# Patient Record
Sex: FEMALE | Race: WHITE | NOT HISPANIC OR LATINO | Employment: UNEMPLOYED | URBAN - METROPOLITAN AREA
[De-identification: names, ages, dates, MRNs, and addresses within clinical notes are randomized per-mention and may not be internally consistent; named-entity substitution may affect disease eponyms.]

---

## 2017-03-30 ENCOUNTER — HOSPITAL ENCOUNTER (EMERGENCY)
Facility: MEDICAL CENTER | Age: 52
End: 2017-03-30
Attending: EMERGENCY MEDICINE
Payer: MEDICAID

## 2017-03-30 VITALS
SYSTOLIC BLOOD PRESSURE: 104 MMHG | BODY MASS INDEX: 21.26 KG/M2 | RESPIRATION RATE: 14 BRPM | DIASTOLIC BLOOD PRESSURE: 79 MMHG | OXYGEN SATURATION: 99 % | HEIGHT: 63 IN | TEMPERATURE: 96.3 F | WEIGHT: 120 LBS | HEART RATE: 76 BPM

## 2017-03-30 DIAGNOSIS — R40.1 STUPOR: ICD-10-CM

## 2017-03-30 DIAGNOSIS — F10.921 ALCOHOL INTOXICATION, WITH DELIRIUM (HCC): ICD-10-CM

## 2017-03-30 LAB
GLUCOSE BLD-MCNC: 73 MG/DL (ref 65–99)
POC BREATHALIZER: 0.21 PERCENT (ref 0–0.01)

## 2017-03-30 PROCEDURE — 82962 GLUCOSE BLOOD TEST: CPT

## 2017-03-30 PROCEDURE — 700105 HCHG RX REV CODE 258: Performed by: EMERGENCY MEDICINE

## 2017-03-30 PROCEDURE — 302970 POC BREATHALIZER: Performed by: EMERGENCY MEDICINE

## 2017-03-30 PROCEDURE — 99284 EMERGENCY DEPT VISIT MOD MDM: CPT

## 2017-03-30 RX ORDER — SODIUM CHLORIDE 9 MG/ML
1000 INJECTION, SOLUTION INTRAVENOUS ONCE
Status: COMPLETED | OUTPATIENT
Start: 2017-03-30 | End: 2017-03-30

## 2017-03-30 RX ADMIN — SODIUM CHLORIDE 1000 ML: 9 INJECTION, SOLUTION INTRAVENOUS at 07:33

## 2017-03-30 NOTE — ED NOTES
Pt still eager to go home. Denies any symptoms. Pts fluid bolus completed. Pt discharged home as she wishes. Pt verbalized understanding. Pt denies wanting help with community resources. Pt encouraged to refrain from drinking. Pt verbalized understanding. Pt left ambulating independently.
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
## 2017-03-30 NOTE — ED AVS SNAPSHOT
AmeriWorks Access Code: [email or web address]  Expires: 4/29/2017  7:02 AM    Your email address is not on file at [facility].  Email Addresses are required for you to sign up for AmeriWorks, please contact [phone] to verify your personal information and to provide your email address prior to attempting to register for AmeriWorks.    Jumana Mosqueda Femi  [address] [place] [address]    AmeriWorks  A secure, online tool to manage your health information     [facility]’s AmeriWorks® is a secure, online tool that connects you to your personalized health information from the privacy of your home -- day or night - making it very easy for you to manage your healthcare. Once the activation process is completed, you can even access your medical information using the AmeriWorks matthew, which is available for free in the Apple Matthew store or Google Play store.     To learn more about AmeriWorks, visit [email or web address]    There are two levels of access available (as shown below):   My Chart Features  [facility] Primary Care Doctor [facility]  Specialists [facility]  Urgent  Care Non-[facility] Primary Care Doctor   Email your healthcare team securely and privately 24/7 X X X    Manage appointments: schedule your next appointment; view details of past/upcoming appointments X      Request prescription refills. X      View recent personal medical records, including lab and immunizations X X X X   View health record, including health history, allergies, medications X X X X   Read reports about your outpatient visits, procedures, consult and ER notes X X X X   See your discharge summary, which is a recap of your hospital and/or ER visit that includes your diagnosis, lab results, and care plan X X  X     How to register for AmeriWorks:  Once your e-mail address has been verified, follow the following steps to sign up for AmeriWorks.     1. Go to  [email or web address]  2. Click on the Sign Up Now box, which takes you to the New Member Sign Up page. You  will need to provide the following information:  a. Enter your Unsilo Access Code exactly as it appears at the top of this page. (You will not need to use this code after you’ve completed the sign-up process. If you do not sign up before the expiration date, you must request a new code.)   b. Enter your date of birth.   c. Enter your home email address.   d. Click Submit, and follow the next screen’s instructions.  3. Create a Captive Mediat ID. This will be your Unsilo login ID and cannot be changed, so think of one that is secure and easy to remember.  4. Create a Unsilo password. You can change your password at any time.  5. Enter your Password Reset Question and Answer. This can be used at a later time if you forget your password.   6. Enter your e-mail address. This allows you to receive e-mail notifications when new information is available in Unsilo.  7. Click Sign Up. You can now view your health information.    For assistance activating your Unsilo account, call [phone]
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
## 2017-03-30 NOTE — ED NOTES
Pt still hypotensive, pt asymptomatic. Pt talking with no complaints other than intoxication. Dr Yeboah notified and fluid bolus started.

## 2017-03-30 NOTE — ED AVS SNAPSHOT
Home Care Instructions                                                                                                                Jumana Kahn   MRN: [id number]    Department:  [facility], Emergency Dept   Date of Visit:  3/30/2017            [facility], Emergency Dept    [address] [place] [id number]    Phone:  [phone]      You were seen by     Kodak Trimble M.D.      Your Diagnosis Was     Stupor     R40.1       Follow-up Information     1. Follow up with [facility], Emergency Dept.    Specialty:  Emergency Medicine    Why:  If symptoms worsen    Contact information    [address] [place] [id number] [phone]      Medication Information     Review all of your home medications and newly ordered medications with your primary doctor and/or pharmacist as soon as possible. Follow medication instructions as directed by your doctor and/or pharmacist.     Please keep your complete medication list with you and share with your physician. Update the information when medications are discontinued, doses are changed, or new medications (including over-the-counter products) are added; and carry medication information at all times in the event of emergency situations.               Medication List      Notice     You have not been prescribed any medications.            Procedures and tests performed during your visit     ACCU-CHEK GLUCOSE    POC BREATHALIZER        Discharge Instructions       Alcohol Intoxication  Alcohol intoxication occurs when the amount of alcohol that a person has consumed impairs his or her ability to mentally and physically function. Alcohol directly impairs the normal chemical activity of the brain. Drinking large amounts of alcohol can lead to changes in mental function and behavior, and it can cause many physical effects that can be harmful.   Alcohol intoxication can range in severity from mild to  "very severe. Various factors can affect the level of intoxication that occurs, such as the person's age, gender, weight, frequency of alcohol consumption, and the presence of other medical conditions (such as diabetes, seizures, or heart conditions). Dangerous levels of alcohol intoxication may occur when people drink large amounts of alcohol in a short period (binge drinking). Alcohol can also be especially dangerous when combined with certain prescription medicines or \"recreational\" drugs.  SIGNS AND SYMPTOMS  Some common signs and symptoms of mild alcohol intoxication include:  · Loss of coordination.  · Changes in mood and behavior.  · Impaired judgment.  · Slurred speech.  As alcohol intoxication progresses to more severe levels, other signs and symptoms will appear. These may include:  · Vomiting.  · Confusion and impaired memory.  · Slowed breathing.  · Seizures.  · Loss of consciousness.  DIAGNOSIS   Your health care provider will take a medical history and perform a physical exam. You will be asked about the amount and type of alcohol you have consumed. Blood tests will be done to measure the concentration of alcohol in your blood. In many places, your blood alcohol level must be lower than 80 mg/dL (0.08%) to legally drive. However, many dangerous effects of alcohol can occur at much lower levels.   TREATMENT   People with alcohol intoxication often do not require treatment. Most of the effects of alcohol intoxication are temporary, and they go away as the alcohol naturally leaves the body. Your health care provider will monitor your condition until you are stable enough to go home. Fluids are sometimes given through an IV access tube to help prevent dehydration.   HOME CARE INSTRUCTIONS  · Do not drive after drinking alcohol.  · Stay hydrated. Drink enough water and fluids to keep your urine clear or pale yellow. Avoid caffeine.    · Only take over-the-counter or prescription medicines as directed by your " health care provider.    SEEK MEDICAL CARE IF:   · You have persistent vomiting.    · You do not feel better after a few days.  · You have frequent alcohol intoxication. Your health care provider can help determine if you should see a substance use treatment counselor.  SEEK IMMEDIATE MEDICAL CARE IF:   · You become shaky or tremble when you try to stop drinking.    · You shake uncontrollably (seizure).    · You throw up (vomit) blood. This may be bright red or may look like black coffee grounds.    · You have blood in your stool. This may be bright red or may appear as a black, tarry, bad smelling stool.    · You become lightheaded or faint.    MAKE SURE YOU:   · Understand these instructions.  · Will watch your condition.  · Will get help right away if you are not doing well or get worse.     This information is not intended to replace advice given to you by your health care provider. Make sure you discuss any questions you have with your health care provider.     Document Released: 09/27/2006 Document Revised: 08/20/2014 Document Reviewed: 05/23/2014  [employer] Interactive Patient Education ©2016 [employer]            Patient Information     Patient Information    Following emergency treatment: all patient requiring follow-up care must return either to a private physician or a clinic if your condition worsens before you are able to obtain further medical attention, please return to the emergency room.     Billing Information    At [facility], we work to make the billing process streamlined for our patients.  Our Representatives are here to answer any questions you may have regarding your hospital bill.  If you have insurance coverage and have supplied your insurance information to us, we will submit a claim to your insurer on your behalf.  Should you have any questions regarding your bill, we can be reached online or by phone as follows:  Online: You are able pay your bills online or live chat with our  representatives about any billing questions you may have. We are here to help Monday - Friday from 8:00am to 7:30pm and 9:00am - 12:00pm on Saturdays.  Please visit [email or web address]  for more information.   Phone:  [phone] or [phone]    Please note that your emergency physician, surgeon, pathologist, radiologist, anesthesiologist, and other specialists are not employed by [facility] and will therefore bill separately for their services.  Please contact them directly for any questions concerning their bills at the numbers below:     Emergency Physician Services:  [phone]  [facility]:  [phone]  [facility]:  [phone]  [facility]:  [phone]    1. Your final bill may vary from the amount quoted upon discharge if all procedures are not complete at that time, or if your doctor has additional procedures of which we are not aware. You will receive an additional bill if you return to the Emergency Department at [facility] for suture removal regardless of the facility of which the sutures were placed.     2. Please arrange for settlement of this account at the emergency registration.    3. All self-pay accounts are due in full at the time of treatment.  If you are unable to meet this obligation then payment is expected within 4-5 days.     4. If you have had radiology studies (CT, X-ray, Ultrasound, MRI), you have received a preliminary result during your emergency department visit. Please contact the radiology department [phone] to receive a copy of your final result. Please discuss the Final result with your primary physician or with the follow up physician provided.     Crisis Hotline:  [place] Crisis Hotline:  6-078-FUBPJEZ or 1-609.671.4469  [place] Crisis Hotline:    1-238.217.8595 or 610-975-8323         ED Discharge Follow Up Questions    1. In order to provide you with very good care, we would  like to follow up with a phone call in the next few days.  May we have your permission to contact you?     YES /  NO    2. What is the best phone number to call you? (       )_____-__________    3. What is the best time to call you?      Morning  /  Afternoon  /  Evening                   Patient Signature:  ____________________________________________________________    Date:  ____________________________________________________________

## 2017-03-30 NOTE — ED NOTES
Pt more awake after some fluids. Pt able to ambulate to the restroom independently. Reporting her B/P is always low. Pt denies any symptoms. Pt a&ox4. Pt eager to leave. B/p has improved. Pt called a friend for a ride home.

## 2017-03-30 NOTE — DISCHARGE INSTRUCTIONS
"Alcohol Intoxication  Alcohol intoxication occurs when the amount of alcohol that a person has consumed impairs his or her ability to mentally and physically function. Alcohol directly impairs the normal chemical activity of the brain. Drinking large amounts of alcohol can lead to changes in mental function and behavior, and it can cause many physical effects that can be harmful.   Alcohol intoxication can range in severity from mild to very severe. Various factors can affect the level of intoxication that occurs, such as the person's age, gender, weight, frequency of alcohol consumption, and the presence of other medical conditions (such as diabetes, seizures, or heart conditions). Dangerous levels of alcohol intoxication may occur when people drink large amounts of alcohol in a short period (binge drinking). Alcohol can also be especially dangerous when combined with certain prescription medicines or \"recreational\" drugs.  SIGNS AND SYMPTOMS  Some common signs and symptoms of mild alcohol intoxication include:  · Loss of coordination.  · Changes in mood and behavior.  · Impaired judgment.  · Slurred speech.  As alcohol intoxication progresses to more severe levels, other signs and symptoms will appear. These may include:  · Vomiting.  · Confusion and impaired memory.  · Slowed breathing.  · Seizures.  · Loss of consciousness.  DIAGNOSIS   Your health care provider will take a medical history and perform a physical exam. You will be asked about the amount and type of alcohol you have consumed. Blood tests will be done to measure the concentration of alcohol in your blood. In many places, your blood alcohol level must be lower than 80 mg/dL (0.08%) to legally drive. However, many dangerous effects of alcohol can occur at much lower levels.   TREATMENT   People with alcohol intoxication often do not require treatment. Most of the effects of alcohol intoxication are temporary, and they go away as the alcohol naturally " leaves the body. Your health care provider will monitor your condition until you are stable enough to go home. Fluids are sometimes given through an IV access tube to help prevent dehydration.   HOME CARE INSTRUCTIONS  · Do not drive after drinking alcohol.  · Stay hydrated. Drink enough water and fluids to keep your urine clear or pale yellow. Avoid caffeine.    · Only take over-the-counter or prescription medicines as directed by your health care provider.    SEEK MEDICAL CARE IF:   · You have persistent vomiting.    · You do not feel better after a few days.  · You have frequent alcohol intoxication. Your health care provider can help determine if you should see a substance use treatment counselor.  SEEK IMMEDIATE MEDICAL CARE IF:   · You become shaky or tremble when you try to stop drinking.    · You shake uncontrollably (seizure).    · You throw up (vomit) blood. This may be bright red or may look like black coffee grounds.    · You have blood in your stool. This may be bright red or may appear as a black, tarry, bad smelling stool.    · You become lightheaded or faint.    MAKE SURE YOU:   · Understand these instructions.  · Will watch your condition.  · Will get help right away if you are not doing well or get worse.     This information is not intended to replace advice given to you by your health care provider. Make sure you discuss any questions you have with your health care provider.     Document Released: 09/27/2006 Document Revised: 08/20/2014 Document Reviewed: 05/23/2014  [employer] Interactive Patient Education ©2016 [employer]

## 2017-03-30 NOTE — ED NOTES
Pt sleeping in fetal position in gurney, even respirations. On monitor with alarms audible. Pt within view from nurses station. Will continue to monitor.

## 2017-03-30 NOTE — ED AVS SNAPSHOT
3/30/2017          Jumana Kahn  [address] [place] [address]    Dear Jumana:    [facility] wants to ensure your discharge home is safe and you or your loved ones have had all your questions answered regarding your care after you leave the hospital.    You may receive a telephone call within two days of your discharge.  This call is to make certain you understand your discharge instructions as well as ensure we provided you with the best care possible during your stay with us.     The call will only last approximately 3-5 minutes and will be done by a nurse.    Once again, we want to ensure your discharge home is safe and that you have a clear understanding of any next steps in your care.  If you have any questions or concerns, please do not hesitate to contact us, we are here for you.  Thank you for choosing [facility] for your healthcare needs.    Sincerely,    Jose Vivas    [facility]

## 2017-06-16 ENCOUNTER — HOSPITAL ENCOUNTER (EMERGENCY)
Facility: MEDICAL CENTER | Age: 52
End: 2017-06-16
Payer: MEDICAID

## 2017-06-16 VITALS
BODY MASS INDEX: 21.85 KG/M2 | DIASTOLIC BLOOD PRESSURE: 69 MMHG | TEMPERATURE: 98.2 F | HEART RATE: 90 BPM | SYSTOLIC BLOOD PRESSURE: 100 MMHG | RESPIRATION RATE: 16 BRPM | HEIGHT: 64 IN | WEIGHT: 128 LBS

## 2017-06-16 PROCEDURE — 302449 STATCHG TRIAGE ONLY (STATISTIC)

## 2017-06-16 ASSESSMENT — PAIN SCALES - GENERAL: PAINLEVEL_OUTOF10: 5
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
## 2017-10-03 ENCOUNTER — HOME HEALTH ADMISSION (OUTPATIENT)
Dept: HOME HEALTH SERVICES | Facility: HOME HEALTHCARE | Age: 52
End: 2017-10-03
Payer: MEDICAID

## 2018-07-11 ENCOUNTER — HOSPITAL ENCOUNTER (EMERGENCY)
Facility: MEDICAL CENTER | Age: 53
End: 2018-07-12
Attending: EMERGENCY MEDICINE
Payer: MEDICAID

## 2018-07-11 ENCOUNTER — APPOINTMENT (OUTPATIENT)
Dept: RADIOLOGY | Facility: MEDICAL CENTER | Age: 53
End: 2018-07-11
Attending: EMERGENCY MEDICINE
Payer: MEDICAID

## 2018-07-11 DIAGNOSIS — T07.XXXA MULTIPLE CONTUSIONS: ICD-10-CM

## 2018-07-11 DIAGNOSIS — F10.920 ALCOHOLIC INTOXICATION WITHOUT COMPLICATION (HCC): ICD-10-CM

## 2018-07-11 DIAGNOSIS — S06.0X1A CONCUSSION WITH LOSS OF CONSCIOUSNESS OF 30 MINUTES OR LESS, INITIAL ENCOUNTER: ICD-10-CM

## 2018-07-11 DIAGNOSIS — S01.81XA FACIAL LACERATION, INITIAL ENCOUNTER: ICD-10-CM

## 2018-07-11 LAB
ALBUMIN SERPL BCP-MCNC: 4.1 G/DL (ref 3.2–4.9)
ALBUMIN/GLOB SERPL: 1.1 G/DL
ALP SERPL-CCNC: 87 U/L (ref 30–99)
ALT SERPL-CCNC: 94 U/L (ref 2–50)
ANION GAP SERPL CALC-SCNC: 13 MMOL/L (ref 0–11.9)
AST SERPL-CCNC: 85 U/L (ref 12–45)
BASOPHILS # BLD AUTO: 2.6 % (ref 0–1.8)
BASOPHILS # BLD: 0.14 K/UL (ref 0–0.12)
BILIRUB SERPL-MCNC: 0.3 MG/DL (ref 0.1–1.5)
BUN SERPL-MCNC: 22 MG/DL (ref 8–22)
CALCIUM SERPL-MCNC: 9 MG/DL (ref 8.5–10.5)
CHLORIDE SERPL-SCNC: 112 MMOL/L (ref 96–112)
CO2 SERPL-SCNC: 22 MMOL/L (ref 20–33)
CREAT SERPL-MCNC: 0.89 MG/DL (ref 0.5–1.4)
EOSINOPHIL # BLD AUTO: 0.02 K/UL (ref 0–0.51)
EOSINOPHIL NFR BLD: 0.4 % (ref 0–6.9)
ERYTHROCYTE [DISTWIDTH] IN BLOOD BY AUTOMATED COUNT: 64.8 FL (ref 35.9–50)
ETHANOL BLD-MCNC: 0.26 G/DL
GLOBULIN SER CALC-MCNC: 3.7 G/DL (ref 1.9–3.5)
GLUCOSE SERPL-MCNC: 78 MG/DL (ref 65–99)
HCT VFR BLD AUTO: 39.3 % (ref 37–47)
HGB BLD-MCNC: 12.9 G/DL (ref 12–16)
IMM GRANULOCYTES # BLD AUTO: 0.04 K/UL (ref 0–0.11)
IMM GRANULOCYTES NFR BLD AUTO: 0.7 % (ref 0–0.9)
LIPASE SERPL-CCNC: 89 U/L (ref 11–82)
LYMPHOCYTES # BLD AUTO: 1.74 K/UL (ref 1–4.8)
LYMPHOCYTES NFR BLD: 32.4 % (ref 22–41)
MCH RBC QN AUTO: 35.9 PG (ref 27–33)
MCHC RBC AUTO-ENTMCNC: 32.8 G/DL (ref 33.6–35)
MCV RBC AUTO: 109.5 FL (ref 81.4–97.8)
MONOCYTES # BLD AUTO: 0.43 K/UL (ref 0–0.85)
MONOCYTES NFR BLD AUTO: 8 % (ref 0–13.4)
NEUTROPHILS # BLD AUTO: 3 K/UL (ref 2–7.15)
NEUTROPHILS NFR BLD: 55.9 % (ref 44–72)
NRBC # BLD AUTO: 0 K/UL
NRBC BLD-RTO: 0 /100 WBC
PLATELET # BLD AUTO: 203 K/UL (ref 164–446)
PMV BLD AUTO: 9.3 FL (ref 9–12.9)
POTASSIUM SERPL-SCNC: 3.7 MMOL/L (ref 3.6–5.5)
PROT SERPL-MCNC: 7.8 G/DL (ref 6–8.2)
RBC # BLD AUTO: 3.59 M/UL (ref 4.2–5.4)
SODIUM SERPL-SCNC: 147 MMOL/L (ref 135–145)
WBC # BLD AUTO: 5.4 K/UL (ref 4.8–10.8)

## 2018-07-11 PROCEDURE — 73090 X-RAY EXAM OF FOREARM: CPT | Mod: RT

## 2018-07-11 PROCEDURE — 70450 CT HEAD/BRAIN W/O DYE: CPT

## 2018-07-11 PROCEDURE — 80053 COMPREHEN METABOLIC PANEL: CPT

## 2018-07-11 PROCEDURE — 72125 CT NECK SPINE W/O DYE: CPT

## 2018-07-11 PROCEDURE — 99285 EMERGENCY DEPT VISIT HI MDM: CPT

## 2018-07-11 PROCEDURE — 71045 X-RAY EXAM CHEST 1 VIEW: CPT

## 2018-07-11 PROCEDURE — 80307 DRUG TEST PRSMV CHEM ANLYZR: CPT

## 2018-07-11 PROCEDURE — 85025 COMPLETE CBC W/AUTO DIFF WBC: CPT

## 2018-07-11 PROCEDURE — 83690 ASSAY OF LIPASE: CPT

## 2018-07-11 RX ORDER — DEXTROAMPHETAMINE SACCHARATE, AMPHETAMINE ASPARTATE MONOHYDRATE, DEXTROAMPHETAMINE SULFATE AND AMPHETAMINE SULFATE 5; 5; 5; 5 MG/1; MG/1; MG/1; MG/1
20 CAPSULE, EXTENDED RELEASE ORAL 3 TIMES DAILY
COMMUNITY
End: 2019-07-24

## 2018-07-11 RX ORDER — LEVOTHYROXINE SODIUM 0.05 MG/1
50 TABLET ORAL
Status: ON HOLD | COMMUNITY
End: 2020-05-13

## 2018-07-11 RX ORDER — NITROFURANTOIN 25; 75 MG/1; MG/1
100 CAPSULE ORAL 2 TIMES DAILY
COMMUNITY
Start: 2018-07-02 | End: 2019-07-24

## 2018-07-11 RX ORDER — IBUPROFEN 800 MG/1
800 TABLET ORAL EVERY 8 HOURS PRN
COMMUNITY
End: 2019-09-16

## 2018-07-11 ASSESSMENT — PAIN SCALES - GENERAL: PAINLEVEL_OUTOF10: 5

## 2018-07-12 ENCOUNTER — APPOINTMENT (OUTPATIENT)
Dept: RADIOLOGY | Facility: MEDICAL CENTER | Age: 53
End: 2018-07-12
Attending: EMERGENCY MEDICINE
Payer: MEDICAID

## 2018-07-12 VITALS
BODY MASS INDEX: 21.26 KG/M2 | WEIGHT: 132.28 LBS | OXYGEN SATURATION: 96 % | HEIGHT: 66 IN | HEART RATE: 67 BPM | SYSTOLIC BLOOD PRESSURE: 115 MMHG | TEMPERATURE: 97.7 F | RESPIRATION RATE: 18 BRPM | DIASTOLIC BLOOD PRESSURE: 65 MMHG

## 2018-07-12 PROCEDURE — A9270 NON-COVERED ITEM OR SERVICE: HCPCS | Performed by: EMERGENCY MEDICINE

## 2018-07-12 PROCEDURE — 90715 TDAP VACCINE 7 YRS/> IM: CPT | Performed by: EMERGENCY MEDICINE

## 2018-07-12 PROCEDURE — 70486 CT MAXILLOFACIAL W/O DYE: CPT

## 2018-07-12 PROCEDURE — 700102 HCHG RX REV CODE 250 W/ 637 OVERRIDE(OP): Performed by: EMERGENCY MEDICINE

## 2018-07-12 PROCEDURE — 304217 HCHG IRRIGATION SYSTEM

## 2018-07-12 PROCEDURE — 304999 HCHG REPAIR-SIMPLE/INTERMED LEVEL 1

## 2018-07-12 PROCEDURE — 700111 HCHG RX REV CODE 636 W/ 250 OVERRIDE (IP): Performed by: EMERGENCY MEDICINE

## 2018-07-12 PROCEDURE — 90471 IMMUNIZATION ADMIN: CPT

## 2018-07-12 PROCEDURE — 303747 HCHG EXTRA SUTURE

## 2018-07-12 PROCEDURE — 99285 EMERGENCY DEPT VISIT HI MDM: CPT

## 2018-07-12 RX ORDER — CEPHALEXIN 500 MG/1
500 CAPSULE ORAL 4 TIMES DAILY
Qty: 20 CAP | Refills: 0 | Status: SHIPPED | OUTPATIENT
Start: 2018-07-12 | End: 2018-07-17

## 2018-07-12 RX ORDER — CEPHALEXIN 500 MG/1
500 CAPSULE ORAL ONCE
Status: COMPLETED | OUTPATIENT
Start: 2018-07-12 | End: 2018-07-12

## 2018-07-12 RX ADMIN — CEPHALEXIN 500 MG: 500 CAPSULE ORAL at 02:10

## 2018-07-12 RX ADMIN — CLOSTRIDIUM TETANI TOXOID ANTIGEN (FORMALDEHYDE INACTIVATED), CORYNEBACTERIUM DIPHTHERIAE TOXOID ANTIGEN (FORMALDEHYDE INACTIVATED), BORDETELLA PERTUSSIS TOXOID ANTIGEN (GLUTARALDEHYDE INACTIVATED), BORDETELLA PERTUSSIS FILAMENTOUS HEMAGGLUTININ ANTIGEN (FORMALDEHYDE INACTIVATED), BORDETELLA PERTUSSIS PERTACTIN ANTIGEN, AND BORDETELLA PERTUSSIS FIMBRIAE 2/3 ANTIGEN 0.5 ML: 5; 2; 2.5; 5; 3; 5 INJECTION, SUSPENSION INTRAMUSCULAR at 02:09

## 2018-07-12 RX ADMIN — LIDOCAINE HYDROCHLORIDE 20 ML: 10 INJECTION, SOLUTION EPIDURAL; INFILTRATION; INTRACAUDAL; PERINEURAL at 02:25

## 2018-07-12 NOTE — ED NOTES
Discharge instructions given to patient. Education provided on diagnosis, treatment, follow up, and prescriptions provided. Pt verbalized understanding. All questions answered. IV removed. Pt ambulatory to lobby with steady gait.
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
## 2018-07-12 NOTE — ED NOTES
Med rec complete per pt and a call to [facility] at [phone] to verify recent ABX and adderall dosing  Allergies reviewed  Pt finished a 10 day course of Macrobid 7/10/18  Pt was taking Adderall XR 20mg TID, but reports she has been out for around 2 weeks  Last fill date verifies this information as well

## 2018-07-13 ENCOUNTER — HOSPITAL ENCOUNTER (EMERGENCY)
Facility: MEDICAL CENTER | Age: 53
End: 2018-07-13
Payer: MEDICAID

## 2018-07-13 VITALS
RESPIRATION RATE: 17 BRPM | OXYGEN SATURATION: 94 % | HEIGHT: 64 IN | SYSTOLIC BLOOD PRESSURE: 116 MMHG | TEMPERATURE: 98.3 F | HEART RATE: 96 BPM | DIASTOLIC BLOOD PRESSURE: 82 MMHG

## 2018-07-13 PROCEDURE — 302449 STATCHG TRIAGE ONLY (STATISTIC)

## 2018-07-20 ENCOUNTER — HOSPITAL ENCOUNTER (EMERGENCY)
Facility: MEDICAL CENTER | Age: 53
End: 2018-07-20
Attending: EMERGENCY MEDICINE
Payer: MEDICAID

## 2018-07-20 VITALS
TEMPERATURE: 97 F | RESPIRATION RATE: 17 BRPM | WEIGHT: 135.8 LBS | HEART RATE: 94 BPM | BODY MASS INDEX: 23.31 KG/M2 | OXYGEN SATURATION: 97 % | SYSTOLIC BLOOD PRESSURE: 125 MMHG | DIASTOLIC BLOOD PRESSURE: 71 MMHG

## 2018-07-20 DIAGNOSIS — B86 SCABIES: ICD-10-CM

## 2018-07-20 DIAGNOSIS — Z48.02 VISIT FOR SUTURE REMOVAL: ICD-10-CM

## 2018-07-20 PROCEDURE — 99281 EMR DPT VST MAYX REQ PHY/QHP: CPT | Mod: EDC

## 2018-07-20 PROCEDURE — 99283 EMERGENCY DEPT VISIT LOW MDM: CPT | Mod: EDC

## 2018-07-20 RX ORDER — PERMETHRIN 50 MG/G
CREAM TOPICAL
Qty: 60 G | Refills: 0 | Status: SHIPPED | OUTPATIENT
Start: 2018-07-20 | End: 2019-07-24

## 2018-07-20 RX ORDER — PERMETHRIN 50 MG/G
CREAM TOPICAL
Qty: 60 G | Refills: 0 | Status: SHIPPED | OUTPATIENT
Start: 2018-07-20 | End: 2018-07-20

## 2018-07-20 ASSESSMENT — PAIN SCALES - GENERAL: PAINLEVEL_OUTOF10: 0

## 2018-07-21 NOTE — ED PROVIDER NOTES
ED Provider Note    Scribed for Oral Saldivar M.D. by Kateryna Romo. 7/20/2018  8:14 PM    Primary care provider: JENNY Jay  Means of arrival: walk-in  History obtained from: Patient  History limited by: None    CHIEF COMPLAINT  Chief Complaint   Patient presents with   • Suture Removal     Above L eye x6 days. Redness noted   • Rash     L arm medial to elbow. Possible exposure to scabies from child.       HPI  Jumana Kahn is a 53 y.o. female who presents to the Emergency Department for evaluation of a healing laceration located just above the left eye, that was repaired 6 days ago with sutures. She is here for suture removal as they were supposed to be removed yesterday. Patient was placed on antibiotics at initial evaluation, and has one more day left.   Patient also developed a rash along her right arm this morning with associated itching. She has been babysitting a child that was recently diagnosed with scabies infection, and she is concerned that this has now spread to her.  No complaints of fever, drainage from the wound.    REVIEW OF SYSTEMS  Pertinent positives include rash, laceration evaluation. Pertinent negatives include no fever, drainage from the wound.      PAST MEDICAL HISTORY   has a past medical history of Hypothyroid and Psychiatric disorder.    SURGICAL HISTORY   has a past surgical history that includes other orthopedic surgery; gyn surgery; and other.    SOCIAL HISTORY  Social History   Substance Use Topics   • Smoking status: Never Smoker   • Alcohol use Yes      History   Drug Use No     CURRENT MEDICATIONS  No current facility-administered medications on file prior to encounter.      Current Outpatient Prescriptions on File Prior to Encounter   Medication Sig Dispense Refill   • ibuprofen (MOTRIN) 800 MG Tab Take 800 mg by mouth every 8 hours as needed for Mild Pain.     • levothyroxine (SYNTHROID) 50 MCG Tab Take 50 mcg by mouth Every morning on an empty  stomach.     • amphetamine-dextroamphetamine (ADDERALL XR, 20MG,) 20 MG per XR capsule Take 20 mg by mouth 3 times a day.     • nitrofurantoin monohydr macro (MACROBID) 100 MG Cap Take 100 mg by mouth 2 times a day. 10 day course, finished 7/10/18         ALLERGIES  Allergies   Allergen Reactions   • Ampicillin Rash     Skin rash       PHYSICAL EXAM  VITAL SIGNS: /71   Pulse 94   Temp 36.1 °C (97 °F)   Resp 17   Wt 61.6 kg (135 lb 12.9 oz)   SpO2 97%   BMI 23.31 kg/m²     Constitutional: Well developed, Well nourished, no distress, Non-toxic appearance.   HENT: Normocephalic, well healed laceration over left eyebrow, no surround redness, induration or drainage.  Oropharynx moist.   Eyes: PERRL, EOMI, Conjunctiva normal, No discharge.   CV: Good pulses  Thorax & Lungs: No respiratory distress.   Skin: Warm, Dry, several raised red papules on the inside of the left arm   Musculoskeletal: No major deformities noted.   Neurologic: Awake, alert. Moves all extremities spontaneously.  Psychiatric: Affect normal, Mood normal.    Suture/ Staple Removal Procedure Note    Indication: Wound healed    Procedure: The patient was placed in the appropriate position and the sutures were removed without difficulty.    Other items: Suture count: 7    The patient tolerated the procedure well.    Complications: None    COURSE & MEDICAL DECISION MAKING  Nursing notes, VS, PMSFHx reviewed in chart.    8:14 PM - Patient seen and examined at bedside. She presents afebrile with a healing, sutured laceration above her left eyebrow seeking suture removal. There is no surrounding redness, induration or drainage at this time. Patient is currently completing her prescribed regimen of antibiotics to prevent any infections. She is also complaining of a itchy rash located along her left arm that has been present since this morning, after recent exposure to a child infected with scabies. I informed the patient that we would treat any  possible infestation with ointment and remove the sutures here in the ED prior to discharge. Patient understands and agrees with treatment plan.    Decision Makin-year-old female here for suture removal, also scabies exposure with new arm rash.  Rash consistent with scabies infection.  No signs of wound infection.  Sutures removed in the department without issue.  Given care instructions for scabies infection, prescription for permethrin lotion.  Given strict return precautions, care instructions.  Advised PCP follow-up in 1-2 days.  Patient expresses understanding agrees to plan.     The patient will return for new or worsening symptoms and is stable at the time of discharge.    DISPOSITION:  Patient will be discharged home in stable condition.    FOLLOW UP:  JENNY Jay  [address]  [address] [id number] [phone]    Schedule an appointment as soon as possible for a visit in 2 days        OUTPATIENT MEDICATIONS:  New Prescriptions    PERMETHRIN (ELIMITE) 5 % CREAM    Apply to entire body, leave on for 12 hours, then wash off.  Repeat application in 1 week.         FINAL IMPRESSION  1. Scabies    2. Visit for suture removal          Kateryna SALGADO (Scribe), am scribing for, and in the presence of, Oral Saldivar M.D..    Electronically signed by: Kateryna Romo (Gurpreetibsurinder), 2018    Oral SALGADO M.D. personally performed the services described in this documentation, as scribed by Kateryna Romo in my presence, and it is both accurate and complete.    The note accurately reflects work and decisions made by me.  Oral Saldivar  2018  8:35 PM

## 2018-07-21 NOTE — DISCHARGE INSTRUCTIONS
Scabies, Adult  Introduction  Scabies is a skin condition that happens when very small insects get under the skin (infestation). This causes a rash and severe itchiness. Scabies can spread from person to person (is contagious). If you get scabies, it is common for others in your household to get scabies too.  With proper treatment, symptoms usually go away in 2-4 weeks. Scabies usually does not cause lasting problems.  What are the causes?  This condition is caused by mites (Sarcoptes scabiei, or human itch mites) that can only be seen with a microscope. The mites get into the top layer of skin and lay eggs. Scabies can spread from person to person through:  · Close contact with a person who has scabies.  · Contact with infested items, such as towels, bedding, or clothing.  What increases the risk?  This condition is more likely to develop in:  · People who live in nursing homes and other extended-care facilities.  · People who have sexual contact with a partner who has scabies.  · Young children who attend  facilities.  · People who care for others who are at increased risk for scabies.  What are the signs or symptoms?  Symptoms of this condition may include:  · Severe itchiness. This is often worse at night.  · A rash that includes tiny red bumps or blisters. The rash commonly occurs on the wrist, elbow, armpit, fingers, waist, groin, or buttocks. Bumps may form a line (burrow) in some areas.  · Skin irritation. This can include scaly patches or sores.  How is this diagnosed?  This condition is diagnosed with a physical exam. Your health care provider will look closely at your skin. In some cases, your health care provider may take a sample of your affected skin (skin scraping) and have it examined under a microscope.  How is this treated?  This condition may be treated with:  · Medicated cream or lotion that kills the mites. This is spread on the entire body and left on for several hours. Usually, one  treatment with medicated cream or lotion is enough to kill all of the mites. In severe cases, the treatment may be repeated.  · Medicated cream that relieves itching.  · Medicines that help to relieve itching.  · Medicines that kill the mites. This treatment is rarely used.  Follow these instructions at home:     Medicines  · Take or apply over-the-counter and prescription medicines as told by your health care provider.  · Apply medicated cream or lotion as told by your health care provider.  · Do not wash off the medicated cream or lotion until the necessary amount of time has passed.  Skin Care  · Avoid scratching your affected skin.  · Keep your fingernails closely trimmed to reduce injury from scratching.  · Take cool baths or apply cool washcloths to help reduce itching.  General instructions  · Clean all items that you recently had contact with, including bedding, clothing, and furniture. Do this on the same day that your treatment starts.  ¨ Use hot water when you wash items.  ¨ Place unwashable items into closed, airtight plastic bags for at least 3 days. The mites cannot live for more than 3 days away from human skin.  ¨ Vacuum furniture and mattresses that you use.  · Make sure that other people who may have been infested are examined by a health care provider. These include members of your household and anyone who may have had contact with infested items.  · Keep all follow-up visits as told by your health care provider. This is important.  Contact a health care provider if:  · You have itching that does not go away after 4 weeks of treatment.  · You continue to develop new bumps or burrows.  · You have redness, swelling, or pain in your rash area after treatment.  · You have fluid, blood, or pus coming from your rash.  This information is not intended to replace advice given to you by your health care provider. Make sure you discuss any questions you have with your health care provider.  Document  Released: 09/07/2016 Document Revised: 05/25/2017 Document Reviewed: 07/19/2016  © 2017 [employer]    Suture Removal, Care After  Refer to this sheet in the next few weeks. These instructions provide you with information on caring for yourself after your procedure. Your health care provider may also give you more specific instructions. Your treatment has been planned according to current medical practices, but problems sometimes occur. Call your health care provider if you have any problems or questions after your procedure.  WHAT TO EXPECT AFTER THE PROCEDURE  After your stitches (sutures) are removed, it is typical to have the following:  · Some discomfort and swelling in the wound area.  · Slight redness in the area.  HOME CARE INSTRUCTIONS   · If you have skin adhesive strips over the wound area, do not take the strips off. They will fall off on their own in a few days. If the strips remain in place after 14 days, you may remove them.  · Change any bandages (dressings) at least once a day or as directed by your health care provider. If the bandage sticks, soak it off with warm, soapy water.  · Apply cream or ointment only as directed by your health care provider. If using cream or ointment, wash the area with soap and water 2 times a day to remove all the cream or ointment. Rinse off the soap and pat the area dry with a clean towel.  · Keep the wound area dry and clean. If the bandage becomes wet or dirty, or if it develops a bad smell, change it as soon as possible.  · Continue to protect the wound from injury.  · Use sunscreen when out in the sun. New scars become sunburned easily.  SEEK MEDICAL CARE IF:  · You have increasing redness, swelling, or pain in the wound.  · You see pus coming from the wound.  · You have a fever.  · You notice a bad smell coming from the wound or dressing.  · Your wound breaks open (edges not staying together).     This information is not intended to replace advice given to you by  your health care provider. Make sure you discuss any questions you have with your health care provider.     Document Released: 09/12/2002 Document Revised: 10/08/2014 Document Reviewed: 07/30/2014  Elsevier Interactive Patient Education ©2016 [employer]

## 2018-07-21 NOTE — ED TRIAGE NOTES
Chief Complaint   Patient presents with   • Suture Removal     Above L eye x6 days. Redness noted   • Rash     L arm medial to elbow. Possible exposure to scabies from child.     /71   Pulse 94   Temp 36.1 °C (97 °F)   Resp 17   Wt 61.6 kg (135 lb 12.9 oz)   SpO2 97%   BMI 23.31 kg/m²     Pt ambulated into triage, complaints as above. VS as above, NAD, encouraged to return to the triage nurse or tech with any new complaints or symptoms.

## 2018-07-21 NOTE — ED NOTES
Discharge instructions discussed with patient, copy of discharge instructions and rx for elimite given to patient. Instructed to follow up with JENNY Jay  [address]  [facility] [id number] [phone]    Schedule an appointment as soon as possible for a visit in 2 days    .  Verbalized understanding of discharge information. Pt discharged to patient. Pt awake, alert, calm, NAD, age appropriate. VSS.

## 2018-07-21 NOTE — ED NOTES
Pt placed in room 48. Suture removal to L eye brow. 8 sutures in place. - erythema -edema. Healing well. In addition, rash diffusely described as itching.

## 2018-08-01 ENCOUNTER — HOSPITAL ENCOUNTER (EMERGENCY)
Facility: MEDICAL CENTER | Age: 53
End: 2018-08-01
Payer: MEDICAID

## 2018-08-01 VITALS
HEART RATE: 95 BPM | BODY MASS INDEX: 21.46 KG/M2 | RESPIRATION RATE: 18 BRPM | WEIGHT: 125 LBS | SYSTOLIC BLOOD PRESSURE: 127 MMHG | TEMPERATURE: 98.3 F | DIASTOLIC BLOOD PRESSURE: 89 MMHG | OXYGEN SATURATION: 96 %

## 2018-08-01 PROCEDURE — 302449 STATCHG TRIAGE ONLY (STATISTIC)
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
## 2018-08-10 ENCOUNTER — HOSPITAL ENCOUNTER (EMERGENCY)
Facility: MEDICAL CENTER | Age: 53
End: 2018-08-11
Attending: EMERGENCY MEDICINE
Payer: MEDICAID

## 2018-08-10 ENCOUNTER — APPOINTMENT (OUTPATIENT)
Dept: RADIOLOGY | Facility: MEDICAL CENTER | Age: 53
End: 2018-08-10
Attending: EMERGENCY MEDICINE
Payer: MEDICAID

## 2018-08-10 DIAGNOSIS — S93.409A SPRAIN OF ANKLE, UNSPECIFIED LATERALITY, UNSPECIFIED LIGAMENT, INITIAL ENCOUNTER: ICD-10-CM
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
## 2018-08-10 DIAGNOSIS — S01.01XA LACERATION OF SCALP, INITIAL ENCOUNTER: ICD-10-CM

## 2018-08-10 DIAGNOSIS — F10.929 ALCOHOLIC INTOXICATION WITH COMPLICATION (HCC): ICD-10-CM

## 2018-08-10 DIAGNOSIS — S09.90XA CLOSED HEAD INJURY, INITIAL ENCOUNTER: ICD-10-CM

## 2018-08-10 LAB
ALBUMIN SERPL BCP-MCNC: 4.6 G/DL (ref 3.2–4.9)
ALBUMIN/GLOB SERPL: 1.4 G/DL
ALP SERPL-CCNC: 71 U/L (ref 30–99)
ALT SERPL-CCNC: 60 U/L (ref 2–50)
ANION GAP SERPL CALC-SCNC: 20 MMOL/L (ref 0–11.9)
APTT PPP: 28.5 SEC (ref 24.7–36)
AST SERPL-CCNC: 105 U/L (ref 12–45)
BASOPHILS # BLD AUTO: 1.5 % (ref 0–1.8)
BASOPHILS # BLD: 0.1 K/UL (ref 0–0.12)
BILIRUB SERPL-MCNC: 0.5 MG/DL (ref 0.1–1.5)
BUN SERPL-MCNC: 13 MG/DL (ref 8–22)
CALCIUM SERPL-MCNC: 8.8 MG/DL (ref 8.5–10.5)
CHLORIDE SERPL-SCNC: 107 MMOL/L (ref 96–112)
CO2 SERPL-SCNC: 17 MMOL/L (ref 20–33)
CREAT SERPL-MCNC: 0.79 MG/DL (ref 0.5–1.4)
EOSINOPHIL # BLD AUTO: 0.13 K/UL (ref 0–0.51)
EOSINOPHIL NFR BLD: 2 % (ref 0–6.9)
ERYTHROCYTE [DISTWIDTH] IN BLOOD BY AUTOMATED COUNT: 57.1 FL (ref 35.9–50)
ETHANOL BLD-MCNC: 0.39 G/DL
GLOBULIN SER CALC-MCNC: 3.2 G/DL (ref 1.9–3.5)
GLUCOSE SERPL-MCNC: 72 MG/DL (ref 65–99)
HCT VFR BLD AUTO: 34.2 % (ref 37–47)
HGB BLD-MCNC: 11.6 G/DL (ref 12–16)
IMM GRANULOCYTES # BLD AUTO: 0.01 K/UL (ref 0–0.11)
IMM GRANULOCYTES NFR BLD AUTO: 0.2 % (ref 0–0.9)
INR PPP: 1.13 (ref 0.87–1.13)
LYMPHOCYTES # BLD AUTO: 3.91 K/UL (ref 1–4.8)
LYMPHOCYTES NFR BLD: 60.4 % (ref 22–41)
MCH RBC QN AUTO: 35.4 PG (ref 27–33)
MCHC RBC AUTO-ENTMCNC: 33.9 G/DL (ref 33.6–35)
MCV RBC AUTO: 104.3 FL (ref 81.4–97.8)
MONOCYTES # BLD AUTO: 0.54 K/UL (ref 0–0.85)
MONOCYTES NFR BLD AUTO: 8.3 % (ref 0–13.4)
NEUTROPHILS # BLD AUTO: 1.78 K/UL (ref 2–7.15)
NEUTROPHILS NFR BLD: 27.6 % (ref 44–72)
NRBC # BLD AUTO: 0 K/UL
NRBC BLD-RTO: 0 /100 WBC
PLATELET # BLD AUTO: 98 K/UL (ref 164–446)
PMV BLD AUTO: 9.6 FL (ref 9–12.9)
POTASSIUM SERPL-SCNC: 3.5 MMOL/L (ref 3.6–5.5)
PROT SERPL-MCNC: 7.8 G/DL (ref 6–8.2)
PROTHROMBIN TIME: 14.2 SEC (ref 12–14.6)
RBC # BLD AUTO: 3.28 M/UL (ref 4.2–5.4)
SODIUM SERPL-SCNC: 144 MMOL/L (ref 135–145)
WBC # BLD AUTO: 6.5 K/UL (ref 4.8–10.8)

## 2018-08-10 PROCEDURE — 90471 IMMUNIZATION ADMIN: CPT

## 2018-08-10 PROCEDURE — 305308 HCHG STAPLER,SKIN,DISP.

## 2018-08-10 PROCEDURE — 80307 DRUG TEST PRSMV CHEM ANLYZR: CPT

## 2018-08-10 PROCEDURE — 70450 CT HEAD/BRAIN W/O DYE: CPT

## 2018-08-10 PROCEDURE — 90715 TDAP VACCINE 7 YRS/> IM: CPT | Performed by: EMERGENCY MEDICINE

## 2018-08-10 PROCEDURE — 700105 HCHG RX REV CODE 258: Performed by: EMERGENCY MEDICINE

## 2018-08-10 PROCEDURE — 700102 HCHG RX REV CODE 250 W/ 637 OVERRIDE(OP): Performed by: EMERGENCY MEDICINE

## 2018-08-10 PROCEDURE — 85025 COMPLETE CBC W/AUTO DIFF WBC: CPT

## 2018-08-10 PROCEDURE — 304999 HCHG REPAIR-SIMPLE/INTERMED LEVEL 1

## 2018-08-10 PROCEDURE — 85610 PROTHROMBIN TIME: CPT

## 2018-08-10 PROCEDURE — 99284 EMERGENCY DEPT VISIT MOD MDM: CPT

## 2018-08-10 PROCEDURE — 80053 COMPREHEN METABOLIC PANEL: CPT

## 2018-08-10 PROCEDURE — 36415 COLL VENOUS BLD VENIPUNCTURE: CPT

## 2018-08-10 PROCEDURE — 700111 HCHG RX REV CODE 636 W/ 250 OVERRIDE (IP): Performed by: EMERGENCY MEDICINE

## 2018-08-10 PROCEDURE — 304217 HCHG IRRIGATION SYSTEM

## 2018-08-10 PROCEDURE — A9270 NON-COVERED ITEM OR SERVICE: HCPCS | Performed by: EMERGENCY MEDICINE

## 2018-08-10 PROCEDURE — 73080 X-RAY EXAM OF ELBOW: CPT | Mod: LT

## 2018-08-10 PROCEDURE — 85730 THROMBOPLASTIN TIME PARTIAL: CPT

## 2018-08-10 RX ORDER — ACETAMINOPHEN 325 MG/1
650 TABLET ORAL ONCE
Status: COMPLETED | OUTPATIENT
Start: 2018-08-11 | End: 2018-08-10

## 2018-08-10 RX ORDER — SODIUM CHLORIDE 9 MG/ML
1000 INJECTION, SOLUTION INTRAVENOUS ONCE
Status: COMPLETED | OUTPATIENT
Start: 2018-08-10 | End: 2018-08-10

## 2018-08-10 RX ADMIN — SODIUM CHLORIDE 1000 ML: 9 INJECTION, SOLUTION INTRAVENOUS at 20:45

## 2018-08-10 RX ADMIN — ACETAMINOPHEN 650 MG: 325 TABLET, FILM COATED ORAL at 23:42

## 2018-08-10 RX ADMIN — CLOSTRIDIUM TETANI TOXOID ANTIGEN (FORMALDEHYDE INACTIVATED), CORYNEBACTERIUM DIPHTHERIAE TOXOID ANTIGEN (FORMALDEHYDE INACTIVATED), BORDETELLA PERTUSSIS TOXOID ANTIGEN (GLUTARALDEHYDE INACTIVATED), BORDETELLA PERTUSSIS FILAMENTOUS HEMAGGLUTININ ANTIGEN (FORMALDEHYDE INACTIVATED), BORDETELLA PERTUSSIS PERTACTIN ANTIGEN, AND BORDETELLA PERTUSSIS FIMBRIAE 2/3 ANTIGEN 0.5 ML: 5; 2; 2.5; 5; 3; 5 INJECTION, SUSPENSION INTRAMUSCULAR at 20:44

## 2018-08-10 ASSESSMENT — PAIN SCALES - GENERAL: PAINLEVEL_OUTOF10: 8

## 2018-08-11 VITALS
HEIGHT: 65 IN | DIASTOLIC BLOOD PRESSURE: 85 MMHG | OXYGEN SATURATION: 99 % | BODY MASS INDEX: 20.83 KG/M2 | WEIGHT: 125 LBS | HEART RATE: 79 BPM | TEMPERATURE: 97.6 F | SYSTOLIC BLOOD PRESSURE: 111 MMHG | RESPIRATION RATE: 17 BRPM

## 2018-08-11 LAB
ANION GAP SERPL CALC-SCNC: 14 MMOL/L (ref 0–11.9)
BASOPHILS # BLD AUTO: 1.2 % (ref 0–1.8)
BASOPHILS # BLD: 0.08 K/UL (ref 0–0.12)
BUN SERPL-MCNC: 13 MG/DL (ref 8–22)
CALCIUM SERPL-MCNC: 7.2 MG/DL (ref 8.5–10.5)
CHLORIDE SERPL-SCNC: 112 MMOL/L (ref 96–112)
CO2 SERPL-SCNC: 16 MMOL/L (ref 20–33)
CREAT SERPL-MCNC: 0.6 MG/DL (ref 0.5–1.4)
EOSINOPHIL # BLD AUTO: 0.08 K/UL (ref 0–0.51)
EOSINOPHIL NFR BLD: 1.2 % (ref 0–6.9)
ERYTHROCYTE [DISTWIDTH] IN BLOOD BY AUTOMATED COUNT: 58.5 FL (ref 35.9–50)
ETHANOL BLD-MCNC: 0.16 G/DL
GLUCOSE SERPL-MCNC: 41 MG/DL (ref 65–99)
HCT VFR BLD AUTO: 28.6 % (ref 37–47)
HGB BLD-MCNC: 9.4 G/DL (ref 12–16)
IMM GRANULOCYTES # BLD AUTO: 0.02 K/UL (ref 0–0.11)
IMM GRANULOCYTES NFR BLD AUTO: 0.3 % (ref 0–0.9)
INR PPP: 1.32 (ref 0.87–1.13)
LYMPHOCYTES # BLD AUTO: 2.1 K/UL (ref 1–4.8)
LYMPHOCYTES NFR BLD: 32.2 % (ref 22–41)
MCH RBC QN AUTO: 35.3 PG (ref 27–33)
MCHC RBC AUTO-ENTMCNC: 32.9 G/DL (ref 33.6–35)
MCV RBC AUTO: 107.5 FL (ref 81.4–97.8)
MONOCYTES # BLD AUTO: 0.56 K/UL (ref 0–0.85)
MONOCYTES NFR BLD AUTO: 8.6 % (ref 0–13.4)
NEUTROPHILS # BLD AUTO: 3.69 K/UL (ref 2–7.15)
NEUTROPHILS NFR BLD: 56.5 % (ref 44–72)
NRBC # BLD AUTO: 0 K/UL
NRBC BLD-RTO: 0 /100 WBC
PLATELET # BLD AUTO: 73 K/UL (ref 164–446)
PMV BLD AUTO: 10 FL (ref 9–12.9)
POTASSIUM SERPL-SCNC: 3.4 MMOL/L (ref 3.6–5.5)
PROTHROMBIN TIME: 16.1 SEC (ref 12–14.6)
RBC # BLD AUTO: 2.66 M/UL (ref 4.2–5.4)
SODIUM SERPL-SCNC: 142 MMOL/L (ref 135–145)
WBC # BLD AUTO: 6.5 K/UL (ref 4.8–10.8)

## 2018-08-11 PROCEDURE — 85025 COMPLETE CBC W/AUTO DIFF WBC: CPT

## 2018-08-11 PROCEDURE — 96374 THER/PROPH/DIAG INJ IV PUSH: CPT

## 2018-08-11 PROCEDURE — 700101 HCHG RX REV CODE 250: Performed by: EMERGENCY MEDICINE

## 2018-08-11 PROCEDURE — 82962 GLUCOSE BLOOD TEST: CPT

## 2018-08-11 PROCEDURE — 80307 DRUG TEST PRSMV CHEM ANLYZR: CPT

## 2018-08-11 PROCEDURE — 85610 PROTHROMBIN TIME: CPT

## 2018-08-11 PROCEDURE — 700105 HCHG RX REV CODE 258: Performed by: EMERGENCY MEDICINE

## 2018-08-11 PROCEDURE — 80048 BASIC METABOLIC PNL TOTAL CA: CPT

## 2018-08-11 RX ORDER — DEXTROSE MONOHYDRATE 25 G/50ML
25 INJECTION, SOLUTION INTRAVENOUS ONCE
Status: COMPLETED | OUTPATIENT
Start: 2018-08-11 | End: 2018-08-11

## 2018-08-11 RX ORDER — SODIUM CHLORIDE 9 MG/ML
1000 INJECTION, SOLUTION INTRAVENOUS ONCE
Status: COMPLETED | OUTPATIENT
Start: 2018-08-11 | End: 2018-08-11

## 2018-08-11 RX ADMIN — DEXTROSE MONOHYDRATE 25 ML: 25 INJECTION, SOLUTION INTRAVENOUS at 05:47

## 2018-08-11 RX ADMIN — SODIUM CHLORIDE 1000 ML: 9 INJECTION, SOLUTION INTRAVENOUS at 01:29

## 2018-08-11 RX ADMIN — SODIUM CHLORIDE 1000 ML: 9 INJECTION, SOLUTION INTRAVENOUS at 03:23

## 2018-08-11 NOTE — ED NOTES
Pt ripped IV out, ripped monitor off, yelling about not being able to drink vodka. Pt explained she is intoxicated and unable to leave. ERP notified. IV replaced. IVF hanging. Pt instructed again to not get out of bed, call light again placed in pts hand. Pt encouraged to call for assistance. Siderails upx2

## 2018-08-11 NOTE — DISCHARGE PLANNING
Medical Social Work    LSW met with pt at bedside and provided pt with resources for alcohol abuse. Pt states that her plan is to get a cab ride over to her friend's house in [place] and stay there.
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
## 2018-08-11 NOTE — DISCHARGE INSTRUCTIONS
Concussion, Adult  A concussion is a brain injury. It is caused by:  · A hit to the head.  · A quick and sudden movement (jolt) of the head or neck.  A concussion is usually not life threatening. Even so, it can cause serious problems. If you had a concussion before, you may have concussion-like problems after a hit to your head.  Follow these instructions at home:  General instructions  · Follow your doctor's directions carefully.  · Take medicines only as told by your doctor.  · Only take medicines your doctor says are safe.  · Do not drink alcohol until your doctor says it is okay. Alcohol and some drugs can slow down healing. They can also put you at risk for further injury.  · If you are having trouble remembering things, write them down.  · Try to do one thing at a time if you get distracted easily. For example, do not watch TV while making dinner.  · Talk to your family members or close friends when making important decisions.  · Follow up with your doctor as told.  · Watch your symptoms. Tell others to do the same. Serious problems can sometimes happen after a concussion. Older adults are more likely to have these problems.  · Tell your teachers, school nurse, school counselor, , , or  about your concussion. Tell them about what you can or cannot do. They should watch to see if:  ¨ It gets even harder for you to pay attention or concentrate.  ¨ It gets even harder for you to remember things or learn new things.  ¨ You need more time than normal to finish things.  ¨ You become annoyed (irritable) more than before.  ¨ You are not able to deal with stress as well.  ¨ You have more problems than before.  · Rest. Make sure you:  ¨ Get plenty of sleep at night.  ¨ Go to sleep early.  ¨ Go to bed at the same time every day. Try to wake up at the same time.  ¨ Rest during the day.  ¨ Take naps when you feel tired.  · Limit activities where you have to think a lot or concentrate.  These include:  ¨ Doing homework.  ¨ Doing work related to a job.  ¨ Watching TV.  ¨ Using the computer.  Returning To Your Regular Activities   Return to your normal activities slowly, not all at once. You must give your body and brain enough time to heal.  · Do not play sports or do other athletic activities until your doctor says it is okay.  · Ask your doctor when you can drive, ride a bicycle, or work other vehicles or machines. Never do these things if you feel dizzy.  · Ask your doctor about when you can return to work or school.  Preventing Another Concussion   It is very important to avoid another brain injury, especially before you have healed. In rare cases, another injury can lead to permanent brain damage, brain swelling, or death. The risk of this is greatest during the first 7-10 days after your injury. Avoid injuries by:  · Wearing a seat belt when riding in a car.  · Not drinking too much alcohol.  · Avoiding activities that could lead to a second concussion (such as contact sports).  · Wearing a helmet when doing activities like:  ¨ Biking.  ¨ Skiing.  ¨ Skateboarding.  ¨ Skating.  · Making your home safer by:  ¨ Removing things from the floor or stairways that could make you trip.  ¨ Using grab bars in bathrooms and handrails by stairs.  ¨ Placing non-slip mats on floors and in bathtubs.  ¨ Improve lighting in dark areas.  Contact a doctor if:  · It gets even harder for you to pay attention or concentrate.  · It gets even harder for you to remember things or learn new things.  · You need more time than normal to finish things.  · You become annoyed (irritable) more than before.  · You are not able to deal with stress as well.  · You have more problems than before.  · You have problems keeping your balance.  · You are not able to react quickly when you should.  Get help if you have any of these problems for more than 2 weeks:  · Lasting (chronic) headaches.  · Dizziness or trouble  balancing.  · Feeling sick to your stomach (nausea).  · Seeing (vision) problems.  · Being affected by noises or light more than normal.  · Feeling sad, low, down in the dumps, blue, gloomy, or empty (depressed).  · Mood changes (mood swings).  · Feeling of fear or nervousness about what may happen (anxiety).  · Feeling annoyed.  · Memory problems.  · Problems concentrating or paying attention.  · Sleep problems.  · Feeling tired all the time.  Get help right away if:  · You have bad headaches or your headaches get worse.  · You have weakness (even if it is in one hand, leg, or part of the face).  · You have loss of feeling (numbness).  · You feel off balance.  · You keep throwing up (vomiting).  · You feel tired.  · One black center of your eye (pupil) is larger than the other.  · You twitch or shake violently (convulse).  · Your speech is not clear (slurred).  · You are more confused, easily angered (agitated), or annoyed than before.  · You have more trouble resting than before.  · You are unable to recognize people or places.  · You have neck pain.  · It is difficult to wake you up.  · You have unusual behavior changes.  · You pass out (lose consciousness).  This information is not intended to replace advice given to you by your health care provider. Make sure you discuss any questions you have with your health care provider.  Document Released: 12/06/2010 Document Revised: 05/25/2017 Document Reviewed: 07/10/2014  [employer] Interactive Patient Education © 2017 [employer]      Wound Care, Adult  Taking care of your wound properly can help to prevent pain and infection. It can also help your wound to heal more quickly.  How is this treated?  Wound care  · Follow instructions from your health care provider about how to take care of your wound. Make sure you:  ¨ Wash your hands with soap and water before you change the bandage (dressing). If soap and water are not available, use hand .  ¨ Change your  dressing as told by your health care provider.  ¨ Leave stitches (sutures), skin glue, or adhesive strips in place. These skin closures may need to stay in place for 2 weeks or longer. If adhesive strip edges start to loosen and curl up, you may trim the loose edges. Do not remove adhesive strips completely unless your health care provider tells you to do that.  · Check your wound area every day for signs of infection. Check for:  ¨ More redness, swelling, or pain.  ¨ More fluid or blood.  ¨ Warmth.  ¨ Pus or a bad smell.  · Ask your health care provider if you should clean the wound with mild soap and water. Doing this may include:  ¨ Using a clean towel to pat the wound dry after cleaning it. Do not rub or scrub the wound.  ¨ Applying a cream or ointment. Do this only as told by your health care provider.  ¨ Covering the incision with a clean dressing.  · Ask your health care provider when you can leave the wound uncovered.  Medicines   · If you were prescribed an antibiotic medicine, cream, or ointment, take or use the antibiotic as told by your health care provider. Do not stop taking or using the antibiotic even if your condition improves.  · Take over-the-counter and prescription medicines only as told by your health care provider. If you were prescribed pain medicine, take it at least 30 minutes before doing any wound care or as told by your health care provider.  General instructions  · Return to your normal activities as told by your health care provider. Ask your health care provider what activities are safe.  · Do not scratch or pick at the wound.  · Keep all follow-up visits as told by your health care provider. This is important.  · Eat a diet that includes protein, vitamin A, vitamin C, and other nutrient-rich foods. These help the wound heal:  ¨ Protein-rich foods include meat, dairy, beans, nuts, and other sources.  ¨ Vitamin A-rich foods include carrots and dark green, leafy vegetables.  ¨ Vitamin  C-rich foods include citrus, tomatoes, and other fruits and vegetables.  ¨ Nutrient-rich foods have protein, carbohydrates, fat, vitamins, or minerals. Eat a variety of healthy foods including vegetables, fruits, and whole grains.  Contact a health care provider if:  · You received a tetanus shot and you have swelling, severe pain, redness, or bleeding at the injection site.  · Your pain is not controlled with medicine.  · You have more redness, swelling, or pain around the wound.  · You have more fluid or blood coming from the wound.  · Your wound feels warm to the touch.  · You have pus or a bad smell coming from the wound.  · You have a fever or chills.  · You are nauseous or you vomit.  · You are dizzy.  Get help right away if:  · You have a red streak going away from your wound.  · The edges of the wound open up and separate.  · Your wound is bleeding and the bleeding does not stop with gentle pressure.  · You have a rash.  · You faint.  · You have trouble breathing.  This information is not intended to replace advice given to you by your health care provider. Make sure you discuss any questions you have with your health care provider.  Document Released: 09/26/2009 Document Revised: 08/16/2017 Document Reviewed: 07/04/2017  [employer] Interactive Patient Education © 2017 [employer]

## 2018-08-11 NOTE — ED NOTES
Ambulated pt around nurse station. Pt still c/o dizziness an c/o right ankle pain; RN to consult with ERP.

## 2018-08-11 NOTE — ED NOTES
Lab called with critical result of Glucose 41 at 0540. Critical lab result read back to Lab.   Dr. Griffin notified of critical lab result at 0539.  Critical lab result read back by Dr. Griffin.

## 2018-08-11 NOTE — ED NOTES
ERP notified of vital signs, verbal order to give 3rd liter NS. Pts wound began bleeding, ERP stated to apply Pressure dressing, pressure dressing placed to pt's wound.

## 2018-08-11 NOTE — ED NOTES
Pt resting quietly, NAD, RR E/U. Will attempt to road test pt again per ERP request. Monitoring BP at this time.
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
## 2018-08-11 NOTE — ED NOTES
ERP at bedside, wound repaired. Pt attempting to get out of bed, threatening to leave. Pt demanding purse. Bottle of vodka found in pts purse. Vodka taken from pt. Pt given purse. CT at bedside to take pt. Pt's linens changed.

## 2018-08-11 NOTE — ED NOTES
After speaking with ERP, RN ambulated pt to restroom with minimal one person assist. RN provided pt with food and water. Will continue to monitor pt after PO intake.
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
## 2018-08-13 LAB — GLUCOSE BLD-MCNC: 99 MG/DL (ref 65–99)

## 2018-09-21 ENCOUNTER — HOSPITAL ENCOUNTER (EMERGENCY)
Facility: MEDICAL CENTER | Age: 53
End: 2018-09-21
Attending: EMERGENCY MEDICINE
Payer: MEDICAID

## 2018-09-21 VITALS
DIASTOLIC BLOOD PRESSURE: 59 MMHG | SYSTOLIC BLOOD PRESSURE: 101 MMHG | HEART RATE: 91 BPM | HEIGHT: 65 IN | TEMPERATURE: 98 F | RESPIRATION RATE: 16 BRPM | OXYGEN SATURATION: 97 % | WEIGHT: 139.77 LBS | BODY MASS INDEX: 23.29 KG/M2

## 2018-09-21 DIAGNOSIS — J01.01 ACUTE RECURRENT MAXILLARY SINUSITIS: ICD-10-CM

## 2018-09-21 PROCEDURE — 99283 EMERGENCY DEPT VISIT LOW MDM: CPT

## 2018-09-21 RX ORDER — CEFDINIR 300 MG/1
300 CAPSULE ORAL 2 TIMES DAILY
Qty: 28 CAP | Refills: 0 | Status: SHIPPED | OUTPATIENT
Start: 2018-09-21 | End: 2018-10-05

## 2018-09-21 ASSESSMENT — LIFESTYLE VARIABLES: DO YOU DRINK ALCOHOL: NO

## 2018-09-22 NOTE — ED NOTES
All lines and monitors disconnected.  Discharge instructions reviewed, questions answered.  Pt to [facility], escorted by RN.  Pt provided with prescription X 1.  Pt states all belongings in possession.

## 2018-09-22 NOTE — ED PROVIDER NOTES
"ED Provider Note    Scribed for Francie Noonan M.D. by Joe Santana. 9/21/2018  6:36 PM    Primary care provider: JENNY Jay  Means of arrival: Walk in  History obtained from: Patient  History limited by: None    CHIEF COMPLAINT  Chief Complaint   Patient presents with   • Nasal Congestion   • Dental Pain       HPI  Jumana Kahn is a 53 y.o. female who presents to the Emergency Department complaining of nasal congestion over the last 3 weeks. Patient states she has a history of bronchitis. She notes getting over pneumonia 3 months ago. Patient reports having swollen lymph nodes in her neck. She reports yellow \"scabs\" in her nose, left ear pain, green sputum production, sore throat, and subjective fevers. Patient does not report any shortness of breath or weakness. Patient notes having a history of thyroid issues but is not currently on any medications. Patient is a former smoker. She notes having sinus surgery 5 months ago.       REVIEW OF SYSTEMS  HEENT: Positive nasal congestion, left ear pain, yellow \"scabs\" in nose, sore throat  LYMPHATIC: Positive swollen lymph nodes to neck  PULMONARY: Positive green sputum production. Negative shortness of breath.  Neuro: no weakness  Endocrine: Positive subjective fevers  See history of present illness.    PAST MEDICAL HISTORY   has a past medical history of Hypothyroid and Psychiatric disorder.    SURGICAL HISTORY   has a past surgical history that includes other orthopedic surgery; gyn surgery; and other.    SOCIAL HISTORY  Social History   Substance Use Topics   • Smoking status: Never Smoker   • Smokeless tobacco: Never Used   • Alcohol use Yes      History   Drug Use No       FAMILY HISTORY  No pertinent family history reported.     CURRENT MEDICATIONS  Home Medications     Reviewed by Krysten Horner R.N. (Registered Nurse) on 09/21/18 at 1713  Med List Status: Complete   Medication Last Dose Status   amphetamine-dextroamphetamine " "(ADDERALL XR, 20MG,) 20 MG per XR capsule 9/21/2018 Active   ibuprofen (MOTRIN) 800 MG Tab 7/11/2018 Active   levothyroxine (SYNTHROID) 50 MCG Tab not taking Active   nitrofurantoin monohydr macro (MACROBID) 100 MG Cap not taking Active   permethrin (ELIMITE) 5 % Cream not taking Active                ALLERGIES  Allergies   Allergen Reactions   • Amoxicillin      Patient states it does not work   • Ampicillin Rash     Skin rash       PHYSICAL EXAM  VITAL SIGNS: BP (!) 99/60   Pulse 96   Temp 36.7 °C (98 °F) (Temporal)   Resp 14   Ht 1.651 m (5' 5\")   Wt 63.4 kg (139 lb 12.4 oz)   SpO2 97%   BMI 23.26 kg/m²   Constitutional: Well developed, Well nourished, No acute distress, Non-toxic appearance.   HEENT: Normocephalic, Atraumatic,  external ears normal, pharynx pink,  Mucous  Membranes moist, Rhinorrhea or mucosal edema. Posterior nasal drainage. Frontal maxillary sinus tenderness.  Eyes: PERRL, EOMI, Conjunctiva normal, No discharge.   Neck: Normal range of motion, No tenderness, Supple, No stridor.   Lymphatic: No lymphadenopathy    Cardiovascular: Regular Rate and Rhythm, No murmurs,  rubs, or gallops.   Thorax & Lungs: Lungs clear to auscultation bilaterally, No respiratory distress, No wheezes, rhales or rhonchi, No chest wall tenderness.   Abdomen: Bowel sounds normal, Soft, non tender, non distended,  No pulsatile masses., no rebound guarding or peritoneal signs.   Skin: Warm, Dry, No erythema, No rash,   Extremities: Equal, intact distal pulses, No cyanosis, No tenderness.   Musculoskeletal: Good range of motion in all major joints. No tenderness to palpation or major deformities noted.   Neurologic: Alert & awake, no focal deficits  Psychiatric: Affect normal      COURSE & MEDICAL DECISION MAKING  Nursing notes, VS, PMSFHx reviewed in chart.     6:36 PM - Patient seen and examined at bedside. Patient presents with symptoms consistent with maxillary sinusitis. The patient will be discharged with " instructions regarding supportive care and medications including omnicef. Instructions were given for follow-up with patient's PCP. Discussed indications for seeking immediate medical attention. Patient was given the opportunity for questions. The patient understands and agrees.     No follow-up provider specified.     Current Outpatient Prescriptions   Medication Sig Dispense Refill   • cefdinir (OMNICEF) 300 MG Cap Take 1 Cap by mouth 2 times a day for 14 days. 28 Cap 0   • permethrin (ELIMITE) 5 % Cream Apply to entire body, leave on for 12 hours, then wash off.  Repeat application in 1 week. 60 g 0   • ibuprofen (MOTRIN) 800 MG Tab Take 800 mg by mouth every 8 hours as needed for Mild Pain.     • levothyroxine (SYNTHROID) 50 MCG Tab Take 50 mcg by mouth Every morning on an empty stomach.     • amphetamine-dextroamphetamine (ADDERALL XR, 20MG,) 20 MG per XR capsule Take 20 mg by mouth 3 times a day.     • nitrofurantoin monohydr macro (MACROBID) 100 MG Cap Take 100 mg by mouth 2 times a day. 10 day course, finished 7/10/18               The patient will return for new or worsening symptoms and is stable at the time of discharge.    The patient is referred to a primary physician for blood pressure management, diabetic screening, and for all other preventative health concerns.      DISPOSITION:  Patient will be discharged home in stable condition.      OUTPATIENT MEDICATIONS:  Discharge Medication List as of 9/21/2018  6:59 PM      START taking these medications    Details   cefdinir (OMNICEF) 300 MG Cap Take 1 Cap by mouth 2 times a day for 14 days., Disp-28 Cap, R-0, Print Rx Paper              FINAL IMPRESSION  1. Acute recurrent maxillary sinusitis Active         Joe SALGADO (Lionel), am scribing for, and in the presence of, Francie Noonan M.D..    Electronically signed by: Joe Santana (Lionel), 9/21/2018    Francie SALGADO M.D. personally performed the services described in this  documentation, as scribed by Joe Santana in my presence, and it is both accurate and complete. E    The note accurately reflects work and decisions made by me.  Francie Noonan  9/21/2018  8:17 PM

## 2019-01-15 NOTE — ED TRIAGE NOTES
"Chief Complaint   Patient presents with   • Nasal Congestion   • Dental Pain       Patient ambulatory to triage with steady gait. Patient states she has a history of nasal surgery because she has had so many sinus infections that she had so much scar tissue. Patient states that she has been waiting to come in but \"she knows she will not get over this infection\".  Patient is hard of hearing and lost her hearing aids recently.     Patient placed back in lobby and updated on triage process.   "
.  Chief Complaint   Patient presents with   • Nasal Congestion   • Dental Pain   Agree with triage assessment.  Onset X approximately 3 weeks.  Pt reports having sinus surgeries X 3.      
none

## 2019-07-24 ENCOUNTER — HOSPITAL ENCOUNTER (EMERGENCY)
Facility: MEDICAL CENTER | Age: 54
End: 2019-07-24
Attending: EMERGENCY MEDICINE
Payer: MEDICAID

## 2019-07-24 VITALS
SYSTOLIC BLOOD PRESSURE: 124 MMHG | BODY MASS INDEX: 28.03 KG/M2 | HEIGHT: 65 IN | TEMPERATURE: 97.5 F | WEIGHT: 168.21 LBS | DIASTOLIC BLOOD PRESSURE: 88 MMHG | OXYGEN SATURATION: 95 % | RESPIRATION RATE: 16 BRPM | HEART RATE: 98 BPM

## 2019-07-24 DIAGNOSIS — J01.01 ACUTE RECURRENT MAXILLARY SINUSITIS: ICD-10-CM

## 2019-07-24 PROCEDURE — 99282 EMERGENCY DEPT VISIT SF MDM: CPT

## 2019-07-24 RX ORDER — AZITHROMYCIN 250 MG/1
TABLET, FILM COATED ORAL
Qty: 6 TAB | Refills: 0 | Status: SHIPPED | OUTPATIENT
Start: 2019-07-24 | End: 2019-09-16

## 2019-07-24 RX ORDER — MOMETASONE FUROATE 50 UG/1
2 SPRAY, METERED NASAL DAILY
Qty: 1 INHALER | Refills: 0 | Status: SHIPPED | OUTPATIENT
Start: 2019-07-24 | End: 2019-09-16

## 2019-07-24 NOTE — ED TRIAGE NOTES
Ambulatory to triage with report of  Chief Complaint   Patient presents with   • Sinus Pain     congestion.  onset of symptoms 3 weeks ago.  no relief with OTC medications.  reports unknown fever   NAD noted in triage

## 2019-08-06 ENCOUNTER — HOSPITAL ENCOUNTER (EMERGENCY)
Dept: HOSPITAL 8 - ED | Age: 54
Discharge: HOME | End: 2019-08-06
Payer: MEDICAID

## 2019-08-06 VITALS — BODY MASS INDEX: 24.98 KG/M2 | HEIGHT: 65 IN | WEIGHT: 149.91 LBS

## 2019-08-06 VITALS — DIASTOLIC BLOOD PRESSURE: 67 MMHG | SYSTOLIC BLOOD PRESSURE: 105 MMHG
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
## 2019-08-06 DIAGNOSIS — S50.11XA: ICD-10-CM

## 2019-08-06 DIAGNOSIS — S00.12XA: ICD-10-CM
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
## 2019-08-06 DIAGNOSIS — S00.03XA: ICD-10-CM

## 2019-08-06 DIAGNOSIS — F10.229: ICD-10-CM

## 2019-08-06 DIAGNOSIS — E03.9: ICD-10-CM
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
## 2019-08-06 DIAGNOSIS — S06.0X9A: Primary | ICD-10-CM

## 2019-08-06 DIAGNOSIS — S30.1XXA: ICD-10-CM

## 2019-08-06 DIAGNOSIS — S50.12XA: ICD-10-CM

## 2019-08-06 DIAGNOSIS — S20.212A: ICD-10-CM
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
## 2019-08-06 DIAGNOSIS — S70.02XA: ICD-10-CM

## 2019-08-06 DIAGNOSIS — J45.909: ICD-10-CM
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
## 2019-08-06 LAB
ALBUMIN SERPL-MCNC: 4.4 G/DL (ref 3.4–5)
ALP SERPL-CCNC: 120 U/L (ref 45–117)
ALT SERPL-CCNC: 155 U/L (ref 12–78)
ANION GAP SERPL CALC-SCNC: 12 MMOL/L (ref 5–15)
BASOPHILS # BLD AUTO: 0.03 X10^3/UL (ref 0–0.1)
BASOPHILS NFR BLD AUTO: 1 % (ref 0–1)
BILIRUB SERPL-MCNC: 1 MG/DL (ref 0.2–1)
CALCIUM SERPL-MCNC: 8.5 MG/DL (ref 8.5–10.1)
CHLORIDE SERPL-SCNC: 107 MMOL/L (ref 98–107)
CREAT SERPL-MCNC: 0.75 MG/DL (ref 0.55–1.02)
EOSINOPHIL # BLD AUTO: 0.04 X10^3/UL (ref 0–0.4)
EOSINOPHIL NFR BLD AUTO: 1 % (ref 1–7)
ERYTHROCYTE [DISTWIDTH] IN BLOOD BY AUTOMATED COUNT: 15.9 % (ref 9.6–15.2)
INR PPP: 1.02 (ref 0.93–1.1)
LYMPHOCYTES # BLD AUTO: 1.96 X10^3/UL (ref 1–3.4)
LYMPHOCYTES NFR BLD AUTO: 43 % (ref 22–44)
MCH RBC QN AUTO: 38.1 PG (ref 27–34.8)
MCHC RBC AUTO-ENTMCNC: 34.1 G/DL (ref 32.4–35.8)
MCV RBC AUTO: 111.7 FL (ref 80–100)
MD: (no result)
MONOCYTES # BLD AUTO: 0.35 X10^3/UL (ref 0.2–0.8)
MONOCYTES NFR BLD AUTO: 8 % (ref 2–9)
NEUTROPHILS # BLD AUTO: 2.16 X10^3/UL (ref 1.8–6.8)
NEUTROPHILS NFR BLD AUTO: 48 % (ref 42–75)
PLATELET # BLD AUTO: 115 X10^3/UL (ref 130–400)
PMV BLD AUTO: 6.7 FL (ref 7.4–10.4)
PROT SERPL-MCNC: 8.8 G/DL (ref 6.4–8.2)
PROTHROMBIN TIME: 10.7 SECONDS (ref 9.6–11.5)
RBC # BLD AUTO: 3.66 X10^6/UL (ref 3.82–5.3)

## 2019-08-06 PROCEDURE — 85610 PROTHROMBIN TIME: CPT

## 2019-08-06 PROCEDURE — 72125 CT NECK SPINE W/O DYE: CPT

## 2019-08-06 PROCEDURE — 85025 COMPLETE CBC W/AUTO DIFF WBC: CPT

## 2019-08-06 PROCEDURE — 71045 X-RAY EXAM CHEST 1 VIEW: CPT

## 2019-08-06 PROCEDURE — 70486 CT MAXILLOFACIAL W/O DYE: CPT

## 2019-08-06 PROCEDURE — 80053 COMPREHEN METABOLIC PANEL: CPT

## 2019-08-06 PROCEDURE — 70450 CT HEAD/BRAIN W/O DYE: CPT

## 2019-08-06 PROCEDURE — 74177 CT ABD & PELVIS W/CONTRAST: CPT

## 2019-08-06 PROCEDURE — 36415 COLL VENOUS BLD VENIPUNCTURE: CPT

## 2019-08-06 PROCEDURE — 80307 DRUG TEST PRSMV CHEM ANLYZR: CPT

## 2019-08-06 PROCEDURE — 99284 EMERGENCY DEPT VISIT MOD MDM: CPT
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
## 2019-08-06 NOTE — NUR
pt resting in secured room with lights dimmed and with sitter at doorway. 
monitors reconnected. vss. awaiting ct results.

## 2019-08-06 NOTE — NUR
PT GIVEN DC INSTRUCTIONS BUT IS UNWILLING TO LISTEN. PT SAYS SHE ARRIVED HERE 
WITH HER PHONE AND CANNOT FIND IT NOW. THIS RN WENT THROUGH PT'S BELONGINGS 
UPON HER ARRIVAL TO ED AND THERE WAS NO PHONE PRESENT IN BELONGINGS. PT 
EVENTUALLY STATED THAT HER PHONE MAY BE AT HOME. PT PROVIDED WITH TAXI VOUCHER 
AND [employer] WAS CALLED. PT CURRENTLY WAITING IN [facility] FOR [facility].
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
## 2019-09-16 ENCOUNTER — HOSPITAL ENCOUNTER (EMERGENCY)
Facility: MEDICAL CENTER | Age: 54
End: 2019-09-16
Attending: EMERGENCY MEDICINE
Payer: MEDICAID

## 2019-09-16 VITALS
RESPIRATION RATE: 18 BRPM | SYSTOLIC BLOOD PRESSURE: 135 MMHG | BODY MASS INDEX: 28.72 KG/M2 | OXYGEN SATURATION: 97 % | DIASTOLIC BLOOD PRESSURE: 88 MMHG | WEIGHT: 172.4 LBS | HEIGHT: 65 IN | HEART RATE: 91 BPM | TEMPERATURE: 97.9 F

## 2019-09-16 DIAGNOSIS — J45.21 MILD INTERMITTENT ASTHMA WITH ACUTE EXACERBATION: ICD-10-CM

## 2019-09-16 DIAGNOSIS — J01.01 ACUTE RECURRENT MAXILLARY SINUSITIS: ICD-10-CM

## 2019-09-16 PROCEDURE — 99284 EMERGENCY DEPT VISIT MOD MDM: CPT

## 2019-09-16 RX ORDER — CEFDINIR 300 MG/1
300 CAPSULE ORAL 2 TIMES DAILY
Qty: 20 CAP | Refills: 0 | Status: ON HOLD | OUTPATIENT
Start: 2019-09-16 | End: 2020-01-31

## 2019-09-16 RX ORDER — METHYLPREDNISOLONE 4 MG/1
TABLET ORAL
Qty: 1 KIT | Refills: 0 | Status: ON HOLD | OUTPATIENT
Start: 2019-09-16 | End: 2020-01-31

## 2019-09-16 RX ORDER — ALBUTEROL SULFATE 90 UG/1
2 AEROSOL, METERED RESPIRATORY (INHALATION) EVERY 6 HOURS PRN
Qty: 8.5 G | Refills: 0 | Status: SHIPPED | OUTPATIENT
Start: 2019-09-16 | End: 2020-05-06

## 2019-09-16 NOTE — ED PROVIDER NOTES
"ED Provider Note    Scribed for Roxana Garza M.D. by Claudette Hilton. 2019, 4:42 PM.    Primary care provider: JENNY Jay  Means of arrival: Private vehicle   History obtained from: Patient   History limited by: None     CHIEF COMPLAINT  Chief Complaint   Patient presents with   • Sinus Pain     hst of chronic sinus infections    • Cough     green sputum    • Rash     abd/back        HPI  Jumana Kahn is a 54 y.o. female who presents to the Emergency Department or sinus pain, cough, and urticaria.   Patient has ENT doctore she can follow up with. Has Asthma is allergic to tumble weeds. Urticaria on back that has since resolved and hives on her anterior chest. She complaints of hives on anterior chest. No fevers , no facial swelling or pain    REVIEW OF SYSTEMS  Pertinent positives include nasal congestion, cough, facial pain. Pertinent negatives include no fever, vomting.   PAST MEDICAL HISTORY   has a past medical history of Hypothyroid and Psychiatric disorder.    SURGICAL HISTORY   has a past surgical history that includes other orthopedic surgery; gyn surgery; and other.    SOCIAL HISTORY  Social History     Tobacco Use   • Smoking status: Former Smoker     Last attempt to quit: 2001     Years since quittin.1   • Smokeless tobacco: Never Used   Substance Use Topics   • Alcohol use: Yes     Comment: \"sometimes\"   • Drug use: No      Social History     Substance and Sexual Activity   Drug Use No       FAMILY HISTORY  No family history on file.    CURRENT MEDICATIONS  Home Medications     Reviewed by Paulina Ross R.N. (Registered Nurse) on 19 at 1630  Med List Status: Partial   Medication Last Dose Status   levothyroxine (SYNTHROID) 50 MCG Tab 2019 Active                ALLERGIES  Allergies   Allergen Reactions   • Amoxicillin      Patient states it does not work   • Ampicillin Rash     Skin rash       PHYSICAL EXAM  VITAL SIGNS: /88   Pulse 91   " "Temp 36.6 °C (97.9 °F) (Temporal)   Resp 18   Ht 1.651 m (5' 5\")   Wt 78.2 kg (172 lb 6.4 oz)   SpO2 97%   BMI 28.69 kg/m²     Constitutional:  Alert and in no apparent distress.  HENT: Normocephalic, Bilateral external ears normal. Nose congested, facial pain  Eyes: Pupils are equal and reactive. Conjunctiva normal, non-icteric.   Cardiovascular:  Good Perfusion  Thorax & Lungs: Easy unlabored respirations, coarse breath sounds, occational wheezing.    Abdomen:  No pain with movement   Skin: Visualized skin is  Dry, No erythema, No rash.   Extremities:   Moves all extremities   Neurologic: Alert, Grossly non-focal.   Psychiatric: Appropriate Mood    DIAGNOSTIC STUDIES / PROCEDURES    COURSE & MEDICAL DECISION MAKING  Nursing notes and vital signs were reviewed. (See chart for details)  The patient's  records were reviewed and she was seen in July for Sinus Infection, placed on Azithromycin and nasal steroid, history was obtained from the patient and friend.;     The patient presents with continued sinus pain facial pain now with cough and wheezing., and the differential diagnosis includes but is not limited to sinusitis, asthma exacerbation no signs of facial swelling, cellulitis or meningitis.  She is not tachypneic or hypoxic  She has required prior sinus surgeries and meets criteria for treatment for antibiotic therapy and steroids for asthma exacerbation.    She will follow up with ENT    DISPOSITION:  Patient will be discharged home in stable condition.    FOLLOW UP:  Megan Arevalo, [facility].  [address]  [facility] [address]  [phone]            OUTPATIENT MEDICATIONS:  Discharge Medication List as of 9/16/2019  4:48 PM      START taking these medications    Details   albuterol 108 (90 Base) MCG/ACT Aero Soln inhalation aerosol Inhale 2 Puffs by mouth every 6 hours as needed for Shortness of Breath., Disp-8.5 g, R-0, Print Rx Paper      cefdinir (OMNICEF) 300 MG Cap Take 1 Cap by mouth 2 " times a day., Disp-20 Cap, R-0, Print Rx Paper      methylPREDNISolone (MEDROL DOSEPAK) 4 MG Tablet Therapy Pack Use as directed, Disp-1 Kit, R-0, Print Rx Paper               FINAL IMPRESSION  Sinusitis  Asthma Exacerbation     IClaudette (Scribe), am scribing for, and in the presence of, Roxana Garza M.D..    Electronically signed by: Claudette Hilton (Gurpreetibe), 9/16/2019    I, Roxana Garza M.D. personally performed the services described in this documentation, as scribed by Claudette Hilton in my presence, and it is both accurate and complete.    The note accurately reflects work and decisions made by me.  Roxana Garza  9/16/2019  5:03 PM

## 2019-09-16 NOTE — ED TRIAGE NOTES
Pt to triage .  Chief Complaint   Patient presents with   • Sinus Pain     hst of chronic sinus infections    • Cough     green sputum    • Rash     abd/back

## 2019-09-17 NOTE — ED NOTES
Discussed Discharge instructions, F/U instructions, and medication instructions with Pt and family. Rx given, questions answered. Pt and family escorted out of ED in stable condition.

## 2020-01-11 ENCOUNTER — APPOINTMENT (OUTPATIENT)
Dept: RADIOLOGY | Facility: MEDICAL CENTER | Age: 55
DRG: 380 | End: 2020-01-11
Attending: EMERGENCY MEDICINE
Payer: MEDICAID

## 2020-01-11 ENCOUNTER — HOSPITAL ENCOUNTER (INPATIENT)
Facility: MEDICAL CENTER | Age: 55
LOS: 19 days | DRG: 380 | End: 2020-01-31
Attending: EMERGENCY MEDICINE | Admitting: HOSPITALIST
Payer: MEDICAID

## 2020-01-11 DIAGNOSIS — K92.2 UPPER GI BLEED: ICD-10-CM

## 2020-01-11 DIAGNOSIS — S09.90XA CLOSED HEAD INJURY, INITIAL ENCOUNTER: ICD-10-CM

## 2020-01-11 DIAGNOSIS — E87.20 LACTIC ACIDOSIS: ICD-10-CM

## 2020-01-11 DIAGNOSIS — D64.9 ANEMIA, UNSPECIFIED TYPE: ICD-10-CM

## 2020-01-11 DIAGNOSIS — K92.2 GASTROINTESTINAL HEMORRHAGE, UNSPECIFIED GASTROINTESTINAL HEMORRHAGE TYPE: ICD-10-CM

## 2020-01-11 DIAGNOSIS — F10.930 ALCOHOL WITHDRAWAL SYNDROME WITHOUT COMPLICATION (HCC): ICD-10-CM

## 2020-01-11 DIAGNOSIS — S02.2XXA CLOSED FRACTURE OF NASAL BONE, INITIAL ENCOUNTER: ICD-10-CM

## 2020-01-11 LAB
ALBUMIN SERPL BCP-MCNC: 3.9 G/DL (ref 3.2–4.9)
ALBUMIN/GLOB SERPL: 1.3 G/DL
ALP SERPL-CCNC: 352 U/L (ref 30–99)
ALT SERPL-CCNC: 36 U/L (ref 2–50)
ANION GAP SERPL CALC-SCNC: 31 MMOL/L (ref 0–11.9)
ANISOCYTOSIS BLD QL SMEAR: ABNORMAL
APTT PPP: 28.5 SEC (ref 24.7–36)
AST SERPL-CCNC: 219 U/L (ref 12–45)
BASOPHILS # BLD AUTO: 0.9 % (ref 0–1.8)
BASOPHILS # BLD: 0.07 K/UL (ref 0–0.12)
BILIRUB SERPL-MCNC: 5.5 MG/DL (ref 0.1–1.5)
BUN SERPL-MCNC: 8 MG/DL (ref 8–22)
CALCIUM SERPL-MCNC: 8.8 MG/DL (ref 8.5–10.5)
CHLORIDE SERPL-SCNC: 77 MMOL/L (ref 96–112)
CO2 SERPL-SCNC: 18 MMOL/L (ref 20–33)
CREAT SERPL-MCNC: 0.98 MG/DL (ref 0.5–1.4)
EOSINOPHIL # BLD AUTO: 0.07 K/UL (ref 0–0.51)
EOSINOPHIL NFR BLD: 0.9 % (ref 0–6.9)
ERYTHROCYTE [DISTWIDTH] IN BLOOD BY AUTOMATED COUNT: 90.3 FL (ref 35.9–50)
GLOBULIN SER CALC-MCNC: 3 G/DL (ref 1.9–3.5)
GLUCOSE SERPL-MCNC: 173 MG/DL (ref 65–99)
HCT VFR BLD AUTO: 26.3 % (ref 37–47)
HGB BLD-MCNC: 8.4 G/DL (ref 12–16)
INR PPP: 1.07 (ref 0.87–1.13)
LACTATE BLD-SCNC: 16.9 MMOL/L (ref 0.5–2)
LIPASE SERPL-CCNC: 51 U/L (ref 11–82)
LYMPHOCYTES # BLD AUTO: 0.44 K/UL (ref 1–4.8)
LYMPHOCYTES NFR BLD: 5.3 % (ref 22–41)
MACROCYTES BLD QL SMEAR: ABNORMAL
MANUAL DIFF BLD: NORMAL
MCH RBC QN AUTO: 36.4 PG (ref 27–33)
MCHC RBC AUTO-ENTMCNC: 31.9 G/DL (ref 33.6–35)
MCV RBC AUTO: 113.9 FL (ref 81.4–97.8)
METAMYELOCYTES NFR BLD MANUAL: 1.7 %
MONOCYTES # BLD AUTO: 0.66 K/UL (ref 0–0.85)
MONOCYTES NFR BLD AUTO: 7.9 % (ref 0–13.4)
MORPHOLOGY BLD-IMP: NORMAL
MYELOCYTES NFR BLD MANUAL: 1.7 %
NEUTROPHILS # BLD AUTO: 6.7 K/UL (ref 2–7.15)
NEUTROPHILS NFR BLD: 73.7 % (ref 44–72)
NEUTS BAND NFR BLD MANUAL: 7 % (ref 0–10)
NRBC # BLD AUTO: 0.04 K/UL
NRBC BLD-RTO: 0.5 /100 WBC
PLATELET # BLD AUTO: 116 K/UL (ref 164–446)
PLATELET BLD QL SMEAR: NORMAL
PMV BLD AUTO: 9.3 FL (ref 9–12.9)
POLYCHROMASIA BLD QL SMEAR: NORMAL
POTASSIUM SERPL-SCNC: 3.4 MMOL/L (ref 3.6–5.5)
PROMYELOCYTES NFR BLD MANUAL: 0.9 %
PROT SERPL-MCNC: 6.9 G/DL (ref 6–8.2)
PROTHROMBIN TIME: 14.2 SEC (ref 12–14.6)
RBC # BLD AUTO: 2.31 M/UL (ref 4.2–5.4)
RBC BLD AUTO: PRESENT
SODIUM SERPL-SCNC: 126 MMOL/L (ref 135–145)
WBC # BLD AUTO: 8.3 K/UL (ref 4.8–10.8)

## 2020-01-11 PROCEDURE — 83930 ASSAY OF BLOOD OSMOLALITY: CPT

## 2020-01-11 PROCEDURE — 85007 BL SMEAR W/DIFF WBC COUNT: CPT

## 2020-01-11 PROCEDURE — 700111 HCHG RX REV CODE 636 W/ 250 OVERRIDE (IP): Performed by: EMERGENCY MEDICINE

## 2020-01-11 PROCEDURE — 85730 THROMBOPLASTIN TIME PARTIAL: CPT

## 2020-01-11 PROCEDURE — C9113 INJ PANTOPRAZOLE SODIUM, VIA: HCPCS | Performed by: EMERGENCY MEDICINE

## 2020-01-11 PROCEDURE — 83605 ASSAY OF LACTIC ACID: CPT

## 2020-01-11 PROCEDURE — 80307 DRUG TEST PRSMV CHEM ANLYZR: CPT

## 2020-01-11 PROCEDURE — 85027 COMPLETE CBC AUTOMATED: CPT

## 2020-01-11 PROCEDURE — 93005 ELECTROCARDIOGRAM TRACING: CPT | Performed by: EMERGENCY MEDICINE

## 2020-01-11 PROCEDURE — 700101 HCHG RX REV CODE 250: Performed by: EMERGENCY MEDICINE

## 2020-01-11 PROCEDURE — HZ2ZZZZ DETOXIFICATION SERVICES FOR SUBSTANCE ABUSE TREATMENT: ICD-10-PCS | Performed by: HOSPITALIST

## 2020-01-11 PROCEDURE — 99291 CRITICAL CARE FIRST HOUR: CPT

## 2020-01-11 PROCEDURE — 96375 TX/PRO/DX INJ NEW DRUG ADDON: CPT

## 2020-01-11 PROCEDURE — 96365 THER/PROPH/DIAG IV INF INIT: CPT

## 2020-01-11 PROCEDURE — 85610 PROTHROMBIN TIME: CPT

## 2020-01-11 PROCEDURE — 80053 COMPREHEN METABOLIC PANEL: CPT

## 2020-01-11 PROCEDURE — 83690 ASSAY OF LIPASE: CPT

## 2020-01-11 PROCEDURE — 84484 ASSAY OF TROPONIN QUANT: CPT

## 2020-01-11 RX ORDER — PANTOPRAZOLE SODIUM 40 MG/10ML
80 INJECTION, POWDER, LYOPHILIZED, FOR SOLUTION INTRAVENOUS ONCE
Status: COMPLETED | OUTPATIENT
Start: 2020-01-11 | End: 2020-01-12

## 2020-01-11 RX ORDER — SODIUM CHLORIDE 9 MG/ML
1000 INJECTION, SOLUTION INTRAVENOUS ONCE
Status: COMPLETED | OUTPATIENT
Start: 2020-01-12 | End: 2020-01-12

## 2020-01-11 RX ORDER — LORAZEPAM 2 MG/ML
2 INJECTION INTRAMUSCULAR ONCE
Status: COMPLETED | OUTPATIENT
Start: 2020-01-11 | End: 2020-01-11

## 2020-01-11 RX ADMIN — PANTOPRAZOLE SODIUM 80 MG: 40 INJECTION, POWDER, LYOPHILIZED, FOR SOLUTION INTRAVENOUS at 23:45

## 2020-01-11 RX ADMIN — LORAZEPAM 2 MG: 2 INJECTION INTRAMUSCULAR; INTRAVENOUS at 23:23

## 2020-01-11 RX ADMIN — THIAMINE HYDROCHLORIDE 1000 ML: 100 INJECTION, SOLUTION INTRAMUSCULAR; INTRAVENOUS at 23:23

## 2020-01-11 SDOH — HEALTH STABILITY: MENTAL HEALTH: HOW OFTEN DO YOU HAVE 6 OR MORE DRINKS ON ONE OCCASION?: DAILY OR ALMOST DAILY

## 2020-01-12 ENCOUNTER — APPOINTMENT (OUTPATIENT)
Dept: RADIOLOGY | Facility: MEDICAL CENTER | Age: 55
DRG: 380 | End: 2020-01-12
Attending: EMERGENCY MEDICINE
Payer: MEDICAID

## 2020-01-12 ENCOUNTER — APPOINTMENT (OUTPATIENT)
Dept: RADIOLOGY | Facility: MEDICAL CENTER | Age: 55
DRG: 380 | End: 2020-01-12
Attending: INTERNAL MEDICINE
Payer: MEDICAID

## 2020-01-12 PROBLEM — E87.20 LACTIC ACIDOSIS: Status: RESOLVED | Noted: 2020-01-12 | Resolved: 2020-01-12

## 2020-01-12 PROBLEM — S02.2XXA CLOSED FRACTURE OF NASAL BONE: Status: ACTIVE | Noted: 2020-01-12

## 2020-01-12 PROBLEM — E03.9 HYPOTHYROIDISM: Status: ACTIVE | Noted: 2020-01-12

## 2020-01-12 PROBLEM — D69.6 THROMBOCYTOPENIA (HCC): Status: ACTIVE | Noted: 2020-01-12

## 2020-01-12 PROBLEM — E87.20 METABOLIC ACIDOSIS: Status: ACTIVE | Noted: 2020-01-12

## 2020-01-12 PROBLEM — E87.6 HYPOKALEMIA: Status: ACTIVE | Noted: 2020-01-12

## 2020-01-12 PROBLEM — E87.1 HYPONATREMIA: Status: ACTIVE | Noted: 2020-01-12

## 2020-01-12 PROBLEM — R79.89 TROPONIN LEVEL ELEVATED: Status: ACTIVE | Noted: 2020-01-12

## 2020-01-12 PROBLEM — F10.939 ALCOHOL WITHDRAWAL SYNDROME WITH COMPLICATION (HCC): Status: ACTIVE | Noted: 2020-01-12

## 2020-01-12 PROBLEM — E87.20 LACTIC ACIDOSIS: Status: ACTIVE | Noted: 2020-01-12

## 2020-01-12 PROBLEM — K70.9 ALCOHOLIC LIVER DISEASE (HCC): Status: ACTIVE | Noted: 2020-01-12

## 2020-01-12 PROBLEM — D53.9 MACROCYTIC ANEMIA: Status: ACTIVE | Noted: 2020-01-12

## 2020-01-12 PROBLEM — R31.9 HEMATURIA: Status: ACTIVE | Noted: 2020-01-12

## 2020-01-12 PROBLEM — D69.6 THROMBOCYTOPENIA (HCC): Status: RESOLVED | Noted: 2020-01-12 | Resolved: 2020-01-12

## 2020-01-12 PROBLEM — I95.9 HYPOTENSION: Status: ACTIVE | Noted: 2020-01-12

## 2020-01-12 PROBLEM — K92.2 GIB (GASTROINTESTINAL BLEEDING): Status: ACTIVE | Noted: 2020-01-12

## 2020-01-12 LAB
ABO + RH BLD: NORMAL
ABO GROUP BLD: NORMAL
AMPHET UR QL SCN: NEGATIVE
APPEARANCE UR: ABNORMAL
BACTERIA #/AREA URNS HPF: NEGATIVE /HPF
BARBITURATES UR QL SCN: NEGATIVE
BARCODED ABORH UBTYP: 600
BARCODED PRD CODE UBPRD: NORMAL
BARCODED UNIT NUM UBUNT: NORMAL
BENZODIAZ UR QL SCN: NEGATIVE
BILIRUB UR QL STRIP.AUTO: NEGATIVE
BLD GP AB SCN SERPL QL: NORMAL
BZE UR QL SCN: NEGATIVE
CANNABINOIDS UR QL SCN: NEGATIVE
CFT BLD TEG: 6.3 MIN (ref 5–10)
CLOT ANGLE BLD TEG: 60.4 DEGREES (ref 53–72)
CLOT LYSIS 30M P MA LENFR BLD TEG: 0 % (ref 0–8)
COLOR UR: YELLOW
COMPONENT R 8504R: NORMAL
CT.EXTRINSIC BLD ROTEM: 2.8 MIN (ref 1–3)
EKG IMPRESSION: NORMAL
EPI CELLS #/AREA URNS HPF: NEGATIVE /HPF
ETHANOL BLD-MCNC: 0 G/DL
GLUCOSE UR STRIP.AUTO-MCNC: NEGATIVE MG/DL
HGB BLD-MCNC: 7 G/DL (ref 12–16)
HGB BLD-MCNC: 8.2 G/DL (ref 12–16)
HGB BLD-MCNC: 8.4 G/DL (ref 12–16)
HYALINE CASTS #/AREA URNS LPF: ABNORMAL /LPF
KETONES UR STRIP.AUTO-MCNC: NEGATIVE MG/DL
LACTATE BLD-SCNC: 2.3 MMOL/L (ref 0.5–2)
LACTATE BLD-SCNC: 2.6 MMOL/L (ref 0.5–2)
LACTATE BLD-SCNC: 3.6 MMOL/L (ref 0.5–2)
LACTATE BLD-SCNC: 5.4 MMOL/L (ref 0.5–2)
LACTATE BLD-SCNC: 8.9 MMOL/L (ref 0.5–2)
LEUKOCYTE ESTERASE UR QL STRIP.AUTO: ABNORMAL
MAGNESIUM SERPL-MCNC: 1.7 MG/DL (ref 1.5–2.5)
MCF BLD TEG: 51.5 MM (ref 50–70)
METHADONE UR QL SCN: NEGATIVE
MICRO URNS: ABNORMAL
NITRITE UR QL STRIP.AUTO: NEGATIVE
OPIATES UR QL SCN: NEGATIVE
OSMOLALITY SERPL: 272 MOSM/KG H2O (ref 278–298)
OSMOLALITY UR: 89 MOSM/KG H2O (ref 300–900)
OXYCODONE UR QL SCN: NEGATIVE
PA AA BLD-ACNC: 52.5 %
PA ADP BLD-ACNC: 81.2 %
PCP UR QL SCN: NEGATIVE
PH UR STRIP.AUTO: 6 [PH] (ref 5–8)
PHOSPHATE SERPL-MCNC: 2.4 MG/DL (ref 2.5–4.5)
PRODUCT TYPE UPROD: NORMAL
PROPOXYPH UR QL SCN: NEGATIVE
PROT UR QL STRIP: NEGATIVE MG/DL
RBC # URNS HPF: ABNORMAL /HPF
RBC UR QL AUTO: ABNORMAL
RH BLD: NORMAL
SODIUM UR-SCNC: 22 MMOL/L
SP GR UR STRIP.AUTO: 1
TEG ALGORITHM TGALG: NORMAL
TROPONIN T SERPL-MCNC: 14 NG/L (ref 6–19)
TROPONIN T SERPL-MCNC: 16 NG/L (ref 6–19)
TROPONIN T SERPL-MCNC: 501 NG/L (ref 6–19)
UNIT STATUS USTAT: NORMAL
UROBILINOGEN UR STRIP.AUTO-MCNC: 1 MG/DL
WBC #/AREA URNS HPF: ABNORMAL /HPF

## 2020-01-12 PROCEDURE — 87086 URINE CULTURE/COLONY COUNT: CPT

## 2020-01-12 PROCEDURE — 83735 ASSAY OF MAGNESIUM: CPT

## 2020-01-12 PROCEDURE — 700111 HCHG RX REV CODE 636 W/ 250 OVERRIDE (IP): Performed by: INTERNAL MEDICINE

## 2020-01-12 PROCEDURE — 85018 HEMOGLOBIN: CPT

## 2020-01-12 PROCEDURE — 99291 CRITICAL CARE FIRST HOUR: CPT | Mod: 25 | Performed by: INTERNAL MEDICINE

## 2020-01-12 PROCEDURE — 72125 CT NECK SPINE W/O DYE: CPT

## 2020-01-12 PROCEDURE — 700105 HCHG RX REV CODE 258: Performed by: HOSPITALIST

## 2020-01-12 PROCEDURE — 700105 HCHG RX REV CODE 258: Performed by: INTERNAL MEDICINE

## 2020-01-12 PROCEDURE — P9016 RBC LEUKOCYTES REDUCED: HCPCS

## 2020-01-12 PROCEDURE — 80307 DRUG TEST PRSMV CHEM ANLYZR: CPT

## 2020-01-12 PROCEDURE — C9113 INJ PANTOPRAZOLE SODIUM, VIA: HCPCS | Performed by: HOSPITALIST

## 2020-01-12 PROCEDURE — 96366 THER/PROPH/DIAG IV INF ADDON: CPT

## 2020-01-12 PROCEDURE — 87040 BLOOD CULTURE FOR BACTERIA: CPT | Mod: 91

## 2020-01-12 PROCEDURE — 700105 HCHG RX REV CODE 258: Performed by: EMERGENCY MEDICINE

## 2020-01-12 PROCEDURE — 86900 BLOOD TYPING SEROLOGIC ABO: CPT

## 2020-01-12 PROCEDURE — 770022 HCHG ROOM/CARE - ICU (200)

## 2020-01-12 PROCEDURE — 99292 CRITICAL CARE ADDL 30 MIN: CPT | Mod: 25 | Performed by: INTERNAL MEDICINE

## 2020-01-12 PROCEDURE — 83605 ASSAY OF LACTIC ACID: CPT

## 2020-01-12 PROCEDURE — 96376 TX/PRO/DX INJ SAME DRUG ADON: CPT

## 2020-01-12 PROCEDURE — 70450 CT HEAD/BRAIN W/O DYE: CPT

## 2020-01-12 PROCEDURE — 85384 FIBRINOGEN ACTIVITY: CPT

## 2020-01-12 PROCEDURE — 85347 COAGULATION TIME ACTIVATED: CPT

## 2020-01-12 PROCEDURE — 700101 HCHG RX REV CODE 250

## 2020-01-12 PROCEDURE — 70486 CT MAXILLOFACIAL W/O DYE: CPT

## 2020-01-12 PROCEDURE — 36556 INSERT NON-TUNNEL CV CATH: CPT | Mod: RT | Performed by: INTERNAL MEDICINE

## 2020-01-12 PROCEDURE — 84100 ASSAY OF PHOSPHORUS: CPT

## 2020-01-12 PROCEDURE — 700101 HCHG RX REV CODE 250: Performed by: INTERNAL MEDICINE

## 2020-01-12 PROCEDURE — 85576 BLOOD PLATELET AGGREGATION: CPT

## 2020-01-12 PROCEDURE — 30233N1 TRANSFUSION OF NONAUTOLOGOUS RED BLOOD CELLS INTO PERIPHERAL VEIN, PERCUTANEOUS APPROACH: ICD-10-PCS | Performed by: HOSPITALIST

## 2020-01-12 PROCEDURE — 02HV33Z INSERTION OF INFUSION DEVICE INTO SUPERIOR VENA CAVA, PERCUTANEOUS APPROACH: ICD-10-PCS | Performed by: INTERNAL MEDICINE

## 2020-01-12 PROCEDURE — 36430 TRANSFUSION BLD/BLD COMPNT: CPT

## 2020-01-12 PROCEDURE — 700111 HCHG RX REV CODE 636 W/ 250 OVERRIDE (IP): Performed by: HOSPITALIST

## 2020-01-12 PROCEDURE — 72070 X-RAY EXAM THORAC SPINE 2VWS: CPT

## 2020-01-12 PROCEDURE — C1751 CATH, INF, PER/CENT/MIDLINE: HCPCS

## 2020-01-12 PROCEDURE — 83935 ASSAY OF URINE OSMOLALITY: CPT

## 2020-01-12 PROCEDURE — 86901 BLOOD TYPING SEROLOGIC RH(D): CPT

## 2020-01-12 PROCEDURE — 96367 TX/PROPH/DG ADDL SEQ IV INF: CPT

## 2020-01-12 PROCEDURE — 86923 COMPATIBILITY TEST ELECTRIC: CPT

## 2020-01-12 PROCEDURE — 86850 RBC ANTIBODY SCREEN: CPT

## 2020-01-12 PROCEDURE — 71045 X-RAY EXAM CHEST 1 VIEW: CPT

## 2020-01-12 PROCEDURE — 96368 THER/DIAG CONCURRENT INF: CPT

## 2020-01-12 PROCEDURE — 81001 URINALYSIS AUTO W/SCOPE: CPT

## 2020-01-12 PROCEDURE — 84300 ASSAY OF URINE SODIUM: CPT

## 2020-01-12 PROCEDURE — 99223 1ST HOSP IP/OBS HIGH 75: CPT | Performed by: HOSPITALIST

## 2020-01-12 PROCEDURE — 84484 ASSAY OF TROPONIN QUANT: CPT

## 2020-01-12 PROCEDURE — 92960 CARDIOVERSION ELECTRIC EXT: CPT

## 2020-01-12 PROCEDURE — 36556 INSERT NON-TUNNEL CV CATH: CPT

## 2020-01-12 PROCEDURE — 700111 HCHG RX REV CODE 636 W/ 250 OVERRIDE (IP): Performed by: EMERGENCY MEDICINE

## 2020-01-12 PROCEDURE — 700105 HCHG RX REV CODE 258

## 2020-01-12 PROCEDURE — 72110 X-RAY EXAM L-2 SPINE 4/>VWS: CPT

## 2020-01-12 RX ORDER — LORAZEPAM 2 MG/ML
3 INJECTION INTRAMUSCULAR
Status: DISCONTINUED | OUTPATIENT
Start: 2020-01-12 | End: 2020-01-17

## 2020-01-12 RX ORDER — LEVOTHYROXINE SODIUM 0.05 MG/1
50 TABLET ORAL
Status: DISCONTINUED | OUTPATIENT
Start: 2020-01-12 | End: 2020-01-31 | Stop reason: HOSPADM

## 2020-01-12 RX ORDER — ALBUTEROL SULFATE 90 UG/1
2 AEROSOL, METERED RESPIRATORY (INHALATION) EVERY 6 HOURS PRN
Status: DISCONTINUED | OUTPATIENT
Start: 2020-01-12 | End: 2020-01-31 | Stop reason: HOSPADM

## 2020-01-12 RX ORDER — SODIUM CHLORIDE 9 MG/ML
INJECTION, SOLUTION INTRAVENOUS
Status: COMPLETED
Start: 2020-01-12 | End: 2020-01-12

## 2020-01-12 RX ORDER — ONDANSETRON 4 MG/1
4 TABLET, ORALLY DISINTEGRATING ORAL EVERY 4 HOURS PRN
Status: DISCONTINUED | OUTPATIENT
Start: 2020-01-12 | End: 2020-01-31 | Stop reason: HOSPADM

## 2020-01-12 RX ORDER — SODIUM CHLORIDE 9 MG/ML
1000 INJECTION, SOLUTION INTRAVENOUS ONCE
Status: COMPLETED | OUTPATIENT
Start: 2020-01-12 | End: 2020-01-12

## 2020-01-12 RX ORDER — SODIUM CHLORIDE 9 MG/ML
INJECTION, SOLUTION INTRAVENOUS
Status: ACTIVE
Start: 2020-01-12 | End: 2020-01-12

## 2020-01-12 RX ORDER — BISACODYL 10 MG
10 SUPPOSITORY, RECTAL RECTAL
Status: DISCONTINUED | OUTPATIENT
Start: 2020-01-12 | End: 2020-01-31 | Stop reason: HOSPADM

## 2020-01-12 RX ORDER — PYRIDOXINE HCL (VITAMIN B6) 50 MG
100 TABLET ORAL DAILY
Status: DISCONTINUED | OUTPATIENT
Start: 2020-01-12 | End: 2020-01-31 | Stop reason: HOSPADM

## 2020-01-12 RX ORDER — PROMETHAZINE HYDROCHLORIDE 25 MG/1
12.5-25 SUPPOSITORY RECTAL EVERY 4 HOURS PRN
Status: DISCONTINUED | OUTPATIENT
Start: 2020-01-12 | End: 2020-01-31 | Stop reason: HOSPADM

## 2020-01-12 RX ORDER — SODIUM CHLORIDE, SODIUM LACTATE, POTASSIUM CHLORIDE, CALCIUM CHLORIDE 600; 310; 30; 20 MG/100ML; MG/100ML; MG/100ML; MG/100ML
INJECTION, SOLUTION INTRAVENOUS CONTINUOUS
Status: DISCONTINUED | OUTPATIENT
Start: 2020-01-12 | End: 2020-01-14

## 2020-01-12 RX ORDER — PROCHLORPERAZINE EDISYLATE 5 MG/ML
5-10 INJECTION INTRAMUSCULAR; INTRAVENOUS EVERY 4 HOURS PRN
Status: DISCONTINUED | OUTPATIENT
Start: 2020-01-12 | End: 2020-01-31 | Stop reason: HOSPADM

## 2020-01-12 RX ORDER — ZINC SULFATE 50(220)MG
220 CAPSULE ORAL ONCE
Status: DISPENSED | OUTPATIENT
Start: 2020-01-12 | End: 2020-01-13

## 2020-01-12 RX ORDER — THIAMINE MONONITRATE (VIT B1) 100 MG
100 TABLET ORAL DAILY
Status: DISCONTINUED | OUTPATIENT
Start: 2020-01-12 | End: 2020-01-12

## 2020-01-12 RX ORDER — ACETAMINOPHEN 325 MG/1
650 TABLET ORAL EVERY 6 HOURS PRN
Status: DISCONTINUED | OUTPATIENT
Start: 2020-01-12 | End: 2020-01-23

## 2020-01-12 RX ORDER — NOREPINEPHRINE BITARTRATE 1 MG/ML
INJECTION, SOLUTION INTRAVENOUS
Status: COMPLETED
Start: 2020-01-12 | End: 2020-01-12

## 2020-01-12 RX ORDER — AMOXICILLIN 250 MG
2 CAPSULE ORAL 2 TIMES DAILY
Status: DISCONTINUED | OUTPATIENT
Start: 2020-01-12 | End: 2020-01-31 | Stop reason: HOSPADM

## 2020-01-12 RX ORDER — LORAZEPAM 2 MG/ML
2 INJECTION INTRAMUSCULAR
Status: DISCONTINUED | OUTPATIENT
Start: 2020-01-12 | End: 2020-01-17

## 2020-01-12 RX ORDER — POLYETHYLENE GLYCOL 3350 17 G/17G
1 POWDER, FOR SOLUTION ORAL
Status: DISCONTINUED | OUTPATIENT
Start: 2020-01-12 | End: 2020-01-31 | Stop reason: HOSPADM

## 2020-01-12 RX ORDER — POTASSIUM CHLORIDE 7.45 MG/ML
10 INJECTION INTRAVENOUS
Status: DISPENSED | OUTPATIENT
Start: 2020-01-12 | End: 2020-01-12

## 2020-01-12 RX ORDER — ONDANSETRON 2 MG/ML
4 INJECTION INTRAMUSCULAR; INTRAVENOUS EVERY 4 HOURS PRN
Status: DISCONTINUED | OUTPATIENT
Start: 2020-01-12 | End: 2020-01-31 | Stop reason: HOSPADM

## 2020-01-12 RX ORDER — THIAMINE MONONITRATE (VIT B1) 100 MG
100 TABLET ORAL DAILY
Status: DISCONTINUED | OUTPATIENT
Start: 2020-01-15 | End: 2020-01-31 | Stop reason: HOSPADM

## 2020-01-12 RX ORDER — LORAZEPAM 2 MG/ML
4 INJECTION INTRAMUSCULAR
Status: DISCONTINUED | OUTPATIENT
Start: 2020-01-12 | End: 2020-01-17

## 2020-01-12 RX ORDER — CLONIDINE HYDROCHLORIDE 0.1 MG/1
0.1 TABLET ORAL EVERY 6 HOURS PRN
Status: DISCONTINUED | OUTPATIENT
Start: 2020-01-12 | End: 2020-01-17

## 2020-01-12 RX ORDER — MAGNESIUM SULFATE HEPTAHYDRATE 40 MG/ML
4 INJECTION, SOLUTION INTRAVENOUS ONCE
Status: COMPLETED | OUTPATIENT
Start: 2020-01-12 | End: 2020-01-12

## 2020-01-12 RX ORDER — FOLIC ACID 1 MG/1
1 TABLET ORAL DAILY
Status: DISPENSED | OUTPATIENT
Start: 2020-01-12 | End: 2020-01-16

## 2020-01-12 RX ORDER — LORAZEPAM 2 MG/ML
1 INJECTION INTRAMUSCULAR
Status: DISCONTINUED | OUTPATIENT
Start: 2020-01-12 | End: 2020-01-17

## 2020-01-12 RX ORDER — PROMETHAZINE HYDROCHLORIDE 25 MG/1
12.5-25 TABLET ORAL EVERY 4 HOURS PRN
Status: DISCONTINUED | OUTPATIENT
Start: 2020-01-12 | End: 2020-01-31 | Stop reason: HOSPADM

## 2020-01-12 RX ADMIN — THIAMINE HYDROCHLORIDE 500 MG: 100 INJECTION, SOLUTION INTRAMUSCULAR; INTRAVENOUS at 08:15

## 2020-01-12 RX ADMIN — SODIUM CHLORIDE 1000 ML: 9 INJECTION, SOLUTION INTRAVENOUS at 00:15

## 2020-01-12 RX ADMIN — CEFTRIAXONE SODIUM 2 G: 2 INJECTION, POWDER, FOR SOLUTION INTRAMUSCULAR; INTRAVENOUS at 01:30

## 2020-01-12 RX ADMIN — POTASSIUM CHLORIDE 10 MEQ: 7.46 INJECTION, SOLUTION INTRAVENOUS at 06:08

## 2020-01-12 RX ADMIN — CEFTRIAXONE SODIUM 1 G: 1 INJECTION, POWDER, FOR SOLUTION INTRAMUSCULAR; INTRAVENOUS at 20:48

## 2020-01-12 RX ADMIN — NOREPINEPHRINE BITARTRATE 8 MG: 1 INJECTION INTRAVENOUS at 07:30

## 2020-01-12 RX ADMIN — NOREPINEPHRINE BITARTRATE 5 MCG/MIN: 1 INJECTION INTRAVENOUS at 08:16

## 2020-01-12 RX ADMIN — SODIUM CHLORIDE 1000 ML: 9 INJECTION, SOLUTION INTRAVENOUS at 03:30

## 2020-01-12 RX ADMIN — LORAZEPAM 2 MG: 2 INJECTION INTRAMUSCULAR; INTRAVENOUS at 05:43

## 2020-01-12 RX ADMIN — SODIUM CHLORIDE 500 ML: 9 INJECTION, SOLUTION INTRAVENOUS at 08:25

## 2020-01-12 RX ADMIN — LORAZEPAM 1 MG: 2 INJECTION INTRAMUSCULAR; INTRAVENOUS at 17:54

## 2020-01-12 RX ADMIN — OCTREOTIDE ACETATE 50 MCG/HR: 200 INJECTION, SOLUTION INTRAVENOUS; SUBCUTANEOUS at 01:30

## 2020-01-12 RX ADMIN — SODIUM CHLORIDE 250 ML: 9 INJECTION, SOLUTION INTRAVENOUS at 07:32

## 2020-01-12 RX ADMIN — SODIUM CHLORIDE 8 MG/HR: 9 INJECTION, SOLUTION INTRAVENOUS at 15:35

## 2020-01-12 RX ADMIN — MAGNESIUM SULFATE IN WATER 4 G: 40 INJECTION, SOLUTION INTRAVENOUS at 09:40

## 2020-01-12 RX ADMIN — SODIUM CHLORIDE, POTASSIUM CHLORIDE, SODIUM LACTATE AND CALCIUM CHLORIDE: 600; 310; 30; 20 INJECTION, SOLUTION INTRAVENOUS at 06:07

## 2020-01-12 RX ADMIN — SODIUM CHLORIDE 8 MG/HR: 9 INJECTION, SOLUTION INTRAVENOUS at 04:30

## 2020-01-12 RX ADMIN — SODIUM CHLORIDE, POTASSIUM CHLORIDE, SODIUM LACTATE AND CALCIUM CHLORIDE: 600; 310; 30; 20 INJECTION, SOLUTION INTRAVENOUS at 19:10

## 2020-01-12 RX ADMIN — POTASSIUM PHOSPHATE, MONOBASIC AND POTASSIUM PHOSPHATE, DIBASIC 15 MMOL: 224; 236 INJECTION, SOLUTION, CONCENTRATE INTRAVENOUS at 10:53

## 2020-01-12 ASSESSMENT — COGNITIVE AND FUNCTIONAL STATUS - GENERAL
SUGGESTED CMS G CODE MODIFIER MOBILITY: CL
MOVING TO AND FROM BED TO CHAIR: A LOT
WALKING IN HOSPITAL ROOM: TOTAL
DRESSING REGULAR UPPER BODY CLOTHING: A LOT
HELP NEEDED FOR BATHING: A LOT
DAILY ACTIVITIY SCORE: 12
STANDING UP FROM CHAIR USING ARMS: A LOT
MOVING FROM LYING ON BACK TO SITTING ON SIDE OF FLAT BED: A LOT
TOILETING: A LOT
DRESSING REGULAR LOWER BODY CLOTHING: A LOT
PERSONAL GROOMING: A LOT
TURNING FROM BACK TO SIDE WHILE IN FLAT BAD: A LITTLE
SUGGESTED CMS G CODE MODIFIER DAILY ACTIVITY: CL
CLIMB 3 TO 5 STEPS WITH RAILING: A LOT
MOBILITY SCORE: 12
EATING MEALS: A LOT

## 2020-01-12 ASSESSMENT — ENCOUNTER SYMPTOMS
ABDOMINAL PAIN: 1
HEMOPTYSIS: 0
TREMORS: 1
FLANK PAIN: 0
CARDIOVASCULAR NEGATIVE: 1
PALPITATIONS: 0
CONSTITUTIONAL NEGATIVE: 1
NAUSEA: 1
MUSCULOSKELETAL NEGATIVE: 1
RESPIRATORY NEGATIVE: 1
EYE REDNESS: 0
FOCAL WEAKNESS: 0
EYES NEGATIVE: 1
BRUISES/BLEEDS EASILY: 0
MYALGIAS: 0
SEIZURES: 0
SPEECH CHANGE: 0
STRIDOR: 0
SHORTNESS OF BREATH: 0
GASTROINTESTINAL NEGATIVE: 1
NECK PAIN: 0
CHILLS: 0
FEVER: 0
COUGH: 0
VOMITING: 1
EYE DISCHARGE: 0

## 2020-01-12 ASSESSMENT — LIFESTYLE VARIABLES
TOTAL SCORE: 4
EVER FELT BAD OR GUILTY ABOUT YOUR DRINKING: YES
TOTAL SCORE: 4
DOES PATIENT WANT TO TALK TO SOMEONE ABOUT QUITTING: YES
HAVE YOU EVER FELT YOU SHOULD CUT DOWN ON YOUR DRINKING: YES
AVERAGE NUMBER OF DAYS PER WEEK YOU HAVE A DRINK CONTAINING ALCOHOL: 7
ALCOHOL_USE: YES
SUBSTANCE_ABUSE: 1
ON A TYPICAL DAY WHEN YOU DRINK ALCOHOL HOW MANY DRINKS DO YOU HAVE: 8
EVER_SMOKED: NEVER
DOES PATIENT WANT TO STOP DRINKING: YES
EVER HAD A DRINK FIRST THING IN THE MORNING TO STEADY YOUR NERVES TO GET RID OF A HANGOVER: YES
TOTAL SCORE: 4
HOW MANY TIMES IN THE PAST YEAR HAVE YOU HAD 5 OR MORE DRINKS IN A DAY: 360
CONSUMPTION TOTAL: POSITIVE
HAVE PEOPLE ANNOYED YOU BY CRITICIZING YOUR DRINKING: YES

## 2020-01-12 ASSESSMENT — PATIENT HEALTH QUESTIONNAIRE - PHQ9
SUM OF ALL RESPONSES TO PHQ9 QUESTIONS 1 AND 2: 0
2. FEELING DOWN, DEPRESSED, IRRITABLE, OR HOPELESS: NOT AT ALL
1. LITTLE INTEREST OR PLEASURE IN DOING THINGS: NOT AT ALL

## 2020-01-12 NOTE — ED NOTES
Lab called with critical result of Lactate 8.9 at 0227. Critical lab result read back to lab.   Dr. Mosqueda notified of critical lab result at 0227.  Critical lab result read back by Dr. Mosqueda.

## 2020-01-12 NOTE — ED PROVIDER NOTES
ED Provider Note    Scribed for Selam Mosqueda M.D. by Nelly Su. 2020  11:04 PM    Means of arrival: Ambulance  History obtained from: Patient  History limited by: None       CHIEF COMPLAINT  Chief Complaint   Patient presents with   • Fall   • Head Injury       HPI  Jumana Kahn is a 54 y.o. female with only reported history of hypothyroidism who presents to the Emergency Department for facial injury secondary to a fall onset prior to arrival. The patient was walking to the store today when she tripped and struck her face on the pavement.  She states she believes she tripped and did not have a syncopal episode or lose consciousness.  She endorses pain to the left side of her face and nose.  The patient also reports ongoing symptoms of hematemesis intermittently for the last 2-1/2 weeks.  She reports both bright red blood and coffee-ground emesis. She reports associated weakness, hematuria and constipation. She has not been able to eat or drink without emesis shortly after. The patient states that she has very little energy to complete her normal daily tasks. She denies loss of consciousness or bloody stool. The patient is tremulous and states she is withdrawing from alcohol.  She states her last drink was at 3 AM this morning and she is recently been trying to cut back.      REVIEW OF SYSTEMS  Pertinent positive include facial injury, bloody emesis, weakness, hematuria, and constipation. Pertinent negative include no loss of consciousness or bloody stool. All other systems reviewed and are negative.      PAST MEDICAL HISTORY   has a past medical history of Hypothyroid and Psychiatric disorder.    SOCIAL HISTORY  Social History     Tobacco Use   • Smoking status: Former Smoker     Packs/day: 0.00     Last attempt to quit: 2001     Years since quittin.4   • Smokeless tobacco: Never Used   Substance and Sexual Activity   • Alcohol use: Yes     Binge frequency: Daily or almost daily      "Comment: \"sometimes\"   • Drug use: No   • Sexual activity: Not on file       SURGICAL HISTORY   has a past surgical history that includes other orthopedic surgery; gyn surgery; and other.    CURRENT MEDICATIONS  Home Medications    **Home medications have not yet been reviewed for this encounter**         ALLERGIES  Allergies   Allergen Reactions   • Ampicillin Rash     Skin rash  Tolerated cephalexin (2018)        PHYSICAL EXAM   VITAL SIGNS: /80   Pulse (!) 138   Temp 37 °C (98.6 °F) (Temporal)   Resp (!) 22   Ht 1.651 m (5' 5\")   Wt 77.1 kg (170 lb)   SpO2 95%   BMI 28.29 kg/m²    Constitutional: Ill-appearing middle-aged female. Alert in no apparent distress.  HENT: Normocephalic. Bilateral external ears normal. Nose normal.  Moist mucous membranes.  Oropharynx clear. Significant swelling to the left side of the face with overlying abrasions  Eyes: Pupils are equal and reactive. Conjunctiva normal. EOMI without entrapment Sclerae icterus   Neck: Supple, full range of motion  Heart: Tachycardic rate and Regular rhythm.  No murmurs.    Lungs: No respiratory distress, normal work of breathing. Lungs clear to auscultation bilaterally.  Abdomen Soft, no distention.  No tenderness to palpation.  Musculoskeletal: .No obvious deformities noted.  No lower extremity edema. Mildine cervical spine tenderness to palpation, midline thoracic and upper thoracic tenderness to palpation   Skin: Warm, Dry. Jaundice  Neurologic:  Hand tremors and tongue fasciculations. Alert and oriented x3. Moving all extremities spontaneously without focal deficits.  Psychiatric: Affect normal, Mood normal, Appears appropriate and not intoxicated.      DIAGNOSTIC STUDIES    EKG  Results for orders placed or performed during the hospital encounter of 01/11/20   EKG (Now)   Result Value Ref Range    Report       [facility] Emergency Dept.    Test Date:  2020-01-11  Pt Name:    ISMAEL SRINIVASAN              " Department: ER  MRN:        [id number]                      Room:        13  Gender:     Female                       Technician: [id number]  :        1965                   Requested By:SELAM JO  Order #:    [id number]                    Reading MD: Selam Jo MD    Measurements  Intervals                                Axis  Rate:       106                          P:          51  WA:         168                          QRS:        10  QRSD:       92                           T:          213  QT:         340  QTc:        452    Interpretive Statements  SINUS TACHYCARDIA  BORDERLINE R WAVE PROGRESSION, ANTERIOR LEADS  NONSPECIFIC T ABNORMALITIES, DIFFUSE LEADS  T wave inversions with mild ST depression in precordial leads  No previous ECG available for comparison  Electronically Signed On 2020 1:31:49 PST by Selam Jo MD             LABS  Personally reviewed by me  Labs Reviewed   CBC WITH DIFFERENTIAL - Abnormal; Notable for the following components:       Result Value    RBC 2.31 (*)     Hemoglobin 8.4 (*)     Hematocrit 26.3 (*)     .9 (*)     MCH 36.4 (*)     MCHC 31.9 (*)     RDW 90.3 (*)     Platelet Count 116 (*)     Neutrophils-Polys 73.70 (*)     Lymphocytes 5.30 (*)     Lymphs (Absolute) 0.44 (*)     All other components within normal limits    Narrative:     Indicate which anticoagulants the patient is on:->UNKNOWN   COMP METABOLIC PANEL - Abnormal; Notable for the following components:    Sodium 126 (*)     Potassium 3.4 (*)     Chloride 77 (*)     Co2 18 (*)     Anion Gap 31.0 (*)     Glucose 173 (*)     AST(SGOT) 219 (*)     Alkaline Phosphatase 352 (*)     Total Bilirubin 5.5 (*)     All other components within normal limits    Narrative:     Indicate which anticoagulants the patient is on:->UNKNOWN   LACTIC ACID - Abnormal; Notable for the following components:    Lactic Acid 16.9 (*)     All other components within normal limits   TROPONIN - Abnormal; Notable for the  following components:    Troponin T 501 (*)     All other components within normal limits   ESTIMATED GFR - Abnormal; Notable for the following components:    GFR If Non  59 (*)     All other components within normal limits    Narrative:     Indicate which anticoagulants the patient is on:->UNKNOWN   LACTIC ACID - Abnormal; Notable for the following components:    Lactic Acid 8.9 (*)     All other components within normal limits    Narrative:     After 2nd liter   OSMOLALITY SERUM - Abnormal; Notable for the following components:    Osmolality Serum 272 (*)     All other components within normal limits   LIPASE    Narrative:     Indicate which anticoagulants the patient is on:->UNKNOWN   APTT    Narrative:     Indicate which anticoagulants the patient is on:->UNKNOWN   COD (ADULT)   PROTHROMBIN TIME    Narrative:     Indicate which anticoagulants the patient is on:->UNKNOWN   DIFFERENTIAL MANUAL    Narrative:     Indicate which anticoagulants the patient is on:->UNKNOWN   PERIPHERAL SMEAR REVIEW    Narrative:     Indicate which anticoagulants the patient is on:->UNKNOWN   PLATELET ESTIMATE    Narrative:     Indicate which anticoagulants the patient is on:->UNKNOWN   MORPHOLOGY    Narrative:     Indicate which anticoagulants the patient is on:->UNKNOWN   ABO RH CONFIRM   DIAGNOSTIC ALCOHOL   URINALYSIS,CULTURE IF INDICATED   URINE DRUG SCREEN   BLOOD CULTURE   BLOOD CULTURE           RADIOLOGY  Personally reviewed by me  DX-CHEST-PORTABLE (1 VIEW)   Final Result      No acute cardiopulmonary findings.      CT-HEAD W/O   Final Result      1.  No acute intracranial findings.      2.  Left nasal bone fracture.         CT-CSPINE WITHOUT PLUS RECONS   Final Result      1.  No acute fracture is identified.      2.  Multilevel degenerative disc disease and facet arthropathy as described.      CT-MAXILLOFACIAL W/O PLUS RECONS   Final Result         Left nasal bone fracture      DX-THORACIC SPINE-2 VIEWS    Final Result      No acute fracture is identified.      DX-LUMBAR SPINE-4+ VIEWS   Final Result         1.  No acute fracture is identified.      2.  Facet arthropathy in the lower lumbar spine.          ED COURSE  Vitals:    01/11/20 2251 01/11/20 2331 01/12/20 0117 01/12/20 0131   BP: 115/80 103/74 (!) 99/66 (!) 90/57   Pulse: (!) 138 (!) 117 95 96   Resp: (!) 22 (!) 22     Temp: 37 °C (98.6 °F)      TempSrc: Temporal      SpO2: 95% 94% 96% 97%   Weight:       Height:             Medications administered:  Medications   octreotide (SANDOSTATIN) 1,250 mcg in  mL Infusion (has no administration in time range)   cefTRIAXone (ROCEPHIN) 2 g in  mL IVPB (has no administration in time range)   detox IV 1000 mL (D5LR + magnesium 1 g + thiamine 100 mg + folic acid 1 mg) infusion (1,000 mL Intravenous New Bag 1/11/20 2323)   LORazepam (ATIVAN) injection 2 mg (2 mg Intravenous Given 1/11/20 2323)   pantoprazole (PROTONIX) injection 80 mg (80 mg Intravenous Given 1/11/20 2345)   NS infusion 1,000 mL (1,000 mL Intravenous New Bag 1/12/20 0015)     Patient was given IV fluids for tachycardia and elevated lactate.  IV hydration was used because oral hydration was not adequate alone.  Following fluid administration patient's vital signs and lactic acidosis were improved.      11:04 PM Patient seen and examined at bedside. The patient presents with head injury. Ordered for Dx-Lumbar, Dx-Thoracic, CT-Head, CT-Cspine, CT-Maxillofacial, Lactic Acid, Troponin, CBC, CMP, Estimated GFR, Morphology, APTT, PT/INR, and platelet estimate to evaluate. Patient will be treated with Protonix 80 mg for her symptoms.     MEDICAL DECISION MAKING  Patient with history of chronic alcohol abuse who presents after a reported ground-level mechanical fall with obvious facial trauma.  The patient is afebrile, tachycardic on arrival with otherwise reassuring vitals.  She is significant facial trauma to the left side of her face.  She has no  focal neurologic deficits on exam.  Imaging does not show evidence of intracranial hemorrhage, skull fracture, C-spine fracture.  There is no evidence of facial fracture other than a nasal bone fracture.  There is no evidence of ocular trauma or entrapment on exam.    Labs are significant for a very high lactic acidosis of unclear etiology.  The patient does not have any focal infectious symptoms or elevated white blood cell count.  Chest x-ray does not show signs of pneumonia.  Urinalysis is pending at this time.  Empiric antibiotics were not given as we do not have a infectious etiology.  I suspect that the lactic acidosis may be due to dehydration as well as her liver disease.  She has an elevated bilirubin in addition to transaminitis.  The lactic acidosis did improve significantly following a few liters of IV fluids.  She does not have an elevated osmolar gap to suggest toxic alcohol ingestion.    Patient is having reports of hematemesis over the last 2 weeks.  She does have evidence of anemia of unclear chronicity.  She has not had any active bleeding here in the department.  I have covered her with pantoprazole, octreotide, and ceftriaxone due to her signs of liver stigmata and concern for possible esophageal varices.    Patient also has a significantly elevated troponin.  She has nonspecific T wave changes on her EKG without priors for comparison.  She is not having any chest pain or symptoms concerning for ACS.  We will continue to trend troponins as I think this is more likely elevated due to her other underlying medical issues.    On reassessment, the patient is resting comfortably following Ativan with improvement of her withdrawal symptoms.  Her heart rate is improving with hydration and blood pressures are low however remained stable.  The patient was updated with plan of care for admission and is agreeable at this time.    I discussed the case with Dr. Cole, hospitalist on-call, who accepts admission  of the patient.  She is in stable but critical condition at the time of transfer to the floor.    CRITICAL CARE TIME  Upon my evaluation, this patient had a high probability of imminent or life-threatening deterioration due to severe lactic acidosis requiring fluid resuscitation, GI bleeding with underlying liver disease and anemia, alcohol withdrawal, which required my direct attention, intervention, and personal management.     I personally provided 45 minutes of total critical care time outside of time spent on separately billable/documented procedures. Time includes: review of laboratory data, review of radiology studies, discussion with consultants, discussion with family/patient, monitoring for potential decompensation.  Interventions were performed as documented above.       DISPOSITION:  Patient will be hospitalized by Dr. Cole in critical condition.      IMPRESSION  (S09.90XA) Closed head injury, initial encounter  (S02.2XXA) Closed fracture of nasal bone, initial encounter  (E87.2) Lactic acidosis  (K92.2) Upper GI bleed  (D64.9) Anemia, unspecified type  (F10.230) Alcohol withdrawal syndrome without complication (HCC)    Results, diagnoses, and treatment options were discussed with the patient and/or family. Patient verbalized understanding of plan of care.       Nelly SALGADO (Scribe), am scribing for, and in the presence of, Selam Mosqueda M.D..    Electronically signed by: Nelly Su (Gurpreetibe), 1/11/2020    Selam SALGADO M.D. personally performed the services described in this documentation, as scribed by Nelly Su in my presence, and it is both accurate and complete.  C  The note accurately reflects work and decisions made by me.  Selam Mosqueda  1/12/2020  3:02 AM

## 2020-01-12 NOTE — ED NOTES
Lab called with critical result of Troponin 501 at 0219. Critical lab result read back to lab.   Dr. Mosqueda notified of critical lab result at 0220.  Critical lab result read back by Dr. Mosqueda.

## 2020-01-12 NOTE — ASSESSMENT & PLAN NOTE
Elevated TSH with low T4, ?  Compliance with levothyroxine therapy  Consider increasing levothyroxine vs encouraging compliance

## 2020-01-12 NOTE — PROGRESS NOTES
Dr. Cash at bedside. Updated about SBP 70s-80s and hgb of 7.0. Orders received. MD to place central line.

## 2020-01-12 NOTE — ASSESSMENT & PLAN NOTE
Resolved  US abd without hydronephrosis or obvious source  F/U with PCP for further outpatient work-up as needed

## 2020-01-12 NOTE — ASSESSMENT & PLAN NOTE
Presented with Upper GI bleed given her bright red bleeding followed by coffee-ground emesis.    Post admission to  ICU on a protonix and octreotide drip.    EGD 1/15 revealing severe ulcerative erosive esophagitis with hemoclip  She was transfused 2 units RBC due to acute blood loss anemia  Continue oral Prilosec twice daily
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
## 2020-01-12 NOTE — PROGRESS NOTES
Critical Care Progress Note    Date of admission  1/11/2020    Chief Complaint  54 y.o. female admitted 1/11/2020 with GLF, nasal fx, hematemesis, lactic acidosis, EtOH w/d    Hospital Course    54 y.o. female admitted 1/11/2020.  She has a history of alcohol abuse and hypothyroidism.  She presented to the emergency department this evening after she apparently tripped and took a header.  She hit her face.  She is complaining of pain in the left side of her face.  She denies syncope.  She remembers tripping and falling.  She is unclear if she knocked herself out when she hit the pavement, but states that if she did it was only for a couple of seconds.  She further tells me that for the last couple of weeks she has noticed some blood in her urine and painful urination.  She denies fever.  She has felt shaky, but she has been cutting down her alcohol intake.  She admits to drinking 1-2 half pints of vodka a day.  Her last drink was yesterday.  She has also been having some hematemesis for the last couple of weeks.  Initially she was vomiting bright red blood, but now she states that her emesis is dark and almost black.  She feels constipated and denies melena or hematochezia.      Interval Problem Update  Reviewed last 24 hour events:  1 U PRBCs this am, Hgb 7  A/o x 4; lethargic  SR 80   Afebrile  NPO  No BM, no further hematemesis   Norepi gtt 8 mcg, min; f/u CVP  2 lpm oxygen  CXR  PPI gtt, octreotide gtt  C3 SBP ppx    Replace lytes    Review of Systems  Review of Systems   Unable to perform ROS: Acuity of condition        Vital Signs for last 24 hours   Temp:  [36.4 °C (97.5 °F)-37 °C (98.6 °F)] 36.4 °C (97.6 °F)  Pulse:  [] 80  Resp:  [18-22] 18  BP: ()/(50-80) 87/58  SpO2:  [94 %-99 %] 97 %    Hemodynamic parameters for last 24 hours       Respiratory Information for the last 24 hours       Physical Exam   Physical Exam  Vitals signs and nursing note reviewed.   Constitutional:        Appearance: She is ill-appearing.   HENT:      Head:      Comments: Bruising around L eye, swollen shut     Mouth/Throat:      Mouth: Mucous membranes are dry.   Cardiovascular:      Rate and Rhythm: Normal rate and regular rhythm.   Pulmonary:      Effort: No respiratory distress.      Breath sounds: No wheezing.   Abdominal:      General: There is no distension.      Tenderness: There is no tenderness.   Musculoskeletal:         General: No tenderness or deformity.   Skin:     General: Skin is warm and dry.      Capillary Refill: Capillary refill takes less than 2 seconds.   Neurological:      General: No focal deficit present.         Medications  Current Facility-Administered Medications   Medication Dose Route Frequency Provider Last Rate Last Dose   • albuterol inhaler 2 Puff  2 Puff Inhalation Q6HRS PRN Feliciano Cole M.D.       • levothyroxine (SYNTHROID) tablet 50 mcg  50 mcg Oral AM ES Feliciano Cole M.D.   Stopped at 01/12/20 0600   • acetaminophen (TYLENOL) tablet 650 mg  650 mg Oral Q6HRS PRN Feliciano Cole M.D.       • cloNIDine (CATAPRES) tablet 0.1 mg  0.1 mg Oral Q6HRS PRN Feliciano Cole M.D.       • ondansetron (ZOFRAN) syringe/vial injection 4 mg  4 mg Intravenous Q4HRS PRN Feliciano Cole M.D.       • ondansetron (ZOFRAN ODT) dispertab 4 mg  4 mg Oral Q4HRS PRN Feliciano Cole M.D.       • promethazine (PHENERGAN) tablet 12.5-25 mg  12.5-25 mg Oral Q4HRS PRN Feliciano Cole M.D.       • promethazine (PHENERGAN) suppository 12.5-25 mg  12.5-25 mg Rectal Q4HRS PRN Feliciano Cole M.D.       • prochlorperazine (COMPAZINE) injection 5-10 mg  5-10 mg Intravenous Q4HRS PRN Feliciano Cole M.D.       • senna-docusate (PERICOLACE or SENOKOT S) 8.6-50 MG per tablet 2 Tab  2 Tab Oral BID Feliciano Cole M.D.        And   • polyethylene glycol/lytes (MIRALAX) PACKET 1 Packet  1 Packet Oral QDAY PRN Feliciano Cole M.D.        And   • magnesium hydroxide (MILK OF MAGNESIA) suspension 30 mL  30 mL Oral  QDAY PRN Feliciano Cole M.D.        And   • bisacodyl (DULCOLAX) suppository 10 mg  10 mg Rectal QDAY PRN Feliciano Cole M.D.       • lactated ringers infusion   Intravenous Hardeep Cash M.D. 125 mL/hr at 01/12/20 0607     • cefTRIAXone (ROCEPHIN) 1 g in  mL IVPB  1 g Intravenous Q24HRS Feliciano Cole M.D.       • Pharmacy Consult Request   Other PHARMACY TO DOSE Feliciano Cole M.D.       • octreotide (SANDOSTATIN) 1,250 mcg in  mL Infusion  50 mcg/hr Intravenous Continuous Feliciano Cole M.D. 10 mL/hr at 01/12/20 0827 50 mcg/hr at 01/12/20 0827   • pantoprazole (PROTONIX) 80 mg in  mL Infusion  8 mg/hr Intravenous Continuous Feliciano Cole M.D. 25 mL/hr at 01/12/20 0430 8 mg/hr at 01/12/20 0430   • pyridoxine (VITAMIN B-6) tablet 100 mg  100 mg Oral DAILY Feliciano Cole M.D.   Stopped at 01/12/20 0600   • zinc sulfate (ZINCATE) capsule 220 mg  220 mg Oral Once Feliciano Cole M.D.   Stopped at 01/12/20 0600   • LORazepam (ATIVAN) injection 4 mg  4 mg Intravenous Q15 MIN PRN Feliciano Cole M.D.        Or   • LORazepam (ATIVAN) injection 3 mg  3 mg Intravenous Q15 MIN PRN Feliciano Cole M.D.        Or   • LORazepam (ATIVAN) injection 2 mg  2 mg Intravenous Q15 MIN PRN Feliciano Cole M.D.   2 mg at 01/12/20 0543    Or   • LORazepam (ATIVAN) injection 1 mg  1 mg Intravenous Q15 MIN PRN Feliciano Cole M.D.       • multivitamin (THERAGRAN) tablet 1 Tab  1 Tab Oral DAILY Feliciano Cole M.D.   Stopped at 01/12/20 0600    And   • folic acid (FOLVITE) tablet 1 mg  1 mg Oral DAILY Feliciano Cole M.D.   Stopped at 01/12/20 0600   • thiamine (B-1) 500 mg in D5W 100 mL IVPB  500 mg Intravenous DAILY Aram Cash M.D. 200 mL/hr at 01/12/20 0815 500 mg at 01/12/20 0815    Followed by   • [START ON 1/15/2020] thiamine tablet 100 mg  100 mg Oral DAILY Aram Cash M.D.       • potassium chloride (KCL) ivpb 10 mEq  10 mEq Intravenous Q HOUR Aram Cash  M.D.   Stopped at 01/12/20 0620   • magnesium sulfate IVPB premix 4 g  4 g Intravenous Once Aram Cash M.D.       • norepinephrine (LEVOPHED) 8 mg in  mL Infusion  0-30 mcg/min Intravenous Continuous Aram Cash M.D. 9.4 mL/hr at 01/12/20 0816 5 mcg/min at 01/12/20 0816   • SODIUM CHLORIDE 0.9 % IV SOLN        Stopped at 01/12/20 0730       Fluids    Intake/Output Summary (Last 24 hours) at 1/12/2020 0837  Last data filed at 1/12/2020 0748  Gross per 24 hour   Intake 3450 ml   Output --   Net 3450 ml       Laboratory          Recent Labs     01/11/20 2248 01/12/20  0500   SODIUM 126*  --    POTASSIUM 3.4*  --    CHLORIDE 77*  --    CO2 18*  --    BUN 8  --    CREATININE 0.98  --    MAGNESIUM  --  1.7   PHOSPHORUS  --  2.4*   CALCIUM 8.8  --      Recent Labs     01/11/20 2248   ALTSGPT 36   ASTSGOT 219*   ALKPHOSPHAT 352*   TBILIRUBIN 5.5*   LIPASE 51   GLUCOSE 173*     Recent Labs     01/11/20 2248   WBC 8.3   NEUTSPOLYS 73.70*   LYMPHOCYTES 5.30*   MONOCYTES 7.90   EOSINOPHILS 0.90   BASOPHILS 0.90   ASTSGOT 219*   ALTSGPT 36   ALKPHOSPHAT 352*   TBILIRUBIN 5.5*     Recent Labs     01/11/20 2248 01/12/20  0500   RBC 2.31*  --    HEMOGLOBIN 8.4* 7.0*   HEMATOCRIT 26.3*  --    PLATELETCT 116*  --    PROTHROMBTM 14.2  --    APTT 28.5  --    INR 1.07  --        Imaging  X-Ray:  I have personally reviewed the images and compared with prior images.    Assessment/Plan  * GIB (gastrointestinal bleeding)  Assessment & Plan  Differential diagnosis includes esophagitis, gastritis, peptic ulcer disease, Shayla-Navarro tear, esophageal varices  Begin IV octreotide and Protonix drips  Start prophylactic Rocephin  Trend hemoglobin  Transfuse PRBCs to keep hemoglobin greater than 7  Gastroenterology consultation  Check thromboelastogram with platelet mapping    Lactic acidosis  Assessment & Plan  Suspect due to a combination of intravascular volume depletion as well as liver disease  Hydrate with  intravenous fluids  Trend lactic acid    Hematuria  Assessment & Plan  Check urinalysis and culture urine    Alcohol withdrawal syndrome with complication (HCC)  Assessment & Plan  Begin benzodiazepines based upon RASS  Low threshold for transitioning to phenobarbital protocol if she develops severe withdrawal    Troponin level elevated  Assessment & Plan  ECG without evidence of acute coronary syndrome or STEMI  Trend troponin    Alcoholic liver disease (HCC)  Assessment & Plan  Avoid hepatotoxins  Alcohol cessation counseling  High-dose IV thiamine and supplemental vitamins  Observe for evidence of withdrawal    Macrocytic anemia  Assessment & Plan  History of hematuria and hematemesis  Trend hemoglobin  Transfuse PRBCs to keep hemoglobin greater than 7    Closed fracture of nasal bone  Assessment & Plan  Pain control    Hypokalemia  Assessment & Plan  Replete potassium  Check magnesium    Hypothyroidism  Assessment & Plan  Continue levothyroxine, 50 mcg daily    Thrombocytopenia (HCC)  Assessment & Plan  Trend platelet count  Check thromboelastogram with platelet mapping    Hyponatremia  Assessment & Plan  Follow serial electrolytes  Hydrate with intravenous fluids       PRBCs today  Titrate off pressors  No further bleeding noted; follow HH  Octreotide/PPI, gi eval  Follow UOP/renal fxn  Plan as above    VTE:  Contraindicated  Ulcer: PPI  Lines: Central Line  Ongoing indication addressed    I have performed a physical exam and reviewed and updated ROS and Plan today (1/12/2020). In review of yesterday's note (1/11/2020), there are no changes except as documented above.     Discussed patient condition and risk of morbidity and/or mortality with RN, RT, Pharmacy and QA team  The patient remains critically ill.  Critical care time = 60 minutes in directly providing and coordinating critical care and extensive data review.  No time overlap and excludes procedures.

## 2020-01-12 NOTE — ASSESSMENT & PLAN NOTE
With associated hepatic dysfunction in the form of thrombocytopenia, but with normal coagulation pattern.    Bilirubin increasing, this is likely her intrinsic liver disease.   Liver ultrasound showed Massive hepatomegaly and echogenic liver, compatible with fatty change versus fibrosis  Meld score 17, child Evans score 11, 55% 1 year mortality. Etoh cessation counseling, outpatient GI input.  Increased lethargy - check ammonia level.   Encourage intake, nutrition support.

## 2020-01-12 NOTE — ASSESSMENT & PLAN NOTE
Due to severe erosive esophagitis  Continue PPI  Continue Carafate  Off Rocephin and octreotide  Trend hemoglobin and transfuse as needed to maintain greater than 7  EGD with GI and anesthesia on 1/15/20: severe erosive esophagitis  Thromboelastogram unremarkable  Follow-up with GI for repeat EGD as outpatient

## 2020-01-12 NOTE — ASSESSMENT & PLAN NOTE
Suspect this could be demand ischemia in the setting of gastrointestinal hemorrhage- level normalized   Asymptomatic , no chest pain -follow clinically .

## 2020-01-12 NOTE — PROCEDURES
Date of service:  1/12/2020    Title:  Central venous catheter placement - internal jugular vein    Indication: Hypotension.  Requires central venous access for medication administration.    Narrative:    A time out was performed identifying the correct patient, correct procedure and correct location prior to this procedure.    The right neck was prepped with chlorhexidine and draped in the usual sterile fashion.  1% Xylocaine solution was used for topical anesthesia.  A triple lumen central venous catheter was placed into the right internal jugular vein under ultrasound guidance using the technique described by Alexx without difficulty or apparent complication.  The line was sutured into place and a sterile dressing was placed over the line.  All 3 ports flush and return venous blood easily.  The patient tolerated the procedure quite nicely.  No complications are apparent.  A STAT CXR is ordered to confirm placement.      Aram Cash MD  Pulmonary and Critical Care Medicine

## 2020-01-12 NOTE — H&P
Hospital Medicine History & Physical Note    Date of Service  1/12/2020    Primary Care Physician  AB Jay.    Consultants  Critical care Dr. Cash    Code Status  Full code    Chief Complaint  Hematemesis    History of Presenting Illness  54 y.o. female with history of chronic alcoholism, with prior episodes of withdrawal, alcoholic liver disease, was in her usual state of health until approximately 2 weeks prior to admission.  She reports the onset of painless nausea and vomiting, which initially was bright red blood, and then over the next several days becoming coffee-ground in color and then black.  On the day prior to this admission, she had her last alcoholic beverage at around 3 in the morning, and then reports that she began to feel evidence of withdrawal.  Additionally, while walking, she did trip on a curb, and fell and hit her face on it.  She subsequently has lacerations to the eyebrow and bruising to the face. She was subsequently brought to this facility for the same.  She currently reports no headache or vision changes she does feel tremulous, anxious, denies chest pain, shortness of breath, abdominal pain, diarrhea or constipation.  No fever or chills.  No other complaints.    Review of Systems  Review of Systems   Constitutional: Negative.    HENT: Negative.    Eyes: Negative.    Respiratory: Negative.    Cardiovascular: Negative.    Gastrointestinal: Negative.    Genitourinary: Negative.    Musculoskeletal: Negative.    Skin: Negative.    Neurological: Positive for tremors.   Endo/Heme/Allergies: Negative.    Psychiatric/Behavioral: Positive for substance abuse.       Past Medical History   has a past medical history of Hypothyroid and Psychiatric disorder.    Surgical History   has a past surgical history that includes other orthopedic surgery; gyn surgery; and other.     Family History  Family history reviewed and non-pertinent     Social History   reports that she quit  smoking about 18 years ago. She smoked 0.00 packs per day. She has never used smokeless tobacco. She reports current alcohol use. She reports that she does not use drugs.    Allergies  Allergies   Allergen Reactions   • Ampicillin Rash     Skin rash  Tolerated cephalexin (2018)        Medications  Prior to Admission Medications   Prescriptions Last Dose Informant Patient Reported? Taking?   albuterol 108 (90 Base) MCG/ACT Aero Soln inhalation aerosol   No No   Sig: Inhale 2 Puffs by mouth every 6 hours as needed for Shortness of Breath.   cefdinir (OMNICEF) 300 MG Cap   No No   Sig: Take 1 Cap by mouth 2 times a day.   levothyroxine (SYNTHROID) 50 MCG Tab  Patient Yes No   Sig: Take 50 mcg by mouth Every morning on an empty stomach.   methylPREDNISolone (MEDROL DOSEPAK) 4 MG Tablet Therapy Pack   No No   Sig: Use as directed      Facility-Administered Medications: None       Physical Exam  Temp:  [37 °C (98.6 °F)] 37 °C (98.6 °F)  Pulse:  [] 100  Resp:  [20-22] 20  BP: ()/(52-80) 85/54  SpO2:  [94 %-99 %] 98 %    Physical Exam  Vitals signs and nursing note reviewed.   Constitutional:       Appearance: Normal appearance. She is normal weight. She is ill-appearing.   HENT:      Head:      Comments: Facial trauma, with ecchymoses over left eye, bloody discharge near the eyebrow on the left, and abrasions to the face     Nose: Nose normal.      Mouth/Throat:      Mouth: Mucous membranes are moist.      Pharynx: Oropharynx is clear.   Eyes:      Extraocular Movements: Extraocular movements intact.      Conjunctiva/sclera: Conjunctivae normal.      Pupils: Pupils are equal, round, and reactive to light.   Neck:      Musculoskeletal: Normal range of motion and neck supple.   Cardiovascular:      Rate and Rhythm: Normal rate and regular rhythm.      Pulses: Normal pulses.      Heart sounds: No murmur. No friction rub. No gallop.    Pulmonary:      Effort: Pulmonary effort is normal. No respiratory distress.       Breath sounds: Normal breath sounds. No wheezing.   Abdominal:      General: Abdomen is flat. Bowel sounds are normal. There is no distension.      Palpations: Abdomen is soft. There is no mass.      Tenderness: There is no tenderness.   Musculoskeletal: Normal range of motion.   Skin:     Findings: Bruising and lesion present.   Neurological:      General: No focal deficit present.      Mental Status: She is alert and oriented to person, place, and time.   Psychiatric:         Mood and Affect: Mood normal.         Behavior: Behavior normal.         Laboratory:  Recent Labs     01/11/20  2248   WBC 8.3   RBC 2.31*   HEMOGLOBIN 8.4*   HEMATOCRIT 26.3*   .9*   MCH 36.4*   MCHC 31.9*   RDW 90.3*   PLATELETCT 116*   MPV 9.3     Recent Labs     01/11/20  2248   SODIUM 126*   POTASSIUM 3.4*   CHLORIDE 77*   CO2 18*   GLUCOSE 173*   BUN 8   CREATININE 0.98   CALCIUM 8.8     Recent Labs     01/11/20  2248   ALTSGPT 36   ASTSGOT 219*   ALKPHOSPHAT 352*   TBILIRUBIN 5.5*   LIPASE 51   GLUCOSE 173*     Recent Labs     01/11/20  2248   APTT 28.5   INR 1.07     No results for input(s): NTPROBNP in the last 72 hours.      Recent Labs     01/11/20  2248   TROPONINT 501*       Urinalysis:    No results found     Imaging:  DX-CHEST-PORTABLE (1 VIEW)   Final Result      No acute cardiopulmonary findings.      CT-HEAD W/O   Final Result      1.  No acute intracranial findings.      2.  Left nasal bone fracture.         CT-CSPINE WITHOUT PLUS RECONS   Final Result      1.  No acute fracture is identified.      2.  Multilevel degenerative disc disease and facet arthropathy as described.      CT-MAXILLOFACIAL W/O PLUS RECONS   Final Result         Left nasal bone fracture      DX-THORACIC SPINE-2 VIEWS   Final Result      No acute fracture is identified.      DX-LUMBAR SPINE-4+ VIEWS   Final Result         1.  No acute fracture is identified.      2.  Facet arthropathy in the lower lumbar spine.            Assessment/Plan:  I  anticipate this patient will require at least two midnights for appropriate medical management, necessitating inpatient admission.    * GIB (gastrointestinal bleeding)  Assessment & Plan  Upper at this point, with reported bright red followed by coffee-ground emesis.  Concern for possible underlying varices in the setting of liver disease and chronic alcoholism, this could also be due to alcoholic gastritis.  Patient will be continued on intravenous Protonix and intravenous octreotide therapy for now.  Trend hemoglobin.  Conservative transfusion strategy.Transfuse if needed for hemoglobin less than 7 gm/dL      Alcohol withdrawal syndrome with complication (HCC)  Assessment & Plan  Self-reported, and with notable tremors which have improved with administration of benzodiazepine therapy.  Will monitor with RASS monitoring in the ICU and Ativan as needed    Troponin level elevated  Assessment & Plan  Suspect this could be demand ischemia in the setting of gastrointestinal hemorrhage.  We will trend the same.  Patient does not report chest pain at this point.    Alcoholic liver disease (HCC)  Assessment & Plan  With associated hepatic dysfunction in the form of thrombocytopenia, but with normal coagulation pattern.  There is still the concern of possible underlying varices given gastrointestinal hemorrhage as well.  Will likely at some point need esophagogastroduodenoscopy for further clarification    Metabolic acidosis  Assessment & Plan  Lactic acidosis, with initial lactic acid of 16.9.  Unclear etiology of this, as no clear evidence of sepsis or other infection.  Improvement has occurred with hydration in the emergency department to 8.9.  We will continue to monitor.    Hyponatremia  Assessment & Plan  Unclear etiology, at least in part due to volume depletion, but cannot rule out other volume status changes in the setting of liver disease.  We will monitor, intravenous hydration.    Macrocytic anemia  Assessment &  Plan  Suspect due to chronic alcoholism, likely component of acute bleed superimposed.  Monitor.        VTE prophylaxis: SCD

## 2020-01-12 NOTE — ASSESSMENT & PLAN NOTE
Avoid hepatotoxins  Needs to completely avoid alcohol  Maddrey's Discriminant Function <32  High-dose IV thiamine and supplemental vitamins  No evidence of withdrawal  Hepatitis panel negative, RUQ US consistent with cirrhosis

## 2020-01-12 NOTE — PROGRESS NOTES
2 RN skin check complete.   Devices in place ekg leads, central line, PIV, nasal cannula   Skin assessed under devices intact.  Bruising and swelling to left eye and cheek, open abrasion to right knee. Skin otherwise intact. Wound consult placed yes .  The following interventions in place pictures documented in epic, q2 turns in place, pillows in use for support, grey ear foam on nasal cannula tubing

## 2020-01-12 NOTE — ASSESSMENT & PLAN NOTE
Suspect due to chronic alcoholism, likely component of acute blood loss superimposed.    Recent Hgb stable. Monitor for acute symptoms of bleed.  Monitor hemoglobin and transfuse for hemoglobin of less than or equal to 7

## 2020-01-12 NOTE — ED NOTES
Summer from Lab called with critical result of lactic acid of 16.9 at 2319. Critical lab result read back to Summer.   Dr. Mosqueda notified of critical lab result at 2320.  Critical lab result read back by Dr. Mosqueda.

## 2020-01-12 NOTE — CONSULTS
PULMONARY AND CRITICAL CARE MEDICINE CONSULTATION    Date of Consultation:  1/12/2020    Requesting Physician:  Feliciano Cole MD    Consulting Physician:  Aram Cash MD    Reason for Consultation: Critical care management in lady with lactic acidosis, evidence of alcohol withdrawal and hematemesis    Chief Complaint: Facial pain following ground-level fall, hematemesis, weakness    History of Present Illness:    I was kindly asked to see and evaluate Jumana Kahn, a 54 y.o. female for evaluation and management of the above problem.    This lady has a history of alcohol abuse and hypothyroidism.  She presented to the emergency department this evening after she apparently tripped and took a header.  She hit her face.  She is complaining of pain in the left side of her face.  She denies syncope.  She remembers tripping and falling.  She is unclear if she knocked herself out when she hit the pavement, but states that if she did it was only for a couple of seconds.  She further tells me that for the last couple of weeks she has noticed some blood in her urine and painful urination.  She denies fever.  She has felt shaky, but she has been cutting down her alcohol intake.  She admits to drinking 1-2 half pints of vodka a day.  Her last drink was yesterday.  She has also been having some hematemesis for the last couple of weeks.  Initially she was vomiting bright red blood, but now she states that her emesis is dark and almost black.  She feels constipated and denies melena or hematochezia.    Medications Prior to Admission:    No current facility-administered medications on file prior to encounter.      Current Outpatient Medications on File Prior to Encounter   Medication Sig Dispense Refill   • albuterol 108 (90 Base) MCG/ACT Aero Soln inhalation aerosol Inhale 2 Puffs by mouth every 6 hours as needed for Shortness of Breath. 8.5 g 0   • cefdinir (OMNICEF) 300 MG Cap Take 1 Cap by mouth 2 times a  day. 20 Cap 0   • methylPREDNISolone (MEDROL DOSEPAK) 4 MG Tablet Therapy Pack Use as directed 1 Kit 0   • levothyroxine (SYNTHROID) 50 MCG Tab Take 50 mcg by mouth Every morning on an empty stomach.         Current Medications:      Current Facility-Administered Medications:   •  albuterol inhaler 2 Puff, 2 Puff, Inhalation, Q6HRS PRN, Feliciano Cole M.D.  •  levothyroxine (SYNTHROID) tablet 50 mcg, 50 mcg, Oral, AM ES, Feliciano Cole M.D.  •  acetaminophen (TYLENOL) tablet 650 mg, 650 mg, Oral, Q6HRS PRN, Feliciano Cole M.D.  •  cloNIDine (CATAPRES) tablet 0.1 mg, 0.1 mg, Oral, Q6HRS PRN, Feliciano Cole M.D.  •  ondansetron (ZOFRAN) syringe/vial injection 4 mg, 4 mg, Intravenous, Q4HRS PRN, Feliciano Cole M.D.  •  ondansetron (ZOFRAN ODT) dispertab 4 mg, 4 mg, Oral, Q4HRS PRN, Feliciano Cole M.D.  •  promethazine (PHENERGAN) tablet 12.5-25 mg, 12.5-25 mg, Oral, Q4HRS PRN, Feliciano Cole M.D.  •  promethazine (PHENERGAN) suppository 12.5-25 mg, 12.5-25 mg, Rectal, Q4HRS PRN, Feliciano Cole M.D.  •  prochlorperazine (COMPAZINE) injection 5-10 mg, 5-10 mg, Intravenous, Q4HRS PRN, Feliciano Cole M.D.  •  senna-docusate (PERICOLACE or SENOKOT S) 8.6-50 MG per tablet 2 Tab, 2 Tab, Oral, BID **AND** polyethylene glycol/lytes (MIRALAX) PACKET 1 Packet, 1 Packet, Oral, QDAY PRN **AND** magnesium hydroxide (MILK OF MAGNESIA) suspension 30 mL, 30 mL, Oral, QDAY PRN **AND** bisacodyl (DULCOLAX) suppository 10 mg, 10 mg, Rectal, QDAY PRN, Feliciano Cole M.D.  •  lactated ringers infusion, , Intravenous, Continuous, Aram Cash M.D.  •  cefTRIAXone (ROCEPHIN) 1 g in  mL IVPB, 1 g, Intravenous, Q24HRS, Feliciano Cole M.D.  •  Pharmacy Consult Request, , Other, PHARMACY TO DOSE, Feliciano Cole M.D.  •  octreotide (SANDOSTATIN) 1,250 mcg in  mL Infusion, 50 mcg/hr, Intravenous, Continuous, Feliciano Cole M.D.  •  pantoprazole (PROTONIX) 80 mg in  mL Infusion, 8 mg/hr, Intravenous,  Continuous, Feliciano Cole M.D.  •  pyridoxine (VITAMIN B-6) tablet 100 mg, 100 mg, Oral, DAILY, Feliciano Cole M.D.  •  zinc sulfate (ZINCATE) capsule 220 mg, 220 mg, Oral, Once, Feliciano Cole M.D.  •  LORazepam (ATIVAN) injection 4 mg, 4 mg, Intravenous, Q15 MIN PRN **OR** LORazepam (ATIVAN) injection 3 mg, 3 mg, Intravenous, Q15 MIN PRN **OR** LORazepam (ATIVAN) injection 2 mg, 2 mg, Intravenous, Q15 MIN PRN **OR** LORazepam (ATIVAN) injection 1 mg, 1 mg, Intravenous, Q15 MIN PRN, Feliciano Cole M.D.  •  [DISCONTINUED] thiamine tablet 100 mg, 100 mg, Oral, DAILY **AND** multivitamin (THERAGRAN) tablet 1 Tab, 1 Tab, Oral, DAILY **AND** folic acid (FOLVITE) tablet 1 mg, 1 mg, Oral, DAILY, Feliciano Cole M.D.  •  thiamine (B-1) 500 mg in D5W 100 mL IVPB, 500 mg, Intravenous, DAILY **FOLLOWED BY** [START ON 1/15/2020] thiamine tablet 100 mg, 100 mg, Oral, DAILY, Aram Cash M.D.  •  potassium chloride (KCL) ivpb 10 mEq, 10 mEq, Intravenous, Q HOUR, Aram Cash M.D.    Current Outpatient Medications:   •  albuterol 108 (90 Base) MCG/ACT Aero Soln inhalation aerosol, Inhale 2 Puffs by mouth every 6 hours as needed for Shortness of Breath., Disp: 8.5 g, Rfl: 0  •  cefdinir (OMNICEF) 300 MG Cap, Take 1 Cap by mouth 2 times a day., Disp: 20 Cap, Rfl: 0  •  methylPREDNISolone (MEDROL DOSEPAK) 4 MG Tablet Therapy Pack, Use as directed, Disp: 1 Kit, Rfl: 0  •  levothyroxine (SYNTHROID) 50 MCG Tab, Take 50 mcg by mouth Every morning on an empty stomach., Disp: , Rfl:     Allergies:    Ampicillin    Past Surgical History:    Past Surgical History:   Procedure Laterality Date   • GYN SURGERY      tubal ligation   • OTHER      sinus surgery   • OTHER ORTHOPEDIC SURGERY      leg       Past Medical History:    Past Medical History:   Diagnosis Date   • Hypothyroid    • Psychiatric disorder        Social History:    Social History     Socioeconomic History   • Marital status: Single     Spouse name: Not  "on file   • Number of children: Not on file   • Years of education: Not on file   • Highest education level: Not on file   Occupational History   • Not on file   Social Needs   • Financial resource strain: Not on file   • Food insecurity:     Worry: Not on file     Inability: Not on file   • Transportation needs:     Medical: Not on file     Non-medical: Not on file   Tobacco Use   • Smoking status: Former Smoker     Packs/day: 0.00     Last attempt to quit: 2001     Years since quittin.4   • Smokeless tobacco: Never Used   Substance and Sexual Activity   • Alcohol use: Yes     Binge frequency: Daily or almost daily     Comment: \"sometimes\"   • Drug use: No   • Sexual activity: Not on file   Lifestyle   • Physical activity:     Days per week: Not on file     Minutes per session: Not on file   • Stress: Not on file   Relationships   • Social connections:     Talks on phone: Not on file     Gets together: Not on file     Attends Hindu service: Not on file     Active member of club or organization: Not on file     Attends meetings of clubs or organizations: Not on file     Relationship status: Not on file   • Intimate partner violence:     Fear of current or ex partner: Not on file     Emotionally abused: Not on file     Physically abused: Not on file     Forced sexual activity: Not on file   Other Topics Concern   • Not on file   Social History Narrative   • Not on file       Family History:    No family history on file.    Review of System:    Review of Systems   Constitutional: Positive for malaise/fatigue. Negative for chills and fever.   HENT: Negative for ear discharge and ear pain.    Eyes: Negative for discharge and redness.   Respiratory: Negative for cough, hemoptysis, shortness of breath and stridor.    Cardiovascular: Negative for chest pain, palpitations and leg swelling.   Gastrointestinal: Positive for abdominal pain, nausea and vomiting.   Genitourinary: Positive for dysuria and hematuria. " "Negative for flank pain.   Musculoskeletal: Negative for myalgias and neck pain.   Skin: Negative for rash.   Neurological: Negative for speech change, focal weakness and seizures.   Endo/Heme/Allergies: Does not bruise/bleed easily.   Psychiatric/Behavioral: Positive for substance abuse. Negative for suicidal ideas.       Physical Examination:    BP (!) 85/54   Pulse 100   Temp 37 °C (98.6 °F) (Temporal)   Resp 20   Ht 1.651 m (5' 5\")   Wt 77.1 kg (170 lb)   SpO2 98%   BMI 28.29 kg/m²   Physical Exam   HENT:   She has swelling and ecchymosis around her left eye.  She has difficulty completely opening up her left eye.  She has dry mucous membranes.  She has scleral icterus.   Eyes: Pupils are equal, round, and reactive to light. Scleral icterus is present.   Neck: Normal range of motion. Neck supple. No tracheal deviation present.   Cardiovascular: Intact distal pulses. Exam reveals no gallop.   No murmur heard.  Sinus rhythm   Pulmonary/Chest: No stridor. She has no wheezes. She has no rales.   Abdominal: Soft. Bowel sounds are normal. She exhibits no distension. There is no tenderness. There is no rebound.   Musculoskeletal:         General: No tenderness or edema.      Comments: No clubbing or cyanosis   Neurological:   She is tired, but easily arousable.  She has no focal weakness.   Skin: Skin is warm and dry. She is not diaphoretic.       Laboratory Data:        Recent Labs     01/11/20  2248   WBC 8.3   RBC 2.31*   HEMOGLOBIN 8.4*   HEMATOCRIT 26.3*   .9*   MCH 36.4*   MCHC 31.9*   RDW 90.3*   PLATELETCT 116*   MPV 9.3     Recent Labs     01/11/20  2248   SODIUM 126*   POTASSIUM 3.4*   CHLORIDE 77*   CO2 18*   GLUCOSE 173*   BUN 8   CREATININE 0.98   CALCIUM 8.8                   Imaging:    I personally viewed the CXR and CT scan images as well as reviewed the radiology interpretation reports.    DX-CHEST-PORTABLE (1 VIEW)   Final Result      No acute cardiopulmonary findings.      CT-HEAD W/O "   Final Result      1.  No acute intracranial findings.      2.  Left nasal bone fracture.         CT-CSPINE WITHOUT PLUS RECONS   Final Result      1.  No acute fracture is identified.      2.  Multilevel degenerative disc disease and facet arthropathy as described.      CT-MAXILLOFACIAL W/O PLUS RECONS   Final Result         Left nasal bone fracture      DX-THORACIC SPINE-2 VIEWS   Final Result      No acute fracture is identified.      DX-LUMBAR SPINE-4+ VIEWS   Final Result         1.  No acute fracture is identified.      2.  Facet arthropathy in the lower lumbar spine.          Assessment and Plan:    * GIB (gastrointestinal bleeding)  Assessment & Plan  Differential diagnosis includes esophagitis, gastritis, peptic ulcer disease, Shayla-Navarro tear, esophageal varices  Begin IV octreotide and Protonix drips  Start prophylactic Rocephin  Trend hemoglobin  Transfuse PRBCs to keep hemoglobin greater than 7  Gastroenterology consultation  Check thromboelastogram with platelet mapping    Lactic acidosis  Assessment & Plan  Suspect due to a combination of intravascular volume depletion as well as liver disease  Hydrate with intravenous fluids  Trend lactic acid    Hematuria  Assessment & Plan  Check urinalysis and culture urine    Alcohol withdrawal syndrome with complication (HCC)  Assessment & Plan  Begin benzodiazepines based upon RASS  Low threshold for transitioning to phenobarbital protocol if she develops severe withdrawal    Troponin level elevated  Assessment & Plan  ECG without evidence of acute coronary syndrome or STEMI  Trend troponin    Alcoholic liver disease (HCC)  Assessment & Plan  Avoid hepatotoxins  Alcohol cessation counseling  High-dose IV thiamine and supplemental vitamins  Observe for evidence of withdrawal    Macrocytic anemia  Assessment & Plan  History of hematuria and hematemesis  Trend hemoglobin  Transfuse PRBCs to keep hemoglobin greater than 7    Closed fracture of nasal  bone  Assessment & Plan  Pain control    Hypokalemia  Assessment & Plan  Replete potassium  Check magnesium    Hypothyroidism  Assessment & Plan  Continue levothyroxine, 50 mcg daily    Thrombocytopenia (HCC)  Assessment & Plan  Trend platelet count  Check thromboelastogram with platelet mapping    Hyponatremia  Assessment & Plan  Follow serial electrolytes  Hydrate with intravenous fluids    I have assessed and reassessed her respiratory status, blood pressure, hemodynamics, cardiovascular status, urine output, serial determinations of serum lactic acid, serial determinations of hemoglobin, response to IV fluid resuscitation and her neurologic status.  She is at high risk for worsening respiratory, cardiovascular, CNS and gastrointestinal system dysfunction.    High risk of deterioration and worsening vital organ dysfunction and death without the above critical care interventions.    Thank you for allowing me to participate in the care of this lady.  I will continue to follow her with great interest.    Critical Care Time:  126 minutes  [id number]  No time overlap  Time excludes procedures  Discussed with RN, hospitalist    Aram Cash MD  Pulmonary and Critical Care Medicine

## 2020-01-13 ENCOUNTER — APPOINTMENT (OUTPATIENT)
Dept: RADIOLOGY | Facility: MEDICAL CENTER | Age: 55
DRG: 380 | End: 2020-01-13
Attending: INTERNAL MEDICINE
Payer: MEDICAID

## 2020-01-13 LAB
ALBUMIN SERPL BCP-MCNC: 2.8 G/DL (ref 3.2–4.9)
ALBUMIN SERPL BCP-MCNC: 2.8 G/DL (ref 3.2–4.9)
ALBUMIN/GLOB SERPL: 1.1 G/DL
ALBUMIN/GLOB SERPL: 1.2 G/DL
ALP SERPL-CCNC: 238 U/L (ref 30–99)
ALP SERPL-CCNC: 239 U/L (ref 30–99)
ALT SERPL-CCNC: 23 U/L (ref 2–50)
ALT SERPL-CCNC: 23 U/L (ref 2–50)
ANION GAP SERPL CALC-SCNC: 11 MMOL/L (ref 0–11.9)
ANION GAP SERPL CALC-SCNC: 13 MMOL/L (ref 0–11.9)
AST SERPL-CCNC: 92 U/L (ref 12–45)
AST SERPL-CCNC: 93 U/L (ref 12–45)
BASOPHILS # BLD AUTO: 1.7 % (ref 0–1.8)
BASOPHILS # BLD: 0.09 K/UL (ref 0–0.12)
BILIRUB SERPL-MCNC: 5.1 MG/DL (ref 0.1–1.5)
BILIRUB SERPL-MCNC: 5.1 MG/DL (ref 0.1–1.5)
BUN SERPL-MCNC: 4 MG/DL (ref 8–22)
BUN SERPL-MCNC: 5 MG/DL (ref 8–22)
CALCIUM SERPL-MCNC: 7.2 MG/DL (ref 8.5–10.5)
CALCIUM SERPL-MCNC: 7.3 MG/DL (ref 8.5–10.5)
CHLORIDE SERPL-SCNC: 98 MMOL/L (ref 96–112)
CHLORIDE SERPL-SCNC: 98 MMOL/L (ref 96–112)
CO2 SERPL-SCNC: 29 MMOL/L (ref 20–33)
CO2 SERPL-SCNC: 30 MMOL/L (ref 20–33)
CORTIS SERPL-MCNC: 10.6 UG/DL (ref 0–23)
CREAT SERPL-MCNC: 0.67 MG/DL (ref 0.5–1.4)
CREAT SERPL-MCNC: 0.71 MG/DL (ref 0.5–1.4)
EOSINOPHIL # BLD AUTO: 0.1 K/UL (ref 0–0.51)
EOSINOPHIL NFR BLD: 1.9 % (ref 0–6.9)
ERYTHROCYTE [DISTWIDTH] IN BLOOD BY AUTOMATED COUNT: 99.3 FL (ref 35.9–50)
GLOBULIN SER CALC-MCNC: 2.3 G/DL (ref 1.9–3.5)
GLOBULIN SER CALC-MCNC: 2.6 G/DL (ref 1.9–3.5)
GLUCOSE SERPL-MCNC: 129 MG/DL (ref 65–99)
GLUCOSE SERPL-MCNC: 132 MG/DL (ref 65–99)
HBV SURFACE AG SER QL: NEGATIVE
HCT VFR BLD AUTO: 25.6 % (ref 37–47)
HCV AB SER QL: NEGATIVE
HGB BLD-MCNC: 7.8 G/DL (ref 12–16)
HGB BLD-MCNC: 7.9 G/DL (ref 12–16)
HGB BLD-MCNC: 8.2 G/DL (ref 12–16)
IMM GRANULOCYTES # BLD AUTO: 0.1 K/UL (ref 0–0.11)
IMM GRANULOCYTES NFR BLD AUTO: 1.9 % (ref 0–0.9)
LYMPHOCYTES # BLD AUTO: 0.74 K/UL (ref 1–4.8)
LYMPHOCYTES NFR BLD: 14.3 % (ref 22–41)
MAGNESIUM SERPL-MCNC: 2.5 MG/DL (ref 1.5–2.5)
MCH RBC QN AUTO: 36.3 PG (ref 27–33)
MCHC RBC AUTO-ENTMCNC: 32 G/DL (ref 33.6–35)
MCV RBC AUTO: 113.3 FL (ref 81.4–97.8)
MONOCYTES # BLD AUTO: 0.77 K/UL (ref 0–0.85)
MONOCYTES NFR BLD AUTO: 14.9 % (ref 0–13.4)
NEUTROPHILS # BLD AUTO: 3.38 K/UL (ref 2–7.15)
NEUTROPHILS NFR BLD: 65.3 % (ref 44–72)
NRBC # BLD AUTO: 0.03 K/UL
NRBC BLD-RTO: 0.6 /100 WBC
PHOSPHATE SERPL-MCNC: 1.7 MG/DL (ref 2.5–4.5)
PLATELET # BLD AUTO: 100 K/UL (ref 164–446)
PMV BLD AUTO: 9.3 FL (ref 9–12.9)
POTASSIUM SERPL-SCNC: 3.1 MMOL/L (ref 3.6–5.5)
POTASSIUM SERPL-SCNC: 3.1 MMOL/L (ref 3.6–5.5)
PROT SERPL-MCNC: 5.1 G/DL (ref 6–8.2)
PROT SERPL-MCNC: 5.4 G/DL (ref 6–8.2)
RBC # BLD AUTO: 2.26 M/UL (ref 4.2–5.4)
SODIUM SERPL-SCNC: 139 MMOL/L (ref 135–145)
SODIUM SERPL-SCNC: 140 MMOL/L (ref 135–145)
WBC # BLD AUTO: 5.2 K/UL (ref 4.8–10.8)

## 2020-01-13 PROCEDURE — 770022 HCHG ROOM/CARE - ICU (200)

## 2020-01-13 PROCEDURE — 87340 HEPATITIS B SURFACE AG IA: CPT

## 2020-01-13 PROCEDURE — 82533 TOTAL CORTISOL: CPT

## 2020-01-13 PROCEDURE — 80053 COMPREHEN METABOLIC PANEL: CPT

## 2020-01-13 PROCEDURE — 99291 CRITICAL CARE FIRST HOUR: CPT | Performed by: INTERNAL MEDICINE

## 2020-01-13 PROCEDURE — 83516 IMMUNOASSAY NONANTIBODY: CPT | Mod: 91

## 2020-01-13 PROCEDURE — 85018 HEMOGLOBIN: CPT

## 2020-01-13 PROCEDURE — 84100 ASSAY OF PHOSPHORUS: CPT

## 2020-01-13 PROCEDURE — 700111 HCHG RX REV CODE 636 W/ 250 OVERRIDE (IP): Performed by: INTERNAL MEDICINE

## 2020-01-13 PROCEDURE — 700101 HCHG RX REV CODE 250: Performed by: INTERNAL MEDICINE

## 2020-01-13 PROCEDURE — 85025 COMPLETE CBC W/AUTO DIFF WBC: CPT

## 2020-01-13 PROCEDURE — 700105 HCHG RX REV CODE 258: Performed by: INTERNAL MEDICINE

## 2020-01-13 PROCEDURE — 700105 HCHG RX REV CODE 258: Performed by: HOSPITALIST

## 2020-01-13 PROCEDURE — 700102 HCHG RX REV CODE 250 W/ 637 OVERRIDE(OP): Performed by: HOSPITALIST

## 2020-01-13 PROCEDURE — A9270 NON-COVERED ITEM OR SERVICE: HCPCS | Performed by: HOSPITALIST

## 2020-01-13 PROCEDURE — 302146: Performed by: INTERNAL MEDICINE

## 2020-01-13 PROCEDURE — 83735 ASSAY OF MAGNESIUM: CPT

## 2020-01-13 PROCEDURE — 700111 HCHG RX REV CODE 636 W/ 250 OVERRIDE (IP): Performed by: HOSPITALIST

## 2020-01-13 PROCEDURE — 86038 ANTINUCLEAR ANTIBODIES: CPT

## 2020-01-13 PROCEDURE — 76700 US EXAM ABDOM COMPLETE: CPT

## 2020-01-13 PROCEDURE — 86803 HEPATITIS C AB TEST: CPT

## 2020-01-13 PROCEDURE — 82103 ALPHA-1-ANTITRYPSIN TOTAL: CPT

## 2020-01-13 PROCEDURE — C9113 INJ PANTOPRAZOLE SODIUM, VIA: HCPCS | Performed by: HOSPITALIST

## 2020-01-13 RX ADMIN — CEFTRIAXONE SODIUM 1 G: 1 INJECTION, POWDER, FOR SOLUTION INTRAMUSCULAR; INTRAVENOUS at 18:08

## 2020-01-13 RX ADMIN — LORAZEPAM 2 MG: 2 INJECTION INTRAMUSCULAR; INTRAVENOUS at 21:02

## 2020-01-13 RX ADMIN — NOREPINEPHRINE BITARTRATE 8 MCG/MIN: 1 INJECTION INTRAVENOUS at 14:12

## 2020-01-13 RX ADMIN — LORAZEPAM 1 MG: 2 INJECTION INTRAMUSCULAR; INTRAVENOUS at 20:10

## 2020-01-13 RX ADMIN — NOREPINEPHRINE BITARTRATE 10 MCG/MIN: 1 INJECTION INTRAVENOUS at 00:34

## 2020-01-13 RX ADMIN — THIAMINE HYDROCHLORIDE 500 MG: 100 INJECTION, SOLUTION INTRAMUSCULAR; INTRAVENOUS at 05:16

## 2020-01-13 RX ADMIN — POTASSIUM PHOSPHATE, MONOBASIC AND POTASSIUM PHOSPHATE, DIBASIC 30 MMOL: 224; 236 INJECTION, SOLUTION, CONCENTRATE INTRAVENOUS at 09:28

## 2020-01-13 RX ADMIN — SODIUM CHLORIDE 8 MG/HR: 9 INJECTION, SOLUTION INTRAVENOUS at 20:09

## 2020-01-13 RX ADMIN — FOLIC ACID 1 MG: 1 TABLET ORAL at 05:17

## 2020-01-13 RX ADMIN — SODIUM CHLORIDE 8 MG/HR: 9 INJECTION, SOLUTION INTRAVENOUS at 00:34

## 2020-01-13 RX ADMIN — LORAZEPAM 1 MG: 2 INJECTION INTRAMUSCULAR; INTRAVENOUS at 01:48

## 2020-01-13 RX ADMIN — LEVOTHYROXINE SODIUM 50 MCG: 50 TABLET ORAL at 05:17

## 2020-01-13 RX ADMIN — LORAZEPAM 2 MG: 2 INJECTION INTRAMUSCULAR; INTRAVENOUS at 12:08

## 2020-01-13 RX ADMIN — SODIUM CHLORIDE, POTASSIUM CHLORIDE, SODIUM LACTATE AND CALCIUM CHLORIDE: 600; 310; 30; 20 INJECTION, SOLUTION INTRAVENOUS at 11:14

## 2020-01-13 RX ADMIN — SODIUM CHLORIDE 8 MG/HR: 9 INJECTION, SOLUTION INTRAVENOUS at 11:14

## 2020-01-13 RX ADMIN — OCTREOTIDE ACETATE 50 MCG/HR: 200 INJECTION, SOLUTION INTRAVENOUS; SUBCUTANEOUS at 02:55

## 2020-01-13 RX ADMIN — SODIUM CHLORIDE, POTASSIUM CHLORIDE, SODIUM LACTATE AND CALCIUM CHLORIDE: 600; 310; 30; 20 INJECTION, SOLUTION INTRAVENOUS at 20:09

## 2020-01-13 RX ADMIN — SODIUM CHLORIDE, POTASSIUM CHLORIDE, SODIUM LACTATE AND CALCIUM CHLORIDE: 600; 310; 30; 20 INJECTION, SOLUTION INTRAVENOUS at 02:55

## 2020-01-13 RX ADMIN — THERA TABS 1 TABLET: TAB at 05:17

## 2020-01-13 RX ADMIN — PYRIDOXINE HCL TAB 50 MG 100 MG: 50 TAB at 05:16

## 2020-01-13 ASSESSMENT — ENCOUNTER SYMPTOMS
VOMITING: 1
DIZZINESS: 0
ABDOMINAL PAIN: 1
NAUSEA: 1
EYE PAIN: 1
FEVER: 0
BLURRED VISION: 0
FOCAL WEAKNESS: 0
HEARTBURN: 1
MYALGIAS: 0
COUGH: 0
CHILLS: 0
SENSORY CHANGE: 0
STRIDOR: 0
SHORTNESS OF BREATH: 0
SPUTUM PRODUCTION: 0

## 2020-01-13 NOTE — DISCHARGE PLANNING
Patient is eligible for Medicaid Meds to Beds at discharge if they have coverage with [place] Medicaid, Medicaid FFS, Medicaid HMO ([facility]), or [address]. This service is provided through the [facility] if orders are received by the pharmacy prior to 4pm Monday through Friday excluding holidays. Preferred pharmacy has been changed to [facility]. Please call [phone] prior to discharge.

## 2020-01-13 NOTE — CARE PLAN
Problem: Bowel/Gastric:  Goal: Normal bowel function is maintained or improved  Outcome: MET  Intervention: Educate patient and significant other/support system about diet, fluid intake, medications and activity to promote bowel function  Note:   Education provided  Intervention: Educate patient and significant other/support system about signs and symptoms of constipation and interventions to implement  Note:   Completed  Intervention: Collaborate with Interdisciplinary Team for optimal positioning for bowel evacuation  Note:   Completed  Goal: Will not experience complications related to bowel motility  Outcome: MET  Intervention: Assess baseline bowel pattern  Note:   Completed  Intervention: Implement interventions to promote bowel evacuation if inadequate bowel movements in past 48 hours  Note:   Pt had bowel movement 1/13/20.  Intervention: Implement Bowel Protocol, if applicable  Note:   Not applicable      Problem: Pain Management  Goal: Pain level will decrease to patient's comfort goal  Outcome: MET  Intervention: Follow pain managment plan developed in collaboration with patient and Interdisciplinary Team  Note:   Completed  Intervention: Educate and implement non-pharmacologic comfort measures. Examples: relaxation, distration, play therapy, activity therapy, massage, etc.  Note:   Pharmacologic and non-pharmacologic measures implemented.

## 2020-01-13 NOTE — DIETARY
Nutrition Services Brief Note: Pt NPO X 3 days, Per MD notes, pt with GIB and GI MD consult pending. Pt with hx of ETOH abuse and Alcoholic liver disease. No Poor PO/wt loss per admit screen.     Pertinent Labs: K+ 3.1  Pertinent Meds: LR @ 125 ml/hr, ICU electrolyte replacement per pharmacy, daily multivitamin, folic acid, Levophed @ 8 mcg/min, Zofran PRN, Potassium Phosphates, Compazine/Phenergan PRN, Vit B-6, Thiamine, Bowel protocol.   Skin: abrasion to left knee/facial bruising. Pt s/p fall.       Plan/Rec: If unable to advance diet in next 24-48 hours, consider nutrition support. RD following for plan of care.

## 2020-01-13 NOTE — CARE PLAN
Problem: Safety  Goal: Will remain free from falls  Outcome: PROGRESSING AS EXPECTED  Intervention: Implement fall precautions  Note:   Bed low and locked, bed alarm in use, treaded slipper socks on, call light in hand and intermittently used appropriately, yellow wristband in place and appropriate sign outside door. Pt educated on importance of using call light and assistance with mobility.     Problem: Knowledge Deficit  Goal: Knowledge of disease process/condition, treatment plan, diagnostic tests, and medications will improve  Outcome: PROGRESSING AS EXPECTED  Intervention: Assess knowledge level of disease process/condition, treatment plan, diagnostic tests, and medications  Note:   Pt expressed lack of knowledge on GI bleed, treatment process, tests, and reports symptoms for last couple weeks. Further education required.

## 2020-01-13 NOTE — PROGRESS NOTES
"Critical Care Progress Note    Date of admission  1/11/2020    Chief Complaint  54 y.o. female admitted 1/11/2020 with GIB, hypotension    Hospital Course    \"54 y.o. female admitted 1/11/2020.  She has a history of alcohol abuse and hypothyroidism.  She presented to the emergency department this evening after she apparently tripped and took a header.  She hit her face.  She is complaining of pain in the left side of her face.  She denies syncope.  She remembers tripping and falling.  She is unclear if she knocked herself out when she hit the pavement, but states that if she did it was only for a couple of seconds.  She further tells me that for the last couple of weeks she has noticed some blood in her urine and painful urination.  She denies fever.  She has felt shaky, but she has been cutting down her alcohol intake.  She admits to drinking 1-2 half pints of vodka a day.  Her last drink was yesterday.  She has also been having some hematemesis for the last couple of weeks.  Initially she was vomiting bright red blood, but now she states that her emesis is dark and almost black.  She feels constipated and denies melena or hematochezia.  \"      Interval Problem Update  Reviewed last 24 hour events:   - NAEON   - Neuro: confused   - HR: 80s-100s   - SBP: Levo at 8, titrating down   - GI: NPO, GI consult pending   - UOP: not kept due to incontinence   - Abad: no   - Tm: afeb   - Lines: CVC   - PPx: GI PPI, DVT contraindicated   - 2L NC   - CXR (personally reviewed and compared to prior): no new      Review of Systems  Review of Systems   Constitutional: Negative for chills and fever.   Eyes: Positive for pain. Negative for blurred vision.   Respiratory: Negative for cough, sputum production, shortness of breath and stridor.    Cardiovascular: Negative for chest pain.   Gastrointestinal: Positive for abdominal pain, heartburn, melena, nausea and vomiting.   Genitourinary: Positive for hematuria. Negative for dysuria. "   Musculoskeletal: Negative for myalgias.   Skin: Negative for rash.   Neurological: Negative for dizziness, sensory change and focal weakness.        Vital Signs for last 24 hours   Temp:  [36.4 °C (97.5 °F)-36.7 °C (98 °F)] 36.4 °C (97.5 °F)  Pulse:  [] 87  Resp:  [12-27] 21  BP: ()/(45-85) 99/66  SpO2:  [89 %-99 %] 94 %    Hemodynamic parameters for last 24 hours  CVP:  [-50 MM HG-16 MM HG] 12 MM HG    Respiratory Information for the last 24 hours       Physical Exam   Physical Exam  Vitals signs and nursing note reviewed.   Constitutional:       Appearance: She is ill-appearing.   HENT:      Head:      Comments: Left periorbital ecchymosis     Right Ear: External ear normal.      Left Ear: External ear normal.      Mouth/Throat:      Mouth: Mucous membranes are dry.   Eyes:      Pupils: Pupils are equal, round, and reactive to light.      Comments: When left eyelid retracted EOMI intact   Neck:      Musculoskeletal: Normal range of motion and neck supple.      Comments: CVC  Cardiovascular:      Rate and Rhythm: Normal rate.      Pulses: Normal pulses.   Pulmonary:      Effort: Pulmonary effort is normal. No respiratory distress.      Breath sounds: Normal breath sounds.   Abdominal:      General: Abdomen is flat.      Tenderness: There is tenderness (mild epigastric). There is no guarding or rebound.   Musculoskeletal: Normal range of motion.   Skin:     General: Skin is warm.      Capillary Refill: Capillary refill takes less than 2 seconds.      Findings: Bruising present.   Neurological:      General: No focal deficit present.      Mental Status: She is alert.      Cranial Nerves: No cranial nerve deficit.      Sensory: No sensory deficit.      Motor: No weakness.   Psychiatric:         Mood and Affect: Mood normal.         Medications  Current Facility-Administered Medications   Medication Dose Route Frequency Provider Last Rate Last Dose   • potassium phosphates 30 mmol in D5W 500 mL ivpb  30  mmol Intravenous Once Ant Cassidy Jr. D.OAcosta 100 mL/hr at 01/13/20 0928 30 mmol at 01/13/20 0928   • albuterol inhaler 2 Puff  2 Puff Inhalation Q6HRS PRN Feliciano Cole M.D.       • levothyroxine (SYNTHROID) tablet 50 mcg  50 mcg Oral AM ES Feliciano Cole M.D.   50 mcg at 01/13/20 0517   • acetaminophen (TYLENOL) tablet 650 mg  650 mg Oral Q6HRS PRN Feliciano Cole M.D.       • cloNIDine (CATAPRES) tablet 0.1 mg  0.1 mg Oral Q6HRS PRN Feliciano Cole M.D.       • ondansetron (ZOFRAN) syringe/vial injection 4 mg  4 mg Intravenous Q4HRS PRN Feliciano Cole M.D.       • ondansetron (ZOFRAN ODT) dispertab 4 mg  4 mg Oral Q4HRS PRN Feliciano Cole M.D.       • promethazine (PHENERGAN) tablet 12.5-25 mg  12.5-25 mg Oral Q4HRS PRN Feliciano Cole M.D.       • promethazine (PHENERGAN) suppository 12.5-25 mg  12.5-25 mg Rectal Q4HRS PRANGELQIUE Cole M.D.       • prochlorperazine (COMPAZINE) injection 5-10 mg  5-10 mg Intravenous Q4HRS PRN Feliciano Cole M.D.       • senna-docusate (PERICOLACE or SENOKOT S) 8.6-50 MG per tablet 2 Tab  2 Tab Oral BID Feliciano Cole M.D.        And   • polyethylene glycol/lytes (MIRALAX) PACKET 1 Packet  1 Packet Oral QDAY PRN Feliciano Cole M.D.        And   • magnesium hydroxide (MILK OF MAGNESIA) suspension 30 mL  30 mL Oral QDAY PRN Feliciano Cole M.D.        And   • bisacodyl (DULCOLAX) suppository 10 mg  10 mg Rectal QDAY PRN Feliciano Cole M.D.       • lactated ringers infusion   Intravenous Continuous Aram Cash M.D. 125 mL/hr at 01/13/20 0709     • cefTRIAXone (ROCEPHIN) 1 g in  mL IVPB  1 g Intravenous Q24HRS Feliciano Cole M.D.   Stopped at 01/12/20 2118   • Pharmacy Consult Request   Other PHARMACY TO DOSE Feliciano Cole M.D.       • octreotide (SANDOSTATIN) 1,250 mcg in  mL Infusion  50 mcg/hr Intravenous Continuous Feliciano Cole M.D. 10 mL/hr at 01/13/20 0708 50 mcg/hr at 01/13/20 0708   • pantoprazole (PROTONIX) 80 mg in  mL  Infusion  8 mg/hr Intravenous Continuous Feliciano Cole M.D. 25 mL/hr at 01/13/20 0708 8 mg/hr at 01/13/20 0708   • pyridoxine (VITAMIN B-6) tablet 100 mg  100 mg Oral DAILY Feliciano Cole M.D.   100 mg at 01/13/20 0516   • LORazepam (ATIVAN) injection 4 mg  4 mg Intravenous Q15 MIN PRN Feliciano Cole M.D.        Or   • LORazepam (ATIVAN) injection 3 mg  3 mg Intravenous Q15 MIN PRN Feliciano Cole M.D.        Or   • LORazepam (ATIVAN) injection 2 mg  2 mg Intravenous Q15 MIN PRN Feliciano Cole M.D.   1 mg at 01/12/20 1754    Or   • LORazepam (ATIVAN) injection 1 mg  1 mg Intravenous Q15 MIN PRN Feliciano Cole M.D.   1 mg at 01/13/20 0148   • multivitamin (THERAGRAN) tablet 1 Tab  1 Tab Oral DAILY Feliciano Cole M.D.   1 Tab at 01/13/20 0517    And   • folic acid (FOLVITE) tablet 1 mg  1 mg Oral DAILY Feliciano Cole M.D.   1 mg at 01/13/20 0517   • thiamine (B-1) 500 mg in D5W 100 mL IVPB  500 mg Intravenous DAILY Aram Cash M.D. 200 mL/hr at 01/13/20 0516 500 mg at 01/13/20 0516    Followed by   • [START ON 1/15/2020] thiamine tablet 100 mg  100 mg Oral DAILY Aram Cash M.D.       • norepinephrine (LEVOPHED) 8 mg in  mL Infusion  0-30 mcg/min Intravenous Continuous Aram Cash M.D. 15 mL/hr at 01/13/20 1021 8 mcg/min at 01/13/20 1021   • MD Alert...ICU Electrolyte Replacement per Pharmacy   Other PHARMACY TO DOSE Efrem Harkins M.D.           Fluids    Intake/Output Summary (Last 24 hours) at 1/13/2020 1033  Last data filed at 1/13/2020 0600  Gross per 24 hour   Intake 3852.57 ml   Output 1200 ml   Net 2652.57 ml       Laboratory          Recent Labs     01/11/20  2248 01/12/20  0500 01/13/20  0412 01/13/20  0435   SODIUM 126*  --  139 140   POTASSIUM 3.4*  --  3.1* 3.1*   CHLORIDE 77*  --  98 98   CO2 18*  --  30 29   BUN 8  --  5* 4*   CREATININE 0.98  --  0.71 0.67   MAGNESIUM  --  1.7 2.5  --    PHOSPHORUS  --  2.4* 1.7*  --    CALCIUM 8.8  --  7.3* 7.2*      Recent Labs     01/11/20 2248 01/13/20 0412 01/13/20  0435   ALTSGPT 36 23 23   ASTSGOT 219* 93* 92*   ALKPHOSPHAT 352* 238* 239*   TBILIRUBIN 5.5* 5.1* 5.1*   LIPASE 51  --   --    GLUCOSE 173* 132* 129*     Recent Labs     01/11/20 2248 01/13/20 0412 01/13/20  0435   WBC 8.3 5.2  --    NEUTSPOLYS 73.70* 65.30  --    LYMPHOCYTES 5.30* 14.30*  --    MONOCYTES 7.90 14.90*  --    EOSINOPHILS 0.90 1.90  --    BASOPHILS 0.90 1.70  --    ASTSGOT 219* 93* 92*   ALTSGPT 36 23 23   ALKPHOSPHAT 352* 238* 239*   TBILIRUBIN 5.5* 5.1* 5.1*     Recent Labs     01/11/20 2248 01/12/20  1152 01/12/20 1922 01/13/20 0412   RBC 2.31*  --   --   --  2.26*   HEMOGLOBIN 8.4*   < > 8.2* 8.4* 8.2*   HEMATOCRIT 26.3*  --   --   --  25.6*   PLATELETCT 116*  --   --   --  100*   PROTHROMBTM 14.2  --   --   --   --    APTT 28.5  --   --   --   --    INR 1.07  --   --   --   --     < > = values in this interval not displayed.       Imaging  X-Ray:  I have personally reviewed the images and compared with prior images.    Assessment/Plan  * GIB (gastrointestinal bleeding)- (present on admission)  Assessment & Plan  Differential diagnosis includes esophagitis, gastritis, peptic ulcer disease, Shayla-Navarro tear, esophageal varices, portal gastropathy  Continue IV octreotide and Protonix drips  Continue prophylactic Rocephin  Trend hemoglobin  Transfuse PRBCs to keep hemoglobin greater than 7  I have consulted gastroenterology today  Thromboelastogram unremarkable    Lactic acidosis- (present on admission)  Assessment & Plan  Suspect due to a combination of intravascular volume depletion as well as liver disease  Hydrate with intravenous fluids  Improved, stopped trending    Hypotension- (present on admission)  Assessment & Plan  Likely hypovolemic, GIB and cirrhosis  Titrating levo  PRN IVF boluses  Consider hydrocortisone if this fails to improve    Hematuria- (present on admission)  Assessment & Plan  Check urinalysis and culture  urine  US abd    Alcohol withdrawal syndrome with complication (HCC)- (present on admission)  Assessment & Plan  Benzodiazepines based upon RASS  Low threshold for transitioning to phenobarbital protocol if she develops severe withdrawal    Troponin level elevated- (present on admission)  Assessment & Plan  ECG without evidence of acute coronary syndrome or STEMI  Trend troponin    Alcoholic liver disease (HCC)  Assessment & Plan  Avoid hepatotoxins  Alcohol cessation counseling  High-dose IV thiamine and supplemental vitamins  Observe for evidence of withdrawal  Hepatitis panel, RUQ US    Macrocytic anemia- (present on admission)  Assessment & Plan  History of hematuria and hematemesis  Trend hemoglobin  Transfuse PRBCs to keep hemoglobin greater than 7  Will need EGD    Closed fracture of nasal bone- (present on admission)  Assessment & Plan  Pain control    Hypokalemia- (present on admission)  Assessment & Plan  Replete potassium and magnesium    Hypothyroidism- (present on admission)  Assessment & Plan  Check TSH/T4 in AM  Continue levothyroxine, 50 mcg daily    Thrombocytopenia (HCC)- (present on admission)  Assessment & Plan  Trend platelet count  Transfuse as needed    Hyponatremia- (present on admission)  Assessment & Plan  Follow serial electrolytes  Hydrate with intravenous fluids       VTE:  Contraindicated  Ulcer: PPI  Lines: Central Line  Ongoing indication addressed    I have performed a physical exam and reviewed and updated ROS and Plan today (1/13/2020). In review of yesterday's note (1/12/2020), there are no changes except as documented above.     Titrating vasopressors to maintain adequate hemodynamics. This patient is critically ill, at high risk for decompensation leading to worsening vital organ dysfunction and death without critical care interventions.    Discussed patient condition and risk of morbidity and/or mortality with Hospitalist, RN, RT, Pharmacy, Dietary, , Charge nurse /  hot rounds, Patient and GI.    The patient remains critically ill.  Critical care time = 34 minutes in directly providing and coordinating critical care and extensive data review.  No time overlap and excludes procedures.
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
## 2020-01-13 NOTE — CARE PLAN
Problem: Safety  Goal: Will remain free from injury  Outcome: MET  Intervention: Provide assistance with mobility  Note:   Assistance of two or more  Intervention: Collaborate with Interdisciplinary Team for safe transfer and mobilization techniques  Note:   Completed  Intervention: Educate patient and significant other/support system about adaptive mobility strategies and safe transfers  Note:   Education provided  Goal: Will remain free from falls  Note:   Completed.     Problem: Fluid Volume:  Goal: Will maintain balanced intake and output  Outcome: MET  Intervention: Monitor, educate, and encourage compliance with therapeutic intake of liquids  Note:   Urine output monitored and education provided.

## 2020-01-14 ENCOUNTER — ANESTHESIA EVENT (OUTPATIENT)
Dept: SURGERY | Facility: MEDICAL CENTER | Age: 55
DRG: 380 | End: 2020-01-14
Payer: MEDICAID

## 2020-01-14 LAB
ALBUMIN SERPL BCP-MCNC: 2.5 G/DL (ref 3.2–4.9)
ALBUMIN/GLOB SERPL: 1.2 G/DL
ALP SERPL-CCNC: 180 U/L (ref 30–99)
ALT SERPL-CCNC: 20 U/L (ref 2–50)
ANION GAP SERPL CALC-SCNC: 9 MMOL/L (ref 0–11.9)
ANISOCYTOSIS BLD QL SMEAR: ABNORMAL
AST SERPL-CCNC: 108 U/L (ref 12–45)
BACTERIA UR CULT: NORMAL
BASOPHILS # BLD AUTO: 1.5 % (ref 0–1.8)
BASOPHILS # BLD: 0.05 K/UL (ref 0–0.12)
BILIRUB SERPL-MCNC: 4.8 MG/DL (ref 0.1–1.5)
BUN SERPL-MCNC: <3 MG/DL (ref 8–22)
CALCIUM SERPL-MCNC: 7.1 MG/DL (ref 8.5–10.5)
CHLORIDE SERPL-SCNC: 103 MMOL/L (ref 96–112)
CO2 SERPL-SCNC: 29 MMOL/L (ref 20–33)
COMMENT 1642: NORMAL
CORTIS SERPL-MCNC: 12.7 UG/DL (ref 0–23)
CREAT SERPL-MCNC: 0.56 MG/DL (ref 0.5–1.4)
EOSINOPHIL # BLD AUTO: 0.11 K/UL (ref 0–0.51)
EOSINOPHIL NFR BLD: 3.3 % (ref 0–6.9)
ERYTHROCYTE [DISTWIDTH] IN BLOOD BY AUTOMATED COUNT: 103.8 FL (ref 35.9–50)
GLOBULIN SER CALC-MCNC: 2.1 G/DL (ref 1.9–3.5)
GLUCOSE SERPL-MCNC: 95 MG/DL (ref 65–99)
HCT VFR BLD AUTO: 23.2 % (ref 37–47)
HGB BLD-MCNC: 7.4 G/DL (ref 12–16)
IMM GRANULOCYTES # BLD AUTO: 0.09 K/UL (ref 0–0.11)
IMM GRANULOCYTES NFR BLD AUTO: 2.7 % (ref 0–0.9)
LYMPHOCYTES # BLD AUTO: 0.73 K/UL (ref 1–4.8)
LYMPHOCYTES NFR BLD: 21.7 % (ref 22–41)
MACROCYTES BLD QL SMEAR: ABNORMAL
MAGNESIUM SERPL-MCNC: 2 MG/DL (ref 1.5–2.5)
MCH RBC QN AUTO: 37.6 PG (ref 27–33)
MCHC RBC AUTO-ENTMCNC: 31.9 G/DL (ref 33.6–35)
MCV RBC AUTO: 117.8 FL (ref 81.4–97.8)
MICROCYTES BLD QL SMEAR: ABNORMAL
MONOCYTES # BLD AUTO: 0.54 K/UL (ref 0–0.85)
MONOCYTES NFR BLD AUTO: 16.1 % (ref 0–13.4)
MORPHOLOGY BLD-IMP: NORMAL
NEUTROPHILS # BLD AUTO: 1.84 K/UL (ref 2–7.15)
NEUTROPHILS NFR BLD: 54.7 % (ref 44–72)
NRBC # BLD AUTO: 0.03 K/UL
NRBC BLD-RTO: 0.9 /100 WBC
PHOSPHATE SERPL-MCNC: 1.8 MG/DL (ref 2.5–4.5)
PLATELET # BLD AUTO: 67 K/UL (ref 164–446)
PLATELET BLD QL SMEAR: NORMAL
PMV BLD AUTO: 8.8 FL (ref 9–12.9)
POIKILOCYTOSIS BLD QL SMEAR: NORMAL
POLYCHROMASIA BLD QL SMEAR: NORMAL
POTASSIUM SERPL-SCNC: 3.4 MMOL/L (ref 3.6–5.5)
PROT SERPL-MCNC: 4.6 G/DL (ref 6–8.2)
RBC # BLD AUTO: 1.97 M/UL (ref 4.2–5.4)
RBC BLD AUTO: PRESENT
SIGNIFICANT IND 70042: NORMAL
SITE SITE: NORMAL
SODIUM SERPL-SCNC: 141 MMOL/L (ref 135–145)
SOURCE SOURCE: NORMAL
STOMATOCYTES BLD QL SMEAR: NORMAL
T4 FREE SERPL-MCNC: 0.35 NG/DL (ref 0.53–1.43)
TSH SERPL DL<=0.005 MIU/L-ACNC: 31.2 UIU/ML (ref 0.38–5.33)
WBC # BLD AUTO: 3.4 K/UL (ref 4.8–10.8)

## 2020-01-14 PROCEDURE — 84443 ASSAY THYROID STIM HORMONE: CPT

## 2020-01-14 PROCEDURE — 99233 SBSQ HOSP IP/OBS HIGH 50: CPT | Performed by: INTERNAL MEDICINE

## 2020-01-14 PROCEDURE — 80053 COMPREHEN METABOLIC PANEL: CPT

## 2020-01-14 PROCEDURE — 700105 HCHG RX REV CODE 258: Performed by: INTERNAL MEDICINE

## 2020-01-14 PROCEDURE — 85025 COMPLETE CBC W/AUTO DIFF WBC: CPT

## 2020-01-14 PROCEDURE — C9113 INJ PANTOPRAZOLE SODIUM, VIA: HCPCS | Performed by: INTERNAL MEDICINE

## 2020-01-14 PROCEDURE — 82533 TOTAL CORTISOL: CPT

## 2020-01-14 PROCEDURE — 700111 HCHG RX REV CODE 636 W/ 250 OVERRIDE (IP): Performed by: INTERNAL MEDICINE

## 2020-01-14 PROCEDURE — 700101 HCHG RX REV CODE 250: Performed by: INTERNAL MEDICINE

## 2020-01-14 PROCEDURE — 83735 ASSAY OF MAGNESIUM: CPT

## 2020-01-14 PROCEDURE — A9270 NON-COVERED ITEM OR SERVICE: HCPCS | Performed by: HOSPITALIST

## 2020-01-14 PROCEDURE — 84439 ASSAY OF FREE THYROXINE: CPT

## 2020-01-14 PROCEDURE — 700111 HCHG RX REV CODE 636 W/ 250 OVERRIDE (IP): Performed by: HOSPITALIST

## 2020-01-14 PROCEDURE — 84100 ASSAY OF PHOSPHORUS: CPT

## 2020-01-14 PROCEDURE — 700105 HCHG RX REV CODE 258: Performed by: HOSPITALIST

## 2020-01-14 PROCEDURE — 700102 HCHG RX REV CODE 250 W/ 637 OVERRIDE(OP): Performed by: HOSPITALIST

## 2020-01-14 PROCEDURE — 770022 HCHG ROOM/CARE - ICU (200)

## 2020-01-14 RX ORDER — PANTOPRAZOLE SODIUM 40 MG/10ML
40 INJECTION, POWDER, LYOPHILIZED, FOR SOLUTION INTRAVENOUS 2 TIMES DAILY
Status: DISCONTINUED | OUTPATIENT
Start: 2020-01-14 | End: 2020-01-17

## 2020-01-14 RX ADMIN — FOLIC ACID 1 MG: 1 TABLET ORAL at 04:51

## 2020-01-14 RX ADMIN — THERA TABS 1 TABLET: TAB at 04:51

## 2020-01-14 RX ADMIN — SENNOSIDES AND DOCUSATE SODIUM 2 TABLET: 8.6; 5 TABLET ORAL at 04:51

## 2020-01-14 RX ADMIN — OCTREOTIDE ACETATE 50 MCG/HR: 200 INJECTION, SOLUTION INTRAVENOUS; SUBCUTANEOUS at 04:46

## 2020-01-14 RX ADMIN — CEFTRIAXONE SODIUM 1 G: 1 INJECTION, POWDER, FOR SOLUTION INTRAMUSCULAR; INTRAVENOUS at 17:16

## 2020-01-14 RX ADMIN — PANTOPRAZOLE SODIUM 40 MG: 40 INJECTION, POWDER, LYOPHILIZED, FOR SOLUTION INTRAVENOUS at 12:57

## 2020-01-14 RX ADMIN — LEVOTHYROXINE SODIUM 50 MCG: 50 TABLET ORAL at 04:51

## 2020-01-14 RX ADMIN — LORAZEPAM 1 MG: 2 INJECTION INTRAMUSCULAR; INTRAVENOUS at 19:39

## 2020-01-14 RX ADMIN — SENNOSIDES AND DOCUSATE SODIUM 2 TABLET: 8.6; 5 TABLET ORAL at 17:15

## 2020-01-14 RX ADMIN — THIAMINE HYDROCHLORIDE 500 MG: 100 INJECTION, SOLUTION INTRAMUSCULAR; INTRAVENOUS at 04:53

## 2020-01-14 RX ADMIN — ONDANSETRON 4 MG: 2 INJECTION INTRAMUSCULAR; INTRAVENOUS at 19:39

## 2020-01-14 RX ADMIN — PYRIDOXINE HCL TAB 50 MG 100 MG: 50 TAB at 04:51

## 2020-01-14 RX ADMIN — SODIUM CHLORIDE, POTASSIUM CHLORIDE, SODIUM LACTATE AND CALCIUM CHLORIDE: 600; 310; 30; 20 INJECTION, SOLUTION INTRAVENOUS at 03:55

## 2020-01-14 RX ADMIN — POTASSIUM PHOSPHATE, MONOBASIC AND POTASSIUM PHOSPHATE, DIBASIC 30 MMOL: 224; 236 INJECTION, SOLUTION, CONCENTRATE INTRAVENOUS at 08:40

## 2020-01-14 RX ADMIN — PANTOPRAZOLE SODIUM 40 MG: 40 INJECTION, POWDER, LYOPHILIZED, FOR SOLUTION INTRAVENOUS at 17:16

## 2020-01-14 ASSESSMENT — ENCOUNTER SYMPTOMS
CHILLS: 0
SENSORY CHANGE: 0
DIARRHEA: 0
CONSTIPATION: 0
FOCAL WEAKNESS: 0
BLURRED VISION: 0
HEARTBURN: 1
VOMITING: 1
EYE PAIN: 1
BLOOD IN STOOL: 0
COUGH: 0
SPUTUM PRODUCTION: 0
DIZZINESS: 0
MYALGIAS: 0
NAUSEA: 1
FEVER: 0
SHORTNESS OF BREATH: 0
STRIDOR: 0
ABDOMINAL PAIN: 1
HEARTBURN: 0

## 2020-01-14 NOTE — PROGRESS NOTES
"Pt found by this RN trying to get out of bed to \"get her purse\". Pt re-situated back in bed and purse given to patient. Left room to get new ecg electrodes, when returned to room patient pulling out bottle of alcohol from purse. Purse and alcohol taken out of reach of patient. CEILO Caldera came to room. With patient's permission purse was searched by myself and CELIO Caldera on tray table in front of patient. No other alcohol found. Purse returned to patient, alcohol placed in clear bag and locked up.   "
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
## 2020-01-14 NOTE — PROGRESS NOTES
Gastroenterology Progress Note     Author: Pito Davis M.D.   Date & Time Created: 1/14/2020 9:17 AM    Chief Complaint:  Hematemesis    HISTORY OF PRESENT ILLNESS:  The patient is a 54-year-old female with a long   history of alcohol abuse with multiple episodes of withdrawal admissions   related to alcohol use with ongoing drinking.  She states that she drinks   close to a pint of vodka per day and recently has had some withdrawal symptoms   causing her to continue her alcohol intake.  She was brought in after she   fell tripping over a curb and hit her head causing left-sided facial injury   and subsequently was describing her episodes of nausea and vomiting and   hematemesis in the ER.  She describes hematemesis starting 2 weeks ago, scant   and bright red at first, then turning to coffee-ground appearing material,   became a little bit more intermittent.  She denies any abdominal pain.  She   does describe dark stools and nursing is at bedside when I interviewed her and   indeed it looks like melena.  She otherwise denies any diarrhea or   constipation, no hematochezia, no fevers or chills.    Interval History:  1/14/2020: Some ongoing emesis, small volume with streaks of blood.  Hgb trending down slowly.  Ongoing upper ab pain.  More alert today.  US with massive hepatomegaly and normal spleen size and no ascites    Review of Systems:  Review of Systems   Constitutional: Positive for malaise/fatigue. Negative for chills and fever.   Respiratory: Negative for shortness of breath.    Cardiovascular: Negative for chest pain.   Gastrointestinal: Positive for abdominal pain, nausea and vomiting. Negative for blood in stool, constipation, diarrhea, heartburn and melena.       Physical Exam:  Physical Exam  Vitals signs and nursing note reviewed.   HENT:      Head:      Salivary Glands: Diffusely enlarged: Facial ecchymoses left orbital area.   Eyes:      General: Scleral icterus present.   Cardiovascular:       Rate and Rhythm: Regular rhythm. Tachycardia present.      Heart sounds: No murmur.   Pulmonary:      Effort: Pulmonary effort is normal. No respiratory distress.      Breath sounds: Normal breath sounds. No stridor. No wheezing, rhonchi or rales.   Abdominal:      General: Bowel sounds are normal. There is no distension.      Palpations: Abdomen is soft. There is hepatomegaly. There is no mass.      Tenderness: There is tenderness. There is no guarding or rebound.      Hernia: No hernia is present.   Musculoskeletal:      Right lower leg: No edema.      Left lower leg: No edema.   Skin:     Coloration: Skin is jaundiced.   Neurological:      General: No focal deficit present.      Mental Status: She is oriented to person, place, and time.         Labs:          Recent Labs     01/12/20  0500 01/13/20 0412 01/13/20 0435 01/14/20  0440   SODIUM  --  139 140 141   POTASSIUM  --  3.1* 3.1* 3.4*   CHLORIDE  --  98 98 103   CO2  --  30 29 29   BUN  --  5* 4* <3*   CREATININE  --  0.71 0.67 0.56   MAGNESIUM 1.7 2.5  --  2.0   PHOSPHORUS 2.4* 1.7*  --  1.8*   CALCIUM  --  7.3* 7.2* 7.1*     Recent Labs     01/11/20 2248 01/13/20 0412 01/13/20 0435 01/14/20  0440   ALTSGPT 36 23 23 20   ASTSGOT 219* 93* 92* 108*   ALKPHOSPHAT 352* 238* 239* 180*   TBILIRUBIN 5.5* 5.1* 5.1* 4.8*   LIPASE 51  --   --   --    GLUCOSE 173* 132* 129* 95     Recent Labs     01/11/20 2248 01/13/20 0412 01/13/20  1121 01/13/20 2017 01/14/20  0440   RBC 2.31*  --  2.26*  --   --  1.97*   HEMOGLOBIN 8.4*   < > 8.2* 7.9* 7.8* 7.4*   HEMATOCRIT 26.3*  --  25.6*  --   --  23.2*   PLATELETCT 116*  --  100*  --   --  67*   PROTHROMBTM 14.2  --   --   --   --   --    APTT 28.5  --   --   --   --   --    INR 1.07  --   --   --   --   --     < > = values in this interval not displayed.     Recent Labs     01/11/20  2248 01/13/20  0412 01/13/20  0435 01/14/20  0440   WBC 8.3 5.2  --  3.4*   NEUTSPOLYS 73.70* 65.30  --  54.70   LYMPHOCYTES 5.30*  14.30*  --  21.70*   MONOCYTES 7.90 14.90*  --  16.10*   EOSINOPHILS 0.90 1.90  --  3.30   BASOPHILS 0.90 1.70  --  1.50   ASTSGOT 219* 93* 92* 108*   ALTSGPT 36 23 23 20   ALKPHOSPHAT 352* 238* 239* 180*   TBILIRUBIN 5.5* 5.1* 5.1* 4.8*     Hemodynamics:  Temp (24hrs), Av.4 °C (97.6 °F), Min:36.1 °C (97 °F), Max:36.8 °C (98.2 °F)  Temperature: 36.2 °C (97.2 °F)  Pulse  Av.7  Min: 69  Max: 138   Blood Pressure: 110/69  CVP (mm Hg): (Abnormal) 21 MM HG  Respiratory:    Respiration: (Abnormal) 24, Pulse Oximetry: 92 %        RUL Breath Sounds: Clear, RML Breath Sounds: Clear, RLL Breath Sounds: Diminished, JALIL Breath Sounds: Clear, LLL Breath Sounds: Diminished  Fluids:    Intake/Output Summary (Last 24 hours) at 2020 0917  Last data filed at 2020 0600  Gross per 24 hour   Intake 4283.05 ml   Output 1400 ml   Net 2883.05 ml     Weight: 81.5 kg (179 lb 10.8 oz)  GI/Nutrition:  Orders Placed This Encounter   Procedures   • Diet NPO     Standing Status:   Standing     Number of Occurrences:   1     Order Specific Question:   Restrict to:     Answer:   Sips with Medications [3]     Medical Decision Making, by Problem:  Active Hospital Problems    Diagnosis   • *GIB (gastrointestinal bleeding) [K92.2]   • Lactic acidosis [E87.2]   • Macrocytic anemia [D53.9]   • Metabolic acidosis [E87.2]   • Alcoholic liver disease (HCC) [K70.9]   • Troponin level elevated [R79.89]   • Alcohol withdrawal syndrome with complication (HCC) [F10.239]   • Hematuria [R31.9]   • Hypotension [I95.9]   • Hyponatremia [E87.1]   • Thrombocytopenia (HCC) [D69.6]   • Hypothyroidism [E03.9]   • Hypokalemia [E87.6]   • Closed fracture of nasal bone [S02.2XXA]     PROBLEMS:  1. Hematemesis.  Small volume.  Question erosive esophagitis versus PUD and less likely varices  2. Acute alcoholic hepatitis.  DF <32 and no indication for steroids currently  3. Hepatomegaly, massive.  May be contributing to some abdominal pain  4. Alcohol  withdrawal  5. Alcohol dependence  6. Macrocytic anemia secondary to alcohol  7. Pancytopenia  8. Hypothyroidism  9. Facial fracture    Plan:  1. Plan for EGD tomorrow with anesthesia at 11AM.  Clear liquid diet today and NPO at [facility]  2. Continue current medications  3. Discussed alcohol abstinence    Quality-Core Measures

## 2020-01-14 NOTE — CARE PLAN
Problem: Safety  Goal: Will remain free from falls  Outcome: MET  Intervention: Assess risk factors for falls  Flowsheets (Taken 1/14/2020 1324)  Fall Risk: High Risk to Fall - 2 or more points   Mobility Status Assessment: 2-2 Healthcare Providers Required for Assistance with Ambulation & Transfer  Intervention: Implement fall precautions  Flowsheets (Taken 1/14/2020 1400)  Environmental Precautions: Treaded Slipper Socks on Patient;Personal Belongings, Wastebasket, Call Bell etc. in Easy Reach;Transferred to Stronger Side;Report Given to Other Health Care Providers Regarding Fall Risk;Bed in Low Position;Communication Sign for Patients & Families;Mobility Assessed & Appropriate Sign Placed     Problem: Skin Integrity  Goal: Risk for impaired skin integrity will decrease  Outcome: MET     Problem: Respiratory:  Goal: Respiratory status will improve  Intervention: Assess and monitor pulmonary status  Note:   96% on 4 L  Intervention: Administer and titrate oxygen therapy  Flowsheets (Taken 1/14/2020 0600 by Irena Kapoor RAcostaNAcosta)  O2 (LPM): 4  Note:   Completed  Intervention: Educate and encourage coughing and deep breathing  Note:   Encouraged  Intervention: Educate and encourage incentive spirometry usage  Note:   NA  Intervention: Collaborate with respiratory therapist and Interdisciplinary Team on treatment measures to improve respiratory function  Note:   Completed

## 2020-01-15 ENCOUNTER — ANESTHESIA (OUTPATIENT)
Dept: SURGERY | Facility: MEDICAL CENTER | Age: 55
DRG: 380 | End: 2020-01-15
Payer: MEDICAID

## 2020-01-15 LAB
A1AT SERPL-MCNC: 149 MG/DL (ref 90–200)
ABO GROUP BLD: NORMAL
ALBUMIN SERPL BCP-MCNC: 2.6 G/DL (ref 3.2–4.9)
ALBUMIN/GLOB SERPL: 1.1 G/DL
ALP SERPL-CCNC: 194 U/L (ref 30–99)
ALT SERPL-CCNC: 25 U/L (ref 2–50)
ANION GAP SERPL CALC-SCNC: 9 MMOL/L (ref 0–11.9)
AST SERPL-CCNC: 157 U/L (ref 12–45)
BARCODED ABORH UBTYP: 6200
BARCODED PRD CODE UBPRD: NORMAL
BARCODED UNIT NUM UBUNT: NORMAL
BASOPHILS # BLD AUTO: 1.6 % (ref 0–1.8)
BASOPHILS # BLD: 0.07 K/UL (ref 0–0.12)
BILIRUB SERPL-MCNC: 5 MG/DL (ref 0.1–1.5)
BLD GP AB SCN SERPL QL: NORMAL
BUN SERPL-MCNC: <3 MG/DL (ref 8–22)
CALCIUM SERPL-MCNC: 7.2 MG/DL (ref 8.5–10.5)
CHLORIDE SERPL-SCNC: 105 MMOL/L (ref 96–112)
CO2 SERPL-SCNC: 26 MMOL/L (ref 20–33)
COMPONENT R 8504R: NORMAL
CORTIS SERPL-MCNC: 10 UG/DL (ref 0–23)
CREAT SERPL-MCNC: 0.62 MG/DL (ref 0.5–1.4)
EOSINOPHIL # BLD AUTO: 0.12 K/UL (ref 0–0.51)
EOSINOPHIL NFR BLD: 2.7 % (ref 0–6.9)
ERYTHROCYTE [DISTWIDTH] IN BLOOD BY AUTOMATED COUNT: 104.4 FL (ref 35.9–50)
GLOBULIN SER CALC-MCNC: 2.4 G/DL (ref 1.9–3.5)
GLUCOSE SERPL-MCNC: 72 MG/DL (ref 65–99)
HCT VFR BLD AUTO: 24.8 % (ref 37–47)
HGB BLD-MCNC: 6.5 G/DL (ref 12–16)
HGB BLD-MCNC: 7.8 G/DL (ref 12–16)
IMM GRANULOCYTES # BLD AUTO: 0.12 K/UL (ref 0–0.11)
IMM GRANULOCYTES NFR BLD AUTO: 2.7 % (ref 0–0.9)
LYMPHOCYTES # BLD AUTO: 1 K/UL (ref 1–4.8)
LYMPHOCYTES NFR BLD: 22.3 % (ref 22–41)
MAGNESIUM SERPL-MCNC: 2 MG/DL (ref 1.5–2.5)
MCH RBC QN AUTO: 37.3 PG (ref 27–33)
MCHC RBC AUTO-ENTMCNC: 31.5 G/DL (ref 33.6–35)
MCV RBC AUTO: 118.7 FL (ref 81.4–97.8)
MITOCHONDRIA M2 IGG SER-ACNC: 4.7 UNITS (ref 0–20)
MONOCYTES # BLD AUTO: 0.77 K/UL (ref 0–0.85)
MONOCYTES NFR BLD AUTO: 17.1 % (ref 0–13.4)
MORPHOLOGY BLD-IMP: NORMAL
NEUTROPHILS # BLD AUTO: 2.41 K/UL (ref 2–7.15)
NEUTROPHILS NFR BLD: 53.6 % (ref 44–72)
NRBC # BLD AUTO: 0.02 K/UL
NRBC BLD-RTO: 0.4 /100 WBC
NUCLEAR IGG SER QL IA: NORMAL
PHOSPHATE SERPL-MCNC: 2.8 MG/DL (ref 2.5–4.5)
PLATELET # BLD AUTO: 93 K/UL (ref 164–446)
PMV BLD AUTO: 10.2 FL (ref 9–12.9)
POTASSIUM SERPL-SCNC: 5.1 MMOL/L (ref 3.6–5.5)
PRODUCT TYPE UPROD: NORMAL
PROT SERPL-MCNC: 5 G/DL (ref 6–8.2)
RBC # BLD AUTO: 2.09 M/UL (ref 4.2–5.4)
RH BLD: NORMAL
SODIUM SERPL-SCNC: 140 MMOL/L (ref 135–145)
UNIT STATUS USTAT: NORMAL
WBC # BLD AUTO: 4.5 K/UL (ref 4.8–10.8)

## 2020-01-15 PROCEDURE — 84100 ASSAY OF PHOSPHORUS: CPT

## 2020-01-15 PROCEDURE — 700105 HCHG RX REV CODE 258: Performed by: ANESTHESIOLOGY

## 2020-01-15 PROCEDURE — 700111 HCHG RX REV CODE 636 W/ 250 OVERRIDE (IP): Performed by: INTERNAL MEDICINE

## 2020-01-15 PROCEDURE — A9270 NON-COVERED ITEM OR SERVICE: HCPCS | Performed by: INTERNAL MEDICINE

## 2020-01-15 PROCEDURE — 160207 HCHG ENDO MINUTES - EA ADDL 1 MIN LEVEL 3: Performed by: INTERNAL MEDICINE

## 2020-01-15 PROCEDURE — 86901 BLOOD TYPING SEROLOGIC RH(D): CPT

## 2020-01-15 PROCEDURE — C9113 INJ PANTOPRAZOLE SODIUM, VIA: HCPCS | Performed by: INTERNAL MEDICINE

## 2020-01-15 PROCEDURE — 770022 HCHG ROOM/CARE - ICU (200)

## 2020-01-15 PROCEDURE — 0W3P8ZZ CONTROL BLEEDING IN GASTROINTESTINAL TRACT, VIA NATURAL OR ARTIFICIAL OPENING ENDOSCOPIC: ICD-10-PCS | Performed by: INTERNAL MEDICINE

## 2020-01-15 PROCEDURE — 160002 HCHG RECOVERY MINUTES (STAT): Performed by: INTERNAL MEDICINE

## 2020-01-15 PROCEDURE — 160048 HCHG OR STATISTICAL LEVEL 1-5: Performed by: INTERNAL MEDICINE

## 2020-01-15 PROCEDURE — 160036 HCHG PACU - EA ADDL 30 MINS PHASE I: Performed by: INTERNAL MEDICINE

## 2020-01-15 PROCEDURE — 86900 BLOOD TYPING SEROLOGIC ABO: CPT

## 2020-01-15 PROCEDURE — 500066 HCHG BITE BLOCK, ECT: Performed by: INTERNAL MEDICINE

## 2020-01-15 PROCEDURE — 160035 HCHG PACU - 1ST 60 MINS PHASE I: Performed by: INTERNAL MEDICINE

## 2020-01-15 PROCEDURE — 36430 TRANSFUSION BLD/BLD COMPNT: CPT

## 2020-01-15 PROCEDURE — A9270 NON-COVERED ITEM OR SERVICE: HCPCS | Performed by: HOSPITALIST

## 2020-01-15 PROCEDURE — 86923 COMPATIBILITY TEST ELECTRIC: CPT

## 2020-01-15 PROCEDURE — 80053 COMPREHEN METABOLIC PANEL: CPT

## 2020-01-15 PROCEDURE — 160202 HCHG ENDO MINUTES - 1ST 30 MINS LEVEL 3: Performed by: INTERNAL MEDICINE

## 2020-01-15 PROCEDURE — 700111 HCHG RX REV CODE 636 W/ 250 OVERRIDE (IP): Performed by: HOSPITALIST

## 2020-01-15 PROCEDURE — 86850 RBC ANTIBODY SCREEN: CPT

## 2020-01-15 PROCEDURE — 160009 HCHG ANES TIME/MIN: Performed by: INTERNAL MEDICINE

## 2020-01-15 PROCEDURE — 700105 HCHG RX REV CODE 258

## 2020-01-15 PROCEDURE — 82533 TOTAL CORTISOL: CPT

## 2020-01-15 PROCEDURE — 700111 HCHG RX REV CODE 636 W/ 250 OVERRIDE (IP): Performed by: ANESTHESIOLOGY

## 2020-01-15 PROCEDURE — 99233 SBSQ HOSP IP/OBS HIGH 50: CPT | Performed by: INTERNAL MEDICINE

## 2020-01-15 PROCEDURE — 85018 HEMOGLOBIN: CPT

## 2020-01-15 PROCEDURE — 83735 ASSAY OF MAGNESIUM: CPT

## 2020-01-15 PROCEDURE — 700102 HCHG RX REV CODE 250 W/ 637 OVERRIDE(OP): Performed by: HOSPITALIST

## 2020-01-15 PROCEDURE — 700102 HCHG RX REV CODE 250 W/ 637 OVERRIDE(OP): Performed by: INTERNAL MEDICINE

## 2020-01-15 PROCEDURE — 85025 COMPLETE CBC W/AUTO DIFF WBC: CPT

## 2020-01-15 PROCEDURE — 700105 HCHG RX REV CODE 258: Performed by: HOSPITALIST

## 2020-01-15 PROCEDURE — P9016 RBC LEUKOCYTES REDUCED: HCPCS

## 2020-01-15 RX ORDER — SODIUM CHLORIDE 9 MG/ML
INJECTION, SOLUTION INTRAVENOUS
Status: COMPLETED
Start: 2020-01-15 | End: 2020-01-15

## 2020-01-15 RX ORDER — DEXAMETHASONE SODIUM PHOSPHATE 4 MG/ML
INJECTION, SOLUTION INTRA-ARTICULAR; INTRALESIONAL; INTRAMUSCULAR; INTRAVENOUS; SOFT TISSUE PRN
Status: DISCONTINUED | OUTPATIENT
Start: 2020-01-15 | End: 2020-01-15 | Stop reason: SURG

## 2020-01-15 RX ORDER — SUCRALFATE ORAL 1 G/10ML
1 SUSPENSION ORAL EVERY 6 HOURS
Status: DISCONTINUED | OUTPATIENT
Start: 2020-01-15 | End: 2020-01-31 | Stop reason: HOSPADM

## 2020-01-15 RX ORDER — ONDANSETRON 2 MG/ML
4 INJECTION INTRAMUSCULAR; INTRAVENOUS
Status: DISCONTINUED | OUTPATIENT
Start: 2020-01-15 | End: 2020-01-15 | Stop reason: HOSPADM

## 2020-01-15 RX ORDER — OXYCODONE HCL 5 MG/5 ML
10 SOLUTION, ORAL ORAL
Status: DISCONTINUED | OUTPATIENT
Start: 2020-01-15 | End: 2020-01-15 | Stop reason: HOSPADM

## 2020-01-15 RX ORDER — ONDANSETRON 2 MG/ML
INJECTION INTRAMUSCULAR; INTRAVENOUS PRN
Status: DISCONTINUED | OUTPATIENT
Start: 2020-01-15 | End: 2020-01-15 | Stop reason: HOSPADM

## 2020-01-15 RX ORDER — OXYCODONE HCL 5 MG/5 ML
5 SOLUTION, ORAL ORAL
Status: DISCONTINUED | OUTPATIENT
Start: 2020-01-15 | End: 2020-01-15 | Stop reason: HOSPADM

## 2020-01-15 RX ORDER — HYDROMORPHONE HYDROCHLORIDE 1 MG/ML
0.4 INJECTION, SOLUTION INTRAMUSCULAR; INTRAVENOUS; SUBCUTANEOUS
Status: DISCONTINUED | OUTPATIENT
Start: 2020-01-15 | End: 2020-01-15 | Stop reason: HOSPADM

## 2020-01-15 RX ORDER — DIPHENHYDRAMINE HYDROCHLORIDE 50 MG/ML
12.5 INJECTION INTRAMUSCULAR; INTRAVENOUS
Status: DISCONTINUED | OUTPATIENT
Start: 2020-01-15 | End: 2020-01-15 | Stop reason: HOSPADM

## 2020-01-15 RX ORDER — HYDROMORPHONE HYDROCHLORIDE 1 MG/ML
0.1 INJECTION, SOLUTION INTRAMUSCULAR; INTRAVENOUS; SUBCUTANEOUS
Status: DISCONTINUED | OUTPATIENT
Start: 2020-01-15 | End: 2020-01-15 | Stop reason: HOSPADM

## 2020-01-15 RX ORDER — HYDROMORPHONE HYDROCHLORIDE 1 MG/ML
0.2 INJECTION, SOLUTION INTRAMUSCULAR; INTRAVENOUS; SUBCUTANEOUS
Status: DISCONTINUED | OUTPATIENT
Start: 2020-01-15 | End: 2020-01-15 | Stop reason: HOSPADM

## 2020-01-15 RX ORDER — IPRATROPIUM BROMIDE AND ALBUTEROL SULFATE 2.5; .5 MG/3ML; MG/3ML
3 SOLUTION RESPIRATORY (INHALATION)
Status: DISCONTINUED | OUTPATIENT
Start: 2020-01-15 | End: 2020-01-15 | Stop reason: HOSPADM

## 2020-01-15 RX ORDER — HALOPERIDOL 5 MG/ML
1 INJECTION INTRAMUSCULAR
Status: DISCONTINUED | OUTPATIENT
Start: 2020-01-15 | End: 2020-01-15 | Stop reason: HOSPADM

## 2020-01-15 RX ORDER — MIDAZOLAM HYDROCHLORIDE 1 MG/ML
INJECTION INTRAMUSCULAR; INTRAVENOUS PRN
Status: DISCONTINUED | OUTPATIENT
Start: 2020-01-15 | End: 2020-01-15 | Stop reason: SURG

## 2020-01-15 RX ORDER — SODIUM CHLORIDE, SODIUM LACTATE, POTASSIUM CHLORIDE, CALCIUM CHLORIDE 600; 310; 30; 20 MG/100ML; MG/100ML; MG/100ML; MG/100ML
INJECTION, SOLUTION INTRAVENOUS
Status: DISCONTINUED | OUTPATIENT
Start: 2020-01-15 | End: 2020-01-15 | Stop reason: SURG

## 2020-01-15 RX ADMIN — LORAZEPAM 1 MG: 2 INJECTION INTRAMUSCULAR; INTRAVENOUS at 17:55

## 2020-01-15 RX ADMIN — PANTOPRAZOLE SODIUM 40 MG: 40 INJECTION, POWDER, LYOPHILIZED, FOR SOLUTION INTRAVENOUS at 05:06

## 2020-01-15 RX ADMIN — ONDANSETRON 4 MG: 2 INJECTION INTRAMUSCULAR; INTRAVENOUS at 10:11

## 2020-01-15 RX ADMIN — PYRIDOXINE HCL TAB 50 MG 100 MG: 50 TAB at 05:06

## 2020-01-15 RX ADMIN — Medication 100 MG: at 05:06

## 2020-01-15 RX ADMIN — LORAZEPAM 2 MG: 2 INJECTION INTRAMUSCULAR; INTRAVENOUS at 23:23

## 2020-01-15 RX ADMIN — SUCRALFATE 1 G: 1 SUSPENSION ORAL at 12:46

## 2020-01-15 RX ADMIN — LORAZEPAM 1 MG: 2 INJECTION INTRAMUSCULAR; INTRAVENOUS at 03:31

## 2020-01-15 RX ADMIN — THERA TABS 1 TABLET: TAB at 05:06

## 2020-01-15 RX ADMIN — MIDAZOLAM 2 MG: 1 INJECTION INTRAMUSCULAR; INTRAVENOUS at 10:02

## 2020-01-15 RX ADMIN — SUCRALFATE 1 G: 1 SUSPENSION ORAL at 17:46

## 2020-01-15 RX ADMIN — DEXAMETHASONE SODIUM PHOSPHATE 4 MG: 4 INJECTION, SOLUTION INTRA-ARTICULAR; INTRALESIONAL; INTRAMUSCULAR; INTRAVENOUS; SOFT TISSUE at 10:11

## 2020-01-15 RX ADMIN — SODIUM CHLORIDE, POTASSIUM CHLORIDE, SODIUM LACTATE AND CALCIUM CHLORIDE: 600; 310; 30; 20 INJECTION, SOLUTION INTRAVENOUS at 10:02

## 2020-01-15 RX ADMIN — SUCRALFATE 1 G: 1 SUSPENSION ORAL at 23:20

## 2020-01-15 RX ADMIN — CEFTRIAXONE SODIUM 1 G: 1 INJECTION, POWDER, FOR SOLUTION INTRAMUSCULAR; INTRAVENOUS at 17:46

## 2020-01-15 RX ADMIN — PANTOPRAZOLE SODIUM 40 MG: 40 INJECTION, POWDER, LYOPHILIZED, FOR SOLUTION INTRAVENOUS at 17:46

## 2020-01-15 RX ADMIN — FOLIC ACID 1 MG: 1 TABLET ORAL at 05:06

## 2020-01-15 RX ADMIN — OCTREOTIDE ACETATE 50 MCG/HR: 200 INJECTION, SOLUTION INTRAVENOUS; SUBCUTANEOUS at 08:05

## 2020-01-15 RX ADMIN — PROPOFOL 150 MG: 10 INJECTION, EMULSION INTRAVENOUS at 10:07

## 2020-01-15 RX ADMIN — LEVOTHYROXINE SODIUM 50 MCG: 50 TABLET ORAL at 05:06

## 2020-01-15 RX ADMIN — SODIUM CHLORIDE 500 ML: 9 INJECTION, SOLUTION INTRAVENOUS at 23:27

## 2020-01-15 RX ADMIN — SUCCINYLCHOLINE CHLORIDE 100 MG: 20 INJECTION, SOLUTION INTRAMUSCULAR; INTRAVENOUS at 10:07

## 2020-01-15 ASSESSMENT — ENCOUNTER SYMPTOMS
NAUSEA: 0
SENSORY CHANGE: 0
STRIDOR: 0
SHORTNESS OF BREATH: 0
NAUSEA: 1
HEARTBURN: 0
DIARRHEA: 0
BLURRED VISION: 0
FEVER: 0
VOMITING: 0
BLOOD IN STOOL: 0
COUGH: 0
CHILLS: 0
MYALGIAS: 0
CONSTIPATION: 0
FOCAL WEAKNESS: 0
ABDOMINAL PAIN: 1
SPUTUM PRODUCTION: 0
DIZZINESS: 0

## 2020-01-15 NOTE — ANESTHESIA PREPROCEDURE EVALUATION
Relevant Problems      (+) Alcoholic liver disease (HCC)      ENDO   (+) Hypothyroidism       Physical Exam    Airway   Mallampati: II  TM distance: >3 FB  Neck ROM: full       Cardiovascular - normal exam  Rhythm: regular  Rate: normal  (-) murmur     Dental - normal exam      Very poor dentition   Pulmonary - normal exam  (+) decreased breath sounds     Abdominal    Neurological - normal exam         Other findings: Currently on nasal cannula,n poor hearing            Anesthesia Plan    ASA 3 (alcholic liver, GI bleed, s/p trauma)       Plan - general       Airway plan will be ETT        Induction: intravenous    Postoperative Plan: Postoperative administration of opioids is intended.    Pertinent diagnostic labs and testing reviewed    Informed Consent:    Anesthetic plan and risks discussed with patient.    Use of blood products discussed with: patient whom consented to blood products.

## 2020-01-15 NOTE — OR NURSING
PT from OR w/ ANES/RN, PT incontinent of large amount of urine requiring bed and linen change, depend placed underneath PT.  PT denies pain/nausea, tolerated sips of water.  PT sats on 4L O2 via NC, sats > 92%.  PT will go back to floor once fully recovered.

## 2020-01-15 NOTE — ANESTHESIA PROCEDURE NOTES
Airway  Date/Time: 1/15/2020 10:08 AM  Performed by: Jovani Kim M.D.  Authorized by: Jovani Kim M.D.     Location:  OR  Urgency:  Elective  Indications for Airway Management:  Anesthesia  Spontaneous Ventilation: absent    Sedation Level:  Deep  Preoxygenated: Yes    Patient Position:  Sniffing  Final Airway Type:  Endotracheal airway  Final Endotracheal Airway:  ETT  Cuffed: Yes    Technique Used for Successful ETT Placement:  Direct laryngoscopy  Insertion Site:  Oral  Blade Type:  Cricket  Laryngoscope Blade/Videolaryngoscope Blade Size:  3  ETT Size (mm):  7.0  Measured from:  Teeth  ETT to Teeth (cm):  22  Placement Verified by: auscultation and capnometry    Cormack-Lehane Classification:  Grade I - full view of glottis  Number of Attempts at Approach:  1

## 2020-01-15 NOTE — PROGRESS NOTES
Gastroenterology Progress Note     Author: Pito Davis M.D.   Date & Time Created: 1/15/2020 9:54 AM    Chief Complaint:  Hematemesis    HISTORY OF PRESENT ILLNESS:  The patient is a 54-year-old female with a long   history of alcohol abuse with multiple episodes of withdrawal admissions   related to alcohol use with ongoing drinking.  She states that she drinks   close to a pint of vodka per day and recently has had some withdrawal symptoms   causing her to continue her alcohol intake.  She was brought in after she   fell tripping over a curb and hit her head causing left-sided facial injury   and subsequently was describing her episodes of nausea and vomiting and   hematemesis in the ER.  She describes hematemesis starting 2 weeks ago, scant   and bright red at first, then turning to coffee-ground appearing material,   became a little bit more intermittent.  She denies any abdominal pain.  She   does describe dark stools and nursing is at bedside when I interviewed her and   indeed it looks like melena.  She otherwise denies any diarrhea or   constipation, no hematochezia, no fevers or chills.    Interval History:  1/14/2020: Some ongoing emesis, small volume with streaks of blood.  Hgb trending down slowly.  Ongoing upper ab pain.  More alert today.  US with massive hepatomegaly and normal spleen size and no ascites  1/15/2020: No vomiting.  Ongoing ab pain but better.  Hgb stable.  More alert and energy today.  Labs stable.  No further melena, hematemesis.    Review of Systems:  Review of Systems   Constitutional: Positive for malaise/fatigue. Negative for chills and fever.   Respiratory: Negative for shortness of breath.    Cardiovascular: Negative for chest pain.   Gastrointestinal: Positive for abdominal pain. Negative for blood in stool, constipation, diarrhea, heartburn, melena, nausea and vomiting.       Physical Exam:  Physical Exam  Vitals signs and nursing note reviewed.   HENT:      Head:       Salivary Glands: Diffusely enlarged: Facial ecchymoses left orbital area.   Eyes:      General: Scleral icterus present.   Cardiovascular:      Rate and Rhythm: Regular rhythm. Tachycardia present.      Heart sounds: No murmur.   Pulmonary:      Effort: Pulmonary effort is normal. No respiratory distress.      Breath sounds: Normal breath sounds. No stridor. No wheezing, rhonchi or rales.   Abdominal:      General: Bowel sounds are normal. There is no distension.      Palpations: Abdomen is soft. There is hepatomegaly. There is no mass.      Tenderness: There is tenderness. There is no guarding or rebound.      Hernia: No hernia is present.   Musculoskeletal:      Right lower leg: No edema.      Left lower leg: No edema.   Skin:     Coloration: Skin is jaundiced.   Neurological:      General: No focal deficit present.      Mental Status: She is oriented to person, place, and time.         Labs:          Recent Labs     01/13/20  0412 01/13/20  0435 01/14/20  0440 01/15/20  0320   SODIUM 139 140 141 140   POTASSIUM 3.1* 3.1* 3.4* 5.1   CHLORIDE 98 98 103 105   CO2 30 29 29 26   BUN 5* 4* <3* <3*   CREATININE 0.71 0.67 0.56 0.62   MAGNESIUM 2.5  --  2.0 2.0   PHOSPHORUS 1.7*  --  1.8* 2.8   CALCIUM 7.3* 7.2* 7.1* 7.2*     Recent Labs     01/13/20  0435 01/14/20  0440 01/15/20  0320   ALTSGPT 23 20 25   ASTSGOT 92* 108* 157*   ALKPHOSPHAT 239* 180* 194*   TBILIRUBIN 5.1* 4.8* 5.0*   GLUCOSE 129* 95 72     Recent Labs     01/13/20  0412  01/13/20  2017 01/14/20  0440 01/15/20  0320   RBC 2.26*  --   --  1.97* 2.09*   HEMOGLOBIN 8.2*   < > 7.8* 7.4* 7.8*   HEMATOCRIT 25.6*  --   --  23.2* 24.8*   PLATELETCT 100*  --   --  67* 93*    < > = values in this interval not displayed.     Recent Labs     01/13/20  0412 01/13/20  0435 01/14/20  0440 01/15/20  0320   WBC 5.2  --  3.4* 4.5*   NEUTSPOLYS 65.30  --  54.70 53.60   LYMPHOCYTES 14.30*  --  21.70* 22.30   MONOCYTES 14.90*  --  16.10* 17.10*   EOSINOPHILS 1.90  --  3.30  2.70   BASOPHILS 1.70  --  1.50 1.60   ASTSGOT 93* 92* 108* 157*   ALTSGPT 23 23 20 25   ALKPHOSPHAT 238* 239* 180* 194*   TBILIRUBIN 5.1* 5.1* 4.8* 5.0*     Hemodynamics:  Temp (24hrs), Av.7 °C (98.1 °F), Min:36.4 °C (97.6 °F), Max:37.3 °C (99.1 °F)  Temperature: 37.3 °C (99.1 °F)  Pulse  Av  Min: 69  Max: 138   Blood Pressure: (Abnormal) 95/62  CVP (mm Hg): 2 MM HG  Respiratory:    Respiration: 20, Pulse Oximetry: 96 %        RUL Breath Sounds: Clear, RML Breath Sounds: Clear, RLL Breath Sounds: Diminished, JALIL Breath Sounds: Clear, LLL Breath Sounds: Diminished  Fluids:    Intake/Output Summary (Last 24 hours) at 2020 0917  Last data filed at 2020 0600  Gross per 24 hour   Intake 4283.05 ml   Output 1400 ml   Net 2883.05 ml     Weight: 87.1 kg (192 lb 0.3 oz)  GI/Nutrition:  Orders Placed This Encounter   Procedures   • Diet NPO     Standing Status:   Standing     Number of Occurrences:   8     Order Specific Question:   Restrict to:     Answer:   Sips with Medications [3]     Medical Decision Making, by Problem:  Active Hospital Problems    Diagnosis   • *GIB (gastrointestinal bleeding) [K92.2]   • Lactic acidosis [E87.2]   • Macrocytic anemia [D53.9]   • Metabolic acidosis [E87.2]   • Alcoholic liver disease (HCC) [K70.9]   • Troponin level elevated [R79.89]   • Alcohol withdrawal syndrome with complication (HCC) [F10.239]   • Hematuria [R31.9]   • Hypotension [I95.9]   • Hyponatremia [E87.1]   • Thrombocytopenia (HCC) [D69.6]   • Hypothyroidism [E03.9]   • Hypokalemia [E87.6]   • Closed fracture of nasal bone [S02.2XXA]     PROBLEMS:  1. Hematemesis.  Small volume.  Question erosive esophagitis versus PUD and less likely varices  2. Acute alcoholic hepatitis.  DF <32 and no indication for steroids currently  3. Hepatomegaly, massive.  May be contributing to some abdominal pain  4. Alcohol withdrawal  5. Alcohol dependence  6. Macrocytic anemia secondary to alcohol  7. Pancytopenia  8.  Hypothyroidism  9. Facial fracture    Plan:  1.OK for EGD today with anesthesia  2. Continue current medications  3. Discussed alcohol abstinence    Quality-Core Measures   Reviewed items::  Labs reviewed, Radiology images reviewed and Medications reviewed

## 2020-01-15 NOTE — PROGRESS NOTES
Received report and assumed pt care. Pt is asleep and in bed. VSS. Plan for EGD at 0930.     Discussed plan of care with Dr. Cassidy, plan for medical transfer orders post EGD.

## 2020-01-15 NOTE — CARE PLAN
Problem: Psychosocial Needs:  Goal: Level of anxiety will decrease  Outcome: PROGRESSING SLOWER THAN EXPECTED     Problem: Mobility  Goal: Risk for activity intolerance will decrease  Outcome: PROGRESSING SLOWER THAN EXPECTED

## 2020-01-15 NOTE — THERAPY
Physical Therapy Contact Note    PT consult received and acknowledged. PT eval attempted, pt currently off unit for EGD. Will re-attempt PT evaluation later today as appropriate/able or tomorrow.     Melissa Johnson, PT, DPT

## 2020-01-15 NOTE — PROGRESS NOTES
Pt boyfriend brought pt salad, pt educated on her clear liquid diet and told that she cannot have salad at this time. All questions answered.

## 2020-01-15 NOTE — CARE PLAN
Problem: Infection  Goal: Will remain free from infection  Outcome: PROGRESSING AS EXPECTED  Pt on ABX  EGD today     Problem: Mobility  Goal: Risk for activity intolerance will decrease  Outcome: PROGRESSING SLOWER THAN EXPECTED  Pt resistant to mobilizing, however is up self at home (no assistance required)  PT consulted

## 2020-01-15 NOTE — OR SURGEON
Immediate Post OP Note    PreOp Diagnosis: Hematemesis    PostOp Diagnosis: Severe erosive esophagitis with clot and visible vessel and diffuse friability and oozing, hiatal hernia    Procedure(s):  EGD, WITH CLIP PLACEMENT - Wound Class: Clean Contaminated    Surgeon(s):  Pito Davis M.D.    Anesthesiologist/Type of Anesthesia:  Anesthesiologist: Jovani Kim M.D./General    Surgical Staff:  Circulator: Nury Chapman; Sadaf Singh R.N.  Endoscopy Technician: Kita Bhandari; Hermes García    Specimens removed if any:  None    Estimated Blood Loss: None    Findings:   1. Severe erosive esophagitis in distal 10 cm of esophagus with friability, oozing with minimal trauma.  There was clot located at 30 cm in esophagus which was removed and revealed visible vessel which was treated with clip x1.  This did induce mild oozing likely from friability and trauma from clip placement which stopped spontaneously  2. Small hiatal hernia  3. Possible underlying Barretts, not biopsied due to bleeding  4. No obvious esophageal or gastric varices or portal gastropathy    Complications: None        1/15/2020 10:29 AM Pito Davis M.D.

## 2020-01-15 NOTE — PROCEDURES
DATE OF SERVICE:  01/15/2020    PROCEDURE PERFORMED:  Upper endoscopy with Hemoclip placement.    :  Pito Davis MD    INDICATION:  The patient is a 54-year-old with history of alcohol abuse who   presented after a fall and was noted to have hematemesis and melena with some   anemia.  No known cirrhosis.    INFORMED CONSENT:  Written informed consent was obtained from the patient   after thorough explanation of indications, benefits, risks of the procedure,   which included but was not limited to bleeding, infection, perforation,   adverse reaction to medications and missed lesions.    MEDICATIONS GIVEN:  General anesthesia with endotracheal intubation.    Continuous telemetry, BP, pulse oximetry, and capnography were performed by   the anesthesiologist throughout the procedure.    A midsize flexible upper endoscope was used for the procedure.    FINDINGS:  The patient was placed in the left lateral decubitus position.    After anesthesia was achieved, the endoscope was passed into the posterior   pharynx under direct visualization and advanced to second portion of the   duodenum without difficulty.  The patient tolerated procedure well with   following findings:  1.  Esophagus:  She did have evidence for severe ulcerative, friable with   spontaneous oozing, erosive esophagitis in the distal 10 cm of her esophagus   from 27 cm to 37 cm.  There again was spontaneous oozing with minimal scope   trauma.  There was noted to be an adherent clot at approximately 30 cm in the   esophagus.  The clot was removed and there appeared to be an underlying   visible vessel.  One Hemoclip was placed over the visible vessel.  There was   some slight oozing following clip placement likely due to the friable nature   of the mucosa, but not from an actively bleeding visible vessel.  This did   stop spontaneously.  There may have been underlying Nunes's, although no   biopsies performed in the setting of bleeding.   No strictures noted.  No   obvious esophageal varices noted.  2.  Stomach:  She did have a small 2 cm hiatal hernia from 37 to 39 cm.  The   remainder of the gastric mucosa appeared normal.  Retroflexion revealed no   gastric varices.  No obvious portal gastropathy.  3.  Duodenum:  Normal.    IMPRESSION:  1.  Severe ulcerative erosive esophagitis with spontaneous oozing and   associated adherent clot and visible vessel in the midesophagus, treated with   Hemoclip placement.  2.  Possible underlying Nunes's, not biopsied.  3.  Small hiatal hernia.    PLAN:  1.  Continue IV PPI twice daily.  2.  Sucralfate 1 g 4 times daily.  3.  Keep head of bed elevated to greater than 45 degrees at all times.  4.  Clear liquid diet.  5.  Needs alcohol abstinence.  6.  May need repeat upper endoscopy in 8 weeks to assess for healing of the   esophagitis and rule out underlying Nunes's or other lesions.       ____________________________________     MD LORRIE GRAHAM / JM    DD:  01/15/2020 10:56:46  DT:  01/15/2020 11:34:55    D#:  [id number]  Job#:  [id number]

## 2020-01-15 NOTE — ANESTHESIA TIME REPORT
Anesthesia Start and Stop Event Times     Date Time Event    1/15/2020 0958 Ready for Procedure     1002 Anesthesia Start     1042 Anesthesia Stop        Responsible Staff  01/15/20    Name Role Begin End    Jovani Kim M.D. Anesth 1002 1042        Preop Diagnosis (Free Text):  Pre-op Diagnosis     hemetemesis        Preop Diagnosis (Codes):    Post op Diagnosis  Esophageal bleeding      Premium Reason  Non-Premium    Comments:

## 2020-01-16 LAB
ALBUMIN SERPL BCP-MCNC: 2.5 G/DL (ref 3.2–4.9)
ALBUMIN/GLOB SERPL: 1 G/DL
ALP SERPL-CCNC: 185 U/L (ref 30–99)
ALT SERPL-CCNC: 24 U/L (ref 2–50)
ANION GAP SERPL CALC-SCNC: 10 MMOL/L (ref 0–11.9)
AST SERPL-CCNC: 143 U/L (ref 12–45)
BASOPHILS # BLD AUTO: 1.8 % (ref 0–1.8)
BASOPHILS # BLD: 0.1 K/UL (ref 0–0.12)
BILIRUB SERPL-MCNC: 5.8 MG/DL (ref 0.1–1.5)
BUN SERPL-MCNC: 3 MG/DL (ref 8–22)
CALCIUM SERPL-MCNC: 7.7 MG/DL (ref 8.5–10.5)
CHLORIDE SERPL-SCNC: 105 MMOL/L (ref 96–112)
CO2 SERPL-SCNC: 25 MMOL/L (ref 20–33)
CORTIS SERPL-MCNC: 12.4 UG/DL (ref 0–23)
CREAT SERPL-MCNC: 0.56 MG/DL (ref 0.5–1.4)
EOSINOPHIL # BLD AUTO: 0.15 K/UL (ref 0–0.51)
EOSINOPHIL NFR BLD: 2.7 % (ref 0–6.9)
ERYTHROCYTE [DISTWIDTH] IN BLOOD BY AUTOMATED COUNT: 102.3 FL (ref 35.9–50)
GLOBULIN SER CALC-MCNC: 2.4 G/DL (ref 1.9–3.5)
GLUCOSE SERPL-MCNC: 76 MG/DL (ref 65–99)
HCT VFR BLD AUTO: 28.3 % (ref 37–47)
HGB BLD-MCNC: 10.2 G/DL (ref 12–16)
HGB BLD-MCNC: 9 G/DL (ref 12–16)
HGB BLD-MCNC: 9.9 G/DL (ref 12–16)
IMM GRANULOCYTES # BLD AUTO: 0.11 K/UL (ref 0–0.11)
IMM GRANULOCYTES NFR BLD AUTO: 2 % (ref 0–0.9)
LYMPHOCYTES # BLD AUTO: 1.06 K/UL (ref 1–4.8)
LYMPHOCYTES NFR BLD: 19.2 % (ref 22–41)
MAGNESIUM SERPL-MCNC: 1.9 MG/DL (ref 1.5–2.5)
MCH RBC QN AUTO: 35.7 PG (ref 27–33)
MCHC RBC AUTO-ENTMCNC: 31.8 G/DL (ref 33.6–35)
MCV RBC AUTO: 112.3 FL (ref 81.4–97.8)
MONOCYTES # BLD AUTO: 1.09 K/UL (ref 0–0.85)
MONOCYTES NFR BLD AUTO: 19.7 % (ref 0–13.4)
NEUTROPHILS # BLD AUTO: 3.01 K/UL (ref 2–7.15)
NEUTROPHILS NFR BLD: 54.6 % (ref 44–72)
NRBC # BLD AUTO: 0 K/UL
NRBC BLD-RTO: 0 /100 WBC
PHOSPHATE SERPL-MCNC: 2.1 MG/DL (ref 2.5–4.5)
PLATELET # BLD AUTO: 107 K/UL (ref 164–446)
PMV BLD AUTO: 9.6 FL (ref 9–12.9)
POTASSIUM SERPL-SCNC: 4.2 MMOL/L (ref 3.6–5.5)
PROT SERPL-MCNC: 4.9 G/DL (ref 6–8.2)
RBC # BLD AUTO: 2.52 M/UL (ref 4.2–5.4)
SMA IGG SER-ACNC: 10 UNITS (ref 0–19)
SODIUM SERPL-SCNC: 140 MMOL/L (ref 135–145)
WBC # BLD AUTO: 5.5 K/UL (ref 4.8–10.8)

## 2020-01-16 PROCEDURE — 700101 HCHG RX REV CODE 250: Performed by: INTERNAL MEDICINE

## 2020-01-16 PROCEDURE — 82533 TOTAL CORTISOL: CPT

## 2020-01-16 PROCEDURE — A9270 NON-COVERED ITEM OR SERVICE: HCPCS | Performed by: INTERNAL MEDICINE

## 2020-01-16 PROCEDURE — 97162 PT EVAL MOD COMPLEX 30 MIN: CPT

## 2020-01-16 PROCEDURE — 83735 ASSAY OF MAGNESIUM: CPT

## 2020-01-16 PROCEDURE — 770022 HCHG ROOM/CARE - ICU (200)

## 2020-01-16 PROCEDURE — 84100 ASSAY OF PHOSPHORUS: CPT

## 2020-01-16 PROCEDURE — C9113 INJ PANTOPRAZOLE SODIUM, VIA: HCPCS | Performed by: INTERNAL MEDICINE

## 2020-01-16 PROCEDURE — A9270 NON-COVERED ITEM OR SERVICE: HCPCS | Performed by: HOSPITALIST

## 2020-01-16 PROCEDURE — 700102 HCHG RX REV CODE 250 W/ 637 OVERRIDE(OP): Performed by: HOSPITALIST

## 2020-01-16 PROCEDURE — 700102 HCHG RX REV CODE 250 W/ 637 OVERRIDE(OP): Performed by: INTERNAL MEDICINE

## 2020-01-16 PROCEDURE — 85025 COMPLETE CBC W/AUTO DIFF WBC: CPT

## 2020-01-16 PROCEDURE — 700111 HCHG RX REV CODE 636 W/ 250 OVERRIDE (IP): Performed by: INTERNAL MEDICINE

## 2020-01-16 PROCEDURE — 700105 HCHG RX REV CODE 258: Performed by: INTERNAL MEDICINE

## 2020-01-16 PROCEDURE — 99233 SBSQ HOSP IP/OBS HIGH 50: CPT | Performed by: HOSPITALIST

## 2020-01-16 PROCEDURE — 97161 PT EVAL LOW COMPLEX 20 MIN: CPT

## 2020-01-16 PROCEDURE — 80053 COMPREHEN METABOLIC PANEL: CPT

## 2020-01-16 PROCEDURE — 85018 HEMOGLOBIN: CPT

## 2020-01-16 PROCEDURE — 99233 SBSQ HOSP IP/OBS HIGH 50: CPT | Performed by: INTERNAL MEDICINE

## 2020-01-16 PROCEDURE — 700111 HCHG RX REV CODE 636 W/ 250 OVERRIDE (IP): Performed by: HOSPITALIST

## 2020-01-16 RX ORDER — MAGNESIUM SULFATE HEPTAHYDRATE 40 MG/ML
2 INJECTION, SOLUTION INTRAVENOUS ONCE
Status: COMPLETED | OUTPATIENT
Start: 2020-01-16 | End: 2020-01-16

## 2020-01-16 RX ADMIN — SENNOSIDES AND DOCUSATE SODIUM 2 TABLET: 8.6; 5 TABLET ORAL at 17:32

## 2020-01-16 RX ADMIN — SUCRALFATE 1 G: 1 SUSPENSION ORAL at 17:31

## 2020-01-16 RX ADMIN — LEVOTHYROXINE SODIUM 50 MCG: 50 TABLET ORAL at 05:00

## 2020-01-16 RX ADMIN — Medication 100 MG: at 05:00

## 2020-01-16 RX ADMIN — PANTOPRAZOLE SODIUM 40 MG: 40 INJECTION, POWDER, LYOPHILIZED, FOR SOLUTION INTRAVENOUS at 06:00

## 2020-01-16 RX ADMIN — PYRIDOXINE HCL TAB 50 MG 100 MG: 50 TAB at 05:00

## 2020-01-16 RX ADMIN — LORAZEPAM 2 MG: 2 INJECTION INTRAMUSCULAR; INTRAVENOUS at 20:23

## 2020-01-16 RX ADMIN — LORAZEPAM 2 MG: 2 INJECTION INTRAMUSCULAR; INTRAVENOUS at 15:52

## 2020-01-16 RX ADMIN — SUCRALFATE 1 G: 1 SUSPENSION ORAL at 06:00

## 2020-01-16 RX ADMIN — SODIUM PHOSPHATE, MONOBASIC, MONOHYDRATE AND SODIUM PHOSPHATE, DIBASIC, ANHYDROUS 15 MMOL: 276; 142 INJECTION, SOLUTION INTRAVENOUS at 08:47

## 2020-01-16 RX ADMIN — MAGNESIUM SULFATE 2 G: 2 INJECTION INTRAVENOUS at 08:47

## 2020-01-16 RX ADMIN — CEFTRIAXONE SODIUM 1 G: 1 INJECTION, POWDER, FOR SOLUTION INTRAMUSCULAR; INTRAVENOUS at 17:31

## 2020-01-16 RX ADMIN — PANTOPRAZOLE SODIUM 40 MG: 40 INJECTION, POWDER, LYOPHILIZED, FOR SOLUTION INTRAVENOUS at 17:31

## 2020-01-16 RX ADMIN — SUCRALFATE 1 G: 1 SUSPENSION ORAL at 12:25

## 2020-01-16 ASSESSMENT — ENCOUNTER SYMPTOMS
SHORTNESS OF BREATH: 0
VOMITING: 1
VOMITING: 0
HEARTBURN: 0
NAUSEA: 0
CHILLS: 0
ROS SKIN COMMENTS: ECCHYMOSIS
MYALGIAS: 0
BLOOD IN STOOL: 0
BLURRED VISION: 0
HALLUCINATIONS: 0
DIZZINESS: 0
SENSORY CHANGE: 0
FEVER: 0
NAUSEA: 1
DIARRHEA: 0
ABDOMINAL PAIN: 1
SPUTUM PRODUCTION: 0
NERVOUS/ANXIOUS: 1
CONSTIPATION: 0
TREMORS: 1
FOCAL WEAKNESS: 0
COUGH: 0
STRIDOR: 0

## 2020-01-16 ASSESSMENT — COGNITIVE AND FUNCTIONAL STATUS - GENERAL
TOILETING: A LOT
MOVING TO AND FROM BED TO CHAIR: UNABLE
MOBILITY SCORE: 16
SUGGESTED CMS G CODE MODIFIER MOBILITY: CK
HELP NEEDED FOR BATHING: A LOT
DRESSING REGULAR UPPER BODY CLOTHING: A LITTLE
WALKING IN HOSPITAL ROOM: A LITTLE
TURNING FROM BACK TO SIDE WHILE IN FLAT BAD: A LITTLE
CLIMB 3 TO 5 STEPS WITH RAILING: A LITTLE
MOVING FROM LYING ON BACK TO SITTING ON SIDE OF FLAT BED: UNABLE
PERSONAL GROOMING: A LITTLE
WALKING IN HOSPITAL ROOM: A LOT
MOVING FROM LYING ON BACK TO SITTING ON SIDE OF FLAT BED: A LOT
SUGGESTED CMS G CODE MODIFIER DAILY ACTIVITY: CK
MOBILITY SCORE: 8
DAILY ACTIVITIY SCORE: 16
CLIMB 3 TO 5 STEPS WITH RAILING: TOTAL
TURNING FROM BACK TO SIDE WHILE IN FLAT BAD: UNABLE
STANDING UP FROM CHAIR USING ARMS: A LOT
MOVING TO AND FROM BED TO CHAIR: A LITTLE
EATING MEALS: A LITTLE
SUGGESTED CMS G CODE MODIFIER MOBILITY: CM
DRESSING REGULAR LOWER BODY CLOTHING: A LITTLE
STANDING UP FROM CHAIR USING ARMS: A LOT

## 2020-01-16 ASSESSMENT — GAIT ASSESSMENTS: GAIT LEVEL OF ASSIST: UNABLE TO PARTICIPATE

## 2020-01-16 NOTE — PROGRESS NOTES
Hospital Medicine Daily Progress Note    Date of Service  1/16/2020    Chief Complaint  54 y.o. female admitted 1/11/2020 with vomiting blood    Hospital Course    Ms. Kahn is a past medical history of alcohol abuse with alcoholic liver disease was brought to the emergency room after she fell hit a curb with left-sided facial trauma.  She also had noted 2 weeks of hematemesis that had been red blood at first and then transition to more of a black coffee ground emesis.  She required 2 units of blood transfusions.  She underwent EGD by Dr. Davis in the operating room on 1/15/2020 revealing severe ulcerative erosive esophagitis with spontaneous oozing and adhered clot with a visible vessel for which she had a Hemoclip placed.      Interval Problem Update  1/16/20: Ms. Kahn was evaluated and examined in the ICU.   She admits to drinking a pint of vodka. She denies hallucination. +tremor.  She has had some intermittent vomiting though no black or blood in it.  Consultants/Specialty  Critical care. I discussed with Dr. Cassidy  GI     Code Status  full    Disposition  Medical floor    Review of Systems  Review of Systems   Constitutional: Negative for chills and fever.   Cardiovascular: Negative for chest pain.   Gastrointestinal: Positive for abdominal pain, nausea and vomiting.   Neurological: Positive for tremors.   Psychiatric/Behavioral: Negative for hallucinations. The patient is nervous/anxious.    All other systems reviewed and are negative.       Physical Exam  Temp:  [36.6 °C (97.8 °F)-37.2 °C (98.9 °F)] 37.1 °C (98.8 °F)  Pulse:  [] 109  Resp:  [13-34] 23  BP: ()/(58-86) 122/85  SpO2:  [74 %-97 %] 93 %    Physical Exam  Vitals signs and nursing note reviewed.   HENT:      Head:      Comments: Left chin and left face bruise  Left eye periorbital bruise       Mouth/Throat:      Mouth: Mucous membranes are dry.      Pharynx: Oropharynx is clear.   Eyes:      General: No scleral icterus.      Conjunctiva/sclera: Conjunctivae normal.   Neck:      Musculoskeletal: Normal range of motion and neck supple.   Cardiovascular:      Rate and Rhythm: Normal rate and regular rhythm.   Pulmonary:      Effort: Pulmonary effort is normal. No respiratory distress.   Abdominal:      General: Abdomen is flat. There is no distension.   Musculoskeletal:      Right lower leg: No edema.      Left lower leg: No edema.      Comments: Right knee bruise  Right knee scab   Skin:     General: Skin is warm and dry.   Neurological:      Mental Status: She is oriented to person, place, and time.      Comments: Slight tremor   Psychiatric:         Mood and Affect: Mood normal.         Behavior: Behavior normal.         Fluids    Intake/Output Summary (Last 24 hours) at 1/16/2020 1316  Last data filed at 1/16/2020 1200  Gross per 24 hour   Intake 1451.04 ml   Output 303 ml   Net 1148.04 ml       Laboratory  Recent Labs     01/14/20  0440 01/15/20  0320 01/15/20  2046 01/16/20  0518 01/16/20  1230   WBC 3.4* 4.5*  --  5.5  --    RBC 1.97* 2.09*  --  2.52*  --    HEMOGLOBIN 7.4* 7.8* 6.5* 9.0* 10.2*   HEMATOCRIT 23.2* 24.8*  --  28.3*  --    .8* 118.7*  --  112.3*  --    MCH 37.6* 37.3*  --  35.7*  --    MCHC 31.9* 31.5*  --  31.8*  --    .8* 104.4*  --  102.3*  --    PLATELETCT 67* 93*  --  107*  --    MPV 8.8* 10.2  --  9.6  --      Recent Labs     01/14/20  0440 01/15/20  0320 01/16/20  0518   SODIUM 141 140 140   POTASSIUM 3.4* 5.1 4.2   CHLORIDE 103 105 105   CO2 29 26 25   GLUCOSE 95 72 76   BUN <3* <3* 3*   CREATININE 0.56 0.62 0.56   CALCIUM 7.1* 7.2* 7.7*                   Imaging  US-ABDOMEN COMPLETE SURVEY   Final Result         1.  Gallbladder sludge without additional sonographic findings to indicate acute cholecystitis.   2.  Massive hepatomegaly and echogenic liver, compatible with fatty change versus fibrosis.         DX-CHEST-PORTABLE (1 VIEW)   Final Result      1.  Right internal jugular catheter  placement with the tip projecting over the cavoatrial junction. No pneumothorax is identified.      2.  Left basilar opacification may be related to atelectasis.      DX-CHEST-PORTABLE (1 VIEW)   Final Result      No acute cardiopulmonary findings.      CT-HEAD W/O   Final Result      1.  No acute intracranial findings.      2.  Left nasal bone fracture.         CT-CSPINE WITHOUT PLUS RECONS   Final Result      1.  No acute fracture is identified.      2.  Multilevel degenerative disc disease and facet arthropathy as described.      CT-MAXILLOFACIAL W/O PLUS RECONS   Final Result         Left nasal bone fracture      DX-THORACIC SPINE-2 VIEWS   Final Result      No acute fracture is identified.      DX-LUMBAR SPINE-4+ VIEWS   Final Result         1.  No acute fracture is identified.      2.  Facet arthropathy in the lower lumbar spine.           Assessment/Plan  * GIB (gastrointestinal bleeding)- (present on admission)  Assessment & Plan  Upper GI bleed given her bright red bleeding followed by coffee-ground emesis.   She was admitted to the ICU on a protonix and octreotide drip.  She underwent EGD 1/15 revealing severe ulcerative erosive esophagitis with hemoclip  Serial Hb levels ordered  She was transfused 2 units RBC due to acute blood loss anemia  Check Hb in the morning which was ordered.   IV protonix    Alcohol withdrawal syndrome with complication (HCC)- (present on admission)  Assessment & Plan  Tremors without delirium    Troponin level elevated- (present on admission)  Assessment & Plan  Suspect this could be demand ischemia in the setting of gastrointestinal hemorrhage.      Alcoholic liver disease (HCC)- (present on admission)  Assessment & Plan  With associated hepatic dysfunction in the form of thrombocytopenia, but with normal coagulation pattern.    Bilirubin 5 which may be exacerbated by the degree of bruising    Metabolic acidosis- (present on admission)  Assessment & Plan  Lactic acidosis, with  initial lactic acid of 16.9.  Unclear etiology of this, as no clear evidence of sepsis or other infection.  She was given aggressive IV fluids.     Macrocytic anemia- (present on admission)  Assessment & Plan  Suspect due to chronic alcoholism, likely component of acute bleed superimposed.  Monitor.    Thrombocytopenia (HCC)- (present on admission)  Assessment & Plan  Likely secondary to liver disease and alcohol induced bone marrow suppression  Check CBC in the morning as platelets remain low    Hyponatremia- (present on admission)  Assessment & Plan  Resolved with IV fluids       VTE prophylaxis: SCDs

## 2020-01-16 NOTE — CARE PLAN
Problem: Respiratory:  Goal: Respiratory status will improve  Note:   Titrating O2 as needed throughout shift     Problem: Mobility  Goal: Risk for activity intolerance will decrease  Note:   EOB as tolerated

## 2020-01-16 NOTE — CARE PLAN
Problem: Communication  Goal: The ability to communicate needs accurately and effectively will improve  Outcome: PROGRESSING SLOWER THAN EXPECTED     Problem: Knowledge Deficit  Goal: Knowledge of disease process/condition, treatment plan, diagnostic tests, and medications will improve  Outcome: PROGRESSING AS EXPECTED     Problem: Discharge Barriers/Planning  Goal: Patient's continuum of care needs will be met  Outcome: PROGRESSING AS EXPECTED     Problem: Respiratory:  Goal: Respiratory status will improve  Outcome: PROGRESSING AS EXPECTED     Problem: Psychosocial Needs:  Goal: Level of anxiety will decrease  Outcome: PROGRESSING SLOWER THAN EXPECTED

## 2020-01-16 NOTE — ANESTHESIA QCDR
2019 Qualified Clinical Data Registry (for Quality Improvement)     Postoperative nausea/vomiting risk protocol (Adult = 18 yrs and Pediatric 3-17 yrs)- (430 and 463)  General inhalation anesthetic (NOT TIVA) with PONV risk factors: Yes  Provision of anti-emetic therapy with at least 2 different classes of agents: Yes   Patient DID NOT receive anti-emetic therapy and reason is documented in Medical Record:  N/A    Multimodal Pain Management- (477)  Non-emergent surgery AND patient age >= 18: Yes  Use of Multimodal Pain Management, two or more drugs and/or interventions, NOT including systemic opioids: Yes  Exception: Documented allergy to multiple classes of analgesics: N/A    Smoking Abstinence (404)  Patient is current smoker (cigarette, pipe, e-cig, marijuanna): Yes  Elective Surgery: No  Abstinence instructions provided prior to day of surgery:   Patient abstained from smoking on day of surgery:     Pre-Op Beta-Blocker in Isolated CABG (44)  Isolated CABG AND patient age >= 18:   Beta-blocker admin within 24 hours of surgical incision:   Exception:of medical reason(s) for not administering beta blocker within 24 hours prior to surgical incision (e.g., not  indicated,other medical reason):     PACU assessment of acute postoperative pain prior to Anesthesia Care End- Applies to Patients Age = 18- (ABG7)  Initial PACU pain score is which of the following: < 7/10  Patient unable to report pain score: N/A    Post-anesthetic transfer of care checklist/protocol to PACU/ICU- (426 and 427)  Upon conclusion of case, patient transferred to which of the following locations: PACU/Non-ICU  Use of transfer checklist/protocol: Yes  Exclusion: Service Performed in Patient Hospital Room (and thus did not require transfer): N/A  Unplanned admission to ICU related to anesthesia service up through end of PACU care- (MD51)  Unplanned admission to ICU (not initially anticipated at anesthesia start time): No

## 2020-01-16 NOTE — PROGRESS NOTES
"  Gastroenterology Progress Note     Author: David Bean M.D.  Date & Time Created: 1/16/2020 7:58 AM    Chief Complaint:  Hematemesis    HISTORY OF PRESENT ILLNESS:  The patient is a 54-year-old female with a long history of alcohol abuse with multiple episodes of withdrawal admissions related to alcohol use with ongoing drinking.  She states that she drinks close to a pint of vodka per day and recently has had some withdrawal symptoms causing her to continue her alcohol intake.  She was brought in after she fell tripping over a curb and hit her head causing left-sided facial injury and subsequently was describing her episodes of nausea and vomiting and hematemesis in the ER.  She describes hematemesis starting 2 weeks ago, scant and bright red at first, then turning to coffee-ground appearing material, became a little bit more intermittent.  She denies any abdominal pain.  She does describe dark stools and nursing is at bedside when I interviewed her and indeed it looks like melena.  She otherwise denies any diarrhea or constipation, no hematochezia, no fevers or chills.    Interval History:  1/14/2020: Some ongoing emesis, small volume with streaks of blood.  Hgb trending down slowly.  Ongoing upper ab pain.  More alert today.  US with massive hepatomegaly and normal spleen size and no ascites  1/15/2020: No vomiting.  Ongoing ab pain but better.  Hgb stable.  More alert and energy today.  Labs stable.  No further melena, hematemesis.  EGD showed severe erosive esophagitis with a visible vessel which was treated with an clip.  There was possible underlying Nunes's esophagus.  01/16/2020 - No nausea or vomiting.  No further signs of bleeding.  No abdominal pain.  Appetite good.  Need for alcohol abstinence, twice daily PPI, sucralfate suspension, raising head of bed 3-6\" and repeat endoscopy in 8 weeks explained to the patient and she expressed understanding.    Review of Systems:  Review of Systems "   Constitutional: Positive for malaise/fatigue. Negative for chills and fever.   Respiratory: Negative for shortness of breath.    Cardiovascular: Negative for chest pain.   Gastrointestinal: Positive for abdominal pain. Negative for blood in stool, constipation, diarrhea, heartburn, melena, nausea and vomiting.       Physical Exam:  Physical Exam  Vitals signs and nursing note reviewed.   HENT:      Head:      Salivary Glands: Diffusely enlarged: Facial ecchymoses left orbital area.   Eyes:      General: Scleral icterus present.   Cardiovascular:      Rate and Rhythm: Regular rhythm. Tachycardia present.      Heart sounds: No murmur.   Pulmonary:      Effort: Pulmonary effort is normal. No respiratory distress.      Breath sounds: Normal breath sounds. No stridor. No wheezing, rhonchi or rales.   Abdominal:      General: Bowel sounds are normal. There is no distension.      Palpations: Abdomen is soft. There is hepatomegaly. There is no mass.      Tenderness: There is tenderness. There is no guarding or rebound.      Hernia: No hernia is present.   Musculoskeletal:      Right lower leg: No edema.      Left lower leg: No edema.   Skin:     Coloration: Skin is jaundiced.   Neurological:      General: No focal deficit present.      Mental Status: She is oriented to person, place, and time.         Labs:          Recent Labs     01/14/20  0440 01/15/20  0320 01/16/20  0518   SODIUM 141 140 140   POTASSIUM 3.4* 5.1 4.2   CHLORIDE 103 105 105   CO2 29 26 25   BUN <3* <3* 3*   CREATININE 0.56 0.62 0.56   MAGNESIUM 2.0 2.0 1.9   PHOSPHORUS 1.8* 2.8 2.1*   CALCIUM 7.1* 7.2* 7.7*     Recent Labs     01/14/20  0440 01/15/20  0320 01/16/20  0518   ALTSGPT 20 25 24   ASTSGOT 108* 157* 143*   ALKPHOSPHAT 180* 194* 185*   TBILIRUBIN 4.8* 5.0* 5.8*   GLUCOSE 95 72 76     Recent Labs     01/14/20  0440 01/15/20  0320 01/15/20  2046 01/16/20  0518   RBC 1.97* 2.09*  --  2.52*   HEMOGLOBIN 7.4* 7.8* 6.5* 9.0*   HEMATOCRIT 23.2* 24.8*   --  28.3*   PLATELETCT 67* 93*  --  107*     Recent Labs     20  0440 01/15/20  0320 20  0518   WBC 3.4* 4.5* 5.5   NEUTSPOLYS 54.70 53.60 54.60   LYMPHOCYTES 21.70* 22.30 19.20*   MONOCYTES 16.10* 17.10* 19.70*   EOSINOPHILS 3.30 2.70 2.70   BASOPHILS 1.50 1.60 1.80   ASTSGOT 108* 157* 143*   ALTSGPT 20 25 24   ALKPHOSPHAT 180* 194* 185*   TBILIRUBIN 4.8* 5.0* 5.8*     Hemodynamics:  Temp (24hrs), Av.9 °C (98.5 °F), Min:36.3 °C (97.3 °F), Max:37.3 °C (99.1 °F)  Temperature: 37.1 °C (98.8 °F)  Pulse  Av.6  Min: 64  Max: 138   Blood Pressure: 100/66     Respiratory:    Respiration: 17, Pulse Oximetry: 91 %        RUL Breath Sounds: Clear, RML Breath Sounds: Clear, RLL Breath Sounds: Diminished, JALIL Breath Sounds: Clear, LLL Breath Sounds: Diminished  Fluids:    Intake/Output Summary (Last 24 hours) at 2020 0917  Last data filed at 2020 0600  Gross per 24 hour   Intake 4283.05 ml   Output 1400 ml   Net 2883.05 ml        GI/Nutrition:  Orders Placed This Encounter   Procedures   • Diet Order Clear Liquids - No Red Foods     Standing Status:   Standing     Number of Occurrences:   1     Order Specific Question:   Diet:     Answer:   Clear Liquids - No Red Foods [12]     Medical Decision Making, by Problem:  Active Hospital Problems    Diagnosis   • *GIB (gastrointestinal bleeding) [K92.2]   • Lactic acidosis [E87.2]   • Macrocytic anemia [D53.9]   • Metabolic acidosis [E87.2]   • Alcoholic liver disease (HCC) [K70.9]   • Troponin level elevated [R79.89]   • Alcohol withdrawal syndrome with complication (HCC) [F10.239]   • Hematuria [R31.9]   • Hypotension [I95.9]   • Hyponatremia [E87.1]   • Thrombocytopenia (HCC) [D69.6]   • Hypothyroidism [E03.9]   • Hypokalemia [E87.6]   • Closed fracture of nasal bone [S02.2XXA]       Impression / Plan:  1. Hematemesis.  Small volume - due to severe esophagitis with visible vessel which was treated with clip - no further signs of bleeding  2. Acute  alcoholic hepatitis - DF <32 and no indication for steroids currently  3. Hepatomegaly, massive.  May be contributing to some abdominal pain  4. Alcohol withdrawal  5. Alcohol dependence  6. Macrocytic anemia secondary to alcohol  7. Pancytopenia  8. Hypothyroidism  9. Facial fracture  10.  Esophagitis    1.  Twice daily PPI therapy indefinitely  2.  Sucralfate suspension 1gm qid immediately post meals and at bedtime  3.  Keep head of bed elevated  4.  Discussed alcohol abstinence  5.  EGD as outpatient with propofol sedation in >8 weeks  6.  Follow up with GI as outpatient     **  GI WILL SIGN OFF / STAND BY - PLEASE CALL IF ANY CONCERNS  **    Quality-Core Measures   Reviewed items::  Labs reviewed, Radiology images reviewed and Medications reviewed

## 2020-01-16 NOTE — PROGRESS NOTES
2 RN Skin Check    - Facial Bruising RT fall PTA  - Redness Bilateral Ears; Blanching; Grey Foam Applied to O2 Tubing  - Generalized bruising UE and LE Bilateral  - Scab and Bruise on R Knee  - Dry/Cracked/Calloused Feet Bilaterally

## 2020-01-16 NOTE — THERAPY
"Physical Therapy Evaluation completed.   Bed Mobility: Supine to sit: Maximal Assist    Transfers: Sit to stand: Moderate Assist   Gait: Unable to Participate  Plan of Care: Will benefit from Physical Therapy 3 times per week  Discharge Recommendations: Equipment: Will Continue to Assess for Equipment Needs. Post-acute therapy: Recommend post-acute placement for continued physical therapy services prior to discharge home. Patient can tolerate post-acute therapies at a 5x/week frequency.       Pt is 53 y/o F presenting acutely with hematemesis. PMH includes long history of alcohol abuse with multiple withdrawal admissions. Pt fell recently tripping on a curb resulting in L facial injury. She reports at baseline she cares for her significant other. Pt demonstrating impairments that include weakness, impaired balance, poor activity tolerance, and impaired fxnl mob. She required max A to attain EOB. Pt able to transition from bed to chair with FWW and mod A. She reporting feeling dizzy stating \"I'm going to pass out\" during transfer, SpO2 >90% and SBP in 120s. Able to tolerate sitting up and left with nsg in the room. Anticipate need for post-acute placement. Additionally, pt would benefit from Occupational Therapy Consult as pt demonstrating difficulty with ADLs/IADLs during PT assessment. PT to follow in acute setting.    See \"Rehab Therapy-Acute\" Patient Summary Report for complete documentation.     "

## 2020-01-16 NOTE — PROGRESS NOTES
MD Gonda paged for updates. MD updated on pts current HGB 6.5, per MD administer 1Unit PRBC at this time. All telephone orders repeated to MD and placed in Epic

## 2020-01-16 NOTE — PROGRESS NOTES
"Critical Care Progress Note    Date of admission  1/11/2020    Chief Complaint  54 y.o. female admitted 1/11/2020 with GIB, hypotension    Hospital Course    \"54 y.o. female admitted 1/11/2020.  She has a history of alcohol abuse and hypothyroidism.  She presented to the emergency department this evening after she apparently tripped and took a header.  She hit her face.  She is complaining of pain in the left side of her face.  She denies syncope.  She remembers tripping and falling.  She is unclear if she knocked herself out when she hit the pavement, but states that if she did it was only for a couple of seconds.  She further tells me that for the last couple of weeks she has noticed some blood in her urine and painful urination.  She denies fever.  She has felt shaky, but she has been cutting down her alcohol intake.  She admits to drinking 1-2 half pints of vodka a day.  Her last drink was yesterday.  She has also been having some hematemesis for the last couple of weeks.  Initially she was vomiting bright red blood, but now she states that her emesis is dark and almost black.  She feels constipated and denies melena or hematochezia.  \"      Interval Problem Update  Reviewed last 24 hour events:   - Received 1 unit pRBCs   - Neuro: AOx4   - HR: 70s-90s   - SBP: 90s-120s   - GI: tolerating diet   - UOP: unmeasured   - Abad: no   - Tm: 37.2   - Lines: CVC   - PPx: GI PPI, DVT contraindicated   - 4L NC   - CXR (personally reviewed and compared to prior): no new    Yesterday   - EGD today   - Neuro: AOx4   - HR: 80s-90s   - SBP: 90s-110s   - GI: NPO for EGD   - UOP: 2.6L/24 hrs   - Abad: no   - Tm: 37.3   - Lines: CVC   - PPx: GI PPI, DVT contraindicated   - 4L NC   - CXR (personally reviewed and compared to prior): no new    Review of Systems  Review of Systems   Constitutional: Positive for malaise/fatigue. Negative for chills and fever.   HENT:        Nasal pain   Eyes: Negative for blurred vision. "   Respiratory: Negative for cough, sputum production, shortness of breath and stridor.    Cardiovascular: Negative for chest pain.   Gastrointestinal: Positive for abdominal pain, melena and nausea. Negative for vomiting.   Genitourinary: Positive for hematuria (resolved). Negative for dysuria.   Musculoskeletal: Negative for myalgias.   Skin: Negative for rash.        ecchymosis   Neurological: Negative for dizziness, sensory change and focal weakness.        Vital Signs for last 24 hours   Temp:  [36.3 °C (97.3 °F)-37.2 °C (98.9 °F)] 37.1 °C (98.8 °F)  Pulse:  [] 75  Resp:  [13-34] 19  BP: ()/(58-86) 120/61  SpO2:  [74 %-97 %] 95 %    Hemodynamic parameters for last 24 hours       Respiratory Information for the last 24 hours       Physical Exam   Physical Exam  Vitals signs and nursing note reviewed.   Constitutional:       Appearance: She is obese. She is ill-appearing.   HENT:      Head:      Comments: Left periorbital ecchymosis     Right Ear: External ear normal.      Left Ear: External ear normal.      Nose: Nose normal.      Mouth/Throat:      Mouth: Mucous membranes are dry.      Pharynx: Oropharynx is clear.   Eyes:      Extraocular Movements: Extraocular movements intact.      Conjunctiva/sclera: Conjunctivae normal.      Pupils: Pupils are equal, round, and reactive to light.      Comments: Left periorbital ecchymosis improving   Neck:      Musculoskeletal: Normal range of motion and neck supple.   Cardiovascular:      Rate and Rhythm: Normal rate.      Pulses: Normal pulses.   Pulmonary:      Effort: Pulmonary effort is normal. No respiratory distress.      Breath sounds: Normal breath sounds.   Abdominal:      General: Abdomen is flat.      Tenderness: There is tenderness (mild epigastric). There is no guarding or rebound.   Musculoskeletal: Normal range of motion.   Skin:     General: Skin is warm.      Capillary Refill: Capillary refill takes less than 2 seconds.      Coloration: Skin is  not jaundiced.      Findings: Bruising present.   Neurological:      General: No focal deficit present.      Mental Status: She is alert.      Cranial Nerves: No cranial nerve deficit.      Sensory: No sensory deficit.      Motor: No weakness.   Psychiatric:         Mood and Affect: Mood normal.         Medications  Current Facility-Administered Medications   Medication Dose Route Frequency Provider Last Rate Last Dose   • magnesium sulfate IVPB premix 2 g  2 g Intravenous Once Ant Cassidy Jr. D.O. 25 mL/hr at 01/16/20 0847 2 g at 01/16/20 0847   • sodium phosphate 15 mmol in D5W 250 mL ivpb  15 mmol Intravenous Once Ant Cassidy Jr., D.O. 62.5 mL/hr at 01/16/20 0847 15 mmol at 01/16/20 0847   • sucralfate (CARAFATE) 1 GM/10ML suspension 1 g  1 g Oral Q6HRS Pito Davis M.D.   1 g at 01/16/20 0600   • pantoprazole (PROTONIX) injection 40 mg  40 mg Intravenous BID Ant Cassidy Jr., D.O.   40 mg at 01/16/20 0600   • albuterol inhaler 2 Puff  2 Puff Inhalation Q6HRS PRN Feliciano Cole M.D.       • levothyroxine (SYNTHROID) tablet 50 mcg  50 mcg Oral AM ES Feliciano Cole M.D.   50 mcg at 01/16/20 0500   • acetaminophen (TYLENOL) tablet 650 mg  650 mg Oral Q6HRS PRN Feliciano Cole M.D.       • cloNIDine (CATAPRES) tablet 0.1 mg  0.1 mg Oral Q6HRS PRN Feliciano Cole M.D.       • ondansetron (ZOFRAN) syringe/vial injection 4 mg  4 mg Intravenous Q4HRS PRANGELIQUE Cole M.D.   4 mg at 01/14/20 1939   • ondansetron (ZOFRAN ODT) dispertab 4 mg  4 mg Oral Q4HRS PRN Feliciano Cole M.D.       • promethazine (PHENERGAN) tablet 12.5-25 mg  12.5-25 mg Oral Q4HRS PRANGELIQUE Cole M.D.       • promethazine (PHENERGAN) suppository 12.5-25 mg  12.5-25 mg Rectal Q4HRS PRN Feliciano Cole M.D.       • prochlorperazine (COMPAZINE) injection 5-10 mg  5-10 mg Intravenous Q4HRS PRN Feliciano Cole M.D.       • senna-docusate (PERICOLACE or SENOKOT S) 8.6-50 MG per tablet 2 Tab  2 Tab Oral BID Feliciano Cole M.D.    Stopped at 01/15/20 1800    And   • polyethylene glycol/lytes (MIRALAX) PACKET 1 Packet  1 Packet Oral QDAY PRN Feliciano Cole M.D.        And   • magnesium hydroxide (MILK OF MAGNESIA) suspension 30 mL  30 mL Oral QDAY PRN Feliciano Cole M.D.        And   • bisacodyl (DULCOLAX) suppository 10 mg  10 mg Rectal QDAY PRN Feliciano Cole M.D.       • cefTRIAXone (ROCEPHIN) 1 g in  mL IVPB  1 g Intravenous Q24HRS Feliciano Cole M.D.   Stopped at 01/15/20 1816   • Pharmacy Consult Request   Other PHARMACY TO DOSE Feliciano Cole M.D.       • pyridoxine (VITAMIN B-6) tablet 100 mg  100 mg Oral DAILY Feliciano Cole M.D.   100 mg at 01/16/20 0500   • LORazepam (ATIVAN) injection 4 mg  4 mg Intravenous Q15 MIN PRN Feliciano Cole M.D.        Or   • LORazepam (ATIVAN) injection 3 mg  3 mg Intravenous Q15 MIN PRN Feliciano Cole M.D.        Or   • LORazepam (ATIVAN) injection 2 mg  2 mg Intravenous Q15 MIN PRN Feliciano Cole M.D.   2 mg at 01/15/20 2323    Or   • LORazepam (ATIVAN) injection 1 mg  1 mg Intravenous Q15 MIN PRN Feliciano Cole M.D.   1 mg at 01/15/20 1755   • thiamine tablet 100 mg  100 mg Oral DAILY Aram Cash M.D.   100 mg at 01/16/20 0500   • MD Alert...ICU Electrolyte Replacement per Pharmacy   Other PHARMACY TO DOSE Efrem Harkins M.D.           Fluids    Intake/Output Summary (Last 24 hours) at 1/16/2020 0949  Last data filed at 1/16/2020 0800  Gross per 24 hour   Intake 1465 ml   Output 303 ml   Net 1162 ml       Laboratory          Recent Labs     01/14/20  0440 01/15/20  0320 01/16/20  0518   SODIUM 141 140 140   POTASSIUM 3.4* 5.1 4.2   CHLORIDE 103 105 105   CO2 29 26 25   BUN <3* <3* 3*   CREATININE 0.56 0.62 0.56   MAGNESIUM 2.0 2.0 1.9   PHOSPHORUS 1.8* 2.8 2.1*   CALCIUM 7.1* 7.2* 7.7*     Recent Labs     01/14/20  0440 01/15/20  0320 01/16/20  0518   ALTSGPT 20 25 24   ASTSGOT 108* 157* 143*   ALKPHOSPHAT 180* 194* 185*   TBILIRUBIN 4.8* 5.0* 5.8*   GLUCOSE 95 72 76      Recent Labs     01/14/20  0440 01/15/20  0320 01/16/20  0518   WBC 3.4* 4.5* 5.5   NEUTSPOLYS 54.70 53.60 54.60   LYMPHOCYTES 21.70* 22.30 19.20*   MONOCYTES 16.10* 17.10* 19.70*   EOSINOPHILS 3.30 2.70 2.70   BASOPHILS 1.50 1.60 1.80   ASTSGOT 108* 157* 143*   ALTSGPT 20 25 24   ALKPHOSPHAT 180* 194* 185*   TBILIRUBIN 4.8* 5.0* 5.8*     Recent Labs     01/14/20  0440 01/15/20  0320 01/15/20  2046 01/16/20  0518   RBC 1.97* 2.09*  --  2.52*   HEMOGLOBIN 7.4* 7.8* 6.5* 9.0*   HEMATOCRIT 23.2* 24.8*  --  28.3*   PLATELETCT 67* 93*  --  107*       Imaging  X-Ray:  I have personally reviewed the images and compared with prior images.    Assessment/Plan  * GIB (gastrointestinal bleeding)- (present on admission)  Assessment & Plan  Due to severe erosive esophagitis  Continue PPI  Carafate started by GI  Stop Rocephin  Trend hemoglobin and transfuse as needed to maintain greater than 7  EGD with GI and anesthesia on 1/15/20: severe erosive esophagitis  Thromboelastogram unremarkable  Follow-up with GI for repeat EGD as outpatient    Lactic acidosis- (present on admission)  Assessment & Plan  Suspect due to a combination of intravascular volume depletion as well as liver disease  Improved, stopped trending    Hypotension- (present on admission)  Assessment & Plan  resolved    Hematuria- (present on admission)  Assessment & Plan  Resolved  US abd without hydronephrosis or obvious source  F/U with PCP    Alcohol withdrawal syndrome with complication (HCC)- (present on admission)  Assessment & Plan  Benzodiazepines based upon RASS, requiring low doses    Troponin level elevated- (present on admission)  Assessment & Plan  ECG without evidence of acute coronary syndrome or STEMI  Troponin trended to normal    Alcoholic liver disease (HCC)- (present on admission)  Assessment & Plan  Avoid hepatotoxins  Alcohol cessation counseling  Jeremyey's Discriminant Function <32  High-dose IV thiamine and supplemental vitamins  No  evidence of withdrawal  Hepatitis panel negative, RUQ US consistent with cirrhosis    Macrocytic anemia- (present on admission)  Assessment & Plan  History of hematuria and hematemesis  Trend hemoglobin  Transfuse PRBCs PRN to keep hemoglobin >7    Closed fracture of nasal bone- (present on admission)  Assessment & Plan  Pain control    Hypokalemia- (present on admission)  Assessment & Plan  Replete potassium and magnesium    Hypothyroidism- (present on admission)  Assessment & Plan  Check TSH/T4 in AM  Continue levothyroxine, 50 mcg daily    Thrombocytopenia (HCC)- (present on admission)  Assessment & Plan  Trend platelet count  Transfuse as needed    Hyponatremia- (present on admission)  Assessment & Plan  Resolved       VTE:  Contraindicated  Ulcer: PPI  Lines: None    I have performed a physical exam and reviewed and updated ROS and Plan today (1/16/2020). In review of yesterday's note (1/15/2020), there are no changes except as documented above.     Discussed patient condition and risk of morbidity and/or mortality with Hospitalist, RN, RT, Pharmacy, Dietary, , Charge nurse / hot rounds and Patient.

## 2020-01-16 NOTE — ANESTHESIA POSTPROCEDURE EVALUATION
Patient: Jumana Kahn    Procedure Summary     Date:  01/15/20 Room / Location:  [place] [address] / SURGERY [facility]    Anesthesia Start:  1002 Anesthesia Stop:  1042    Procedure:  EGD, WITH CLIP PLACEMENT (N/A ) Diagnosis:  (erosive esophagitis)    Surgeon:  Pito Davis M.D. Responsible Provider:  Jovani Kim M.D.    Anesthesia Type:  general ASA Status:  3          Final Anesthesia Type: general  Last vitals  BP   Blood Pressure: 103/86    Temp   36.3 °C (97.3 °F)    Pulse   Pulse: 86   Resp   (!) 29    SpO2   90 %      Anesthesia Post Evaluation    Patient location during evaluation: PACU  Patient participation: complete - patient participated  Level of consciousness: awake and alert    Airway patency: patent  Anesthetic complications: no  Cardiovascular status: hemodynamically stable  Respiratory status: acceptable  Hydration status: euvolemic    PONV: none           Nurse Pain Score: 0 (NPRS)

## 2020-01-16 NOTE — PROGRESS NOTES
"Critical Care Progress Note    Date of admission  1/11/2020    Chief Complaint  54 y.o. female admitted 1/11/2020 with GIB, hypotension    Hospital Course    \"54 y.o. female admitted 1/11/2020.  She has a history of alcohol abuse and hypothyroidism.  She presented to the emergency department this evening after she apparently tripped and took a header.  She hit her face.  She is complaining of pain in the left side of her face.  She denies syncope.  She remembers tripping and falling.  She is unclear if she knocked herself out when she hit the pavement, but states that if she did it was only for a couple of seconds.  She further tells me that for the last couple of weeks she has noticed some blood in her urine and painful urination.  She denies fever.  She has felt shaky, but she has been cutting down her alcohol intake.  She admits to drinking 1-2 half pints of vodka a day.  Her last drink was yesterday.  She has also been having some hematemesis for the last couple of weeks.  Initially she was vomiting bright red blood, but now she states that her emesis is dark and almost black.  She feels constipated and denies melena or hematochezia.  \"      Interval Problem Update  Reviewed last 24 hour events:   - EGD today   - Neuro: AOx4   - HR: 80s-90s   - SBP: 90s-110s   - GI: NPO for EGD   - UOP: 2.6L/24 hrs   - Abad: no   - Tm: 37.3   - Lines: CVC   - PPx: GI PPI, DVT contraindicated   - 4L NC   - CXR (personally reviewed and compared to prior): no new    Yesterday   - EGD with anesthesia on 1/15/20   - Neuro: confused   - HR: 80s-100s   - SBP: 90s-120s, off levo   - GI: CLD no reds   - UOP: poorly kept, incontinent   - Abad: no   - Tm: 36.8   - Lines: CVC   - PPx: GI PPI, DVT contraindicated   - 4L NC   - CXR (personally reviewed and compared to prior): no new    Review of Systems  Review of Systems   Constitutional: Positive for malaise/fatigue. Negative for chills and fever.   Eyes: Negative for blurred vision. "   Respiratory: Negative for cough, sputum production, shortness of breath and stridor.    Cardiovascular: Negative for chest pain.   Gastrointestinal: Positive for abdominal pain, melena and nausea. Negative for heartburn and vomiting.   Genitourinary: Positive for hematuria (resolved). Negative for dysuria.   Musculoskeletal: Negative for myalgias.   Skin: Negative for rash.   Neurological: Negative for dizziness, sensory change and focal weakness.        Vital Signs for last 24 hours   Temp:  [36.3 °C (97.3 °F)-37.3 °C (99.1 °F)] 36.3 °C (97.3 °F)  Pulse:  [] 86  Resp:  [16-34] 29  BP: ()/(57-86) 103/86  SpO2:  [90 %-96 %] 90 %    Hemodynamic parameters for last 24 hours       Respiratory Information for the last 24 hours       Physical Exam   Physical Exam  Vitals signs and nursing note reviewed.   Constitutional:       Appearance: She is obese. She is ill-appearing.   HENT:      Head:      Comments: Left periorbital ecchymosis     Right Ear: External ear normal.      Left Ear: External ear normal.      Nose: Nose normal.      Mouth/Throat:      Mouth: Mucous membranes are dry.      Pharynx: Oropharynx is clear.   Eyes:      Pupils: Pupils are equal, round, and reactive to light.      Comments: When left eyelid retracted EOMI intact   Neck:      Musculoskeletal: Normal range of motion and neck supple.   Cardiovascular:      Rate and Rhythm: Normal rate.      Pulses: Normal pulses.   Pulmonary:      Effort: Pulmonary effort is normal. No respiratory distress.      Breath sounds: Normal breath sounds.   Abdominal:      General: Abdomen is flat.      Tenderness: There is tenderness (mild epigastric). There is no guarding or rebound.   Musculoskeletal: Normal range of motion.   Skin:     General: Skin is warm.      Capillary Refill: Capillary refill takes less than 2 seconds.      Findings: Bruising present.   Neurological:      General: No focal deficit present.      Mental Status: She is alert.       Cranial Nerves: No cranial nerve deficit.      Sensory: No sensory deficit.      Motor: No weakness.   Psychiatric:         Mood and Affect: Mood normal.         Medications  Current Facility-Administered Medications   Medication Dose Route Frequency Provider Last Rate Last Dose   • sucralfate (CARAFATE) 1 GM/10ML suspension 1 g  1 g Oral Q6HRS Pito Davis M.D.   1 g at 01/15/20 1746   • pantoprazole (PROTONIX) injection 40 mg  40 mg Intravenous BID Ant Cassidy Jr. D.OAcosta   40 mg at 01/15/20 1746   • albuterol inhaler 2 Puff  2 Puff Inhalation Q6HRS PRN Feliciano Cole M.D.       • levothyroxine (SYNTHROID) tablet 50 mcg  50 mcg Oral AM ES Feliciano Cole M.D.   50 mcg at 01/15/20 0506   • acetaminophen (TYLENOL) tablet 650 mg  650 mg Oral Q6HRS PRN Feliciano Cole M.D.       • cloNIDine (CATAPRES) tablet 0.1 mg  0.1 mg Oral Q6HRS PRN Feliciano Cole M.D.       • ondansetron (ZOFRAN) syringe/vial injection 4 mg  4 mg Intravenous Q4HRS PRANGELIQUE Cole M.D.   4 mg at 01/14/20 1939   • ondansetron (ZOFRAN ODT) dispertab 4 mg  4 mg Oral Q4HRS PRANGELIQUE Cole M.D.       • promethazine (PHENERGAN) tablet 12.5-25 mg  12.5-25 mg Oral Q4HRS PRANGELIQUE Cole M.D.       • promethazine (PHENERGAN) suppository 12.5-25 mg  12.5-25 mg Rectal Q4HRS PRANGELIQUE Cole M.D.       • prochlorperazine (COMPAZINE) injection 5-10 mg  5-10 mg Intravenous Q4HRS PRN Feliciano Cole M.D.       • senna-docusate (PERICOLACE or SENOKOT S) 8.6-50 MG per tablet 2 Tab  2 Tab Oral BID Feliciano Cole M.D.   Stopped at 01/15/20 1800    And   • polyethylene glycol/lytes (MIRALAX) PACKET 1 Packet  1 Packet Oral QDAY PRN Feliciano Cole M.D.        And   • magnesium hydroxide (MILK OF MAGNESIA) suspension 30 mL  30 mL Oral QDAY PRN Feliciano Cole M.D.        And   • bisacodyl (DULCOLAX) suppository 10 mg  10 mg Rectal QDAY PRN Feliciano Cole M.D.       • cefTRIAXone (ROCEPHIN) 1 g in  mL IVPB  1 g Intravenous Q24HRS  Feliciano Cole M.D.   Stopped at 01/15/20 1816   • Pharmacy Consult Request   Other PHARMACY TO DOSE Feliciano Cole M.D.       • pyridoxine (VITAMIN B-6) tablet 100 mg  100 mg Oral DAILY Feliciano Cole M.D.   100 mg at 01/15/20 0506   • LORazepam (ATIVAN) injection 4 mg  4 mg Intravenous Q15 MIN PRN Feliciano Cole M.D.        Or   • LORazepam (ATIVAN) injection 3 mg  3 mg Intravenous Q15 MIN PRN Feliciano Cole M.D.        Or   • LORazepam (ATIVAN) injection 2 mg  2 mg Intravenous Q15 MIN PRN Feliciano Cole M.D.   2 mg at 01/13/20 2102    Or   • LORazepam (ATIVAN) injection 1 mg  1 mg Intravenous Q15 MIN PRN Feliciano Cole M.D.   1 mg at 01/15/20 1755   • multivitamin (THERAGRAN) tablet 1 Tab  1 Tab Oral DAILY Feliciano Cole M.D.   1 Tab at 01/15/20 0506    And   • folic acid (FOLVITE) tablet 1 mg  1 mg Oral DAILY Feliciano Cole M.D.   1 mg at 01/15/20 0506   • thiamine tablet 100 mg  100 mg Oral DAILY Aram Cash M.D.   100 mg at 01/15/20 0506   • MD Alert...ICU Electrolyte Replacement per Pharmacy   Other PHARMACY TO DOSE Efrem Harkins M.D.           Fluids    Intake/Output Summary (Last 24 hours) at 1/15/2020 2027  Last data filed at 1/15/2020 1500  Gross per 24 hour   Intake 545 ml   Output 900 ml   Net -355 ml       Laboratory          Recent Labs     01/13/20  0412 01/13/20  0435 01/14/20  0440 01/15/20  0320   SODIUM 139 140 141 140   POTASSIUM 3.1* 3.1* 3.4* 5.1   CHLORIDE 98 98 103 105   CO2 30 29 29 26   BUN 5* 4* <3* <3*   CREATININE 0.71 0.67 0.56 0.62   MAGNESIUM 2.5  --  2.0 2.0   PHOSPHORUS 1.7*  --  1.8* 2.8   CALCIUM 7.3* 7.2* 7.1* 7.2*     Recent Labs     01/13/20  0435 01/14/20  0440 01/15/20  0320   ALTSGPT 23 20 25   ASTSGOT 92* 108* 157*   ALKPHOSPHAT 239* 180* 194*   TBILIRUBIN 5.1* 4.8* 5.0*   GLUCOSE 129* 95 72     Recent Labs     01/13/20  0412 01/13/20  0435 01/14/20  0440 01/15/20  0320   WBC 5.2  --  3.4* 4.5*   NEUTSPOLYS 65.30  --  54.70 53.60   LYMPHOCYTES 14.30*   --  21.70* 22.30   MONOCYTES 14.90*  --  16.10* 17.10*   EOSINOPHILS 1.90  --  3.30 2.70   BASOPHILS 1.70  --  1.50 1.60   ASTSGOT 93* 92* 108* 157*   ALTSGPT 23 23 20 25   ALKPHOSPHAT 238* 239* 180* 194*   TBILIRUBIN 5.1* 5.1* 4.8* 5.0*     Recent Labs     01/13/20  0412  01/13/20 2017 01/14/20 0440 01/15/20  0320   RBC 2.26*  --   --  1.97* 2.09*   HEMOGLOBIN 8.2*   < > 7.8* 7.4* 7.8*   HEMATOCRIT 25.6*  --   --  23.2* 24.8*   PLATELETCT 100*  --   --  67* 93*    < > = values in this interval not displayed.       Imaging  X-Ray:  I have personally reviewed the images and compared with prior images.    Assessment/Plan  * GIB (gastrointestinal bleeding)- (present on admission)  Assessment & Plan  Due to severe erosive esophagitis  Continue IV Protonix and rocephin (5 days)  Stop octreotide  Trend hemoglobin  Transfuse PRBCs to keep hemoglobin >7  EGD with GI and anesthesia on 1/15/20: severe erosive esophagitis  Thromboelastogram unremarkable    Lactic acidosis- (present on admission)  Assessment & Plan  Suspect due to a combination of intravascular volume depletion as well as liver disease  Hydrate with intravenous fluids  Improved, stopped trending    Hypotension- (present on admission)  Assessment & Plan  Likely hypovolemic, GIB and cirrhosis  Levophed titrated off  PRN IVF boluses  Consider hydrocortisone if this fails to improve    Hematuria- (present on admission)  Assessment & Plan  Resolved  US abd without hydronephrosis or obvious source    Alcohol withdrawal syndrome with complication (HCC)- (present on admission)  Assessment & Plan  Benzodiazepines based upon RASS  Low threshold for transitioning to phenobarbital protocol if she develops severe withdrawal    Troponin level elevated- (present on admission)  Assessment & Plan  ECG without evidence of acute coronary syndrome or STEMI  Troponin trended to normal    Alcoholic liver disease (HCC)- (present on admission)  Assessment & Plan  Avoid  hepatotoxins  Alcohol cessation counseling  High-dose IV thiamine and supplemental vitamins  Observe for evidence of withdrawal  Hepatitis panel negative, RUQ US consistent with cirrhosis    Macrocytic anemia- (present on admission)  Assessment & Plan  History of hematuria and hematemesis  Trend hemoglobin  Transfuse PRBCs PRN to keep hemoglobin >7    Closed fracture of nasal bone- (present on admission)  Assessment & Plan  Pain control    Hypokalemia- (present on admission)  Assessment & Plan  Replete potassium and magnesium    Hypothyroidism- (present on admission)  Assessment & Plan  Check TSH/T4 in AM  Continue levothyroxine, 50 mcg daily    Thrombocytopenia (HCC)- (present on admission)  Assessment & Plan  Trend platelet count  Transfuse as needed    Hyponatremia- (present on admission)  Assessment & Plan  Follow serial electrolytes  Hydrate with intravenous fluids       VTE:  Contraindicated  Ulcer: PPI  Lines: None    I have performed a physical exam and reviewed and updated ROS and Plan today (1/15/2020). In review of yesterday's note (1/14/2020), there are no changes except as documented above.     Discussed patient condition and risk of morbidity and/or mortality with RN, RT, Pharmacy, Dietary, , Charge nurse / hot rounds, Patient and GI.

## 2020-01-17 ENCOUNTER — APPOINTMENT (OUTPATIENT)
Dept: RADIOLOGY | Facility: MEDICAL CENTER | Age: 55
DRG: 380 | End: 2020-01-17
Attending: INTERNAL MEDICINE
Payer: MEDICAID

## 2020-01-17 PROBLEM — J96.01 ACUTE RESPIRATORY FAILURE WITH HYPOXIA (HCC): Status: ACTIVE | Noted: 2020-01-17

## 2020-01-17 LAB
ANISOCYTOSIS BLD QL SMEAR: ABNORMAL
BACTERIA BLD CULT: NORMAL
BACTERIA BLD CULT: NORMAL
BASOPHILS # BLD AUTO: 1.8 % (ref 0–1.8)
BASOPHILS # BLD: 0.09 K/UL (ref 0–0.12)
COMMENT 1642: NORMAL
EOSINOPHIL # BLD AUTO: 0.14 K/UL (ref 0–0.51)
EOSINOPHIL NFR BLD: 2.8 % (ref 0–6.9)
ERYTHROCYTE [DISTWIDTH] IN BLOOD BY AUTOMATED COUNT: 99.4 FL (ref 35.9–50)
HCT VFR BLD AUTO: 28.8 % (ref 37–47)
HGB BLD-MCNC: 9.2 G/DL (ref 12–16)
IMM GRANULOCYTES # BLD AUTO: 0.09 K/UL (ref 0–0.11)
IMM GRANULOCYTES NFR BLD AUTO: 1.8 % (ref 0–0.9)
LYMPHOCYTES # BLD AUTO: 0.91 K/UL (ref 1–4.8)
LYMPHOCYTES NFR BLD: 18 % (ref 22–41)
MACROCYTES BLD QL SMEAR: ABNORMAL
MCH RBC QN AUTO: 36.2 PG (ref 27–33)
MCHC RBC AUTO-ENTMCNC: 31.9 G/DL (ref 33.6–35)
MCV RBC AUTO: 113.4 FL (ref 81.4–97.8)
MONOCYTES # BLD AUTO: 1.21 K/UL (ref 0–0.85)
MONOCYTES NFR BLD AUTO: 23.9 % (ref 0–13.4)
MORPHOLOGY BLD-IMP: NORMAL
NEUTROPHILS # BLD AUTO: 2.62 K/UL (ref 2–7.15)
NEUTROPHILS NFR BLD: 51.7 % (ref 44–72)
NRBC # BLD AUTO: 0 K/UL
NRBC BLD-RTO: 0 /100 WBC
OVALOCYTES BLD QL SMEAR: NORMAL
PLATELET # BLD AUTO: 110 K/UL (ref 164–446)
PLATELET BLD QL SMEAR: NORMAL
PMV BLD AUTO: 9.5 FL (ref 9–12.9)
POLYCHROMASIA BLD QL SMEAR: NORMAL
RBC # BLD AUTO: 2.54 M/UL (ref 4.2–5.4)
RBC BLD AUTO: PRESENT
SIGNIFICANT IND 70042: NORMAL
SIGNIFICANT IND 70042: NORMAL
SITE SITE: NORMAL
SITE SITE: NORMAL
SOURCE SOURCE: NORMAL
SOURCE SOURCE: NORMAL
WBC # BLD AUTO: 5.1 K/UL (ref 4.8–10.8)

## 2020-01-17 PROCEDURE — A9270 NON-COVERED ITEM OR SERVICE: HCPCS | Performed by: HOSPITALIST

## 2020-01-17 PROCEDURE — 71046 X-RAY EXAM CHEST 2 VIEWS: CPT

## 2020-01-17 PROCEDURE — 700111 HCHG RX REV CODE 636 W/ 250 OVERRIDE (IP): Performed by: INTERNAL MEDICINE

## 2020-01-17 PROCEDURE — C9113 INJ PANTOPRAZOLE SODIUM, VIA: HCPCS | Performed by: INTERNAL MEDICINE

## 2020-01-17 PROCEDURE — 700102 HCHG RX REV CODE 250 W/ 637 OVERRIDE(OP): Performed by: INTERNAL MEDICINE

## 2020-01-17 PROCEDURE — 770001 HCHG ROOM/CARE - MED/SURG/GYN PRIV*

## 2020-01-17 PROCEDURE — 94668 MNPJ CHEST WALL SBSQ: CPT

## 2020-01-17 PROCEDURE — 94667 MNPJ CHEST WALL 1ST: CPT

## 2020-01-17 PROCEDURE — 85025 COMPLETE CBC W/AUTO DIFF WBC: CPT

## 2020-01-17 PROCEDURE — 94669 MECHANICAL CHEST WALL OSCILL: CPT

## 2020-01-17 PROCEDURE — A9270 NON-COVERED ITEM OR SERVICE: HCPCS | Performed by: INTERNAL MEDICINE

## 2020-01-17 PROCEDURE — 700102 HCHG RX REV CODE 250 W/ 637 OVERRIDE(OP): Performed by: HOSPITALIST

## 2020-01-17 PROCEDURE — 99233 SBSQ HOSP IP/OBS HIGH 50: CPT | Performed by: INTERNAL MEDICINE

## 2020-01-17 PROCEDURE — 700111 HCHG RX REV CODE 636 W/ 250 OVERRIDE (IP): Performed by: HOSPITALIST

## 2020-01-17 PROCEDURE — 99232 SBSQ HOSP IP/OBS MODERATE 35: CPT | Performed by: HOSPITALIST

## 2020-01-17 PROCEDURE — 700101 HCHG RX REV CODE 250: Performed by: INTERNAL MEDICINE

## 2020-01-17 PROCEDURE — 94640 AIRWAY INHALATION TREATMENT: CPT

## 2020-01-17 RX ORDER — OMEPRAZOLE 20 MG/1
20 CAPSULE, DELAYED RELEASE ORAL 2 TIMES DAILY
Status: DISCONTINUED | OUTPATIENT
Start: 2020-01-17 | End: 2020-01-31 | Stop reason: HOSPADM

## 2020-01-17 RX ORDER — IPRATROPIUM BROMIDE AND ALBUTEROL SULFATE 2.5; .5 MG/3ML; MG/3ML
3 SOLUTION RESPIRATORY (INHALATION)
Status: DISCONTINUED | OUTPATIENT
Start: 2020-01-17 | End: 2020-01-18

## 2020-01-17 RX ORDER — CHLORDIAZEPOXIDE HYDROCHLORIDE 25 MG/1
25 CAPSULE, GELATIN COATED ORAL EVERY 8 HOURS
Status: DISCONTINUED | OUTPATIENT
Start: 2020-01-17 | End: 2020-01-22

## 2020-01-17 RX ADMIN — IPRATROPIUM BROMIDE AND ALBUTEROL SULFATE 3 ML: .5; 3 SOLUTION RESPIRATORY (INHALATION) at 10:41

## 2020-01-17 RX ADMIN — Medication 100 MG: at 05:38

## 2020-01-17 RX ADMIN — IPRATROPIUM BROMIDE AND ALBUTEROL SULFATE 3 ML: .5; 3 SOLUTION RESPIRATORY (INHALATION) at 21:10

## 2020-01-17 RX ADMIN — SUCRALFATE 1 G: 1 SUSPENSION ORAL at 11:54

## 2020-01-17 RX ADMIN — PANTOPRAZOLE SODIUM 40 MG: 40 INJECTION, POWDER, LYOPHILIZED, FOR SOLUTION INTRAVENOUS at 05:44

## 2020-01-17 RX ADMIN — CHLORDIAZEPOXIDE HYDROCHLORIDE 25 MG: 25 CAPSULE ORAL at 22:02

## 2020-01-17 RX ADMIN — SUCRALFATE 1 G: 1 SUSPENSION ORAL at 05:38

## 2020-01-17 RX ADMIN — LEVOTHYROXINE SODIUM 50 MCG: 50 TABLET ORAL at 05:38

## 2020-01-17 RX ADMIN — LORAZEPAM 2 MG: 2 INJECTION INTRAMUSCULAR; INTRAVENOUS at 11:53

## 2020-01-17 RX ADMIN — PYRIDOXINE HCL TAB 50 MG 100 MG: 50 TAB at 05:38

## 2020-01-17 RX ADMIN — SUCRALFATE 1 G: 1 SUSPENSION ORAL at 00:27

## 2020-01-17 RX ADMIN — SENNOSIDES AND DOCUSATE SODIUM 2 TABLET: 8.6; 5 TABLET ORAL at 17:44

## 2020-01-17 RX ADMIN — SUCRALFATE 1 G: 1 SUSPENSION ORAL at 17:46

## 2020-01-17 RX ADMIN — PANTOPRAZOLE SODIUM 40 MG: 40 INJECTION, POWDER, LYOPHILIZED, FOR SOLUTION INTRAVENOUS at 17:45

## 2020-01-17 ASSESSMENT — COPD QUESTIONNAIRES
DO YOU EVER COUGH UP ANY MUCUS OR PHLEGM?: NO/ONLY WITH OCCASIONAL COLDS OR INFECTIONS
COPD SCREENING SCORE: 4
HAVE YOU SMOKED AT LEAST 100 CIGARETTES IN YOUR ENTIRE LIFE: YES
DURING THE PAST 4 WEEKS HOW MUCH DID YOU FEEL SHORT OF BREATH: SOME OF THE TIME

## 2020-01-17 ASSESSMENT — ENCOUNTER SYMPTOMS
MYALGIAS: 0
ABDOMINAL PAIN: 0
DIZZINESS: 0
SPUTUM PRODUCTION: 0
SHORTNESS OF BREATH: 0
FEVER: 0
SENSORY CHANGE: 0
COUGH: 0
BLURRED VISION: 0
ROS SKIN COMMENTS: ECCHYMOSIS
TREMORS: 1
HALLUCINATIONS: 0
VOMITING: 0
STRIDOR: 0
CHILLS: 0
NAUSEA: 0
NERVOUS/ANXIOUS: 1
MEMORY LOSS: 1
FOCAL WEAKNESS: 0
NAUSEA: 1

## 2020-01-17 ASSESSMENT — LIFESTYLE VARIABLES: EVER_SMOKED: YES

## 2020-01-17 NOTE — CARE PLAN
Problem: Communication  Goal: The ability to communicate needs accurately and effectively will improve  Outcome: PROGRESSING AS EXPECTED  Note:   Pt is hard of hearing requiring staff to speak up, however is able to communicate needs effectively      Problem: Infection  Goal: Will remain free from infection  Outcome: PROGRESSING AS EXPECTED     Problem: Venous Thromboembolism (VTW)/Deep Vein Thrombosis (DVT) Prevention:  Goal: Patient will participate in Venous Thrombosis (VTE)/Deep Vein Thrombosis (DVT)Prevention Measures  Outcome: PROGRESSING AS EXPECTED     Problem: Knowledge Deficit  Goal: Knowledge of disease process/condition, treatment plan, diagnostic tests, and medications will improve  Outcome: PROGRESSING AS EXPECTED  Goal: Knowledge of the prescribed therapeutic regimen will improve  Outcome: PROGRESSING AS EXPECTED     Problem: Discharge Barriers/Planning  Goal: Patient's continuum of care needs will be met  Outcome: PROGRESSING AS EXPECTED     Problem: Respiratory:  Goal: Respiratory status will improve  Outcome: PROGRESSING AS EXPECTED     Problem: Psychosocial Needs:  Goal: Level of anxiety will decrease  Outcome: PROGRESSING AS EXPECTED     Problem: Urinary Elimination:  Goal: Ability to reestablish a normal urinary elimination pattern will improve  Outcome: PROGRESSING AS EXPECTED  Note:   Pt is incontinent at times of urine purwick in place for urine, pt calls to go to restroom but already has.      Problem: Mobility  Goal: Risk for activity intolerance will decrease  Outcome: PROGRESSING AS EXPECTED

## 2020-01-17 NOTE — PROGRESS NOTES
Pt transported to [facility]    All personal belongings with pt in [facility]. Attempted to call significant other, Will, to update but his phone is out of order.

## 2020-01-17 NOTE — CARE PLAN
Problem: Nutritional:  Goal: Achieve adequate nutritional intake  Description  Diet advancement with PO >50% of meals.    Outcome: PROGRESSING SLOWER THAN EXPECTED
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
## 2020-01-17 NOTE — DIETARY
Nutrition Services: Overall inadequate nutritional intake, admit day 5. Pt now on a regular diet. Will add Boost supplements to meals. Nutrition Representative sees pt daily for menu options, but she typically accepts standard meals. Encourage pt to order preferred foods. Snacks also available. RD following.
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
## 2020-01-17 NOTE — ASSESSMENT & PLAN NOTE
Likely secondary to liver disease and alcohol induced bone marrow suppression, resolved  Continue etoh cessation counseling

## 2020-01-18 PROBLEM — R09.02 HYPOXIA: Status: ACTIVE | Noted: 2020-01-18

## 2020-01-18 PROCEDURE — 700102 HCHG RX REV CODE 250 W/ 637 OVERRIDE(OP): Performed by: INTERNAL MEDICINE

## 2020-01-18 PROCEDURE — 700102 HCHG RX REV CODE 250 W/ 637 OVERRIDE(OP): Performed by: HOSPITALIST

## 2020-01-18 PROCEDURE — A9270 NON-COVERED ITEM OR SERVICE: HCPCS | Performed by: HOSPITALIST

## 2020-01-18 PROCEDURE — 700111 HCHG RX REV CODE 636 W/ 250 OVERRIDE (IP): Performed by: HOSPITALIST

## 2020-01-18 PROCEDURE — 97110 THERAPEUTIC EXERCISES: CPT

## 2020-01-18 PROCEDURE — 770001 HCHG ROOM/CARE - MED/SURG/GYN PRIV*

## 2020-01-18 PROCEDURE — 700101 HCHG RX REV CODE 250: Performed by: INTERNAL MEDICINE

## 2020-01-18 PROCEDURE — 94668 MNPJ CHEST WALL SBSQ: CPT

## 2020-01-18 PROCEDURE — 94640 AIRWAY INHALATION TREATMENT: CPT

## 2020-01-18 PROCEDURE — A9270 NON-COVERED ITEM OR SERVICE: HCPCS | Performed by: INTERNAL MEDICINE

## 2020-01-18 PROCEDURE — 770006 HCHG ROOM/CARE - MED/SURG/GYN SEMI*

## 2020-01-18 PROCEDURE — 94669 MECHANICAL CHEST WALL OSCILL: CPT

## 2020-01-18 PROCEDURE — 97530 THERAPEUTIC ACTIVITIES: CPT

## 2020-01-18 PROCEDURE — 99232 SBSQ HOSP IP/OBS MODERATE 35: CPT | Performed by: INTERNAL MEDICINE

## 2020-01-18 PROCEDURE — 94760 N-INVAS EAR/PLS OXIMETRY 1: CPT

## 2020-01-18 PROCEDURE — 97165 OT EVAL LOW COMPLEX 30 MIN: CPT

## 2020-01-18 PROCEDURE — 99232 SBSQ HOSP IP/OBS MODERATE 35: CPT | Performed by: HOSPITALIST

## 2020-01-18 RX ORDER — IPRATROPIUM BROMIDE AND ALBUTEROL SULFATE 2.5; .5 MG/3ML; MG/3ML
3 SOLUTION RESPIRATORY (INHALATION)
Status: DISCONTINUED | OUTPATIENT
Start: 2020-01-18 | End: 2020-01-31 | Stop reason: HOSPADM

## 2020-01-18 RX ADMIN — SUCRALFATE 1 G: 1 SUSPENSION ORAL at 13:21

## 2020-01-18 RX ADMIN — OMEPRAZOLE 20 MG: 20 CAPSULE, DELAYED RELEASE ORAL at 05:24

## 2020-01-18 RX ADMIN — CHLORDIAZEPOXIDE HYDROCHLORIDE 25 MG: 25 CAPSULE ORAL at 05:24

## 2020-01-18 RX ADMIN — ONDANSETRON 4 MG: 2 INJECTION INTRAMUSCULAR; INTRAVENOUS at 00:23

## 2020-01-18 RX ADMIN — SUCRALFATE 1 G: 1 SUSPENSION ORAL at 22:32

## 2020-01-18 RX ADMIN — IPRATROPIUM BROMIDE AND ALBUTEROL SULFATE 3 ML: .5; 3 SOLUTION RESPIRATORY (INHALATION) at 09:03

## 2020-01-18 RX ADMIN — ONDANSETRON 4 MG: 2 INJECTION INTRAMUSCULAR; INTRAVENOUS at 13:01

## 2020-01-18 RX ADMIN — SUCRALFATE 1 G: 1 SUSPENSION ORAL at 17:45

## 2020-01-18 RX ADMIN — CHLORDIAZEPOXIDE HYDROCHLORIDE 25 MG: 25 CAPSULE ORAL at 13:21

## 2020-01-18 RX ADMIN — SENNOSIDES AND DOCUSATE SODIUM 2 TABLET: 8.6; 5 TABLET ORAL at 05:25

## 2020-01-18 RX ADMIN — CHLORDIAZEPOXIDE HYDROCHLORIDE 25 MG: 25 CAPSULE ORAL at 22:32

## 2020-01-18 RX ADMIN — SUCRALFATE 1 G: 1 SUSPENSION ORAL at 05:25

## 2020-01-18 RX ADMIN — LEVOTHYROXINE SODIUM 50 MCG: 50 TABLET ORAL at 05:24

## 2020-01-18 RX ADMIN — IPRATROPIUM BROMIDE AND ALBUTEROL SULFATE 3 ML: .5; 3 SOLUTION RESPIRATORY (INHALATION) at 11:42

## 2020-01-18 RX ADMIN — PYRIDOXINE HCL TAB 50 MG 100 MG: 50 TAB at 05:22

## 2020-01-18 RX ADMIN — Medication 100 MG: at 05:22

## 2020-01-18 RX ADMIN — ACETAMINOPHEN 650 MG: 325 TABLET, FILM COATED ORAL at 21:11

## 2020-01-18 RX ADMIN — SUCRALFATE 1 G: 1 SUSPENSION ORAL at 00:33

## 2020-01-18 RX ADMIN — OMEPRAZOLE 20 MG: 20 CAPSULE, DELAYED RELEASE ORAL at 17:45

## 2020-01-18 ASSESSMENT — ENCOUNTER SYMPTOMS
STRIDOR: 0
NAUSEA: 1
COUGH: 0
FEVER: 0
VOMITING: 1
MEMORY LOSS: 1
VOMITING: 0
SENSORY CHANGE: 0
MYALGIAS: 0
SHORTNESS OF BREATH: 0
NERVOUS/ANXIOUS: 1
FOCAL WEAKNESS: 0
ROS SKIN COMMENTS: ECCHYMOSIS
ABDOMINAL PAIN: 0
SPUTUM PRODUCTION: 0
HALLUCINATIONS: 0
DIZZINESS: 0
NAUSEA: 0
CHILLS: 0
BLURRED VISION: 0
INSOMNIA: 1
TREMORS: 1

## 2020-01-18 ASSESSMENT — COGNITIVE AND FUNCTIONAL STATUS - GENERAL
EATING MEALS: A LITTLE
WALKING IN HOSPITAL ROOM: A LITTLE
DRESSING REGULAR LOWER BODY CLOTHING: A LOT
DRESSING REGULAR UPPER BODY CLOTHING: A LITTLE
MOVING FROM LYING ON BACK TO SITTING ON SIDE OF FLAT BED: UNABLE
STANDING UP FROM CHAIR USING ARMS: A LITTLE
SUGGESTED CMS G CODE MODIFIER MOBILITY: CL
HELP NEEDED FOR BATHING: A LOT
CLIMB 3 TO 5 STEPS WITH RAILING: A LOT
TOILETING: A LITTLE
MOVING TO AND FROM BED TO CHAIR: UNABLE
MOBILITY SCORE: 11
SUGGESTED CMS G CODE MODIFIER DAILY ACTIVITY: CK
PERSONAL GROOMING: A LITTLE
TURNING FROM BACK TO SIDE WHILE IN FLAT BAD: UNABLE
DAILY ACTIVITIY SCORE: 16

## 2020-01-18 ASSESSMENT — GAIT ASSESSMENTS
GAIT LEVEL OF ASSIST: MINIMAL ASSIST
ASSISTIVE DEVICE: FRONT WHEEL WALKER
DISTANCE (FEET): 30

## 2020-01-18 ASSESSMENT — ACTIVITIES OF DAILY LIVING (ADL): TOILETING: INDEPENDENT

## 2020-01-18 ASSESSMENT — LIFESTYLE VARIABLES: SUBSTANCE_ABUSE: 1

## 2020-01-18 NOTE — CARE PLAN
Problem: Hyperinflation:  Goal: Prevent or improve atelectasis  Note:   PEP QID  Best is value today [id number]

## 2020-01-18 NOTE — ASSESSMENT & PLAN NOTE
Hypoxia resolved. cxr showed possible pneumonia, pleural effusion,  Atx.   Elevated procalcitonin, completed Rocephin and Doxy  1/26/2020  o2 requirements overall improved - weaned off o2.  Encourage IS, mobilization as gia

## 2020-01-18 NOTE — CARE PLAN
Problem: Psychosocial Needs:  Goal: Level of anxiety will decrease  Outcome: PROGRESSING SLOWER THAN EXPECTED  Note:   Pt reports she is anxious and shaky. Pt appears tremulous in B hands at times. Intermittent.  Intervention: Collaborate with Interdisciplinary Team including Psychologist/Behavioral Health Team  Note:   Librium q8hr ordered     Problem: Mobility  Goal: Risk for activity intolerance will decrease  Outcome: PROGRESSING SLOWER THAN EXPECTED  Intervention: Assess and monitor signs of activity intolerance  Note:   Pt remains weak when ambulating. Requires x1 assist and assistance of FWW  Intervention: Provide rest periods  Note:   Rest periods utilized when ambulating to restroom  Intervention: Encourage patient to increase activity level in collaboration with Interdisciplinary Team  Note:   Pt declines ambulating in [place] r/t fatigue. Ambulating to restroom

## 2020-01-18 NOTE — PROGRESS NOTES
Hospital Medicine Daily Progress Note    Date of Service  1/18/2020    Chief Complaint  54 y.o. female admitted 1/11/2020 with vomiting blood    Hospital Course    Ms. Kahn is a past medical history of alcohol abuse with alcoholic liver disease was brought to the emergency room after she fell hit a curb with left-sided facial trauma.  She also had noted 2 weeks of hematemesis that had been red blood at first and then transition to more of a black coffee ground emesis.  She required 2 units of blood transfusions.  She underwent EGD by Dr. Davis in the operating room on 1/15/2020 revealing severe ulcerative erosive esophagitis with spontaneous oozing and adhered clot with a visible vessel for which she had a Hemoclip placed.      Interval Problem Update  1/16/20: Ms. Kahn was evaluated and examined in the ICU.   She admits to drinking a pint of vodka. She denies hallucination. +tremor.  She has had some intermittent vomiting though no black or blood in it.  1/17: patient seen and evaluated in the ICU.  Remains quite weak requiring substantial assistance to get out of bed.  She did receive a dose of IV Ativan this morning because of tremulousness.  Spoke with her nurse but her condition.  Her significant other did visit today.  1/18: Ms. Rios was evaluated in the ICU. She is very weak requiring assistance to walk to the chair. She has required 1 liter of oxygen as her sats went down to 84%. She is requesting IV ativan due to ongoing detox. Her significant other visited today.   Consultants/Specialty  Critical care.   GI     Code Status  full    Disposition  Medical floor    Review of Systems  Review of Systems   Constitutional: Negative for fever.   Cardiovascular: Negative for chest pain.   Gastrointestinal: Positive for nausea and vomiting. Negative for abdominal pain.   Neurological: Positive for tremors.   Psychiatric/Behavioral: Positive for memory loss and substance abuse. Negative for hallucinations.  The patient is nervous/anxious and has insomnia.    All other systems reviewed and are negative.       Physical Exam  Temp:  [36.3 °C (97.3 °F)-36.5 °C (97.7 °F)] 36.3 °C (97.3 °F)  Pulse:  [85-98] 88  Resp:  [16-18] 16  BP: ()/(62-96) 90/62  SpO2:  [92 %-100 %] 92 %    Physical Exam  Vitals signs and nursing note reviewed.   Constitutional:       Comments: Disheveled   HENT:      Head:      Comments: Left chin and left face bruise  Left eye periorbital bruise       Mouth/Throat:      Mouth: Mucous membranes are dry.      Pharynx: Oropharynx is clear.   Eyes:      General: No scleral icterus.     Conjunctiva/sclera: Conjunctivae normal.   Neck:      Musculoskeletal: Normal range of motion and neck supple.   Cardiovascular:      Rate and Rhythm: Normal rate and regular rhythm.   Pulmonary:      Effort: Pulmonary effort is normal. No respiratory distress.   Abdominal:      General: There is no distension.      Tenderness: There is no tenderness.   Musculoskeletal:      Right lower leg: No edema.      Left lower leg: No edema.      Comments: Right knee bruise  Right knee scab   Skin:     General: Skin is warm and dry.   Neurological:      Mental Status: She is alert and oriented to person, place, and time.      Comments: Slight tremor of her hands   Psychiatric:         Mood and Affect: Mood normal.         Behavior: Behavior normal.      Comments: She is currently calm         Fluids    Intake/Output Summary (Last 24 hours) at 1/18/2020 1243  Last data filed at 1/18/2020 1100  Gross per 24 hour   Intake 360 ml   Output 750 ml   Net -390 ml       Laboratory  Recent Labs     01/16/20  0518 01/16/20  1230 01/16/20 2015 01/17/20  0540   WBC 5.5  --   --  5.1   RBC 2.52*  --   --  2.54*   HEMOGLOBIN 9.0* 10.2* 9.9* 9.2*   HEMATOCRIT 28.3*  --   --  28.8*   .3*  --   --  113.4*   MCH 35.7*  --   --  36.2*   MCHC 31.8*  --   --  31.9*   .3*  --   --  99.4*   PLATELETCT 107*  --   --  110*   MPV 9.6  --    --  9.5     Recent Labs     01/16/20  0518   SODIUM 140   POTASSIUM 4.2   CHLORIDE 105   CO2 25   GLUCOSE 76   BUN 3*   CREATININE 0.56   CALCIUM 7.7*                   Imaging  DX-CHEST-2 VIEWS   Final Result      Interval worsening in bibasilar opacities with air bronchograms. Findings are concerning for pneumonia. Follow-up imaging is recommended to document resolution.      US-ABDOMEN COMPLETE SURVEY   Final Result         1.  Gallbladder sludge without additional sonographic findings to indicate acute cholecystitis.   2.  Massive hepatomegaly and echogenic liver, compatible with fatty change versus fibrosis.         DX-CHEST-PORTABLE (1 VIEW)   Final Result      1.  Right internal jugular catheter placement with the tip projecting over the cavoatrial junction. No pneumothorax is identified.      2.  Left basilar opacification may be related to atelectasis.      DX-CHEST-PORTABLE (1 VIEW)   Final Result      No acute cardiopulmonary findings.      CT-HEAD W/O   Final Result      1.  No acute intracranial findings.      2.  Left nasal bone fracture.         CT-CSPINE WITHOUT PLUS RECONS   Final Result      1.  No acute fracture is identified.      2.  Multilevel degenerative disc disease and facet arthropathy as described.      CT-MAXILLOFACIAL W/O PLUS RECONS   Final Result         Left nasal bone fracture      DX-THORACIC SPINE-2 VIEWS   Final Result      No acute fracture is identified.      DX-LUMBAR SPINE-4+ VIEWS   Final Result         1.  No acute fracture is identified.      2.  Facet arthropathy in the lower lumbar spine.           Assessment/Plan  * GIB (gastrointestinal bleeding)- (present on admission)  Assessment & Plan  Upper GI bleed given her bright red bleeding followed by coffee-ground emesis.   She was admitted to the ICU on a protonix and octreotide drip.  She underwent EGD 1/15 revealing severe ulcerative erosive esophagitis with hemoclip  Serial Hb levels ordered  She was transfused 2 units  RBC due to acute blood loss anemia  IV protonix will transition to oral Prilosec twice daily      Hematuria- (present on admission)  Assessment & Plan  resolved    Alcohol withdrawal syndrome with complication (HCC)- (present on admission)  Assessment & Plan  Tremors without delirium  Required IV Ativan now oral Librium.      Troponin level elevated- (present on admission)  Assessment & Plan  Suspect this could be demand ischemia in the setting of gastrointestinal hemorrhage.      Alcoholic liver disease (HCC)- (present on admission)  Assessment & Plan  With associated hepatic dysfunction in the form of thrombocytopenia, but with normal coagulation pattern.    Bilirubin 5 which may be exacerbated by the degree of bruising    Metabolic acidosis- (present on admission)  Assessment & Plan  Lactic acidosis, with initial lactic acid of 16.9.  Unclear etiology of this, as no clear evidence of sepsis or other infection.  She was given aggressive IV fluids.     Macrocytic anemia- (present on admission)  Assessment & Plan  Suspect due to chronic alcoholism, likely component of acute bleed superimposed.  Monitor.    Hypoxia- (present on admission)  Assessment & Plan  She is requiring supplemental oxygen  Incentive spirometry encouraged  84% on room air 1/18    Thrombocytopenia (HCC)- (present on admission)  Assessment & Plan  Likely secondary to liver disease and alcohol induced bone marrow suppression      Hyponatremia- (present on admission)  Assessment & Plan  Resolved with IV fluids       VTE prophylaxis: SCDs

## 2020-01-18 NOTE — PROGRESS NOTES
Pt complaining of anxiety and shakiness at this time. Tremors present in bilateral hands. New order for Librium 25 mg PO q8hr from Dr. Elena

## 2020-01-18 NOTE — ASSESSMENT & PLAN NOTE
Chest x-ray with bibasilar opacities  Clinically not consistent with pneumonia as she is afebrile, nonproductive cough, no leukocytosis  ?  Atelectasis  I will continue 5 days of doxycycline for possible infectious overlay  RT/O2 Protocols  Titrate supplemental FiO2 to maintain SpO2 >88%  Scheduled nebs, transition to PRN soon  PEP, IS, ambulation, mucolytics if necessary

## 2020-01-18 NOTE — PROGRESS NOTES
"Pulmonary/Critical Care Progress Note    Date of admission  1/11/2020    Chief Complaint  54 y.o. female admitted 1/11/2020 with GIB, hypotension    Hospital Course    \"54 y.o. female admitted 1/11/2020.  She has a history of alcohol abuse and hypothyroidism.  She presented to the emergency department this evening after she apparently tripped and took a header.  She hit her face.  She is complaining of pain in the left side of her face.  She denies syncope.  She remembers tripping and falling.  She is unclear if she knocked herself out when she hit the pavement, but states that if she did it was only for a couple of seconds.  She further tells me that for the last couple of weeks she has noticed some blood in her urine and painful urination.  She denies fever.  She has felt shaky, but she has been cutting down her alcohol intake.  She admits to drinking 1-2 half pints of vodka a day.  Her last drink was yesterday.  She has also been having some hematemesis for the last couple of weeks.  Initially she was vomiting bright red blood, but now she states that her emesis is dark and almost black.  She feels constipated and denies melena or hematochezia.  \"      Interval Problem Update  Reviewed last 24 hour events:   -Oxygenation improving, patient remains poorly motivated to participate in care   - Neuro: AO x4   - HR: 70s-90s   - SBP: 100s-150s   - GI: tolerating diet   - UOP: 750 mL/24 hrs   - Abad: no   - Tm: 36.5   - Lines: PIV   - PPx: GI PPI, DVT contraindicated   - 1L NC, improved from 4L yesterday   - CXR (personally reviewed and compared to prior): no new    Yesterday   -No acute events overnight, remains on oxygen   - Neuro: AOx4   - HR: 70s-90s   - SBP: 100s-150s   - GI: tolerating diet   - UOP: poorly measured   - Abad: no   - Tm: 37.1   - Lines: PIV   - PPx: GI PPI, DVT contraindicated   - 4L NC   - CXR (personally reviewed and compared to prior): Bibasilar opacities concerning for infiltrate    Review of " Systems  Review of Systems   Constitutional: Positive for malaise/fatigue. Negative for chills and fever.   HENT:        Nasal/facial pain   Eyes: Negative for blurred vision.   Respiratory: Negative for cough, sputum production, shortness of breath and stridor.    Cardiovascular: Negative for chest pain.   Gastrointestinal: Negative for abdominal pain, melena (Resolved), nausea and vomiting.   Genitourinary: Negative for dysuria and hematuria (resolved).   Musculoskeletal: Negative for myalgias.   Skin: Negative for rash.        ecchymosis   Neurological: Positive for tremors. Negative for dizziness, sensory change and focal weakness.   Psychiatric/Behavioral: The patient is nervous/anxious.         Vital Signs for last 24 hours   Temp:  [36.5 °C (97.7 °F)] 36.5 °C (97.7 °F)  Pulse:  [84-98] 85  Resp:  [16-17] 16  BP: (117)/(96) 117/96  SpO2:  [92 %-100 %] 92 %    Hemodynamic parameters for last 24 hours       Respiratory Information for the last 24 hours       Physical Exam   Physical Exam  Vitals signs and nursing note reviewed.   Constitutional:       Appearance: She is obese. She is ill-appearing.   HENT:      Head:      Comments: Left periorbital ecchymosis     Right Ear: External ear normal.      Left Ear: External ear normal.      Nose: Nose normal.      Mouth/Throat:      Mouth: Mucous membranes are dry.      Pharynx: Oropharynx is clear.   Eyes:      Extraocular Movements: Extraocular movements intact.      Conjunctiva/sclera: Conjunctivae normal.      Pupils: Pupils are equal, round, and reactive to light.      Comments: Left periorbital ecchymosis improving   Neck:      Musculoskeletal: Normal range of motion and neck supple.   Cardiovascular:      Rate and Rhythm: Normal rate.      Pulses: Normal pulses.   Pulmonary:      Effort: Pulmonary effort is normal. No respiratory distress.      Breath sounds: Wheezing (trace) and rhonchi present.   Abdominal:      General: Abdomen is flat.      Tenderness:  There is no tenderness. There is no guarding or rebound.   Musculoskeletal: Normal range of motion.   Skin:     General: Skin is warm.      Capillary Refill: Capillary refill takes less than 2 seconds.      Coloration: Skin is not jaundiced.      Findings: Bruising present.   Neurological:      General: No focal deficit present.      Mental Status: She is alert.      GCS: GCS eye subscore is 4. GCS verbal subscore is 5. GCS motor subscore is 6.      Cranial Nerves: No cranial nerve deficit.      Sensory: No sensory deficit.      Motor: No weakness or tremor.   Psychiatric:         Mood and Affect: Mood normal.         Medications  Current Facility-Administered Medications   Medication Dose Route Frequency Provider Last Rate Last Dose   • ipratropium-albuterol (DUONEB) nebulizer solution  3 mL Nebulization Q4HRS (RT) Ant Cassidy Jr. D.OAcosta   3 mL at 01/18/20 0903   • Respiratory Care per Protocol   Nebulization Continuous RT Ant Cassidy Jr. D.O.       • omeprazole (PRILOSEC) capsule 20 mg  20 mg Oral BID Parag Elizondo M.D.   20 mg at 01/18/20 0524   • chlordiazePOXIDE (LIBRIUM) capsule 25 mg  25 mg Oral Q8HRS Ramiro Elena M.D.   25 mg at 01/18/20 0524   • sore throat spray (CHLORASEPTIC) 1 Spray  1 Spray Mouth/Throat Q2HRS PRN Ant Cassidy Jr., D.O.       • menthol (HALLS) lozenge 1 Lozenge  1 Lozenge Oral Q2HRS PRN Ant Cassidy Jr., D.O.       • sucralfate (CARAFATE) 1 GM/10ML suspension 1 g  1 g Oral Q6HRS Pito Davis M.D.   1 g at 01/18/20 0525   • albuterol inhaler 2 Puff  2 Puff Inhalation Q6HRS PRN Feliciano Cole M.D.       • levothyroxine (SYNTHROID) tablet 50 mcg  50 mcg Oral AM ES Feliciano Cole M.D.   50 mcg at 01/18/20 0524   • acetaminophen (TYLENOL) tablet 650 mg  650 mg Oral Q6HRS PRN Feliciano Cole M.D.       • ondansetron (ZOFRAN) syringe/vial injection 4 mg  4 mg Intravenous Q4HRS PRN Feliciano Cole M.D.   4 mg at 01/18/20 0023   • ondansetron (ZOFRAN ODT) dispertab 4 mg   4 mg Oral Q4HRS PRN Feliciano Cole M.D.       • promethazine (PHENERGAN) tablet 12.5-25 mg  12.5-25 mg Oral Q4HRS PRN Feliciano Cole M.D.       • promethazine (PHENERGAN) suppository 12.5-25 mg  12.5-25 mg Rectal Q4HRS PRN Feliciano Cole M.D.       • prochlorperazine (COMPAZINE) injection 5-10 mg  5-10 mg Intravenous Q4HRS PRN Feliciano Cole M.D.       • senna-docusate (PERICOLACE or SENOKOT S) 8.6-50 MG per tablet 2 Tab  2 Tab Oral BID Feliciano Cole M.D.   2 Tab at 01/18/20 0525    And   • polyethylene glycol/lytes (MIRALAX) PACKET 1 Packet  1 Packet Oral QDAY PRN Feliciano Cole M.D.        And   • magnesium hydroxide (MILK OF MAGNESIA) suspension 30 mL  30 mL Oral QDAY PRN Feliciano Cole M.D.        And   • bisacodyl (DULCOLAX) suppository 10 mg  10 mg Rectal QDAY PRN Feliciano Cole M.D.       • pyridoxine (VITAMIN B-6) tablet 100 mg  100 mg Oral DAILY Feliciano Cole M.D.   100 mg at 01/18/20 0522   • thiamine tablet 100 mg  100 mg Oral DAILY Aram Cash M.D.   100 mg at 01/18/20 0522       Fluids    Intake/Output Summary (Last 24 hours) at 1/18/2020 0924  Last data filed at 1/18/2020 0600  Gross per 24 hour   Intake 240 ml   Output 950 ml   Net -710 ml       Laboratory          Recent Labs     01/16/20 0518   SODIUM 140   POTASSIUM 4.2   CHLORIDE 105   CO2 25   BUN 3*   CREATININE 0.56   MAGNESIUM 1.9   PHOSPHORUS 2.1*   CALCIUM 7.7*     Recent Labs     01/16/20 0518   ALTSGPT 24   ASTSGOT 143*   ALKPHOSPHAT 185*   TBILIRUBIN 5.8*   GLUCOSE 76     Recent Labs     01/16/20 0518 01/17/20  0540   WBC 5.5 5.1   NEUTSPOLYS 54.60 51.70   LYMPHOCYTES 19.20* 18.00*   MONOCYTES 19.70* 23.90*   EOSINOPHILS 2.70 2.80   BASOPHILS 1.80 1.80   ASTSGOT 143*  --    ALTSGPT 24  --    ALKPHOSPHAT 185*  --    TBILIRUBIN 5.8*  --      Recent Labs     01/16/20  0518 01/16/20  1230 01/16/20 2015 01/17/20  0540   RBC 2.52*  --   --  2.54*   HEMOGLOBIN 9.0* 10.2* 9.9* 9.2*   HEMATOCRIT 28.3*  --   --  28.8*    PLATELETCT 107*  --   --  110*       Imaging  X-Ray:  I have personally reviewed the images and compared with prior images.    Assessment/Plan  * GIB (gastrointestinal bleeding)- (present on admission)  Assessment & Plan  Due to severe erosive esophagitis  Continue PPI  Continue Carafate  Off Rocephin and octreotide  Trend hemoglobin and transfuse as needed to maintain greater than 7  EGD with GI and anesthesia on 1/15/20: severe erosive esophagitis  Thromboelastogram unremarkable  Follow-up with GI for repeat EGD as outpatient    Lactic acidosis- (present on admission)  Assessment & Plan  Resolved    Hypotension- (present on admission)  Assessment & Plan  resolved    Hematuria- (present on admission)  Assessment & Plan  Resolved  US abd without hydronephrosis or obvious source  F/U with PCP for further outpatient work-up as needed    Alcohol withdrawal syndrome with complication (HCC)- (present on admission)  Assessment & Plan  No longer exhibiting withdrawal symptoms  Stop all benzodiazepines    Troponin level elevated- (present on admission)  Assessment & Plan  ECG without evidence of acute coronary syndrome or STEMI  Troponin trended to normal    Alcoholic liver disease (HCC)- (present on admission)  Assessment & Plan  Avoid hepatotoxins  Needs to completely avoid alcohol  Maddrey's Discriminant Function <32  High-dose IV thiamine and supplemental vitamins  No evidence of withdrawal  Hepatitis panel negative, RUQ US consistent with cirrhosis    Macrocytic anemia- (present on admission)  Assessment & Plan  History of hematuria and hematemesis  Trend hemoglobin  Transfuse PRBCs PRN to keep hemoglobin >7    Hypoxia- (present on admission)  Assessment & Plan  Trending towards no longer requiring supplemental oxygen  Continue anti-atelectatic therapies including ambulation and IS    Acute respiratory failure with hypoxia (HCC)- (present on admission)  Assessment & Plan  Chest x-ray with bibasilar  opacities  Clinically not consistent with pneumonia as she is afebrile, nonproductive cough, no leukocytosis  ?  Atelectasis  I will continue 5 days of doxycycline for possible infectious overlay  RT/O2 Protocols  Titrate supplemental FiO2 to maintain SpO2 >88%  Scheduled nebs, transition to PRN soon  PEP, IS, ambulation, mucolytics if necessary    Closed fracture of nasal bone- (present on admission)  Assessment & Plan  Pain control  Minimally displaced, no respiratory issues associated with this    Hypokalemia- (present on admission)  Assessment & Plan  Replete potassium and magnesium    Hypothyroidism- (present on admission)  Assessment & Plan  Elevated TSH with low T4, ?  Compliance with levothyroxine therapy  Consider increasing levothyroxine vs encouraging compliance    Thrombocytopenia (HCC)- (present on admission)  Assessment & Plan  Trend platelet count  Transfuse as needed    Hyponatremia- (present on admission)  Assessment & Plan  Resolved       VTE:  Contraindicated and GIB  Ulcer: PPI  Lines: None    I have performed a physical exam and reviewed and updated ROS and Plan today (1/18/2020). In review of yesterday's note (1/17/2020), there are no changes except as documented above.     Discussed patient condition and risk of morbidity and/or mortality with Hospitalist, RN, RT, Pharmacy, Dietary, , Charge nurse / hot rounds and Patient.    Patient continues to remain stable to transfer out of ICU. [facility] and pulmonary will sign off at transfer as oxygenation is improving and patient is anticipated to not require supplemental oxygen soon.  Please contact for pulmonary team if additional pulmonary consultation is needed.

## 2020-01-18 NOTE — CARE PLAN
Problem: Bronchoconstriction:  Goal: Improve in air movement and diminished wheezing  Outcome: PROGRESSING AS EXPECTED     Duoneb Q4      Problem: Hyperinflation:  Goal: Prevent or improve atelectasis  Outcome: PROGRESSING AS EXPECTED     PEP QID  IS  Actual 750mLs

## 2020-01-18 NOTE — CARE PLAN
Problem: Bronchoconstriction:  Goal: Improve in air movement and diminished wheezing  Note:   DUO Q4     Problem: Hyperinflation:  Goal: Prevent or improve atelectasis  Note:   PEP QID

## 2020-01-19 PROBLEM — R29.6 FALLS FREQUENTLY: Status: ACTIVE | Noted: 2020-01-19

## 2020-01-19 PROCEDURE — A9270 NON-COVERED ITEM OR SERVICE: HCPCS | Performed by: INTERNAL MEDICINE

## 2020-01-19 PROCEDURE — A9270 NON-COVERED ITEM OR SERVICE: HCPCS | Performed by: HOSPITALIST

## 2020-01-19 PROCEDURE — 700102 HCHG RX REV CODE 250 W/ 637 OVERRIDE(OP): Performed by: INTERNAL MEDICINE

## 2020-01-19 PROCEDURE — 94668 MNPJ CHEST WALL SBSQ: CPT

## 2020-01-19 PROCEDURE — 99231 SBSQ HOSP IP/OBS SF/LOW 25: CPT | Performed by: HOSPITALIST

## 2020-01-19 PROCEDURE — 700102 HCHG RX REV CODE 250 W/ 637 OVERRIDE(OP): Performed by: HOSPITALIST

## 2020-01-19 PROCEDURE — 770001 HCHG ROOM/CARE - MED/SURG/GYN PRIV*

## 2020-01-19 PROCEDURE — 770006 HCHG ROOM/CARE - MED/SURG/GYN SEMI*

## 2020-01-19 RX ADMIN — Medication 1 LOZENGE: at 14:05

## 2020-01-19 RX ADMIN — SUCRALFATE 1 G: 1 SUSPENSION ORAL at 06:04

## 2020-01-19 RX ADMIN — ACETAMINOPHEN 650 MG: 325 TABLET, FILM COATED ORAL at 13:12

## 2020-01-19 RX ADMIN — CHLORDIAZEPOXIDE HYDROCHLORIDE 25 MG: 25 CAPSULE ORAL at 20:51

## 2020-01-19 RX ADMIN — PYRIDOXINE HCL TAB 50 MG 100 MG: 50 TAB at 06:05

## 2020-01-19 RX ADMIN — OMEPRAZOLE 20 MG: 20 CAPSULE, DELAYED RELEASE ORAL at 17:18

## 2020-01-19 RX ADMIN — Medication 100 MG: at 06:05

## 2020-01-19 RX ADMIN — SUCRALFATE 1 G: 1 SUSPENSION ORAL at 13:12

## 2020-01-19 RX ADMIN — ACETAMINOPHEN 650 MG: 325 TABLET, FILM COATED ORAL at 03:22

## 2020-01-19 RX ADMIN — SUCRALFATE 1 G: 1 SUSPENSION ORAL at 23:13

## 2020-01-19 RX ADMIN — OMEPRAZOLE 20 MG: 20 CAPSULE, DELAYED RELEASE ORAL at 06:05

## 2020-01-19 RX ADMIN — SUCRALFATE 1 G: 1 SUSPENSION ORAL at 17:18

## 2020-01-19 RX ADMIN — CHLORDIAZEPOXIDE HYDROCHLORIDE 25 MG: 25 CAPSULE ORAL at 06:05

## 2020-01-19 RX ADMIN — LEVOTHYROXINE SODIUM 50 MCG: 50 TABLET ORAL at 06:05

## 2020-01-19 RX ADMIN — CHLORDIAZEPOXIDE HYDROCHLORIDE 25 MG: 25 CAPSULE ORAL at 13:12

## 2020-01-19 ASSESSMENT — ENCOUNTER SYMPTOMS
MEMORY LOSS: 1
ROS GI COMMENTS: TOLERATING A DIET
NAUSEA: 0
TREMORS: 1
ABDOMINAL PAIN: 0
INSOMNIA: 1
FEVER: 0
VOMITING: 0
HALLUCINATIONS: 0
NERVOUS/ANXIOUS: 1

## 2020-01-19 ASSESSMENT — LIFESTYLE VARIABLES: SUBSTANCE_ABUSE: 1

## 2020-01-19 NOTE — RESPIRATORY CARE
COPD EDUCATION by COPD CLINICAL EDUCATOR  1/19/2020 at 7:28 AM by Danielle Puga, RRT     Patient reviewed by COPD education team. Patient does not have a history or diagnosis of COPD and is a non-smoker, therefore does not qualify for the COPD program.

## 2020-01-19 NOTE — THERAPY
"Occupational Therapy Evaluation completed.   Functional Status:  Min A standing grooming, SPV seated grooming, Min A toileting, Min A LB dressing, Min A functional mobility w/ FWW  Plan of Care: Will benefit from Occupational Therapy 3 times per week  Discharge Recommendations:  Equipment: Will Continue to Assess for Equipment Needs. Post-acute therapy Recommend post-acute placement for additional occupational therapy services prior to discharge home. Patient can tolerate post-acute therapies at a 5x/week frequency.    See \"Rehab Therapy-Acute\" Patient Summary Report for complete documentation.      Pt is 53 y/o female who presents to acute due to vomiting blood and GLF. PMH includes alcohol abuse w/ alcoholic cirrhosis. Pt reports she is typically independent w/ BADLs and IADLs and cares for her SO at home. Pt had 2 jo-ann LOB's when performing toilet txfs and when performing standing grooming. Pt demo'd impaired balance, functional mobility, activity tolerance impacting functional independence, will follow for acute OT services while in house. Recommend post acute placement at this time.   "

## 2020-01-19 NOTE — ASSESSMENT & PLAN NOTE
Trending towards no longer requiring supplemental oxygen  Continue anti-atelectatic therapies including ambulation and IS

## 2020-01-19 NOTE — PROGRESS NOTES
1835: Patient transferred to [id number] with this RN and family member in wheelchair with all belongings. Patient settled in new room, call light in reach, bed low/locked position, Maritza RN made aware patient was in room.

## 2020-01-19 NOTE — PROGRESS NOTES
"Hospital Medicine Daily Progress Note    Date of Service  1/19/2020    Chief Complaint  54 y.o. female admitted 1/11/2020 with vomiting blood    Hospital Course    Ms. Kahn is a past medical history of alcohol abuse with alcoholic liver disease was brought to the emergency room after she fell hit a curb with left-sided facial trauma.  She also had noted 2 weeks of hematemesis that had been red blood at first and then transition to more of a black coffee ground emesis.  She required 2 units of blood transfusions.  She underwent EGD by Dr. Davis in the operating room on 1/15/2020 revealing severe ulcerative erosive esophagitis with spontaneous oozing and adhered clot with a visible vessel for which she had a Hemoclip placed.      Interval Problem Update  1/16/20: Ms. Kahn was evaluated and examined in the ICU.   She admits to drinking a pint of vodka. She denies hallucination. +tremor.  She has had some intermittent vomiting though no black or blood in it.  1/17: patient seen and evaluated in the ICU.  Remains quite weak requiring substantial assistance to get out of bed.  She did receive a dose of IV Ativan this morning because of tremulousness.  Spoke with her nurse but her condition.  Her significant other did visit today.  1/18: Ms. Rios was evaluated in the ICU. She is very weak requiring assistance to walk to the chair. She has required 1 liter of oxygen as her sats went down to 84%. She is requesting IV ativan due to ongoing detox. Her significant other visited today.   1/19: patient evaluated and examined on the medical floor. She complains of ongoing heartburn. She is able to walk but requires assistance and notes, \"my equilibrium is off\". She is tolerating Boost. I discussed with RN about trying to taper off oxygen. Her sons and daughter are coming in today. I asked to meet with them and set up a meeting.   Consultants/Specialty  Critical care.   GI     Code Status  full    Disposition  Medical " floor    Review of Systems  Review of Systems   Constitutional: Negative for fever.   Cardiovascular: Negative for chest pain.   Gastrointestinal: Negative for abdominal pain, nausea and vomiting.        Tolerating a diet   Neurological: Positive for tremors.   Psychiatric/Behavioral: Positive for memory loss and substance abuse. Negative for hallucinations. The patient is nervous/anxious and has insomnia.    All other systems reviewed and are negative.       Physical Exam  Temp:  [36.3 °C (97.3 °F)-37.4 °C (99.3 °F)] 37.4 °C (99.3 °F)  Pulse:  [] 96  Resp:  [16-18] 18  BP: ()/(62-81) 99/72  SpO2:  [91 %-95 %] 92 %    Physical Exam  Vitals signs and nursing note reviewed.   Constitutional:       General: She is not in acute distress.     Comments: Disheveled   HENT:      Head:      Comments: Left chin and left face bruise  Left eye periorbital bruise       Mouth/Throat:      Mouth: Mucous membranes are dry.      Pharynx: Oropharynx is clear.   Eyes:      General: Scleral icterus present.      Conjunctiva/sclera: Conjunctivae normal.   Neck:      Musculoskeletal: Normal range of motion and neck supple.   Cardiovascular:      Rate and Rhythm: Normal rate and regular rhythm.      Heart sounds: No murmur.   Pulmonary:      Effort: Pulmonary effort is normal. No respiratory distress.   Abdominal:      General: There is no distension.      Palpations: Abdomen is soft.      Tenderness: There is no tenderness.   Musculoskeletal:      Right lower leg: No edema.      Left lower leg: No edema.      Comments: Right knee bruise  Right knee scab   Skin:     General: Skin is warm and dry.   Neurological:      General: No focal deficit present.      Mental Status: She is alert and oriented to person, place, and time.      Comments: Slight tremor of her hands   Psychiatric:         Mood and Affect: Mood normal.         Behavior: Behavior normal.      Comments: She is currently calm         Fluids    Intake/Output Summary  (Last 24 hours) at 1/19/2020 0706  Last data filed at 1/19/2020 0400  Gross per 24 hour   Intake 540 ml   Output 0 ml   Net 540 ml       Laboratory  Recent Labs     01/16/20  1230 01/16/20 2015 01/17/20  0540   WBC  --   --  5.1   RBC  --   --  2.54*   HEMOGLOBIN 10.2* 9.9* 9.2*   HEMATOCRIT  --   --  28.8*   MCV  --   --  113.4*   MCH  --   --  36.2*   MCHC  --   --  31.9*   RDW  --   --  99.4*   PLATELETCT  --   --  110*   MPV  --   --  9.5                       Imaging  DX-CHEST-2 VIEWS   Final Result      Interval worsening in bibasilar opacities with air bronchograms. Findings are concerning for pneumonia. Follow-up imaging is recommended to document resolution.      US-ABDOMEN COMPLETE SURVEY   Final Result         1.  Gallbladder sludge without additional sonographic findings to indicate acute cholecystitis.   2.  Massive hepatomegaly and echogenic liver, compatible with fatty change versus fibrosis.         DX-CHEST-PORTABLE (1 VIEW)   Final Result      1.  Right internal jugular catheter placement with the tip projecting over the cavoatrial junction. No pneumothorax is identified.      2.  Left basilar opacification may be related to atelectasis.      DX-CHEST-PORTABLE (1 VIEW)   Final Result      No acute cardiopulmonary findings.      CT-HEAD W/O   Final Result      1.  No acute intracranial findings.      2.  Left nasal bone fracture.         CT-CSPINE WITHOUT PLUS RECONS   Final Result      1.  No acute fracture is identified.      2.  Multilevel degenerative disc disease and facet arthropathy as described.      CT-MAXILLOFACIAL W/O PLUS RECONS   Final Result         Left nasal bone fracture      DX-THORACIC SPINE-2 VIEWS   Final Result      No acute fracture is identified.      DX-LUMBAR SPINE-4+ VIEWS   Final Result         1.  No acute fracture is identified.      2.  Facet arthropathy in the lower lumbar spine.           Assessment/Plan  * GIB (gastrointestinal bleeding)- (present on  admission)  Assessment & Plan  Upper GI bleed given her bright red bleeding followed by coffee-ground emesis.   She was admitted to the ICU on a protonix and octreotide drip.  She underwent EGD 1/15 revealing severe ulcerative erosive esophagitis with hemoclip  Serial Hb levels ordered  She was transfused 2 units RBC due to acute blood loss anemia  IV protonix transitioned to oral Prilosec twice daily      Falls frequently- (present on admission)  Assessment & Plan  CT head negative for bleed  Secondary to EtOH  Family conference on 1/20 for placement options    Hematuria- (present on admission)  Assessment & Plan  resolved    Alcohol withdrawal syndrome with complication (HCC)- (present on admission)  Assessment & Plan  Tremors without delirium  Required IV Ativan now oral Librium.      Troponin level elevated- (present on admission)  Assessment & Plan  Suspect this could be demand ischemia in the setting of gastrointestinal hemorrhage.      Alcoholic liver disease (HCC)- (present on admission)  Assessment & Plan  With associated hepatic dysfunction in the form of thrombocytopenia, but with normal coagulation pattern.    Bilirubin 5 which may be exacerbated by the degree of bruising    Metabolic acidosis- (present on admission)  Assessment & Plan  Lactic acidosis, with initial lactic acid of 16.9.  Unclear etiology of this, as no clear evidence of sepsis or other infection.  She was given aggressive IV fluids.     Macrocytic anemia- (present on admission)  Assessment & Plan  Suspect due to chronic alcoholism, likely component of acute bleed superimposed.  Monitor.    Hypoxia- (present on admission)  Assessment & Plan  She is requiring supplemental oxygen  Incentive spirometry encouraged  84% on room air 1/18  Room air trial on 1/19    Thrombocytopenia (HCC)- (present on admission)  Assessment & Plan  Likely secondary to liver disease and alcohol induced bone marrow suppression      Hyponatremia- (present on  admission)  Assessment & Plan  Resolved with IV fluids       VTE prophylaxis: SCDs

## 2020-01-19 NOTE — THERAPY
"Physical Therapy Treatment completed.   Bed Mobility:  Supine to Sit: (NT in chair pre/post)  Transfers: Sit to Stand: Minimal Assist  Gait: Level Of Assist: Minimal Assist with Front-Wheel Walker     30ft x 2   Plan of Care: Will benefit from Physical Therapy 3 times per week  Discharge Recommendations: Equipment: Will Continue to Assess for Equipment Needs.     Pt with improving activity tolerance and focus; does well with calming cues to attend to task; underestimates abilities; recommend placement ,will follow.     See \"Rehab Therapy-Acute\" Patient Summary Report for complete documentation.       "

## 2020-01-19 NOTE — CARE PLAN
Problem: Communication  Goal: The ability to communicate needs accurately and effectively will improve  Outcome: PROGRESSING SLOWER THAN EXPECTED  Intervention: [place] patient and significant other/support system to call light to alert staff of needs  Note:   Pt and SO educated on need to use call light prior to mobilizing. Pt and SO made an attempt at unsafe mobilizing after education was received. Re-education provided. No more unsafe attempts noted.   Bed alarm in place and on.      Problem: Knowledge Deficit  Goal: Knowledge of disease process/condition, treatment plan, diagnostic tests, and medications will improve  Outcome: PROGRESSING SLOWER THAN EXPECTED  Goal: Knowledge of the prescribed therapeutic regimen will improve  Outcome: PROGRESSING SLOWER THAN EXPECTED  Intervention: Discuss information regarding therpeutic regimen and document in education  Note:   Pt was educated on POC, MAR, shift routine and nursing education R/T Dx. Questions were encouraged and answered. Multiple family members and SO included in education per pt request. Some family members were educated via phone. Pt verbalized understanding of all teaching.

## 2020-01-19 NOTE — PROGRESS NOTES
Called report to Maritza RN on T4. All belongings gathered. Awaiting transport to transfer patient.

## 2020-01-19 NOTE — ASSESSMENT & PLAN NOTE
CT head negative for bleed  Secondary to EtOH, multi factorial cause    PT/OT-- encourage compliance w therapies

## 2020-01-19 NOTE — CARE PLAN
Problem: Hyperinflation:  Goal: Prevent or improve atelectasis  Outcome: PROGRESSING AS EXPECTED  PEP QID

## 2020-01-20 ENCOUNTER — APPOINTMENT (OUTPATIENT)
Dept: RADIOLOGY | Facility: MEDICAL CENTER | Age: 55
DRG: 380 | End: 2020-01-20
Attending: HOSPITALIST
Payer: MEDICAID

## 2020-01-20 PROCEDURE — 73502 X-RAY EXAM HIP UNI 2-3 VIEWS: CPT | Mod: LT

## 2020-01-20 PROCEDURE — A9270 NON-COVERED ITEM OR SERVICE: HCPCS | Performed by: HOSPITALIST

## 2020-01-20 PROCEDURE — 99233 SBSQ HOSP IP/OBS HIGH 50: CPT | Performed by: HOSPITALIST

## 2020-01-20 PROCEDURE — 700102 HCHG RX REV CODE 250 W/ 637 OVERRIDE(OP): Performed by: HOSPITALIST

## 2020-01-20 PROCEDURE — A9270 NON-COVERED ITEM OR SERVICE: HCPCS | Performed by: INTERNAL MEDICINE

## 2020-01-20 PROCEDURE — 94760 N-INVAS EAR/PLS OXIMETRY 1: CPT

## 2020-01-20 PROCEDURE — 94669 MECHANICAL CHEST WALL OSCILL: CPT

## 2020-01-20 PROCEDURE — 700102 HCHG RX REV CODE 250 W/ 637 OVERRIDE(OP): Performed by: INTERNAL MEDICINE

## 2020-01-20 PROCEDURE — 770001 HCHG ROOM/CARE - MED/SURG/GYN PRIV*

## 2020-01-20 PROCEDURE — 700111 HCHG RX REV CODE 636 W/ 250 OVERRIDE (IP): Performed by: HOSPITALIST

## 2020-01-20 PROCEDURE — 94668 MNPJ CHEST WALL SBSQ: CPT

## 2020-01-20 RX ORDER — OXYCODONE HYDROCHLORIDE 5 MG/1
5 TABLET ORAL EVERY 6 HOURS PRN
Status: DISCONTINUED | OUTPATIENT
Start: 2020-01-20 | End: 2020-01-31

## 2020-01-20 RX ADMIN — Medication 100 MG: at 05:30

## 2020-01-20 RX ADMIN — LEVOTHYROXINE SODIUM 50 MCG: 50 TABLET ORAL at 05:30

## 2020-01-20 RX ADMIN — PYRIDOXINE HCL TAB 50 MG 100 MG: 50 TAB at 05:30

## 2020-01-20 RX ADMIN — OMEPRAZOLE 20 MG: 20 CAPSULE, DELAYED RELEASE ORAL at 05:30

## 2020-01-20 RX ADMIN — CHLORDIAZEPOXIDE HYDROCHLORIDE 25 MG: 25 CAPSULE ORAL at 05:30

## 2020-01-20 RX ADMIN — SUCRALFATE 1 G: 1 SUSPENSION ORAL at 18:07

## 2020-01-20 RX ADMIN — ONDANSETRON 4 MG: 2 INJECTION INTRAMUSCULAR; INTRAVENOUS at 02:45

## 2020-01-20 RX ADMIN — OXYCODONE HYDROCHLORIDE 5 MG: 5 TABLET ORAL at 21:35

## 2020-01-20 RX ADMIN — OMEPRAZOLE 20 MG: 20 CAPSULE, DELAYED RELEASE ORAL at 18:07

## 2020-01-20 RX ADMIN — CHLORDIAZEPOXIDE HYDROCHLORIDE 25 MG: 25 CAPSULE ORAL at 14:55

## 2020-01-20 RX ADMIN — CHLORDIAZEPOXIDE HYDROCHLORIDE 25 MG: 25 CAPSULE ORAL at 21:35

## 2020-01-20 RX ADMIN — SUCRALFATE 1 G: 1 SUSPENSION ORAL at 05:31

## 2020-01-20 ASSESSMENT — ENCOUNTER SYMPTOMS
ABDOMINAL PAIN: 0
FEVER: 0
VOMITING: 0
NAUSEA: 1

## 2020-01-20 NOTE — ASSESSMENT & PLAN NOTE
On synthroid 50 mcg daily TSH is markedly elevated at 31  States she may have been taking her synthroid  Recommend repeat Thyroid function test in approx 6 weeks.

## 2020-01-20 NOTE — PROGRESS NOTES
"Hospital Medicine Daily Progress Note    Date of Service  1/20/2020    Chief Complaint  54 y.o. female admitted 1/11/2020 with vomiting blood    Hospital Course    Ms. Kahn is a past medical history of alcohol abuse with alcoholic liver disease was brought to the emergency room after she fell hit a curb with left-sided facial trauma.  She also had noted 2 weeks of hematemesis that had been red blood at first and then transition to more of a black coffee ground emesis.  She required 2 units of blood transfusions.  She underwent EGD by Dr. Davis in the operating room on 1/15/2020 revealing severe ulcerative erosive esophagitis with spontaneous oozing and adhered clot with a visible vessel for which she had a Hemoclip placed.      Interval Problem Update  1/16/20: Ms. Kahn was evaluated and examined in the ICU.   She admits to drinking a pint of vodka. She denies hallucination. +tremor.  She has had some intermittent vomiting though no black or blood in it.  1/17: patient seen and evaluated in the ICU.  Remains quite weak requiring substantial assistance to get out of bed.  She did receive a dose of IV Ativan this morning because of tremulousness.  Spoke with her nurse but her condition.  Her significant other did visit today.  1/18: Ms. Rios was evaluated in the ICU. She is very weak requiring assistance to walk to the chair. She has required 1 liter of oxygen as her sats went down to 84%. She is requesting IV ativan due to ongoing detox. Her significant other visited today.   1/19: patient evaluated and examined on the medical floor. She complains of ongoing heartburn. She is able to walk but requires assistance and notes, \"my equilibrium is off\". She is tolerating Boost. I discussed with RN about trying to taper off oxygen. Her sons and daughter are coming in today. I asked to meet with them and set up a meeting.   1/20: I met with Ms. Kanh, her significant other, her two sons, her daughter, and her " daughter's significant other about her condition. The  and RN were in the room. We had a long discussion about her diagnosis, work up, and home safety options. Ms. Kahn is open to SNF and orders placed.   40 minutes spent that of which over 50% of the time was spent in counseling about her diagnosis of alcohol-induced liver disease and upper GI bleed with falls.   Consultants/Specialty  Critical care.   GI     Code Status  full    Disposition  Medical floor  SNF referral   Review of Systems  Review of Systems   Constitutional: Negative for fever.   Cardiovascular: Negative for chest pain and leg swelling.   Gastrointestinal: Positive for nausea. Negative for abdominal pain and vomiting.   All other systems reviewed and are negative.       Physical Exam  Temp:  [36.5 °C (97.7 °F)-37.3 °C (99.1 °F)] 37.2 °C (98.9 °F)  Pulse:  [] 99  Resp:  [16] 16  BP: ()/(71-82) 110/82  SpO2:  [91 %-94 %] 92 %    Physical Exam  Vitals signs and nursing note reviewed.   Constitutional:       General: She is not in acute distress.  HENT:      Head:      Comments: Left chin and left face bruise  Left eye periorbital bruise       Mouth/Throat:      Mouth: Mucous membranes are dry.      Pharynx: Oropharynx is clear.   Eyes:      General: Scleral icterus present.      Conjunctiva/sclera: Conjunctivae normal.   Neck:      Musculoskeletal: Normal range of motion and neck supple.   Cardiovascular:      Rate and Rhythm: Normal rate and regular rhythm.      Heart sounds: No murmur.   Pulmonary:      Effort: Pulmonary effort is normal. No respiratory distress.   Abdominal:      General: There is no distension.      Palpations: Abdomen is soft.      Tenderness: There is no tenderness.   Musculoskeletal:      Right lower leg: No edema.      Left lower leg: No edema.      Comments: Right knee bruise  Right knee scab   Skin:     General: Skin is warm and dry.   Neurological:      General: No focal deficit present.       Mental Status: She is alert and oriented to person, place, and time.   Psychiatric:         Mood and Affect: Mood normal.         Behavior: Behavior normal.      Comments: Hard of hearing         Fluids    Intake/Output Summary (Last 24 hours) at 1/20/2020 1320  Last data filed at 1/20/2020 0400  Gross per 24 hour   Intake 520 ml   Output 0 ml   Net 520 ml       Laboratory                        Imaging  DX-CHEST-2 VIEWS   Final Result      Interval worsening in bibasilar opacities with air bronchograms. Findings are concerning for pneumonia. Follow-up imaging is recommended to document resolution.      US-ABDOMEN COMPLETE SURVEY   Final Result         1.  Gallbladder sludge without additional sonographic findings to indicate acute cholecystitis.   2.  Massive hepatomegaly and echogenic liver, compatible with fatty change versus fibrosis.         DX-CHEST-PORTABLE (1 VIEW)   Final Result      1.  Right internal jugular catheter placement with the tip projecting over the cavoatrial junction. No pneumothorax is identified.      2.  Left basilar opacification may be related to atelectasis.      DX-CHEST-PORTABLE (1 VIEW)   Final Result      No acute cardiopulmonary findings.      CT-HEAD W/O   Final Result      1.  No acute intracranial findings.      2.  Left nasal bone fracture.         CT-CSPINE WITHOUT PLUS RECONS   Final Result      1.  No acute fracture is identified.      2.  Multilevel degenerative disc disease and facet arthropathy as described.      CT-MAXILLOFACIAL W/O PLUS RECONS   Final Result         Left nasal bone fracture      DX-THORACIC SPINE-2 VIEWS   Final Result      No acute fracture is identified.      DX-LUMBAR SPINE-4+ VIEWS   Final Result         1.  No acute fracture is identified.      2.  Facet arthropathy in the lower lumbar spine.           Assessment/Plan  * GIB (gastrointestinal bleeding)- (present on admission)  Assessment & Plan  Upper GI bleed given her bright red bleeding followed  by coffee-ground emesis.   She was admitted to the ICU on a protonix and octreotide drip.  She underwent EGD 1/15 revealing severe ulcerative erosive esophagitis with hemoclip  Serial Hb levels ordered  She was transfused 2 units RBC due to acute blood loss anemia  IV protonix transitioned to oral Prilosec twice daily      Falls frequently- (present on admission)  Assessment & Plan  CT head negative for bleed  Secondary to EtOH  Family conference on 1/20 for placement options such as SNF    Hematuria- (present on admission)  Assessment & Plan  resolved    Alcohol withdrawal syndrome with complication (HCC)- (present on admission)  Assessment & Plan  Tremors without delirium  Status post IV Ativan and oral Librium.      Troponin level elevated- (present on admission)  Assessment & Plan  Suspect this could be demand ischemia in the setting of gastrointestinal hemorrhage.      Alcoholic liver disease (HCC)- (present on admission)  Assessment & Plan  With associated hepatic dysfunction in the form of thrombocytopenia, but with normal coagulation pattern.    Bilirubin 5 which may be exacerbated by the degree of bruising  Liver ultrasound noted    Metabolic acidosis- (present on admission)  Assessment & Plan  Lactic acidosis, with initial lactic acid of 16.9.  Unclear etiology of this, as no clear evidence of sepsis or other infection.  She was given aggressive IV fluids.     Macrocytic anemia- (present on admission)  Assessment & Plan  Suspect due to chronic alcoholism, likely component of acute bleed superimposed.  Monitor.    Hypoxia- (present on admission)  Assessment & Plan  She is requiring supplemental oxygen  Incentive spirometry encouraged  84% on room air 1/18  Room air trial on 1/19    Closed fracture of nasal bone- (present on admission)  Assessment & Plan  Non-operative    Hypothyroidism- (present on admission)  Assessment & Plan  On synthroid 50 mcg daily  TSH is markedly elevated at 31  Though she unlikely  was compliant with synthroid thus the dose will not be increased    Thrombocytopenia (HCC)- (present on admission)  Assessment & Plan  Likely secondary to liver disease and alcohol induced bone marrow suppression      Hyponatremia- (present on admission)  Assessment & Plan  Resolved with IV fluids       VTE prophylaxis: SCDs

## 2020-01-20 NOTE — CARE PLAN
Problem: Discharge Barriers/Planning  Goal: Patient's continuum of care needs will be met  Outcome: PROGRESSING AS EXPECTED     Care conference tomorrow to determine discharge planning    Problem: Psychosocial Needs:  Goal: Level of anxiety will decrease  Outcome: PROGRESSING AS EXPECTED     Patient encouraged to voice feelings to staff

## 2020-01-20 NOTE — PROGRESS NOTES
Report received. Assessment completed.  Pt is A&O x4. Pt on room air when awake, desats when sleeping requiring 1-2LO2  Denies pain and nausea at this time.   Requesting to sleep, feeling tired   Last BM 1/19.   +void.  Tolerating diet with poor appetite today  Pt up with 1 assist and FWW-unsteady gait.   Call light within reach. All needs met at this time. Fall Precautions and hourly rounding in place.  Plan for family conference with MD for home safety needs today

## 2020-01-20 NOTE — CARE PLAN
Problem: Nutritional:  Goal: Achieve adequate nutritional intake  Description  Diet advancement with PO >50% of meals.    Outcome: PROGRESSING AS EXPECTED    Pt eating ~50% of meals and likes Boost Plus. Discussed with family and pt at bedside about softer food options for pt to eat. Also discussed with NR.    RD to follow.

## 2020-01-20 NOTE — DISCHARGE PLANNING
Anticipated Discharge Disposition: SNF    Action: Met with pt and pt family at bedside. Pt, MD and family agreed SNF was the best DC plan. Pt signed choice for SNF, faxed to [facility].  1. [facility]  2. [facility]  3. [facility]     Barriers to Discharge: acceptance to SNF    Plan: F/U with SNF      Length of Stay: 8

## 2020-01-20 NOTE — PROGRESS NOTES
Pt A&O x 4     Pt rates pain 8 out of 10. Denies any interventions at this time.    Neuro: Moves all extremities. States she occasionally has numbness in the right foot/ankle accompanied by weakness.     Cardiac: Denies new onset of chest pain.     Vascular: Moderate facial edema present     Respiratory: Right and left upper lobes sound clear to auscultation. Right middle/lower and left lower are slightly diminished. On room air. Tan sputum with productive cough present.      GI: Abdomen soft, non-tender. Normoactive bowel sounds, + flatus, + BM. Denies nausea/ vomiting.     : Pt voiding adequately.      Musculoskeletal : Pt up to bathroom with one person assist using the front-wheel walker, generalized weakness.      Integumentary: BLE brusing and scabbing, BUE bruising and scabbing, severe facial bruising    Labs noted     Fall precautions in place: Bed locked in lowest position, upper bed rails up, treaded socks in place, personal belongings within reach, call light within reach, appropriate mobility signs in place, bed alarm on. Pt calls appropriately.      Pt updated on plan of care.

## 2020-01-20 NOTE — DISCHARGE PLANNING
Received Choice form at 1167  Agency/Facility Name: [facility], [facility]  Referral sent per Choice form @ [id number]

## 2020-01-21 ENCOUNTER — APPOINTMENT (OUTPATIENT)
Dept: RADIOLOGY | Facility: MEDICAL CENTER | Age: 55
DRG: 380 | End: 2020-01-21
Attending: HOSPITALIST
Payer: MEDICAID

## 2020-01-21 PROBLEM — R50.9 FEVER: Status: ACTIVE | Noted: 2020-01-21

## 2020-01-21 LAB
LACTATE BLD-SCNC: 1.8 MMOL/L (ref 0.5–2)
LACTATE BLD-SCNC: 2 MMOL/L (ref 0.5–2)
LACTATE BLD-SCNC: 2.3 MMOL/L (ref 0.5–2)
PROCALCITONIN SERPL-MCNC: 1.03 NG/ML

## 2020-01-21 PROCEDURE — 94669 MECHANICAL CHEST WALL OSCILL: CPT

## 2020-01-21 PROCEDURE — 700102 HCHG RX REV CODE 250 W/ 637 OVERRIDE(OP): Performed by: INTERNAL MEDICINE

## 2020-01-21 PROCEDURE — A9270 NON-COVERED ITEM OR SERVICE: HCPCS | Performed by: INTERNAL MEDICINE

## 2020-01-21 PROCEDURE — 700102 HCHG RX REV CODE 250 W/ 637 OVERRIDE(OP): Performed by: HOSPITALIST

## 2020-01-21 PROCEDURE — 71046 X-RAY EXAM CHEST 2 VIEWS: CPT

## 2020-01-21 PROCEDURE — 770001 HCHG ROOM/CARE - MED/SURG/GYN PRIV*

## 2020-01-21 PROCEDURE — 700111 HCHG RX REV CODE 636 W/ 250 OVERRIDE (IP): Performed by: HOSPITALIST

## 2020-01-21 PROCEDURE — A9270 NON-COVERED ITEM OR SERVICE: HCPCS | Performed by: HOSPITALIST

## 2020-01-21 PROCEDURE — 36415 COLL VENOUS BLD VENIPUNCTURE: CPT

## 2020-01-21 PROCEDURE — 84145 PROCALCITONIN (PCT): CPT

## 2020-01-21 PROCEDURE — 99233 SBSQ HOSP IP/OBS HIGH 50: CPT | Performed by: HOSPITALIST

## 2020-01-21 PROCEDURE — 700105 HCHG RX REV CODE 258: Performed by: HOSPITALIST

## 2020-01-21 PROCEDURE — 94760 N-INVAS EAR/PLS OXIMETRY 1: CPT

## 2020-01-21 PROCEDURE — 83605 ASSAY OF LACTIC ACID: CPT | Mod: 91

## 2020-01-21 PROCEDURE — 94668 MNPJ CHEST WALL SBSQ: CPT

## 2020-01-21 RX ORDER — DOXYCYCLINE 100 MG/1
100 TABLET ORAL EVERY 12 HOURS
Status: COMPLETED | OUTPATIENT
Start: 2020-01-21 | End: 2020-01-26

## 2020-01-21 RX ORDER — SODIUM CHLORIDE 9 MG/ML
INJECTION, SOLUTION INTRAVENOUS CONTINUOUS
Status: DISPENSED | OUTPATIENT
Start: 2020-01-21 | End: 2020-01-22

## 2020-01-21 RX ADMIN — CHLORDIAZEPOXIDE HYDROCHLORIDE 25 MG: 25 CAPSULE ORAL at 22:31

## 2020-01-21 RX ADMIN — ACETAMINOPHEN 650 MG: 325 TABLET, FILM COATED ORAL at 22:31

## 2020-01-21 RX ADMIN — OMEPRAZOLE 20 MG: 20 CAPSULE, DELAYED RELEASE ORAL at 18:14

## 2020-01-21 RX ADMIN — OXYCODONE HYDROCHLORIDE 5 MG: 5 TABLET ORAL at 22:31

## 2020-01-21 RX ADMIN — PYRIDOXINE HCL TAB 50 MG 100 MG: 50 TAB at 04:54

## 2020-01-21 RX ADMIN — SUCRALFATE 1 G: 1 SUSPENSION ORAL at 18:10

## 2020-01-21 RX ADMIN — SUCRALFATE 1 G: 1 SUSPENSION ORAL at 22:30

## 2020-01-21 RX ADMIN — CEFTRIAXONE SODIUM 1 G: 1 INJECTION, POWDER, FOR SOLUTION INTRAMUSCULAR; INTRAVENOUS at 18:14

## 2020-01-21 RX ADMIN — DOXYCYCLINE 100 MG: 100 TABLET, FILM COATED ORAL at 18:14

## 2020-01-21 RX ADMIN — Medication 100 MG: at 04:55

## 2020-01-21 RX ADMIN — SUCRALFATE 1 G: 1 SUSPENSION ORAL at 04:54

## 2020-01-21 RX ADMIN — OMEPRAZOLE 20 MG: 20 CAPSULE, DELAYED RELEASE ORAL at 04:55

## 2020-01-21 RX ADMIN — SUCRALFATE 1 G: 1 SUSPENSION ORAL at 12:48

## 2020-01-21 RX ADMIN — LEVOTHYROXINE SODIUM 50 MCG: 50 TABLET ORAL at 04:55

## 2020-01-21 RX ADMIN — CHLORDIAZEPOXIDE HYDROCHLORIDE 25 MG: 25 CAPSULE ORAL at 04:55

## 2020-01-21 RX ADMIN — SODIUM CHLORIDE: 9 INJECTION, SOLUTION INTRAVENOUS at 18:11

## 2020-01-21 RX ADMIN — CHLORDIAZEPOXIDE HYDROCHLORIDE 25 MG: 25 CAPSULE ORAL at 18:10

## 2020-01-21 ASSESSMENT — ENCOUNTER SYMPTOMS
BLURRED VISION: 0
CHILLS: 0
NECK PAIN: 0
HEADACHES: 0
ABDOMINAL PAIN: 0
TINGLING: 0
DIZZINESS: 0
WHEEZING: 0
HEARTBURN: 0
DEPRESSION: 0
BACK PAIN: 0
MYALGIAS: 0
VOMITING: 0
BRUISES/BLEEDS EASILY: 0
COUGH: 1
PALPITATIONS: 0
FEVER: 0
SHORTNESS OF BREATH: 1
SPUTUM PRODUCTION: 1
NAUSEA: 0

## 2020-01-21 ASSESSMENT — LIFESTYLE VARIABLES: SUBSTANCE_ABUSE: 0

## 2020-01-21 NOTE — PROGRESS NOTES
"Received report from day shift RN. Assumed patient care at 1900  AOx4, pt upset regarding hip xray being incompleted, pt stating \"I've waited all day!\", Educated patient that xray is ordered  Left hip pain rated at 9/10, medicated per MAR  Room air  No complaints of nausea at this time, poor PO intake  +void +flatus +BM  Ambulates x1 assist with steady gait  High fall risk, bed alarm on  Call light within reach  Bed locked and in low position   POC discussed with patient  All needs met at this time.   "

## 2020-01-21 NOTE — ASSESSMENT & PLAN NOTE
Most likely due to pneumonia, procalcitonin is elevated, continue IV antibiotics.  No fever today.  Fever resolved

## 2020-01-21 NOTE — PROGRESS NOTES
"Hospital Medicine Daily Progress Note    Date of Service  1/21/2020    Chief Complaint  54 y.o. female admitted 1/11/2020 with vomiting blood    Hospital Course    Ms. Kahn is a past medical history of alcohol abuse with alcoholic liver disease was brought to the emergency room after she fell hit a curb with left-sided facial trauma.  She also had noted 2 weeks of hematemesis that had been red blood at first and then transition to more of a black coffee ground emesis.  She required 2 units of blood transfusions.  She underwent EGD by Dr. Davis in the operating room on 1/15/2020 revealing severe ulcerative erosive esophagitis with spontaneous oozing and adhered clot with a visible vessel for which she had a Hemoclip placed.      Interval Problem Update  1/16/20: Ms. Kahn was evaluated and examined in the ICU.   She admits to drinking a pint of vodka. She denies hallucination. +tremor.  She has had some intermittent vomiting though no black or blood in it.  1/17: patient seen and evaluated in the ICU.  Remains quite weak requiring substantial assistance to get out of bed.  She did receive a dose of IV Ativan this morning because of tremulousness.  Spoke with her nurse but her condition.  Her significant other did visit today.  1/18: Ms. Rios was evaluated in the ICU. She is very weak requiring assistance to walk to the chair. She has required 1 liter of oxygen as her sats went down to 84%. She is requesting IV ativan due to ongoing detox. Her significant other visited today.   1/19: patient evaluated and examined on the medical floor. She complains of ongoing heartburn. She is able to walk but requires assistance and notes, \"my equilibrium is off\". She is tolerating Boost. I discussed with RN about trying to taper off oxygen. Her sons and daughter are coming in today. I asked to meet with them and set up a meeting.   1/20: I met with Ms. Kahn, her significant other, her two sons, her daughter, and her " daughter's significant other about her condition. The  and RN were in the room. We had a long discussion about her diagnosis, work up, and home safety options. Ms. Kahn is open to SNF and orders placed.   40 minutes spent that of which over 50% of the time was spent in counseling about her diagnosis of alcohol-induced liver disease and upper GI bleed with falls.     1/21: Patient complains of fever, cough with yellowish sputum, no hemoptysis, mild shortness of breath, she is hard of hearing which is chronic, denies any nausea or vomiting, discussed with patient will get chest x-ray, procalcitonin, lactic acid, will start IV antibiotics, discussed case with patient's family, all questions answered.    Consultants/Specialty  Critical care.   GI     Code Status  full    Disposition  SNF     Review of Systems  Review of Systems   Constitutional: Negative for chills and fever.   HENT: Negative for congestion and nosebleeds.    Eyes: Negative for blurred vision.   Respiratory: Positive for cough, sputum production and shortness of breath. Negative for wheezing.    Cardiovascular: Negative for chest pain, palpitations and leg swelling.   Gastrointestinal: Negative for abdominal pain, heartburn, nausea and vomiting.   Genitourinary: Negative for dysuria, frequency and urgency.   Musculoskeletal: Negative for back pain, myalgias and neck pain.   Neurological: Negative for dizziness, tingling and headaches.   Endo/Heme/Allergies: Does not bruise/bleed easily.   Psychiatric/Behavioral: Negative for depression, substance abuse and suicidal ideas.        Physical Exam  Temp:  [37.1 °C (98.7 °F)-38.1 °C (100.6 °F)] 37.1 °C (98.7 °F)  Pulse:  [] 73  Resp:  [16-18] 17  BP: ()/(53-67) 105/67  SpO2:  [90 %-99 %] 99 %    Physical Exam  Vitals signs and nursing note reviewed.   Constitutional:       General: She is not in acute distress.  HENT:      Head: Normocephalic and atraumatic.      Comments: Left chin  and left face bruise  Left eye periorbital bruise       Nose: Nose normal.      Mouth/Throat:      Mouth: Mucous membranes are dry.      Pharynx: Oropharynx is clear.   Eyes:      General: Scleral icterus present.      Conjunctiva/sclera: Conjunctivae normal.   Neck:      Musculoskeletal: Normal range of motion and neck supple.   Cardiovascular:      Rate and Rhythm: Normal rate and regular rhythm.      Heart sounds: No murmur.   Pulmonary:      Effort: Pulmonary effort is normal. No respiratory distress.      Breath sounds: Rhonchi and rales present.   Abdominal:      General: Bowel sounds are normal. There is no distension.      Palpations: Abdomen is soft.      Tenderness: There is no tenderness. There is no guarding.   Musculoskeletal:      Right lower leg: No edema.      Left lower leg: No edema.   Skin:     General: Skin is warm and dry.      Coloration: Skin is jaundiced.   Neurological:      General: No focal deficit present.      Mental Status: She is alert and oriented to person, place, and time.   Psychiatric:         Mood and Affect: Mood normal.         Behavior: Behavior normal.      Comments: Hard of hearing         Fluids    Intake/Output Summary (Last 24 hours) at 1/21/2020 1557  Last data filed at 1/21/2020 1330  Gross per 24 hour   Intake 480 ml   Output 0 ml   Net 480 ml       Laboratory                        Imaging  DX-CHEST-2 VIEWS   Final Result      Small to moderate bilateral pleural effusions and bibasilar atelectasis versus consolidations. Lung aeration has slightly improved since prior study.      DX-HIP-COMPLETE - UNILATERAL 2+ LEFT   Final Result      Mild osteoarthritis of the hips. No acute abnormality.      DX-CHEST-2 VIEWS   Final Result      Interval worsening in bibasilar opacities with air bronchograms. Findings are concerning for pneumonia. Follow-up imaging is recommended to document resolution.      US-ABDOMEN COMPLETE SURVEY   Final Result         1.  Gallbladder sludge  without additional sonographic findings to indicate acute cholecystitis.   2.  Massive hepatomegaly and echogenic liver, compatible with fatty change versus fibrosis.         DX-CHEST-PORTABLE (1 VIEW)   Final Result      1.  Right internal jugular catheter placement with the tip projecting over the cavoatrial junction. No pneumothorax is identified.      2.  Left basilar opacification may be related to atelectasis.      DX-CHEST-PORTABLE (1 VIEW)   Final Result      No acute cardiopulmonary findings.      CT-HEAD W/O   Final Result      1.  No acute intracranial findings.      2.  Left nasal bone fracture.         CT-CSPINE WITHOUT PLUS RECONS   Final Result      1.  No acute fracture is identified.      2.  Multilevel degenerative disc disease and facet arthropathy as described.      CT-MAXILLOFACIAL W/O PLUS RECONS   Final Result         Left nasal bone fracture      DX-THORACIC SPINE-2 VIEWS   Final Result      No acute fracture is identified.      DX-LUMBAR SPINE-4+ VIEWS   Final Result         1.  No acute fracture is identified.      2.  Facet arthropathy in the lower lumbar spine.           Assessment/Plan  * GIB (gastrointestinal bleeding)- (present on admission)  Assessment & Plan  Upper GI bleed given her bright red bleeding followed by coffee-ground emesis.   She was admitted to the ICU on a protonix and octreotide drip.  She underwent EGD 1/15 revealing severe ulcerative erosive esophagitis with hemoclip  Serial Hb levels ordered  She was transfused 2 units RBC due to acute blood loss anemia  Continue oral Prilosec twice daily      Falls frequently- (present on admission)  Assessment & Plan  CT head negative for bleed  Secondary to EtOH  Family conference on 1/20 for placement options such as SNF    Lactic acidosis- (present on admission)  Assessment & Plan  Will add gentle ivf.     Hypotension- (present on admission)  Assessment & Plan  Stable continue monitoring    Hematuria- (present on  admission)  Assessment & Plan  resolved    Alcohol withdrawal syndrome with complication (HCC)- (present on admission)  Assessment & Plan  Tremors without delirium  Status post IV Ativan and oral Librium.  Improved    Troponin level elevated- (present on admission)  Assessment & Plan  Suspect this could be demand ischemia in the setting of gastrointestinal hemorrhage.    No chest pain. Troponin back to normal.    Alcoholic liver disease (HCC)- (present on admission)  Assessment & Plan  With associated hepatic dysfunction in the form of thrombocytopenia, but with normal coagulation pattern.    Bilirubin 5 which may be exacerbated by the degree of bruising.  Liver ultrasound showed Massive hepatomegaly and echogenic liver, compatible with fatty change versus fibrosis.    Metabolic acidosis- (present on admission)  Assessment & Plan  Improved, lactic acid today 2.3, will give gentle IV fluids, continue monitoring lactic acid every 4 hours.    Macrocytic anemia- (present on admission)  Assessment & Plan  Suspect due to chronic alcoholism, likely component of acute bleed superimposed.  Monitor.  Last hemoglobin 9.2, follow-up CBC in a.m.    Fever  Assessment & Plan  Previous chest x-ray showed bibasilar opacities with air bronchograms concerning for pneumonia, will repeat chest x-ray today, procalcitonin, lactic acid, start IV ceftriaxone and doxycycline for possible pneumonia.    Hypoxia- (present on admission)  Assessment & Plan  Improved. cxr showed possible pneumonia.     Closed fracture of nasal bone- (present on admission)  Assessment & Plan  Non-operative    Hypokalemia- (present on admission)  Assessment & Plan  Resolved    Hypothyroidism- (present on admission)  Assessment & Plan  On synthroid 50 mcg daily  TSH is markedly elevated at 31  Though she unlikely was compliant with synthroid thus the dose will not be increased.  Will need close f/u as outpatient.     Thrombocytopenia (HCC)- (present on  admission)  Assessment & Plan  Likely secondary to liver disease and alcohol induced bone marrow suppression, slowly improving.       Hyponatremia- (present on admission)  Assessment & Plan  Resolved with IV fluids       VTE prophylaxis: SCDs    Case and plan of care discussed with nurse staff  Case and plan of care discussed during multidisciplinary rounds

## 2020-01-21 NOTE — DISCHARGE PLANNING
Agency/Facility Name: [facility]  Spoke To: Neda  Outcome: Patient declined due to Medicaid.

## 2020-01-21 NOTE — ASSESSMENT & PLAN NOTE
Asymptomatic, Sbp 90s.  Suspect due to liver disease, low baseline.   Plan patient had reported she always has low bp in the 90's-90's systolic.   Encourage intake. Continue midodrine.  Monitor for acute symptoms.

## 2020-01-21 NOTE — PROGRESS NOTES
Report received. Assessment completed.  Pt is A&O x4. Pt on room air when awake, desats when sleeping requiring 1-2LO2  Denies pain and nausea at this time.  Last BM 1/19.   +void.  Tolerating diet   Skin jaundiced and bruised throughout  Pt up with 1 assist and FWW-unsteady gait.   Call light within reach. All needs met at this time. Fall Precautions and hourly rounding in place.

## 2020-01-21 NOTE — DISCHARGE PLANNING
Agency/Facility Name: [facility]   Outcome: Left voice message for Neda in admissions in regards to patient referral. Requested a call back.     Agency/Facility Name: [facility]  Outcome: Notification via Epic - patient declined, no Medicaid beds available.     Agency/Facility Name: [facility]   Outcome: Notification via Epic - patient declined due to Medicaid.

## 2020-01-21 NOTE — CARE PLAN
Problem: Hyperinflation:  Goal: Prevent or improve atelectasis  Outcome: PROGRESSING AS EXPECTED   IS QID best value 1500

## 2020-01-21 NOTE — DISCHARGE PLANNING
Agency/Facility Name: [facility]   Spoke To: Cortney  Outcome: Patient accepted pending insurance authorization and bed availability. Per Cortney, bed wont be available until next week.     Agency/Facility Name: [facility]  Spoke To: Ashlie  Outcome: Patient accepted.    Agency/Facility Name: [facility]  Outcome: Notification via Epic - patient accepted.

## 2020-01-21 NOTE — DISCHARGE PLANNING
Received Choice form at [id number]  Agency/Facility Name: [facility] [place]  Referral sent per Choice form @ [id number]

## 2020-01-21 NOTE — CARE PLAN
Problem: Knowledge Deficit  Goal: Knowledge of disease process/condition, treatment plan, diagnostic tests, and medications will improve  Outcome: PROGRESSING AS EXPECTED     Treatment plan updated to patient    Problem: Communication  Goal: The ability to communicate needs accurately and effectively will improve  Outcome: PROGRESSING SLOWER THAN EXPECTED     Education regarding call light use reinforced

## 2020-01-21 NOTE — DISCHARGE PLANNING
Pt SNF placement is difficult r/t insurance. Spoke with pt at bedside. Received signed blanket referral.

## 2020-01-22 ENCOUNTER — DOCUMENTATION (OUTPATIENT)
Dept: HEALTH INFORMATION MANAGEMENT | Facility: OTHER | Age: 55
End: 2020-01-22

## 2020-01-22 LAB
ALBUMIN SERPL BCP-MCNC: 2.7 G/DL (ref 3.2–4.9)
ALBUMIN/GLOB SERPL: 1.1 G/DL
ALP SERPL-CCNC: 198 U/L (ref 30–99)
ALT SERPL-CCNC: 25 U/L (ref 2–50)
AMMONIA PLAS-SCNC: 82 UMOL/L (ref 11–45)
ANION GAP SERPL CALC-SCNC: 10 MMOL/L (ref 0–11.9)
APTT PPP: 40.6 SEC (ref 24.7–36)
AST SERPL-CCNC: 87 U/L (ref 12–45)
BASOPHILS # BLD AUTO: 1.6 % (ref 0–1.8)
BASOPHILS # BLD: 0.15 K/UL (ref 0–0.12)
BILIRUB SERPL-MCNC: 9.1 MG/DL (ref 0.1–1.5)
BILIRUB SERPL-MCNC: 9.2 MG/DL (ref 0.1–1.5)
BUN SERPL-MCNC: 9 MG/DL (ref 8–22)
CALCIUM SERPL-MCNC: 8.2 MG/DL (ref 8.5–10.5)
CHLORIDE SERPL-SCNC: 110 MMOL/L (ref 96–112)
CO2 SERPL-SCNC: 20 MMOL/L (ref 20–33)
CREAT SERPL-MCNC: 0.89 MG/DL (ref 0.5–1.4)
EOSINOPHIL # BLD AUTO: 0.14 K/UL (ref 0–0.51)
EOSINOPHIL NFR BLD: 1.5 % (ref 0–6.9)
ERYTHROCYTE [DISTWIDTH] IN BLOOD BY AUTOMATED COUNT: 91.7 FL (ref 35.9–50)
GLOBULIN SER CALC-MCNC: 2.5 G/DL (ref 1.9–3.5)
GLUCOSE SERPL-MCNC: 88 MG/DL (ref 65–99)
HCT VFR BLD AUTO: 28.2 % (ref 37–47)
HGB BLD-MCNC: 8.6 G/DL (ref 12–16)
IMM GRANULOCYTES # BLD AUTO: 0.07 K/UL (ref 0–0.11)
IMM GRANULOCYTES NFR BLD AUTO: 0.7 % (ref 0–0.9)
INR PPP: 1.22 (ref 0.87–1.13)
LYMPHOCYTES # BLD AUTO: 1.01 K/UL (ref 1–4.8)
LYMPHOCYTES NFR BLD: 10.5 % (ref 22–41)
MCH RBC QN AUTO: 35.1 PG (ref 27–33)
MCHC RBC AUTO-ENTMCNC: 30.5 G/DL (ref 33.6–35)
MCV RBC AUTO: 115.1 FL (ref 81.4–97.8)
MONOCYTES # BLD AUTO: 1.01 K/UL (ref 0–0.85)
MONOCYTES NFR BLD AUTO: 10.5 % (ref 0–13.4)
NEUTROPHILS # BLD AUTO: 7.26 K/UL (ref 2–7.15)
NEUTROPHILS NFR BLD: 75.2 % (ref 44–72)
NRBC # BLD AUTO: 0 K/UL
NRBC BLD-RTO: 0 /100 WBC
PLATELET # BLD AUTO: 254 K/UL (ref 164–446)
PMV BLD AUTO: 9.8 FL (ref 9–12.9)
POTASSIUM SERPL-SCNC: 3.3 MMOL/L (ref 3.6–5.5)
PROCALCITONIN SERPL-MCNC: 0.95 NG/ML
PROT SERPL-MCNC: 5.2 G/DL (ref 6–8.2)
PROTHROMBIN TIME: 15.7 SEC (ref 12–14.6)
RBC # BLD AUTO: 2.45 M/UL (ref 4.2–5.4)
SODIUM SERPL-SCNC: 140 MMOL/L (ref 135–145)
WBC # BLD AUTO: 9.6 K/UL (ref 4.8–10.8)

## 2020-01-22 PROCEDURE — 700111 HCHG RX REV CODE 636 W/ 250 OVERRIDE (IP): Performed by: HOSPITALIST

## 2020-01-22 PROCEDURE — 700102 HCHG RX REV CODE 250 W/ 637 OVERRIDE(OP): Performed by: INTERNAL MEDICINE

## 2020-01-22 PROCEDURE — A9270 NON-COVERED ITEM OR SERVICE: HCPCS | Performed by: INTERNAL MEDICINE

## 2020-01-22 PROCEDURE — 770001 HCHG ROOM/CARE - MED/SURG/GYN PRIV*

## 2020-01-22 PROCEDURE — 85025 COMPLETE CBC W/AUTO DIFF WBC: CPT

## 2020-01-22 PROCEDURE — 700102 HCHG RX REV CODE 250 W/ 637 OVERRIDE(OP): Performed by: HOSPITALIST

## 2020-01-22 PROCEDURE — 84145 PROCALCITONIN (PCT): CPT

## 2020-01-22 PROCEDURE — A9270 NON-COVERED ITEM OR SERVICE: HCPCS | Performed by: HOSPITALIST

## 2020-01-22 PROCEDURE — 99233 SBSQ HOSP IP/OBS HIGH 50: CPT | Performed by: HOSPITALIST

## 2020-01-22 PROCEDURE — 85730 THROMBOPLASTIN TIME PARTIAL: CPT

## 2020-01-22 PROCEDURE — 82140 ASSAY OF AMMONIA: CPT

## 2020-01-22 PROCEDURE — 85610 PROTHROMBIN TIME: CPT

## 2020-01-22 PROCEDURE — 700105 HCHG RX REV CODE 258: Performed by: HOSPITALIST

## 2020-01-22 PROCEDURE — 82247 BILIRUBIN TOTAL: CPT

## 2020-01-22 PROCEDURE — 302128 INFUSION PUMP: Performed by: HOSPITALIST

## 2020-01-22 PROCEDURE — 36415 COLL VENOUS BLD VENIPUNCTURE: CPT

## 2020-01-22 PROCEDURE — 94760 N-INVAS EAR/PLS OXIMETRY 1: CPT

## 2020-01-22 PROCEDURE — 80053 COMPREHEN METABOLIC PANEL: CPT

## 2020-01-22 RX ORDER — MIDODRINE HYDROCHLORIDE 5 MG/1
2.5 TABLET ORAL
Status: DISCONTINUED | OUTPATIENT
Start: 2020-01-22 | End: 2020-01-23

## 2020-01-22 RX ORDER — LACTULOSE 20 G/30ML
30 SOLUTION ORAL 3 TIMES DAILY
Status: DISCONTINUED | OUTPATIENT
Start: 2020-01-22 | End: 2020-01-31 | Stop reason: HOSPADM

## 2020-01-22 RX ORDER — POTASSIUM CHLORIDE 20 MEQ/1
40 TABLET, EXTENDED RELEASE ORAL ONCE
Status: COMPLETED | OUTPATIENT
Start: 2020-01-22 | End: 2020-01-22

## 2020-01-22 RX ORDER — CHLORDIAZEPOXIDE HYDROCHLORIDE 25 MG/1
25 CAPSULE, GELATIN COATED ORAL 2 TIMES DAILY
Status: DISCONTINUED | OUTPATIENT
Start: 2020-01-22 | End: 2020-01-24

## 2020-01-22 RX ADMIN — PYRIDOXINE HCL TAB 50 MG 100 MG: 50 TAB at 05:05

## 2020-01-22 RX ADMIN — CHLORDIAZEPOXIDE HYDROCHLORIDE 25 MG: 25 CAPSULE ORAL at 05:01

## 2020-01-22 RX ADMIN — SUCRALFATE 1 G: 1 SUSPENSION ORAL at 18:18

## 2020-01-22 RX ADMIN — CHLORDIAZEPOXIDE HYDROCHLORIDE 25 MG: 25 CAPSULE ORAL at 18:20

## 2020-01-22 RX ADMIN — MIDODRINE HYDROCHLORIDE 2.5 MG: 5 TABLET ORAL at 18:20

## 2020-01-22 RX ADMIN — LACTULOSE 30 ML: 20 SOLUTION ORAL at 05:08

## 2020-01-22 RX ADMIN — Medication 100 MG: at 05:01

## 2020-01-22 RX ADMIN — SODIUM CHLORIDE: 9 INJECTION, SOLUTION INTRAVENOUS at 04:58

## 2020-01-22 RX ADMIN — SUCRALFATE 1 G: 1 SUSPENSION ORAL at 05:01

## 2020-01-22 RX ADMIN — DOXYCYCLINE 100 MG: 100 TABLET, FILM COATED ORAL at 18:20

## 2020-01-22 RX ADMIN — POTASSIUM CHLORIDE 40 MEQ: 1500 TABLET, EXTENDED RELEASE ORAL at 11:17

## 2020-01-22 RX ADMIN — ACETAMINOPHEN 650 MG: 325 TABLET, FILM COATED ORAL at 05:01

## 2020-01-22 RX ADMIN — CEFTRIAXONE SODIUM 1 G: 1 INJECTION, POWDER, FOR SOLUTION INTRAMUSCULAR; INTRAVENOUS at 04:58

## 2020-01-22 RX ADMIN — RIFAXIMIN 550 MG: 550 TABLET ORAL at 11:17

## 2020-01-22 RX ADMIN — LACTULOSE 30 ML: 20 SOLUTION ORAL at 18:17

## 2020-01-22 RX ADMIN — LACTULOSE 30 ML: 20 SOLUTION ORAL at 11:17

## 2020-01-22 RX ADMIN — LEVOTHYROXINE SODIUM 50 MCG: 50 TABLET ORAL at 05:01

## 2020-01-22 RX ADMIN — OMEPRAZOLE 20 MG: 20 CAPSULE, DELAYED RELEASE ORAL at 18:20

## 2020-01-22 RX ADMIN — OMEPRAZOLE 20 MG: 20 CAPSULE, DELAYED RELEASE ORAL at 05:01

## 2020-01-22 RX ADMIN — DOXYCYCLINE 100 MG: 100 TABLET, FILM COATED ORAL at 05:01

## 2020-01-22 RX ADMIN — RIFAXIMIN 550 MG: 550 TABLET ORAL at 18:20

## 2020-01-22 RX ADMIN — SUCRALFATE 1 G: 1 SUSPENSION ORAL at 11:17

## 2020-01-22 ASSESSMENT — ENCOUNTER SYMPTOMS
VOMITING: 0
WHEEZING: 0
SHORTNESS OF BREATH: 0
COUGH: 1
HEARTBURN: 0
SPUTUM PRODUCTION: 1
BRUISES/BLEEDS EASILY: 0
DEPRESSION: 0
FEVER: 0
PALPITATIONS: 0
HEADACHES: 0
MYALGIAS: 0
NAUSEA: 0
NECK PAIN: 0
BLURRED VISION: 0
TINGLING: 0
DIZZINESS: 0

## 2020-01-22 ASSESSMENT — COGNITIVE AND FUNCTIONAL STATUS - GENERAL
DAILY ACTIVITIY SCORE: 19
CLIMB 3 TO 5 STEPS WITH RAILING: A LOT
WALKING IN HOSPITAL ROOM: A LOT
SUGGESTED CMS G CODE MODIFIER MOBILITY: CK
TOILETING: A LITTLE
MOVING FROM LYING ON BACK TO SITTING ON SIDE OF FLAT BED: A LITTLE
MOVING TO AND FROM BED TO CHAIR: A LITTLE
STANDING UP FROM CHAIR USING ARMS: A LOT
DRESSING REGULAR LOWER BODY CLOTHING: A LOT
SUGGESTED CMS G CODE MODIFIER DAILY ACTIVITY: CK
DRESSING REGULAR UPPER BODY CLOTHING: A LITTLE
HELP NEEDED FOR BATHING: A LITTLE
MOBILITY SCORE: 16

## 2020-01-22 ASSESSMENT — LIFESTYLE VARIABLES: SUBSTANCE_ABUSE: 0

## 2020-01-22 NOTE — PROGRESS NOTES
Lab results back: total bilirubin 9.1 and ammonia 82. Results read back to Dr Michaud. New orders received.

## 2020-01-22 NOTE — PROGRESS NOTES
"Hospital Medicine Daily Progress Note    Date of Service  1/22/2020    Chief Complaint  54 y.o. female admitted 1/11/2020 with vomiting blood    Hospital Course    Ms. Kahn is a past medical history of alcohol abuse with alcoholic liver disease was brought to the emergency room after she fell hit a curb with left-sided facial trauma.  She also had noted 2 weeks of hematemesis that had been red blood at first and then transition to more of a black coffee ground emesis.  She required 2 units of blood transfusions.  She underwent EGD by Dr. Davis in the operating room on 1/15/2020 revealing severe ulcerative erosive esophagitis with spontaneous oozing and adhered clot with a visible vessel for which she had a Hemoclip placed.      Interval Problem Update  1/16/20: Ms. Kahn was evaluated and examined in the ICU.   She admits to drinking a pint of vodka. She denies hallucination. +tremor.  She has had some intermittent vomiting though no black or blood in it.  1/17: patient seen and evaluated in the ICU.  Remains quite weak requiring substantial assistance to get out of bed.  She did receive a dose of IV Ativan this morning because of tremulousness.  Spoke with her nurse but her condition.  Her significant other did visit today.  1/18: Ms. Rios was evaluated in the ICU. She is very weak requiring assistance to walk to the chair. She has required 1 liter of oxygen as her sats went down to 84%. She is requesting IV ativan due to ongoing detox. Her significant other visited today.   1/19: patient evaluated and examined on the medical floor. She complains of ongoing heartburn. She is able to walk but requires assistance and notes, \"my equilibrium is off\". She is tolerating Boost. I discussed with RN about trying to taper off oxygen. Her sons and daughter are coming in today. I asked to meet with them and set up a meeting.   1/20: I met with Ms. Kahn, her significant other, her two sons, her daughter, and her " daughter's significant other about her condition. The  and RN were in the room. We had a long discussion about her diagnosis, work up, and home safety options. Ms. Kahn is open to SNF and orders placed.   40 minutes spent that of which over 50% of the time was spent in counseling about her diagnosis of alcohol-induced liver disease and upper GI bleed with falls.     1/21: Patient complains of fever, cough with yellowish sputum, no hemoptysis, mild shortness of breath, she is hard of hearing which is chronic, denies any nausea or vomiting, discussed with patient will get chest x-ray, procalcitonin, lactic acid, will start IV antibiotics, discussed case with patient's family, all questions answered.  1/22: Patient is alert and oriented, continue having cough but improved, no more fevers, she still feels weak, no nausea no vomiting.  Discussed with patient regarding her labs today, discussed with her regarding her liver disease and she was advised to stop drinking completely otherwise her liver disease will continue progressing.    Ammonia level are high, started on rifaximin, continue lactulose, follow-up ammonia level in a.m.    Consultants/Specialty  Critical care.   GI     Code Status  full    Disposition  SNF     Review of Systems  Review of Systems   Constitutional: Negative for fever.   HENT: Negative for congestion and nosebleeds.    Eyes: Negative for blurred vision.   Respiratory: Positive for cough and sputum production. Negative for shortness of breath and wheezing.    Cardiovascular: Negative for chest pain and palpitations.   Gastrointestinal: Negative for heartburn, nausea and vomiting.   Genitourinary: Negative for dysuria and urgency.   Musculoskeletal: Negative for myalgias and neck pain.   Neurological: Negative for dizziness, tingling and headaches.   Endo/Heme/Allergies: Does not bruise/bleed easily.   Psychiatric/Behavioral: Negative for depression, substance abuse and suicidal  ideas.        Physical Exam  Temp:  [36.2 °C (97.2 °F)-37.3 °C (99.1 °F)] 36.2 °C (97.2 °F)  Pulse:  [] 84  Resp:  [16-19] 18  BP: ()/(16-70) 92/70  SpO2:  [91 %-96 %] 94 %    Physical Exam  Vitals signs and nursing note reviewed.   Constitutional:       General: She is not in acute distress.  HENT:      Head: Normocephalic and atraumatic.      Comments: Left chin and left face bruise  Left eye periorbital bruise       Nose: Nose normal.      Mouth/Throat:      Mouth: Mucous membranes are dry.      Pharynx: Oropharynx is clear.   Eyes:      General: Scleral icterus present.      Conjunctiva/sclera: Conjunctivae normal.   Neck:      Musculoskeletal: Normal range of motion and neck supple.   Cardiovascular:      Rate and Rhythm: Normal rate and regular rhythm.      Heart sounds: No murmur.   Pulmonary:      Effort: Pulmonary effort is normal. No respiratory distress.      Breath sounds: Rhonchi and rales present.   Abdominal:      General: Bowel sounds are normal. There is no distension.      Palpations: Abdomen is soft.      Tenderness: There is no tenderness. There is no guarding.   Musculoskeletal:      Right lower leg: No edema.      Left lower leg: No edema.   Skin:     General: Skin is warm and dry.      Coloration: Skin is jaundiced.   Neurological:      General: No focal deficit present.      Mental Status: She is alert and oriented to person, place, and time.   Psychiatric:         Mood and Affect: Mood normal.         Behavior: Behavior normal.      Comments: Hard of hearing         Fluids    Intake/Output Summary (Last 24 hours) at 1/22/2020 1456  Last data filed at 1/22/2020 1320  Gross per 24 hour   Intake 1768.75 ml   Output 0 ml   Net 1768.75 ml       Laboratory  Recent Labs     01/22/20  0353   WBC 9.6   RBC 2.45*   HEMOGLOBIN 8.6*   HEMATOCRIT 28.2*   .1*   MCH 35.1*   MCHC 30.5*   RDW 91.7*   PLATELETCT 254   MPV 9.8     Recent Labs     01/22/20  0353   SODIUM 140   POTASSIUM 3.3*    CHLORIDE 110   CO2 20   GLUCOSE 88   BUN 9   CREATININE 0.89   CALCIUM 8.2*     Recent Labs     01/22/20  1034   APTT 40.6*   INR 1.22*               Imaging  DX-CHEST-2 VIEWS   Final Result      Small to moderate bilateral pleural effusions and bibasilar atelectasis versus consolidations. Lung aeration has slightly improved since prior study.      DX-HIP-COMPLETE - UNILATERAL 2+ LEFT   Final Result      Mild osteoarthritis of the hips. No acute abnormality.      DX-CHEST-2 VIEWS   Final Result      Interval worsening in bibasilar opacities with air bronchograms. Findings are concerning for pneumonia. Follow-up imaging is recommended to document resolution.      US-ABDOMEN COMPLETE SURVEY   Final Result         1.  Gallbladder sludge without additional sonographic findings to indicate acute cholecystitis.   2.  Massive hepatomegaly and echogenic liver, compatible with fatty change versus fibrosis.         DX-CHEST-PORTABLE (1 VIEW)   Final Result      1.  Right internal jugular catheter placement with the tip projecting over the cavoatrial junction. No pneumothorax is identified.      2.  Left basilar opacification may be related to atelectasis.      DX-CHEST-PORTABLE (1 VIEW)   Final Result      No acute cardiopulmonary findings.      CT-HEAD W/O   Final Result      1.  No acute intracranial findings.      2.  Left nasal bone fracture.         CT-CSPINE WITHOUT PLUS RECONS   Final Result      1.  No acute fracture is identified.      2.  Multilevel degenerative disc disease and facet arthropathy as described.      CT-MAXILLOFACIAL W/O PLUS RECONS   Final Result         Left nasal bone fracture      DX-THORACIC SPINE-2 VIEWS   Final Result      No acute fracture is identified.      DX-LUMBAR SPINE-4+ VIEWS   Final Result         1.  No acute fracture is identified.      2.  Facet arthropathy in the lower lumbar spine.           Assessment/Plan  * GIB (gastrointestinal bleeding)- (present on admission)  Assessment  & Plan  Upper GI bleed given her bright red bleeding followed by coffee-ground emesis.   She was admitted to the ICU on a protonix and octreotide drip.  She underwent EGD 1/15 revealing severe ulcerative erosive esophagitis with hemoclip  Serial Hb levels ordered  She was transfused 2 units RBC due to acute blood loss anemia  Continue oral Prilosec twice daily      Falls frequently- (present on admission)  Assessment & Plan  CT head negative for bleed  Secondary to EtOH  Family conference on 1/20 for placement options such as SNF    Lactic acidosis- (present on admission)  Assessment & Plan  Resolved, lactic acid is back to normal.    Hypotension- (present on admission)  Assessment & Plan  Stable continue monitoring, continue IV fluids, will try midodrine    Hematuria- (present on admission)  Assessment & Plan  resolved    Alcohol withdrawal syndrome with complication (HCC)- (present on admission)  Assessment & Plan  Tremors without delirium  Status post IV Ativan   Start tapering off Librium improved    Troponin level elevated- (present on admission)  Assessment & Plan  Suspect this could be demand ischemia in the setting of gastrointestinal hemorrhage.    No chest pain. Troponin back to normal.    Alcoholic liver disease (HCC)- (present on admission)  Assessment & Plan  With associated hepatic dysfunction in the form of thrombocytopenia, but with normal coagulation pattern.    Bilirubin is 9 today.  Liver ultrasound showed Massive hepatomegaly and echogenic liver, compatible with fatty change versus fibrosis.  Meld score 17, child Evans score 11, 55% 1 year mortality.  Patient was again advised to stop drinking alcohol.      Metabolic acidosis- (present on admission)  Assessment & Plan  Lactic acid back to normal    Macrocytic anemia- (present on admission)  Assessment & Plan  Suspect due to chronic alcoholism, likely component of acute bleed superimposed.  Monitor.  Hemoglobin is stable 8.6  today.    Fever  Assessment & Plan  Most likely due to pneumonia, procalcitonin is elevated, continue IV antibiotics.  No fever today.    Hypoxia- (present on admission)  Assessment & Plan  Improved. cxr showed possible pneumonia.  Elevated procalcitonin, continue IV antibiotics    Closed fracture of nasal bone- (present on admission)  Assessment & Plan  Non-operative    Hypokalemia- (present on admission)  Assessment & Plan  Replacing today    Hypothyroidism- (present on admission)  Assessment & Plan  On synthroid 50 mcg daily  TSH is markedly elevated at 31  Though she unlikely was compliant with synthroid thus the dose will not be increased.  Will need close f/u as outpatient.     Thrombocytopenia (HCC)- (present on admission)  Assessment & Plan  Likely secondary to liver disease and alcohol induced bone marrow suppression, improved.       Hyponatremia- (present on admission)  Assessment & Plan  Resolved with IV fluids       VTE prophylaxis: SCDs    Case and plan of care discussed with nurse staff  Case and plan of care discussed during multidisciplinary rounds

## 2020-01-22 NOTE — PROGRESS NOTES
Pt was up with CNA to the restroom, lost balance while trying to turn to the toilet and fell to the floor.  Pt states she hit her Right eye on the FWW.  This RN was called to the room after the fall.  Pt was sitting on left leg in restroom with nothing in front of her.  Pt was assisted off the floor to the toilet with 2 assist.  After using the restroom she used the FWW to ambulate back to bed.    Pt has no visible injuries and states she doesn't have any pain anywhere.    Vitals   BP 84/69  HR 89  SPO2 90 on RA    MD notified

## 2020-01-22 NOTE — PROGRESS NOTES
At 1430 patient asked to go to restroom, this CNA (Demond) took pt to bathroom. At first the pt refused her walker this CNA stated she was uncomfortable to take her without walker. She the decided to take walker, once in bathroom the pt was trying to turn to the right but needed to turn to the left due to IV tubing behind her back. PT refused to listen to CNA and continue moving to the right, pt then fling herslf on floor, hit her knee. This CNA assisted pt to the floor, and pt  stated this CNA is to little to help her to the bathroom. This CNA has been assisgn to pt for the last two days. The pt is X1 assisit with a front wheel walker.        After talking with pt RN and charge nurse we will have her be a X2 assist with front wheel walker.

## 2020-01-22 NOTE — CARE PLAN
Problem: Hyperinflation:  Goal: Prevent or improve atelectasis  Outcome: PROGRESSING AS EXPECTED  Note:   IS QID 1000

## 2020-01-22 NOTE — DISCHARGE PLANNING
Agency/Facility Name: [facility]  Spoke To: Antonina  Outcome: Bed is available for pt today, will need to submit for insurance authorization. Pts insurance will need updated therapy notes.     CELIO Veloz notified.

## 2020-01-22 NOTE — PROGRESS NOTES
Report received. Assessment completed.  Pt is A&O x4. Pt on room air when awake, desats when sleeping requiring 1-2LO2  Denies pain and nausea at this time.  +void  Tolerating diet   Skin jaundiced and bruised throughout  Pt up with 1 assist and FWW-unsteady gait, noted to be weaker on feet today than yesterday   Call light within reach. All needs met at this time. Fall Precautions and hourly rounding in place

## 2020-01-22 NOTE — PROGRESS NOTES
Pt jaundiced and increasingly weak. Pt's last total bilirubin was 5.8 on 1/16/20.  This RN paged and spoke with Dr Michaud about adding a total bili and ammonia to this AM's labs. New orders received.

## 2020-01-22 NOTE — PROGRESS NOTES
Report received from Clare PICKENS  Assumed care of patient at 1900.    Pt is A&O x 4.  Pain reported at 2/10 and then 5/10. Pt was medicated per MAR.  Nausea denied  Tolerating Diet with poor appetite.  Bruising and trace swelling noted to face (eyes), neck, lt thigh and BUE.   Skin is jaundiced throughout.   + Urine output  + BM    + Flatus  Up x 1 assist with front wheel walker. Pt is unsteady and requires staff assistance to stay standing.   SCD's refused despite education.   This RN spoke to daughter via phone per pt request.   Bed in lowest position and locked.  Bed alarm in place and on.   Pt resting comfortably now.  Review plan of care with patient  Call light within reach  Hourly rounds in place  All needs met at this time

## 2020-01-22 NOTE — CARE PLAN
Problem: Communication  Goal: The ability to communicate needs accurately and effectively will improve  Outcome: PROGRESSING SLOWER THAN EXPECTED  Intervention: [place] patient and significant other/support system to call light to alert staff of needs  Note:   Pt demonstrates appropriate use of call light. However, pt is very hard of hearing despite hearing aid use.      Problem: Knowledge Deficit  Goal: Knowledge of disease process/condition, treatment plan, diagnostic tests, and medications will improve  Outcome: PROGRESSING AS EXPECTED  Goal: Knowledge of the prescribed therapeutic regimen will improve  Outcome: PROGRESSING AS EXPECTED  Intervention: Discuss information regarding therpeutic regimen and document in education  Note:   Pt was educated on POC, MAR, shift routine and nursing education R/T Dx. Questions were encouraged and answered. Daughter included in some education per pt request. Pt verbalized understanding of all teaching.

## 2020-01-23 LAB
ALBUMIN SERPL BCP-MCNC: 2.9 G/DL (ref 3.2–4.9)
ALBUMIN/GLOB SERPL: 1 G/DL
ALP SERPL-CCNC: 184 U/L (ref 30–99)
ALT SERPL-CCNC: 21 U/L (ref 2–50)
AMMONIA PLAS-SCNC: 57 UMOL/L (ref 11–45)
ANION GAP SERPL CALC-SCNC: 11 MMOL/L (ref 0–11.9)
ANISOCYTOSIS BLD QL SMEAR: ABNORMAL
AST SERPL-CCNC: 74 U/L (ref 12–45)
BASOPHILS # BLD AUTO: 1.7 % (ref 0–1.8)
BASOPHILS # BLD: 0.16 K/UL (ref 0–0.12)
BILIRUB SERPL-MCNC: 9.5 MG/DL (ref 0.1–1.5)
BUN SERPL-MCNC: 7 MG/DL (ref 8–22)
CALCIUM SERPL-MCNC: 8.4 MG/DL (ref 8.5–10.5)
CHLORIDE SERPL-SCNC: 115 MMOL/L (ref 96–112)
CO2 SERPL-SCNC: 19 MMOL/L (ref 20–33)
COMMENT 1642: NORMAL
CREAT SERPL-MCNC: 0.68 MG/DL (ref 0.5–1.4)
EOSINOPHIL # BLD AUTO: 0.17 K/UL (ref 0–0.51)
EOSINOPHIL NFR BLD: 1.8 % (ref 0–6.9)
ERYTHROCYTE [DISTWIDTH] IN BLOOD BY AUTOMATED COUNT: 91.6 FL (ref 35.9–50)
GLOBULIN SER CALC-MCNC: 2.8 G/DL (ref 1.9–3.5)
GLUCOSE SERPL-MCNC: 84 MG/DL (ref 65–99)
HCT VFR BLD AUTO: 30.9 % (ref 37–47)
HGB BLD-MCNC: 9.7 G/DL (ref 12–16)
IMM GRANULOCYTES # BLD AUTO: 0.1 K/UL (ref 0–0.11)
IMM GRANULOCYTES NFR BLD AUTO: 1 % (ref 0–0.9)
LYMPHOCYTES # BLD AUTO: 1.07 K/UL (ref 1–4.8)
LYMPHOCYTES NFR BLD: 11.1 % (ref 22–41)
MACROCYTES BLD QL SMEAR: ABNORMAL
MAGNESIUM SERPL-MCNC: 2.2 MG/DL (ref 1.5–2.5)
MCH RBC QN AUTO: 36.2 PG (ref 27–33)
MCHC RBC AUTO-ENTMCNC: 31.4 G/DL (ref 33.6–35)
MCV RBC AUTO: 115.3 FL (ref 81.4–97.8)
MONOCYTES # BLD AUTO: 1 K/UL (ref 0–0.85)
MONOCYTES NFR BLD AUTO: 10.4 % (ref 0–13.4)
MORPHOLOGY BLD-IMP: NORMAL
NEUTROPHILS # BLD AUTO: 7.11 K/UL (ref 2–7.15)
NEUTROPHILS NFR BLD: 74 % (ref 44–72)
NRBC # BLD AUTO: 0 K/UL
NRBC BLD-RTO: 0 /100 WBC
PLATELET # BLD AUTO: 261 K/UL (ref 164–446)
PLATELET BLD QL SMEAR: NORMAL
PMV BLD AUTO: 9.6 FL (ref 9–12.9)
POLYCHROMASIA BLD QL SMEAR: NORMAL
POTASSIUM SERPL-SCNC: 3.2 MMOL/L (ref 3.6–5.5)
PROT SERPL-MCNC: 5.7 G/DL (ref 6–8.2)
RBC # BLD AUTO: 2.68 M/UL (ref 4.2–5.4)
RBC BLD AUTO: PRESENT
SODIUM SERPL-SCNC: 145 MMOL/L (ref 135–145)
WBC # BLD AUTO: 9.6 K/UL (ref 4.8–10.8)

## 2020-01-23 PROCEDURE — 80053 COMPREHEN METABOLIC PANEL: CPT

## 2020-01-23 PROCEDURE — 99232 SBSQ HOSP IP/OBS MODERATE 35: CPT | Performed by: HOSPITALIST

## 2020-01-23 PROCEDURE — 700102 HCHG RX REV CODE 250 W/ 637 OVERRIDE(OP): Performed by: HOSPITALIST

## 2020-01-23 PROCEDURE — A9270 NON-COVERED ITEM OR SERVICE: HCPCS | Performed by: INTERNAL MEDICINE

## 2020-01-23 PROCEDURE — 97116 GAIT TRAINING THERAPY: CPT

## 2020-01-23 PROCEDURE — 83735 ASSAY OF MAGNESIUM: CPT

## 2020-01-23 PROCEDURE — 700102 HCHG RX REV CODE 250 W/ 637 OVERRIDE(OP): Performed by: INTERNAL MEDICINE

## 2020-01-23 PROCEDURE — 94668 MNPJ CHEST WALL SBSQ: CPT

## 2020-01-23 PROCEDURE — 82140 ASSAY OF AMMONIA: CPT

## 2020-01-23 PROCEDURE — A9270 NON-COVERED ITEM OR SERVICE: HCPCS | Performed by: HOSPITALIST

## 2020-01-23 PROCEDURE — 94669 MECHANICAL CHEST WALL OSCILL: CPT

## 2020-01-23 PROCEDURE — 97530 THERAPEUTIC ACTIVITIES: CPT

## 2020-01-23 PROCEDURE — 770001 HCHG ROOM/CARE - MED/SURG/GYN PRIV*

## 2020-01-23 PROCEDURE — 700111 HCHG RX REV CODE 636 W/ 250 OVERRIDE (IP): Performed by: HOSPITALIST

## 2020-01-23 PROCEDURE — 85025 COMPLETE CBC W/AUTO DIFF WBC: CPT

## 2020-01-23 PROCEDURE — 94760 N-INVAS EAR/PLS OXIMETRY 1: CPT

## 2020-01-23 PROCEDURE — 700105 HCHG RX REV CODE 258: Performed by: HOSPITALIST

## 2020-01-23 PROCEDURE — 36415 COLL VENOUS BLD VENIPUNCTURE: CPT

## 2020-01-23 RX ORDER — MIDODRINE HYDROCHLORIDE 5 MG/1
5 TABLET ORAL
Status: DISCONTINUED | OUTPATIENT
Start: 2020-01-23 | End: 2020-01-31 | Stop reason: HOSPADM

## 2020-01-23 RX ORDER — POTASSIUM CHLORIDE 20 MEQ/1
40 TABLET, EXTENDED RELEASE ORAL ONCE
Status: COMPLETED | OUTPATIENT
Start: 2020-01-23 | End: 2020-01-23

## 2020-01-23 RX ADMIN — RIFAXIMIN 550 MG: 550 TABLET ORAL at 18:00

## 2020-01-23 RX ADMIN — PYRIDOXINE HCL TAB 50 MG 100 MG: 50 TAB at 06:10

## 2020-01-23 RX ADMIN — SUCRALFATE 1 G: 1 SUSPENSION ORAL at 01:11

## 2020-01-23 RX ADMIN — SUCRALFATE 1 G: 1 SUSPENSION ORAL at 18:00

## 2020-01-23 RX ADMIN — CEFTRIAXONE SODIUM 1 G: 1 INJECTION, POWDER, FOR SOLUTION INTRAMUSCULAR; INTRAVENOUS at 06:08

## 2020-01-23 RX ADMIN — LEVOTHYROXINE SODIUM 50 MCG: 50 TABLET ORAL at 06:08

## 2020-01-23 RX ADMIN — SENNOSIDES AND DOCUSATE SODIUM 2 TABLET: 8.6; 5 TABLET ORAL at 18:00

## 2020-01-23 RX ADMIN — RIFAXIMIN 550 MG: 550 TABLET ORAL at 06:08

## 2020-01-23 RX ADMIN — SUCRALFATE 1 G: 1 SUSPENSION ORAL at 06:08

## 2020-01-23 RX ADMIN — Medication 100 MG: at 06:08

## 2020-01-23 RX ADMIN — OMEPRAZOLE 20 MG: 20 CAPSULE, DELAYED RELEASE ORAL at 17:59

## 2020-01-23 RX ADMIN — MIDODRINE HYDROCHLORIDE 5 MG: 5 TABLET ORAL at 17:59

## 2020-01-23 RX ADMIN — OMEPRAZOLE 20 MG: 20 CAPSULE, DELAYED RELEASE ORAL at 06:08

## 2020-01-23 RX ADMIN — OXYCODONE HYDROCHLORIDE 5 MG: 5 TABLET ORAL at 18:00

## 2020-01-23 RX ADMIN — DOXYCYCLINE 100 MG: 100 TABLET, FILM COATED ORAL at 06:08

## 2020-01-23 RX ADMIN — DOXYCYCLINE 100 MG: 100 TABLET, FILM COATED ORAL at 18:00

## 2020-01-23 RX ADMIN — LACTULOSE 30 ML: 20 SOLUTION ORAL at 12:09

## 2020-01-23 RX ADMIN — SUCRALFATE 1 G: 1 SUSPENSION ORAL at 12:09

## 2020-01-23 RX ADMIN — LACTULOSE 30 ML: 20 SOLUTION ORAL at 18:00

## 2020-01-23 RX ADMIN — CHLORDIAZEPOXIDE HYDROCHLORIDE 25 MG: 25 CAPSULE ORAL at 06:08

## 2020-01-23 RX ADMIN — MIDODRINE HYDROCHLORIDE 5 MG: 5 TABLET ORAL at 12:09

## 2020-01-23 RX ADMIN — POTASSIUM CHLORIDE 40 MEQ: 1500 TABLET, EXTENDED RELEASE ORAL at 09:22

## 2020-01-23 RX ADMIN — LACTULOSE 30 ML: 20 SOLUTION ORAL at 06:08

## 2020-01-23 RX ADMIN — CHLORDIAZEPOXIDE HYDROCHLORIDE 25 MG: 25 CAPSULE ORAL at 17:59

## 2020-01-23 ASSESSMENT — COGNITIVE AND FUNCTIONAL STATUS - GENERAL
SUGGESTED CMS G CODE MODIFIER MOBILITY: CL
SUGGESTED CMS G CODE MODIFIER DAILY ACTIVITY: CJ
MOVING TO AND FROM BED TO CHAIR: A LOT
CLIMB 3 TO 5 STEPS WITH RAILING: TOTAL
MOBILITY SCORE: 11
TURNING FROM BACK TO SIDE WHILE IN FLAT BAD: A LOT
DRESSING REGULAR UPPER BODY CLOTHING: A LITTLE
TOILETING: A LITTLE
WALKING IN HOSPITAL ROOM: A LOT
STANDING UP FROM CHAIR USING ARMS: A LITTLE
MOVING FROM LYING ON BACK TO SITTING ON SIDE OF FLAT BED: UNABLE
HELP NEEDED FOR BATHING: A LITTLE
DRESSING REGULAR LOWER BODY CLOTHING: A LITTLE
DAILY ACTIVITIY SCORE: 20

## 2020-01-23 ASSESSMENT — ENCOUNTER SYMPTOMS
COUGH: 1
NAUSEA: 0
MYALGIAS: 0
DOUBLE VISION: 0
DEPRESSION: 0
SPUTUM PRODUCTION: 1
DIZZINESS: 0
PALPITATIONS: 0
HEARTBURN: 0
NECK PAIN: 0
HEADACHES: 0
BRUISES/BLEEDS EASILY: 0
CHILLS: 0
WHEEZING: 0

## 2020-01-23 ASSESSMENT — GAIT ASSESSMENTS
GAIT LEVEL OF ASSIST: MODERATE ASSIST
DISTANCE (FEET): 75
ASSISTIVE DEVICE: FRONT WHEEL WALKER

## 2020-01-23 ASSESSMENT — LIFESTYLE VARIABLES: SUBSTANCE_ABUSE: 0

## 2020-01-23 NOTE — PROGRESS NOTES
"Hospital Medicine Daily Progress Note    Date of Service  1/23/2020    Chief Complaint  54 y.o. female admitted 1/11/2020 with vomiting blood    Hospital Course    Ms. Kahn is a past medical history of alcohol abuse with alcoholic liver disease was brought to the emergency room after she fell hit a curb with left-sided facial trauma.  She also had noted 2 weeks of hematemesis that had been red blood at first and then transition to more of a black coffee ground emesis.  She required 2 units of blood transfusions.  She underwent EGD by Dr. Davis in the operating room on 1/15/2020 revealing severe ulcerative erosive esophagitis with spontaneous oozing and adhered clot with a visible vessel for which she had a Hemoclip placed.      Interval Problem Update  1/16/20: Ms. Kahn was evaluated and examined in the ICU.   She admits to drinking a pint of vodka. She denies hallucination. +tremor.  She has had some intermittent vomiting though no black or blood in it.  1/17: patient seen and evaluated in the ICU.  Remains quite weak requiring substantial assistance to get out of bed.  She did receive a dose of IV Ativan this morning because of tremulousness.  Spoke with her nurse but her condition.  Her significant other did visit today.  1/18: Ms. Rios was evaluated in the ICU. She is very weak requiring assistance to walk to the chair. She has required 1 liter of oxygen as her sats went down to 84%. She is requesting IV ativan due to ongoing detox. Her significant other visited today.   1/19: patient evaluated and examined on the medical floor. She complains of ongoing heartburn. She is able to walk but requires assistance and notes, \"my equilibrium is off\". She is tolerating Boost. I discussed with RN about trying to taper off oxygen. Her sons and daughter are coming in today. I asked to meet with them and set up a meeting.   1/20: I met with Ms. Kahn, her significant other, her two sons, her daughter, and her " daughter's significant other about her condition. The  and RN were in the room. We had a long discussion about her diagnosis, work up, and home safety options. Ms. Kahn is open to SNF and orders placed.   40 minutes spent that of which over 50% of the time was spent in counseling about her diagnosis of alcohol-induced liver disease and upper GI bleed with falls.     1/21: Patient complains of fever, cough with yellowish sputum, no hemoptysis, mild shortness of breath, she is hard of hearing which is chronic, denies any nausea or vomiting, discussed with patient will get chest x-ray, procalcitonin, lactic acid, will start IV antibiotics, discussed case with patient's family, all questions answered.  1/22: Patient is alert and oriented, continue having cough but improved, no more fevers, she still feels weak, no nausea no vomiting.  Discussed with patient regarding her labs today, discussed with her regarding her liver disease and she was advised to stop drinking completely otherwise her liver disease will continue progressing.  1/23: Patient is more awake and oriented today, started feeling better, no nausea no vomiting, ammonia level is trending down, no fever no chills cough is improved.        Consultants/Specialty  Critical care.   GI     Code Status  full    Disposition  SNF in a day or 2     Review of Systems  Review of Systems   Constitutional: Negative for chills.   HENT: Negative for congestion and nosebleeds.    Eyes: Negative for double vision.   Respiratory: Positive for cough and sputum production. Negative for wheezing.    Cardiovascular: Negative for chest pain and palpitations.   Gastrointestinal: Negative for heartburn and nausea.   Genitourinary: Negative for dysuria and frequency.   Musculoskeletal: Negative for myalgias and neck pain.   Neurological: Negative for dizziness and headaches.   Endo/Heme/Allergies: Does not bruise/bleed easily.   Psychiatric/Behavioral: Negative for  depression, substance abuse and suicidal ideas.        Physical Exam  Temp:  [36.7 °C (98.1 °F)-36.9 °C (98.5 °F)] 36.9 °C (98.5 °F)  Pulse:  [] 90  Resp:  [16-20] 18  BP: ()/(54-72) 114/72  SpO2:  [90 %-98 %] 96 %    Physical Exam  Vitals signs and nursing note reviewed.   Constitutional:       General: She is not in acute distress.  HENT:      Head: Normocephalic and atraumatic.      Comments: Left chin and left face bruise  Left eye periorbital bruise       Nose: Nose normal.      Mouth/Throat:      Mouth: Mucous membranes are dry.      Pharynx: Oropharynx is clear.   Eyes:      General: Scleral icterus present.      Conjunctiva/sclera: Conjunctivae normal.   Neck:      Musculoskeletal: Normal range of motion and neck supple.   Cardiovascular:      Rate and Rhythm: Normal rate and regular rhythm.      Heart sounds: No murmur.   Pulmonary:      Effort: Pulmonary effort is normal. No respiratory distress.      Breath sounds: No stridor. Rales present. No rhonchi.   Abdominal:      General: Bowel sounds are normal. There is no distension.      Palpations: Abdomen is soft.      Tenderness: There is no tenderness.   Musculoskeletal:      Right lower leg: No edema.      Left lower leg: No edema.   Skin:     General: Skin is warm and dry.      Coloration: Skin is jaundiced.   Neurological:      General: No focal deficit present.      Mental Status: She is alert and oriented to person, place, and time.   Psychiatric:         Mood and Affect: Mood normal.         Behavior: Behavior normal.      Comments: Hard of hearing         Fluids    Intake/Output Summary (Last 24 hours) at 1/23/2020 1425  Last data filed at 1/23/2020 0400  Gross per 24 hour   Intake 1110 ml   Output 0 ml   Net 1110 ml       Laboratory  Recent Labs     01/22/20  0353 01/23/20  0349   WBC 9.6 9.6   RBC 2.45* 2.68*   HEMOGLOBIN 8.6* 9.7*   HEMATOCRIT 28.2* 30.9*   .1* 115.3*   MCH 35.1* 36.2*   MCHC 30.5* 31.4*   RDW 91.7* 91.6*    PLATELETCT 254 261   MPV 9.8 9.6     Recent Labs     01/22/20  0353 01/23/20  0349   SODIUM 140 145   POTASSIUM 3.3* 3.2*   CHLORIDE 110 115*   CO2 20 19*   GLUCOSE 88 84   BUN 9 7*   CREATININE 0.89 0.68   CALCIUM 8.2* 8.4*     Recent Labs     01/22/20  1034   APTT 40.6*   INR 1.22*               Imaging  DX-CHEST-2 VIEWS   Final Result      Small to moderate bilateral pleural effusions and bibasilar atelectasis versus consolidations. Lung aeration has slightly improved since prior study.      DX-HIP-COMPLETE - UNILATERAL 2+ LEFT   Final Result      Mild osteoarthritis of the hips. No acute abnormality.      DX-CHEST-2 VIEWS   Final Result      Interval worsening in bibasilar opacities with air bronchograms. Findings are concerning for pneumonia. Follow-up imaging is recommended to document resolution.      US-ABDOMEN COMPLETE SURVEY   Final Result         1.  Gallbladder sludge without additional sonographic findings to indicate acute cholecystitis.   2.  Massive hepatomegaly and echogenic liver, compatible with fatty change versus fibrosis.         DX-CHEST-PORTABLE (1 VIEW)   Final Result      1.  Right internal jugular catheter placement with the tip projecting over the cavoatrial junction. No pneumothorax is identified.      2.  Left basilar opacification may be related to atelectasis.      DX-CHEST-PORTABLE (1 VIEW)   Final Result      No acute cardiopulmonary findings.      CT-HEAD W/O   Final Result      1.  No acute intracranial findings.      2.  Left nasal bone fracture.         CT-CSPINE WITHOUT PLUS RECONS   Final Result      1.  No acute fracture is identified.      2.  Multilevel degenerative disc disease and facet arthropathy as described.      CT-MAXILLOFACIAL W/O PLUS RECONS   Final Result         Left nasal bone fracture      DX-THORACIC SPINE-2 VIEWS   Final Result      No acute fracture is identified.      DX-LUMBAR SPINE-4+ VIEWS   Final Result         1.  No acute fracture is identified.       2.  Facet arthropathy in the lower lumbar spine.           Assessment/Plan  * GIB (gastrointestinal bleeding)- (present on admission)  Assessment & Plan  Upper GI bleed given her bright red bleeding followed by coffee-ground emesis.   She was admitted to the ICU on a protonix and octreotide drip.  She underwent EGD 1/15 revealing severe ulcerative erosive esophagitis with hemoclip  Serial Hb levels ordered  She was transfused 2 units RBC due to acute blood loss anemia  Continue oral Prilosec twice daily      Falls frequently- (present on admission)  Assessment & Plan  CT head negative for bleed  Secondary to EtOH  SNF when medically stable probably in a day or 2    Lactic acidosis- (present on admission)  Assessment & Plan  Resolved, lactic acid is back to normal.    Hypotension- (present on admission)  Assessment & Plan  Stable continue monitoring, continue IV fluids, improved on midodrine    Hematuria- (present on admission)  Assessment & Plan  resolved    Alcohol withdrawal syndrome with complication (HCC)- (present on admission)  Assessment & Plan  Tremors without delirium  Status post IV Ativan   Start tapering off Librium continue twice daily for today and then will transition to once daily tomorrow.     Troponin level elevated- (present on admission)  Assessment & Plan  Suspect this could be demand ischemia in the setting of gastrointestinal hemorrhage.    No chest pain. Troponin back to normal.    Alcoholic liver disease (HCC)- (present on admission)  Assessment & Plan  With associated hepatic dysfunction in the form of thrombocytopenia, but with normal coagulation pattern.    Bilirubin is 9 today.  Liver ultrasound showed Massive hepatomegaly and echogenic liver, compatible with fatty change versus fibrosis.  Meld score 17, child Evans score 11, 55% 1 year mortality.  Patient was again advised to stop drinking alcohol.      Metabolic acidosis- (present on admission)  Assessment & Plan  Lactic acid back to  normal    Macrocytic anemia- (present on admission)  Assessment & Plan  Suspect due to chronic alcoholism, likely component of acute bleed superimposed.  Monitor.  Hemoglobin is stable 9.7 today.    Fever  Assessment & Plan  Most likely due to pneumonia, procalcitonin is elevated, continue IV antibiotics.  No fever today.    Hypoxia- (present on admission)  Assessment & Plan  Hypoxia resolved. cxr showed possible pneumonia.  Elevated procalcitonin, continue IV antibiotics    Closed fracture of nasal bone- (present on admission)  Assessment & Plan  Non-operative    Hypokalemia- (present on admission)  Assessment & Plan  Replacing, will check magnesium.    Hypothyroidism- (present on admission)  Assessment & Plan  On synthroid 50 mcg daily  TSH is markedly elevated at 31  Though she unlikely was compliant with synthroid thus the dose will not be increased.  Will need close f/u as outpatient.     Thrombocytopenia (HCC)- (present on admission)  Assessment & Plan  Likely secondary to liver disease and alcohol induced bone marrow suppression, resolved, back to normal      Hyponatremia- (present on admission)  Assessment & Plan  Resolved with IV fluids       VTE prophylaxis: SCDs    Case and plan of care discussed with nurse staff  Case and plan of care discussed during multidisciplinary rounds

## 2020-01-23 NOTE — PROGRESS NOTES
Pt states she is already bruising under Right eye from the fall earlier today.  This RN assessed face- no new bruising noted.  Old purple/yellow bruising under Right eye still present.

## 2020-01-23 NOTE — CARE PLAN
Problem: Knowledge Deficit  Goal: Knowledge of disease process/condition, treatment plan, diagnostic tests, and medications will improve  Outcome: PROGRESSING SLOWER THAN EXPECTED  Goal: Knowledge of the prescribed therapeutic regimen will improve  Outcome: PROGRESSING SLOWER THAN EXPECTED  Intervention: Discuss information regarding therpeutic regimen and document in education  Note:   Pt was educated on POC, MAR, shift routine and nursing education R/T Dx. Questions were encouraged and answered. Kateryna, daughter in law, included in education per pt request. Kateryna is assisting with reinforcing RN's education. Pt verbalized understanding of some teaching. Pt requires frequent reminders and re-education.          Problem: Psychosocial Needs:  Goal: Level of anxiety will decrease  Outcome: PROGRESSING SLOWER THAN EXPECTED  Intervention: Identify and develop with patient strategies to cope with anxiety triggers  Note:   Pt expresses great frustration and blames nursing staff for her continued weakness. Pt was educated on all the nursing interventions in place. Pt verbalized understanding, but still exhibits frustration. Therapeutic communication and listening provided.

## 2020-01-23 NOTE — PROGRESS NOTES
Report received from Clare PICKENS  Assumed care of patient at 1900.    Pt is A&O x 4.  Pain present, but pt is hypotensive and weak. Rest provided in alterative to medication.   Nausea denied. Tolerating Diet.   Bruising and trace swelling noted to face (eyes) and neck.  Pt states that she has a new bruise to her rt eye. This RN assessed face and notes no new bruising. The bruise under the pt's rt eye has actually decreased a great deal in the few days that this RN has cared for the pt. Pt was educated that there is no new bruising at this time. Pt became very argumentative and expressed frustration. Therapeutic communication was used. Pt was de-escalated.  Skin is jaundiced throughout.   + Urine output  + loose BMs    + Flatus  Up x 2 assist with front wheel walker. Pt is unsteady and requires staff assistance to stay standing. Pt does not follow commands well.   SCD's refused despite education.   This RN spoke to daughter-in-law, Kateryna, via phone per pt request. Kateryna was educated on POC and dx results. Kateryna stated that the pt was not giving correct information to family members in relation to POC and over all hospital stay. Kateryna was very understanding and attempted to correct several of the pt's misunderstandings. One misunderstanding the pt has is that the pt belives she has been dx with pneumonia. Pt was educated repeatedly that this is not confirmed, but that the pt has high ammonia. Pt states that she saw ammonia in her lt wrist on a xray. Pt was educated on what high ammonia is and how it is dx. Pt states that this RN is incorrect and that she saw high ammonia on her wrist xray and chest xray. Kateryna completed additional education over the phone and pt verbalized understanding, but states that she is still suspicious.   Bed in lowest position and locked.  Bed alarm in place and on.   Pt resting comfortably now.  Review plan of care with patient  Call light within reach  Hourly rounds in place  All needs met at  this time

## 2020-01-23 NOTE — CARE PLAN
Problem: Communication  Goal: The ability to communicate needs accurately and effectively will improve  Outcome: PROGRESSING SLOWER THAN EXPECTED  Note:   Agitated often. Forgetful.      Problem: Knowledge Deficit  Goal: Knowledge of disease process/condition, treatment plan, diagnostic tests, and medications will improve  Outcome: PROGRESSING SLOWER THAN EXPECTED  Note:   Unable to remember and verbally retell what POC is. Telling family wrong information.

## 2020-01-23 NOTE — THERAPY
"Physical Therapy Treatment completed.   Bed Mobility:  Supine to Sit: Supervised  Transfers: Sit to Stand: Minimal Assist for balance/safety  Gait: Level Of Assist: Moderate Assist with Front-Wheel Walker x75ft      Plan of Care: Will benefit from Physical Therapy 3 times per week  Discharge Recommendations: Equipment: Will Continue to Assess for Equipment Needs. Post-acute therapy: Recommend post-acute placement for continued physical therapy services prior to discharge home. Patient can tolerate post-acute therapies at a 5x/week frequency.       Pt seen for PT tx today. Per EMR pt had fall in bathroom yesterday afternoon. Pt reporting feeling dizzy upon initial standing, SBP 96 and no change from sitting. PT encouraged pt to attempt marching EOB before walking with feeling of dizziness, pt refused and attempted to begin walking. Pt demonstrating shuffled gait pattern with walker 1-2ft in front of her. She was cued multiple times for walker repositioning and to increase step ht. She stated \"I can't, I don't want to\". She cont to demonstrate poor reception to cueing throughout mobility with poor safety awareness and high fall risk requiring increasing assistance. Pt not functionally safe to DC home at this time, recommend placement.    See \"Rehab Therapy-Acute\" Patient Summary Report for complete documentation.       "

## 2020-01-23 NOTE — CARE PLAN
Problem: Nutritional:  Goal: Achieve adequate nutritional intake  Description  Diet advancement with PO >50% of meals.    Outcome: MET     PO <25-75% x 6 meals (50-75% x 4 meals) + % x 2 nutritional supplements.

## 2020-01-23 NOTE — THERAPY
"Occupational Therapy Treatment completed with focus on ADLs and ADL transfers.  Functional Status:  Min A with ADLs and txfs, poor safety/deficit awareness, becoming agitated when given V/C for safety during functional activity  Plan of Care: Will benefit from Occupational Therapy 3 times per week  Discharge Recommendations: Recommend post-acute placement for additional occupational therapy services prior to discharge home.   See \"Rehab Therapy-Acute\" Patient Summary Report for complete documentation.   "

## 2020-01-24 LAB
ALBUMIN SERPL BCP-MCNC: 3 G/DL (ref 3.2–4.9)
ALBUMIN/GLOB SERPL: 0.9 G/DL
ALP SERPL-CCNC: 195 U/L (ref 30–99)
ALT SERPL-CCNC: 21 U/L (ref 2–50)
AMMONIA PLAS-SCNC: 50 UMOL/L (ref 11–45)
ANION GAP SERPL CALC-SCNC: 10 MMOL/L (ref 0–11.9)
AST SERPL-CCNC: 80 U/L (ref 12–45)
BILIRUB CONJ SERPL-MCNC: 6.3 MG/DL (ref 0.1–0.5)
BILIRUB SERPL-MCNC: 10.8 MG/DL (ref 0.1–1.5)
BUN SERPL-MCNC: 5 MG/DL (ref 8–22)
CALCIUM SERPL-MCNC: 8.9 MG/DL (ref 8.5–10.5)
CHLORIDE SERPL-SCNC: 115 MMOL/L (ref 96–112)
CO2 SERPL-SCNC: 20 MMOL/L (ref 20–33)
CREAT SERPL-MCNC: 0.78 MG/DL (ref 0.5–1.4)
GLOBULIN SER CALC-MCNC: 3.4 G/DL (ref 1.9–3.5)
GLUCOSE SERPL-MCNC: 73 MG/DL (ref 65–99)
POTASSIUM SERPL-SCNC: 3.7 MMOL/L (ref 3.6–5.5)
PROT SERPL-MCNC: 6.4 G/DL (ref 6–8.2)
SODIUM SERPL-SCNC: 145 MMOL/L (ref 135–145)

## 2020-01-24 PROCEDURE — 90686 IIV4 VACC NO PRSV 0.5 ML IM: CPT | Performed by: HOSPITALIST

## 2020-01-24 PROCEDURE — 700111 HCHG RX REV CODE 636 W/ 250 OVERRIDE (IP): Performed by: HOSPITALIST

## 2020-01-24 PROCEDURE — 700105 HCHG RX REV CODE 258: Performed by: HOSPITALIST

## 2020-01-24 PROCEDURE — A9270 NON-COVERED ITEM OR SERVICE: HCPCS | Performed by: INTERNAL MEDICINE

## 2020-01-24 PROCEDURE — A9270 NON-COVERED ITEM OR SERVICE: HCPCS | Performed by: HOSPITALIST

## 2020-01-24 PROCEDURE — 700102 HCHG RX REV CODE 250 W/ 637 OVERRIDE(OP): Performed by: HOSPITALIST

## 2020-01-24 PROCEDURE — 700102 HCHG RX REV CODE 250 W/ 637 OVERRIDE(OP): Performed by: INTERNAL MEDICINE

## 2020-01-24 PROCEDURE — 36415 COLL VENOUS BLD VENIPUNCTURE: CPT

## 2020-01-24 PROCEDURE — 770001 HCHG ROOM/CARE - MED/SURG/GYN PRIV*

## 2020-01-24 PROCEDURE — 80053 COMPREHEN METABOLIC PANEL: CPT

## 2020-01-24 PROCEDURE — 90471 IMMUNIZATION ADMIN: CPT

## 2020-01-24 PROCEDURE — 3E02340 INTRODUCTION OF INFLUENZA VACCINE INTO MUSCLE, PERCUTANEOUS APPROACH: ICD-10-PCS | Performed by: HOSPITALIST

## 2020-01-24 PROCEDURE — 82248 BILIRUBIN DIRECT: CPT

## 2020-01-24 PROCEDURE — 99233 SBSQ HOSP IP/OBS HIGH 50: CPT | Performed by: HOSPITALIST

## 2020-01-24 PROCEDURE — 82140 ASSAY OF AMMONIA: CPT

## 2020-01-24 RX ORDER — CHLORDIAZEPOXIDE HYDROCHLORIDE 25 MG/1
25 CAPSULE, GELATIN COATED ORAL DAILY
Status: DISCONTINUED | OUTPATIENT
Start: 2020-01-25 | End: 2020-01-26

## 2020-01-24 RX ADMIN — PYRIDOXINE HCL TAB 50 MG 100 MG: 50 TAB at 04:59

## 2020-01-24 RX ADMIN — RIFAXIMIN 550 MG: 550 TABLET ORAL at 04:58

## 2020-01-24 RX ADMIN — Medication 100 MG: at 04:58

## 2020-01-24 RX ADMIN — RIFAXIMIN 550 MG: 550 TABLET ORAL at 18:07

## 2020-01-24 RX ADMIN — LACTULOSE 30 ML: 20 SOLUTION ORAL at 15:26

## 2020-01-24 RX ADMIN — DOXYCYCLINE 100 MG: 100 TABLET, FILM COATED ORAL at 04:58

## 2020-01-24 RX ADMIN — OXYCODONE HYDROCHLORIDE 5 MG: 5 TABLET ORAL at 11:30

## 2020-01-24 RX ADMIN — ONDANSETRON 4 MG: 2 INJECTION INTRAMUSCULAR; INTRAVENOUS at 20:58

## 2020-01-24 RX ADMIN — MIDODRINE HYDROCHLORIDE 5 MG: 5 TABLET ORAL at 09:37

## 2020-01-24 RX ADMIN — SUCRALFATE 1 G: 1 SUSPENSION ORAL at 22:25

## 2020-01-24 RX ADMIN — INFLUENZA A VIRUS A/BRISBANE/02/2018 IVR-190 (H1N1) ANTIGEN (FORMALDEHYDE INACTIVATED), INFLUENZA A VIRUS A/KANSAS/14/2017 X-327 (H3N2) ANTIGEN (FORMALDEHYDE INACTIVATED), INFLUENZA B VIRUS B/PHUKET/3073/2013 ANTIGEN (FORMALDEHYDE INACTIVATED), AND INFLUENZA B VIRUS B/MARYLAND/15/2016 BX-69A ANTIGEN (FORMALDEHYDE INACTIVATED) 0.5 ML: 15; 15; 15; 15 INJECTION, SUSPENSION INTRAMUSCULAR at 04:58

## 2020-01-24 RX ADMIN — DOXYCYCLINE 100 MG: 100 TABLET, FILM COATED ORAL at 18:06

## 2020-01-24 RX ADMIN — LACTULOSE 30 ML: 20 SOLUTION ORAL at 04:58

## 2020-01-24 RX ADMIN — MIDODRINE HYDROCHLORIDE 5 MG: 5 TABLET ORAL at 22:33

## 2020-01-24 RX ADMIN — LACTULOSE 30 ML: 20 SOLUTION ORAL at 22:25

## 2020-01-24 RX ADMIN — SUCRALFATE 1 G: 1 SUSPENSION ORAL at 04:58

## 2020-01-24 RX ADMIN — CEFTRIAXONE SODIUM 1 G: 1 INJECTION, POWDER, FOR SOLUTION INTRAMUSCULAR; INTRAVENOUS at 04:58

## 2020-01-24 RX ADMIN — MIDODRINE HYDROCHLORIDE 5 MG: 5 TABLET ORAL at 15:27

## 2020-01-24 RX ADMIN — OMEPRAZOLE 20 MG: 20 CAPSULE, DELAYED RELEASE ORAL at 18:10

## 2020-01-24 RX ADMIN — OXYCODONE HYDROCHLORIDE 5 MG: 5 TABLET ORAL at 22:25

## 2020-01-24 RX ADMIN — SUCRALFATE 1 G: 1 SUSPENSION ORAL at 15:26

## 2020-01-24 RX ADMIN — OMEPRAZOLE 20 MG: 20 CAPSULE, DELAYED RELEASE ORAL at 04:58

## 2020-01-24 RX ADMIN — CHLORDIAZEPOXIDE HYDROCHLORIDE 25 MG: 25 CAPSULE ORAL at 04:58

## 2020-01-24 RX ADMIN — LEVOTHYROXINE SODIUM 50 MCG: 50 TABLET ORAL at 04:58

## 2020-01-24 ASSESSMENT — ENCOUNTER SYMPTOMS
DEPRESSION: 0
HEARTBURN: 0
DIZZINESS: 0
PALPITATIONS: 0
BRUISES/BLEEDS EASILY: 0
FEVER: 0
BLURRED VISION: 0
NECK PAIN: 0
COUGH: 1
VOMITING: 0
HEADACHES: 0
SPUTUM PRODUCTION: 1
MYALGIAS: 0
NAUSEA: 0
WHEEZING: 0

## 2020-01-24 ASSESSMENT — LIFESTYLE VARIABLES: SUBSTANCE_ABUSE: 0

## 2020-01-24 NOTE — PROGRESS NOTES
Bedside report received.  Assessment complete.  A&O x 3. Disoriented to time, slow to respond. Patient irritable this AM   Patient up with max assist. Bed alarm on.   Patient has 0/10 pain.   Denies N&V. Tolerating regular diet.  Generalized bruising to face and upper extremities.  + void (incontinent), JABARI flatus, + BM.  Patient denies SOB.  SCD's off.    Review plan with of care with patient. Call light and personal belongings with in reach. Hourly rounding in place. All needs met at this time.

## 2020-01-24 NOTE — CARE PLAN
Problem: Hyperinflation:  Goal: Prevent or improve atelectasis  Intervention: Perform hyperinflation therapy as indicated by assessment  Note:   PEP QID

## 2020-01-24 NOTE — PROGRESS NOTES
Assumed care at 1845. Pt resting in bed.   A&ox 3-4. Forgetful. [place]  Tolerating diet. O2 on RA  Multiple BMs. Pt taking lactulose TID  Pain controlled   Voiding adequately  Jaundiced. Bruising to face  Up with 2 person assist FWW  Bed alarm in place. Call light wtihin reach. Hourly rounding in place

## 2020-01-24 NOTE — CARE PLAN
Problem: Psychosocial Needs:  Goal: Level of anxiety will decrease  Outcome: PROGRESSING AS EXPECTED   Calm and pleasant tonight. Reassurance provided    Problem: Mobility  Goal: Risk for activity intolerance will decrease  Outcome: PROGRESSING AS EXPECTED  Up with 2 person assist with FWW> unsteady . Bed alarm in place

## 2020-01-24 NOTE — PROGRESS NOTES
"Hospital Medicine Daily Progress Note    Date of Service  1/24/2020    Chief Complaint  54 y.o. female admitted 1/11/2020 with vomiting blood    Hospital Course    Ms. Kahn is a past medical history of alcohol abuse with alcoholic liver disease was brought to the emergency room after she fell hit a curb with left-sided facial trauma.  She also had noted 2 weeks of hematemesis that had been red blood at first and then transition to more of a black coffee ground emesis.  She required 2 units of blood transfusions.  She underwent EGD by Dr. Davis in the operating room on 1/15/2020 revealing severe ulcerative erosive esophagitis with spontaneous oozing and adhered clot with a visible vessel for which she had a Hemoclip placed.      Interval Problem Update  1/16/20: Ms. Kahn was evaluated and examined in the ICU.   She admits to drinking a pint of vodka. She denies hallucination. +tremor.  She has had some intermittent vomiting though no black or blood in it.  1/17: patient seen and evaluated in the ICU.  Remains quite weak requiring substantial assistance to get out of bed.  She did receive a dose of IV Ativan this morning because of tremulousness.  Spoke with her nurse but her condition.  Her significant other did visit today.  1/18: Ms. Rios was evaluated in the ICU. She is very weak requiring assistance to walk to the chair. She has required 1 liter of oxygen as her sats went down to 84%. She is requesting IV ativan due to ongoing detox. Her significant other visited today.   1/19: patient evaluated and examined on the medical floor. She complains of ongoing heartburn. She is able to walk but requires assistance and notes, \"my equilibrium is off\". She is tolerating Boost. I discussed with RN about trying to taper off oxygen. Her sons and daughter are coming in today. I asked to meet with them and set up a meeting.   1/20: I met with Ms. Kahn, her significant other, her two sons, her daughter, and her " daughter's significant other about her condition. The  and RN were in the room. We had a long discussion about her diagnosis, work up, and home safety options. Ms. Kahn is open to SNF and orders placed.   40 minutes spent that of which over 50% of the time was spent in counseling about her diagnosis of alcohol-induced liver disease and upper GI bleed with falls.     1/21: Patient complains of fever, cough with yellowish sputum, no hemoptysis, mild shortness of breath, she is hard of hearing which is chronic, denies any nausea or vomiting, discussed with patient will get chest x-ray, procalcitonin, lactic acid, will start IV antibiotics, discussed case with patient's family, all questions answered.  1/22: Patient is alert and oriented, continue having cough but improved, no more fevers, she still feels weak, no nausea no vomiting.  Discussed with patient regarding her labs today, discussed with her regarding her liver disease and she was advised to stop drinking completely otherwise her liver disease will continue progressing.  1/23: Patient is more awake and oriented today, started feeling better, no nausea no vomiting, ammonia level is trending down, no fever no chills cough is improved.  1/24: Patient is lying in bed, still feeling weak, tolerating diet, she is awake and oriented follows commands, cough and shortness of breath almost resolved.  Denies right upper quadrant pain        Consultants/Specialty  Critical care.   GI     Code Status  full    Disposition  SNF in a day or 2     Review of Systems  Review of Systems   Constitutional: Positive for malaise/fatigue. Negative for fever.   HENT: Negative for congestion and nosebleeds.    Eyes: Negative for blurred vision.   Respiratory: Positive for cough and sputum production. Negative for wheezing.    Cardiovascular: Negative for chest pain and palpitations.   Gastrointestinal: Negative for heartburn, nausea and vomiting.   Genitourinary: Negative  for dysuria and frequency.   Musculoskeletal: Negative for myalgias and neck pain.   Neurological: Negative for dizziness and headaches.   Endo/Heme/Allergies: Does not bruise/bleed easily.   Psychiatric/Behavioral: Negative for depression, substance abuse and suicidal ideas.        Physical Exam  Temp:  [36.6 °C (97.8 °F)-37.3 °C (99.1 °F)] 36.7 °C (98 °F)  Pulse:  [80-99] 86  Resp:  [16-18] 18  BP: ()/(50-78) 92/60  SpO2:  [90 %-98 %] 97 %    Physical Exam  Vitals signs and nursing note reviewed.   Constitutional:       General: She is not in acute distress.  HENT:      Head: Normocephalic and atraumatic.      Comments: Left chin and left face bruise  Left eye periorbital bruise       Nose: Nose normal.      Mouth/Throat:      Mouth: Mucous membranes are dry.      Pharynx: Oropharynx is clear.   Eyes:      General: Scleral icterus present.      Conjunctiva/sclera: Conjunctivae normal.   Neck:      Musculoskeletal: Normal range of motion and neck supple.   Cardiovascular:      Rate and Rhythm: Normal rate and regular rhythm.      Heart sounds: No murmur.   Pulmonary:      Effort: Pulmonary effort is normal. No respiratory distress.      Breath sounds: No stridor. Rales present. No rhonchi.   Abdominal:      General: Bowel sounds are normal. There is no distension.      Palpations: Abdomen is soft.      Tenderness: There is tenderness (Mild). There is no guarding.   Musculoskeletal:      Right lower leg: No edema.      Left lower leg: No edema.   Skin:     General: Skin is warm and dry.      Coloration: Skin is jaundiced.   Neurological:      General: No focal deficit present.      Mental Status: She is alert and oriented to person, place, and time.   Psychiatric:         Mood and Affect: Mood normal.         Behavior: Behavior normal.      Comments: Hard of hearing         Fluids    Intake/Output Summary (Last 24 hours) at 1/24/2020 1425  Last data filed at 1/24/2020 0310  Gross per 24 hour   Intake 300 ml    Output --   Net 300 ml       Laboratory  Recent Labs     01/22/20  0353 01/23/20  0349   WBC 9.6 9.6   RBC 2.45* 2.68*   HEMOGLOBIN 8.6* 9.7*   HEMATOCRIT 28.2* 30.9*   .1* 115.3*   MCH 35.1* 36.2*   MCHC 30.5* 31.4*   RDW 91.7* 91.6*   PLATELETCT 254 261   MPV 9.8 9.6     Recent Labs     01/22/20  0353 01/23/20  0349 01/24/20  0306   SODIUM 140 145 145   POTASSIUM 3.3* 3.2* 3.7   CHLORIDE 110 115* 115*   CO2 20 19* 20   GLUCOSE 88 84 73   BUN 9 7* 5*   CREATININE 0.89 0.68 0.78   CALCIUM 8.2* 8.4* 8.9     Recent Labs     01/22/20  1034   APTT 40.6*   INR 1.22*               Imaging  DX-CHEST-2 VIEWS   Final Result      Small to moderate bilateral pleural effusions and bibasilar atelectasis versus consolidations. Lung aeration has slightly improved since prior study.      DX-HIP-COMPLETE - UNILATERAL 2+ LEFT   Final Result      Mild osteoarthritis of the hips. No acute abnormality.      DX-CHEST-2 VIEWS   Final Result      Interval worsening in bibasilar opacities with air bronchograms. Findings are concerning for pneumonia. Follow-up imaging is recommended to document resolution.      US-ABDOMEN COMPLETE SURVEY   Final Result         1.  Gallbladder sludge without additional sonographic findings to indicate acute cholecystitis.   2.  Massive hepatomegaly and echogenic liver, compatible with fatty change versus fibrosis.         DX-CHEST-PORTABLE (1 VIEW)   Final Result      1.  Right internal jugular catheter placement with the tip projecting over the cavoatrial junction. No pneumothorax is identified.      2.  Left basilar opacification may be related to atelectasis.      DX-CHEST-PORTABLE (1 VIEW)   Final Result      No acute cardiopulmonary findings.      CT-HEAD W/O   Final Result      1.  No acute intracranial findings.      2.  Left nasal bone fracture.         CT-CSPINE WITHOUT PLUS RECONS   Final Result      1.  No acute fracture is identified.      2.  Multilevel degenerative disc disease and  facet arthropathy as described.      CT-MAXILLOFACIAL W/O PLUS RECONS   Final Result         Left nasal bone fracture      DX-THORACIC SPINE-2 VIEWS   Final Result      No acute fracture is identified.      DX-LUMBAR SPINE-4+ VIEWS   Final Result         1.  No acute fracture is identified.      2.  Facet arthropathy in the lower lumbar spine.           Assessment/Plan  * GIB (gastrointestinal bleeding)- (present on admission)  Assessment & Plan  Upper GI bleed given her bright red bleeding followed by coffee-ground emesis.   She was admitted to the ICU on a protonix and octreotide drip.  She underwent EGD 1/15 revealing severe ulcerative erosive esophagitis with hemoclip  Serial Hb levels ordered  She was transfused 2 units RBC due to acute blood loss anemia  Continue oral Prilosec twice daily      Falls frequently- (present on admission)  Assessment & Plan  CT head negative for bleed  Secondary to EtOH  SNF when medically stable probably in a day or 2    Lactic acidosis- (present on admission)  Assessment & Plan  Resolved, lactic acid is back to normal.    Hypotension- (present on admission)  Assessment & Plan  Stable continue monitoring, continue IV fluids, improved on midodrine  Improving    Hematuria- (present on admission)  Assessment & Plan  resolved    Alcohol withdrawal syndrome with complication (HCC)- (present on admission)  Assessment & Plan  Tremors without delirium  Status post IV Ativan   Started tapering off Librium continue once daily for 2 more days..     Troponin level elevated- (present on admission)  Assessment & Plan  Suspect this could be demand ischemia in the setting of gastrointestinal hemorrhage.    No chest pain. Troponin back to normal.    Alcoholic liver disease (HCC)- (present on admission)  Assessment & Plan  With associated hepatic dysfunction in the form of thrombocytopenia, but with normal coagulation pattern.    Bilirubin is 10.8 today with direct bilirubin 6.3 this is likely her  intrinsic liver disease  Liver ultrasound showed Massive hepatomegaly and echogenic liver, compatible with fatty change versus fibrosis.  Meld score 17, child Evans score 11, 55% 1 year mortality.        Metabolic acidosis- (present on admission)  Assessment & Plan  Lactic acid back to normal    Macrocytic anemia- (present on admission)  Assessment & Plan  Suspect due to chronic alcoholism, likely component of acute bleed superimposed.  Monitor.  Hemoglobin is stable 9.7     Fever  Assessment & Plan  Most likely due to pneumonia, procalcitonin is elevated, continue IV antibiotics.  No fever today.  Fever resolved    Hypoxia- (present on admission)  Assessment & Plan  Hypoxia resolved. cxr showed possible pneumonia.  Elevated procalcitonin, continue IV antibiotics  Improving, will complete 5 days of antibiotics    Closed fracture of nasal bone- (present on admission)  Assessment & Plan  Non-operative    Hypokalemia- (present on admission)  Assessment & Plan  Replaced, magnesium within normal limits    Hypothyroidism- (present on admission)  Assessment & Plan  On synthroid 50 mcg daily  TSH is markedly elevated at 31  Though she unlikely was compliant with synthroid thus the dose will not be increased.  Will need close f/u as outpatient.     Thrombocytopenia (HCC)- (present on admission)  Assessment & Plan  Likely secondary to liver disease and alcohol induced bone marrow suppression, resolved, back to normal      Hyponatremia- (present on admission)  Assessment & Plan  Resolved with IV fluids       VTE prophylaxis: SCDs    Case and plan of care discussed with nurse staff  Case and plan of care discussed during multidisciplinary rounds

## 2020-01-24 NOTE — PROGRESS NOTES
"Patient requesting pain medication for hip pain, this RN took patient's blood pressure to ensure medication was safe to administer. Patient complaining of pain while vitals machine was running BP, stating \"you dont have to make the pressure so high, this is killing me\". This RN explained that I had no control over how high the vitals machine constricted patient's arm. Patient repeated statement multiple times, stating \"maybe I should put this on your arm and the doctor's arm and see how you like it\" . This RN educated patient that she could refuse vital signs, but that she would not be able to receive pain medication to ensure patient safety. Patient then stated \"I'm not stupid when it comes to medical stuff. I have pica from the blood pressure cuff, which is when the oxygen gets cut off for too long\". This RN again educated patient that we could not force her to have her blood pressure taken, but that she would not be able to receive pain medication without recent vital signs as it could compromise her safety.   "

## 2020-01-25 LAB
ALBUMIN SERPL BCP-MCNC: 2.8 G/DL (ref 3.2–4.9)
ALP SERPL-CCNC: 179 U/L (ref 30–99)
ALT SERPL-CCNC: 18 U/L (ref 2–50)
ANION GAP SERPL CALC-SCNC: 10 MMOL/L (ref 0–11.9)
AST SERPL-CCNC: 78 U/L (ref 12–45)
BILIRUB CONJ SERPL-MCNC: 6.8 MG/DL (ref 0.1–0.5)
BILIRUB INDIRECT SERPL-MCNC: 3.7 MG/DL (ref 0–1)
BILIRUB SERPL-MCNC: 10.5 MG/DL (ref 0.1–1.5)
BUN SERPL-MCNC: 6 MG/DL (ref 8–22)
CALCIUM SERPL-MCNC: 8.7 MG/DL (ref 8.5–10.5)
CHLORIDE SERPL-SCNC: 114 MMOL/L (ref 96–112)
CO2 SERPL-SCNC: 20 MMOL/L (ref 20–33)
CREAT SERPL-MCNC: 0.88 MG/DL (ref 0.5–1.4)
GLUCOSE SERPL-MCNC: 75 MG/DL (ref 65–99)
INR PPP: 1.16 (ref 0.87–1.13)
POTASSIUM SERPL-SCNC: 3.4 MMOL/L (ref 3.6–5.5)
PROT SERPL-MCNC: 5.9 G/DL (ref 6–8.2)
PROTHROMBIN TIME: 15.1 SEC (ref 12–14.6)
SODIUM SERPL-SCNC: 144 MMOL/L (ref 135–145)

## 2020-01-25 PROCEDURE — A9270 NON-COVERED ITEM OR SERVICE: HCPCS | Performed by: INTERNAL MEDICINE

## 2020-01-25 PROCEDURE — 700111 HCHG RX REV CODE 636 W/ 250 OVERRIDE (IP): Performed by: HOSPITALIST

## 2020-01-25 PROCEDURE — 770001 HCHG ROOM/CARE - MED/SURG/GYN PRIV*

## 2020-01-25 PROCEDURE — 36415 COLL VENOUS BLD VENIPUNCTURE: CPT

## 2020-01-25 PROCEDURE — 700102 HCHG RX REV CODE 250 W/ 637 OVERRIDE(OP): Performed by: INTERNAL MEDICINE

## 2020-01-25 PROCEDURE — 80076 HEPATIC FUNCTION PANEL: CPT

## 2020-01-25 PROCEDURE — 80048 BASIC METABOLIC PNL TOTAL CA: CPT

## 2020-01-25 PROCEDURE — A9270 NON-COVERED ITEM OR SERVICE: HCPCS | Performed by: HOSPITALIST

## 2020-01-25 PROCEDURE — 700102 HCHG RX REV CODE 250 W/ 637 OVERRIDE(OP): Performed by: HOSPITALIST

## 2020-01-25 PROCEDURE — 85610 PROTHROMBIN TIME: CPT

## 2020-01-25 PROCEDURE — 99232 SBSQ HOSP IP/OBS MODERATE 35: CPT | Performed by: HOSPITALIST

## 2020-01-25 PROCEDURE — 700105 HCHG RX REV CODE 258: Performed by: HOSPITALIST

## 2020-01-25 RX ORDER — POTASSIUM CHLORIDE 20 MEQ/1
40 TABLET, EXTENDED RELEASE ORAL ONCE
Status: COMPLETED | OUTPATIENT
Start: 2020-01-25 | End: 2020-01-25

## 2020-01-25 RX ORDER — SODIUM CHLORIDE 9 MG/ML
INJECTION, SOLUTION INTRAVENOUS
Status: ACTIVE
Start: 2020-01-25 | End: 2020-01-25

## 2020-01-25 RX ADMIN — MIDODRINE HYDROCHLORIDE 5 MG: 5 TABLET ORAL at 09:28

## 2020-01-25 RX ADMIN — LACTULOSE 30 ML: 20 SOLUTION ORAL at 17:17

## 2020-01-25 RX ADMIN — LACTULOSE 30 ML: 20 SOLUTION ORAL at 06:07

## 2020-01-25 RX ADMIN — CHLORDIAZEPOXIDE HYDROCHLORIDE 25 MG: 25 CAPSULE ORAL at 06:00

## 2020-01-25 RX ADMIN — CEFTRIAXONE SODIUM 1 G: 1 INJECTION, POWDER, FOR SOLUTION INTRAMUSCULAR; INTRAVENOUS at 06:00

## 2020-01-25 RX ADMIN — OMEPRAZOLE 20 MG: 20 CAPSULE, DELAYED RELEASE ORAL at 06:00

## 2020-01-25 RX ADMIN — Medication 100 MG: at 06:00

## 2020-01-25 RX ADMIN — SUCRALFATE 1 G: 1 SUSPENSION ORAL at 06:00

## 2020-01-25 RX ADMIN — DOXYCYCLINE 100 MG: 100 TABLET, FILM COATED ORAL at 17:16

## 2020-01-25 RX ADMIN — MIDODRINE HYDROCHLORIDE 5 MG: 5 TABLET ORAL at 12:25

## 2020-01-25 RX ADMIN — DOXYCYCLINE 100 MG: 100 TABLET, FILM COATED ORAL at 06:00

## 2020-01-25 RX ADMIN — LEVOTHYROXINE SODIUM 50 MCG: 50 TABLET ORAL at 06:00

## 2020-01-25 RX ADMIN — OMEPRAZOLE 20 MG: 20 CAPSULE, DELAYED RELEASE ORAL at 17:17

## 2020-01-25 RX ADMIN — OXYCODONE HYDROCHLORIDE 5 MG: 5 TABLET ORAL at 09:42

## 2020-01-25 RX ADMIN — RIFAXIMIN 550 MG: 550 TABLET ORAL at 06:00

## 2020-01-25 RX ADMIN — PYRIDOXINE HCL TAB 50 MG 100 MG: 50 TAB at 06:00

## 2020-01-25 RX ADMIN — LACTULOSE 30 ML: 20 SOLUTION ORAL at 12:25

## 2020-01-25 RX ADMIN — RIFAXIMIN 550 MG: 550 TABLET ORAL at 17:17

## 2020-01-25 RX ADMIN — SUCRALFATE 1 G: 1 SUSPENSION ORAL at 12:25

## 2020-01-25 RX ADMIN — SENNOSIDES AND DOCUSATE SODIUM 2 TABLET: 8.6; 5 TABLET ORAL at 06:00

## 2020-01-25 RX ADMIN — SUCRALFATE 1 G: 1 SUSPENSION ORAL at 17:17

## 2020-01-25 RX ADMIN — POTASSIUM CHLORIDE 40 MEQ: 1500 TABLET, EXTENDED RELEASE ORAL at 09:28

## 2020-01-25 RX ADMIN — MIDODRINE HYDROCHLORIDE 5 MG: 5 TABLET ORAL at 17:17

## 2020-01-25 ASSESSMENT — ENCOUNTER SYMPTOMS
NAUSEA: 0
DIZZINESS: 0
SPUTUM PRODUCTION: 0
BRUISES/BLEEDS EASILY: 0
MYALGIAS: 0
WHEEZING: 0
COUGH: 0
VOMITING: 0
NECK PAIN: 0
FEVER: 0
BLURRED VISION: 0
HEADACHES: 0
DEPRESSION: 0
HEARTBURN: 0
HEMOPTYSIS: 0
PALPITATIONS: 0

## 2020-01-25 ASSESSMENT — LIFESTYLE VARIABLES: SUBSTANCE_ABUSE: 0

## 2020-01-25 NOTE — PROGRESS NOTES
"A&Ox4.   BP (!) 91/61 Comment: RN notified  Pulse 90   Temp 37.2 °C (98.9 °F) (Temporal)   Resp 17   Ht 1.651 m (5' 5\")   Wt 79.2 kg (174 lb 9.7 oz)   SpO2 93%   BMI 29.06 kg/m²   Pt states left hip pain 9/10, medicated per MAR.   Neuro intact, denies numbness or tingling.   Saturating >90% on RA. Denies SOB.  Abdomen soft, hypoactive bowel sounds, + flatus, LBM 1/24.   Incontinent urine and stool. Pivot to commode w/assistance.   Tolerating regular diet, denies n/v.  Pt ambulates x2 assist, unsteady gait. High fall risk. Fall precautions in place. Bed/chair alarm on.   Bilateral orbital bruising noted.   Right red spot on buttocks, blanching, barrier cream applied, Q2 turns in place, waffle cushion placed.   Updated on plan of care. Safety education provided. Bed locked in low. Call light within reach. Hourly rounding in place.        "

## 2020-01-25 NOTE — PROGRESS NOTES
"Hospital Medicine Daily Progress Note    Date of Service  1/25/2020    Chief Complaint  54 y.o. female admitted 1/11/2020 with vomiting blood    Hospital Course    Ms. Kahn is a past medical history of alcohol abuse with alcoholic liver disease was brought to the emergency room after she fell hit a curb with left-sided facial trauma.  She also had noted 2 weeks of hematemesis that had been red blood at first and then transition to more of a black coffee ground emesis.  She required 2 units of blood transfusions.  She underwent EGD by Dr. Davis in the operating room on 1/15/2020 revealing severe ulcerative erosive esophagitis with spontaneous oozing and adhered clot with a visible vessel for which she had a Hemoclip placed.      Interval Problem Update  1/16/20: Ms. Kahn was evaluated and examined in the ICU.   She admits to drinking a pint of vodka. She denies hallucination. +tremor.  She has had some intermittent vomiting though no black or blood in it.  1/17: patient seen and evaluated in the ICU.  Remains quite weak requiring substantial assistance to get out of bed.  She did receive a dose of IV Ativan this morning because of tremulousness.  Spoke with her nurse but her condition.  Her significant other did visit today.  1/18: Ms. Rios was evaluated in the ICU. She is very weak requiring assistance to walk to the chair. She has required 1 liter of oxygen as her sats went down to 84%. She is requesting IV ativan due to ongoing detox. Her significant other visited today.   1/19: patient evaluated and examined on the medical floor. She complains of ongoing heartburn. She is able to walk but requires assistance and notes, \"my equilibrium is off\". She is tolerating Boost. I discussed with RN about trying to taper off oxygen. Her sons and daughter are coming in today. I asked to meet with them and set up a meeting.   1/20: I met with Ms. Kahn, her significant other, her two sons, her daughter, and her " daughter's significant other about her condition. The  and RN were in the room. We had a long discussion about her diagnosis, work up, and home safety options. Ms. Kahn is open to SNF and orders placed.   40 minutes spent that of which over 50% of the time was spent in counseling about her diagnosis of alcohol-induced liver disease and upper GI bleed with falls.     1/21: Patient complains of fever, cough with yellowish sputum, no hemoptysis, mild shortness of breath, she is hard of hearing which is chronic, denies any nausea or vomiting, discussed with patient will get chest x-ray, procalcitonin, lactic acid, will start IV antibiotics, discussed case with patient's family, all questions answered.  1/22: Patient is alert and oriented, continue having cough but improved, no more fevers, she still feels weak, no nausea no vomiting.  Discussed with patient regarding her labs today, discussed with her regarding her liver disease and she was advised to stop drinking completely otherwise her liver disease will continue progressing.  1/23: Patient is more awake and oriented today, started feeling better, no nausea no vomiting, ammonia level is trending down, no fever no chills cough is improved.  1/24: Patient is lying in bed, still feeling weak, tolerating diet, she is awake and oriented follows commands, cough and shortness of breath almost resolved.  Denies right upper quadrant pain  1/25: patient in bed, had long discusion with patient and family regarding her prognosis and her liver disease, discussed again regarding the need to stop drink, that at this time she is more stable but due to her liver disease her long term prognosis is not good and if she continue drinking her quality of life will decrease. Patient expressed understanding.       Consultants/Specialty  Critical care.   GI     Code Status  full    Disposition  SNF tomorrow.      Review of Systems  Review of Systems   Constitutional: Positive  for malaise/fatigue. Negative for fever.   HENT: Negative for congestion and nosebleeds.    Eyes: Negative for blurred vision.   Respiratory: Negative for cough, hemoptysis, sputum production and wheezing.    Cardiovascular: Negative for chest pain and palpitations.   Gastrointestinal: Negative for heartburn, nausea and vomiting.   Genitourinary: Negative for dysuria and urgency.   Musculoskeletal: Negative for myalgias and neck pain.   Neurological: Negative for dizziness and headaches.   Endo/Heme/Allergies: Does not bruise/bleed easily.   Psychiatric/Behavioral: Negative for depression, substance abuse and suicidal ideas.        Physical Exam  Temp:  [36.3 °C (97.4 °F)-37.2 °C (98.9 °F)] 37.1 °C (98.7 °F)  Pulse:  [77-98] 77  Resp:  [17-18] 17  BP: ()/(58-71) 92/60  SpO2:  [90 %-97 %] 97 %    Physical Exam  Vitals signs and nursing note reviewed.   Constitutional:       General: She is not in acute distress.  HENT:      Head: Normocephalic and atraumatic.      Comments: Left chin and left face bruise  Left eye periorbital bruise       Nose: Nose normal.      Mouth/Throat:      Mouth: Mucous membranes are dry.      Pharynx: Oropharynx is clear.   Eyes:      General: Scleral icterus present.      Conjunctiva/sclera: Conjunctivae normal.   Neck:      Musculoskeletal: Normal range of motion and neck supple.   Cardiovascular:      Rate and Rhythm: Normal rate and regular rhythm.      Heart sounds: No murmur.   Pulmonary:      Effort: Pulmonary effort is normal. No respiratory distress.      Breath sounds: No stridor. No rhonchi or rales.   Abdominal:      General: Bowel sounds are normal. There is no distension.      Palpations: Abdomen is soft.      Tenderness: There is no tenderness. There is no guarding or rebound.   Musculoskeletal:      Right lower leg: No edema.      Left lower leg: No edema.   Skin:     General: Skin is warm and dry.      Coloration: Skin is jaundiced.   Neurological:      General: No  focal deficit present.      Mental Status: She is alert and oriented to person, place, and time.   Psychiatric:         Mood and Affect: Mood normal.         Behavior: Behavior normal.      Comments: Hard of hearing         Fluids    Intake/Output Summary (Last 24 hours) at 1/25/2020 1314  Last data filed at 1/25/2020 1200  Gross per 24 hour   Intake 660 ml   Output --   Net 660 ml       Laboratory  Recent Labs     01/23/20  0349   WBC 9.6   RBC 2.68*   HEMOGLOBIN 9.7*   HEMATOCRIT 30.9*   .3*   MCH 36.2*   MCHC 31.4*   RDW 91.6*   PLATELETCT 261   MPV 9.6     Recent Labs     01/23/20  0349 01/24/20  0306 01/25/20  0438   SODIUM 145 145 144   POTASSIUM 3.2* 3.7 3.4*   CHLORIDE 115* 115* 114*   CO2 19* 20 20   GLUCOSE 84 73 75   BUN 7* 5* 6*   CREATININE 0.68 0.78 0.88   CALCIUM 8.4* 8.9 8.7     Recent Labs     01/25/20  0438   INR 1.16*               Imaging  DX-CHEST-2 VIEWS   Final Result      Small to moderate bilateral pleural effusions and bibasilar atelectasis versus consolidations. Lung aeration has slightly improved since prior study.      DX-HIP-COMPLETE - UNILATERAL 2+ LEFT   Final Result      Mild osteoarthritis of the hips. No acute abnormality.      DX-CHEST-2 VIEWS   Final Result      Interval worsening in bibasilar opacities with air bronchograms. Findings are concerning for pneumonia. Follow-up imaging is recommended to document resolution.      US-ABDOMEN COMPLETE SURVEY   Final Result         1.  Gallbladder sludge without additional sonographic findings to indicate acute cholecystitis.   2.  Massive hepatomegaly and echogenic liver, compatible with fatty change versus fibrosis.         DX-CHEST-PORTABLE (1 VIEW)   Final Result      1.  Right internal jugular catheter placement with the tip projecting over the cavoatrial junction. No pneumothorax is identified.      2.  Left basilar opacification may be related to atelectasis.      DX-CHEST-PORTABLE (1 VIEW)   Final Result      No acute  cardiopulmonary findings.      CT-HEAD W/O   Final Result      1.  No acute intracranial findings.      2.  Left nasal bone fracture.         CT-CSPINE WITHOUT PLUS RECONS   Final Result      1.  No acute fracture is identified.      2.  Multilevel degenerative disc disease and facet arthropathy as described.      CT-MAXILLOFACIAL W/O PLUS RECONS   Final Result         Left nasal bone fracture      DX-THORACIC SPINE-2 VIEWS   Final Result      No acute fracture is identified.      DX-LUMBAR SPINE-4+ VIEWS   Final Result         1.  No acute fracture is identified.      2.  Facet arthropathy in the lower lumbar spine.           Assessment/Plan  * GIB (gastrointestinal bleeding)- (present on admission)  Assessment & Plan  Upper GI bleed given her bright red bleeding followed by coffee-ground emesis.   She was admitted to the ICU on a protonix and octreotide drip.  She underwent EGD 1/15 revealing severe ulcerative erosive esophagitis with hemoclip  Serial Hb levels ordered  She was transfused 2 units RBC due to acute blood loss anemia  Continue oral Prilosec twice daily      Falls frequently- (present on admission)  Assessment & Plan  CT head negative for bleed  Secondary to EtOH  SNF when medically stable probably in a day or 2    Lactic acidosis- (present on admission)  Assessment & Plan  Resolved, lactic acid is back to normal.    Hypotension- (present on admission)  Assessment & Plan  Stable continue monitoring, continue IV fluids, improved on midodrine  Discussed with patient she states she always has low bp in the 90's systolic.     Hematuria- (present on admission)  Assessment & Plan  resolved    Alcohol withdrawal syndrome with complication (HCC)- (present on admission)  Assessment & Plan  Tremors without delirium  Status post IV Ativan   Started tapering off Librium continue once daily for 2 more days..     Troponin level elevated- (present on admission)  Assessment & Plan  Suspect this could be demand  ischemia in the setting of gastrointestinal hemorrhage.    No chest pain. Troponin back to normal.    Alcoholic liver disease (HCC)- (present on admission)  Assessment & Plan  With associated hepatic dysfunction in the form of thrombocytopenia, but with normal coagulation pattern.    Bilirubin is 10.5 today with direct bilirubin 6.3 this is likely her intrinsic liver disease  Liver ultrasound showed Massive hepatomegaly and echogenic liver, compatible with fatty change versus fibrosis.  Meld score 17, child Evans score 11, 55% 1 year mortality.        Metabolic acidosis- (present on admission)  Assessment & Plan  Lactic acid back to normal    Macrocytic anemia- (present on admission)  Assessment & Plan  Suspect due to chronic alcoholism, likely component of acute bleed superimposed.  Monitor.  Hemoglobin is stable 9.7     Fever  Assessment & Plan  Most likely due to pneumonia, procalcitonin is elevated, continue IV antibiotics.  No fever today.  Fever resolved    Hypoxia- (present on admission)  Assessment & Plan  Hypoxia resolved. cxr showed possible pneumonia.  Elevated procalcitonin, continue IV antibiotics  Improving, will complete 5 days of antibiotics stop date 1/26/2020    Closed fracture of nasal bone- (present on admission)  Assessment & Plan  Non-operative    Hypokalemia- (present on admission)  Assessment & Plan  Replacing today     Hypothyroidism- (present on admission)  Assessment & Plan  On synthroid 50 mcg daily  TSH is markedly elevated at 31  Though she unlikely was compliant with synthroid thus the dose will not be increased.  Will need close f/u as outpatient.     Thrombocytopenia (HCC)- (present on admission)  Assessment & Plan  Likely secondary to liver disease and alcohol induced bone marrow suppression, resolved, back to normal      Hyponatremia- (present on admission)  Assessment & Plan  Resolved with IV fluids       VTE prophylaxis: SCDs    Case and plan of care discussed with nurse  staff  Case and plan of care discussed during multidisciplinary rounds

## 2020-01-25 NOTE — PROGRESS NOTES
2 RN Skin Check    2 RN skin check complete:   Bilateral orbital bruising.   Bruising and scattered abrasions to bilateral upper arms.   Scab to left elbow, MATTHIAS. Skin pink, blanching.   Sacrum red, blanching. Red spot on right buttocks, blanching.   Heels dry and flaky, dark callous on planter surface of bilateral feet.   All other bony prominence and skin intact.     Interventions: Q2 turns in place, waffle in use, pillow to float heels, barrier paste and frequently linen changes for incontinence.

## 2020-01-25 NOTE — CARE PLAN
Problem: Communication  Goal: The ability to communicate needs accurately and effectively will improve  Outcome: PROGRESSING SLOWER THAN EXPECTED  Note:   Review plan of care with patient; patient slow to respond; re orient and re-educate patient as needed      Problem: Mobility  Goal: Risk for activity intolerance will decrease  Outcome: PROGRESSING SLOWER THAN EXPECTED  Note:   Patient unable to bear weight for long amounts of time or for long distance; patient max assist to bedside commode

## 2020-01-25 NOTE — CARE PLAN
Problem: Communication  Goal: The ability to communicate needs accurately and effectively will improve  Outcome: PROGRESSING AS EXPECTED     Problem: Infection  Goal: Will remain free from infection  Outcome: PROGRESSING AS EXPECTED     Problem: Knowledge Deficit  Goal: Knowledge of disease process/condition, treatment plan, diagnostic tests, and medications will improve  Outcome: PROGRESSING AS EXPECTED  Goal: Knowledge of the prescribed therapeutic regimen will improve  Outcome: PROGRESSING AS EXPECTED     Problem: Respiratory:  Goal: Respiratory status will improve  Outcome: PROGRESSING AS EXPECTED     Problem: Psychosocial Needs:  Goal: Level of anxiety will decrease  Outcome: PROGRESSING AS EXPECTED     Problem: Mobility  Goal: Risk for activity intolerance will decrease  Outcome: PROGRESSING AS EXPECTED

## 2020-01-25 NOTE — CARE PLAN
Problem: Communication  Goal: The ability to communicate needs accurately and effectively will improve  Outcome: PROGRESSING AS EXPECTED   Education provided on use of call light. Pt demonstrates understanding by using call light appropriately.     Problem: Mobility  Goal: Risk for activity intolerance will decrease  Outcome: PROGRESSING AS EXPECTED   Pt education on fall risk. Non-skid socks on. Environment clear of clutter. Assistive devices out of sight. Pt ambulates x2 assist pivot to chair. Patient educated on up for meals.

## 2020-01-25 NOTE — CONSULTS
"Reason for PC Consult: Advance Care Planning    Consulted by:   Dr. Collins    Assessment:  General:   54 year old female admitted for GI bleed on 1/11/20. Pt has a history of ETOH abuse, Cirrhosis, liver disease and hypothyroidism. Pt was bought in by EMS after she had fallen walking to her home from the store, hitting her face.     Dyspnea: No, 97% on RA  Last BM: 01/24/20    Pain: No    Depression: No    Dementia: No      Spiritual:  Is Confucianist or spirituality important for coping with this illness? No, Pt declined visit from   Has a  or spiritual provider visit been requested? No    Palliative Performance Scale: 60%    Advance Directive: None on File  DPOA: None on File  POLST: None on File      Code Status: Full    Outcome:  PC RN and PC MSW visited pt at bedside, introduced self and role of PC. Discussed pt's understanding of clinical picture, pt explained a lengthy understanding of why she fell. Pt states she was sick and her SO refused to go to the store for her. Because she was sick and weak she fell slipping on a \"small rock\". PC RN inquired about her understanding of what the physicians are saying, pt states \"they all say something different. It's confusing and contradictory\". PC RN inquired about pt's alcohol use and updates regarding her liver status. Pt verbalized understanding that her liver is inflamed but it is getting better.     Discussed pt's current alcohol consumption, pt reports she drinks a 5th of vodka a day. Pt states she started drinking 4 years ago. Pt feels she isn't a long term drinking because it is \"on and off\". Pt expressed not feeling like she has problems with drinking, she states she will stop drinking and \"never touch it again\". When discussing concern for pt's liver function and discussing 'what ifs', pt stated she is getting better and doesn't think she will get worse. Pt expressed knowing she will get better, she is going to take all the medications the " "GI doctor prescribes and will not drink any further, therefor she will be fine.    Discussed code status, AD and POLST form. Discussed resuscitation, pt expressed wanting to remain Full code at this time, because it helped her father she feels it will help her also. Discussed AD, pt agreeable to completing form, choose daughter Clare Masterson as DPOA and son \"AJ\" as 1st Alt. And pt choose statements of desires #2 & #5 with a question neeru next to 3. Pt stated she might want experimental treatment if she had cancer. Discussed benefits vs burdens of treatment and quality vs quantity of life, pt verbalized understanding.     Provided contact card, encouraged pt to call with any questions or concerns.     Active listening, education, validation, normalization, therapeutic touch, and emotional support provided throughout encounter.    Updated:   Dr. Collins    Plan:   AD awaiting notary, no changes to current POC at this time, continue GOC discussion as clinical picture unfolds.     Thank you for allowing Palliative Care to participate in this patient's care. Please feel free to call [phone] with any questions or concerns.  "

## 2020-01-26 LAB
ALBUMIN SERPL BCP-MCNC: 2.6 G/DL (ref 3.2–4.9)
ALP SERPL-CCNC: 170 U/L (ref 30–99)
ALT SERPL-CCNC: 17 U/L (ref 2–50)
AST SERPL-CCNC: 83 U/L (ref 12–45)
BILIRUB CONJ SERPL-MCNC: 6 MG/DL (ref 0.1–0.5)
BILIRUB INDIRECT SERPL-MCNC: 4.7 MG/DL (ref 0–1)
BILIRUB SERPL-MCNC: 10.7 MG/DL (ref 0.1–1.5)
POTASSIUM SERPL-SCNC: 3.3 MMOL/L (ref 3.6–5.5)
PROT SERPL-MCNC: 5.9 G/DL (ref 6–8.2)

## 2020-01-26 PROCEDURE — 700102 HCHG RX REV CODE 250 W/ 637 OVERRIDE(OP): Performed by: INTERNAL MEDICINE

## 2020-01-26 PROCEDURE — 700102 HCHG RX REV CODE 250 W/ 637 OVERRIDE(OP): Performed by: HOSPITALIST

## 2020-01-26 PROCEDURE — 80076 HEPATIC FUNCTION PANEL: CPT

## 2020-01-26 PROCEDURE — 99232 SBSQ HOSP IP/OBS MODERATE 35: CPT | Performed by: HOSPITALIST

## 2020-01-26 PROCEDURE — A9270 NON-COVERED ITEM OR SERVICE: HCPCS | Performed by: INTERNAL MEDICINE

## 2020-01-26 PROCEDURE — 84132 ASSAY OF SERUM POTASSIUM: CPT

## 2020-01-26 PROCEDURE — A9270 NON-COVERED ITEM OR SERVICE: HCPCS | Performed by: HOSPITALIST

## 2020-01-26 PROCEDURE — 770001 HCHG ROOM/CARE - MED/SURG/GYN PRIV*

## 2020-01-26 PROCEDURE — 36415 COLL VENOUS BLD VENIPUNCTURE: CPT

## 2020-01-26 RX ORDER — POTASSIUM CHLORIDE 20 MEQ/1
40 TABLET, EXTENDED RELEASE ORAL ONCE
Status: COMPLETED | OUTPATIENT
Start: 2020-01-26 | End: 2020-01-26

## 2020-01-26 RX ADMIN — Medication 100 MG: at 06:33

## 2020-01-26 RX ADMIN — SUCRALFATE 1 G: 1 SUSPENSION ORAL at 06:33

## 2020-01-26 RX ADMIN — POTASSIUM CHLORIDE 40 MEQ: 1500 TABLET, EXTENDED RELEASE ORAL at 10:45

## 2020-01-26 RX ADMIN — PYRIDOXINE HCL TAB 50 MG 100 MG: 50 TAB at 06:33

## 2020-01-26 RX ADMIN — LACTULOSE 30 ML: 20 SOLUTION ORAL at 18:00

## 2020-01-26 RX ADMIN — DOXYCYCLINE 100 MG: 100 TABLET, FILM COATED ORAL at 06:33

## 2020-01-26 RX ADMIN — MIDODRINE HYDROCHLORIDE 5 MG: 5 TABLET ORAL at 11:56

## 2020-01-26 RX ADMIN — RIFAXIMIN 550 MG: 550 TABLET ORAL at 06:33

## 2020-01-26 RX ADMIN — MIDODRINE HYDROCHLORIDE 5 MG: 5 TABLET ORAL at 17:14

## 2020-01-26 RX ADMIN — OMEPRAZOLE 20 MG: 20 CAPSULE, DELAYED RELEASE ORAL at 06:33

## 2020-01-26 RX ADMIN — OMEPRAZOLE 20 MG: 20 CAPSULE, DELAYED RELEASE ORAL at 17:14

## 2020-01-26 RX ADMIN — RIFAXIMIN 550 MG: 550 TABLET ORAL at 17:14

## 2020-01-26 RX ADMIN — LEVOTHYROXINE SODIUM 50 MCG: 50 TABLET ORAL at 06:33

## 2020-01-26 RX ADMIN — SENNOSIDES AND DOCUSATE SODIUM 2 TABLET: 8.6; 5 TABLET ORAL at 06:33

## 2020-01-26 RX ADMIN — SUCRALFATE 1 G: 1 SUSPENSION ORAL at 11:56

## 2020-01-26 RX ADMIN — LACTULOSE 30 ML: 20 SOLUTION ORAL at 11:56

## 2020-01-26 RX ADMIN — SUCRALFATE 1 G: 1 SUSPENSION ORAL at 18:00

## 2020-01-26 RX ADMIN — MIDODRINE HYDROCHLORIDE 5 MG: 5 TABLET ORAL at 08:26

## 2020-01-26 RX ADMIN — CHLORDIAZEPOXIDE HYDROCHLORIDE 25 MG: 25 CAPSULE ORAL at 06:33

## 2020-01-26 RX ADMIN — OXYCODONE HYDROCHLORIDE 5 MG: 5 TABLET ORAL at 02:15

## 2020-01-26 RX ADMIN — LACTULOSE 30 ML: 20 SOLUTION ORAL at 06:33

## 2020-01-26 ASSESSMENT — ENCOUNTER SYMPTOMS
BRUISES/BLEEDS EASILY: 0
DIZZINESS: 0
HEADACHES: 0
NECK PAIN: 0
BLURRED VISION: 0
HEARTBURN: 0
COUGH: 0
SPUTUM PRODUCTION: 0
DEPRESSION: 0
MYALGIAS: 0
HEMOPTYSIS: 0
NAUSEA: 0
ORTHOPNEA: 0
ABDOMINAL PAIN: 0
BACK PAIN: 0
CHILLS: 0

## 2020-01-26 ASSESSMENT — LIFESTYLE VARIABLES: SUBSTANCE_ABUSE: 0

## 2020-01-26 NOTE — PROGRESS NOTES
"Patient is paranoid and accusing nursing staff of stealing her medications and hearing aid. At midnight, patient was offered scheduled Sucralfate, however, she refused. When asked for a reason why she didn't want her medication, patient stated, \"I am not suppose to take that.\" Patient was informed that it was indeed her medication. Patient continues to refuse. However, patient is A&Ox4. Will continue to monitor patient and encourage her to be compliant with care.   "

## 2020-01-26 NOTE — PROGRESS NOTES
A&Ox4.   Hypotensive, pt fatigued.  Pt denies pain at this time.    Neuro intact, denies numbness or tingling.   Saturating >90% on RA, requiring 1L at night. Denies SOB.  Abdomen soft, hypoactive bowel sounds, + flatus, LBM 1/25, watery stool.   Incontinent urine and stool. Pivot to commode w/assistance.   Tolerating regular diet, decreased appetite, denies n/v.  Pt ambulates x2-3 assist, unsteady gait. High fall risk. Fall precautions in place. Bed/chair alarm on.   Bilateral orbital and left cheek bruised.   Generalized yellow discoloration of skin, bilateral sclera jaundice.   Right red spot on buttocks, blanching, barrier cream applied, Q2 turns in place, waffle cushion placed.   Updated on plan of care. Safety education provided. Bed locked in low. Call light within reach. Hourly rounding in place.

## 2020-01-26 NOTE — PROGRESS NOTES
"Patient A&Ox4 however, lethargic and agitated stating this RN took her papers from her side table. When asked patient stated \"they had phone numbers on them\". Discussed with patient that this RN is unaware of any papers and helped patient look on side table and around room for them. Patient now on phone with  claiming \"she took them\". Patients daughter and other family members at bedside.     Patients daughter brought soup in container, placed in middle fridge with patient information.    "

## 2020-01-26 NOTE — CARE PLAN
Problem: Infection  Goal: Will remain free from infection  Outcome: PROGRESSING AS EXPECTED     Problem: Respiratory:  Goal: Respiratory status will improve  Outcome: PROGRESSING AS EXPECTED     Problem: Mobility  Goal: Risk for activity intolerance will decrease  Outcome: PROGRESSING AS EXPECTED     Problem: Communication  Goal: The ability to communicate needs accurately and effectively will improve  Outcome: PROGRESSING SLOWER THAN EXPECTED     Problem: Knowledge Deficit  Goal: Knowledge of disease process/condition, treatment plan, diagnostic tests, and medications will improve  Outcome: PROGRESSING SLOWER THAN EXPECTED  Goal: Knowledge of the prescribed therapeutic regimen will improve  Outcome: PROGRESSING SLOWER THAN EXPECTED     Problem: Psychosocial Needs:  Goal: Level of anxiety will decrease  Outcome: PROGRESSING SLOWER THAN EXPECTED

## 2020-01-26 NOTE — PROGRESS NOTES
"Hospital Medicine Daily Progress Note    Date of Service  1/26/2020    Chief Complaint  54 y.o. female admitted 1/11/2020 with vomiting blood    Hospital Course    Ms. Kahn is a past medical history of alcohol abuse with alcoholic liver disease was brought to the emergency room after she fell hit a curb with left-sided facial trauma.  She also had noted 2 weeks of hematemesis that had been red blood at first and then transition to more of a black coffee ground emesis.  She required 2 units of blood transfusions.  She underwent EGD by Dr. Davis in the operating room on 1/15/2020 revealing severe ulcerative erosive esophagitis with spontaneous oozing and adhered clot with a visible vessel for which she had a Hemoclip placed.      Interval Problem Update  1/16/20: Ms. Kahn was evaluated and examined in the ICU.   She admits to drinking a pint of vodka. She denies hallucination. +tremor.  She has had some intermittent vomiting though no black or blood in it.  1/17: patient seen and evaluated in the ICU.  Remains quite weak requiring substantial assistance to get out of bed.  She did receive a dose of IV Ativan this morning because of tremulousness.  Spoke with her nurse but her condition.  Her significant other did visit today.  1/18: Ms. Rios was evaluated in the ICU. She is very weak requiring assistance to walk to the chair. She has required 1 liter of oxygen as her sats went down to 84%. She is requesting IV ativan due to ongoing detox. Her significant other visited today.   1/19: patient evaluated and examined on the medical floor. She complains of ongoing heartburn. She is able to walk but requires assistance and notes, \"my equilibrium is off\". She is tolerating Boost. I discussed with RN about trying to taper off oxygen. Her sons and daughter are coming in today. I asked to meet with them and set up a meeting.   1/20: I met with Ms. Kahn, her significant other, her two sons, her daughter, and her " daughter's significant other about her condition. The  and RN were in the room. We had a long discussion about her diagnosis, work up, and home safety options. Ms. Kahn is open to SNF and orders placed.   40 minutes spent that of which over 50% of the time was spent in counseling about her diagnosis of alcohol-induced liver disease and upper GI bleed with falls.     1/21: Patient complains of fever, cough with yellowish sputum, no hemoptysis, mild shortness of breath, she is hard of hearing which is chronic, denies any nausea or vomiting, discussed with patient will get chest x-ray, procalcitonin, lactic acid, will start IV antibiotics, discussed case with patient's family, all questions answered.  1/22: Patient is alert and oriented, continue having cough but improved, no more fevers, she still feels weak, no nausea no vomiting.  Discussed with patient regarding her labs today, discussed with her regarding her liver disease and she was advised to stop drinking completely otherwise her liver disease will continue progressing.  1/23: Patient is more awake and oriented today, started feeling better, no nausea no vomiting, ammonia level is trending down, no fever no chills cough is improved.  1/24: Patient is lying in bed, still feeling weak, tolerating diet, she is awake and oriented follows commands, cough and shortness of breath almost resolved.  Denies right upper quadrant pain  1/25: patient in bed, had long discusion with patient and family regarding her prognosis and her liver disease, discussed again regarding the need to stop drink, that at this time she is more stable but due to her liver disease her long term prognosis is not good and if she continue drinking her quality of life will decrease. Patient expressed understanding.   1/26: Patient is resting in bed, she is upset because she was woken up this morning, she was refusing her medications, I went and talked to her and after discussion she  agreed to take her medications, denies any pain fever nausea vomiting, no focal weakness cough and shortness of breath resolved.      Consultants/Specialty  Critical care.   GI     Code Status  full    Disposition  SNF if liver function tests are stable and bed is available     Review of Systems  Review of Systems   Constitutional: Positive for malaise/fatigue. Negative for chills.   HENT: Negative for congestion and nosebleeds.    Eyes: Negative for blurred vision.   Respiratory: Negative for cough, hemoptysis and sputum production.    Cardiovascular: Negative for chest pain and orthopnea.   Gastrointestinal: Negative for abdominal pain, heartburn and nausea.   Genitourinary: Negative for dysuria and frequency.   Musculoskeletal: Negative for back pain, myalgias and neck pain.   Neurological: Negative for dizziness and headaches.   Endo/Heme/Allergies: Does not bruise/bleed easily.   Psychiatric/Behavioral: Negative for depression, substance abuse and suicidal ideas.        Physical Exam  Temp:  [36.5 °C (97.7 °F)-37.3 °C (99.2 °F)] 37.1 °C (98.8 °F)  Pulse:  [78-96] 78  Resp:  [14-20] 15  BP: ()/(52-65) 86/62  SpO2:  [90 %-95 %] 93 %    Physical Exam  Vitals signs and nursing note reviewed.   Constitutional:       General: She is not in acute distress.  HENT:      Head: Normocephalic and atraumatic.      Comments: Left chin and left face bruise  Left eye periorbital bruise       Nose: Nose normal.      Mouth/Throat:      Mouth: Mucous membranes are moist.      Pharynx: Oropharynx is clear.   Eyes:      General: Scleral icterus present.      Pupils: Pupils are equal, round, and reactive to light.   Neck:      Musculoskeletal: Normal range of motion and neck supple.   Cardiovascular:      Rate and Rhythm: Normal rate and regular rhythm.      Heart sounds: No murmur.   Pulmonary:      Effort: Pulmonary effort is normal. No respiratory distress.      Breath sounds: No stridor. No rales.   Abdominal:       General: Bowel sounds are normal. There is no distension.      Palpations: Abdomen is soft.      Tenderness: There is no tenderness. There is no guarding.   Musculoskeletal:      Right lower leg: No edema.      Left lower leg: No edema.   Skin:     General: Skin is warm and dry.      Coloration: Skin is jaundiced.   Neurological:      General: No focal deficit present.      Mental Status: She is alert and oriented to person, place, and time.   Psychiatric:         Mood and Affect: Mood normal.         Behavior: Behavior normal.      Comments: Hard of hearing         Fluids    Intake/Output Summary (Last 24 hours) at 1/26/2020 1151  Last data filed at 1/26/2020 0800  Gross per 24 hour   Intake 360 ml   Output 1 ml   Net 359 ml       Laboratory      Recent Labs     01/24/20  0306 01/25/20  0438 01/26/20  0442   SODIUM 145 144  --    POTASSIUM 3.7 3.4* 3.3*   CHLORIDE 115* 114*  --    CO2 20 20  --    GLUCOSE 73 75  --    BUN 5* 6*  --    CREATININE 0.78 0.88  --    CALCIUM 8.9 8.7  --      Recent Labs     01/25/20  0438   INR 1.16*               Imaging  DX-CHEST-2 VIEWS   Final Result      Small to moderate bilateral pleural effusions and bibasilar atelectasis versus consolidations. Lung aeration has slightly improved since prior study.      DX-HIP-COMPLETE - UNILATERAL 2+ LEFT   Final Result      Mild osteoarthritis of the hips. No acute abnormality.      DX-CHEST-2 VIEWS   Final Result      Interval worsening in bibasilar opacities with air bronchograms. Findings are concerning for pneumonia. Follow-up imaging is recommended to document resolution.      US-ABDOMEN COMPLETE SURVEY   Final Result         1.  Gallbladder sludge without additional sonographic findings to indicate acute cholecystitis.   2.  Massive hepatomegaly and echogenic liver, compatible with fatty change versus fibrosis.         DX-CHEST-PORTABLE (1 VIEW)   Final Result      1.  Right internal jugular catheter placement with the tip projecting  over the cavoatrial junction. No pneumothorax is identified.      2.  Left basilar opacification may be related to atelectasis.      DX-CHEST-PORTABLE (1 VIEW)   Final Result      No acute cardiopulmonary findings.      CT-HEAD W/O   Final Result      1.  No acute intracranial findings.      2.  Left nasal bone fracture.         CT-CSPINE WITHOUT PLUS RECONS   Final Result      1.  No acute fracture is identified.      2.  Multilevel degenerative disc disease and facet arthropathy as described.      CT-MAXILLOFACIAL W/O PLUS RECONS   Final Result         Left nasal bone fracture      DX-THORACIC SPINE-2 VIEWS   Final Result      No acute fracture is identified.      DX-LUMBAR SPINE-4+ VIEWS   Final Result         1.  No acute fracture is identified.      2.  Facet arthropathy in the lower lumbar spine.           Assessment/Plan  * GIB (gastrointestinal bleeding)- (present on admission)  Assessment & Plan  Upper GI bleed given her bright red bleeding followed by coffee-ground emesis.   She was admitted to the ICU on a protonix and octreotide drip.  She underwent EGD 1/15 revealing severe ulcerative erosive esophagitis with hemoclip  Serial Hb levels ordered  She was transfused 2 units RBC due to acute blood loss anemia  Continue oral Prilosec twice daily      Falls frequently- (present on admission)  Assessment & Plan  CT head negative for bleed  Secondary to EtOH  SNF when medically stable probably in a day or 2    Lactic acidosis- (present on admission)  Assessment & Plan  Resolved, lactic acid is back to normal.    Hypotension- (present on admission)  Assessment & Plan  Stable continue monitoring, continue IV fluids, improved on midodrine  Discussed with patient she states she always has low bp in the 90's systolic.   Asymptomatic    Hematuria- (present on admission)  Assessment & Plan  resolved    Alcohol withdrawal syndrome with complication (HCC)- (present on admission)  Assessment & Plan  Tremors without  delirium  Status post IV Ativan   Off Librium    Troponin level elevated- (present on admission)  Assessment & Plan  Suspect this could be demand ischemia in the setting of gastrointestinal hemorrhage.    No chest pain. Troponin back to normal.    Alcoholic liver disease (HCC)- (present on admission)  Assessment & Plan  With associated hepatic dysfunction in the form of thrombocytopenia, but with normal coagulation pattern.    Bilirubin is 10.5 today with direct bilirubin 6.3 this is likely her intrinsic liver disease  Liver ultrasound showed Massive hepatomegaly and echogenic liver, compatible with fatty change versus fibrosis.  Meld score 17, child Evans score 11, 55% 1 year mortality.        Metabolic acidosis- (present on admission)  Assessment & Plan  Lactic acid back to normal    Macrocytic anemia- (present on admission)  Assessment & Plan  Suspect due to chronic alcoholism, likely component of acute bleed superimposed.  Monitor.  Hemoglobin is stable 9.7     Fever  Assessment & Plan  Most likely due to pneumonia, procalcitonin is elevated, continue IV antibiotics.  No fever today.  Fever resolved    Hypoxia- (present on admission)  Assessment & Plan  Hypoxia resolved. cxr showed possible pneumonia.  Elevated procalcitonin, continue IV antibiotics  Improving, will complete 5 days of antibiotics stop date 1/26/2020  Continue monitoring      Closed fracture of nasal bone- (present on admission)  Assessment & Plan  Non-operative    Hypokalemia- (present on admission)  Assessment & Plan  Replacing again today, magnesium level is normal, patient was refusing potassium supplement this morning but after discussion with her she agreed.    Hypothyroidism- (present on admission)  Assessment & Plan  On synthroid 50 mcg daily  TSH is markedly elevated at 31  Though she unlikely was compliant with synthroid thus the dose will not be increased.  Will need close f/u as outpatient.     Thrombocytopenia (HCC)- (present on  admission)  Assessment & Plan  Likely secondary to liver disease and alcohol induced bone marrow suppression, resolved, back to normal      Hyponatremia- (present on admission)  Assessment & Plan  Resolved with IV fluids       VTE prophylaxis: SCDs    Case and plan of care discussed with nurse staff  Case and plan of care discussed during multidisciplinary rounds

## 2020-01-26 NOTE — PROGRESS NOTES
2 RN Skin Check:  Bilateral orbital and left face bruised.   Bruising and scattered abrasions to bilateral upper arms.   Scab to left elbow, AMTTHIAS, pink.   Scab to right knee, MATTHIAS, pink.   Sacrum pink, blanching. Red spot on right buttocks, blanching.   Heels dry and flaky, dark callous on planter surface of bilateral feet.   All other bony prominence and skin intact.       Interventions: Q2 turns in place, waffle in use, pillow to float heels, barrier paste and frequently linen changes.

## 2020-01-26 NOTE — CARE PLAN
Problem: Communication  Goal: The ability to communicate needs accurately and effectively will improve  Outcome: PROGRESSING AS EXPECTED   Education provided on use of call light. Pt demonstrates understanding by using call light appropriately.   Hourly rounding in place. Patient  educated on sitting with patient due to patient intermittently lethargic.     Problem: Discharge Barriers/Planning  Goal: Patient's continuum of care needs will be met  Outcome: PROGRESSING AS EXPECTED   POC discussed with patient and ,  arranging SNF transfer, will transfer if LFT result and patient is medically cleared.

## 2020-01-27 LAB — POTASSIUM SERPL-SCNC: 3.4 MMOL/L (ref 3.6–5.5)

## 2020-01-27 PROCEDURE — 700102 HCHG RX REV CODE 250 W/ 637 OVERRIDE(OP): Performed by: HOSPITALIST

## 2020-01-27 PROCEDURE — 84132 ASSAY OF SERUM POTASSIUM: CPT

## 2020-01-27 PROCEDURE — A9270 NON-COVERED ITEM OR SERVICE: HCPCS | Performed by: INTERNAL MEDICINE

## 2020-01-27 PROCEDURE — 700102 HCHG RX REV CODE 250 W/ 637 OVERRIDE(OP): Performed by: INTERNAL MEDICINE

## 2020-01-27 PROCEDURE — A9270 NON-COVERED ITEM OR SERVICE: HCPCS | Performed by: HOSPITALIST

## 2020-01-27 PROCEDURE — 99232 SBSQ HOSP IP/OBS MODERATE 35: CPT | Performed by: HOSPITALIST

## 2020-01-27 PROCEDURE — 770001 HCHG ROOM/CARE - MED/SURG/GYN PRIV*

## 2020-01-27 PROCEDURE — 36415 COLL VENOUS BLD VENIPUNCTURE: CPT

## 2020-01-27 PROCEDURE — 97530 THERAPEUTIC ACTIVITIES: CPT

## 2020-01-27 RX ADMIN — MIDODRINE HYDROCHLORIDE 5 MG: 5 TABLET ORAL at 07:58

## 2020-01-27 RX ADMIN — MIDODRINE HYDROCHLORIDE 5 MG: 5 TABLET ORAL at 12:37

## 2020-01-27 RX ADMIN — OMEPRAZOLE 20 MG: 20 CAPSULE, DELAYED RELEASE ORAL at 06:00

## 2020-01-27 RX ADMIN — RIFAXIMIN 550 MG: 550 TABLET ORAL at 05:59

## 2020-01-27 RX ADMIN — LACTULOSE 30 ML: 20 SOLUTION ORAL at 05:59

## 2020-01-27 RX ADMIN — SUCRALFATE 1 G: 1 SUSPENSION ORAL at 12:38

## 2020-01-27 RX ADMIN — Medication 100 MG: at 05:59

## 2020-01-27 RX ADMIN — LACTULOSE 30 ML: 20 SOLUTION ORAL at 16:56

## 2020-01-27 RX ADMIN — LEVOTHYROXINE SODIUM 50 MCG: 50 TABLET ORAL at 06:00

## 2020-01-27 RX ADMIN — RIFAXIMIN 550 MG: 550 TABLET ORAL at 16:56

## 2020-01-27 RX ADMIN — OMEPRAZOLE 20 MG: 20 CAPSULE, DELAYED RELEASE ORAL at 16:57

## 2020-01-27 RX ADMIN — PYRIDOXINE HCL TAB 50 MG 100 MG: 50 TAB at 05:59

## 2020-01-27 RX ADMIN — SUCRALFATE 1 G: 1 SUSPENSION ORAL at 00:15

## 2020-01-27 RX ADMIN — MIDODRINE HYDROCHLORIDE 5 MG: 5 TABLET ORAL at 16:56

## 2020-01-27 RX ADMIN — SUCRALFATE 1 G: 1 SUSPENSION ORAL at 05:59

## 2020-01-27 RX ADMIN — OXYCODONE HYDROCHLORIDE 5 MG: 5 TABLET ORAL at 08:12

## 2020-01-27 ASSESSMENT — COGNITIVE AND FUNCTIONAL STATUS - GENERAL
WALKING IN HOSPITAL ROOM: A LOT
MOVING TO AND FROM BED TO CHAIR: UNABLE
CLIMB 3 TO 5 STEPS WITH RAILING: TOTAL
STANDING UP FROM CHAIR USING ARMS: A LOT
MOBILITY SCORE: 8
SUGGESTED CMS G CODE MODIFIER MOBILITY: CM
MOVING FROM LYING ON BACK TO SITTING ON SIDE OF FLAT BED: UNABLE
TURNING FROM BACK TO SIDE WHILE IN FLAT BAD: UNABLE

## 2020-01-27 ASSESSMENT — ENCOUNTER SYMPTOMS
NECK PAIN: 0
MYALGIAS: 0
NAUSEA: 0
HEMOPTYSIS: 0
HEARTBURN: 0
BRUISES/BLEEDS EASILY: 0
CLAUDICATION: 0
WEIGHT LOSS: 0
PALPITATIONS: 0
DEPRESSION: 0
HEADACHES: 0
COUGH: 0
VOMITING: 0
BLURRED VISION: 0
BACK PAIN: 0
DIZZINESS: 0

## 2020-01-27 ASSESSMENT — GAIT ASSESSMENTS: GAIT LEVEL OF ASSIST: UNABLE TO PARTICIPATE

## 2020-01-27 ASSESSMENT — LIFESTYLE VARIABLES: SUBSTANCE_ABUSE: 0

## 2020-01-27 NOTE — PROGRESS NOTES
Report received from day shift RN, assumed Care.   Patient is AOx4, responds appropriately but appears to be very lethargic.    Pain controlled at this time.  Patient is tolerating regular diet, but needs encouragement to maintain adequate intake. Denies nausea/vomiting. Incontinent of both stool and urine at times.   Up max assist with unsteady gait.     Plan of care discussed, all questions answered.    Educated on use of call light and importance of calling before getting out of bed. Pt verbalizes understanding.    Call light and belongings within reach, treaded slipper socks on, bed alarm in use, SCDs in use, bed in lowest locked position.  All needs met at this time.

## 2020-01-27 NOTE — PROGRESS NOTES
Patients son at bedside, updated on patients status and plan of care, patient sitting up in bed eating lunch. Requesting to speak with MD. Hospitalist paged to bedside.

## 2020-01-27 NOTE — PROGRESS NOTES
A&Ox4.   Hypotensive, pt fatigued and intermittently lethargic.  Pt states hip pain 9/10, medicated per MAR.    Neuro intact, denies numbness or tingling.   Saturating >90% on RA, requiring 1L at night. Denies SOB.  Abdomen soft, + bowel sounds, + flatus, LBM 1/27, watery loose stool.   Incontinent urine and stool. Pivot to commode w/assistance.   Tolerating regular diet, decreased appetite, denies n/v.  Pt ambulates max assist, unsteady gait. High fall risk. Fall precautions in place. Bed/chair alarm on.   Facial bruising noted.   Generalized yellow discoloration of skin, bilateral sclera jaundice.   Right red spot on buttocks, blanching, barrier cream applied, Q2 turns in place, waffle cushion placed.   Updated on plan of care. Safety education provided. Bed locked in low. Call light within reach. Hourly rounding in place

## 2020-01-27 NOTE — CARE PLAN
Problem: Knowledge Deficit  Goal: Knowledge of the prescribed therapeutic regimen will improve  Outcome: PROGRESSING AS EXPECTED   Educate patient on plan of care and encourage patient to ask questions. Involve patient family members in care discussions as well.

## 2020-01-27 NOTE — PROGRESS NOTES
"Hospital Medicine Daily Progress Note    Date of Service  1/27/2020    Chief Complaint  54 y.o. female admitted 1/11/2020 with vomiting blood    Hospital Course    Ms. Kahn is a past medical history of alcohol abuse with alcoholic liver disease was brought to the emergency room after she fell hit a curb with left-sided facial trauma.  She also had noted 2 weeks of hematemesis that had been red blood at first and then transition to more of a black coffee ground emesis.  She required 2 units of blood transfusions.  She underwent EGD by Dr. Davis in the operating room on 1/15/2020 revealing severe ulcerative erosive esophagitis with spontaneous oozing and adhered clot with a visible vessel for which she had a Hemoclip placed.      Interval Problem Update  1/16/20: Ms. Kahn was evaluated and examined in the ICU.   She admits to drinking a pint of vodka. She denies hallucination. +tremor.  She has had some intermittent vomiting though no black or blood in it.  1/17: patient seen and evaluated in the ICU.  Remains quite weak requiring substantial assistance to get out of bed.  She did receive a dose of IV Ativan this morning because of tremulousness.  Spoke with her nurse but her condition.  Her significant other did visit today.  1/18: Ms. Rios was evaluated in the ICU. She is very weak requiring assistance to walk to the chair. She has required 1 liter of oxygen as her sats went down to 84%. She is requesting IV ativan due to ongoing detox. Her significant other visited today.   1/19: patient evaluated and examined on the medical floor. She complains of ongoing heartburn. She is able to walk but requires assistance and notes, \"my equilibrium is off\". She is tolerating Boost. I discussed with RN about trying to taper off oxygen. Her sons and daughter are coming in today. I asked to meet with them and set up a meeting.   1/20: I met with Ms. Kahn, her significant other, her two sons, her daughter, and her " daughter's significant other about her condition. The  and RN were in the room. We had a long discussion about her diagnosis, work up, and home safety options. Ms. Kahn is open to SNF and orders placed.   40 minutes spent that of which over 50% of the time was spent in counseling about her diagnosis of alcohol-induced liver disease and upper GI bleed with falls.     1/21: Patient complains of fever, cough with yellowish sputum, no hemoptysis, mild shortness of breath, she is hard of hearing which is chronic, denies any nausea or vomiting, discussed with patient will get chest x-ray, procalcitonin, lactic acid, will start IV antibiotics, discussed case with patient's family, all questions answered.  1/22: Patient is alert and oriented, continue having cough but improved, no more fevers, she still feels weak, no nausea no vomiting.  Discussed with patient regarding her labs today, discussed with her regarding her liver disease and she was advised to stop drinking completely otherwise her liver disease will continue progressing.  1/23: Patient is more awake and oriented today, started feeling better, no nausea no vomiting, ammonia level is trending down, no fever no chills cough is improved.  1/24: Patient is lying in bed, still feeling weak, tolerating diet, she is awake and oriented follows commands, cough and shortness of breath almost resolved.  Denies right upper quadrant pain  1/25: patient in bed, had long discusion with patient and family regarding her prognosis and her liver disease, discussed again regarding the need to stop drink, that at this time she is more stable but due to her liver disease her long term prognosis is not good and if she continue drinking her quality of life will decrease. Patient expressed understanding.   1/26: Patient is resting in bed, she is upset because she was woken up this morning, she was refusing her medications, I went and talked to her and after discussion she  agreed to take her medications, denies any pain fever nausea vomiting, no focal weakness cough and shortness of breath resolved.  1/27: Patient is sitting in chair, she is upset with nurse staff because she wants to go back to her bed, denies any fever chills coughing nausea vomiting shortness of breath chest pain.      Consultants/Specialty  Critical care.   GI     Code Status  full    Disposition  SNF if liver function tests are stable and bed is available     Review of Systems  Review of Systems   Constitutional: Positive for malaise/fatigue. Negative for weight loss.   HENT: Negative for congestion and nosebleeds.    Eyes: Negative for blurred vision.   Respiratory: Negative for cough and hemoptysis.    Cardiovascular: Negative for chest pain, palpitations and claudication.   Gastrointestinal: Negative for heartburn, nausea and vomiting.   Genitourinary: Negative for dysuria and urgency.   Musculoskeletal: Negative for back pain, myalgias and neck pain.   Neurological: Negative for dizziness and headaches.   Endo/Heme/Allergies: Does not bruise/bleed easily.   Psychiatric/Behavioral: Negative for depression and substance abuse.        Physical Exam  Temp:  [36.4 °C (97.6 °F)-37.6 °C (99.6 °F)] 36.4 °C (97.6 °F)  Pulse:  [69-88] 69  Resp:  [15-20] 20  BP: (85-97)/(57-67) 85/57  SpO2:  [90 %-92 %] 92 %    Physical Exam  Vitals signs and nursing note reviewed.   Constitutional:       General: She is not in acute distress.  HENT:      Head: Normocephalic and atraumatic.      Comments: Left chin and left face bruise  Left eye periorbital bruise       Nose: Nose normal.      Mouth/Throat:      Mouth: Mucous membranes are moist.      Pharynx: Oropharynx is clear.   Eyes:      General: Scleral icterus present.      Pupils: Pupils are equal, round, and reactive to light.   Neck:      Musculoskeletal: Normal range of motion and neck supple.   Cardiovascular:      Rate and Rhythm: Normal rate and regular rhythm.      Heart  sounds: No murmur.   Pulmonary:      Effort: Pulmonary effort is normal. No respiratory distress.      Breath sounds: No stridor. No wheezing.   Abdominal:      General: Bowel sounds are normal. There is no distension.      Palpations: Abdomen is soft. There is no mass.      Tenderness: There is no guarding.   Musculoskeletal:      Right lower leg: No edema.      Left lower leg: No edema.   Skin:     General: Skin is warm and dry.      Coloration: Skin is jaundiced.   Neurological:      General: No focal deficit present.      Mental Status: She is alert and oriented to person, place, and time.   Psychiatric:         Mood and Affect: Mood normal.         Behavior: Behavior normal.      Comments: Hard of hearing         Fluids    Intake/Output Summary (Last 24 hours) at 1/27/2020 1218  Last data filed at 1/27/2020 1200  Gross per 24 hour   Intake 620 ml   Output 0 ml   Net 620 ml       Laboratory      Recent Labs     01/25/20  0438 01/26/20  0442   SODIUM 144  --    POTASSIUM 3.4* 3.3*   CHLORIDE 114*  --    CO2 20  --    GLUCOSE 75  --    BUN 6*  --    CREATININE 0.88  --    CALCIUM 8.7  --      Recent Labs     01/25/20  0438   INR 1.16*               Imaging  DX-CHEST-2 VIEWS   Final Result      Small to moderate bilateral pleural effusions and bibasilar atelectasis versus consolidations. Lung aeration has slightly improved since prior study.      DX-HIP-COMPLETE - UNILATERAL 2+ LEFT   Final Result      Mild osteoarthritis of the hips. No acute abnormality.      DX-CHEST-2 VIEWS   Final Result      Interval worsening in bibasilar opacities with air bronchograms. Findings are concerning for pneumonia. Follow-up imaging is recommended to document resolution.      US-ABDOMEN COMPLETE SURVEY   Final Result         1.  Gallbladder sludge without additional sonographic findings to indicate acute cholecystitis.   2.  Massive hepatomegaly and echogenic liver, compatible with fatty change versus fibrosis.          DX-CHEST-PORTABLE (1 VIEW)   Final Result      1.  Right internal jugular catheter placement with the tip projecting over the cavoatrial junction. No pneumothorax is identified.      2.  Left basilar opacification may be related to atelectasis.      DX-CHEST-PORTABLE (1 VIEW)   Final Result      No acute cardiopulmonary findings.      CT-HEAD W/O   Final Result      1.  No acute intracranial findings.      2.  Left nasal bone fracture.         CT-CSPINE WITHOUT PLUS RECONS   Final Result      1.  No acute fracture is identified.      2.  Multilevel degenerative disc disease and facet arthropathy as described.      CT-MAXILLOFACIAL W/O PLUS RECONS   Final Result         Left nasal bone fracture      DX-THORACIC SPINE-2 VIEWS   Final Result      No acute fracture is identified.      DX-LUMBAR SPINE-4+ VIEWS   Final Result         1.  No acute fracture is identified.      2.  Facet arthropathy in the lower lumbar spine.           Assessment/Plan  * GIB (gastrointestinal bleeding)- (present on admission)  Assessment & Plan  Upper GI bleed given her bright red bleeding followed by coffee-ground emesis.   She was admitted to the ICU on a protonix and octreotide drip.  She underwent EGD 1/15 revealing severe ulcerative erosive esophagitis with hemoclip  Serial Hb levels ordered  She was transfused 2 units RBC due to acute blood loss anemia  Continue oral Prilosec twice daily      Falls frequently- (present on admission)  Assessment & Plan  CT head negative for bleed  Secondary to EtOH  SNF when medically stable probably in a day or 2    Lactic acidosis- (present on admission)  Assessment & Plan  Resolved, lactic acid is back to normal.    Hypotension- (present on admission)  Assessment & Plan  Stable continue monitoring, continue IV fluids, improved on midodrine  Discussed with patient she states she always has low bp in the 90's systolic.   Asymptomatic    Hematuria- (present on admission)  Assessment &  Plan  resolved    Alcohol withdrawal syndrome with complication (HCC)- (present on admission)  Assessment & Plan  Tremors without delirium  Status post IV Ativan   Off Librium    Troponin level elevated- (present on admission)  Assessment & Plan  Suspect this could be demand ischemia in the setting of gastrointestinal hemorrhage.    No chest pain. Troponin back to normal.    Alcoholic liver disease (HCC)- (present on admission)  Assessment & Plan  With associated hepatic dysfunction in the form of thrombocytopenia, but with normal coagulation pattern.    Bilirubin is 10.5 today with direct bilirubin 6.3 this is likely her intrinsic liver disease  Liver ultrasound showed Massive hepatomegaly and echogenic liver, compatible with fatty change versus fibrosis.  Meld score 17, child Evans score 11, 55% 1 year mortality.        Metabolic acidosis- (present on admission)  Assessment & Plan  Lactic acid back to normal    Macrocytic anemia- (present on admission)  Assessment & Plan  Suspect due to chronic alcoholism, likely component of acute bleed superimposed.  Monitor.  Hemoglobin is stable 9.7     Fever  Assessment & Plan  Most likely due to pneumonia, procalcitonin is elevated, continue IV antibiotics.  No fever today.  Fever resolved    Hypoxia- (present on admission)  Assessment & Plan  Hypoxia resolved. cxr showed possible pneumonia.  Elevated procalcitonin, continue IV antibiotics  Improving, completed antibiotics 1/26/2020  Continue monitoring      Closed fracture of nasal bone- (present on admission)  Assessment & Plan  Non-operative    Hypokalemia- (present on admission)  Assessment & Plan  Checking potassium level again today., magnesium level is normal,     Hypothyroidism- (present on admission)  Assessment & Plan  On synthroid 50 mcg daily  TSH is markedly elevated at 31  Though she unlikely was compliant with synthroid thus the dose will not be increased.  Will need close f/u as outpatient.      Thrombocytopenia (HCC)- (present on admission)  Assessment & Plan  Likely secondary to liver disease and alcohol induced bone marrow suppression, resolved, back to normal      Hyponatremia- (present on admission)  Assessment & Plan  Resolved with IV fluids       VTE prophylaxis: SCDs    Case and plan of care discussed with nurse staff  Case and plan of care discussed during multidisciplinary rounds

## 2020-01-27 NOTE — THERAPY
"Physical Therapy Treatment completed.   Bed Mobility:  Supine to Sit: Maximal Assist  Transfers: Sit to Stand: Minimal Assist (of 2 for safety)  Gait: Level Of Assist: Deferred 2/2 fatigue and hypotension     Plan of Care: Will benefit from Physical Therapy 3 times per week  Discharge Recommendations: Equipment: Will Continue to Assess for Equipment Needs. Post-acute therapy: Recommend post-acute placement for continued physical therapy services prior to discharge home. Patient can tolerate post-acute therapies at a 5x/week frequency.       Pt agreeable for PT tx session with SO present at bedside. Pt very lethargic today and unable to maintain eyes open despite multiple VC. She demonstrated hypotension throughout mobility in 80/60s from supine to sitting position. Decreased participation for bed mob today however she appeared to be self limiting. Pt able to perform STS and marching near EOB with min A for balance/safety. Attempted to attain BP in standing position however machine was unable to read accurately and pt requesting to return BTB. Pt conts to demonstrate fxnl mob at level in which she will require post-acute placement prior to DC home.    See \"Rehab Therapy-Acute\" Patient Summary Report for complete documentation.       "

## 2020-01-27 NOTE — PROGRESS NOTES
2 RN skin check complete.   No confirmed pressure ulcers or skin breakdown noted.  Scabs to bilateral elbows healing and open to air. Skin faintly jaundiced. Generalized bruising and left face bruised. Small scab on right buttock open to air. The following interventions in place: Q2 turns, heels floated, waffle mattress, and frequent checks for incontinence.

## 2020-01-27 NOTE — DISCHARGE PLANNING
Agency/Facility Name: [facility]  Spoke To: Antonina  Outcome: No bed available today.  Antonina requested additional information.    Q:  Is patient doing end of life care or is patient doing treatment for cancer?    CELIO Veloz notified via [employer].

## 2020-01-28 LAB
ALBUMIN SERPL BCP-MCNC: 2.5 G/DL (ref 3.2–4.9)
ALBUMIN/GLOB SERPL: 0.8 G/DL
ALP SERPL-CCNC: 170 U/L (ref 30–99)
ALT SERPL-CCNC: 14 U/L (ref 2–50)
ANION GAP SERPL CALC-SCNC: 8 MMOL/L (ref 0–11.9)
AST SERPL-CCNC: 77 U/L (ref 12–45)
BILIRUB SERPL-MCNC: 11.8 MG/DL (ref 0.1–1.5)
BUN SERPL-MCNC: 5 MG/DL (ref 8–22)
CALCIUM SERPL-MCNC: 8.4 MG/DL (ref 8.5–10.5)
CHLORIDE SERPL-SCNC: 113 MMOL/L (ref 96–112)
CO2 SERPL-SCNC: 20 MMOL/L (ref 20–33)
CREAT SERPL-MCNC: 0.79 MG/DL (ref 0.5–1.4)
GLOBULIN SER CALC-MCNC: 3.3 G/DL (ref 1.9–3.5)
GLUCOSE SERPL-MCNC: 73 MG/DL (ref 65–99)
POTASSIUM SERPL-SCNC: 3.4 MMOL/L (ref 3.6–5.5)
PROT SERPL-MCNC: 5.8 G/DL (ref 6–8.2)
SODIUM SERPL-SCNC: 141 MMOL/L (ref 135–145)

## 2020-01-28 PROCEDURE — 700102 HCHG RX REV CODE 250 W/ 637 OVERRIDE(OP): Performed by: HOSPITALIST

## 2020-01-28 PROCEDURE — A9270 NON-COVERED ITEM OR SERVICE: HCPCS | Performed by: HOSPITALIST

## 2020-01-28 PROCEDURE — 700102 HCHG RX REV CODE 250 W/ 637 OVERRIDE(OP): Performed by: INTERNAL MEDICINE

## 2020-01-28 PROCEDURE — A9270 NON-COVERED ITEM OR SERVICE: HCPCS | Performed by: INTERNAL MEDICINE

## 2020-01-28 PROCEDURE — 97530 THERAPEUTIC ACTIVITIES: CPT

## 2020-01-28 PROCEDURE — 36415 COLL VENOUS BLD VENIPUNCTURE: CPT

## 2020-01-28 PROCEDURE — 770001 HCHG ROOM/CARE - MED/SURG/GYN PRIV*

## 2020-01-28 PROCEDURE — 80053 COMPREHEN METABOLIC PANEL: CPT

## 2020-01-28 PROCEDURE — 99232 SBSQ HOSP IP/OBS MODERATE 35: CPT | Performed by: HOSPITALIST

## 2020-01-28 RX ORDER — POTASSIUM CHLORIDE 20 MEQ/1
40 TABLET, EXTENDED RELEASE ORAL DAILY
Status: DISCONTINUED | OUTPATIENT
Start: 2020-01-28 | End: 2020-01-31 | Stop reason: HOSPADM

## 2020-01-28 RX ADMIN — MIDODRINE HYDROCHLORIDE 5 MG: 5 TABLET ORAL at 17:34

## 2020-01-28 RX ADMIN — MIDODRINE HYDROCHLORIDE 5 MG: 5 TABLET ORAL at 12:06

## 2020-01-28 RX ADMIN — OMEPRAZOLE 20 MG: 20 CAPSULE, DELAYED RELEASE ORAL at 06:03

## 2020-01-28 RX ADMIN — SUCRALFATE 1 G: 1 SUSPENSION ORAL at 12:06

## 2020-01-28 RX ADMIN — LEVOTHYROXINE SODIUM 50 MCG: 50 TABLET ORAL at 06:03

## 2020-01-28 RX ADMIN — LACTULOSE 30 ML: 20 SOLUTION ORAL at 17:34

## 2020-01-28 RX ADMIN — Medication 100 MG: at 06:03

## 2020-01-28 RX ADMIN — LACTULOSE 30 ML: 20 SOLUTION ORAL at 06:03

## 2020-01-28 RX ADMIN — POTASSIUM CHLORIDE 40 MEQ: 1500 TABLET, EXTENDED RELEASE ORAL at 17:34

## 2020-01-28 RX ADMIN — OMEPRAZOLE 20 MG: 20 CAPSULE, DELAYED RELEASE ORAL at 17:34

## 2020-01-28 RX ADMIN — PYRIDOXINE HCL TAB 50 MG 100 MG: 50 TAB at 06:04

## 2020-01-28 RX ADMIN — RIFAXIMIN 550 MG: 550 TABLET ORAL at 17:34

## 2020-01-28 RX ADMIN — SUCRALFATE 1 G: 1 SUSPENSION ORAL at 17:34

## 2020-01-28 RX ADMIN — SUCRALFATE 1 G: 1 SUSPENSION ORAL at 06:03

## 2020-01-28 RX ADMIN — MIDODRINE HYDROCHLORIDE 5 MG: 5 TABLET ORAL at 08:09

## 2020-01-28 RX ADMIN — RIFAXIMIN 550 MG: 550 TABLET ORAL at 06:03

## 2020-01-28 RX ADMIN — LACTULOSE 30 ML: 20 SOLUTION ORAL at 12:06

## 2020-01-28 ASSESSMENT — ENCOUNTER SYMPTOMS
FEVER: 0
SENSORY CHANGE: 0
HALLUCINATIONS: 0
PALPITATIONS: 0
COUGH: 0
VOMITING: 0
FOCAL WEAKNESS: 0
BACK PAIN: 0
DIARRHEA: 0
BRUISES/BLEEDS EASILY: 1
SHORTNESS OF BREATH: 0
WEAKNESS: 0
DIAPHORESIS: 0
ABDOMINAL PAIN: 0
NECK PAIN: 0
CHILLS: 0
STRIDOR: 0
SPEECH CHANGE: 0
TREMORS: 0

## 2020-01-28 ASSESSMENT — COGNITIVE AND FUNCTIONAL STATUS - GENERAL
DRESSING REGULAR LOWER BODY CLOTHING: A LOT
TOILETING: A LOT
PERSONAL GROOMING: A LITTLE
DRESSING REGULAR UPPER BODY CLOTHING: A LOT
SUGGESTED CMS G CODE MODIFIER DAILY ACTIVITY: CK
DAILY ACTIVITIY SCORE: 15
HELP NEEDED FOR BATHING: A LOT

## 2020-01-28 ASSESSMENT — LIFESTYLE VARIABLES: SUBSTANCE_ABUSE: 1

## 2020-01-28 NOTE — CARE PLAN
Problem: Communication  Goal: The ability to communicate needs accurately and effectively will improve  Outcome: PROGRESSING AS EXPECTED  Note:   Patient and family updated on POC, all questions answered at this time.     Problem: Mobility  Goal: Risk for activity intolerance will decrease  Outcome: PROGRESSING AS EXPECTED  Note:   Bed alarm in place, bed in locked and lowest position, call light within reach.

## 2020-01-28 NOTE — PROGRESS NOTES
2 RN skin check complete with CELIO Santos.   Devices in place: PIV, SCDs.  Skin assessed under devices: Yes.  Confirmed pressure ulcers found on: N/A.  New potential pressure ulcers noted on N/A. Wound consult placed N/A.    Skin is jaundiced throughout.   Bruising to left face, neck, and chin.  Blanchable redness to sacrum.   Scab noted to right knee. MATTHIAS, no drainage.    The following interventions in place: Q2h turns, waffle overlay, barrier cream.

## 2020-01-28 NOTE — PROGRESS NOTES
"Pt A&O x 4 but very lethargic and fatigued. Does not always respond to verbal stimulation, frequently requires physical stimulation to wake up.     Vitals: BP (!) 92/61 Comment: RN Aware  Pulse 80   Temp 37.1 °C (98.7 °F) (Temporal)   Resp 20   Ht 1.651 m (5' 5\")   Wt 79.2 kg (174 lb 9.7 oz)   SpO2 91%   BMI 29.06 kg/m²      Pt rates pain 2 out of 10. Patient repositioned for comfort.      Neuro: FRY. Denies new onset of numbness/ tingling.     Cardiac: Denies new onset of chest pain.     Vascular: Pulses 2+ BUE, BLE. No edema noted.     Respiratory: Lungs sound diminished to auscultation. Patient unable to use IS properly when asked. On 1L of O2 via nasal cannula.  on, satting in 90's. Denies SOB, breathing is mild and shallow.     GI: Abdomen soft, rounded, and non-tender. Normoactive bowel sounds, + flatus, + BM. BMs are loose. Denies nausea/ vomiting. Needs encouragement to consume food and drink.     : Pt is incontinent of urine, voiding adequately.      MSK: Pt up with max assist, Q2 turns in place, tolerating well.     Integumentary: Generalized skin is yellow is color. Facial bruising to L side. Bruise to neck underneath chin. Scabs to bilateral elbows, MATTHIAS. Sacrum is pink and blanching.     Waffle mattress in place, Q2 turns, heels floated.     Labs noted.     Fall precautions in place: Bed locked in lowest position, Upper bed rails up, treaded socks in place, personal belongings within reach, call light within reach, appropriate mobility signs in place, + bed alarm. Pt calls appropriately.      Pt updated on POC.    "

## 2020-01-28 NOTE — DISCHARGE PLANNING
Agency/Facility Name: [facility]  Spoke To: Antonina  Outcome: Will need updated therapy notes and updated referral to submit to patient insurance. Current insurance authorization is too old. Possible open bed Thursday or Friday.     CELIO Veloz notified.

## 2020-01-28 NOTE — PROGRESS NOTES
2 RN Skin Check    Generalized skin is yellow is color.   Facial bruising to L side.   Bruise to neck underneath chin.   Scabs to bilateral elbows, MATTHIAS.   Sacrum is pink and blanching.    Waffle mattress in place, Q2 turns in place.

## 2020-01-28 NOTE — PROGRESS NOTES
Pt lethargic this AM but fully oriented.  SpO2 >90% on RA.   Complaining of head and neck pain. Repositioned for comfort.  Skin is jaundiced throughout. Left facial and neck/chin bruising noted.  Regular diet with boost supplements. Denies N/V.  Pt incontinent of bowel and bladder. + void. + BM.   Pt is a max assist to the commode.  All needs met at this time. Call light within reach. Pt calls appropriately.

## 2020-01-28 NOTE — THERAPY
"Occupational Therapy Treatment completed with focus on ADLs and ADL transfers.  Functional Status:  Max A with ADLs and txfs  Plan of Care: Will benefit from Occupational Therapy 3 times per week  Discharge Recommendations: Recommend post-acute placement for additional occupational therapy services prior to discharge home.   See \"Rehab Therapy-Acute\" Patient Summary Report for complete documentation.   "

## 2020-01-28 NOTE — PROGRESS NOTES
Hospital Medicine Daily Progress Note    Date of Service  1/28/2020    Chief Complaint  54 y.o. female admitted 1/11/2020 with hematemesis.     Hospital Course     History of chronic alcoholism, with prior episodes of withdrawal, alcoholic liver disease admitted with Upper GIB and withdrawal .  Patient underwent EGD w findings of severe erosive esophagitis, clot with visible vessel that was treated with hemoclip.     Interval Problem Update    No vomiting. Still very weak. Plan to mobilize. Poor intake- persistently low K.     Consultants/Specialty  Dr. Davis, GI  Dr. Cash, pulmonary     Code Status  Full code     Disposition  Plan to mobilize, will need SNF placement     Review of Systems  Review of Systems   Constitutional: Positive for malaise/fatigue. Negative for chills, diaphoresis and fever.   HENT: Negative for congestion.    Respiratory: Negative for cough, shortness of breath and stridor.    Cardiovascular: Negative for chest pain and palpitations.   Gastrointestinal: Negative for abdominal pain, diarrhea and vomiting.   Musculoskeletal: Negative for back pain, joint pain and neck pain.   Neurological: Negative for tremors, sensory change, speech change, focal weakness and weakness (generalized ).   Endo/Heme/Allergies: Bruises/bleeds easily.   Psychiatric/Behavioral: Positive for substance abuse. Negative for hallucinations.        Physical Exam  Temp:  [36.3 °C (97.3 °F)-37.2 °C (98.9 °F)] 37.2 °C (98.9 °F)  Pulse:  [68-93] 79  Resp:  [18-20] 18  BP: ()/(57-69) 88/64  SpO2:  [91 %-96 %] 91 %    Physical Exam  Constitutional:       General: She is not in acute distress.     Appearance: She is not diaphoretic.   HENT:      Head: Normocephalic and atraumatic.      Right Ear: External ear normal.      Left Ear: External ear normal.      Nose: Nose normal.   Eyes:      General: Scleral icterus present.      Extraocular Movements: Extraocular movements intact.      Conjunctiva/sclera:  Conjunctivae normal.   Neck:      Musculoskeletal: Normal range of motion and neck supple.   Cardiovascular:      Rate and Rhythm: Normal rate and regular rhythm.      Heart sounds: No murmur.   Pulmonary:      Breath sounds: No wheezing, rhonchi or rales.   Abdominal:      General: Abdomen is flat. Bowel sounds are normal. There is no distension.      Palpations: Abdomen is soft.   Musculoskeletal:         General: No swelling or tenderness.   Skin:     General: Skin is warm and dry.      Coloration: Skin is jaundiced.   Neurological:      General: No focal deficit present.      Mental Status: She is alert and oriented to person, place, and time.         Fluids    Intake/Output Summary (Last 24 hours) at 1/28/2020 1529  Last data filed at 1/28/2020 0815  Gross per 24 hour   Intake 420 ml   Output --   Net 420 ml       Laboratory      Recent Labs     01/26/20  0442 01/27/20  1146 01/28/20  0518   SODIUM  --   --  141   POTASSIUM 3.3* 3.4* 3.4*   CHLORIDE  --   --  113*   CO2  --   --  20   GLUCOSE  --   --  73   BUN  --   --  5*   CREATININE  --   --  0.79   CALCIUM  --   --  8.4*                   Imaging  DX-CHEST-2 VIEWS   Final Result      Small to moderate bilateral pleural effusions and bibasilar atelectasis versus consolidations. Lung aeration has slightly improved since prior study.      DX-HIP-COMPLETE - UNILATERAL 2+ LEFT   Final Result      Mild osteoarthritis of the hips. No acute abnormality.      DX-CHEST-2 VIEWS   Final Result      Interval worsening in bibasilar opacities with air bronchograms. Findings are concerning for pneumonia. Follow-up imaging is recommended to document resolution.      US-ABDOMEN COMPLETE SURVEY   Final Result         1.  Gallbladder sludge without additional sonographic findings to indicate acute cholecystitis.   2.  Massive hepatomegaly and echogenic liver, compatible with fatty change versus fibrosis.         DX-CHEST-PORTABLE (1 VIEW)   Final Result      1.  Right  internal jugular catheter placement with the tip projecting over the cavoatrial junction. No pneumothorax is identified.      2.  Left basilar opacification may be related to atelectasis.      DX-CHEST-PORTABLE (1 VIEW)   Final Result      No acute cardiopulmonary findings.      CT-HEAD W/O   Final Result      1.  No acute intracranial findings.      2.  Left nasal bone fracture.         CT-CSPINE WITHOUT PLUS RECONS   Final Result      1.  No acute fracture is identified.      2.  Multilevel degenerative disc disease and facet arthropathy as described.      CT-MAXILLOFACIAL W/O PLUS RECONS   Final Result         Left nasal bone fracture      DX-THORACIC SPINE-2 VIEWS   Final Result      No acute fracture is identified.      DX-LUMBAR SPINE-4+ VIEWS   Final Result         1.  No acute fracture is identified.      2.  Facet arthropathy in the lower lumbar spine.           Assessment/Plan  * GIB (gastrointestinal bleeding)- (present on admission)  Assessment & Plan  Presented with Upper GI bleed given her bright red bleeding followed by coffee-ground emesis.  Post admission to  ICU on a protonix and octreotide drip.  EGD 1/15 revealing severe ulcerative erosive esophagitis with hemoclip  She was transfused 2 units RBC due to acute blood loss anemia  Continue oral Prilosec twice daily      Falls frequently- (present on admission)  Assessment & Plan  CT head negative for bleed  Secondary to EtOH, multi factorial cause   PT/OT  Sw for Snf placement.     Hypoxia- (present on admission)  Assessment & Plan  Hypoxia resolved. cxr showed possible pneumonia, pleural effusion,  Atx.   Elevated procalcitonin, completed Rocephin and Doxy completed antibiotics 1/26/2020  Continue monitoring  o2 requirements overall improved - down to 1 L nc.   Encourage IS, mobilization as gia      Lactic acidosis- (present on admission)  Assessment & Plan  Resolved, lactic acid is back to normal.    Alcoholic liver disease (HCC)- (present on  admission)  Assessment & Plan  With associated hepatic dysfunction in the form of thrombocytopenia, but with normal coagulation pattern.    Bilirubin is 10.5 today with direct bilirubin 6.3 this is likely her intrinsic liver disease  Liver ultrasound showed Massive hepatomegaly and echogenic liver, compatible with fatty change versus fibrosis  Meld score was  17, child Evans score 11, 55% 1 year mortality. Etoh cessation counseling, outpatient GI input.        Macrocytic anemia- (present on admission)  Assessment & Plan  Suspect due to chronic alcoholism, likely component of acute blood loss superimposed.  Recent Hgb stable  Monitor for acute symptoms of bleed.      Acute respiratory failure with hypoxia (HCC)- (present on admission)  Assessment & Plan  Due to pneumonia, withdrawal, effusions and atx.   Encourage IS       Hypotension- (present on admission)  Assessment & Plan  Asymptomatic, Sbp 90s.  Suspect due to liver disease, low baseline. Patient had reported she always has low bp in the 90's systolic.   Encourage intake  Continue midodrine.   Monitor for acute symptoms.     Hematuria- (present on admission)  Assessment & Plan  resolved    Alcohol withdrawal syndrome with complication (HCC)- (present on admission)  Assessment & Plan  Tremors without delirium- resolved.   Status post IV Ativan   Off Librium    Troponin level elevated- (present on admission)  Assessment & Plan  Suspect this could be demand ischemia in the setting of gastrointestinal hemorrhage- level normalized   No chest pain  Follow clinically     Metabolic acidosis- (present on admission)  Assessment & Plan  Lactic acid back to normal    Fever  Assessment & Plan  Most likely due to pneumonia, procalcitonin is elevated, continue IV antibiotics.  No fever today.  Fever resolved    Closed fracture of nasal bone- (present on admission)  Assessment & Plan  Non-operative    Hypokalemia- (present on admission)  Assessment & Plan  magnesium level is  normal  Serum K persistently low-- start  oral Kcl     Hypothyroidism- (present on admission)  Assessment & Plan  On synthroid 50 mcg daily  TSH is markedly elevated at 31  Felt she was non compliant with synthroid thus the dose will not be increased.  Will need close f/u as outpatient. Repeat Thyroid function test in approx 6 weeks.    Thrombocytopenia (HCC)- (present on admission)  Assessment & Plan  Likely secondary to liver disease and alcohol induced bone marrow suppression, resolved  Continue etoh cessation counseling       Hyponatremia- (present on admission)  Assessment & Plan  Resolved with IV fluids       VTE prophylaxis: SCDs

## 2020-01-29 ENCOUNTER — APPOINTMENT (OUTPATIENT)
Dept: RADIOLOGY | Facility: MEDICAL CENTER | Age: 55
DRG: 380 | End: 2020-01-29
Attending: HOSPITALIST
Payer: MEDICAID

## 2020-01-29 PROBLEM — D72.829 LEUKOCYTOSIS: Status: ACTIVE | Noted: 2020-01-29

## 2020-01-29 LAB
ALBUMIN SERPL BCP-MCNC: 2.6 G/DL (ref 3.2–4.9)
ALBUMIN/GLOB SERPL: 0.8 G/DL
ALP SERPL-CCNC: 182 U/L (ref 30–99)
ALT SERPL-CCNC: 15 U/L (ref 2–50)
ANION GAP SERPL CALC-SCNC: 9 MMOL/L (ref 0–11.9)
ANISOCYTOSIS BLD QL SMEAR: ABNORMAL
AST SERPL-CCNC: 86 U/L (ref 12–45)
BASOPHILS # BLD AUTO: 1.3 % (ref 0–1.8)
BASOPHILS # BLD: 0.16 K/UL (ref 0–0.12)
BILIRUB SERPL-MCNC: 12.4 MG/DL (ref 0.1–1.5)
BUN SERPL-MCNC: 4 MG/DL (ref 8–22)
CALCIUM SERPL-MCNC: 8.2 MG/DL (ref 8.5–10.5)
CHLORIDE SERPL-SCNC: 113 MMOL/L (ref 96–112)
CO2 SERPL-SCNC: 20 MMOL/L (ref 20–33)
COMMENT 1642: NORMAL
CREAT SERPL-MCNC: 0.63 MG/DL (ref 0.5–1.4)
EOSINOPHIL # BLD AUTO: 0.29 K/UL (ref 0–0.51)
EOSINOPHIL NFR BLD: 2.4 % (ref 0–6.9)
ERYTHROCYTE [DISTWIDTH] IN BLOOD BY AUTOMATED COUNT: 85.9 FL (ref 35.9–50)
GLOBULIN SER CALC-MCNC: 3.1 G/DL (ref 1.9–3.5)
GLUCOSE SERPL-MCNC: 76 MG/DL (ref 65–99)
HCT VFR BLD AUTO: 34.8 % (ref 37–47)
HGB BLD-MCNC: 11 G/DL (ref 12–16)
IMM GRANULOCYTES # BLD AUTO: 0.22 K/UL (ref 0–0.11)
IMM GRANULOCYTES NFR BLD AUTO: 1.8 % (ref 0–0.9)
LYMPHOCYTES # BLD AUTO: 1.37 K/UL (ref 1–4.8)
LYMPHOCYTES NFR BLD: 11.2 % (ref 22–41)
MACROCYTES BLD QL SMEAR: ABNORMAL
MCH RBC QN AUTO: 36.1 PG (ref 27–33)
MCHC RBC AUTO-ENTMCNC: 31.6 G/DL (ref 33.6–35)
MCV RBC AUTO: 114.1 FL (ref 81.4–97.8)
MONOCYTES # BLD AUTO: 0.81 K/UL (ref 0–0.85)
MONOCYTES NFR BLD AUTO: 6.6 % (ref 0–13.4)
MORPHOLOGY BLD-IMP: NORMAL
NEUTROPHILS # BLD AUTO: 9.35 K/UL (ref 2–7.15)
NEUTROPHILS NFR BLD: 76.7 % (ref 44–72)
NRBC # BLD AUTO: 0 K/UL
NRBC BLD-RTO: 0 /100 WBC
PLATELET # BLD AUTO: 274 K/UL (ref 164–446)
PLATELET BLD QL SMEAR: NORMAL
PMV BLD AUTO: 9.8 FL (ref 9–12.9)
POLYCHROMASIA BLD QL SMEAR: NORMAL
POTASSIUM SERPL-SCNC: 3.5 MMOL/L (ref 3.6–5.5)
PROT SERPL-MCNC: 5.7 G/DL (ref 6–8.2)
RBC # BLD AUTO: 3.05 M/UL (ref 4.2–5.4)
RBC BLD AUTO: PRESENT
SODIUM SERPL-SCNC: 142 MMOL/L (ref 135–145)
WBC # BLD AUTO: 12.2 K/UL (ref 4.8–10.8)

## 2020-01-29 PROCEDURE — 700102 HCHG RX REV CODE 250 W/ 637 OVERRIDE(OP): Performed by: HOSPITALIST

## 2020-01-29 PROCEDURE — A9270 NON-COVERED ITEM OR SERVICE: HCPCS | Performed by: INTERNAL MEDICINE

## 2020-01-29 PROCEDURE — 700102 HCHG RX REV CODE 250 W/ 637 OVERRIDE(OP): Performed by: INTERNAL MEDICINE

## 2020-01-29 PROCEDURE — A9270 NON-COVERED ITEM OR SERVICE: HCPCS | Performed by: HOSPITALIST

## 2020-01-29 PROCEDURE — 71045 X-RAY EXAM CHEST 1 VIEW: CPT

## 2020-01-29 PROCEDURE — 770001 HCHG ROOM/CARE - MED/SURG/GYN PRIV*

## 2020-01-29 PROCEDURE — 80053 COMPREHEN METABOLIC PANEL: CPT

## 2020-01-29 PROCEDURE — 85025 COMPLETE CBC W/AUTO DIFF WBC: CPT

## 2020-01-29 PROCEDURE — 99232 SBSQ HOSP IP/OBS MODERATE 35: CPT | Performed by: HOSPITALIST

## 2020-01-29 PROCEDURE — 36415 COLL VENOUS BLD VENIPUNCTURE: CPT

## 2020-01-29 PROCEDURE — 97535 SELF CARE MNGMENT TRAINING: CPT

## 2020-01-29 RX ADMIN — PYRIDOXINE HCL TAB 50 MG 100 MG: 50 TAB at 06:27

## 2020-01-29 RX ADMIN — SUCRALFATE 1 G: 1 SUSPENSION ORAL at 16:57

## 2020-01-29 RX ADMIN — OMEPRAZOLE 20 MG: 20 CAPSULE, DELAYED RELEASE ORAL at 06:17

## 2020-01-29 RX ADMIN — OMEPRAZOLE 20 MG: 20 CAPSULE, DELAYED RELEASE ORAL at 16:57

## 2020-01-29 RX ADMIN — MIDODRINE HYDROCHLORIDE 5 MG: 5 TABLET ORAL at 06:17

## 2020-01-29 RX ADMIN — SENNOSIDES AND DOCUSATE SODIUM 2 TABLET: 8.6; 5 TABLET ORAL at 06:17

## 2020-01-29 RX ADMIN — SUCRALFATE 1 G: 1 SUSPENSION ORAL at 06:14

## 2020-01-29 RX ADMIN — MIDODRINE HYDROCHLORIDE 5 MG: 5 TABLET ORAL at 16:57

## 2020-01-29 RX ADMIN — LACTULOSE 30 ML: 20 SOLUTION ORAL at 16:57

## 2020-01-29 RX ADMIN — LACTULOSE 30 ML: 20 SOLUTION ORAL at 06:14

## 2020-01-29 RX ADMIN — RIFAXIMIN 550 MG: 550 TABLET ORAL at 16:57

## 2020-01-29 RX ADMIN — Medication 100 MG: at 06:17

## 2020-01-29 RX ADMIN — RIFAXIMIN 550 MG: 550 TABLET ORAL at 06:17

## 2020-01-29 RX ADMIN — LEVOTHYROXINE SODIUM 50 MCG: 50 TABLET ORAL at 06:17

## 2020-01-29 RX ADMIN — POTASSIUM CHLORIDE 40 MEQ: 1500 TABLET, EXTENDED RELEASE ORAL at 06:17

## 2020-01-29 RX ADMIN — OXYCODONE HYDROCHLORIDE 5 MG: 5 TABLET ORAL at 17:10

## 2020-01-29 ASSESSMENT — ENCOUNTER SYMPTOMS
BRUISES/BLEEDS EASILY: 1
COUGH: 0
HALLUCINATIONS: 0
ABDOMINAL PAIN: 0
FOCAL WEAKNESS: 0
SPEECH CHANGE: 0
DIARRHEA: 0
SENSORY CHANGE: 0
SHORTNESS OF BREATH: 0
BACK PAIN: 0
PALPITATIONS: 0
CHILLS: 0
VOMITING: 0
NECK PAIN: 0
WEAKNESS: 1
FEVER: 0
TREMORS: 0

## 2020-01-29 ASSESSMENT — LIFESTYLE VARIABLES: SUBSTANCE_ABUSE: 1

## 2020-01-29 NOTE — THERAPY
"Physical Therapy Contact Note    Pt awoken from sleep and PT tx attempted. Pt requiring frequent cues to open eyes and participate in conversation regarding participation in therapy. She initially appeared agreeable to participate however when attempting to initiate mobility pt stating \"I won't do it right now, I need to sleep more\". Pt educated that it was almost 2pm in afternoon and that she needs to mobilize to get stronger. Pt continued to adamantly refuse despite education. PT notified , physician, and RN. Per RN pt not eating meals adequately for multiple days. PT to attempt again as able/appropriate to assess patients ability to participate in therapy for post-acute placement. Goals of mobility discussed with patient and pt confirming that she would still like to be able to walk however not willing to attempt at this moment.     Melissa Johnson, PT, DPT  [phone]  "

## 2020-01-29 NOTE — PALLIATIVE CARE
Palliative Care follow-up  PC RN visited pt and SO at bedside, pt sleeping. SO reports pt has been sleeping all morning and asked to not wake her. SO denies any needs at this time.         Plan:   Pert chart review, plan for DC to SNF today.         Thank you for allowing Palliative Care to participate in this patient's care. Please feel free to call [phone] with any questions or concerns.

## 2020-01-29 NOTE — PROGRESS NOTES
Hospital Medicine Daily Progress Note    Date of Service  1/29/2020    Chief Complaint  54 y.o. female admitted 1/11/2020 with hematemesis.     Hospital Course     History of chronic alcoholism, with prior episodes of withdrawal, alcoholic liver disease admitted with Upper GIB and withdrawal .  Patient underwent EGD w findings of severe erosive esophagitis, clot with visible vessel that was treated with hemoclip.     Interval Problem Update    Had declined therapies. Lethargic and sleep most of day. Wbc increased-- afebrile .  No acute symptoms of infection.  Consultants/Specialty  Dr. Davis, GI  Dr. Cash, pulmonary     Code Status  Full code     Disposition  Plan to mobilize, will need SNF placement     Review of Systems  Review of Systems   Constitutional: Positive for malaise/fatigue. Negative for chills and fever.   HENT: Negative for congestion.    Respiratory: Negative for cough and shortness of breath.    Cardiovascular: Negative for chest pain and palpitations.   Gastrointestinal: Negative for abdominal pain, diarrhea and vomiting.   Musculoskeletal: Negative for back pain, joint pain and neck pain.   Neurological: Positive for weakness (generalized ). Negative for tremors, sensory change, speech change and focal weakness.   Endo/Heme/Allergies: Bruises/bleeds easily.   Psychiatric/Behavioral: Positive for substance abuse. Negative for hallucinations.        Physical Exam  Temp:  [36.6 °C (97.9 °F)-37.2 °C (98.9 °F)] 36.8 °C (98.2 °F)  Pulse:  [63-93] 86  Resp:  [18] 18  BP: ()/(61-70) 101/70  SpO2:  [91 %-94 %] 94 %    Physical Exam  Constitutional:       General: She is not in acute distress.     Appearance: She is not diaphoretic.      Comments: Drowsy, arouseable , slow w responses. Following commands.    HENT:      Head: Normocephalic and atraumatic.      Right Ear: External ear normal.      Left Ear: External ear normal.      Nose: Nose normal.   Eyes:      General: Scleral icterus  present.      Extraocular Movements: Extraocular movements intact.      Conjunctiva/sclera: Conjunctivae normal.   Neck:      Musculoskeletal: Normal range of motion and neck supple.   Cardiovascular:      Rate and Rhythm: Normal rate and regular rhythm.      Heart sounds: No murmur.   Pulmonary:      Breath sounds: No wheezing, rhonchi or rales.   Abdominal:      General: Abdomen is flat. Bowel sounds are normal. There is no distension.      Palpations: Abdomen is soft.   Musculoskeletal:         General: No swelling or tenderness.   Skin:     General: Skin is warm and dry.      Coloration: Skin is jaundiced.   Neurological:      General: No focal deficit present.      Mental Status: She is oriented to person, place, and time.   Psychiatric:         Mood and Affect: Affect is flat.         Speech: Speech is delayed.         Fluids    Intake/Output Summary (Last 24 hours) at 1/29/2020 0727  Last data filed at 1/29/2020 0410  Gross per 24 hour   Intake 360 ml   Output --   Net 360 ml       Laboratory  Recent Labs     01/29/20  0250   WBC 12.2*   RBC 3.05*   HEMOGLOBIN 11.0*   HEMATOCRIT 34.8*   .1*   MCH 36.1*   MCHC 31.6*   RDW 85.9*   PLATELETCT 274   MPV 9.8     Recent Labs     01/27/20  1146 01/28/20  0518 01/29/20  0250   SODIUM  --  141 142   POTASSIUM 3.4* 3.4* 3.5*   CHLORIDE  --  113* 113*   CO2  --  20 20   GLUCOSE  --  73 76   BUN  --  5* 4*   CREATININE  --  0.79 0.63   CALCIUM  --  8.4* 8.2*                   Imaging  DX-CHEST-2 VIEWS   Final Result      Small to moderate bilateral pleural effusions and bibasilar atelectasis versus consolidations. Lung aeration has slightly improved since prior study.      DX-HIP-COMPLETE - UNILATERAL 2+ LEFT   Final Result      Mild osteoarthritis of the hips. No acute abnormality.      DX-CHEST-2 VIEWS   Final Result      Interval worsening in bibasilar opacities with air bronchograms. Findings are concerning for pneumonia. Follow-up imaging is recommended to  document resolution.      US-ABDOMEN COMPLETE SURVEY   Final Result         1.  Gallbladder sludge without additional sonographic findings to indicate acute cholecystitis.   2.  Massive hepatomegaly and echogenic liver, compatible with fatty change versus fibrosis.         DX-CHEST-PORTABLE (1 VIEW)   Final Result      1.  Right internal jugular catheter placement with the tip projecting over the cavoatrial junction. No pneumothorax is identified.      2.  Left basilar opacification may be related to atelectasis.      DX-CHEST-PORTABLE (1 VIEW)   Final Result      No acute cardiopulmonary findings.      CT-HEAD W/O   Final Result      1.  No acute intracranial findings.      2.  Left nasal bone fracture.         CT-CSPINE WITHOUT PLUS RECONS   Final Result      1.  No acute fracture is identified.      2.  Multilevel degenerative disc disease and facet arthropathy as described.      CT-MAXILLOFACIAL W/O PLUS RECONS   Final Result         Left nasal bone fracture      DX-THORACIC SPINE-2 VIEWS   Final Result      No acute fracture is identified.      DX-LUMBAR SPINE-4+ VIEWS   Final Result         1.  No acute fracture is identified.      2.  Facet arthropathy in the lower lumbar spine.           Assessment/Plan  * Alcoholic liver disease (HCC)- (present on admission)  Assessment & Plan  With associated hepatic dysfunction in the form of thrombocytopenia, but with normal coagulation pattern.    Bilirubin is 10.5 today with direct bilirubin 6.3 this is likely her intrinsic liver disease  Liver ultrasound showed Massive hepatomegaly and echogenic liver, compatible with fatty change versus fibrosis  Meld score was  17, child Evans score 11, 55% 1 year mortality. Etoh cessation counseling, outpatient GI input.  Increased lethargy - check ammonia level.   Encourage intake, nutrition support.         Falls frequently- (present on admission)  Assessment & Plan  CT head negative for bleed  Secondary to EtOH, multi factorial  cause   PT/OT-- encourage compliance w therapies     Hypoxia- (present on admission)  Assessment & Plan  Hypoxia resolved. cxr showed possible pneumonia, pleural effusion,  Atx.   Elevated procalcitonin, completed Rocephin and Doxy  1/26/2020  o2 requirements overall improved - weaned off o2.  Encourage IS, mobilization as gia      Lactic acidosis- (present on admission)  Assessment & Plan  Resolved, lactic acid is back to normal.    Macrocytic anemia- (present on admission)  Assessment & Plan  Suspect due to chronic alcoholism, likely component of acute blood loss superimposed.  Recent Hgb stable  Monitor for acute symptoms of bleed.      GIB (gastrointestinal bleeding)- (present on admission)  Assessment & Plan  Presented with Upper GI bleed given her bright red bleeding followed by coffee-ground emesis.  Post admission to  ICU on a protonix and octreotide drip.  EGD 1/15 revealing severe ulcerative erosive esophagitis with hemoclip  She was transfused 2 units RBC due to acute blood loss anemia  Continue oral Prilosec twice daily      Acute respiratory failure with hypoxia (HCC)- (present on admission)  Assessment & Plan  Due to pneumonia, withdrawal, effusions and atx.   Encourage IS       Hypotension- (present on admission)  Assessment & Plan  Asymptomatic, Sbp 90s.  Suspect due to liver disease, low baseline. Patient had reported she always has low bp in the 90's systolic.   Encourage intake  Continue midodrine.   Monitor for acute symptoms.     Hematuria- (present on admission)  Assessment & Plan  resolved    Alcohol withdrawal syndrome with complication (HCC)- (present on admission)  Assessment & Plan  Tremors without delirium- resolved.   Status post IV Ativan   Off Librium    Troponin level elevated- (present on admission)  Assessment & Plan  Suspect this could be demand ischemia in the setting of gastrointestinal hemorrhage- level normalized   Asymptomatic , no chest pain -follow clinically .     Metabolic  acidosis- (present on admission)  Assessment & Plan  Lactic acid back to normal    Leukocytosis  Assessment & Plan  Patient afebrile.  No acute respiratory, GI or urinary symptoms.  She has increased lethargy-we will check UA  Monitor for acute symptoms of infection    Fever  Assessment & Plan  Most likely due to pneumonia, procalcitonin is elevated, continue IV antibiotics.  No fever today.  Fever resolved    Closed fracture of nasal bone- (present on admission)  Assessment & Plan  Non-operative    Hypokalemia- (present on admission)  Assessment & Plan  magnesium level is normal  Serum K persistently low-- start  oral Kcl     Hypothyroidism- (present on admission)  Assessment & Plan  On synthroid 50 mcg daily TSH is markedly elevated at 31  States she may have been taking her synthroid -although concern for noncompliance.  Will need to confirm her home dosing.  Will need close f/u as outpatient. Repeat Thyroid function test in approx 6 weeks.    Thrombocytopenia (HCC)- (present on admission)  Assessment & Plan  Likely secondary to liver disease and alcohol induced bone marrow suppression, resolved  Continue etoh cessation counseling       Hyponatremia- (present on admission)  Assessment & Plan  Resolved with IV fluids       VTE prophylaxis: SCDs

## 2020-01-29 NOTE — CARE PLAN
Problem: Communication  Goal: The ability to communicate needs accurately and effectively will improve  Outcome: PROGRESSING AS EXPECTED  Note:   Patient updated on POC, all questions answered at this time.     Problem: Respiratory:  Goal: Respiratory status will improve  Outcome: PROGRESSING AS EXPECTED  Note:   Encouraged to deep breathe,  in place, educated to use IS.

## 2020-01-29 NOTE — CARE PLAN
Problem: Communication  Goal: The ability to communicate needs accurately and effectively will improve  Outcome: PROGRESSING AS EXPECTED  Education provided on importance of using call light to alert staff of patient needs. Pt demonstrates understanding by using call light appropriately.      Problem: Mobility  Goal: Risk for activity intolerance will decrease  Outcome: PROGRESSING AS EXPECTED  Max assist when getting OOB.

## 2020-01-29 NOTE — CARE PLAN
"  Problem: Communication  Goal: The ability to communicate needs accurately and effectively will improve  Outcome: PROGRESSING SLOWER THAN EXPECTED  Patient not communicating with me, only stating \"I don't want to talk to you or be seen today\".   Problem: Knowledge Deficit  Goal: Knowledge of disease process/condition, treatment plan, diagnostic tests, and medications will improve  Outcome: PROGRESSING SLOWER THAN EXPECTED  Patient slow to listen and respond. Patient stares off and does not appear to be hearing staff.   "

## 2020-01-29 NOTE — PROGRESS NOTES
2 RN Skin Check    Generalized skin is jaundiced.   Facial bruising to L side.   Bruise to neck underneath chin.   Scabs to bilateral elbows, MATTHIAS.   Sacrum is pink and blanching.     Waffle mattress in place, Q2 turns in plac

## 2020-01-29 NOTE — PROGRESS NOTES
"Assumed care of patient. Patient crooked in bed and stating \"I'm tired. I don't want to talk today\". Patient refusing to be repositioned in bed. Assessment was difficult. Patient non communicative this morning. Skin is jaundiced. Right chin and left facial bruise noted. Bed alarm in place. No other needs at this time. Call light within reach.   "

## 2020-01-29 NOTE — DISCHARGE PLANNING
Agency/Facility Name: [facility]  Spoke To: Antonina  Outcome: Insurance requesting PT to see patient again today.     RN ANOOP Veloz notified.

## 2020-01-29 NOTE — PROGRESS NOTES
"Pt A&O x 4 but very fatigued but is alert.      Vitals: BP (!) 88/63 Comment: RN Notified  Pulse 75   Temp 36.6 °C (97.9 °F) (Temporal)   Resp 18   Ht 1.651 m (5' 5\")   Wt 79.2 kg (174 lb 9.7 oz)   SpO2 92%   BMI 29.06 kg/m²       Pt rates pain 2 out of 10. Patient repositioned for comfort.      Neuro: FRY. Denies new onset of numbness/ tingling.     Cardiac: Denies new onset of chest pain.     Vascular: Pulses 2+ BUE, BLE. No edema noted.     Respiratory: Lungs sound diminished to auscultation. Patient unable to use IS properly when asked. On room air.  on, satting in 90's. Denies SOB, breathing is mild and shallow.     GI: Abdomen soft, rounded, and non-tender. Normoactive bowel sounds, + flatus, + BM. BMs are loose. Denies nausea/ vomiting. Needs encouragement to consume food and drink.     : Pt is incontinent of urine, voiding adequately.      MSK: Pt up with max assist, Q2 turns in place, tolerating well.     Integumentary: Generalized skin is yellow is color. Facial bruising to L side. Bruise to neck underneath chin. Scabs to bilateral elbows, MATTHIAS. Sacrum is pink and blanching.      Waffle mattress in place, Q2 turns, heels floated.     Labs noted.     Fall precautions in place: Bed locked in lowest position, Upper bed rails up, treaded socks in place, personal belongings within reach, call light within reach, appropriate mobility signs in place, + bed alarm. Pt calls appropriately.      Pt updated on POC.  "

## 2020-01-29 NOTE — DISCHARGE PLANNING
Agency/Facility Name: [facility]  Spoke To: Antonina  Outcome: Will submit for insurance authorization. Bed is available for patient today.    CELIO houser

## 2020-01-30 PROBLEM — E87.20 LACTIC ACIDOSIS: Status: RESOLVED | Noted: 2020-01-12 | Resolved: 2020-01-30

## 2020-01-30 PROBLEM — R50.9 FEVER: Status: RESOLVED | Noted: 2020-01-21 | Resolved: 2020-01-30

## 2020-01-30 PROBLEM — R09.02 HYPOXIA: Status: RESOLVED | Noted: 2020-01-18 | Resolved: 2020-01-30

## 2020-01-30 LAB
ALBUMIN SERPL BCP-MCNC: 2.9 G/DL (ref 3.2–4.9)
ALBUMIN/GLOB SERPL: 0.9 G/DL
ALP SERPL-CCNC: 211 U/L (ref 30–99)
ALT SERPL-CCNC: 15 U/L (ref 2–50)
AMMONIA PLAS-SCNC: 56 UMOL/L (ref 11–45)
ANION GAP SERPL CALC-SCNC: 10 MMOL/L (ref 0–11.9)
AST SERPL-CCNC: 98 U/L (ref 12–45)
BASOPHILS # BLD AUTO: 1.4 % (ref 0–1.8)
BASOPHILS # BLD: 0.2 K/UL (ref 0–0.12)
BILIRUB SERPL-MCNC: 13.9 MG/DL (ref 0.1–1.5)
BUN SERPL-MCNC: 5 MG/DL (ref 8–22)
CALCIUM SERPL-MCNC: 8.9 MG/DL (ref 8.5–10.5)
CHLORIDE SERPL-SCNC: 105 MMOL/L (ref 96–112)
CO2 SERPL-SCNC: 22 MMOL/L (ref 20–33)
CREAT SERPL-MCNC: 0.87 MG/DL (ref 0.5–1.4)
EOSINOPHIL # BLD AUTO: 0.32 K/UL (ref 0–0.51)
EOSINOPHIL NFR BLD: 2.3 % (ref 0–6.9)
ERYTHROCYTE [DISTWIDTH] IN BLOOD BY AUTOMATED COUNT: 83.6 FL (ref 35.9–50)
GLOBULIN SER CALC-MCNC: 3.4 G/DL (ref 1.9–3.5)
GLUCOSE SERPL-MCNC: 91 MG/DL (ref 65–99)
HCT VFR BLD AUTO: 36 % (ref 37–47)
HGB BLD-MCNC: 11.3 G/DL (ref 12–16)
IMM GRANULOCYTES # BLD AUTO: 0.26 K/UL (ref 0–0.11)
IMM GRANULOCYTES NFR BLD AUTO: 1.8 % (ref 0–0.9)
LYMPHOCYTES # BLD AUTO: 1.45 K/UL (ref 1–4.8)
LYMPHOCYTES NFR BLD: 10.3 % (ref 22–41)
MCH RBC QN AUTO: 35.3 PG (ref 27–33)
MCHC RBC AUTO-ENTMCNC: 31.4 G/DL (ref 33.6–35)
MCV RBC AUTO: 112.5 FL (ref 81.4–97.8)
MONOCYTES # BLD AUTO: 1 K/UL (ref 0–0.85)
MONOCYTES NFR BLD AUTO: 7.1 % (ref 0–13.4)
NEUTROPHILS # BLD AUTO: 10.84 K/UL (ref 2–7.15)
NEUTROPHILS NFR BLD: 77.1 % (ref 44–72)
NRBC # BLD AUTO: 0 K/UL
NRBC BLD-RTO: 0 /100 WBC
PLATELET # BLD AUTO: 287 K/UL (ref 164–446)
PMV BLD AUTO: 9.8 FL (ref 9–12.9)
POTASSIUM SERPL-SCNC: 4.2 MMOL/L (ref 3.6–5.5)
PROT SERPL-MCNC: 6.3 G/DL (ref 6–8.2)
RBC # BLD AUTO: 3.2 M/UL (ref 4.2–5.4)
SODIUM SERPL-SCNC: 137 MMOL/L (ref 135–145)
WBC # BLD AUTO: 14.1 K/UL (ref 4.8–10.8)

## 2020-01-30 PROCEDURE — A9270 NON-COVERED ITEM OR SERVICE: HCPCS | Performed by: INTERNAL MEDICINE

## 2020-01-30 PROCEDURE — 700102 HCHG RX REV CODE 250 W/ 637 OVERRIDE(OP): Performed by: HOSPITALIST

## 2020-01-30 PROCEDURE — 80053 COMPREHEN METABOLIC PANEL: CPT

## 2020-01-30 PROCEDURE — 97530 THERAPEUTIC ACTIVITIES: CPT

## 2020-01-30 PROCEDURE — A9270 NON-COVERED ITEM OR SERVICE: HCPCS | Performed by: HOSPITALIST

## 2020-01-30 PROCEDURE — 36415 COLL VENOUS BLD VENIPUNCTURE: CPT

## 2020-01-30 PROCEDURE — 700105 HCHG RX REV CODE 258: Performed by: HOSPITALIST

## 2020-01-30 PROCEDURE — 85025 COMPLETE CBC W/AUTO DIFF WBC: CPT

## 2020-01-30 PROCEDURE — 700102 HCHG RX REV CODE 250 W/ 637 OVERRIDE(OP): Performed by: INTERNAL MEDICINE

## 2020-01-30 PROCEDURE — 82140 ASSAY OF AMMONIA: CPT

## 2020-01-30 PROCEDURE — 770006 HCHG ROOM/CARE - MED/SURG/GYN SEMI*

## 2020-01-30 PROCEDURE — 99232 SBSQ HOSP IP/OBS MODERATE 35: CPT | Performed by: HOSPITALIST

## 2020-01-30 RX ORDER — SODIUM CHLORIDE 9 MG/ML
1000 INJECTION, SOLUTION INTRAVENOUS ONCE
Status: COMPLETED | OUTPATIENT
Start: 2020-01-30 | End: 2020-01-30

## 2020-01-30 RX ADMIN — OMEPRAZOLE 20 MG: 20 CAPSULE, DELAYED RELEASE ORAL at 17:14

## 2020-01-30 RX ADMIN — PYRIDOXINE HCL TAB 50 MG 100 MG: 50 TAB at 05:36

## 2020-01-30 RX ADMIN — POTASSIUM CHLORIDE 40 MEQ: 1500 TABLET, EXTENDED RELEASE ORAL at 05:33

## 2020-01-30 RX ADMIN — OMEPRAZOLE 20 MG: 20 CAPSULE, DELAYED RELEASE ORAL at 05:34

## 2020-01-30 RX ADMIN — RIFAXIMIN 550 MG: 550 TABLET ORAL at 17:15

## 2020-01-30 RX ADMIN — SODIUM CHLORIDE 1000 ML: 9 INJECTION, SOLUTION INTRAVENOUS at 12:48

## 2020-01-30 RX ADMIN — MIDODRINE HYDROCHLORIDE 5 MG: 5 TABLET ORAL at 17:15

## 2020-01-30 RX ADMIN — LACTULOSE 30 ML: 20 SOLUTION ORAL at 05:33

## 2020-01-30 RX ADMIN — MIDODRINE HYDROCHLORIDE 5 MG: 5 TABLET ORAL at 12:48

## 2020-01-30 RX ADMIN — SUCRALFATE 1 G: 1 SUSPENSION ORAL at 23:29

## 2020-01-30 RX ADMIN — LACTULOSE 30 ML: 20 SOLUTION ORAL at 17:14

## 2020-01-30 RX ADMIN — RIFAXIMIN 550 MG: 550 TABLET ORAL at 05:34

## 2020-01-30 RX ADMIN — SUCRALFATE 1 G: 1 SUSPENSION ORAL at 12:48

## 2020-01-30 RX ADMIN — SUCRALFATE 1 G: 1 SUSPENSION ORAL at 17:14

## 2020-01-30 RX ADMIN — LACTULOSE 30 ML: 20 SOLUTION ORAL at 12:48

## 2020-01-30 RX ADMIN — SUCRALFATE 1 G: 1 SUSPENSION ORAL at 05:33

## 2020-01-30 RX ADMIN — Medication 100 MG: at 05:34

## 2020-01-30 RX ADMIN — SENNOSIDES AND DOCUSATE SODIUM 2 TABLET: 8.6; 5 TABLET ORAL at 05:34

## 2020-01-30 RX ADMIN — MIDODRINE HYDROCHLORIDE 5 MG: 5 TABLET ORAL at 08:52

## 2020-01-30 RX ADMIN — LEVOTHYROXINE SODIUM 50 MCG: 50 TABLET ORAL at 05:34

## 2020-01-30 ASSESSMENT — COGNITIVE AND FUNCTIONAL STATUS - GENERAL
HELP NEEDED FOR BATHING: TOTAL
WALKING IN HOSPITAL ROOM: TOTAL
MOVING TO AND FROM BED TO CHAIR: UNABLE
SUGGESTED CMS G CODE MODIFIER DAILY ACTIVITY: CL
DRESSING REGULAR UPPER BODY CLOTHING: TOTAL
MOVING FROM LYING ON BACK TO SITTING ON SIDE OF FLAT BED: UNABLE
PERSONAL GROOMING: A LOT
STANDING UP FROM CHAIR USING ARMS: A LOT
SUGGESTED CMS G CODE MODIFIER MOBILITY: CM
MOBILITY SCORE: 7
TURNING FROM BACK TO SIDE WHILE IN FLAT BAD: UNABLE
DAILY ACTIVITIY SCORE: 9
DRESSING REGULAR LOWER BODY CLOTHING: TOTAL
CLIMB 3 TO 5 STEPS WITH RAILING: TOTAL
TOILETING: TOTAL
EATING MEALS: A LITTLE

## 2020-01-30 ASSESSMENT — ENCOUNTER SYMPTOMS
SENSORY CHANGE: 0
COUGH: 0
SORE THROAT: 0
SHORTNESS OF BREATH: 0
EYE DISCHARGE: 0
VOMITING: 0
FEVER: 0
DIARRHEA: 0
BACK PAIN: 0
ROS GI COMMENTS: ANOREXIA
FOCAL WEAKNESS: 0
SPEECH CHANGE: 0
PALPITATIONS: 0
FLANK PAIN: 0
EYE REDNESS: 0
ABDOMINAL PAIN: 0
TREMORS: 0
NECK PAIN: 0
CHILLS: 0
EYE PAIN: 0
SINUS PAIN: 0
HALLUCINATIONS: 0
BRUISES/BLEEDS EASILY: 1

## 2020-01-30 ASSESSMENT — GAIT ASSESSMENTS: GAIT LEVEL OF ASSIST: UNABLE TO PARTICIPATE

## 2020-01-30 ASSESSMENT — LIFESTYLE VARIABLES: SUBSTANCE_ABUSE: 1

## 2020-01-30 NOTE — PROGRESS NOTES
2 RN Skin Check     Generalized skin is jaundiced.   Facial bruising to L side.   Bruise to neck underneath chin.   Scabs to bilateral elbows, MATTHIAS.   Sacrum is pink and blanching.     Waffle mattress in place, Q2 turns in place

## 2020-01-30 NOTE — PROGRESS NOTES
[facility] Medicine Daily Progress Note    Date of Service  1/30/2020    Chief Complaint  54 y.o. female admitted 1/11/2020 with hematemesis.     Hospital Course       54 years old female with past medical history of chronic alcohol disease secondary to alcohol dependence, history of multiple alcohol withdrawals admitted January 11 with hematemesis.  She was initially admitted to intensive care unit, and required pantoprazole and octreotide drips.  Gastroenterology consulted and the patient underwent upper endoscopy with finding of severe erosive esophagitis, clots without visible blood vessel that was treated with a Hemoclip.    She will need follow-up with gastroenterology  Meld score 18, child Evans score 11, High mortality and very poor prognosis.   She was found to have pneumonia and was treated with a course of antibiotics with ceftriaxone and doxycycline, that she completed.  She initially required oxygen but was  able to wean down to room air.  She was found to have closed fracture of the nasal bone that was nonoperative.    Interval Problem Update  Heart rate 80s-190s, blood pressure 80s-90s systolically 50s-60s diastolically, she denies dizziness and lightheadedness.  Potassium within normal limits 4.2 continue to monitor.  Hemoglobin stable 11.3, up from 11.  Continue to monitor  Poor prognosis, discussed with patient and family.  Palliative care consulted, appreciate recommendations.  Appears a bit dry today, I am ordering 1 L NS bolous   Had declined therapies. Lethargic and sleep most of day. Wbc increased-- afebrile .  No acute symptoms of infection.    Consultants/Specialty  Dr. Davis, GI  Dr. Cash, pulmonary   Palliative care     Code Status  Full code     Disposition  Needs SNF placement     Review of Systems  Review of Systems   Constitutional: Positive for malaise/fatigue. Negative for chills and fever.   HENT: Negative for congestion, sinus pain and sore throat.         Hard of hearing    Eyes: Negative for pain, discharge and redness.   Respiratory: Negative for cough and shortness of breath.    Cardiovascular: Negative for chest pain and palpitations.   Gastrointestinal: Negative for abdominal pain, diarrhea and vomiting.        Anorexia    Genitourinary: Negative for flank pain and hematuria.   Musculoskeletal: Negative for back pain, joint pain and neck pain.   Skin: Negative for itching.   Neurological: Negative for tremors, sensory change, speech change and focal weakness.   Endo/Heme/Allergies: Bruises/bleeds easily.   Psychiatric/Behavioral: Positive for substance abuse. Negative for hallucinations.        Physical Exam  Temp:  [36.5 °C (97.7 °F)-37.5 °C (99.5 °F)] 37.2 °C (98.9 °F)  Pulse:  [69-99] 99  Resp:  [18-20] 20  BP: (82-97)/(59-69) 84/62  SpO2:  [90 %-94 %] 90 %    Physical Exam  Constitutional:       General: She is not in acute distress.     Appearance: She is not diaphoretic.      Comments: Drowsy, arouseable , slow w responses. Following commands.    HENT:      Head: Normocephalic and atraumatic.      Comments: Hard of hearing     Right Ear: External ear normal.      Left Ear: External ear normal.      Nose: Nose normal.   Eyes:      General: Scleral icterus present.      Extraocular Movements: Extraocular movements intact.      Conjunctiva/sclera: Conjunctivae normal.   Neck:      Musculoskeletal: Normal range of motion and neck supple.   Cardiovascular:      Rate and Rhythm: Normal rate and regular rhythm.      Heart sounds: No murmur.   Pulmonary:      Breath sounds: No wheezing, rhonchi or rales.   Abdominal:      General: Abdomen is flat. Bowel sounds are normal. There is no distension.      Palpations: Abdomen is soft.   Musculoskeletal:         General: No swelling or tenderness.   Skin:     General: Skin is warm and dry.      Coloration: Skin is jaundiced and pale.   Neurological:      General: No focal deficit present.      Mental Status: She is oriented to person,  place, and time.      Coordination: Coordination abnormal.   Psychiatric:         Mood and Affect: Mood normal. Affect is flat.         Speech: Speech is delayed.      Comments: Slowed         Fluids    Intake/Output Summary (Last 24 hours) at 1/30/2020 1215  Last data filed at 1/30/2020 1200  Gross per 24 hour   Intake 600 ml   Output 0 ml   Net 600 ml       Laboratory  Recent Labs     01/29/20  0250 01/30/20  0446   WBC 12.2* 14.1*   RBC 3.05* 3.20*   HEMOGLOBIN 11.0* 11.3*   HEMATOCRIT 34.8* 36.0*   .1* 112.5*   MCH 36.1* 35.3*   MCHC 31.6* 31.4*   RDW 85.9* 83.6*   PLATELETCT 274 287   MPV 9.8 9.8     Recent Labs     01/28/20  0518 01/29/20  0250 01/30/20  0446   SODIUM 141 142 137   POTASSIUM 3.4* 3.5* 4.2   CHLORIDE 113* 113* 105   CO2 20 20 22   GLUCOSE 73 76 91   BUN 5* 4* 5*   CREATININE 0.79 0.63 0.87   CALCIUM 8.4* 8.2* 8.9                   Imaging  DX-CHEST-LIMITED (1 VIEW)   Final Result      Hypoinflation with bibasilar atelectasis. No definitive evidence of pneumonia.      DX-CHEST-2 VIEWS   Final Result      Small to moderate bilateral pleural effusions and bibasilar atelectasis versus consolidations. Lung aeration has slightly improved since prior study.      DX-HIP-COMPLETE - UNILATERAL 2+ LEFT   Final Result      Mild osteoarthritis of the hips. No acute abnormality.      DX-CHEST-2 VIEWS   Final Result      Interval worsening in bibasilar opacities with air bronchograms. Findings are concerning for pneumonia. Follow-up imaging is recommended to document resolution.      US-ABDOMEN COMPLETE SURVEY   Final Result         1.  Gallbladder sludge without additional sonographic findings to indicate acute cholecystitis.   2.  Massive hepatomegaly and echogenic liver, compatible with fatty change versus fibrosis.         DX-CHEST-PORTABLE (1 VIEW)   Final Result      1.  Right internal jugular catheter placement with the tip projecting over the cavoatrial junction. No pneumothorax is identified.       2.  Left basilar opacification may be related to atelectasis.      DX-CHEST-PORTABLE (1 VIEW)   Final Result      No acute cardiopulmonary findings.      CT-HEAD W/O   Final Result      1.  No acute intracranial findings.      2.  Left nasal bone fracture.         CT-CSPINE WITHOUT PLUS RECONS   Final Result      1.  No acute fracture is identified.      2.  Multilevel degenerative disc disease and facet arthropathy as described.      CT-MAXILLOFACIAL W/O PLUS RECONS   Final Result         Left nasal bone fracture      DX-THORACIC SPINE-2 VIEWS   Final Result      No acute fracture is identified.      DX-LUMBAR SPINE-4+ VIEWS   Final Result         1.  No acute fracture is identified.      2.  Facet arthropathy in the lower lumbar spine.           Assessment/Plan  * Alcoholic liver disease (HCC)- (present on admission)  Assessment & Plan  With associated hepatic dysfunction in the form of thrombocytopenia, but with normal coagulation pattern.    Bilirubin increasing, this is likely her intrinsic liver disease.   Liver ultrasound showed Massive hepatomegaly and echogenic liver, compatible with fatty change versus fibrosis  Meld score 17, child Evans score 11, 55% 1 year mortality. Etoh cessation counseling, outpatient GI input.  Increased lethargy - check ammonia level.   Encourage intake, nutrition support.       Falls frequently- (present on admission)  Assessment & Plan  CT head negative for bleed  Secondary to EtOH, multi factorial cause    PT/OT-- encourage compliance w therapies     Macrocytic anemia- (present on admission)  Assessment & Plan  Suspect due to chronic alcoholism, likely component of acute blood loss superimposed.    Recent Hgb stable. Monitor for acute symptoms of bleed.  Monitor hemoglobin and transfuse for hemoglobin of less than or equal to 7     GIB (gastrointestinal bleeding)- (present on admission)  Assessment & Plan  Presented with Upper GI bleed given her bright red bleeding followed  by coffee-ground emesis.    Post admission to  ICU on a protonix and octreotide drip.    EGD 1/15 revealing severe ulcerative erosive esophagitis with hemoclip  She was transfused 2 units RBC due to acute blood loss anemia  Continue oral Prilosec twice daily      Acute respiratory failure with hypoxia (HCC)- (present on admission)  Assessment & Plan  Due to pneumonia, withdrawal, effusions and atx.   Encourage IS   Resolved     Hypotension- (present on admission)  Assessment & Plan  Asymptomatic, Sbp 90s.  Suspect due to liver disease, low baseline.   Plan patient had reported she always has low bp in the 90's-90's systolic.   Encourage intake. Continue midodrine.  Monitor for acute symptoms.     Hematuria- (present on admission)  Assessment & Plan  Resolved     Alcohol withdrawal syndrome with complication (HCC)- (present on admission)  Assessment & Plan  Tremors without delirium   Status post IV Ativan   Off Librium     Troponin level elevated- (present on admission)  Assessment & Plan  Suspect this could be demand ischemia in the setting of gastrointestinal hemorrhage- level normalized   Asymptomatic , no chest pain -follow clinically .      Metabolic acidosis- (present on admission)  Assessment & Plan  Lactic acid back to normal, resolved    Leukocytosis  Assessment & Plan  Patient afebrile.  No acute respiratory, GI or urinary symptoms.  She has increased lethargy-we will check UA  Monitor for acute symptoms of infection    Closed fracture of nasal bone- (present on admission)  Assessment & Plan  Non-operative     Hypokalemia- (present on admission)  Assessment & Plan  Replacing, continue to monitor and replace as needed     Hypothyroidism- (present on admission)  Assessment & Plan  On synthroid 50 mcg daily TSH is markedly elevated at 31  States she may have been taking her synthroid  Recommend repeat Thyroid function test in approx 6 weeks.     Thrombocytopenia (HCC)- (present on admission)  Assessment &  Plan  Likely secondary to liver disease and alcohol induced bone marrow suppression, resolved  Continue etoh cessation counseling      Hyponatremia- (present on admission)  Assessment & Plan  Hypovolemic, resolved with IV fluids        VTE prophylaxis: SCDs

## 2020-01-30 NOTE — CARE PLAN
Problem: Communication  Goal: The ability to communicate needs accurately and effectively will improve  Outcome: PROGRESSING AS EXPECTED  Note:   Patient updated on POC, all questions answered at this time.

## 2020-01-30 NOTE — DISCHARGE PLANNING
Agency/Facility Name: [facility]  Spoke To: Antonina  Outcome: Bed will be available for patient tomorrow. Requesting 1700 transport.     CELIO Veloz notified.

## 2020-01-30 NOTE — ASSESSMENT & PLAN NOTE
Patient afebrile.  No acute respiratory, GI or urinary symptoms.  She has increased lethargy-we will check UA  Monitor for acute symptoms of infection

## 2020-01-30 NOTE — THERAPY
"Physical Therapy Treatment completed.   Bed Mobility:  Supine to Sit: Moderate Assist  Transfers: Sit to Stand: Maximal Assist  Gait: Level Of Assist: Unable to Participate      Plan of Care: Will benefit from Physical Therapy 4 times per week  Discharge Recommendations: Equipment: Will Continue to Assess for Equipment Needs. Post-acute therapy: Recommend post-acute placement for continued physical therapy services prior to discharge home.       Pt appears motivated to participate in PT tx session this am. She continues to demonstrate weakness associated with deconditioning. Pt demonstrating poor balance sitting EOB with frequent pushing to the R side. She reported this was to off-load L hip due to hip pain. Pt losing balance posteriorly in sitting with impaired balance reactions. She participate in 2 STS transitions requiring max A. Decreased gluteal activation noted to extend hips for standing fully right, no knee buckling noted. PT to cont to follow in acute setting.    See \"Rehab Therapy-Acute\" Patient Summary Report for complete documentation.       "

## 2020-01-30 NOTE — PROGRESS NOTES
Bedside report received.  Assessment complete.  A&O x 4. Patient calls appropriately.  Patient unable to ambulate, turns with max assist.   Denies N&V. Tolerating diet.  + void, + flatus, 1/30 BM  Patient denies SOB.  SCD's refused, despite education.  Review plan with of care with patient. Call light and personal belongings with in reach. Hourly rounding in place. All needs met at this time.

## 2020-01-30 NOTE — PROGRESS NOTES
"Pt A&O x 4 but very fatigued but is alert.      Vitals: BP (!) 82/60 Comment: RN Notified  Pulse 97   Temp 36.5 °C (97.7 °F) (Temporal)   Resp 18   Ht 1.651 m (5' 5\")   Wt 79.2 kg (174 lb 9.7 oz)   SpO2 90%   BMI 29.06 kg/m²        Pt rates pain 2 out of 10. Patient repositioned for comfort.      Neuro: FRY. Denies new onset of numbness/ tingling.     Cardiac: Denies new onset of chest pain.     Vascular: Pulses 2+ BUE, BLE. No edema noted.     Respiratory: Lungs sound diminished to auscultation. Patient unable to use IS properly when asked. On room air.  on, satting in 90's. Denies SOB, breathing is mild and shallow.     GI: Abdomen soft, rounded, and non-tender. Normoactive bowel sounds, + flatus, + BM. Denies nausea/ vomiting. Needs encouragement to consume food and drink.     : Pt is incontinent of urine, voiding adequately.      MSK: Pt up with max assist, Q2 turns in place, tolerating well.     Integumentary: Generalized skin is yellow is color. Facial bruising to L side. Bruise to neck underneath chin. Scabs to bilateral elbows, MATTHIAS. Sacrum is pink and blanching.      Waffle mattress in place, Q2 turns, heels floated.     Labs noted.     Fall precautions in place: Bed locked in lowest position, Upper bed rails up, treaded socks in place, personal belongings within reach, call light within reach, appropriate mobility signs in place, + bed alarm. Pt calls appropriately.      Pt updated on POC  "

## 2020-01-30 NOTE — DISCHARGE PLANNING
Received Transport Form @ 1230  Spoke to Arielle @ [facility]    Transport is scheduled for Friday 1/31 @1700 going to [facility].     MTM contacted for [facility] authorization.     CELIO Veloz notified. Antonina @ [facility] notified.

## 2020-01-30 NOTE — THERAPY
"Occupational Therapy Treatment completed with focus on ADLs and ADL transfers.  Functional Status:  Max A with ADLs and txfs, limited by weakness and decreased cognition  Plan of Care: Will benefit from Occupational Therapy 3 times per week  Discharge Recommendations:Recommend post-acute placement for additional occupational therapy services prior to discharge home.  See \"Rehab Therapy-Acute\" Patient Summary Report for complete documentation.   "

## 2020-01-30 NOTE — CARE PLAN
Problem: Discharge Barriers/Planning  Goal: Patient's continuum of care needs will be met  Outcome: PROGRESSING SLOWER THAN EXPECTED  Note:   Patient agreeable to PT/OT eval today.

## 2020-01-31 VITALS
HEART RATE: 89 BPM | SYSTOLIC BLOOD PRESSURE: 88 MMHG | HEIGHT: 65 IN | DIASTOLIC BLOOD PRESSURE: 53 MMHG | RESPIRATION RATE: 18 BRPM | WEIGHT: 174.6 LBS | OXYGEN SATURATION: 91 % | BODY MASS INDEX: 29.09 KG/M2 | TEMPERATURE: 99.3 F

## 2020-01-31 PROBLEM — J96.01 ACUTE RESPIRATORY FAILURE WITH HYPOXIA (HCC): Status: RESOLVED | Noted: 2020-01-17 | Resolved: 2020-01-31

## 2020-01-31 PROBLEM — E87.20 METABOLIC ACIDOSIS: Status: RESOLVED | Noted: 2020-01-12 | Resolved: 2020-01-31

## 2020-01-31 PROBLEM — K92.2 GIB (GASTROINTESTINAL BLEEDING): Status: RESOLVED | Noted: 2020-01-12 | Resolved: 2020-01-31

## 2020-01-31 PROBLEM — K70.10 ALCOHOLIC HEPATITIS: Status: ACTIVE | Noted: 2020-01-31

## 2020-01-31 PROBLEM — R31.9 HEMATURIA: Status: RESOLVED | Noted: 2020-01-12 | Resolved: 2020-01-31

## 2020-01-31 LAB
ALBUMIN SERPL BCP-MCNC: 2.8 G/DL (ref 3.2–4.9)
ALBUMIN/GLOB SERPL: 0.8 G/DL
ALP SERPL-CCNC: 217 U/L (ref 30–99)
ALT SERPL-CCNC: 14 U/L (ref 2–50)
AMMONIA PLAS-SCNC: 64 UMOL/L (ref 11–45)
ANION GAP SERPL CALC-SCNC: 9 MMOL/L (ref 0–11.9)
AST SERPL-CCNC: 109 U/L (ref 12–45)
BASOPHILS # BLD AUTO: 1.1 % (ref 0–1.8)
BASOPHILS # BLD: 0.15 K/UL (ref 0–0.12)
BILIRUB SERPL-MCNC: 14.1 MG/DL (ref 0.1–1.5)
BUN SERPL-MCNC: 5 MG/DL (ref 8–22)
CALCIUM SERPL-MCNC: 8.5 MG/DL (ref 8.5–10.5)
CHLORIDE SERPL-SCNC: 112 MMOL/L (ref 96–112)
CO2 SERPL-SCNC: 20 MMOL/L (ref 20–33)
CREAT SERPL-MCNC: 0.84 MG/DL (ref 0.5–1.4)
EOSINOPHIL # BLD AUTO: 0.34 K/UL (ref 0–0.51)
EOSINOPHIL NFR BLD: 2.4 % (ref 0–6.9)
ERYTHROCYTE [DISTWIDTH] IN BLOOD BY AUTOMATED COUNT: 82.3 FL (ref 35.9–50)
GLOBULIN SER CALC-MCNC: 3.3 G/DL (ref 1.9–3.5)
GLUCOSE SERPL-MCNC: 100 MG/DL (ref 65–99)
HCT VFR BLD AUTO: 36.5 % (ref 37–47)
HGB BLD-MCNC: 11.5 G/DL (ref 12–16)
IMM GRANULOCYTES # BLD AUTO: 0.23 K/UL (ref 0–0.11)
IMM GRANULOCYTES NFR BLD AUTO: 1.6 % (ref 0–0.9)
INR PPP: 1.25 (ref 0.87–1.13)
LYMPHOCYTES # BLD AUTO: 1.6 K/UL (ref 1–4.8)
LYMPHOCYTES NFR BLD: 11.2 % (ref 22–41)
MAGNESIUM SERPL-MCNC: 1.9 MG/DL (ref 1.5–2.5)
MCH RBC QN AUTO: 35.6 PG (ref 27–33)
MCHC RBC AUTO-ENTMCNC: 31.5 G/DL (ref 33.6–35)
MCV RBC AUTO: 113 FL (ref 81.4–97.8)
MONOCYTES # BLD AUTO: 0.88 K/UL (ref 0–0.85)
MONOCYTES NFR BLD AUTO: 6.2 % (ref 0–13.4)
NEUTROPHILS # BLD AUTO: 11.03 K/UL (ref 2–7.15)
NEUTROPHILS NFR BLD: 77.5 % (ref 44–72)
NRBC # BLD AUTO: 0 K/UL
NRBC BLD-RTO: 0 /100 WBC
PLATELET # BLD AUTO: 253 K/UL (ref 164–446)
PMV BLD AUTO: 10.1 FL (ref 9–12.9)
POTASSIUM SERPL-SCNC: 3.5 MMOL/L (ref 3.6–5.5)
PROT SERPL-MCNC: 6.1 G/DL (ref 6–8.2)
PROTHROMBIN TIME: 16 SEC (ref 12–14.6)
RBC # BLD AUTO: 3.23 M/UL (ref 4.2–5.4)
SODIUM SERPL-SCNC: 141 MMOL/L (ref 135–145)
WBC # BLD AUTO: 14.2 K/UL (ref 4.8–10.8)

## 2020-01-31 PROCEDURE — 700102 HCHG RX REV CODE 250 W/ 637 OVERRIDE(OP): Performed by: INTERNAL MEDICINE

## 2020-01-31 PROCEDURE — 36415 COLL VENOUS BLD VENIPUNCTURE: CPT

## 2020-01-31 PROCEDURE — A9270 NON-COVERED ITEM OR SERVICE: HCPCS | Performed by: HOSPITALIST

## 2020-01-31 PROCEDURE — 700102 HCHG RX REV CODE 250 W/ 637 OVERRIDE(OP): Performed by: HOSPITALIST

## 2020-01-31 PROCEDURE — 83735 ASSAY OF MAGNESIUM: CPT

## 2020-01-31 PROCEDURE — 85610 PROTHROMBIN TIME: CPT

## 2020-01-31 PROCEDURE — A9270 NON-COVERED ITEM OR SERVICE: HCPCS | Performed by: INTERNAL MEDICINE

## 2020-01-31 PROCEDURE — 82140 ASSAY OF AMMONIA: CPT

## 2020-01-31 PROCEDURE — 80053 COMPREHEN METABOLIC PANEL: CPT

## 2020-01-31 PROCEDURE — 85025 COMPLETE CBC W/AUTO DIFF WBC: CPT

## 2020-01-31 PROCEDURE — 99239 HOSP IP/OBS DSCHRG MGMT >30: CPT | Performed by: HOSPITALIST

## 2020-01-31 RX ORDER — FOLIC ACID 1 MG/1
1 TABLET ORAL DAILY
Qty: 30 TAB
Start: 2020-02-01 | End: 2020-06-15 | Stop reason: SDUPTHER

## 2020-01-31 RX ORDER — POTASSIUM CHLORIDE 20 MEQ/1
40 TABLET, EXTENDED RELEASE ORAL DAILY
Qty: 60 TAB | Refills: 11
Start: 2020-02-01 | End: 2020-06-15 | Stop reason: SDUPTHER

## 2020-01-31 RX ORDER — ONDANSETRON 4 MG/1
4 TABLET, ORALLY DISINTEGRATING ORAL EVERY 4 HOURS PRN
Qty: 10 TAB | Refills: 0
Start: 2020-01-31 | End: 2020-05-06

## 2020-01-31 RX ORDER — OMEPRAZOLE 20 MG/1
20 CAPSULE, DELAYED RELEASE ORAL 2 TIMES DAILY
Qty: 30 CAP
Start: 2020-01-31 | End: 2020-05-30 | Stop reason: SDUPTHER

## 2020-01-31 RX ORDER — LACTULOSE 20 G/30ML
30 SOLUTION ORAL 3 TIMES DAILY
Qty: 450 EACH
Start: 2020-01-31 | End: 2020-05-30 | Stop reason: SDUPTHER

## 2020-01-31 RX ORDER — M-VIT,TX,IRON,MINS/CALC/FOLIC 27MG-0.4MG
1 TABLET ORAL DAILY
Qty: 30 TAB | Refills: 11
Start: 2020-02-01 | End: 2020-06-15 | Stop reason: SDUPTHER

## 2020-01-31 RX ORDER — OXYCODONE HYDROCHLORIDE 5 MG/1
2.5 TABLET ORAL 2 TIMES DAILY PRN
Status: DISCONTINUED | OUTPATIENT
Start: 2020-01-31 | End: 2020-01-31 | Stop reason: HOSPADM

## 2020-01-31 RX ORDER — POTASSIUM CHLORIDE 20 MEQ/1
40 TABLET, EXTENDED RELEASE ORAL ONCE
Status: COMPLETED | OUTPATIENT
Start: 2020-01-31 | End: 2020-01-31

## 2020-01-31 RX ORDER — OXYCODONE HYDROCHLORIDE 5 MG/1
2.5 TABLET ORAL 2 TIMES DAILY PRN
Qty: 30 TAB | Refills: 0 | Status: SHIPPED | OUTPATIENT
Start: 2020-01-31 | End: 2020-02-03

## 2020-01-31 RX ORDER — SUCRALFATE ORAL 1 G/10ML
1 SUSPENSION ORAL EVERY 6 HOURS
Refills: 3
Start: 2020-01-31 | End: 2020-05-06

## 2020-01-31 RX ORDER — LANOLIN ALCOHOL/MO/W.PET/CERES
100 CREAM (GRAM) TOPICAL DAILY
Qty: 30 TAB
Start: 2020-02-01 | End: 2020-06-15 | Stop reason: SDUPTHER

## 2020-01-31 RX ORDER — FOLIC ACID 1 MG/1
1 TABLET ORAL DAILY
Status: DISCONTINUED | OUTPATIENT
Start: 2020-01-31 | End: 2020-01-31 | Stop reason: HOSPADM

## 2020-01-31 RX ORDER — MIDODRINE HYDROCHLORIDE 5 MG/1
5 TABLET ORAL
Qty: 60 TAB | Status: ON HOLD
Start: 2020-01-31 | End: 2020-05-13

## 2020-01-31 RX ORDER — M-VIT,TX,IRON,MINS/CALC/FOLIC 27MG-0.4MG
1 TABLET ORAL DAILY
Status: DISCONTINUED | OUTPATIENT
Start: 2020-01-31 | End: 2020-01-31 | Stop reason: HOSPADM

## 2020-01-31 RX ADMIN — LEVOTHYROXINE SODIUM 50 MCG: 50 TABLET ORAL at 06:02

## 2020-01-31 RX ADMIN — MIDODRINE HYDROCHLORIDE 5 MG: 5 TABLET ORAL at 16:58

## 2020-01-31 RX ADMIN — OMEPRAZOLE 20 MG: 20 CAPSULE, DELAYED RELEASE ORAL at 06:02

## 2020-01-31 RX ADMIN — FOLIC ACID 1 MG: 1 TABLET ORAL at 07:46

## 2020-01-31 RX ADMIN — POTASSIUM CHLORIDE 40 MEQ: 1500 TABLET, EXTENDED RELEASE ORAL at 06:02

## 2020-01-31 RX ADMIN — OMEPRAZOLE 20 MG: 20 CAPSULE, DELAYED RELEASE ORAL at 16:58

## 2020-01-31 RX ADMIN — LACTULOSE 30 ML: 20 SOLUTION ORAL at 16:58

## 2020-01-31 RX ADMIN — MIDODRINE HYDROCHLORIDE 5 MG: 5 TABLET ORAL at 07:47

## 2020-01-31 RX ADMIN — RIFAXIMIN 550 MG: 550 TABLET ORAL at 06:02

## 2020-01-31 RX ADMIN — LACTULOSE 30 ML: 20 SOLUTION ORAL at 12:37

## 2020-01-31 RX ADMIN — PYRIDOXINE HCL TAB 50 MG 100 MG: 50 TAB at 06:02

## 2020-01-31 RX ADMIN — SUCRALFATE 1 G: 1 SUSPENSION ORAL at 12:37

## 2020-01-31 RX ADMIN — SUCRALFATE 1 G: 1 SUSPENSION ORAL at 06:01

## 2020-01-31 RX ADMIN — LACTULOSE 30 ML: 20 SOLUTION ORAL at 06:01

## 2020-01-31 RX ADMIN — Medication 100 MG: at 06:02

## 2020-01-31 RX ADMIN — OXYCODONE HYDROCHLORIDE 5 MG: 5 TABLET ORAL at 07:51

## 2020-01-31 RX ADMIN — MIDODRINE HYDROCHLORIDE 5 MG: 5 TABLET ORAL at 12:37

## 2020-01-31 RX ADMIN — MULTIPLE VITAMINS W/ MINERALS TAB 1 TABLET: TAB at 07:46

## 2020-01-31 RX ADMIN — POTASSIUM CHLORIDE 40 MEQ: 1500 TABLET, EXTENDED RELEASE ORAL at 07:46

## 2020-01-31 RX ADMIN — SUCRALFATE 1 G: 1 SUSPENSION ORAL at 16:58

## 2020-01-31 RX ADMIN — RIFAXIMIN 550 MG: 550 TABLET ORAL at 16:58

## 2020-01-31 NOTE — PROGRESS NOTES
Bedside report received.  Assessment complete.  A&O x 4. Patient calls appropriately.  Patient complaining of pain, medicated per MAR.  Patient unable to ambulate, turns with max assist.   Denies N&V. Tolerating diet.  Scattered bruising throughout.  Feet very calloused.  + void, + flatus, 1/31 BM  Patient denies SOB.  SCD's refused, despite education.  Review plan with of care with patient. Call light and personal belongings with in reach. Hourly rounding in place. All needs met at this time.

## 2020-01-31 NOTE — DISCHARGE PLANNING
Agency/Facility Name: [facility]  Spoke To: Antonina  Outcome: As requested by CELIO Veloz, transfer canceled as patient family requesting another facility.     Agency/Facility Name: [facility]  Spoke To: Luiz  Outcome: Bed is available for patient today.     Agency/Facility Name: [facility]  Spoke To: Arielle  Outcome: Scheduled transport for today 1/31 @ 1700 switched to go to [facility].     CELIO Veloz aware.   Luiz @ [facility] notified of transport time.

## 2020-01-31 NOTE — DIETARY
Nutrition Services: Brief Update    RD performed weekly screen for adequate PO intake.  Per ADL flow sheet, PO 0-50% for most recent meals; PO prior was %.  Attempted to speak with pt at bedside, pt was sleeping.  Spoke with pt's significant other who stated that pt's appetite has been improving over the last day or so.  Significant other reports pt has been experiencing diarrhea.  Discussed adding greek yogurt containing probiotics to aid in controlling diarrhea; of note, pt is on antibiotics and is also receiving lactulose per the MAR.    RD will continue to screen pt weekly. Consult RD as needed.

## 2020-01-31 NOTE — CARE PLAN
Problem: Knowledge Deficit  Goal: Knowledge of disease process/condition, treatment plan, diagnostic tests, and medications will improve  Outcome: PROGRESSING SLOWER THAN EXPECTED   Problem: Discharge Barriers/Planning  Goal: Patient's continuum of care needs will be met  Outcome: PROGRESSING SLOWER THAN EXPECTED  Note:   Patient agreeable to PT/OT eval today.

## 2020-01-31 NOTE — DISCHARGE SUMMARY
Discharge Summary    CHIEF COMPLAINT ON ADMISSION  Chief Complaint   Patient presents with   • Fall   • Head Injury     Reason for Admission  Hemoptysis    Admission Date  1/11/2020    CODE STATUS  Full Code    HPI & HOSPITAL COURSE  This is a 54 years old female with past medical history of chronic liver disease secondary to alcohol dependence, multiple alcohol withdrawals admitted January 11 with hematemesis. She was initially admitted to intensive care unit, and required pantoprazole and octreotide drips.  Gastroenterology consulted and the patient underwent upper endoscopy with finding of severe erosive esophagitis, clots without visible blood vessel that was treated with a Hemoclip.   Patient's hemoglobin remained stable after the procedure and was up to 11.5 on the day of discharge.  However, patient's bilirubin kept increasing gradually reaching 14.1 on the day of discharge her AST has also been elevated and was 109 on the day of discharge.  Gastroenterology were reconsulted 1/31 and asked about potential liver transplant or starting steroids for alcoholic hepatitis, Dr Dung West from gastroenterology consultants did not recommend further therapeutic or diagnostic testing including steroid course. Meld score 18, child Evans score 11, she will need follow-up with gastroenterology.  Given her extremely poor prognosis the patient and family were counseled to consider changing code to DNAR/DNI, and consider hospice care however patient refused.  Palliative care have been consulted and evaluated the patient however she remained insisting on remaining on a full code understanding her outcome is very poor.  She did go through alcohol withdrawal and required intravenous lorazepam and Librium. She was found to have pneumonia and was treated with a course of antibiotics with ceftriaxone and doxycycline, that she completed.  She initially required oxygen but was  able to wean down to room air.  She was found to have  closed fracture of the nasal bone that was nonoperative.  She remained hypotensive requiring midodrine with hold parameters.     Therefore, she is discharged in guarded and stable condition to home with close outpatient follow-up.    The patient met 2-midnight criteria for an inpatient stay at the time of discharge.    Discharge Date  1/31/2020     FOLLOW UP ITEMS POST DISCHARGE  Follow-up with gastroenterology [[facility]] within 1 week    DISCHARGE DIAGNOSES  Principal Problem:    Alcoholic liver disease (HCC) POA: Yes  Active Problems:    Macrocytic anemia POA: Yes    Falls frequently POA: Yes    Troponin level elevated POA: Yes    Alcohol withdrawal syndrome with complication (HCC) POA: Yes    Hypotension POA: Yes    Leukocytosis POA: Unknown    Alcoholic hepatitis POA: Unknown    Hyponatremia POA: Yes    Thrombocytopenia (HCC) POA: Yes    Hypothyroidism POA: Yes    Hypokalemia POA: Yes    Closed fracture of nasal bone POA: Yes  Resolved Problems:    GIB (gastrointestinal bleeding) POA: Yes    Lactic acidosis POA: Yes    Hypoxia POA: Yes    Metabolic acidosis POA: Yes    Hematuria POA: Yes    Acute respiratory failure with hypoxia (HCC) POA: Yes    Fever POA: Unknown    FOLLOW UP  [facility]  [address] [place] [address]  [phone]        Pito Davis M.D.  [address] [facility] [address]  [phone]    In 1 week      JENNY Jay  [address]   [address] [id number] [phone]    In 1 week        MEDICATIONS ON DISCHARGE     Medication List      START taking these medications      Instructions   folic acid 1 MG Tabs  Start taking on:  February 1, 2020  Commonly known as:  FOLVITE   Take 1 Tab by mouth every day.  Dose:  1 mg     lactulose 20 GM/30ML Soln   Take 30 mL by mouth 3 times a day.  Dose:  30 mL     midodrine 5 MG Tabs  Commonly known as:  PROAMATINE   Take 1 Tab by mouth 3 times a day, with meals.  Dose:  5 mg     omeprazole 20 MG  delayed-release capsule  Commonly known as:  PRILOSEC   Take 1 Cap by mouth 2 Times a Day.  Dose:  20 mg     ondansetron 4 MG Tbdp  Commonly known as:  ZOFRAN ODT   Take 1 Tab by mouth every four hours as needed for Nausea (give PO if no IV route available).  Dose:  4 mg     oxyCODONE immediate-release 5 MG Tabs  Commonly known as:  ROXICODONE   Take 0.5 Tabs by mouth 2 times a day as needed for up to 3 days.  Dose:  2.5 mg     potassium chloride SA 20 MEQ Tbcr  Start taking on:  February 1, 2020  Commonly known as:  Kdur   Take 2 Tabs by mouth every day.  Dose:  40 mEq     pyridoxine 100 MG tablet  Start taking on:  February 1, 2020   Take 1 Tab by mouth every day.  Dose:  100 mg     riFAXIMin 550 MG Tabs tablet  Commonly known as:  XIFAXAN   Take 1 Tab by mouth 2 Times a Day.  Dose:  550 mg     sucralfate 1 GM/10ML Susp  Commonly known as:  CARAFATE   Take 10 mL by mouth every 6 hours.  Dose:  1 g     therapeutic multivitamin-minerals Tabs  Start taking on:  February 1, 2020   Take 1 Tab by mouth every day.  Dose:  1 Tab     thiamine 100 MG tablet  Start taking on:  February 1, 2020  Commonly known as:  THIAMINE   Take 1 Tab by mouth every day.  Dose:  100 mg        CONTINUE taking these medications      Instructions   albuterol 108 (90 Base) MCG/ACT Aers inhalation aerosol   Inhale 2 Puffs by mouth every 6 hours as needed for Shortness of Breath.  Dose:  2 Puff     levothyroxine 50 MCG Tabs  Commonly known as:  SYNTHROID   Take 50 mcg by mouth Every morning on an empty stomach.  Dose:  50 mcg        STOP taking these medications    cefdinir 300 MG Caps  Commonly known as:  OMNICEF     methylPREDNISolone 4 MG Tbpk  Commonly known as:  MEDROL DOSEPAK          Allergies  Allergies   Allergen Reactions   • Ampicillin Rash     Skin rash  Tolerated cephalexin (2018)      DIET  Orders Placed This Encounter   Procedures   • Diet Order Regular     Standing Status:   Standing     Number of Occurrences:   1     Order  Specific Question:   Diet:     Answer:   Regular [1]     ACTIVITY  As tolerated and directed by skilled nursing.  Weight bearing as tolerated    CONSULTATIONS  Gastroenterology.  Critical care.  Palliative care.    PROCEDURES  EGD with clip placement 1/15     LABORATORY  Lab Results   Component Value Date    SODIUM 141 01/31/2020    POTASSIUM 3.5 (L) 01/31/2020    CHLORIDE 112 01/31/2020    CO2 20 01/31/2020    GLUCOSE 100 (H) 01/31/2020    BUN 5 (L) 01/31/2020    CREATININE 0.84 01/31/2020        Lab Results   Component Value Date    WBC 14.2 (H) 01/31/2020    HEMOGLOBIN 11.5 (L) 01/31/2020    HEMATOCRIT 36.5 (L) 01/31/2020    PLATELETCT 253 01/31/2020      Total time of the discharge process exceeds 39 minutes.

## 2020-01-31 NOTE — PROGRESS NOTES
"Pt A&O x 4 but very fatigued but is alert.      Vitals: BP (!) 89/61   Pulse 88   Temp 37.2 °C (99 °F) (Temporal)   Resp 18   Ht 1.651 m (5' 5\")   Wt 79.2 kg (174 lb 9.7 oz)   SpO2 98%   BMI 29.06 kg/m²         Pt rates pain 1 out of 10. Patient repositioned for comfort.      Neuro: FRY. Denies new onset of numbness/ tingling.     Cardiac: Denies new onset of chest pain.     Vascular: Pulses 2+ BUE, BLE. No edema noted.     Respiratory: Lungs sound diminished to auscultation. Patient unable to use IS properly when asked. On room air.  on, satting in 90's. Denies SOB, breathing is mild and shallow.     GI: Abdomen soft, rounded, and non-tender. Normoactive bowel sounds, + flatus, + BM. Denies nausea/ vomiting. Needs encouragement to consume food and drink.     : Pt is incontinent of urine, voiding adequately. Purewick attempted but patient continues to pull it out place.     MSK: Pt up with max assist, Q2 turns in place, tolerating well.     Integumentary: Generalized skin is yellow is color. Facial bruising to L side. Bruise to neck underneath chin. Scabs to bilateral elbows, MATTHIAS. Sacrum is pink and blanching.      Waffle mattress in place, Q2 turns, heels floated.     Labs noted.     Fall precautions in place: Bed locked in lowest position, Upper bed rails up, treaded socks in place, personal belongings within reach, call light within reach, appropriate mobility signs in place, + bed alarm. Pt calls appropriately.      Pt updated on POC  "

## 2020-01-31 NOTE — CARE PLAN
Problem: Communication  Goal: The ability to communicate needs accurately and effectively will improve  Outcome: PROGRESSING AS EXPECTED  Note:   Patient updated on POC, all questions answered at this time.     Problem: Mobility  Goal: Risk for activity intolerance will decrease  Outcome: PROGRESSING AS EXPECTED  Note:   Q2 turns in place.

## 2020-02-01 NOTE — PROGRESS NOTES
Patient transferred to [facility] via [facility]. All lines removed. Patient states they will return to emergency if discussed symptoms arise. Patient left via gurney and EMT's. Report given to Lorie, receiving nurse.  Medications from drawer removed and sent back to pharmacy or disposed of per protocol. Chart given to unit clerk.

## 2020-02-01 NOTE — DISCHARGE INSTRUCTIONS
"Discharge Instructions    Discharged to other by medical transportation with escort. Discharged via ambulance, hospital escort: Yes.  Special equipment needed: Not Applicable    Be sure to schedule a follow-up appointment with your primary care doctor or any specialists as instructed.     Discharge Plan:   Influenza Vaccine Indication: Indicated: 9 to 64 years of age  Influenza Vaccine Given - only chart on this line when given: Influenza Vaccine Given (See MAR)    I understand that a diet low in cholesterol, fat, and sodium is recommended for good health. Unless I have been given specific instructions below for another diet, I accept this instruction as my diet prescription.   Other diet: Regular    Special Instructions: None    · Is patient discharged on Warfarin / Coumadin?   No   Alcoholic Hepatitis  Alcoholic hepatitis is a condition of the liver characterized by direct injury to the liver cells from alcohol abuse. This disease can occur after years of excessive drinking, or it may occur after isolated episodes of heavy alcohol consumption, such as binge drinking. The symptoms may include:  · Loss of appetite.   · Nausea or vomiting.   · Abdominal pain.   · Fever.   · Jaundice (yellowing of the skin or whites of the eyes).   · Dark urine.   · Swelling of the liver (fullness in your right upper abdomen).   Vomiting blood, fainting, memory loss, blackouts and hallucinations can develop as the disease progresses. This becomes worse with longer abuse. The more you drink, the greater the risk for severe alcoholic hepatitis. You do not have to drink to the point of being \"drunk\" to cause liver damage. Permanent liver damage with cirrhosis, liver failure and death can develop if you continue to drink.  A poor diet is a big part of the problem. Heavy drinkers do not eat a balanced diet. Essential foods and vitamins may be missing from the diet. This leads to malnutrition, which damages the liver further, and can damage " the brain, nerves, stomach and other organ systems. Treating alcoholic hepatitis means that you need to stop drinking and start eating a well-balanced diet including appropriate calories, proteins, vitamins and certain fats. You should also take vitamin supplements, especially vitamin B1 (thiamine) and folic acid. You must stay away from alcohol to recover. Many of the effects of acute alcoholic hepatitis are reversible, if you avoid further alcohol use. Avoid other drugs that are toxic to the liver; your caregiver can tell you which drugs these are. Joining a recovery group such as  or seeking a rehabilitation clinic or group can help give you the support you need to make this change possible.  SEEK IMMEDIATE MEDICAL CARE IF:   · You have increasing abdominal pain.   · You have persistent vomiting or are unable to tolerate oral nutrition.   · You have blood in your vomit.   · You have confusion, lethargy (a lack of energy) or any change in your normal mental abilities.   · You develop jaundice.   Document Released: 01/25/2006 Document Revised: 03/11/2013 Document Reviewed: 12/20/2010  [employer]® Patient Information ©2013 [employer].  Depression / Suicide Risk    As you are discharged from this [facility] facility, it is important to learn how to keep safe from harming yourself.    Recognize the warning signs:  · Abrupt changes in personality, positive or negative- including increase in energy   · Giving away possessions  · Change in eating patterns- significant weight changes-  positive or negative  · Change in sleeping patterns- unable to sleep or sleeping all the time   · Unwillingness or inability to communicate  · Depression  · Unusual sadness, discouragement and loneliness  · Talk of wanting to die  · Neglect of personal appearance   · Rebelliousness- reckless behavior  · Withdrawal from people/activities they love  · Confusion- inability to concentrate     If you or a loved one observes any of these  behaviors or has concerns about self-harm, here's what you can do:  · Talk about it- your feelings and reasons for harming yourself  · Remove any means that you might use to hurt yourself (examples: pills, rope, extension cords, firearm)  · Get professional help from the community (Mental Health, Substance Abuse, psychological counseling)  · Do not be alone:Call your Safe Contact- someone whom you trust who will be there for you.  · Call your local CRISIS HOTLINE 454-6329 or 041-451-5833  · Call your local Children's Mobile Crisis Response Team [place] (851) 704-8015 or www.Trendmeon  · Call the toll free National Suicide Prevention Hotlines   · National Suicide Prevention Lifeline 799-765-OUNE (4796)  · National Hope Line Network 800-SUICIDE (892-3656)

## 2020-05-06 ENCOUNTER — HOSPITAL ENCOUNTER (INPATIENT)
Facility: MEDICAL CENTER | Age: 55
LOS: 7 days | DRG: 896 | End: 2020-05-13
Attending: EMERGENCY MEDICINE | Admitting: INTERNAL MEDICINE
Payer: MEDICAID

## 2020-05-06 ENCOUNTER — APPOINTMENT (OUTPATIENT)
Dept: RADIOLOGY | Facility: MEDICAL CENTER | Age: 55
DRG: 896 | End: 2020-05-06
Attending: STUDENT IN AN ORGANIZED HEALTH CARE EDUCATION/TRAINING PROGRAM
Payer: MEDICAID

## 2020-05-06 DIAGNOSIS — K70.9 ALCOHOLIC LIVER DISEASE (HCC): ICD-10-CM

## 2020-05-06 DIAGNOSIS — G37.2 CENTRAL PONTINE MYELINOLYSIS (HCC): ICD-10-CM

## 2020-05-06 DIAGNOSIS — F10.10 ALCOHOL ABUSE: ICD-10-CM

## 2020-05-06 DIAGNOSIS — K21.00 GASTROESOPHAGEAL REFLUX DISEASE WITH ESOPHAGITIS: ICD-10-CM

## 2020-05-06 DIAGNOSIS — I95.0 IDIOPATHIC HYPOTENSION: ICD-10-CM

## 2020-05-06 DIAGNOSIS — B37.81 CANDIDA ESOPHAGITIS (HCC): ICD-10-CM

## 2020-05-06 PROBLEM — K21.9 GERD (GASTROESOPHAGEAL REFLUX DISEASE): Status: ACTIVE | Noted: 2020-05-06

## 2020-05-06 LAB
ALBUMIN SERPL BCP-MCNC: 2.7 G/DL (ref 3.2–4.9)
ALBUMIN/GLOB SERPL: 0.9 G/DL
ALP SERPL-CCNC: 264 U/L (ref 30–99)
ALT SERPL-CCNC: 74 U/L (ref 2–50)
AMMONIA PLAS-SCNC: 31 UMOL/L (ref 11–45)
ANION GAP SERPL CALC-SCNC: 18 MMOL/L (ref 7–16)
AST SERPL-CCNC: 312 U/L (ref 12–45)
BASOPHILS # BLD AUTO: 0.6 % (ref 0–1.8)
BASOPHILS # BLD: 0.04 K/UL (ref 0–0.12)
BILIRUB SERPL-MCNC: 2.2 MG/DL (ref 0.1–1.5)
BUN SERPL-MCNC: 7 MG/DL (ref 8–22)
CALCIUM SERPL-MCNC: 7.1 MG/DL (ref 8.5–10.5)
CHLORIDE SERPL-SCNC: 103 MMOL/L (ref 96–112)
CO2 SERPL-SCNC: 21 MMOL/L (ref 20–33)
CREAT SERPL-MCNC: 0.6 MG/DL (ref 0.5–1.4)
EKG IMPRESSION: NORMAL
EOSINOPHIL # BLD AUTO: 0.04 K/UL (ref 0–0.51)
EOSINOPHIL NFR BLD: 0.6 % (ref 0–6.9)
ERYTHROCYTE [DISTWIDTH] IN BLOOD BY AUTOMATED COUNT: 48.5 FL (ref 35.9–50)
ETHANOL BLD-MCNC: 341.1 MG/DL (ref 0–10.1)
FERRITIN SERPL-MCNC: 55.9 NG/ML (ref 10–291)
FOLATE SERPL-MCNC: 7.8 NG/ML
GLOBULIN SER CALC-MCNC: 2.9 G/DL (ref 1.9–3.5)
GLUCOSE SERPL-MCNC: 70 MG/DL (ref 65–99)
HCT VFR BLD AUTO: 25 % (ref 37–47)
HGB BLD-MCNC: 8.4 G/DL (ref 12–16)
HGB RETIC QN AUTO: 40.8 PG/CELL (ref 29–35)
IMM GRANULOCYTES # BLD AUTO: 0.04 K/UL (ref 0–0.11)
IMM GRANULOCYTES NFR BLD AUTO: 0.6 % (ref 0–0.9)
IMM RETICS NFR: 26.9 % (ref 9.3–17.4)
INR PPP: 1.41 (ref 0.87–1.13)
IRON SATN MFR SERPL: 18 % (ref 15–55)
IRON SERPL-MCNC: 31 UG/DL (ref 40–170)
LYMPHOCYTES # BLD AUTO: 1.8 K/UL (ref 1–4.8)
LYMPHOCYTES NFR BLD: 26.3 % (ref 22–41)
MAGNESIUM SERPL-MCNC: 2 MG/DL (ref 1.5–2.5)
MCH RBC QN AUTO: 31.6 PG (ref 27–33)
MCHC RBC AUTO-ENTMCNC: 33.6 G/DL (ref 33.6–35)
MCV RBC AUTO: 94 FL (ref 81.4–97.8)
MONOCYTES # BLD AUTO: 0.36 K/UL (ref 0–0.85)
MONOCYTES NFR BLD AUTO: 5.3 % (ref 0–13.4)
NEUTROPHILS # BLD AUTO: 4.56 K/UL (ref 2–7.15)
NEUTROPHILS NFR BLD: 66.6 % (ref 44–72)
NRBC # BLD AUTO: 0 K/UL
NRBC BLD-RTO: 0 /100 WBC
OSMOLALITY SERPL: 352 MOSM/KG H2O (ref 278–298)
PLATELET # BLD AUTO: 68 K/UL (ref 164–446)
PMV BLD AUTO: 12.8 FL (ref 9–12.9)
POTASSIUM SERPL-SCNC: 3.5 MMOL/L (ref 3.6–5.5)
PROT SERPL-MCNC: 5.6 G/DL (ref 6–8.2)
PROTHROMBIN TIME: 17.7 SEC (ref 12–14.6)
RBC # BLD AUTO: 2.66 M/UL (ref 4.2–5.4)
RETICS # AUTO: 0.09 M/UL (ref 0.04–0.06)
RETICS/RBC NFR: 3.4 % (ref 0.8–2.1)
SODIUM SERPL-SCNC: 142 MMOL/L (ref 135–145)
TIBC SERPL-MCNC: 176 UG/DL (ref 250–450)
TSH SERPL DL<=0.005 MIU/L-ACNC: 54.6 UIU/ML (ref 0.38–5.33)
UIBC SERPL-MCNC: 145 UG/DL (ref 110–370)
VIT B12 SERPL-MCNC: 1227 PG/ML (ref 211–911)
WBC # BLD AUTO: 6.8 K/UL (ref 4.8–10.8)

## 2020-05-06 PROCEDURE — 84443 ASSAY THYROID STIM HORMONE: CPT

## 2020-05-06 PROCEDURE — 93005 ELECTROCARDIOGRAM TRACING: CPT | Performed by: STUDENT IN AN ORGANIZED HEALTH CARE EDUCATION/TRAINING PROGRAM

## 2020-05-06 PROCEDURE — 85046 RETICYTE/HGB CONCENTRATE: CPT

## 2020-05-06 PROCEDURE — 82607 VITAMIN B-12: CPT

## 2020-05-06 PROCEDURE — 84100 ASSAY OF PHOSPHORUS: CPT

## 2020-05-06 PROCEDURE — 770020 HCHG ROOM/CARE - TELE (206)

## 2020-05-06 PROCEDURE — 99285 EMERGENCY DEPT VISIT HI MDM: CPT

## 2020-05-06 PROCEDURE — 96365 THER/PROPH/DIAG IV INF INIT: CPT

## 2020-05-06 PROCEDURE — 96375 TX/PRO/DX INJ NEW DRUG ADDON: CPT

## 2020-05-06 PROCEDURE — 99223 1ST HOSP IP/OBS HIGH 75: CPT | Mod: GC | Performed by: INTERNAL MEDICINE

## 2020-05-06 PROCEDURE — 85025 COMPLETE CBC W/AUTO DIFF WBC: CPT | Mod: 91

## 2020-05-06 PROCEDURE — 80307 DRUG TEST PRSMV CHEM ANLYZR: CPT

## 2020-05-06 PROCEDURE — 83540 ASSAY OF IRON: CPT

## 2020-05-06 PROCEDURE — 85610 PROTHROMBIN TIME: CPT

## 2020-05-06 PROCEDURE — 700111 HCHG RX REV CODE 636 W/ 250 OVERRIDE (IP): Performed by: EMERGENCY MEDICINE

## 2020-05-06 PROCEDURE — 83550 IRON BINDING TEST: CPT

## 2020-05-06 PROCEDURE — 82140 ASSAY OF AMMONIA: CPT

## 2020-05-06 PROCEDURE — C9113 INJ PANTOPRAZOLE SODIUM, VIA: HCPCS | Performed by: STUDENT IN AN ORGANIZED HEALTH CARE EDUCATION/TRAINING PROGRAM

## 2020-05-06 PROCEDURE — 36415 COLL VENOUS BLD VENIPUNCTURE: CPT

## 2020-05-06 PROCEDURE — 700105 HCHG RX REV CODE 258: Performed by: STUDENT IN AN ORGANIZED HEALTH CARE EDUCATION/TRAINING PROGRAM

## 2020-05-06 PROCEDURE — A9270 NON-COVERED ITEM OR SERVICE: HCPCS | Performed by: STUDENT IN AN ORGANIZED HEALTH CARE EDUCATION/TRAINING PROGRAM

## 2020-05-06 PROCEDURE — 83735 ASSAY OF MAGNESIUM: CPT | Mod: 91

## 2020-05-06 PROCEDURE — 80053 COMPREHEN METABOLIC PANEL: CPT | Mod: 91

## 2020-05-06 PROCEDURE — 83930 ASSAY OF BLOOD OSMOLALITY: CPT

## 2020-05-06 PROCEDURE — 70450 CT HEAD/BRAIN W/O DYE: CPT

## 2020-05-06 PROCEDURE — 82728 ASSAY OF FERRITIN: CPT

## 2020-05-06 PROCEDURE — 700111 HCHG RX REV CODE 636 W/ 250 OVERRIDE (IP): Performed by: STUDENT IN AN ORGANIZED HEALTH CARE EDUCATION/TRAINING PROGRAM

## 2020-05-06 PROCEDURE — 82270 OCCULT BLOOD FECES: CPT

## 2020-05-06 PROCEDURE — 700102 HCHG RX REV CODE 250 W/ 637 OVERRIDE(OP): Performed by: STUDENT IN AN ORGANIZED HEALTH CARE EDUCATION/TRAINING PROGRAM

## 2020-05-06 PROCEDURE — HZ2ZZZZ DETOXIFICATION SERVICES FOR SUBSTANCE ABUSE TREATMENT: ICD-10-PCS | Performed by: INTERNAL MEDICINE

## 2020-05-06 PROCEDURE — 82746 ASSAY OF FOLIC ACID SERUM: CPT

## 2020-05-06 RX ORDER — LORAZEPAM 2 MG/ML
2 INJECTION INTRAMUSCULAR
Status: DISCONTINUED | OUTPATIENT
Start: 2020-05-06 | End: 2020-05-09

## 2020-05-06 RX ORDER — FAMOTIDINE 20 MG/1
20 TABLET, FILM COATED ORAL 2 TIMES DAILY PRN
Status: DISCONTINUED | OUTPATIENT
Start: 2020-05-06 | End: 2020-05-06

## 2020-05-06 RX ORDER — LACTULOSE 20 G/30ML
30 SOLUTION ORAL 3 TIMES DAILY
Status: DISCONTINUED | OUTPATIENT
Start: 2020-05-06 | End: 2020-05-06

## 2020-05-06 RX ORDER — POLYETHYLENE GLYCOL 3350 17 G/17G
1 POWDER, FOR SOLUTION ORAL
Status: DISCONTINUED | OUTPATIENT
Start: 2020-05-06 | End: 2020-05-08

## 2020-05-06 RX ORDER — POTASSIUM CHLORIDE 7.45 MG/ML
10 INJECTION INTRAVENOUS
Status: COMPLETED | OUTPATIENT
Start: 2020-05-06 | End: 2020-05-07

## 2020-05-06 RX ORDER — LORAZEPAM 2 MG/ML
1.5 INJECTION INTRAMUSCULAR
Status: DISCONTINUED | OUTPATIENT
Start: 2020-05-06 | End: 2020-05-09

## 2020-05-06 RX ORDER — SODIUM CHLORIDE, SODIUM LACTATE, POTASSIUM CHLORIDE, CALCIUM CHLORIDE 600; 310; 30; 20 MG/100ML; MG/100ML; MG/100ML; MG/100ML
INJECTION, SOLUTION INTRAVENOUS CONTINUOUS
Status: DISPENSED | OUTPATIENT
Start: 2020-05-06 | End: 2020-05-07

## 2020-05-06 RX ORDER — THIAMINE MONONITRATE (VIT B1) 100 MG
100 TABLET ORAL EVERY EVENING
Status: COMPLETED | OUTPATIENT
Start: 2020-05-06 | End: 2020-05-09

## 2020-05-06 RX ORDER — LORAZEPAM 2 MG/ML
0.5 INJECTION INTRAMUSCULAR EVERY 4 HOURS PRN
Status: DISCONTINUED | OUTPATIENT
Start: 2020-05-06 | End: 2020-05-09

## 2020-05-06 RX ORDER — LORAZEPAM 2 MG/1
2 TABLET ORAL
Status: DISCONTINUED | OUTPATIENT
Start: 2020-05-06 | End: 2020-05-09

## 2020-05-06 RX ORDER — LORAZEPAM 2 MG/ML
1 INJECTION INTRAMUSCULAR
Status: DISCONTINUED | OUTPATIENT
Start: 2020-05-06 | End: 2020-05-09

## 2020-05-06 RX ORDER — LORAZEPAM 2 MG/1
4 TABLET ORAL
Status: DISCONTINUED | OUTPATIENT
Start: 2020-05-06 | End: 2020-05-09

## 2020-05-06 RX ORDER — PANTOPRAZOLE SODIUM 40 MG/10ML
40 INJECTION, POWDER, LYOPHILIZED, FOR SOLUTION INTRAVENOUS DAILY
Status: DISCONTINUED | OUTPATIENT
Start: 2020-05-06 | End: 2020-05-08

## 2020-05-06 RX ORDER — BISACODYL 10 MG
10 SUPPOSITORY, RECTAL RECTAL
Status: DISCONTINUED | OUTPATIENT
Start: 2020-05-06 | End: 2020-05-08

## 2020-05-06 RX ORDER — LEVOTHYROXINE SODIUM 0.05 MG/1
50 TABLET ORAL
Status: DISCONTINUED | OUTPATIENT
Start: 2020-05-07 | End: 2020-05-07

## 2020-05-06 RX ORDER — LORAZEPAM 2 MG/ML
1 INJECTION INTRAMUSCULAR ONCE
Status: COMPLETED | OUTPATIENT
Start: 2020-05-06 | End: 2020-05-06

## 2020-05-06 RX ORDER — LACTULOSE 20 G/30ML
30 SOLUTION ORAL 3 TIMES DAILY
Status: DISCONTINUED | OUTPATIENT
Start: 2020-05-07 | End: 2020-05-09

## 2020-05-06 RX ORDER — LORAZEPAM 1 MG/1
1 TABLET ORAL EVERY 4 HOURS PRN
Status: DISCONTINUED | OUTPATIENT
Start: 2020-05-06 | End: 2020-05-09

## 2020-05-06 RX ORDER — FOLIC ACID 1 MG/1
1 TABLET ORAL EVERY EVENING
Status: COMPLETED | OUTPATIENT
Start: 2020-05-06 | End: 2020-05-09

## 2020-05-06 RX ORDER — LORAZEPAM 1 MG/1
0.5 TABLET ORAL EVERY 4 HOURS PRN
Status: DISCONTINUED | OUTPATIENT
Start: 2020-05-06 | End: 2020-05-09

## 2020-05-06 RX ORDER — AMOXICILLIN 250 MG
2 CAPSULE ORAL 2 TIMES DAILY
Status: DISCONTINUED | OUTPATIENT
Start: 2020-05-07 | End: 2020-05-08

## 2020-05-06 RX ADMIN — FOLIC ACID 1 MG: 1 TABLET ORAL at 20:22

## 2020-05-06 RX ADMIN — POTASSIUM CHLORIDE 10 MEQ: 10 INJECTION, SOLUTION INTRAVENOUS at 20:22

## 2020-05-06 RX ADMIN — LORAZEPAM 1 MG: 2 INJECTION INTRAMUSCULAR; INTRAVENOUS at 17:07

## 2020-05-06 RX ADMIN — POTASSIUM CHLORIDE 10 MEQ: 10 INJECTION, SOLUTION INTRAVENOUS at 22:34

## 2020-05-06 RX ADMIN — SODIUM CHLORIDE, POTASSIUM CHLORIDE, SODIUM LACTATE AND CALCIUM CHLORIDE: 600; 310; 30; 20 INJECTION, SOLUTION INTRAVENOUS at 20:29

## 2020-05-06 RX ADMIN — PANTOPRAZOLE SODIUM 40 MG: 40 INJECTION, POWDER, LYOPHILIZED, FOR SOLUTION INTRAVENOUS at 22:43

## 2020-05-06 RX ADMIN — Medication 100 MG: at 20:22

## 2020-05-06 RX ADMIN — LORAZEPAM 2 MG: 2 TABLET ORAL at 20:50

## 2020-05-06 RX ADMIN — LORAZEPAM 2 MG: 2 TABLET ORAL at 22:15

## 2020-05-06 RX ADMIN — THERA TABS 1 TABLET: TAB at 20:22

## 2020-05-06 RX ADMIN — POTASSIUM CHLORIDE 10 MEQ: 10 INJECTION, SOLUTION INTRAVENOUS at 21:37

## 2020-05-06 ASSESSMENT — LIFESTYLE VARIABLES
TREMOR: *
AUDITORY DISTURBANCES: NOT PRESENT
VISUAL DISTURBANCES: NOT PRESENT
AUDITORY DISTURBANCES: NOT PRESENT
HOW MANY TIMES IN THE PAST YEAR HAVE YOU HAD 5 OR MORE DRINKS IN A DAY: 30
NAUSEA AND VOMITING: MILD NAUSEA WITH NO VOMITING
AGITATION: NORMAL ACTIVITY
NAUSEA AND VOMITING: NO NAUSEA AND NO VOMITING
ANXIETY: *
EVER FELT BAD OR GUILTY ABOUT YOUR DRINKING: YES
HAVE YOU EVER FELT YOU SHOULD CUT DOWN ON YOUR DRINKING: NO
HEADACHE, FULLNESS IN HEAD: MODERATE
HEADACHE, FULLNESS IN HEAD: SEVERE
TOTAL SCORE: 13
VISUAL DISTURBANCES: NOT PRESENT
ORIENTATION AND CLOUDING OF SENSORIUM: DATE DISORIENTATION BY NO MORE THAN TWO CALENDAR DAYS
TREMOR: TREMOR NOT VISIBLE BUT CAN BE FELT, FINGERTIP TO FINGERTIP
ANXIETY: MILDLY ANXIOUS
ANXIETY: *
TOTAL SCORE: 3
ORIENTATION AND CLOUDING OF SENSORIUM: ORIENTED AND CAN DO SERIAL ADDITIONS
HEADACHE, FULLNESS IN HEAD: VERY MILD
TREMOR: *
EVER_SMOKED: YES
AVERAGE NUMBER OF DAYS PER WEEK YOU HAVE A DRINK CONTAINING ALCOHOL: 4
ON A TYPICAL DAY WHEN YOU DRINK ALCOHOL HOW MANY DRINKS DO YOU HAVE: 4
ALCOHOL_USE: YES
EVER HAD A DRINK FIRST THING IN THE MORNING TO STEADY YOUR NERVES TO GET RID OF A HANGOVER: YES
ORIENTATION AND CLOUDING OF SENSORIUM: ORIENTED AND CAN DO SERIAL ADDITIONS
PAROXYSMAL SWEATS: NO SWEAT VISIBLE
CONSUMPTION TOTAL: POSITIVE
VISUAL DISTURBANCES: NOT PRESENT
PAROXYSMAL SWEATS: NO SWEAT VISIBLE
DOES PATIENT WANT TO TALK TO SOMEONE ABOUT QUITTING: YES
AGITATION: NORMAL ACTIVITY
TOTAL SCORE: 3
PAROXYSMAL SWEATS: NO SWEAT VISIBLE
TOTAL SCORE: 3
DOES PATIENT WANT TO STOP DRINKING: NO
AGITATION: SOMEWHAT MORE THAN NORMAL ACTIVITY
SUBSTANCE_ABUSE: 1
HAVE PEOPLE ANNOYED YOU BY CRITICIZING YOUR DRINKING: YES
NAUSEA AND VOMITING: MILD NAUSEA WITH NO VOMITING
TOTAL SCORE: 11
TOTAL SCORE: 3
AUDITORY DISTURBANCES: NOT PRESENT

## 2020-05-06 ASSESSMENT — ENCOUNTER SYMPTOMS
ABDOMINAL PAIN: 1
EYE REDNESS: 0
FOCAL WEAKNESS: 0
SHORTNESS OF BREATH: 0
CONSTIPATION: 1
COUGH: 0
NAUSEA: 1
SINUS PAIN: 0
TINGLING: 0
PALPITATIONS: 0
BACK PAIN: 0
BRUISES/BLEEDS EASILY: 1
CHILLS: 0
DIARRHEA: 0
FEVER: 1
NECK PAIN: 1
HEARTBURN: 1
BLURRED VISION: 1
FALLS: 1
LOSS OF CONSCIOUSNESS: 0
SENSORY CHANGE: 0
DOUBLE VISION: 0
VOMITING: 1
HEADACHES: 1
WHEEZING: 0
WEAKNESS: 0
EYE PAIN: 0
TREMORS: 1
EYE DISCHARGE: 0
BLOOD IN STOOL: 0
MYALGIAS: 0
SEIZURES: 0
DIZZINESS: 0
SPEECH CHANGE: 0
NERVOUS/ANXIOUS: 1

## 2020-05-06 ASSESSMENT — PATIENT HEALTH QUESTIONNAIRE - PHQ9
2. FEELING DOWN, DEPRESSED, IRRITABLE, OR HOPELESS: NOT AT ALL
SUM OF ALL RESPONSES TO PHQ9 QUESTIONS 1 AND 2: 0
1. LITTLE INTEREST OR PLEASURE IN DOING THINGS: NOT AT ALL

## 2020-05-06 ASSESSMENT — FIBROSIS 4 INDEX
FIB4 SCORE: 27.74
FIB4 SCORE: 5.99

## 2020-05-06 NOTE — ED NOTES
Patient voiding w/out difficulty via bedpan, agitated, pulled off all monitoring equipment, patient reoriented to situation, updated on POC, PIV established, blood drawn and sent to lab, CT called and notified patient ready for CT.
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
## 2020-05-06 NOTE — ED NOTES
Med rec updated and complete. Allergies reviewed. VIA phone interview with pts home pharmacy . Unable to reach pt at this time. Unable to verify last doses taken. Pt has not   Filled the rifaximin in > 6 months.      Listed home pharmacy [facility].

## 2020-05-07 ENCOUNTER — APPOINTMENT (OUTPATIENT)
Dept: RADIOLOGY | Facility: MEDICAL CENTER | Age: 55
DRG: 896 | End: 2020-05-07
Attending: STUDENT IN AN ORGANIZED HEALTH CARE EDUCATION/TRAINING PROGRAM
Payer: MEDICAID

## 2020-05-07 LAB
ALBUMIN SERPL BCP-MCNC: 2.5 G/DL (ref 3.2–4.9)
ALBUMIN/GLOB SERPL: 0.9 G/DL
ALP SERPL-CCNC: 234 U/L (ref 30–99)
ALT SERPL-CCNC: 64 U/L (ref 2–50)
AMPHET UR QL SCN: NEGATIVE
ANION GAP SERPL CALC-SCNC: 14 MMOL/L (ref 7–16)
APPEARANCE UR: CLEAR
AST SERPL-CCNC: 270 U/L (ref 12–45)
BACTERIA #/AREA URNS HPF: ABNORMAL /HPF
BARBITURATES UR QL SCN: NEGATIVE
BASOPHILS # BLD AUTO: 0.6 % (ref 0–1.8)
BASOPHILS # BLD: 0.03 K/UL (ref 0–0.12)
BENZODIAZ UR QL SCN: NEGATIVE
BILIRUB SERPL-MCNC: 2.2 MG/DL (ref 0.1–1.5)
BILIRUB UR QL STRIP.AUTO: ABNORMAL
BUN SERPL-MCNC: 8 MG/DL (ref 8–22)
BZE UR QL SCN: NEGATIVE
CALCIUM SERPL-MCNC: 7 MG/DL (ref 8.5–10.5)
CANNABINOIDS UR QL SCN: NEGATIVE
CHLORIDE SERPL-SCNC: 104 MMOL/L (ref 96–112)
CO2 SERPL-SCNC: 21 MMOL/L (ref 20–33)
COLOR UR: YELLOW
CREAT SERPL-MCNC: 0.52 MG/DL (ref 0.5–1.4)
EOSINOPHIL # BLD AUTO: 0.04 K/UL (ref 0–0.51)
EOSINOPHIL NFR BLD: 0.8 % (ref 0–6.9)
EPI CELLS #/AREA URNS HPF: ABNORMAL /HPF
ERYTHROCYTE [DISTWIDTH] IN BLOOD BY AUTOMATED COUNT: 50.3 FL (ref 35.9–50)
GLOBULIN SER CALC-MCNC: 2.7 G/DL (ref 1.9–3.5)
GLUCOSE BLD-MCNC: 56 MG/DL (ref 65–99)
GLUCOSE BLD-MCNC: 73 MG/DL (ref 65–99)
GLUCOSE BLD-MCNC: 84 MG/DL (ref 65–99)
GLUCOSE SERPL-MCNC: 54 MG/DL (ref 65–99)
GLUCOSE UR STRIP.AUTO-MCNC: NEGATIVE MG/DL
HCT VFR BLD AUTO: 23.2 % (ref 37–47)
HCT VFR BLD AUTO: 24.2 % (ref 37–47)
HCT VFR BLD AUTO: 30.4 % (ref 37–47)
HEMOCCULT SP1 STL QL: POSITIVE
HGB BLD-MCNC: 7.5 G/DL (ref 12–16)
HGB BLD-MCNC: 7.8 G/DL (ref 12–16)
HGB BLD-MCNC: 9.9 G/DL (ref 12–16)
IMM GRANULOCYTES # BLD AUTO: 0.03 K/UL (ref 0–0.11)
IMM GRANULOCYTES NFR BLD AUTO: 0.6 % (ref 0–0.9)
KETONES UR STRIP.AUTO-MCNC: 15 MG/DL
LEUKOCYTE ESTERASE UR QL STRIP.AUTO: ABNORMAL
LYMPHOCYTES # BLD AUTO: 1.58 K/UL (ref 1–4.8)
LYMPHOCYTES NFR BLD: 30.7 % (ref 22–41)
MAGNESIUM SERPL-MCNC: 1.9 MG/DL (ref 1.5–2.5)
MCH RBC QN AUTO: 31.3 PG (ref 27–33)
MCHC RBC AUTO-ENTMCNC: 32.3 G/DL (ref 33.6–35)
MCV RBC AUTO: 96.7 FL (ref 81.4–97.8)
METHADONE UR QL SCN: NEGATIVE
MICRO URNS: ABNORMAL
MONOCYTES # BLD AUTO: 0.31 K/UL (ref 0–0.85)
MONOCYTES NFR BLD AUTO: 6 % (ref 0–13.4)
NEUTROPHILS # BLD AUTO: 3.16 K/UL (ref 2–7.15)
NEUTROPHILS NFR BLD: 61.3 % (ref 44–72)
NITRITE UR QL STRIP.AUTO: NEGATIVE
NRBC # BLD AUTO: 0 K/UL
NRBC BLD-RTO: 0 /100 WBC
OPIATES UR QL SCN: NEGATIVE
OXYCODONE UR QL SCN: NEGATIVE
PCP UR QL SCN: NEGATIVE
PH UR STRIP.AUTO: 6 [PH] (ref 5–8)
PHOSPHATE SERPL-MCNC: 2.1 MG/DL (ref 2.5–4.5)
PLATELET # BLD AUTO: 52 K/UL (ref 164–446)
PMV BLD AUTO: 11.9 FL (ref 9–12.9)
POTASSIUM SERPL-SCNC: 3.9 MMOL/L (ref 3.6–5.5)
PROPOXYPH UR QL SCN: NEGATIVE
PROT SERPL-MCNC: 5.2 G/DL (ref 6–8.2)
PROT UR QL STRIP: 30 MG/DL
RBC # BLD AUTO: 2.4 M/UL (ref 4.2–5.4)
RBC # URNS HPF: ABNORMAL /HPF
RBC UR QL AUTO: ABNORMAL
SODIUM SERPL-SCNC: 139 MMOL/L (ref 135–145)
SP GR UR STRIP.AUTO: 1.02
UROBILINOGEN UR STRIP.AUTO-MCNC: 4 MG/DL
WBC # BLD AUTO: 5.2 K/UL (ref 4.8–10.8)
WBC #/AREA URNS HPF: ABNORMAL /HPF

## 2020-05-07 PROCEDURE — 82962 GLUCOSE BLOOD TEST: CPT | Mod: 91

## 2020-05-07 PROCEDURE — 770020 HCHG ROOM/CARE - TELE (206)

## 2020-05-07 PROCEDURE — 700102 HCHG RX REV CODE 250 W/ 637 OVERRIDE(OP): Performed by: STUDENT IN AN ORGANIZED HEALTH CARE EDUCATION/TRAINING PROGRAM

## 2020-05-07 PROCEDURE — C9113 INJ PANTOPRAZOLE SODIUM, VIA: HCPCS | Performed by: STUDENT IN AN ORGANIZED HEALTH CARE EDUCATION/TRAINING PROGRAM

## 2020-05-07 PROCEDURE — 81001 URINALYSIS AUTO W/SCOPE: CPT

## 2020-05-07 PROCEDURE — 80307 DRUG TEST PRSMV CHEM ANLYZR: CPT

## 2020-05-07 PROCEDURE — 36415 COLL VENOUS BLD VENIPUNCTURE: CPT

## 2020-05-07 PROCEDURE — 97166 OT EVAL MOD COMPLEX 45 MIN: CPT

## 2020-05-07 PROCEDURE — 99232 SBSQ HOSP IP/OBS MODERATE 35: CPT | Mod: GC | Performed by: INTERNAL MEDICINE

## 2020-05-07 PROCEDURE — 700111 HCHG RX REV CODE 636 W/ 250 OVERRIDE (IP): Performed by: STUDENT IN AN ORGANIZED HEALTH CARE EDUCATION/TRAINING PROGRAM

## 2020-05-07 PROCEDURE — A9270 NON-COVERED ITEM OR SERVICE: HCPCS | Performed by: STUDENT IN AN ORGANIZED HEALTH CARE EDUCATION/TRAINING PROGRAM

## 2020-05-07 PROCEDURE — 71045 X-RAY EXAM CHEST 1 VIEW: CPT

## 2020-05-07 PROCEDURE — 74018 RADEX ABDOMEN 1 VIEW: CPT

## 2020-05-07 PROCEDURE — 700105 HCHG RX REV CODE 258: Performed by: STUDENT IN AN ORGANIZED HEALTH CARE EDUCATION/TRAINING PROGRAM

## 2020-05-07 PROCEDURE — 85014 HEMATOCRIT: CPT | Mod: 91

## 2020-05-07 PROCEDURE — 94760 N-INVAS EAR/PLS OXIMETRY 1: CPT

## 2020-05-07 PROCEDURE — 87086 URINE CULTURE/COLONY COUNT: CPT

## 2020-05-07 PROCEDURE — 87186 SC STD MICRODIL/AGAR DIL: CPT | Mod: 91

## 2020-05-07 PROCEDURE — 85018 HEMOGLOBIN: CPT

## 2020-05-07 PROCEDURE — 87077 CULTURE AEROBIC IDENTIFY: CPT

## 2020-05-07 RX ORDER — SODIUM CHLORIDE 9 MG/ML
INJECTION, SOLUTION INTRAVENOUS CONTINUOUS
Status: DISCONTINUED | OUTPATIENT
Start: 2020-05-07 | End: 2020-05-08

## 2020-05-07 RX ORDER — DEXTROSE MONOHYDRATE 25 G/50ML
50 INJECTION, SOLUTION INTRAVENOUS
Status: DISCONTINUED | OUTPATIENT
Start: 2020-05-07 | End: 2020-05-13 | Stop reason: HOSPADM

## 2020-05-07 RX ORDER — LORAZEPAM 1 MG/1
1 TABLET ORAL
Status: DISCONTINUED | OUTPATIENT
Start: 2020-05-07 | End: 2020-05-09

## 2020-05-07 RX ORDER — POTASSIUM CHLORIDE 20 MEQ/1
20 TABLET, EXTENDED RELEASE ORAL ONCE
Status: COMPLETED | OUTPATIENT
Start: 2020-05-07 | End: 2020-05-07

## 2020-05-07 RX ORDER — LEVOTHYROXINE SODIUM 0.07 MG/1
75 TABLET ORAL
Status: DISCONTINUED | OUTPATIENT
Start: 2020-05-07 | End: 2020-05-13 | Stop reason: HOSPADM

## 2020-05-07 RX ADMIN — LORAZEPAM 1.5 MG: 2 INJECTION INTRAMUSCULAR; INTRAVENOUS at 09:50

## 2020-05-07 RX ADMIN — LORAZEPAM 1.5 MG: 2 INJECTION INTRAMUSCULAR; INTRAVENOUS at 19:12

## 2020-05-07 RX ADMIN — THERA TABS 1 TABLET: TAB at 17:18

## 2020-05-07 RX ADMIN — SODIUM CHLORIDE, POTASSIUM CHLORIDE, SODIUM LACTATE AND CALCIUM CHLORIDE: 600; 310; 30; 20 INJECTION, SOLUTION INTRAVENOUS at 03:21

## 2020-05-07 RX ADMIN — LORAZEPAM 1.5 MG: 2 INJECTION INTRAMUSCULAR; INTRAVENOUS at 13:33

## 2020-05-07 RX ADMIN — POTASSIUM CHLORIDE 20 MEQ: 1500 TABLET, EXTENDED RELEASE ORAL at 11:21

## 2020-05-07 RX ADMIN — LORAZEPAM 1 MG: 1 TABLET ORAL at 05:39

## 2020-05-07 RX ADMIN — LORAZEPAM 2 MG: 2 TABLET ORAL at 15:36

## 2020-05-07 RX ADMIN — LACTULOSE 30 ML: 20 SOLUTION ORAL at 17:30

## 2020-05-07 RX ADMIN — LORAZEPAM 3 MG: 2 TABLET ORAL at 12:18

## 2020-05-07 RX ADMIN — LACTULOSE 30 ML: 20 SOLUTION ORAL at 05:39

## 2020-05-07 RX ADMIN — LACTULOSE 30 ML: 20 SOLUTION ORAL at 12:18

## 2020-05-07 RX ADMIN — Medication 100 MG: at 17:18

## 2020-05-07 RX ADMIN — POTASSIUM CHLORIDE 10 MEQ: 10 INJECTION, SOLUTION INTRAVENOUS at 00:14

## 2020-05-07 RX ADMIN — LORAZEPAM 1.5 MG: 2 INJECTION INTRAMUSCULAR; INTRAVENOUS at 11:21

## 2020-05-07 RX ADMIN — LEVOTHYROXINE SODIUM 75 MCG: 75 TABLET ORAL at 05:40

## 2020-05-07 RX ADMIN — FOLIC ACID 1 MG: 1 TABLET ORAL at 17:19

## 2020-05-07 RX ADMIN — SODIUM CHLORIDE: 9 INJECTION, SOLUTION INTRAVENOUS at 19:00

## 2020-05-07 RX ADMIN — PANTOPRAZOLE SODIUM 40 MG: 40 INJECTION, POWDER, LYOPHILIZED, FOR SOLUTION INTRAVENOUS at 17:24

## 2020-05-07 RX ADMIN — LORAZEPAM 1 MG: 2 INJECTION INTRAMUSCULAR; INTRAVENOUS at 17:18

## 2020-05-07 RX ADMIN — LORAZEPAM 1 MG: 2 INJECTION INTRAMUSCULAR; INTRAVENOUS at 08:14

## 2020-05-07 RX ADMIN — LORAZEPAM 1.5 MG: 2 INJECTION INTRAMUSCULAR; INTRAVENOUS at 14:35

## 2020-05-07 ASSESSMENT — LIFESTYLE VARIABLES
ANXIETY: *
TREMOR: TREMOR NOT VISIBLE BUT CAN BE FELT, FINGERTIP TO FINGERTIP
NAUSEA AND VOMITING: INTERMITTENT NAUSEA WITH DRY HEAVES
AGITATION: *
ANXIETY: *
NAUSEA AND VOMITING: *
NAUSEA AND VOMITING: MILD NAUSEA WITH NO VOMITING
AUDITORY DISTURBANCES: NOT PRESENT
ORIENTATION AND CLOUDING OF SENSORIUM: ORIENTED AND CAN DO SERIAL ADDITIONS
AGITATION: *
ANXIETY: *
AGITATION: SOMEWHAT MORE THAN NORMAL ACTIVITY
HEADACHE, FULLNESS IN HEAD: MILD
ANXIETY: *
VISUAL DISTURBANCES: MILD SENSITIVITY
ANXIETY: *
ORIENTATION AND CLOUDING OF SENSORIUM: ORIENTED AND CAN DO SERIAL ADDITIONS
ORIENTATION AND CLOUDING OF SENSORIUM: ORIENTED AND CAN DO SERIAL ADDITIONS
VISUAL DISTURBANCES: MILD SENSITIVITY
HEADACHE, FULLNESS IN HEAD: MODERATE
TREMOR: MODERATE TREMOR WITH ARMS EXTENDED
VISUAL DISTURBANCES: NOT PRESENT
ANXIETY: *
ORIENTATION AND CLOUDING OF SENSORIUM: ORIENTED AND CAN DO SERIAL ADDITIONS
AUDITORY DISTURBANCES: VERY MILD HARSHNESS OR ABILITY TO FRIGHTEN
AUDITORY DISTURBANCES: VERY MILD HARSHNESS OR ABILITY TO FRIGHTEN
AGITATION: SOMEWHAT MORE THAN NORMAL ACTIVITY
AUDITORY DISTURBANCES: VERY MILD HARSHNESS OR ABILITY TO FRIGHTEN
TOTAL SCORE: 1
ORIENTATION AND CLOUDING OF SENSORIUM: ORIENTED AND CAN DO SERIAL ADDITIONS
AUDITORY DISTURBANCES: VERY MILD HARSHNESS OR ABILITY TO FRIGHTEN
PAROXYSMAL SWEATS: *
NAUSEA AND VOMITING: MILD NAUSEA WITH NO VOMITING
HEADACHE, FULLNESS IN HEAD: MILD
PAROXYSMAL SWEATS: BARELY PERCEPTIBLE SWEATING. PALMS MOIST
ANXIETY: NO ANXIETY (AT EASE)
SUBSTANCE_ABUSE: 1
TREMOR: *
TOTAL SCORE: 18
AGITATION: NORMAL ACTIVITY
HEADACHE, FULLNESS IN HEAD: MODERATE
TREMOR: TREMOR NOT VISIBLE BUT CAN BE FELT, FINGERTIP TO FINGERTIP
ANXIETY: MODERATELY ANXIOUS OR GUARDED, SO ANXIETY IS INFERRED
NAUSEA AND VOMITING: INTERMITTENT NAUSEA WITH DRY HEAVES
VISUAL DISTURBANCES: NOT PRESENT
TOTAL SCORE: 11
PAROXYSMAL SWEATS: NO SWEAT VISIBLE
PAROXYSMAL SWEATS: *
ANXIETY: MODERATELY ANXIOUS OR GUARDED, SO ANXIETY IS INFERRED
HEADACHE, FULLNESS IN HEAD: MILD
TOTAL SCORE: VERY MILD ITCHING, PINS AND NEEDLES SENSATION, BURNING OR NUMBNESS
VISUAL DISTURBANCES: NOT PRESENT
AUDITORY DISTURBANCES: NOT PRESENT
AGITATION: *
TREMOR: *
TOTAL SCORE: VERY MILD ITCHING, PINS AND NEEDLES SENSATION, BURNING OR NUMBNESS
PAROXYSMAL SWEATS: *
TOTAL SCORE: 16
TOTAL SCORE: VERY MILD ITCHING, PINS AND NEEDLES SENSATION, BURNING OR NUMBNESS
AUDITORY DISTURBANCES: VERY MILD HARSHNESS OR ABILITY TO FRIGHTEN
PAROXYSMAL SWEATS: BARELY PERCEPTIBLE SWEATING. PALMS MOIST
HEADACHE, FULLNESS IN HEAD: VERY MILD
ANXIETY: *
ANXIETY: *
AGITATION: MODERATELY FIDGETY AND RESTLESS
NAUSEA AND VOMITING: MILD NAUSEA WITH NO VOMITING
TOTAL SCORE: 14
TOTAL SCORE: 17
PAROXYSMAL SWEATS: BARELY PERCEPTIBLE SWEATING. PALMS MOIST
PAROXYSMAL SWEATS: *
PAROXYSMAL SWEATS: NO SWEAT VISIBLE
ORIENTATION AND CLOUDING OF SENSORIUM: ORIENTED AND CAN DO SERIAL ADDITIONS
VISUAL DISTURBANCES: VERY MILD SENSITIVITY
TREMOR: *
AUDITORY DISTURBANCES: VERY MILD HARSHNESS OR ABILITY TO FRIGHTEN
ORIENTATION AND CLOUDING OF SENSORIUM: ORIENTED AND CAN DO SERIAL ADDITIONS
PAROXYSMAL SWEATS: NO SWEAT VISIBLE
TOTAL SCORE: VERY MILD ITCHING, PINS AND NEEDLES SENSATION, BURNING OR NUMBNESS
NAUSEA AND VOMITING: MILD NAUSEA WITH NO VOMITING
ORIENTATION AND CLOUDING OF SENSORIUM: ORIENTED AND CAN DO SERIAL ADDITIONS
AUDITORY DISTURBANCES: NOT PRESENT
HEADACHE, FULLNESS IN HEAD: MILD
HEADACHE, FULLNESS IN HEAD: MILD
AGITATION: NORMAL ACTIVITY
AUDITORY DISTURBANCES: VERY MILD HARSHNESS OR ABILITY TO FRIGHTEN
PAROXYSMAL SWEATS: *
TOTAL SCORE: 10
ORIENTATION AND CLOUDING OF SENSORIUM: ORIENTED AND CAN DO SERIAL ADDITIONS
TREMOR: MODERATE TREMOR WITH ARMS EXTENDED
TOTAL SCORE: 12
PAROXYSMAL SWEATS: *
AGITATION: NORMAL ACTIVITY
VISUAL DISTURBANCES: NOT PRESENT
NAUSEA AND VOMITING: *
TREMOR: MODERATE TREMOR WITH ARMS EXTENDED
TOTAL SCORE: 24
TREMOR: *
ORIENTATION AND CLOUDING OF SENSORIUM: ORIENTED AND CAN DO SERIAL ADDITIONS
HEADACHE, FULLNESS IN HEAD: MILD
VISUAL DISTURBANCES: NOT PRESENT
HEADACHE, FULLNESS IN HEAD: MILD
TOTAL SCORE: 4
AUDITORY DISTURBANCES: VERY MILD HARSHNESS OR ABILITY TO FRIGHTEN
VISUAL DISTURBANCES: NOT PRESENT
NAUSEA AND VOMITING: NO NAUSEA AND NO VOMITING
VISUAL DISTURBANCES: NOT PRESENT
ORIENTATION AND CLOUDING OF SENSORIUM: ORIENTED AND CAN DO SERIAL ADDITIONS
TREMOR: MODERATE TREMOR WITH ARMS EXTENDED
AGITATION: *
NAUSEA AND VOMITING: MILD NAUSEA WITH NO VOMITING
ORIENTATION AND CLOUDING OF SENSORIUM: ORIENTED AND CAN DO SERIAL ADDITIONS
TREMOR: MODERATE TREMOR WITH ARMS EXTENDED
TOTAL SCORE: 20
VISUAL DISTURBANCES: NOT PRESENT
TREMOR: MODERATE TREMOR WITH ARMS EXTENDED
NAUSEA AND VOMITING: MILD NAUSEA WITH NO VOMITING
TOTAL SCORE: VERY MILD ITCHING, PINS AND NEEDLES SENSATION, BURNING OR NUMBNESS
VISUAL DISTURBANCES: NOT PRESENT
ANXIETY: MILDLY ANXIOUS
AUDITORY DISTURBANCES: VERY MILD HARSHNESS OR ABILITY TO FRIGHTEN
NAUSEA AND VOMITING: MILD NAUSEA WITH NO VOMITING
HEADACHE, FULLNESS IN HEAD: NOT PRESENT
TOTAL SCORE: 20
HEADACHE, FULLNESS IN HEAD: MODERATE
AGITATION: MODERATELY FIDGETY AND RESTLESS
AGITATION: NORMAL ACTIVITY
TOTAL SCORE: VERY MILD ITCHING, PINS AND NEEDLES SENSATION, BURNING OR NUMBNESS

## 2020-05-07 ASSESSMENT — COGNITIVE AND FUNCTIONAL STATUS - GENERAL
HELP NEEDED FOR BATHING: A LITTLE
SUGGESTED CMS G CODE MODIFIER DAILY ACTIVITY: CK
DAILY ACTIVITIY SCORE: 24
SUGGESTED CMS G CODE MODIFIER MOBILITY: CJ
DAILY ACTIVITIY SCORE: 19
CLIMB 3 TO 5 STEPS WITH RAILING: A LITTLE
WALKING IN HOSPITAL ROOM: A LITTLE
PERSONAL GROOMING: A LITTLE
SUGGESTED CMS G CODE MODIFIER DAILY ACTIVITY: CH
MOBILITY SCORE: 22
TOILETING: A LITTLE
DRESSING REGULAR LOWER BODY CLOTHING: A LITTLE
EATING MEALS: A LITTLE

## 2020-05-07 ASSESSMENT — ENCOUNTER SYMPTOMS
SHORTNESS OF BREATH: 0
VOMITING: 0
NAUSEA: 1
TREMORS: 1
SINUS PAIN: 1
FEVER: 1
COUGH: 0
SPUTUM PRODUCTION: 0
CHILLS: 0
HEADACHES: 0
FALLS: 1
PALPITATIONS: 0
SEIZURES: 0
BACK PAIN: 1
BLURRED VISION: 1

## 2020-05-07 ASSESSMENT — ACTIVITIES OF DAILY LIVING (ADL): TOILETING: INDEPENDENT

## 2020-05-07 NOTE — ASSESSMENT & PLAN NOTE
Last TSH was 31.200 on 1/14/20. - repeat 54k  non compliant with synthroid for the past 2 weeks   -Increased synthroid to 75.

## 2020-05-07 NOTE — DISCHARGE PLANNING
Patient is eligible for Medicaid Meds to Beds at discharge if they have coverage with [place] [employer], Medicaid FFS, Medicaid HMO ([facility]), or [place]. This service is provided through the [facility] if orders are received prior to 4 p.m. Monday through Friday, excluding holidays. Please call [phone] prior to discharge.
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
## 2020-05-07 NOTE — ASSESSMENT & PLAN NOTE
Pt has difficulty with walking and states that she has fallen. Last fall about a week ago. Says she hit her head at some point and has a posterior right sided headache and blurry vision. Patient is lethargic.  No dysarthria, dysphagia, paraparesis or quadriparesis, behavioral disturbances, seizures,  No locked in syndrome.  CT head w/o negative for acute intracranial hemorrhage, but there is concern for CPM. She has had hyponatremia in the past  MRI scan showed central pontine myelinolysis possibly 2/2 alcoholism.    Plan:  Neurology consult - following rec's - no treatment indicated at this time  Alcohol cessation.  PT/OT eval - need home health.  Physiatry - patient is not participating inpatient physiatry.  Alcohol cessation. At this time, no proven treatment due to chronicity of the condition.
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
## 2020-05-07 NOTE — ASSESSMENT & PLAN NOTE
Blood loss + alcoholism + malnutrition  Previous EGD shows ulcerative esophagitis, no varices was mentioned.  Hgb 8.4 on admission - CBC daily, monitor vitals closely - transfuse prn  B12, folate wnl.  TSH in 50s, very high 2/2 non-adherence. Synthroid increased from 50 mcg to 75 mcg;  Ferritin 61; iron is low at 31 and %sat at low, FOBT+  GI consult - EGD 5/10/2020: Candida esophagitis, LA grade D esophagitis, diffuse gastritis.  PPI  Sucralfate  IVF  Iron replacement.  Celiac panel - pending.

## 2020-05-07 NOTE — PROGRESS NOTES
Bedside report received. Patient A&O x4. Complains of pain 3/10 (headache) medicated per MAR. CIWA of 11. POC discussed with patient. Patient verbalized understanding. Call light and belongings within reach. Bed locked and in lowest position, alarm and fall precautions in place.

## 2020-05-07 NOTE — RESPIRATORY CARE
Oxygen Rounds      Patient found on    O2 L/m:  2    Oxygen device:  Nasal Cannula   Spo2: 96%        Respiratory device skin site inspection completed.

## 2020-05-07 NOTE — CARE PLAN
Problem: Communication  Goal: The ability to communicate needs accurately and effectively will improve  Outcome: PROGRESSING AS EXPECTED   Pt A&Ox4; notifies staff of needs appropriately.    Problem: Safety  Goal: Will remain free from falls  Outcome: PROGRESSING AS EXPECTED   Pt high fall risk; bed alarm in use; pt calls prior to ambulating.    Problem: Psychosocial Needs:  Goal: Level of anxiety will decrease  Outcome: PROGRESSING AS EXPECTED   Pt experiencing anxiety, CIWA protocol in place.

## 2020-05-07 NOTE — ASSESSMENT & PLAN NOTE
Improving  , ALT 74, >2 ratio, Alk 264 on admission, improving but still elevated  Last RUQ U/S in January shows fatty infiltration vs fibrosis.  Supportive care. IVF and trend LFT's   Maddrey score <32, no steroids indicated.  ASMA/SHAHLA -  IGG -   Anti mitochondrial -  HepB and C - negative.
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
## 2020-05-07 NOTE — ASSESSMENT & PLAN NOTE
Resolved.  No alcoholic seizures in the past. Feels like she is going through a detox,   last drink was AM on admission, drinking 1/5 pint vodka /d.   Has tremor, asterixis in ED.  ETOh level 241.1 on admission  EKG sinus tach in the ED, no arrhythmia no Afib.     Plan:  Admit to neurology with telemetry monitoring  CT head - ?central pontine myelinolysis - neurology following - MRI central pontine myelinolysis.  Aspiration/Seiaure/Fall precautions   MV/thiamine/folate   Ativan per CIWA - discontinue 5/9  IVF  Advance diet  Monitor and replete electrolytes prn   Repeat TSH/B12/Folate/Ammonia level - wnl.

## 2020-05-07 NOTE — PROGRESS NOTES
2 RN Skin Check    2 RN skin check complete José Miguel RN  Devices in place: Nasal Cannula.  Skin assessed under devices: yes.  Confirmed pressure ulcers found on: none.  New potential pressure ulcers noted on none.  Wound consult placed No.  The following interventions in place Pillows, grey foam padding.    Bilat ears red/blanching. Bilat elbows intact. Sacrum intact. Bilat heels calloused/intact. Generalized bruising. Abrasion R proximal forearm. Healing wound on top of head with some periwound swelling. Photos taken and in Epic.

## 2020-05-07 NOTE — ASSESSMENT & PLAN NOTE
Long history of alcoholism.  MELD 13 6% 3mo mortality   Last RUQ U/S in January shows fatty infiltration vs fibrosis.  Child class B   -Alcohol cessation counseling provided. Discussed about complications of alcoholism.

## 2020-05-07 NOTE — CONSULTS
Brief Neurology Note    The patient's chart has been reviewed. Case discussed with Dr. Barakat.    CT-HEAD W/O   Final Result      1.  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   2.  Mild diffuse atrophy greater than expected for age.   3.  No acute intracranial hemorrhage.      These findings were discussed with Liam Barakat on 5/6/2020 4:41 PM.            MR-BRAIN-W/O    (Results Pending)     Neurological inquiry as it pertains to how to address CT findings.  Patient to be admitted given instability and deconditioning of clinical status.  MRI brain will better characterize suspected central pontine myelinolysis as well as add information pertaining to chronicity.  This entity is managed conservatively.  Recommend CIWA protocol, toxic metabolic workup, and physiatry consultation.  Mainstay of management is medical without much opportunity for neurological intervention as it pertains to suspected CPM.    Please place formal neurology consult if you have further questions or there is a change in status.    Brooks yDer MD  Director, [facility], [facility]  Neurohospitalist, [facility]  Clinical  of Neurology, [employer]  t) [phone] (f) [phone]

## 2020-05-07 NOTE — PROGRESS NOTES
Retrieved pt from ED. Pt placed on Zoll monitor. Report received from Ni. Pt transported to tele unit, pt placed on tele monitor, monitor room notified, SR 98. Pt A&Ox4, tangential. Pt weighed, VS completed. POC discussed, all questions answered. Pt has no complaints at this time. Bed locked in lowest position, call light in reach with return demonstration on use, fall precautions in place.

## 2020-05-07 NOTE — ASSESSMENT & PLAN NOTE
+FOBT - suspected UGI bleed - although BUN/cre 8/0.5 - EGD - Has hiatal hernia and esophagitis  PPI  No coffee or chocolate.  Alcohol cessation.  Head elevation during meals.

## 2020-05-07 NOTE — PROGRESS NOTES
" Luis A JUDGE came to bedside. Pt verbalized consent to search purse. Found unidentified pills at bottom of back and a container labeled \"folic acid\". Found one empty vodka container and one full vodka container. Witnessed Luis A JUDGE dispose of vodka in sink. Will continue to monitor. Sending meds to pharmacy to be locked up.   "

## 2020-05-07 NOTE — ASSESSMENT & PLAN NOTE
Likely secondary to chronic liver disease from alcohol   Monitor closely with cbc   SCDs for DVT prophylaxis

## 2020-05-07 NOTE — ED NOTES
"Ativan given per MAR, patient reports \"feeling better\", updated on POC, awaiting Admitting MD.    "

## 2020-05-08 ENCOUNTER — APPOINTMENT (OUTPATIENT)
Dept: RADIOLOGY | Facility: MEDICAL CENTER | Age: 55
DRG: 896 | End: 2020-05-08
Attending: PSYCHIATRY & NEUROLOGY
Payer: MEDICAID

## 2020-05-08 PROBLEM — F10.229 ALCOHOL INTOXICATION IN ACTIVE ALCOHOLIC WITH COMPLICATION (HCC): Status: ACTIVE | Noted: 2020-05-06

## 2020-05-08 PROBLEM — G37.2 CENTRAL PONTINE MYELINOLYSIS (HCC): Status: ACTIVE | Noted: 2020-01-19

## 2020-05-08 LAB
ABO GROUP BLD: NORMAL
ALBUMIN SERPL BCP-MCNC: 2.2 G/DL (ref 3.2–4.9)
ALBUMIN/GLOB SERPL: 0.9 G/DL
ALP SERPL-CCNC: 239 U/L (ref 30–99)
ALT SERPL-CCNC: 56 U/L (ref 2–50)
ANION GAP SERPL CALC-SCNC: 8 MMOL/L (ref 7–16)
AST SERPL-CCNC: 229 U/L (ref 12–45)
BILIRUB SERPL-MCNC: 2.6 MG/DL (ref 0.1–1.5)
BLD GP AB SCN SERPL QL: NORMAL
BUN SERPL-MCNC: 8 MG/DL (ref 8–22)
CALCIUM SERPL-MCNC: 7.1 MG/DL (ref 8.5–10.5)
CHLORIDE SERPL-SCNC: 106 MMOL/L (ref 96–112)
CO2 SERPL-SCNC: 22 MMOL/L (ref 20–33)
CREAT SERPL-MCNC: 0.5 MG/DL (ref 0.5–1.4)
ERYTHROCYTE [DISTWIDTH] IN BLOOD BY AUTOMATED COUNT: 49.6 FL (ref 35.9–50)
GLOBULIN SER CALC-MCNC: 2.5 G/DL (ref 1.9–3.5)
GLUCOSE SERPL-MCNC: 69 MG/DL (ref 65–99)
HCT VFR BLD AUTO: 21 % (ref 37–47)
HCT VFR BLD AUTO: 22.8 % (ref 37–47)
HCT VFR BLD AUTO: 26.1 % (ref 37–47)
HCT VFR BLD AUTO: 27.5 % (ref 37–47)
HGB BLD-MCNC: 6.8 G/DL (ref 12–16)
HGB BLD-MCNC: 7.5 G/DL (ref 12–16)
HGB BLD-MCNC: 7.6 G/DL (ref 12–16)
HGB BLD-MCNC: 7.8 G/DL (ref 12–16)
HGB BLD-MCNC: 8.5 G/DL (ref 12–16)
MAGNESIUM SERPL-MCNC: 1.8 MG/DL (ref 1.5–2.5)
MCH RBC QN AUTO: 31.6 PG (ref 27–33)
MCHC RBC AUTO-ENTMCNC: 32.9 G/DL (ref 33.6–35)
MCV RBC AUTO: 96.2 FL (ref 81.4–97.8)
MORPHOLOGY BLD-IMP: NORMAL
PHOSPHATE SERPL-MCNC: 1.8 MG/DL (ref 2.5–4.5)
PLATELET # BLD AUTO: 34 K/UL (ref 164–446)
PLATELETS.RETICULATED NFR BLD AUTO: 4.8 K/UL (ref 0.6–13.1)
PMV BLD AUTO: 11.2 FL (ref 9–12.9)
POTASSIUM SERPL-SCNC: 3.5 MMOL/L (ref 3.6–5.5)
PROT SERPL-MCNC: 4.7 G/DL (ref 6–8.2)
RBC # BLD AUTO: 2.37 M/UL (ref 4.2–5.4)
RH BLD: NORMAL
SODIUM SERPL-SCNC: 136 MMOL/L (ref 135–145)
WBC # BLD AUTO: 3.8 K/UL (ref 4.8–10.8)

## 2020-05-08 PROCEDURE — 86850 RBC ANTIBODY SCREEN: CPT

## 2020-05-08 PROCEDURE — 700105 HCHG RX REV CODE 258: Performed by: STUDENT IN AN ORGANIZED HEALTH CARE EDUCATION/TRAINING PROGRAM

## 2020-05-08 PROCEDURE — A9270 NON-COVERED ITEM OR SERVICE: HCPCS | Performed by: STUDENT IN AN ORGANIZED HEALTH CARE EDUCATION/TRAINING PROGRAM

## 2020-05-08 PROCEDURE — 85027 COMPLETE CBC AUTOMATED: CPT

## 2020-05-08 PROCEDURE — 85055 RETICULATED PLATELET ASSAY: CPT

## 2020-05-08 PROCEDURE — 84207 ASSAY OF VITAMIN B-6: CPT

## 2020-05-08 PROCEDURE — 86901 BLOOD TYPING SEROLOGIC RH(D): CPT

## 2020-05-08 PROCEDURE — 86900 BLOOD TYPING SEROLOGIC ABO: CPT

## 2020-05-08 PROCEDURE — 700102 HCHG RX REV CODE 250 W/ 637 OVERRIDE(OP): Performed by: STUDENT IN AN ORGANIZED HEALTH CARE EDUCATION/TRAINING PROGRAM

## 2020-05-08 PROCEDURE — 97161 PT EVAL LOW COMPLEX 20 MIN: CPT

## 2020-05-08 PROCEDURE — 80053 COMPREHEN METABOLIC PANEL: CPT

## 2020-05-08 PROCEDURE — 770020 HCHG ROOM/CARE - TELE (206)

## 2020-05-08 PROCEDURE — 36415 COLL VENOUS BLD VENIPUNCTURE: CPT

## 2020-05-08 PROCEDURE — 85014 HEMATOCRIT: CPT | Mod: 91

## 2020-05-08 PROCEDURE — 83735 ASSAY OF MAGNESIUM: CPT

## 2020-05-08 PROCEDURE — 85018 HEMOGLOBIN: CPT

## 2020-05-08 PROCEDURE — 84100 ASSAY OF PHOSPHORUS: CPT

## 2020-05-08 PROCEDURE — 700111 HCHG RX REV CODE 636 W/ 250 OVERRIDE (IP): Performed by: STUDENT IN AN ORGANIZED HEALTH CARE EDUCATION/TRAINING PROGRAM

## 2020-05-08 PROCEDURE — 70551 MRI BRAIN STEM W/O DYE: CPT

## 2020-05-08 PROCEDURE — 84425 ASSAY OF VITAMIN B-1: CPT

## 2020-05-08 PROCEDURE — 99232 SBSQ HOSP IP/OBS MODERATE 35: CPT | Mod: GC | Performed by: INTERNAL MEDICINE

## 2020-05-08 PROCEDURE — 700101 HCHG RX REV CODE 250: Performed by: STUDENT IN AN ORGANIZED HEALTH CARE EDUCATION/TRAINING PROGRAM

## 2020-05-08 RX ORDER — POTASSIUM CHLORIDE 20 MEQ/1
40 TABLET, EXTENDED RELEASE ORAL ONCE
Status: COMPLETED | OUTPATIENT
Start: 2020-05-08 | End: 2020-05-08

## 2020-05-08 RX ORDER — SODIUM CHLORIDE 9 MG/ML
INJECTION, SOLUTION INTRAVENOUS
Status: ACTIVE
Start: 2020-05-08 | End: 2020-05-08

## 2020-05-08 RX ORDER — SODIUM CHLORIDE 9 MG/ML
250 INJECTION, SOLUTION INTRAVENOUS ONCE
Status: COMPLETED | OUTPATIENT
Start: 2020-05-08 | End: 2020-05-08

## 2020-05-08 RX ORDER — MIDODRINE HYDROCHLORIDE 5 MG/1
5 TABLET ORAL
Status: DISCONTINUED | OUTPATIENT
Start: 2020-05-08 | End: 2020-05-11

## 2020-05-08 RX ORDER — MAGNESIUM SULFATE HEPTAHYDRATE 40 MG/ML
2 INJECTION, SOLUTION INTRAVENOUS ONCE
Status: COMPLETED | OUTPATIENT
Start: 2020-05-08 | End: 2020-05-08

## 2020-05-08 RX ORDER — DEXTROSE AND SODIUM CHLORIDE 5; .9 G/100ML; G/100ML
INJECTION, SOLUTION INTRAVENOUS CONTINUOUS
Status: DISCONTINUED | OUTPATIENT
Start: 2020-05-08 | End: 2020-05-09

## 2020-05-08 RX ORDER — SODIUM CHLORIDE 9 MG/ML
INJECTION, SOLUTION INTRAVENOUS CONTINUOUS
Status: DISCONTINUED | OUTPATIENT
Start: 2020-05-08 | End: 2020-05-08

## 2020-05-08 RX ORDER — OMEPRAZOLE 20 MG/1
20 CAPSULE, DELAYED RELEASE ORAL 2 TIMES DAILY
Status: DISCONTINUED | OUTPATIENT
Start: 2020-05-08 | End: 2020-05-13 | Stop reason: HOSPADM

## 2020-05-08 RX ADMIN — MIDODRINE HYDROCHLORIDE 5 MG: 5 TABLET ORAL at 12:24

## 2020-05-08 RX ADMIN — Medication 100 MG: at 16:50

## 2020-05-08 RX ADMIN — MIDODRINE HYDROCHLORIDE 5 MG: 5 TABLET ORAL at 07:42

## 2020-05-08 RX ADMIN — FOLIC ACID 1 MG: 1 TABLET ORAL at 16:50

## 2020-05-08 RX ADMIN — DEXTROSE AND SODIUM CHLORIDE: 5; 900 INJECTION, SOLUTION INTRAVENOUS at 19:23

## 2020-05-08 RX ADMIN — LORAZEPAM 1 MG: 1 TABLET ORAL at 04:52

## 2020-05-08 RX ADMIN — POTASSIUM CHLORIDE 40 MEQ: 1500 TABLET, EXTENDED RELEASE ORAL at 07:42

## 2020-05-08 RX ADMIN — Medication 400 MG: at 12:24

## 2020-05-08 RX ADMIN — LACTULOSE 30 ML: 20 SOLUTION ORAL at 04:52

## 2020-05-08 RX ADMIN — OMEPRAZOLE 20 MG: 20 CAPSULE, DELAYED RELEASE ORAL at 16:50

## 2020-05-08 RX ADMIN — DEXTROSE AND SODIUM CHLORIDE: 5; 900 INJECTION, SOLUTION INTRAVENOUS at 07:42

## 2020-05-08 RX ADMIN — MAGNESIUM SULFATE 2 G: 2 INJECTION INTRAVENOUS at 12:24

## 2020-05-08 RX ADMIN — THERA TABS 1 TABLET: TAB at 16:50

## 2020-05-08 RX ADMIN — LACTULOSE 30 ML: 20 SOLUTION ORAL at 12:24

## 2020-05-08 RX ADMIN — LORAZEPAM 0.5 MG: 1 TABLET ORAL at 16:50

## 2020-05-08 RX ADMIN — MIDODRINE HYDROCHLORIDE 5 MG: 5 TABLET ORAL at 16:50

## 2020-05-08 RX ADMIN — LORAZEPAM 0.5 MG: 1 TABLET ORAL at 20:47

## 2020-05-08 RX ADMIN — SODIUM CHLORIDE 250 ML: 9 INJECTION, SOLUTION INTRAVENOUS at 02:30

## 2020-05-08 RX ADMIN — SODIUM PHOSPHATE, MONOBASIC, MONOHYDRATE 30 MMOL: 276; 142 INJECTION, SOLUTION INTRAVENOUS at 14:51

## 2020-05-08 RX ADMIN — LEVOTHYROXINE SODIUM 75 MCG: 75 TABLET ORAL at 04:52

## 2020-05-08 ASSESSMENT — ENCOUNTER SYMPTOMS
INSOMNIA: 1
HALLUCINATIONS: 0
DEPRESSION: 0
COUGH: 0
BLURRED VISION: 0
SEIZURES: 0
CHILLS: 0
HEADACHES: 1
DOUBLE VISION: 0
SENSORY CHANGE: 0
CONSTIPATION: 0
SORE THROAT: 0
DIARRHEA: 0
NERVOUS/ANXIOUS: 0
SHORTNESS OF BREATH: 0
WEAKNESS: 0
NAUSEA: 1
DIZZINESS: 1
ORTHOPNEA: 0
BLOOD IN STOOL: 0
HEARTBURN: 1
VOMITING: 0
FEVER: 0
TREMORS: 0
ABDOMINAL PAIN: 1
PALPITATIONS: 0

## 2020-05-08 ASSESSMENT — LIFESTYLE VARIABLES
HEADACHE, FULLNESS IN HEAD: NOT PRESENT
ANXIETY: *
PAROXYSMAL SWEATS: BARELY PERCEPTIBLE SWEATING. PALMS MOIST
TREMOR: *
ORIENTATION AND CLOUDING OF SENSORIUM: ORIENTED AND CAN DO SERIAL ADDITIONS
HEADACHE, FULLNESS IN HEAD: NOT PRESENT
HEADACHE, FULLNESS IN HEAD: NOT PRESENT
VISUAL DISTURBANCES: NOT PRESENT
PAROXYSMAL SWEATS: BARELY PERCEPTIBLE SWEATING. PALMS MOIST
PAROXYSMAL SWEATS: BARELY PERCEPTIBLE SWEATING. PALMS MOIST
ANXIETY: MILDLY ANXIOUS
VISUAL DISTURBANCES: NOT PRESENT
TREMOR: TREMOR NOT VISIBLE BUT CAN BE FELT, FINGERTIP TO FINGERTIP
AUDITORY DISTURBANCES: NOT PRESENT
VISUAL DISTURBANCES: NOT PRESENT
ORIENTATION AND CLOUDING OF SENSORIUM: ORIENTED AND CAN DO SERIAL ADDITIONS
AGITATION: *
ORIENTATION AND CLOUDING OF SENSORIUM: ORIENTED AND CAN DO SERIAL ADDITIONS
AUDITORY DISTURBANCES: NOT PRESENT
VISUAL DISTURBANCES: NOT PRESENT
AGITATION: *
TOTAL SCORE: 5
ANXIETY: MILDLY ANXIOUS
ORIENTATION AND CLOUDING OF SENSORIUM: ORIENTED AND CAN DO SERIAL ADDITIONS
HEADACHE, FULLNESS IN HEAD: NOT PRESENT
NAUSEA AND VOMITING: NO NAUSEA AND NO VOMITING
SUBSTANCE_ABUSE: 1
TREMOR: TREMOR NOT VISIBLE BUT CAN BE FELT, FINGERTIP TO FINGERTIP
TOTAL SCORE: 10
VISUAL DISTURBANCES: NOT PRESENT
TOTAL SCORE: 5
NAUSEA AND VOMITING: NO NAUSEA AND NO VOMITING
TREMOR: TREMOR NOT VISIBLE BUT CAN BE FELT, FINGERTIP TO FINGERTIP
ANXIETY: MILDLY ANXIOUS
TOTAL SCORE: 6
TOTAL SCORE: 6
AUDITORY DISTURBANCES: NOT PRESENT
VISUAL DISTURBANCES: NOT PRESENT
NAUSEA AND VOMITING: NO NAUSEA AND NO VOMITING
TOTAL SCORE: 3
HEADACHE, FULLNESS IN HEAD: NOT PRESENT
HEADACHE, FULLNESS IN HEAD: NOT PRESENT
PAROXYSMAL SWEATS: *
PAROXYSMAL SWEATS: NO SWEAT VISIBLE
AGITATION: *
TREMOR: *
ANXIETY: MILDLY ANXIOUS
PAROXYSMAL SWEATS: BARELY PERCEPTIBLE SWEATING. PALMS MOIST
AUDITORY DISTURBANCES: NOT PRESENT
AUDITORY DISTURBANCES: NOT PRESENT
AGITATION: NORMAL ACTIVITY
NAUSEA AND VOMITING: NO NAUSEA AND NO VOMITING
ORIENTATION AND CLOUDING OF SENSORIUM: ORIENTED AND CAN DO SERIAL ADDITIONS
NAUSEA AND VOMITING: NO NAUSEA AND NO VOMITING
NAUSEA AND VOMITING: NO NAUSEA AND NO VOMITING
ORIENTATION AND CLOUDING OF SENSORIUM: ORIENTED AND CAN DO SERIAL ADDITIONS
ANXIETY: MILDLY ANXIOUS
AGITATION: *
AGITATION: *
TREMOR: TREMOR NOT VISIBLE BUT CAN BE FELT, FINGERTIP TO FINGERTIP
AUDITORY DISTURBANCES: NOT PRESENT

## 2020-05-08 ASSESSMENT — FIBROSIS 4 INDEX
FIB4 SCORE: 46.8
FIB4 SCORE: 46.8

## 2020-05-08 ASSESSMENT — COGNITIVE AND FUNCTIONAL STATUS - GENERAL
SUGGESTED CMS G CODE MODIFIER MOBILITY: CK
MOBILITY SCORE: 17
MOVING FROM LYING ON BACK TO SITTING ON SIDE OF FLAT BED: A LITTLE
MOVING TO AND FROM BED TO CHAIR: A LITTLE
STANDING UP FROM CHAIR USING ARMS: A LITTLE
CLIMB 3 TO 5 STEPS WITH RAILING: A LOT
TURNING FROM BACK TO SIDE WHILE IN FLAT BAD: A LITTLE
WALKING IN HOSPITAL ROOM: A LITTLE

## 2020-05-08 ASSESSMENT — GAIT ASSESSMENTS
GAIT LEVEL OF ASSIST: MINIMAL ASSIST
DISTANCE (FEET): 25
ASSISTIVE DEVICE: FRONT WHEEL WALKER

## 2020-05-08 NOTE — CARE PLAN
Problem: Communication  Goal: The ability to communicate needs accurately and effectively will improve  Outcome: PROGRESSING AS EXPECTED  Intervention: [place] patient and significant other/support system to call light to alert staff of needs  Flowsheets (Taken 5/7/2020 2100)  Oriented to:: All of the Following : Location of Bathroom, Visiting Policy, Unit Routine, Call Light and Bedside Controls, Bedside Rail Policy, Smoking Policy, Rights and Responsibilities, Bedside Report, and Patient Education Notebook  Note: Pt educated on POC and medications. Pt verbalized understanding.      Problem: Safety  Goal: Will remain free from injury  Outcome: PROGRESSING AS EXPECTED  Intervention: Provide assistance with mobility  Flowsheets (Taken 5/7/2020 0815 by Saranya Mijares)  Assistance: Assistance of One  Ambulation Tolerance:   Tolerates Poorly   General Weakness   Tires Quickly  Note: Pt bedside table and call-light are within reach, bed in lowest position and locked. Treaded socks on and pt has been educated on call light use and asked to call before getting up.
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
## 2020-05-08 NOTE — PROGRESS NOTES
Pt hgb came back below 7, MD was updated, orders for a COD and blood transfusion were placed. MD was made aware patient is unable to sign consent and said he would be up to assess the patient.

## 2020-05-08 NOTE — PROGRESS NOTES
At bedside, patient hgb recheck came back at 7.5, patient blood pressure back up at 110 diastolic. It was decided there is no need for a blood transfusion at this time. Will recheck HGB at 0500.

## 2020-05-08 NOTE — THERAPY
PT orders received, eval attempted. RN requested to defer PT evaluation as RN just gave ativan for withdrawal. Will reattempt PT eval as appropriate. Thanks    Kiarra Canela, PT, DPT Pager: [phone]

## 2020-05-08 NOTE — PROGRESS NOTES
Assumed care of pt, beside report received from CELIO Brambila. Updated on POC, call light within reach all fall precautions within place. Bed locked and lowered. Pt instructed to call for assistance before getting up. All questions answered, no other needs at this time.

## 2020-05-08 NOTE — PROGRESS NOTES
Daily Progress Note:     Date of Service: 5/8/2020  Primary Team: [employer] [facility] White Team   Attending: Anibal Monzon M.D.   Senior Resident: Dr. Wallace  Intern: Dr. Valenzuela  Contact:  [phone]    Chief Complaint:   Alcohol withdrawal    ID: Patient with history of alcohol abuse with alcoholic liver disease. She was admitted due to alcohol intoxication and complications of alcoholism including alcoholic hepatitis, central pontine myelinolysis, GIB possibly from alcoholic gastritis.    Subjective:  No overnight events. She is alert and oriented. Patient is lethargic in the morning likely due to ativan. Mild nausea and headaches. Denied any vomiting or melena.     Interval updates:  5/8/2020: Hemoglobin dropped to 6.8 ON, repeat is >7. Patient is stable, although had an episode of low SBP 80s, given 250 cc IVF by night team. CIWA 16-1-3-10 last, received 3.5 mg ativan overnight. LFTs trending down, alcoholic hepatitis seems to be improving. Thrombocytes are 34k today, no active bleeding, will monitor closely. MRI brain revealed central pontine myelinolysis, in this case it is likely chronic, will follow neuro recs    Consultants/Specialty:  None    Review of Systems:   Review of Systems   Constitutional: Positive for malaise/fatigue. Negative for chills and fever.   HENT: Negative for sore throat.    Eyes: Negative for blurred vision and double vision.   Respiratory: Negative for cough and shortness of breath.    Cardiovascular: Negative for chest pain, palpitations, orthopnea and leg swelling.   Gastrointestinal: Positive for abdominal pain, heartburn, melena and nausea. Negative for blood in stool, constipation, diarrhea and vomiting.   Genitourinary: Negative for dysuria and hematuria.   Musculoskeletal: Negative for joint pain.   Neurological: Positive for dizziness and headaches. Negative for tremors, sensory change, seizures and weakness.   Psychiatric/Behavioral: Positive for substance abuse. Negative for  depression, hallucinations and suicidal ideas. The patient has insomnia. The patient is not nervous/anxious.        Objective Data:   Physical Exam:   Vitals:   Temp:  [36.5 °C (97.7 °F)-37.2 °C (99 °F)] 36.9 °C (98.4 °F)  Pulse:  [] 97  Resp:  [16-18] 16  BP: ()/(51-73) 103/67  SpO2:  [90 %-95 %] 93 %    Physical Exam  Constitutional:       General: She is not in acute distress.     Appearance: She is not ill-appearing.      Comments: Alert and oriented.  Slow responses.   HENT:      Head: Atraumatic.      Nose: No rhinorrhea.   Eyes:      General: Scleral icterus present.      Extraocular Movements: Extraocular movements intact.   Neck:      Musculoskeletal: Neck supple.   Cardiovascular:      Rate and Rhythm: Normal rate and regular rhythm.      Heart sounds: Normal heart sounds. No murmur. No friction rub. No gallop.    Pulmonary:      Breath sounds: No wheezing, rhonchi or rales.   Abdominal:      Tenderness: There is abdominal tenderness (ruq). There is no guarding or rebound.      Comments: Hyperactive bowel sounds  No shifting dullness on abdominal exam - no ascites.  No caput medusa seen on skin exam.   Genitourinary:     Rectum: Guaiac result positive.   Musculoskeletal:      Right lower leg: No edema.      Left lower leg: No edema.   Skin:     Coloration: Skin is pale. Skin is not jaundiced.      Findings: No rash.      Comments: No spider angiomata.   Neurological:      General: No focal deficit present.      Mental Status: She is disoriented.      Cranial Nerves: No cranial nerve deficit.      Sensory: No sensory deficit.      Motor: No weakness.      Deep Tendon Reflexes: Reflexes normal.      Comments: No asterixis   Psychiatric:      Comments: Slowed mentation           Labs:     Recent Labs     05/06/20  1530 05/06/20  1940 05/06/20  2357  05/08/20  0306 05/08/20  0514 05/08/20  0858 05/08/20  1700   RBC 2.66*  --  2.40*  --  2.37*  --   --   --    HEMOGLOBIN 8.4*  --  7.5*   < > 7.5*  7.6* 7.8* 8.5*   HEMATOCRIT 25.0*  --  23.2*   < > 22.8*  --  26.1* 27.5*   PLATELETCT 68*  --  52*  --  34*  --   --   --    PROTHROMBTM 17.7*  --   --   --   --   --   --   --    INR 1.41*  --   --   --   --   --   --   --    IRON 31*  --   --   --   --   --   --   --    FERRITIN  --  55.9  --   --   --   --   --   --    TOTIRONBC 176*  --   --   --   --   --   --   --     < > = values in this interval not displayed.     Recent Labs     05/06/20 1530 05/06/20 2357 05/08/20 0114 05/08/20  0858   SODIUM 142 139 136  --    POTASSIUM 3.5* 3.9 3.5*  --    CHLORIDE 103 104 106  --    CO2 21 21 22  --    BUN 7* 8 8  --    CREATININE 0.60 0.52 0.50  --    MAGNESIUM 2.0 1.9  --  1.8   PHOSPHORUS  --  2.1*  --  1.8*   CALCIUM 7.1* 7.0* 7.1*  --      Recent Labs     05/06/20 1530 05/06/20 2357 05/08/20 0114   ALTSGPT 74* 64* 56*   ASTSGOT 312* 270* 229*   ALKPHOSPHAT 264* 234* 239*   TBILIRUBIN 2.2* 2.2* 2.6*   GLUCOSE 70 54* 69     Recent Labs     05/06/20 1530   INR 1.41*             Recent Labs     05/06/20 1530 05/06/20 2357 05/08/20 0114 05/08/20  0306   WBC 6.8 5.2  --  3.8*   NEUTSPOLYS 66.60 61.30  --   --    LYMPHOCYTES 26.30 30.70  --   --    MONOCYTES 5.30 6.00  --   --    EOSINOPHILS 0.60 0.80  --   --    BASOPHILS 0.60 0.60  --   --    ASTSGOT 312* 270* 229*  --    ALTSGPT 74* 64* 56*  --    ALKPHOSPHAT 264* 234* 239*  --    TBILIRUBIN 2.2* 2.2* 2.6*  --      Recent Labs     05/07/20  0812   METHADONE Negative   OPIATES Negative   CANNABINOID Negative   AMPHUR Negative       Imaging:   MRI brain central pontine myelinolysis.  CXR no device. Mild cardiomegaly.    * Alcohol intoxication in active alcoholic with complication (HCC)- (present on admission)  Assessment & Plan  No alcoholic seizures in the past. Feels like she is going through a detox,   last drink was AM on admission, drinking 1/5 pint vodka /d.   Has tremor, asterixis in ED.  ETOh level 241.1 on admission  EKG sinus tach in the ED, no  arrhythmia no Afib.     Plan:  Admit to neurology with telemetry monitoring  CT head - ?central pontine myelinolysis - neurology following - MRI central pontine myelinolysis.  Aspiration/Seiaure/Fall precautions   MV/thiamine/folate   Ativan per CIWA  IVF  Advance diet  Monitor and replete electrolytes prn   Repeat TSH/B12/Folate/Ammonia level - wnl.    Central pontine myelinolysis (HCC)- (present on admission)  Assessment & Plan  Pt has difficulty with walking and states that she has fallen. Last fall about a week ago. Says she hit her head at some point and has a posterior right sided headache and blurry vision. Patient is lethargic.  No dysarthria, dysphagia, paraparesis or quadriparesis, behavioral disturbances, seizures,  No locked in syndrome.  CT head w/o negative for acute intracranial hemorrhage, but there is concern for CPM. She has had hyponatremia in the past  MRI scan showed central pontine myelinolysis possibly 2/2 alcoholism.    Plan:  Neurology consult - following rec's  PT/OT eval  Physiatry  Alcohol cessation. At this time, no proven treatment due to chronicity of the condition.    Alcoholic liver disease (HCC)- (present on admission)  Assessment & Plan  Long history of alcoholism.  MELD 13 6% 3mo mortality   Last RUQ U/S in January shows fatty infiltration vs fibrosis.  Child class B   -Alcohol cessation counseling provided. Discussed about complications of alcoholism.    Macrocytic anemia- (present on admission)  Assessment & Plan  Likely mixed picture, with macrocytic anemia secondary to etoh abuse and untreated hypothyroidism, and possible superimposed blood loss anemia given recent reported coffee ground emesis & +FOBT testing  Previous EGD shows ulcerative esophagitis, no varices was mentioned.  Hgb 8.4 on admission - trend H&H, monitor vitals closely, transfuse prn  B12, folate wnl.  TSH in 50s, very high 2/2 non-adherence. Synthroid increased from 50 mcg to 75 mcg;  Ferritin 61; iron is low  at 31 and %sat at low end normal indicating likely superimposed iron deficiency anemia   FOBT+  May consider GI consult for possible EGD   IV PPI  IVF    Hypotension- (present on admission)  Assessment & Plan  Patient with chronic hypotension on midodrine - continue inpatient.    GERD (gastroesophageal reflux disease)- (present on admission)  Assessment & Plan  Complains of acid reflux and says she can't tolerate PPI.  hx of esophageal erosion in the past  +FOBT - suspected UGI bleed - BUN/cre 8/0.5  IV PPI    Alcoholic hepatitis- (present on admission)  Assessment & Plan  , ALT 74, >2 ratio, Alk 264 on admission, improving but still elevated  Last RUQ U/S in January shows fatty infiltration vs fibrosis.  -Supportive care. IVF and trend LFT's   -Maddrey score <32, no steroids indicated.    Hypokalemia- (present on admission)  Assessment & Plan  K 3.5 on admission  Monitor closely - replete prn.    Hypothyroidism- (present on admission)  Assessment & Plan  Last TSH was 31.200 on 1/14/20. - repeat 54k  non compliant with synthroid for the past 2 weeks   -Increased synthroid to 75.    Thrombocytopenia (HCC)- (present on admission)  Assessment & Plan  Likely secondary to chronic liver disease from alcohol   Monitor closely with cbc   SCDs for DVT prophylaxis

## 2020-05-08 NOTE — PROGRESS NOTES
Report received from CELIO Frances.  Bed in low position.  Call light within reach. All needs met at this time.
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
## 2020-05-09 ENCOUNTER — APPOINTMENT (OUTPATIENT)
Dept: RADIOLOGY | Facility: MEDICAL CENTER | Age: 55
DRG: 896 | End: 2020-05-09
Attending: STUDENT IN AN ORGANIZED HEALTH CARE EDUCATION/TRAINING PROGRAM
Payer: MEDICAID

## 2020-05-09 PROBLEM — R19.5 OCCULT BLOOD POSITIVE STOOL: Status: ACTIVE | Noted: 2020-05-09

## 2020-05-09 LAB
ALBUMIN SERPL BCP-MCNC: 2.2 G/DL (ref 3.2–4.9)
ALBUMIN/GLOB SERPL: 0.8 G/DL
ALP SERPL-CCNC: 243 U/L (ref 30–99)
ALT SERPL-CCNC: 52 U/L (ref 2–50)
ANION GAP SERPL CALC-SCNC: 10 MMOL/L (ref 7–16)
ANION GAP SERPL CALC-SCNC: 10 MMOL/L (ref 7–16)
ANION GAP SERPL CALC-SCNC: 12 MMOL/L (ref 7–16)
AST SERPL-CCNC: 194 U/L (ref 12–45)
BACTERIA UR CULT: ABNORMAL
BASOPHILS # BLD AUTO: 0.5 % (ref 0–1.8)
BASOPHILS # BLD: 0.02 K/UL (ref 0–0.12)
BILIRUB SERPL-MCNC: 2.2 MG/DL (ref 0.1–1.5)
BUN SERPL-MCNC: 3 MG/DL (ref 8–22)
BUN SERPL-MCNC: 4 MG/DL (ref 8–22)
BUN SERPL-MCNC: 5 MG/DL (ref 8–22)
CALCIUM SERPL-MCNC: 7 MG/DL (ref 8.5–10.5)
CALCIUM SERPL-MCNC: 7.4 MG/DL (ref 8.5–10.5)
CALCIUM SERPL-MCNC: 7.5 MG/DL (ref 8.5–10.5)
CHLORIDE SERPL-SCNC: 110 MMOL/L (ref 96–112)
CHLORIDE SERPL-SCNC: 111 MMOL/L (ref 96–112)
CHLORIDE SERPL-SCNC: 112 MMOL/L (ref 96–112)
CO2 SERPL-SCNC: 16 MMOL/L (ref 20–33)
CO2 SERPL-SCNC: 17 MMOL/L (ref 20–33)
CO2 SERPL-SCNC: 19 MMOL/L (ref 20–33)
CORTIS SERPL-MCNC: 13.8 UG/DL (ref 0–23)
COVID ORDER STATUS COVID19: NORMAL
CREAT SERPL-MCNC: 0.51 MG/DL (ref 0.5–1.4)
CREAT SERPL-MCNC: 0.6 MG/DL (ref 0.5–1.4)
CREAT SERPL-MCNC: 0.62 MG/DL (ref 0.5–1.4)
EOSINOPHIL # BLD AUTO: 0.11 K/UL (ref 0–0.51)
EOSINOPHIL NFR BLD: 2.9 % (ref 0–6.9)
ERYTHROCYTE [DISTWIDTH] IN BLOOD BY AUTOMATED COUNT: 50.2 FL (ref 35.9–50)
GLOBULIN SER CALC-MCNC: 2.7 G/DL (ref 1.9–3.5)
GLUCOSE SERPL-MCNC: 107 MG/DL (ref 65–99)
GLUCOSE SERPL-MCNC: 116 MG/DL (ref 65–99)
GLUCOSE SERPL-MCNC: 96 MG/DL (ref 65–99)
HCT VFR BLD AUTO: 23 % (ref 37–47)
HCT VFR BLD AUTO: 23.1 % (ref 37–47)
HCT VFR BLD AUTO: 25.5 % (ref 37–47)
HCT VFR BLD AUTO: 27.9 % (ref 37–47)
HGB BLD-MCNC: 7.4 G/DL (ref 12–16)
HGB BLD-MCNC: 7.4 G/DL (ref 12–16)
HGB BLD-MCNC: 8 G/DL (ref 12–16)
HGB BLD-MCNC: 8.6 G/DL (ref 12–16)
IMM GRANULOCYTES # BLD AUTO: 0.02 K/UL (ref 0–0.11)
IMM GRANULOCYTES NFR BLD AUTO: 0.5 % (ref 0–0.9)
LYMPHOCYTES # BLD AUTO: 1.2 K/UL (ref 1–4.8)
LYMPHOCYTES NFR BLD: 32.2 % (ref 22–41)
MAGNESIUM SERPL-MCNC: 2.1 MG/DL (ref 1.5–2.5)
MAGNESIUM SERPL-MCNC: 2.2 MG/DL (ref 1.5–2.5)
MCH RBC QN AUTO: 31.2 PG (ref 27–33)
MCHC RBC AUTO-ENTMCNC: 32.2 G/DL (ref 33.6–35)
MCV RBC AUTO: 97 FL (ref 81.4–97.8)
MONOCYTES # BLD AUTO: 0.37 K/UL (ref 0–0.85)
MONOCYTES NFR BLD AUTO: 9.9 % (ref 0–13.4)
MORPHOLOGY BLD-IMP: NORMAL
NEUTROPHILS # BLD AUTO: 2.01 K/UL (ref 2–7.15)
NEUTROPHILS NFR BLD: 54 % (ref 44–72)
NRBC # BLD AUTO: 0 K/UL
NRBC BLD-RTO: 0 /100 WBC
PHOSPHATE SERPL-MCNC: 3.2 MG/DL (ref 2.5–4.5)
PLATELET # BLD AUTO: 45 K/UL (ref 164–446)
PLATELETS.RETICULATED NFR BLD AUTO: 4.1 K/UL (ref 0.6–13.1)
PMV BLD AUTO: 12.6 FL (ref 9–12.9)
POTASSIUM SERPL-SCNC: 3.3 MMOL/L (ref 3.6–5.5)
POTASSIUM SERPL-SCNC: 4 MMOL/L (ref 3.6–5.5)
POTASSIUM SERPL-SCNC: 4.1 MMOL/L (ref 3.6–5.5)
PROT SERPL-MCNC: 4.9 G/DL (ref 6–8.2)
RBC # BLD AUTO: 2.37 M/UL (ref 4.2–5.4)
SARS-COV-2 RNA RESP QL NAA+PROBE: NOTDETECTED
SIGNIFICANT IND 70042: ABNORMAL
SITE SITE: ABNORMAL
SODIUM SERPL-SCNC: 138 MMOL/L (ref 135–145)
SODIUM SERPL-SCNC: 139 MMOL/L (ref 135–145)
SODIUM SERPL-SCNC: 140 MMOL/L (ref 135–145)
SOURCE SOURCE: ABNORMAL
SPECIMEN SOURCE: NORMAL
WBC # BLD AUTO: 3.7 K/UL (ref 4.8–10.8)

## 2020-05-09 PROCEDURE — 700102 HCHG RX REV CODE 250 W/ 637 OVERRIDE(OP): Performed by: INTERNAL MEDICINE

## 2020-05-09 PROCEDURE — 700105 HCHG RX REV CODE 258: Performed by: STUDENT IN AN ORGANIZED HEALTH CARE EDUCATION/TRAINING PROGRAM

## 2020-05-09 PROCEDURE — 700102 HCHG RX REV CODE 250 W/ 637 OVERRIDE(OP): Performed by: STUDENT IN AN ORGANIZED HEALTH CARE EDUCATION/TRAINING PROGRAM

## 2020-05-09 PROCEDURE — 85018 HEMOGLOBIN: CPT

## 2020-05-09 PROCEDURE — 36415 COLL VENOUS BLD VENIPUNCTURE: CPT

## 2020-05-09 PROCEDURE — 85025 COMPLETE CBC W/AUTO DIFF WBC: CPT

## 2020-05-09 PROCEDURE — 85055 RETICULATED PLATELET ASSAY: CPT

## 2020-05-09 PROCEDURE — A9270 NON-COVERED ITEM OR SERVICE: HCPCS | Performed by: STUDENT IN AN ORGANIZED HEALTH CARE EDUCATION/TRAINING PROGRAM

## 2020-05-09 PROCEDURE — G2023 SPECIMEN COLLECT COVID-19: HCPCS | Performed by: STUDENT IN AN ORGANIZED HEALTH CARE EDUCATION/TRAINING PROGRAM

## 2020-05-09 PROCEDURE — 87040 BLOOD CULTURE FOR BACTERIA: CPT | Mod: 91

## 2020-05-09 PROCEDURE — 99233 SBSQ HOSP IP/OBS HIGH 50: CPT | Mod: GC | Performed by: INTERNAL MEDICINE

## 2020-05-09 PROCEDURE — 700111 HCHG RX REV CODE 636 W/ 250 OVERRIDE (IP): Performed by: STUDENT IN AN ORGANIZED HEALTH CARE EDUCATION/TRAINING PROGRAM

## 2020-05-09 PROCEDURE — 71045 X-RAY EXAM CHEST 1 VIEW: CPT

## 2020-05-09 PROCEDURE — U0004 COV-19 TEST NON-CDC HGH THRU: HCPCS

## 2020-05-09 PROCEDURE — 83735 ASSAY OF MAGNESIUM: CPT

## 2020-05-09 PROCEDURE — 82533 TOTAL CORTISOL: CPT

## 2020-05-09 PROCEDURE — 80048 BASIC METABOLIC PNL TOTAL CA: CPT

## 2020-05-09 PROCEDURE — 700101 HCHG RX REV CODE 250: Performed by: STUDENT IN AN ORGANIZED HEALTH CARE EDUCATION/TRAINING PROGRAM

## 2020-05-09 PROCEDURE — 84100 ASSAY OF PHOSPHORUS: CPT

## 2020-05-09 PROCEDURE — 80053 COMPREHEN METABOLIC PANEL: CPT

## 2020-05-09 PROCEDURE — 85014 HEMATOCRIT: CPT | Mod: 91

## 2020-05-09 PROCEDURE — A9270 NON-COVERED ITEM OR SERVICE: HCPCS | Performed by: INTERNAL MEDICINE

## 2020-05-09 PROCEDURE — 770006 HCHG ROOM/CARE - MED/SURG/GYN SEMI*

## 2020-05-09 RX ORDER — POTASSIUM CHLORIDE 20 MEQ/1
20 TABLET, EXTENDED RELEASE ORAL ONCE
Status: COMPLETED | OUTPATIENT
Start: 2020-05-09 | End: 2020-05-09

## 2020-05-09 RX ORDER — POTASSIUM CHLORIDE 20 MEQ/1
40 TABLET, EXTENDED RELEASE ORAL ONCE
Status: COMPLETED | OUTPATIENT
Start: 2020-05-09 | End: 2020-05-09

## 2020-05-09 RX ORDER — LORAZEPAM 2 MG/ML
4 INJECTION INTRAMUSCULAR ONCE
Status: DISCONTINUED | OUTPATIENT
Start: 2020-05-09 | End: 2020-05-09

## 2020-05-09 RX ORDER — DEXTROSE MONOHYDRATE, SODIUM CHLORIDE, AND POTASSIUM CHLORIDE 50; 1.49; 9 G/1000ML; G/1000ML; G/1000ML
INJECTION, SOLUTION INTRAVENOUS CONTINUOUS
Status: DISCONTINUED | OUTPATIENT
Start: 2020-05-09 | End: 2020-05-10

## 2020-05-09 RX ORDER — POTASSIUM CHLORIDE 20 MEQ/1
20 TABLET, EXTENDED RELEASE ORAL ONCE
Status: DISCONTINUED | OUTPATIENT
Start: 2020-05-09 | End: 2020-05-09

## 2020-05-09 RX ORDER — LACTULOSE 20 G/30ML
15 SOLUTION ORAL 3 TIMES DAILY
Status: DISCONTINUED | OUTPATIENT
Start: 2020-05-09 | End: 2020-05-13 | Stop reason: HOSPADM

## 2020-05-09 RX ORDER — LORAZEPAM 2 MG/ML
4 INJECTION INTRAMUSCULAR
Status: DISCONTINUED | OUTPATIENT
Start: 2020-05-09 | End: 2020-05-13 | Stop reason: HOSPADM

## 2020-05-09 RX ADMIN — OMEPRAZOLE 20 MG: 20 CAPSULE, DELAYED RELEASE ORAL at 16:39

## 2020-05-09 RX ADMIN — CEFTRIAXONE SODIUM 1 G: 1 INJECTION, POWDER, FOR SOLUTION INTRAMUSCULAR; INTRAVENOUS at 04:18

## 2020-05-09 RX ADMIN — LACTULOSE 15 ML: 20 SOLUTION ORAL at 16:42

## 2020-05-09 RX ADMIN — MIDODRINE HYDROCHLORIDE 5 MG: 5 TABLET ORAL at 04:30

## 2020-05-09 RX ADMIN — DIBASIC SODIUM PHOSPHATE, MONOBASIC POTASSIUM PHOSPHATE AND MONOBASIC SODIUM PHOSPHATE 1 TABLET: 852; 155; 130 TABLET ORAL at 04:28

## 2020-05-09 RX ADMIN — DEXTROSE AND SODIUM CHLORIDE: 5; 900 INJECTION, SOLUTION INTRAVENOUS at 04:19

## 2020-05-09 RX ADMIN — Medication 400 MG: at 04:28

## 2020-05-09 RX ADMIN — POTASSIUM CHLORIDE 40 MEQ: 1500 TABLET, EXTENDED RELEASE ORAL at 06:41

## 2020-05-09 RX ADMIN — POTASSIUM CHLORIDE, DEXTROSE MONOHYDRATE AND SODIUM CHLORIDE: 150; 5; 900 INJECTION, SOLUTION INTRAVENOUS at 10:43

## 2020-05-09 RX ADMIN — LACTULOSE 15 ML: 20 SOLUTION ORAL at 12:52

## 2020-05-09 RX ADMIN — OMEPRAZOLE 20 MG: 20 CAPSULE, DELAYED RELEASE ORAL at 04:28

## 2020-05-09 RX ADMIN — LACTULOSE 30 ML: 20 SOLUTION ORAL at 04:27

## 2020-05-09 RX ADMIN — Medication 100 MG: at 16:44

## 2020-05-09 RX ADMIN — POTASSIUM CHLORIDE 20 MEQ: 1500 TABLET, EXTENDED RELEASE ORAL at 10:42

## 2020-05-09 RX ADMIN — FOLIC ACID 1 MG: 1 TABLET ORAL at 16:41

## 2020-05-09 RX ADMIN — MIDODRINE HYDROCHLORIDE 5 MG: 5 TABLET ORAL at 16:39

## 2020-05-09 RX ADMIN — LEVOTHYROXINE SODIUM 75 MCG: 75 TABLET ORAL at 04:28

## 2020-05-09 RX ADMIN — POTASSIUM CHLORIDE 20 MEQ: 1500 TABLET, EXTENDED RELEASE ORAL at 04:28

## 2020-05-09 RX ADMIN — THERA TABS 1 TABLET: TAB at 16:40

## 2020-05-09 RX ADMIN — MIDODRINE HYDROCHLORIDE 5 MG: 5 TABLET ORAL at 10:43

## 2020-05-09 ASSESSMENT — LIFESTYLE VARIABLES
PAROXYSMAL SWEATS: NO SWEAT VISIBLE
AGITATION: NORMAL ACTIVITY
ANXIETY: NO ANXIETY (AT EASE)
HEADACHE, FULLNESS IN HEAD: NOT PRESENT
PAROXYSMAL SWEATS: NO SWEAT VISIBLE
TREMOR: TREMOR NOT VISIBLE BUT CAN BE FELT, FINGERTIP TO FINGERTIP
TOTAL SCORE: 0
AGITATION: NORMAL ACTIVITY
ORIENTATION AND CLOUDING OF SENSORIUM: ORIENTED AND CAN DO SERIAL ADDITIONS
VISUAL DISTURBANCES: NOT PRESENT
NAUSEA AND VOMITING: NO NAUSEA AND NO VOMITING
SUBSTANCE_ABUSE: 1
NAUSEA AND VOMITING: NO NAUSEA AND NO VOMITING
ORIENTATION AND CLOUDING OF SENSORIUM: ORIENTED AND CAN DO SERIAL ADDITIONS
NAUSEA AND VOMITING: NO NAUSEA AND NO VOMITING
ANXIETY: NO ANXIETY (AT EASE)
AUDITORY DISTURBANCES: NOT PRESENT
AUDITORY DISTURBANCES: NOT PRESENT
TOTAL SCORE: 0
ORIENTATION AND CLOUDING OF SENSORIUM: ORIENTED AND CAN DO SERIAL ADDITIONS
NAUSEA AND VOMITING: NO NAUSEA AND NO VOMITING
TREMOR: NO TREMOR
TREMOR: NO TREMOR
VISUAL DISTURBANCES: NOT PRESENT
VISUAL DISTURBANCES: NOT PRESENT
AGITATION: NORMAL ACTIVITY
AGITATION: NORMAL ACTIVITY
AUDITORY DISTURBANCES: NOT PRESENT
ANXIETY: NO ANXIETY (AT EASE)
VISUAL DISTURBANCES: NOT PRESENT
HEADACHE, FULLNESS IN HEAD: NOT PRESENT
TREMOR: NO TREMOR
ANXIETY: NO ANXIETY (AT EASE)
TOTAL SCORE: 1
HEADACHE, FULLNESS IN HEAD: NOT PRESENT
ORIENTATION AND CLOUDING OF SENSORIUM: CANNOT DO SERIAL ADDITIONS OR IS UNCERTAIN ABOUT DATE
HEADACHE, FULLNESS IN HEAD: NOT PRESENT
PAROXYSMAL SWEATS: NO SWEAT VISIBLE
TOTAL SCORE: 1
AUDITORY DISTURBANCES: NOT PRESENT
PAROXYSMAL SWEATS: NO SWEAT VISIBLE

## 2020-05-09 ASSESSMENT — ENCOUNTER SYMPTOMS
TREMORS: 0
BRUISES/BLEEDS EASILY: 1
DOUBLE VISION: 0
HALLUCINATIONS: 0
NAUSEA: 1
DIZZINESS: 1
RESPIRATORY NEGATIVE: 1
HEARTBURN: 1
SHORTNESS OF BREATH: 0
VOMITING: 1
NERVOUS/ANXIOUS: 0
CONSTIPATION: 0
CHILLS: 0
ORTHOPNEA: 0
VOMITING: 0
BLOOD IN STOOL: 1
SORE THROAT: 0
INSOMNIA: 1
BLOOD IN STOOL: 0
CARDIOVASCULAR NEGATIVE: 1
ABDOMINAL PAIN: 1
COUGH: 0
WEAKNESS: 0
FALLS: 1
SENSORY CHANGE: 0
BLURRED VISION: 0
FEVER: 0
HEADACHES: 1
PALPITATIONS: 0
SEIZURES: 0
DEPRESSION: 0
DIARRHEA: 0

## 2020-05-09 ASSESSMENT — PATIENT HEALTH QUESTIONNAIRE - PHQ9
SUM OF ALL RESPONSES TO PHQ9 QUESTIONS 1 AND 2: 0
1. LITTLE INTEREST OR PLEASURE IN DOING THINGS: NOT AT ALL
2. FEELING DOWN, DEPRESSED, IRRITABLE, OR HOPELESS: NOT AT ALL

## 2020-05-09 ASSESSMENT — FIBROSIS 4 INDEX: FIB4 SCORE: 31.09

## 2020-05-09 NOTE — PROGRESS NOTES
Daily Progress Note:     Date of Service: 5/9/2020  Primary Team: [employer] [facility] White Team   Attending: Anibal Monzon M.D.   Senior Resident: Dr. Wallace  Intern: Dr. Valenzuela  Contact:  [phone]    Chief Complaint:   Alcohol withdrawal    ID: Patient with history of alcohol abuse with alcoholic liver disease. She was admitted due to alcohol intoxication and complications of alcoholism including alcoholic hepatitis, central pontine myelinolysis, GIB possibly from alcoholic gastritis.    Subjective:  No overnight events. She is oriented but have fatigue. She has been hard to hear since the admission, partially follows the commands due to hearing loss.     Interval updates:  5/8/2020: Hemoglobin dropped to 6.8 ON, repeat is >7. Patient is stable, although had an episode of low SBP 80s, given 250 cc IVF by night team. CIWA 16-1-3-10 last, received 3.5 mg ativan overnight. LFTs trending down, alcoholic hepatitis seems to be improving. Thrombocytes are 34k today, no active bleeding, will monitor closely. MRI brain revealed central pontine myelinolysis, in this case it is likely chronic, will follow neuro recs  5/9/2020: CIWA Stable Hb around 7.5 overnight. No events. GI was consulted ([facility]) for elective endoscopy for possible UGIB. Will obtain COVID test for EGD. Continue PPI. Had 4 bowel movement with lactulose, lowered the dose. Thrombocytes stable at 45k, no active bleeding. She has been tolerating regular diet. Cortisol for low BP and K, NS+D5+K IVF. Developed fever, UCx showing Ecoli and klebsiella, although UA is showing epithelial cells. Will keep patient on abx. Blood cultures, STAT CXR. Suspicion for drug related fever as well.     Consultants/Specialty:  Gastroenterology - [facility]    Review of Systems:   Review of Systems   Constitutional: Positive for malaise/fatigue. Negative for chills and fever.   HENT: Negative for sore throat.    Eyes: Negative for blurred vision and double vision.    Respiratory: Negative for cough and shortness of breath.    Cardiovascular: Negative for chest pain, palpitations, orthopnea and leg swelling.   Gastrointestinal: Positive for abdominal pain, heartburn, melena and nausea. Negative for blood in stool, constipation, diarrhea and vomiting.   Genitourinary: Negative for dysuria and hematuria.   Musculoskeletal: Negative for joint pain.   Neurological: Positive for dizziness and headaches. Negative for tremors, sensory change, seizures and weakness.   Psychiatric/Behavioral: Positive for substance abuse. Negative for depression, hallucinations and suicidal ideas. The patient has insomnia. The patient is not nervous/anxious.        Objective Data:   Physical Exam:   Vitals:   Temp:  [36.5 °C (97.7 °F)-38.4 °C (101.2 °F)] 38.4 °C (101.2 °F)  Pulse:  [] 102  Resp:  [14-18] 14  BP: ()/(49-71) 108/70  SpO2:  [90 %-96 %] 93 %    Physical Exam  Constitutional:       General: She is not in acute distress.     Appearance: She is not ill-appearing.      Comments: Alert and oriented.  Slow responses.   HENT:      Head: Atraumatic.      Nose: No rhinorrhea.   Eyes:      General: Scleral icterus present.      Extraocular Movements: Extraocular movements intact.   Neck:      Musculoskeletal: Neck supple.   Cardiovascular:      Rate and Rhythm: Normal rate and regular rhythm.      Heart sounds: Normal heart sounds. No murmur. No friction rub. No gallop.    Pulmonary:      Breath sounds: No wheezing, rhonchi or rales.   Abdominal:      Tenderness: There is abdominal tenderness (ruq). There is no guarding or rebound.      Comments: Hyperactive bowel sounds  No shifting dullness on abdominal exam - no ascites.  No caput medusa seen on skin exam.   Genitourinary:     Rectum: Guaiac result positive.   Musculoskeletal:      Right lower leg: No edema.      Left lower leg: No edema.   Skin:     Coloration: Skin is pale. Skin is not jaundiced.      Findings: No rash.       Comments: No spider angiomata.   Neurological:      General: No focal deficit present.      Mental Status: She is disoriented.      Cranial Nerves: No cranial nerve deficit.      Sensory: No sensory deficit.      Motor: No weakness.      Deep Tendon Reflexes: Reflexes normal.      Comments: No asterixis   Psychiatric:      Comments: Slowed mentation           Labs:     Recent Labs     05/06/20  1530 05/06/20  1940 05/06/20 2357 05/08/20  0306  05/08/20  1700 05/09/20  0112 05/09/20  0856   RBC 2.66*  --  2.40*  --  2.37*  --   --  2.37*  --    HEMOGLOBIN 8.4*  --  7.5*   < > 7.5*   < > 8.5* 7.4*  7.4* 8.6*   HEMATOCRIT 25.0*  --  23.2*   < > 22.8*   < > 27.5* 23.0*  23.1* 27.9*   PLATELETCT 68*  --  52*  --  34*  --   --  45*  --    PROTHROMBTM 17.7*  --   --   --   --   --   --   --   --    INR 1.41*  --   --   --   --   --   --   --   --    IRON 31*  --   --   --   --   --   --   --   --    FERRITIN  --  55.9  --   --   --   --   --   --   --    TOTIRONBC 176*  --   --   --   --   --   --   --   --     < > = values in this interval not displayed.     Recent Labs     05/06/20 2357 05/08/20  0114 05/08/20  0858 05/09/20  0112 05/09/20  0856   SODIUM 139 136  --  139  --    POTASSIUM 3.9 3.5*  --  3.3*  --    CHLORIDE 104 106  --  110  --    CO2 21 22  --  19*  --    BUN 8 8  --  5*  --    CREATININE 0.52 0.50  --  0.60  --    MAGNESIUM 1.9  --  1.8  --  2.2   PHOSPHORUS 2.1*  --  1.8* 3.2  --    CALCIUM 7.0* 7.1*  --  7.0*  --      Recent Labs     05/06/20 2357 05/08/20  0114 05/09/20  0112   ALTSGPT 64* 56* 52*   ASTSGOT 270* 229* 194*   ALKPHOSPHAT 234* 239* 243*   TBILIRUBIN 2.2* 2.6* 2.2*   GLUCOSE 54* 69 107*     Recent Labs     05/06/20 1530   INR 1.41*             Recent Labs     05/06/20  1530 05/06/20 2357 05/08/20 0114 05/08/20  0306 05/09/20  0112   WBC 6.8 5.2  --  3.8* 3.7*   NEUTSPOLYS 66.60 61.30  --   --  54.00   LYMPHOCYTES 26.30 30.70  --   --  32.20   MONOCYTES 5.30 6.00  --   --  9.90    EOSINOPHILS 0.60 0.80  --   --  2.90   BASOPHILS 0.60 0.60  --   --  0.50   ASTSGOT 312* 270* 229*  --  194*   ALTSGPT 74* 64* 56*  --  52*   ALKPHOSPHAT 264* 234* 239*  --  243*   TBILIRUBIN 2.2* 2.2* 2.6*  --  2.2*     Recent Labs     05/07/20  0812   METHADONE Negative   OPIATES Negative   CANNABINOID Negative   AMPHUR Negative       Imaging:   No imaging today.    * Alcohol intoxication in active alcoholic with complication (HCC)- (present on admission)  Assessment & Plan  No alcoholic seizures in the past. Feels like she is going through a detox,   last drink was AM on admission, drinking 1/5 pint vodka /d.   Has tremor, asterixis in ED.  ETOh level 241.1 on admission  EKG sinus tach in the ED, no arrhythmia no Afib.     Plan:  Admit to neurology with telemetry monitoring  CT head - ?central pontine myelinolysis - neurology following - MRI central pontine myelinolysis.  Aspiration/Seiaure/Fall precautions   MV/thiamine/folate   Ativan per CIWA - discontinue 5/9  IVF  Advance diet - tolerating regular diet.  Monitor and replete electrolytes prn   Repeat TSH/B12/Folate/Ammonia level - wnl.    Occult blood positive stool- (present on admission)  Assessment & Plan  Last EGD shows esophagial erosion.  Patient is stable Hb at around 7.5  Has history of hematemesis a week before admission.  GI consult - EGD pending, COVID test before  PPI    Central pontine myelinolysis (HCC)- (present on admission)  Assessment & Plan  Pt has difficulty with walking and states that she has fallen. Last fall about a week ago. Says she hit her head at some point and has a posterior right sided headache and blurry vision. Patient is lethargic.  No dysarthria, dysphagia, paraparesis or quadriparesis, behavioral disturbances, seizures,  No locked in syndrome.  CT head w/o negative for acute intracranial hemorrhage, but there is concern for CPM. She has had hyponatremia in the past  MRI scan showed central pontine myelinolysis possibly  2/2 alcoholism.    Plan:  Neurology consult - following rec's - no treatment indicated at this time  Alcohol cessation.  PT/OT eval - need post-acute placement.  Physiatry  Alcohol cessation. At this time, no proven treatment due to chronicity of the condition.    Alcoholic liver disease (HCC)- (present on admission)  Assessment & Plan  Long history of alcoholism.  MELD 13 6% 3mo mortality   Last RUQ U/S in January shows fatty infiltration vs fibrosis.  Child class B   -Alcohol cessation counseling provided. Discussed about complications of alcoholism.    Macrocytic anemia- (present on admission)  Assessment & Plan  Likely mixed picture, with macrocytic anemia secondary to etoh abuse and untreated hypothyroidism, and possible superimposed blood loss anemia given recent reported coffee ground emesis & +FOBT testing  Previous EGD shows ulcerative esophagitis, no varices was mentioned.  Hgb 8.4 on admission - trend H&H, monitor vitals closely, transfuse prn  B12, folate wnl.  TSH in 50s, very high 2/2 non-adherence. Synthroid increased from 50 mcg to 75 mcg;  Ferritin 61; iron is low at 31 and %sat at low end normal indicating likely superimposed iron deficiency anemia   FOBT+  GI consult - possible EGD   IV PPI  IVF    Fever  Assessment & Plan  UA LE and N positive. Rare epithelial cells  Urine cultures showing ecoli and klebsiella.  Started on C3 5/9 night. After that patient developed fever.  Could be PNA vs UTI.    Plan:  Blood cultures  STAT CXR  Continue C3.  Monitor closely.    Hypotension- (present on admission)  Assessment & Plan  Patient with chronic hypotension on midodrine - continue inpatient.    GERD (gastroesophageal reflux disease)- (present on admission)  Assessment & Plan  Complains of acid reflux and says she can't tolerate PPI.  hx of esophageal erosion in the past EGD  +FOBT - suspected UGI bleed - although BUN/cre 8/0.5  PPI    Alcoholic hepatitis- (present on admission)  Assessment &  Plan  Improving  , ALT 74, >2 ratio, Alk 264 on admission, improving but still elevated  Last RUQ U/S in January shows fatty infiltration vs fibrosis.  -Supportive care. IVF and trend LFT's   -Maddrey score <32, no steroids indicated.    Hypokalemia- (present on admission)  Assessment & Plan  K 3.5 on admission  Monitor closely - replete prn.    Hypothyroidism- (present on admission)  Assessment & Plan  Last TSH was 31.200 on 1/14/20. - repeat 54k  non compliant with synthroid for the past 2 weeks   -Increased synthroid to 75.    Thrombocytopenia (HCC)- (present on admission)  Assessment & Plan  Likely secondary to chronic liver disease from alcohol   Monitor closely with cbc   SCDs for DVT prophylaxis

## 2020-05-09 NOTE — ASSESSMENT & PLAN NOTE
Last EGD shows esophagial erosion.  Patient is stable Hb at around 7.5  Has history of hematemesis a week before admission.  GI consult - EGD 5/10/2020: Candida esophagitis, LA grade D esophagitis, diffuse gastritis.  PPI + sucralfate.
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
## 2020-05-09 NOTE — PROGRESS NOTES
Paged [facility] about UA and C&S results.   Paged [facility] again about K+ results.     See MAR for orders

## 2020-05-09 NOTE — ASSESSMENT & PLAN NOTE
Resolved.  UA LE and N positive. Rare epithelial cells  Urine cultures showing ecoli and klebsiella.  Started on C3 5/9 night. After that patient developed fever.  Could be PNA vs UTI.    Plan:  Blood cultures - negative.  STAT CXR - atelectasis, no consolidations.  Continue C3 - dc 5/11/20  Monitor closely.

## 2020-05-09 NOTE — PROGRESS NOTES
Bedside report received from previous nurse regarding prior 12 hours.  Pt resting comfortably.  No signs of pain or distress.  Bed locked in lowest position.  White board updated.  Call light within reach.

## 2020-05-10 ENCOUNTER — ANESTHESIA (OUTPATIENT)
Dept: SURGERY | Facility: MEDICAL CENTER | Age: 55
DRG: 896 | End: 2020-05-10
Payer: MEDICAID

## 2020-05-10 ENCOUNTER — ANESTHESIA EVENT (OUTPATIENT)
Dept: SURGERY | Facility: MEDICAL CENTER | Age: 55
DRG: 896 | End: 2020-05-10
Payer: MEDICAID

## 2020-05-10 LAB
ALBUMIN SERPL BCP-MCNC: 2.3 G/DL (ref 3.2–4.9)
ALBUMIN/GLOB SERPL: 0.8 G/DL
ALP SERPL-CCNC: 255 U/L (ref 30–99)
ALT SERPL-CCNC: 56 U/L (ref 2–50)
ANION GAP SERPL CALC-SCNC: 11 MMOL/L (ref 7–16)
AST SERPL-CCNC: 187 U/L (ref 12–45)
BASOPHILS # BLD AUTO: 0.5 % (ref 0–1.8)
BASOPHILS # BLD: 0.02 K/UL (ref 0–0.12)
BILIRUB SERPL-MCNC: 2.8 MG/DL (ref 0.1–1.5)
BUN SERPL-MCNC: 3 MG/DL (ref 8–22)
CALCIUM SERPL-MCNC: 7.3 MG/DL (ref 8.5–10.5)
CHLORIDE SERPL-SCNC: 112 MMOL/L (ref 96–112)
CO2 SERPL-SCNC: 15 MMOL/L (ref 20–33)
CREAT SERPL-MCNC: 0.48 MG/DL (ref 0.5–1.4)
EOSINOPHIL # BLD AUTO: 0.13 K/UL (ref 0–0.51)
EOSINOPHIL NFR BLD: 3.1 % (ref 0–6.9)
ERYTHROCYTE [DISTWIDTH] IN BLOOD BY AUTOMATED COUNT: 52.3 FL (ref 35.9–50)
GLOBULIN SER CALC-MCNC: 2.8 G/DL (ref 1.9–3.5)
GLUCOSE SERPL-MCNC: 98 MG/DL (ref 65–99)
HCT VFR BLD AUTO: 23.8 % (ref 37–47)
HCT VFR BLD AUTO: 24.3 % (ref 37–47)
HGB BLD-MCNC: 7.5 G/DL (ref 12–16)
HGB BLD-MCNC: 7.7 G/DL (ref 12–16)
IMM GRANULOCYTES # BLD AUTO: 0.02 K/UL (ref 0–0.11)
IMM GRANULOCYTES NFR BLD AUTO: 0.5 % (ref 0–0.9)
LYMPHOCYTES # BLD AUTO: 1.12 K/UL (ref 1–4.8)
LYMPHOCYTES NFR BLD: 26.5 % (ref 22–41)
MAGNESIUM SERPL-MCNC: 2.1 MG/DL (ref 1.5–2.5)
MCH RBC QN AUTO: 32.2 PG (ref 27–33)
MCHC RBC AUTO-ENTMCNC: 32.4 G/DL (ref 33.6–35)
MCV RBC AUTO: 99.6 FL (ref 81.4–97.8)
MONOCYTES # BLD AUTO: 0.48 K/UL (ref 0–0.85)
MONOCYTES NFR BLD AUTO: 11.4 % (ref 0–13.4)
NEUTROPHILS # BLD AUTO: 2.45 K/UL (ref 2–7.15)
NEUTROPHILS NFR BLD: 58 % (ref 44–72)
NRBC # BLD AUTO: 0 K/UL
NRBC BLD-RTO: 0 /100 WBC
PHOSPHATE SERPL-MCNC: 2.2 MG/DL (ref 2.5–4.5)
PLATELET # BLD AUTO: 53 K/UL (ref 164–446)
PMV BLD AUTO: 13.4 FL (ref 9–12.9)
POTASSIUM SERPL-SCNC: 4 MMOL/L (ref 3.6–5.5)
PROT SERPL-MCNC: 5.1 G/DL (ref 6–8.2)
RBC # BLD AUTO: 2.39 M/UL (ref 4.2–5.4)
SODIUM SERPL-SCNC: 138 MMOL/L (ref 135–145)
WBC # BLD AUTO: 4.2 K/UL (ref 4.8–10.8)

## 2020-05-10 PROCEDURE — 99232 SBSQ HOSP IP/OBS MODERATE 35: CPT | Mod: GC | Performed by: INTERNAL MEDICINE

## 2020-05-10 PROCEDURE — 85018 HEMOGLOBIN: CPT

## 2020-05-10 PROCEDURE — 700101 HCHG RX REV CODE 250: Performed by: ANESTHESIOLOGY

## 2020-05-10 PROCEDURE — 85025 COMPLETE CBC W/AUTO DIFF WBC: CPT

## 2020-05-10 PROCEDURE — 160048 HCHG OR STATISTICAL LEVEL 1-5: Performed by: INTERNAL MEDICINE

## 2020-05-10 PROCEDURE — 700102 HCHG RX REV CODE 250 W/ 637 OVERRIDE(OP): Performed by: INTERNAL MEDICINE

## 2020-05-10 PROCEDURE — 85014 HEMATOCRIT: CPT

## 2020-05-10 PROCEDURE — 36415 COLL VENOUS BLD VENIPUNCTURE: CPT

## 2020-05-10 PROCEDURE — 0DJ08ZZ INSPECTION OF UPPER INTESTINAL TRACT, VIA NATURAL OR ARTIFICIAL OPENING ENDOSCOPIC: ICD-10-PCS | Performed by: INTERNAL MEDICINE

## 2020-05-10 PROCEDURE — A9270 NON-COVERED ITEM OR SERVICE: HCPCS | Performed by: STUDENT IN AN ORGANIZED HEALTH CARE EDUCATION/TRAINING PROGRAM

## 2020-05-10 PROCEDURE — A9270 NON-COVERED ITEM OR SERVICE: HCPCS | Performed by: INTERNAL MEDICINE

## 2020-05-10 PROCEDURE — 700102 HCHG RX REV CODE 250 W/ 637 OVERRIDE(OP): Performed by: STUDENT IN AN ORGANIZED HEALTH CARE EDUCATION/TRAINING PROGRAM

## 2020-05-10 PROCEDURE — 700101 HCHG RX REV CODE 250: Performed by: STUDENT IN AN ORGANIZED HEALTH CARE EDUCATION/TRAINING PROGRAM

## 2020-05-10 PROCEDURE — 83735 ASSAY OF MAGNESIUM: CPT

## 2020-05-10 PROCEDURE — 160035 HCHG PACU - 1ST 60 MINS PHASE I: Performed by: INTERNAL MEDICINE

## 2020-05-10 PROCEDURE — 700111 HCHG RX REV CODE 636 W/ 250 OVERRIDE (IP): Performed by: ANESTHESIOLOGY

## 2020-05-10 PROCEDURE — 160002 HCHG RECOVERY MINUTES (STAT): Performed by: INTERNAL MEDICINE

## 2020-05-10 PROCEDURE — 160009 HCHG ANES TIME/MIN: Performed by: INTERNAL MEDICINE

## 2020-05-10 PROCEDURE — 700111 HCHG RX REV CODE 636 W/ 250 OVERRIDE (IP): Performed by: STUDENT IN AN ORGANIZED HEALTH CARE EDUCATION/TRAINING PROGRAM

## 2020-05-10 PROCEDURE — 770006 HCHG ROOM/CARE - MED/SURG/GYN SEMI*

## 2020-05-10 PROCEDURE — 160036 HCHG PACU - EA ADDL 30 MINS PHASE I: Performed by: INTERNAL MEDICINE

## 2020-05-10 PROCEDURE — 700105 HCHG RX REV CODE 258: Performed by: ANESTHESIOLOGY

## 2020-05-10 PROCEDURE — 700105 HCHG RX REV CODE 258: Performed by: STUDENT IN AN ORGANIZED HEALTH CARE EDUCATION/TRAINING PROGRAM

## 2020-05-10 PROCEDURE — 80053 COMPREHEN METABOLIC PANEL: CPT

## 2020-05-10 PROCEDURE — 51798 US URINE CAPACITY MEASURE: CPT

## 2020-05-10 PROCEDURE — 160202 HCHG ENDO MINUTES - 1ST 30 MINS LEVEL 3: Performed by: INTERNAL MEDICINE

## 2020-05-10 PROCEDURE — 84100 ASSAY OF PHOSPHORUS: CPT

## 2020-05-10 RX ORDER — FLUCONAZOLE 200 MG/1
200 TABLET ORAL DAILY
Status: DISCONTINUED | OUTPATIENT
Start: 2020-05-11 | End: 2020-05-13 | Stop reason: HOSPADM

## 2020-05-10 RX ORDER — ONDANSETRON 2 MG/ML
4 INJECTION INTRAMUSCULAR; INTRAVENOUS
Status: DISCONTINUED | OUTPATIENT
Start: 2020-05-10 | End: 2020-05-10 | Stop reason: HOSPADM

## 2020-05-10 RX ORDER — DIPHENHYDRAMINE HYDROCHLORIDE 50 MG/ML
25 INJECTION INTRAMUSCULAR; INTRAVENOUS EVERY 6 HOURS PRN
Status: DISCONTINUED | OUTPATIENT
Start: 2020-05-10 | End: 2020-05-10

## 2020-05-10 RX ORDER — ACETAMINOPHEN 500 MG
500 TABLET ORAL ONCE
Status: COMPLETED | OUTPATIENT
Start: 2020-05-10 | End: 2020-05-10

## 2020-05-10 RX ORDER — ROCURONIUM BROMIDE 10 MG/ML
INJECTION, SOLUTION INTRAVENOUS PRN
Status: DISCONTINUED | OUTPATIENT
Start: 2020-05-10 | End: 2020-05-10 | Stop reason: SURG

## 2020-05-10 RX ORDER — DEXTROSE AND SODIUM CHLORIDE 5; .9 G/100ML; G/100ML
INJECTION, SOLUTION INTRAVENOUS CONTINUOUS
Status: DISCONTINUED | OUTPATIENT
Start: 2020-05-10 | End: 2020-05-11

## 2020-05-10 RX ORDER — MEPERIDINE HYDROCHLORIDE 25 MG/ML
6.25 INJECTION INTRAMUSCULAR; INTRAVENOUS; SUBCUTANEOUS
Status: DISCONTINUED | OUTPATIENT
Start: 2020-05-10 | End: 2020-05-10 | Stop reason: HOSPADM

## 2020-05-10 RX ORDER — LIDOCAINE HYDROCHLORIDE 40 MG/ML
SOLUTION TOPICAL PRN
Status: DISCONTINUED | OUTPATIENT
Start: 2020-05-10 | End: 2020-05-10 | Stop reason: SURG

## 2020-05-10 RX ORDER — FLUCONAZOLE 200 MG/1
200 TABLET ORAL DAILY
Status: DISCONTINUED | OUTPATIENT
Start: 2020-05-10 | End: 2020-05-10

## 2020-05-10 RX ORDER — SUCRALFATE ORAL 1 G/10ML
1 SUSPENSION ORAL EVERY 6 HOURS
Status: DISCONTINUED | OUTPATIENT
Start: 2020-05-10 | End: 2020-05-13 | Stop reason: HOSPADM

## 2020-05-10 RX ORDER — HYDROMORPHONE HYDROCHLORIDE 1 MG/ML
0.2 INJECTION, SOLUTION INTRAMUSCULAR; INTRAVENOUS; SUBCUTANEOUS
Status: DISCONTINUED | OUTPATIENT
Start: 2020-05-10 | End: 2020-05-10 | Stop reason: HOSPADM

## 2020-05-10 RX ORDER — DIPHENHYDRAMINE HYDROCHLORIDE 50 MG/ML
12.5 INJECTION INTRAMUSCULAR; INTRAVENOUS
Status: DISCONTINUED | OUTPATIENT
Start: 2020-05-10 | End: 2020-05-10 | Stop reason: HOSPADM

## 2020-05-10 RX ORDER — HALOPERIDOL 5 MG/ML
1 INJECTION INTRAMUSCULAR
Status: DISCONTINUED | OUTPATIENT
Start: 2020-05-10 | End: 2020-05-10 | Stop reason: HOSPADM

## 2020-05-10 RX ORDER — FLUCONAZOLE 200 MG/1
400 TABLET ORAL ONCE
Status: COMPLETED | OUTPATIENT
Start: 2020-05-10 | End: 2020-05-10

## 2020-05-10 RX ORDER — SODIUM CHLORIDE, SODIUM LACTATE, POTASSIUM CHLORIDE, CALCIUM CHLORIDE 600; 310; 30; 20 MG/100ML; MG/100ML; MG/100ML; MG/100ML
INJECTION, SOLUTION INTRAVENOUS
Status: DISCONTINUED | OUTPATIENT
Start: 2020-05-10 | End: 2020-05-10 | Stop reason: SURG

## 2020-05-10 RX ORDER — HYDROMORPHONE HYDROCHLORIDE 1 MG/ML
0.4 INJECTION, SOLUTION INTRAMUSCULAR; INTRAVENOUS; SUBCUTANEOUS
Status: DISCONTINUED | OUTPATIENT
Start: 2020-05-10 | End: 2020-05-10 | Stop reason: HOSPADM

## 2020-05-10 RX ORDER — FLUCONAZOLE 2 MG/ML
200 INJECTION, SOLUTION INTRAVENOUS EVERY 24 HOURS
Status: DISCONTINUED | OUTPATIENT
Start: 2020-05-10 | End: 2020-05-10

## 2020-05-10 RX ORDER — SODIUM CHLORIDE, SODIUM LACTATE, POTASSIUM CHLORIDE, CALCIUM CHLORIDE 600; 310; 30; 20 MG/100ML; MG/100ML; MG/100ML; MG/100ML
INJECTION, SOLUTION INTRAVENOUS CONTINUOUS
Status: DISCONTINUED | OUTPATIENT
Start: 2020-05-10 | End: 2020-05-10 | Stop reason: HOSPADM

## 2020-05-10 RX ORDER — TRAZODONE HYDROCHLORIDE 50 MG/1
50 TABLET ORAL
Status: DISCONTINUED | OUTPATIENT
Start: 2020-05-10 | End: 2020-05-12

## 2020-05-10 RX ORDER — OXYCODONE HCL 5 MG/5 ML
5 SOLUTION, ORAL ORAL
Status: DISCONTINUED | OUTPATIENT
Start: 2020-05-10 | End: 2020-05-10 | Stop reason: HOSPADM

## 2020-05-10 RX ORDER — OXYCODONE HCL 5 MG/5 ML
10 SOLUTION, ORAL ORAL
Status: DISCONTINUED | OUTPATIENT
Start: 2020-05-10 | End: 2020-05-10 | Stop reason: HOSPADM

## 2020-05-10 RX ORDER — SUCRALFATE ORAL 1 G/10ML
1 SUSPENSION ORAL EVERY 6 HOURS
Status: DISCONTINUED | OUTPATIENT
Start: 2020-05-10 | End: 2020-05-10

## 2020-05-10 RX ORDER — SUCCINYLCHOLINE/SOD CL,ISO/PF 200MG/10ML
SYRINGE (ML) INTRAVENOUS PRN
Status: DISCONTINUED | OUTPATIENT
Start: 2020-05-10 | End: 2020-05-10 | Stop reason: SURG

## 2020-05-10 RX ORDER — THIAMINE MONONITRATE (VIT B1) 100 MG
100 TABLET ORAL DAILY
Status: DISCONTINUED | OUTPATIENT
Start: 2020-05-10 | End: 2020-05-13 | Stop reason: HOSPADM

## 2020-05-10 RX ORDER — HYDROMORPHONE HYDROCHLORIDE 1 MG/ML
0.1 INJECTION, SOLUTION INTRAMUSCULAR; INTRAVENOUS; SUBCUTANEOUS
Status: DISCONTINUED | OUTPATIENT
Start: 2020-05-10 | End: 2020-05-10 | Stop reason: HOSPADM

## 2020-05-10 RX ADMIN — LIDOCAINE HYDROCHLORIDE 4 ML: 40 SOLUTION TOPICAL at 10:00

## 2020-05-10 RX ADMIN — LACTULOSE 15 ML: 20 SOLUTION ORAL at 12:40

## 2020-05-10 RX ADMIN — SUCRALFATE 1 G: 1 SUSPENSION ORAL at 13:16

## 2020-05-10 RX ADMIN — ALBUTEROL SULFATE 2.5 MG: 2.5 SOLUTION RESPIRATORY (INHALATION) at 10:45

## 2020-05-10 RX ADMIN — Medication 100 MG: at 12:39

## 2020-05-10 RX ADMIN — TRAZODONE HYDROCHLORIDE 50 MG: 50 TABLET ORAL at 22:12

## 2020-05-10 RX ADMIN — DEXTROSE AND SODIUM CHLORIDE: 5; 900 INJECTION, SOLUTION INTRAVENOUS at 15:21

## 2020-05-10 RX ADMIN — DIBASIC SODIUM PHOSPHATE, MONOBASIC POTASSIUM PHOSPHATE AND MONOBASIC SODIUM PHOSPHATE 1 TABLET: 852; 155; 130 TABLET ORAL at 16:51

## 2020-05-10 RX ADMIN — MIDODRINE HYDROCHLORIDE 5 MG: 5 TABLET ORAL at 16:51

## 2020-05-10 RX ADMIN — CEFTRIAXONE SODIUM 1 G: 1 INJECTION, POWDER, FOR SOLUTION INTRAMUSCULAR; INTRAVENOUS at 05:43

## 2020-05-10 RX ADMIN — Medication 400 MG: at 04:57

## 2020-05-10 RX ADMIN — ROCURONIUM BROMIDE 10 MG: 10 INJECTION, SOLUTION INTRAVENOUS at 09:59

## 2020-05-10 RX ADMIN — OMEPRAZOLE 20 MG: 20 CAPSULE, DELAYED RELEASE ORAL at 04:57

## 2020-05-10 RX ADMIN — LACTULOSE 15 ML: 20 SOLUTION ORAL at 04:57

## 2020-05-10 RX ADMIN — DIBASIC SODIUM PHOSPHATE, MONOBASIC POTASSIUM PHOSPHATE AND MONOBASIC SODIUM PHOSPHATE 1 TABLET: 852; 155; 130 TABLET ORAL at 13:17

## 2020-05-10 RX ADMIN — FENTANYL CITRATE 100 MCG: 50 INJECTION INTRAMUSCULAR; INTRAVENOUS at 10:12

## 2020-05-10 RX ADMIN — PROPOFOL 100 MG: 10 INJECTION, EMULSION INTRAVENOUS at 09:59

## 2020-05-10 RX ADMIN — LACTULOSE 15 ML: 20 SOLUTION ORAL at 12:38

## 2020-05-10 RX ADMIN — MIDODRINE HYDROCHLORIDE 5 MG: 5 TABLET ORAL at 12:39

## 2020-05-10 RX ADMIN — SODIUM CHLORIDE 125 MG: 9 INJECTION, SOLUTION INTRAVENOUS at 13:17

## 2020-05-10 RX ADMIN — Medication 60 MG: at 09:59

## 2020-05-10 RX ADMIN — DIBASIC SODIUM PHOSPHATE, MONOBASIC POTASSIUM PHOSPHATE AND MONOBASIC SODIUM PHOSPHATE 1 TABLET: 852; 155; 130 TABLET ORAL at 23:02

## 2020-05-10 RX ADMIN — FLUCONAZOLE 400 MG: 200 TABLET ORAL at 13:17

## 2020-05-10 RX ADMIN — ACETAMINOPHEN 500 MG: 500 TABLET ORAL at 04:57

## 2020-05-10 RX ADMIN — POTASSIUM CHLORIDE, DEXTROSE MONOHYDRATE AND SODIUM CHLORIDE: 150; 5; 900 INJECTION, SOLUTION INTRAVENOUS at 01:07

## 2020-05-10 RX ADMIN — OMEPRAZOLE 20 MG: 20 CAPSULE, DELAYED RELEASE ORAL at 16:51

## 2020-05-10 RX ADMIN — SODIUM CHLORIDE, POTASSIUM CHLORIDE, SODIUM LACTATE AND CALCIUM CHLORIDE: 600; 310; 30; 20 INJECTION, SOLUTION INTRAVENOUS at 09:56

## 2020-05-10 RX ADMIN — LEVOTHYROXINE SODIUM 75 MCG: 75 TABLET ORAL at 04:57

## 2020-05-10 RX ADMIN — SUCRALFATE 1 G: 1 SUSPENSION ORAL at 23:02

## 2020-05-10 RX ADMIN — SUCRALFATE 1 G: 1 SUSPENSION ORAL at 16:51

## 2020-05-10 RX ADMIN — LACTULOSE 15 ML: 20 SOLUTION ORAL at 16:51

## 2020-05-10 ASSESSMENT — ENCOUNTER SYMPTOMS
HALLUCINATIONS: 0
HEADACHES: 0
DOUBLE VISION: 0
BLOOD IN STOOL: 0
SHORTNESS OF BREATH: 0
INSOMNIA: 1
NERVOUS/ANXIOUS: 0
CONSTIPATION: 0
VOMITING: 0
DIZZINESS: 0
PALPITATIONS: 0
FEVER: 0
SORE THROAT: 0
BLURRED VISION: 0
HEARTBURN: 0
ORTHOPNEA: 0
COUGH: 0
ABDOMINAL PAIN: 0
DEPRESSION: 0
DIARRHEA: 0
CHILLS: 0
NAUSEA: 1
TREMORS: 0
SEIZURES: 0
WEAKNESS: 0
SENSORY CHANGE: 0

## 2020-05-10 ASSESSMENT — PAIN SCALES - GENERAL: PAIN_LEVEL: 0

## 2020-05-10 ASSESSMENT — LIFESTYLE VARIABLES: SUBSTANCE_ABUSE: 1

## 2020-05-10 NOTE — PROGRESS NOTES
Gastroenterology    I have seen the patient in the pre-procedure area.  She reports some abdominal bloating, but no hematemesis.  H/H has been stable.  Her abdomen is mildly protuberant, but soft and non-tender.  There is question of possible ascites.    Problems:  1.Reported coffee ground emesis and melena.  I suspect she still has significant esophagitis due to ongoing alcohol abuse, recent vomiting, aspirin usage, and non-compliance with Prilosec therapy.   2. Alcohol abuse.  3. Alcoholic hepatitis superimposed on likely cirrhosis.  Hep B and C negative.  4. Medical non-compliance.  5. Anemia due to prior GI blood loss and chronic anemia due to malnutrition and alcohol toxicity.  Low iron, low TIBC, normal iron sat, and normal B12.  6. Thrombocytopenia.  7. Neutropenia due to alcohol and liver disease.  8. Elevated INR.  9. Frequent falls.    Plan:  - Proceed with EGD now.    Lucas Crespo MD
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
## 2020-05-10 NOTE — PROGRESS NOTES
Assumed care of pt at 0715. Report received and bedside rounding completed with night RN. Pt is calm no SOB, or in any acute distress noted.    Pt is considered a high fall risk. edu provided on risk level. Fall precautions in place,  bed alarm. - Treaded non slip socks. Bed locked. Communication board updated with POC. Call light and pt belongings within reach - pt makes needs known - hourly rounding in place. See flowsheets for further assessment.     Patient sleeping this am- strict Npo due to EGD. BP soft this am. Went down for EGD procedure. Meds given given on arrival back to her room post procedure.  Bladder scan completed = 399mL. Per MD to straight cath >400mL: Will attempt to have patient urinate- If unable to will bladder scan again.

## 2020-05-10 NOTE — PROGRESS NOTES
Rec'd pt via bed, escorted by pt transport. Assumed pt care. AA&OX4. Denies pain at this time. No s/s of discomfort or distress. Have not ambulated pt at this time, per report 1 assist, unsteady, can be impulsive and high fall risk. Pt is incontinent of urine, cleaned up and linens changed. Placed a purewick in place. Bed in lowest position, bed locked, bed alarm on for safety, treaded socks in place, RN and CNA numbers provided, call light within reach.

## 2020-05-10 NOTE — ANESTHESIA POSTPROCEDURE EVALUATION
Patient: Jumana Kahn    Procedure Summary     Date:  05/10/20 Room / Location:  [place] [address] / SURGERY [facility]    Anesthesia Start:  0956 Anesthesia Stop:  1016    Procedure:  GASTROSCOPY (N/A Mouth) Diagnosis:  (Reported coffee ground emesis and melena, anemia)    Surgeon:  Lucas Crespo M.D. Responsible Provider:  Darryl Llanos M.D.    Anesthesia Type:  general ASA Status:  3          Final Anesthesia Type: general  Last vitals  BP   Blood Pressure: (!) 84/57(RN notified)    Temp   37.3 °C (99.2 °F)    Pulse   Pulse: 100   Resp   18    SpO2   93 %      Anesthesia Post Evaluation    Patient location during evaluation: PACU  Patient participation: complete - patient participated  Level of consciousness: awake and alert  Pain score: 0    Airway patency: patent  Anesthetic complications: no  Cardiovascular status: hemodynamically stable  Respiratory status: acceptable  Hydration status: euvolemic    PONV: none           Nurse Pain Score: 0 (NPRS)

## 2020-05-10 NOTE — PROCEDURES
DATE OF SERVICE:  05/10/2020    PREPROCEDURE DIAGNOSIS:  Coffee-ground emesis.    POSTPROCEDURE DIAGNOSES:  1.  Scattered 1 mm whitish plaques in the upper third of the esophagus   consistent with mild candidiasis, not biopsied due to coagulopathy.  2.  Clot overlying area of inflamed esophageal mucosa at 26 cm with diffuse   slow oozing, which stopped under observation.  No visible vessel to treat.  3.  Hemoclip attached to esophageal mucosa at 28 cm from prior procedure in   January.  4.  Severe esophagitis with confluent whitish exudate in the distal third of   the esophagus consistent with [place] grade D esophagitis.  5.  Hiatal hernia from 34-37 cm.  6.  Diffuse gastritis with mucosal changes consistent with portal   hypertension.  7.  Normal duodenum.    PROCEDURE:  Esophagogastroduodenoscopy.    SEDATION:  General anesthesia with intubation.    ANESTHESIOLOGIST:  Darryl Llanos MD    SPECIMENS:  None.    DESCRIPTION OF PROCEDURE:  The risks, benefits, and alternatives were   explained to the patient and consent obtained.  She was brought to the   operating room suite where a time-out was performed.  She was then put under   general anesthesia and intubated by Dr. Llanos.  The patient was gently rolled   into the left lateral decubitus position.  The Olympus flexible video   endoscope was introduced in the mouth and gently advanced into the esophagus   under direct visualization.  In the esophagus at 26 cm, there was a clot   present, but no active bleeding.  I suctioned off the clot and it revealed   underlying diffuse slow oozing of inflamed mucosa.  No visible vessels were   evident.  The esophagus, at 28 cm, had a Hemoclip attached on the right wall   from prior procedure in January.  Distal to this, she had severe esophagitis   with whitish exudate becoming confluent distally.  The gastroesophageal   junction was located about 34 cm from the incisors and there was a hiatal   hernia that extended to 37  cm.  The gastric mucosa had diffuse patchy erythema   with some areas of mucosal edema and hyperemia, suggesting portal   hypertensive state.  No gastric varices were seen on retroflex view.  The   duodenal bulb and second portion of the duodenum appeared normal.  I brought   the scope back to the esophagus.  I elected not to do biopsies due to portal   hypertension and coagulopathy.  The site, where she had the blood clot, was no   longer bleeding.  There was just adherent blood, which we washed away,   revealed underlying inflamed mucosa.  Air was evacuated and scope withdrawn.    The patient tolerated the procedure well and there were no complications.    RECOMMENDATIONS:  1.  Empiric therapy for esophageal candidiasis with fluconazole for 14 days.  2.  Continue omeprazole 20 mg twice daily.  3.  Add sucralfate suspension 1 g q.i.d.  4.  It is critical that the patient avoid aspirin, nonsteroidal   anti-inflammatory drugs and alcohol going forward.       ____________________________________     Lucas Crespo MD CMS / JM    DD:  05/10/2020 10:30:17  DT:  05/10/2020 10:40:10    D#:  [id number]  Job#:  [id number]

## 2020-05-10 NOTE — CARE PLAN
Problem: Safety  Goal: Will remain free from injury  Outcome: PROGRESSING AS EXPECTED   -Uses call light appropriately    Problem: Infection  Goal: Will remain free from infection  Outcome: PROGRESSING AS EXPECTED  -Rocephin for UTI       Problem: Bowel/Gastric:  Goal: Normal bowel function is maintained or improved  Outcome: PROGRESSING AS EXPECTED  -Titration lactulose depending on number of BMs per day

## 2020-05-10 NOTE — ANESTHESIA PREPROCEDURE EVALUATION
"    Relevant Problems   GI   (+) GERD (gastroesophageal reflux disease)         (+) Alcoholic hepatitis   (+) Alcoholic liver disease (HCC)      ENDO   (+) Hypothyroidism   BP (!) 84/57 Comment: RN notified  Pulse 100   Temp 37.3 °C (99.2 °F)   Resp 18   Ht 1.651 m (5' 5\")   Wt 72.5 kg (159 lb 13.3 oz)   SpO2 93%   BMI 26.60 kg/m²     Physical Exam    Airway   Mallampati: II  TM distance: >3 FB  Neck ROM: full       Cardiovascular - normal exam  Rhythm: regular  Rate: normal  (-) murmur     Dental - normal exam           Pulmonary - normal exam  Breath sounds clear to auscultation     Abdominal    Neurological - normal exam                 Anesthesia Plan    ASA 3   ASA physical status 3 criteria: alcohol and/or substance dependence or abuse    Plan - general       Airway plan will be ETT        Induction: intravenous    Postoperative Plan: Postoperative administration of opioids is intended.    Pertinent diagnostic labs and testing reviewed    Informed Consent:    Anesthetic plan and risks discussed with patient.    Use of blood products discussed with: patient whom consented to blood products.         "

## 2020-05-10 NOTE — PROGRESS NOTES
Daily Progress Note:     Date of Service: 5/10/2020  Primary Team: [facility] IM White Team   Attending: Anibal Monzon M.D.   Senior Resident: Dr. Wallace  Intern: Dr. Valenzuela  Contact:  [phone]    Chief Complaint:   Alcohol withdrawal    ID: Patient with history of alcohol abuse with alcoholic liver disease. She was admitted due to alcohol intoxication and complications of alcoholism including alcoholic hepatitis, central pontine myelinolysis, GIB possibly from alcoholic gastritis.    Subjective:  No overnight events. Comfortably sleeping in a bed. She is oriented, couldn't sleep overnight. Patient is having nausea    Interval updates:  5/8/2020: Hemoglobin dropped to 6.8 ON, repeat is >7. Patient is stable, although had an episode of low SBP 80s, given 250 cc IVF by night team. CIWA 16-1-3-10 last, received 3.5 mg ativan overnight. LFTs trending down, alcoholic hepatitis seems to be improving. Thrombocytes are 34k today, no active bleeding, will monitor closely. MRI brain revealed central pontine myelinolysis, in this case it is likely chronic, will follow neuro recs  5/9/2020: CIWA Stable Hb around 7.5 overnight. No events. GI was consulted ([facility]) for elective endoscopy for possible UGIB. Will obtain COVID test for EGD. Continue PPI. Had 4 bowel movement with lactulose, lowered the dose. Thrombocytes stable at 45k, no active bleeding. She has been tolerating regular diet. Cortisol for low BP and K, NS+D5+K IVF. Developed fever, UCx showing Ecoli and klebsiella, although UA is showing epithelial cells. Will keep patient on abx. Blood cultures, STAT CXR. Suspicion for drug related fever as well.   5/10/2020: CXR did not reveal any PNA, some atelectasis. Negative blood cultures so far. Fever is resolved. Will get EGD today.    Consultants/Specialty:  Gastroenterology - [facility]    Review of Systems:   Review of Systems   Constitutional: Positive for malaise/fatigue. Negative for chills and fever.    HENT: Negative for sore throat.    Eyes: Negative for blurred vision and double vision.   Respiratory: Negative for cough and shortness of breath.    Cardiovascular: Negative for chest pain, palpitations, orthopnea and leg swelling.   Gastrointestinal: Positive for melena and nausea. Negative for abdominal pain, blood in stool, constipation, diarrhea, heartburn and vomiting.   Genitourinary: Negative for dysuria and hematuria.   Musculoskeletal: Negative for joint pain.   Neurological: Negative for dizziness, tremors, sensory change, seizures, weakness and headaches.   Psychiatric/Behavioral: Positive for substance abuse. Negative for depression, hallucinations and suicidal ideas. The patient has insomnia. The patient is not nervous/anxious.        Objective Data:   Physical Exam:   Vitals:   Temp:  [36.7 °C (98 °F)-38.4 °C (101.2 °F)] 37.4 °C (99.3 °F)  Pulse:  [] 105  Resp:  [14-18] 18  BP: ()/(55-76) 96/57  SpO2:  [93 %-99 %] 95 %    Physical Exam  Constitutional:       General: She is not in acute distress.     Appearance: She is not ill-appearing.      Comments: Alert and oriented.  Slow responses.   HENT:      Head: Atraumatic.      Nose: No rhinorrhea.   Eyes:      General: Scleral icterus present.      Extraocular Movements: Extraocular movements intact.   Neck:      Musculoskeletal: Neck supple.   Cardiovascular:      Rate and Rhythm: Normal rate and regular rhythm.      Heart sounds: Normal heart sounds. No murmur. No friction rub. No gallop.    Pulmonary:      Breath sounds: No wheezing, rhonchi or rales.   Abdominal:      Tenderness: There is abdominal tenderness (ruq). There is no guarding or rebound.      Comments: Hyperactive bowel sounds  No shifting dullness on abdominal exam - no ascites.  No caput medusa seen on skin exam.   Genitourinary:     Rectum: Guaiac result positive.   Musculoskeletal:      Right lower leg: No edema.      Left lower leg: No edema.   Skin:     Coloration: Skin  is pale. Skin is not jaundiced.      Findings: No rash.      Comments: No spider angiomata.   Neurological:      General: No focal deficit present.      Mental Status: She is disoriented.      Cranial Nerves: No cranial nerve deficit.      Sensory: No sensory deficit.      Motor: No weakness.      Deep Tendon Reflexes: Reflexes normal.      Comments: No asterixis   Psychiatric:      Comments: Slowed mentation           Labs:     Recent Labs     05/08/20  0306 05/09/20 0112 05/09/20 0856 05/09/20 2057   RBC 2.37*  --  2.37*  --   --    HEMOGLOBIN 7.5*   < > 7.4*  7.4* 8.6* 8.0*   HEMATOCRIT 22.8*   < > 23.0*  23.1* 27.9* 25.5*   PLATELETCT 34*  --  45*  --   --     < > = values in this interval not displayed.     Recent Labs     05/08/20  0858 05/09/20  0112 05/09/20  0856 05/09/20  1203 05/09/20  2057 05/10/20  0323   SODIUM  --  139  --  140 138 138   POTASSIUM  --  3.3*  --  4.1 4.0 4.0   CHLORIDE  --  110  --  111 112 112   CO2  --  19*  --  17* 16* 15*   BUN  --  5*  --  4* 3* 3*   CREATININE  --  0.60  --  0.62 0.51 0.48*   MAGNESIUM 1.8  --  2.2  --  2.1  --    PHOSPHORUS 1.8* 3.2  --   --   --   --    CALCIUM  --  7.0*  --  7.4* 7.5* 7.3*     Recent Labs     05/08/20 0114 05/09/20  0112 05/09/20  1203 05/09/20  2057 05/10/20  0323   ALTSGPT 56* 52*  --   --  56*   ASTSGOT 229* 194*  --   --  187*   ALKPHOSPHAT 239* 243*  --   --  255*   TBILIRUBIN 2.6* 2.2*  --   --  2.8*   GLUCOSE 69 107* 96 116* 98                 Recent Labs     05/08/20  0114 05/08/20  0306 05/09/20  0112 05/10/20  0323   WBC  --  3.8* 3.7*  --    NEUTSPOLYS  --   --  54.00  --    LYMPHOCYTES  --   --  32.20  --    MONOCYTES  --   --  9.90  --    EOSINOPHILS  --   --  2.90  --    BASOPHILS  --   --  0.50  --    ASTSGOT 229*  --  194* 187*   ALTSGPT 56*  --  52* 56*   ALKPHOSPHAT 239*  --  243* 255*   TBILIRUBIN 2.6*  --  2.2* 2.8*     Recent Labs     05/07/20  0812   METHADONE Negative   OPIATES Negative   CANNABINOID Negative    AMPHUR Negative       Imaging:   CXR - left basilar atelectasis.    * Alcohol intoxication in active alcoholic with complication (HCC)- (present on admission)  Assessment & Plan  No alcoholic seizures in the past. Feels like she is going through a detox,   last drink was AM on admission, drinking 1/5 pint vodka /d.   Has tremor, asterixis in ED.  ETOh level 241.1 on admission  EKG sinus tach in the ED, no arrhythmia no Afib.     Plan:  Admit to neurology with telemetry monitoring  CT head - ?central pontine myelinolysis - neurology following - MRI central pontine myelinolysis.  Aspiration/Seiaure/Fall precautions   MV/thiamine/folate   Ativan per CIWA - discontinue 5/9  IVF  Advance diet - tolerating regular diet.  Monitor and replete electrolytes prn   Repeat TSH/B12/Folate/Ammonia level - wnl.    Occult blood positive stool- (present on admission)  Assessment & Plan  Last EGD shows esophagial erosion.  Patient is stable Hb at around 7.5  Has history of hematemesis a week before admission.  GI consult - EGD pending, COVID test before  PPI    Central pontine myelinolysis (HCC)- (present on admission)  Assessment & Plan  Pt has difficulty with walking and states that she has fallen. Last fall about a week ago. Says she hit her head at some point and has a posterior right sided headache and blurry vision. Patient is lethargic.  No dysarthria, dysphagia, paraparesis or quadriparesis, behavioral disturbances, seizures,  No locked in syndrome.  CT head w/o negative for acute intracranial hemorrhage, but there is concern for CPM. She has had hyponatremia in the past  MRI scan showed central pontine myelinolysis possibly 2/2 alcoholism.    Plan:  Neurology consult - following rec's - no treatment indicated at this time  Alcohol cessation.  PT/OT eval - need post-acute placement.  Physiatry  Alcohol cessation. At this time, no proven treatment due to chronicity of the condition.    Alcoholic liver disease (HCC)- (present  on admission)  Assessment & Plan  Long history of alcoholism.  MELD 13 6% 3mo mortality   Last RUQ U/S in January shows fatty infiltration vs fibrosis.  Child class B   -Alcohol cessation counseling provided. Discussed about complications of alcoholism.    Macrocytic anemia- (present on admission)  Assessment & Plan  Likely mixed picture, with macrocytic anemia secondary to etoh abuse and untreated hypothyroidism, and possible superimposed blood loss anemia given recent reported coffee ground emesis & +FOBT testing  Previous EGD shows ulcerative esophagitis, no varices was mentioned.  Hgb 8.4 on admission - trend H&H, monitor vitals closely, transfuse prn  B12, folate wnl.  TSH in 50s, very high 2/2 non-adherence. Synthroid increased from 50 mcg to 75 mcg;  Ferritin 61; iron is low at 31 and %sat at low end normal indicating likely superimposed iron deficiency anemia   FOBT+  GI consult - possible EGD   IV PPI  IVF    Fever  Assessment & Plan  UA LE and N positive. Rare epithelial cells  Urine cultures showing ecoli and klebsiella.  Started on C3 5/9 night. After that patient developed fever.  Could be PNA vs UTI.    Plan:  Blood cultures  STAT CXR  Continue C3.  Monitor closely.    Hypotension- (present on admission)  Assessment & Plan  Patient with chronic hypotension on midodrine - continue inpatient.    GERD (gastroesophageal reflux disease)- (present on admission)  Assessment & Plan  Complains of acid reflux and says she can't tolerate PPI.  hx of esophageal erosion in the past EGD  +FOBT - suspected UGI bleed - although BUN/cre 8/0.5  PPI    Alcoholic hepatitis- (present on admission)  Assessment & Plan  Improving  , ALT 74, >2 ratio, Alk 264 on admission, improving but still elevated  Last RUQ U/S in January shows fatty infiltration vs fibrosis.  -Supportive care. IVF and trend LFT's   -Maddrey score <32, no steroids indicated.    Hypokalemia- (present on admission)  Assessment & Plan  K 3.5 on  admission  Monitor closely - replete prn.    Hypothyroidism- (present on admission)  Assessment & Plan  Last TSH was 31.200 on 1/14/20. - repeat 54k  non compliant with synthroid for the past 2 weeks   -Increased synthroid to 75.    Thrombocytopenia (HCC)- (present on admission)  Assessment & Plan  Likely secondary to chronic liver disease from alcohol   Monitor closely with cbc   SCDs for DVT prophylaxis

## 2020-05-10 NOTE — ANESTHESIA PROCEDURE NOTES
Airway  Date/Time: 5/10/2020 10:00 AM  Performed by: Darryl Llanos M.D.  Authorized by: Darryl Llanos M.D.     Location:  OR  Urgency:  Elective  Difficult Airway: No    Indications for Airway Management:  Anesthesia      Spontaneous Ventilation: absent    Sedation Level:  Deep  Preoxygenated: Yes    Patient Position:  Sniffing  Mask Difficulty Assessment:  0 - not attempted  Final Airway Type:  Endotracheal airway  Final Endotracheal Airway:  ETT  Cuffed: Yes    Technique Used for Successful ETT Placement:  Direct laryngoscopy    Insertion Site:  Oral  Blade Type:  Cricket  Laryngoscope Blade/Videolaryngoscope Blade Size:  3  ETT Size (mm):  7.0  Measured from:  Teeth  ETT to Teeth (cm):  21  Placement Verified by: auscultation and capnometry    Cormack-Lehane Classification:  Grade I - full view of glottis  Number of Attempts at Approach:  1          
42 y/o F pt with hx of asthma presents to ED c/o laceration to left 2nd digit with a knife this morning. Pt went to Urgent Care, X-ray completed advised to come to ED. She was placed on Keflex and Percocet, pt didn't  her prescriptions yet. Denies nausea, vomiting, abdominal pain, chest pain, SOB, headache. No further complaints at this time.

## 2020-05-10 NOTE — OR SURGEON
Immediate Post OP Note    PreOp Diagnosis: Coffee ground emesis    PostOp Diagnosis:   1. Scattered 1 mm whitish plaques in upper third of esophagus c/w mild candidiasis, not biopsied due to coagulopathy.  2. Clot overlying area of inflamed esophageal mucosa at 26 cm with diffuse slow oozing which stopped.  No focal visible vessel.  3. Hemoclip attached to esophageal mucosa at 28 cm.  4. Severe esophagitis with whitish exudate becoming confluent in distal third of esophagus.  5. Hiatal hernia, 34-37 cm.  6. Diffuse gastritis with portal hypertensive changes.  7. Normal duodenum.    Procedure(s):  GASTROSCOPY - Wound Class: Clean Contaminated    Surgeon(s):  Lucas Crespo M.D.    Anesthesiologist/Type of Anesthesia:  Anesthesiologist: Darryl Llanos M.D./General    Surgical Staff:  Endoscopy Technician: Idalmis Peck  Endoscopy Nurse: Cedric Chapman R.N.    Specimens removed if any:  * No specimens in log *    Estimated Blood Loss: < 3 mL    Findings: As above    Complications: None    Recommend:  - Empiric therapy for candidiasis (fluconazole).  - Continue omeprazole 20 mg BID.  - Sucralfate suspension 1 gram QID.  - Patient to avoid aspirin, NSAIDs and alcohol.         5/10/2020 10:12 AM Lucas Crespo M.D.

## 2020-05-10 NOTE — ANESTHESIA TIME REPORT
Anesthesia Start and Stop Event Times     Date Time Event    5/10/2020 0919 Ready for Procedure     0956 Anesthesia Start     1016 Anesthesia Stop        Responsible Staff  05/10/20    Name Role Begin End    Darryl Llanos M.D. Anesth 0956 1016        Preop Diagnosis (Free Text):  Pre-op Diagnosis     Reported coffee ground emesis and melena, anemia        Preop Diagnosis (Codes):    Post op Diagnosis  Upper GI bleed      Premium Reason  E. Weekend    Comments:

## 2020-05-11 PROBLEM — B37.81 CANDIDA ESOPHAGITIS (HCC): Status: ACTIVE | Noted: 2020-05-11

## 2020-05-11 PROBLEM — K29.20 GASTRITIS DUE TO ALCOHOL WITHOUT HEMORRHAGE: Status: ACTIVE | Noted: 2020-05-11

## 2020-05-11 LAB
25(OH)D3 SERPL-MCNC: 10 NG/ML (ref 30–100)
ALBUMIN SERPL BCP-MCNC: 2.3 G/DL (ref 3.2–4.9)
ALBUMIN/GLOB SERPL: 0.8 G/DL
ALP SERPL-CCNC: 237 U/L (ref 30–99)
ALT SERPL-CCNC: 52 U/L (ref 2–50)
AMMONIA PLAS-SCNC: 40 UMOL/L (ref 11–45)
ANION GAP SERPL CALC-SCNC: 10 MMOL/L (ref 7–16)
AST SERPL-CCNC: 147 U/L (ref 12–45)
BASOPHILS # BLD AUTO: 0.5 % (ref 0–1.8)
BASOPHILS # BLD: 0.02 K/UL (ref 0–0.12)
BILIRUB SERPL-MCNC: 2.3 MG/DL (ref 0.1–1.5)
BUN SERPL-MCNC: 2 MG/DL (ref 8–22)
CALCIUM SERPL-MCNC: 7.4 MG/DL (ref 8.5–10.5)
CHLORIDE SERPL-SCNC: 111 MMOL/L (ref 96–112)
CO2 SERPL-SCNC: 17 MMOL/L (ref 20–33)
CREAT SERPL-MCNC: 0.49 MG/DL (ref 0.5–1.4)
EOSINOPHIL # BLD AUTO: 0.13 K/UL (ref 0–0.51)
EOSINOPHIL NFR BLD: 3.2 % (ref 0–6.9)
ERYTHROCYTE [DISTWIDTH] IN BLOOD BY AUTOMATED COUNT: 58.2 FL (ref 35.9–50)
GLOBULIN SER CALC-MCNC: 2.8 G/DL (ref 1.9–3.5)
GLUCOSE SERPL-MCNC: 102 MG/DL (ref 65–99)
HBV CORE AB SERPL QL IA: NONREACTIVE
HBV SURFACE AB SERPL IA-ACNC: <3.5 MIU/ML (ref 0–10)
HBV SURFACE AG SER QL: NORMAL
HCT VFR BLD AUTO: 23.8 % (ref 37–47)
HCV AB SER QL: NORMAL
HGB BLD-MCNC: 7.5 G/DL (ref 12–16)
HIV 1+2 AB+HIV1 P24 AG SERPL QL IA: NORMAL
IMM GRANULOCYTES # BLD AUTO: 0.01 K/UL (ref 0–0.11)
IMM GRANULOCYTES NFR BLD AUTO: 0.2 % (ref 0–0.9)
LYMPHOCYTES # BLD AUTO: 1.23 K/UL (ref 1–4.8)
LYMPHOCYTES NFR BLD: 30.2 % (ref 22–41)
MAGNESIUM SERPL-MCNC: 2 MG/DL (ref 1.5–2.5)
MCH RBC QN AUTO: 32.1 PG (ref 27–33)
MCHC RBC AUTO-ENTMCNC: 31.5 G/DL (ref 33.6–35)
MCV RBC AUTO: 101.7 FL (ref 81.4–97.8)
MONOCYTES # BLD AUTO: 0.57 K/UL (ref 0–0.85)
MONOCYTES NFR BLD AUTO: 14 % (ref 0–13.4)
NEUTROPHILS # BLD AUTO: 2.11 K/UL (ref 2–7.15)
NEUTROPHILS NFR BLD: 51.9 % (ref 44–72)
NRBC # BLD AUTO: 0 K/UL
NRBC BLD-RTO: 0 /100 WBC
PHOSPHATE SERPL-MCNC: 2.8 MG/DL (ref 2.5–4.5)
PLATELET # BLD AUTO: 53 K/UL (ref 164–446)
PLATELET # BLD AUTO: 70 K/UL (ref 164–446)
PMV BLD AUTO: 11.2 FL (ref 9–12.9)
POTASSIUM SERPL-SCNC: 3.8 MMOL/L (ref 3.6–5.5)
PROT SERPL-MCNC: 5.1 G/DL (ref 6–8.2)
RBC # BLD AUTO: 2.34 M/UL (ref 4.2–5.4)
SODIUM SERPL-SCNC: 138 MMOL/L (ref 135–145)
VIT B1 BLD-MCNC: 95 NMOL/L (ref 70–180)
VIT B6 SERPL-MCNC: 20.5 NMOL/L (ref 20–125)
WBC # BLD AUTO: 4.1 K/UL (ref 4.8–10.8)

## 2020-05-11 PROCEDURE — A9270 NON-COVERED ITEM OR SERVICE: HCPCS | Performed by: STUDENT IN AN ORGANIZED HEALTH CARE EDUCATION/TRAINING PROGRAM

## 2020-05-11 PROCEDURE — 99255 IP/OBS CONSLTJ NEW/EST HI 80: CPT | Mod: 25 | Performed by: PHYSICAL MEDICINE & REHABILITATION

## 2020-05-11 PROCEDURE — 97116 GAIT TRAINING THERAPY: CPT

## 2020-05-11 PROCEDURE — 84100 ASSAY OF PHOSPHORUS: CPT

## 2020-05-11 PROCEDURE — 86706 HEP B SURFACE ANTIBODY: CPT

## 2020-05-11 PROCEDURE — 85049 AUTOMATED PLATELET COUNT: CPT

## 2020-05-11 PROCEDURE — 82140 ASSAY OF AMMONIA: CPT

## 2020-05-11 PROCEDURE — 86803 HEPATITIS C AB TEST: CPT

## 2020-05-11 PROCEDURE — 85025 COMPLETE CBC W/AUTO DIFF WBC: CPT

## 2020-05-11 PROCEDURE — 82306 VITAMIN D 25 HYDROXY: CPT

## 2020-05-11 PROCEDURE — 700105 HCHG RX REV CODE 258: Performed by: STUDENT IN AN ORGANIZED HEALTH CARE EDUCATION/TRAINING PROGRAM

## 2020-05-11 PROCEDURE — 84450 TRANSFERASE (AST) (SGOT): CPT

## 2020-05-11 PROCEDURE — 82105 ALPHA-FETOPROTEIN SERUM: CPT

## 2020-05-11 PROCEDURE — 770006 HCHG ROOM/CARE - MED/SURG/GYN SEMI*

## 2020-05-11 PROCEDURE — 84425 ASSAY OF VITAMIN B-1: CPT

## 2020-05-11 PROCEDURE — 84460 ALANINE AMINO (ALT) (SGPT): CPT

## 2020-05-11 PROCEDURE — 83516 IMMUNOASSAY NONANTIBODY: CPT | Mod: 91

## 2020-05-11 PROCEDURE — 82784 ASSAY IGA/IGD/IGG/IGM EACH: CPT

## 2020-05-11 PROCEDURE — 700102 HCHG RX REV CODE 250 W/ 637 OVERRIDE(OP): Performed by: INTERNAL MEDICINE

## 2020-05-11 PROCEDURE — 700111 HCHG RX REV CODE 636 W/ 250 OVERRIDE (IP): Performed by: STUDENT IN AN ORGANIZED HEALTH CARE EDUCATION/TRAINING PROGRAM

## 2020-05-11 PROCEDURE — A9270 NON-COVERED ITEM OR SERVICE: HCPCS | Performed by: INTERNAL MEDICINE

## 2020-05-11 PROCEDURE — 700102 HCHG RX REV CODE 250 W/ 637 OVERRIDE(OP): Performed by: STUDENT IN AN ORGANIZED HEALTH CARE EDUCATION/TRAINING PROGRAM

## 2020-05-11 PROCEDURE — 86704 HEP B CORE ANTIBODY TOTAL: CPT

## 2020-05-11 PROCEDURE — 86708 HEPATITIS A ANTIBODY: CPT

## 2020-05-11 PROCEDURE — 84590 ASSAY OF VITAMIN A: CPT

## 2020-05-11 PROCEDURE — 99232 SBSQ HOSP IP/OBS MODERATE 35: CPT | Mod: GC | Performed by: INTERNAL MEDICINE

## 2020-05-11 PROCEDURE — 97535 SELF CARE MNGMENT TRAINING: CPT

## 2020-05-11 PROCEDURE — 83735 ASSAY OF MAGNESIUM: CPT

## 2020-05-11 PROCEDURE — 82947 ASSAY GLUCOSE BLOOD QUANT: CPT

## 2020-05-11 PROCEDURE — 86038 ANTINUCLEAR ANTIBODIES: CPT

## 2020-05-11 PROCEDURE — 99358 PROLONG SERVICE W/O CONTACT: CPT | Performed by: PHYSICAL MEDICINE & REHABILITATION

## 2020-05-11 PROCEDURE — 84630 ASSAY OF ZINC: CPT

## 2020-05-11 PROCEDURE — 80053 COMPREHEN METABOLIC PANEL: CPT

## 2020-05-11 PROCEDURE — 36415 COLL VENOUS BLD VENIPUNCTURE: CPT

## 2020-05-11 PROCEDURE — 87389 HIV-1 AG W/HIV-1&-2 AB AG IA: CPT

## 2020-05-11 PROCEDURE — 82180 ASSAY OF ASCORBIC ACID: CPT

## 2020-05-11 PROCEDURE — 97530 THERAPEUTIC ACTIVITIES: CPT

## 2020-05-11 PROCEDURE — 87340 HEPATITIS B SURFACE AG IA: CPT

## 2020-05-11 PROCEDURE — 82728 ASSAY OF FERRITIN: CPT

## 2020-05-11 RX ORDER — MIDODRINE HYDROCHLORIDE 5 MG/1
10 TABLET ORAL
Status: DISCONTINUED | OUTPATIENT
Start: 2020-05-11 | End: 2020-05-13 | Stop reason: HOSPADM

## 2020-05-11 RX ORDER — POTASSIUM CHLORIDE 7.45 MG/ML
10 INJECTION INTRAVENOUS
Status: DISCONTINUED | OUTPATIENT
Start: 2020-05-11 | End: 2020-05-11

## 2020-05-11 RX ORDER — POTASSIUM CHLORIDE 7.45 MG/ML
10 INJECTION INTRAVENOUS
Status: COMPLETED | OUTPATIENT
Start: 2020-05-11 | End: 2020-05-11

## 2020-05-11 RX ORDER — SODIUM CHLORIDE 9 MG/ML
1000 INJECTION, SOLUTION INTRAVENOUS ONCE
Status: COMPLETED | OUTPATIENT
Start: 2020-05-11 | End: 2020-05-11

## 2020-05-11 RX ORDER — POTASSIUM CHLORIDE 20 MEQ/1
20 TABLET, EXTENDED RELEASE ORAL ONCE
Status: DISCONTINUED | OUTPATIENT
Start: 2020-05-11 | End: 2020-05-11

## 2020-05-11 RX ADMIN — Medication 100 MG: at 05:16

## 2020-05-11 RX ADMIN — DIBASIC SODIUM PHOSPHATE, MONOBASIC POTASSIUM PHOSPHATE AND MONOBASIC SODIUM PHOSPHATE 1 TABLET: 852; 155; 130 TABLET ORAL at 13:28

## 2020-05-11 RX ADMIN — DIBASIC SODIUM PHOSPHATE, MONOBASIC POTASSIUM PHOSPHATE AND MONOBASIC SODIUM PHOSPHATE 1 TABLET: 852; 155; 130 TABLET ORAL at 17:56

## 2020-05-11 RX ADMIN — DIBASIC SODIUM PHOSPHATE, MONOBASIC POTASSIUM PHOSPHATE AND MONOBASIC SODIUM PHOSPHATE 1 TABLET: 852; 155; 130 TABLET ORAL at 23:57

## 2020-05-11 RX ADMIN — LACTULOSE 15 ML: 20 SOLUTION ORAL at 13:27

## 2020-05-11 RX ADMIN — CEFTRIAXONE SODIUM 1 G: 1 INJECTION, POWDER, FOR SOLUTION INTRAMUSCULAR; INTRAVENOUS at 05:27

## 2020-05-11 RX ADMIN — FLUCONAZOLE 200 MG: 200 TABLET ORAL at 05:18

## 2020-05-11 RX ADMIN — MIDODRINE HYDROCHLORIDE 10 MG: 5 TABLET ORAL at 13:27

## 2020-05-11 RX ADMIN — DIBASIC SODIUM PHOSPHATE, MONOBASIC POTASSIUM PHOSPHATE AND MONOBASIC SODIUM PHOSPHATE 1 TABLET: 852; 155; 130 TABLET ORAL at 05:17

## 2020-05-11 RX ADMIN — OMEPRAZOLE 20 MG: 20 CAPSULE, DELAYED RELEASE ORAL at 17:56

## 2020-05-11 RX ADMIN — POTASSIUM CHLORIDE 10 MEQ: 10 INJECTION, SOLUTION INTRAVENOUS at 09:15

## 2020-05-11 RX ADMIN — MIDODRINE HYDROCHLORIDE 10 MG: 5 TABLET ORAL at 17:56

## 2020-05-11 RX ADMIN — POTASSIUM CHLORIDE 10 MEQ: 10 INJECTION, SOLUTION INTRAVENOUS at 10:20

## 2020-05-11 RX ADMIN — SODIUM CHLORIDE 125 MG: 9 INJECTION, SOLUTION INTRAVENOUS at 06:44

## 2020-05-11 RX ADMIN — OMEPRAZOLE 20 MG: 20 CAPSULE, DELAYED RELEASE ORAL at 05:16

## 2020-05-11 RX ADMIN — SUCRALFATE 1 G: 1 SUSPENSION ORAL at 17:56

## 2020-05-11 RX ADMIN — DEXTROSE AND SODIUM CHLORIDE: 5; 900 INJECTION, SOLUTION INTRAVENOUS at 09:15

## 2020-05-11 RX ADMIN — SUCRALFATE 1 G: 1 SUSPENSION ORAL at 23:57

## 2020-05-11 RX ADMIN — POTASSIUM CHLORIDE 10 MEQ: 10 INJECTION, SOLUTION INTRAVENOUS at 11:30

## 2020-05-11 RX ADMIN — POTASSIUM CHLORIDE 10 MEQ: 10 INJECTION, SOLUTION INTRAVENOUS at 13:26

## 2020-05-11 RX ADMIN — SUCRALFATE 1 G: 1 SUSPENSION ORAL at 13:30

## 2020-05-11 RX ADMIN — SUCRALFATE 1 G: 1 SUSPENSION ORAL at 05:15

## 2020-05-11 RX ADMIN — MIDODRINE HYDROCHLORIDE 5 MG: 5 TABLET ORAL at 08:07

## 2020-05-11 RX ADMIN — LACTULOSE 15 ML: 20 SOLUTION ORAL at 17:56

## 2020-05-11 RX ADMIN — Medication 400 MG: at 05:17

## 2020-05-11 RX ADMIN — LEVOTHYROXINE SODIUM 75 MCG: 75 TABLET ORAL at 05:17

## 2020-05-11 RX ADMIN — LACTULOSE 15 ML: 20 SOLUTION ORAL at 05:14

## 2020-05-11 RX ADMIN — TRAZODONE HYDROCHLORIDE 50 MG: 50 TABLET ORAL at 21:00

## 2020-05-11 RX ADMIN — SODIUM CHLORIDE 1000 ML: 9 INJECTION, SOLUTION INTRAVENOUS at 06:20

## 2020-05-11 ASSESSMENT — ENCOUNTER SYMPTOMS
WEAKNESS: 0
FALLS: 1
HEARTBURN: 0
ABDOMINAL PAIN: 0
COUGH: 0
CHILLS: 0
FEVER: 0
RESPIRATORY NEGATIVE: 1
PALPITATIONS: 0
NAUSEA: 1
SORE THROAT: 0
ORTHOPNEA: 0
VOMITING: 1
BRUISES/BLEEDS EASILY: 1
HALLUCINATIONS: 0
BLOOD IN STOOL: 0
INSOMNIA: 1
CONSTIPATION: 0
ABDOMINAL PAIN: 1
BLURRED VISION: 0
DIARRHEA: 0
CARDIOVASCULAR NEGATIVE: 1
HEADACHES: 0
NERVOUS/ANXIOUS: 0
BLOOD IN STOOL: 1
DIZZINESS: 0
TREMORS: 0
SHORTNESS OF BREATH: 0
DEPRESSION: 0
DOUBLE VISION: 0
VOMITING: 0
SENSORY CHANGE: 0
SEIZURES: 0

## 2020-05-11 ASSESSMENT — COGNITIVE AND FUNCTIONAL STATUS - GENERAL
HELP NEEDED FOR BATHING: A LITTLE
MOVING FROM LYING ON BACK TO SITTING ON SIDE OF FLAT BED: A LITTLE
WALKING IN HOSPITAL ROOM: A LITTLE
MOVING TO AND FROM BED TO CHAIR: A LITTLE
DRESSING REGULAR LOWER BODY CLOTHING: A LITTLE
SUGGESTED CMS G CODE MODIFIER DAILY ACTIVITY: CJ
CLIMB 3 TO 5 STEPS WITH RAILING: A LITTLE
SUGGESTED CMS G CODE MODIFIER MOBILITY: CK
DAILY ACTIVITIY SCORE: 21
MOBILITY SCORE: 18
TURNING FROM BACK TO SIDE WHILE IN FLAT BAD: A LITTLE
STANDING UP FROM CHAIR USING ARMS: A LITTLE
TOILETING: A LITTLE

## 2020-05-11 ASSESSMENT — GAIT ASSESSMENTS
ASSISTIVE DEVICE: FRONT WHEEL WALKER
GAIT LEVEL OF ASSIST: SUPERVISED
DEVIATION: ATAXIC;BRADYKINETIC
DISTANCE (FEET): 20

## 2020-05-11 ASSESSMENT — LIFESTYLE VARIABLES: SUBSTANCE_ABUSE: 1

## 2020-05-11 NOTE — THERAPY
"Occupational Therapy Treatment completed with focus on ADLs, ADL transfers and patient education.  Functional Status:  Pt supine in bed on arrival.  Pt is [place].  Pt reported feeling weak, but was agreeable to OOB ADL's.  Pt was supervised for supine to sit EOB.  Pt able to don hospital pants with Min A.  Pt amb with FWW & Min A to sink to groom in standing.  Pt did have a few mild LOB but was able to self correct.  Pt was supervised for toilet transfers.  Pt left sitting up in chair after tx.  Pt encouraged to be OOB for all meals & avoid using bed pan.  Plan of Care: Will benefit from Occupational Therapy 3 times per week  Discharge Recommendations:  Equipment Will Continue to Assess for Equipment Needs. Post-acute therapy Recommend post-acute placement for additional occupational therapy services prior to discharge home.        See \"Rehab Therapy-Acute\" Patient Summary Report for complete documentation.   "

## 2020-05-11 NOTE — THERAPY
"Physical Therapy Treatment completed.   Bed Mobility:  Supine to Sit: Supervised  Transfers: Sit to Stand: Supervised  Gait: Level Of Assist: Supervised with Front-Wheel Walker       Plan of Care: Will benefit from Physical Therapy 3 times per week  Discharge Recommendations: Equipment: Will Continue to Assess for Equipment Needs. Post-acute therapy Recommend home health transitional care for continued physical therapy services.     Pt is making appropriate improvements. Now able to perform basic mobility without physical assist. Is tremulous when standing with ataxic gait, no overt LOB. Pt very limited in her gait distance, not yet at household gait. Anticipate with increased out of bed time and 1-2 more acute PT sessions she will be able to ambulate household distances and will be at a functional level appropriate for home health management. Acute PT to continue to follow while in house.    See \"Rehab Therapy-Acute\" Patient Summary Report for complete documentation.       "
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
## 2020-05-11 NOTE — ASSESSMENT & PLAN NOTE
EGD 5/10/20: White plaques, likely candidal esophagitis.  Empirically treat with fluconazole oral x 14 days.  Check HIV - negative.

## 2020-05-11 NOTE — PROGRESS NOTES
Rec'd report from day shift RN. Assumed pt care. Assessment completed. AA&OX4. Denies pain at this time. No s/s of discomfort or distress. Have not ambulated pt at this time, per report 1 assist to the bathroom. Pt is incontinent of urine and stool, cleaned up and linens changed. Bed in lowest position, bed locked, bed alarm on for safety, treaded socks in place, RN and CNA numbers provided, call light within reach.

## 2020-05-11 NOTE — CARE PLAN
Problem: Communication  Goal: The ability to communicate needs accurately and effectively will improve  Outcome: PROGRESSING AS EXPECTED     Problem: Safety  Goal: Will remain free from injury  Outcome: PROGRESSING AS EXPECTED  Goal: Will remain free from falls  Outcome: PROGRESSING AS EXPECTED     Problem: Infection  Goal: Will remain free from infection  Outcome: PROGRESSING AS EXPECTED     Problem: Venous Thromboembolism (VTW)/Deep Vein Thrombosis (DVT) Prevention:  Goal: Patient will participate in Venous Thrombosis (VTE)/Deep Vein Thrombosis (DVT)Prevention Measures  Outcome: PROGRESSING AS EXPECTED     Problem: Bowel/Gastric:  Goal: Normal bowel function is maintained or improved  Outcome: PROGRESSING AS EXPECTED  Goal: Will not experience complications related to bowel motility  Outcome: PROGRESSING AS EXPECTED     Problem: Knowledge Deficit  Goal: Knowledge of disease process/condition, treatment plan, diagnostic tests, and medications will improve  Outcome: PROGRESSING AS EXPECTED  Goal: Knowledge of the prescribed therapeutic regimen will improve  Outcome: PROGRESSING AS EXPECTED     Problem: Discharge Barriers/Planning  Goal: Patient's continuum of care needs will be met  Outcome: PROGRESSING AS EXPECTED     Problem: Fluid Volume:  Goal: Will maintain balanced intake and output  Outcome: PROGRESSING AS EXPECTED     Problem: Psychosocial Needs:  Goal: Level of anxiety will decrease  Outcome: PROGRESSING AS EXPECTED     Problem: Respiratory:  Goal: Respiratory status will improve  Outcome: PROGRESSING AS EXPECTED     Problem: Pain Management  Goal: Pain level will decrease to patient's comfort goal  Outcome: PROGRESSING AS EXPECTED     Problem: Skin Integrity  Goal: Risk for impaired skin integrity will decrease  Outcome: PROGRESSING AS EXPECTED     Problem: Urinary Elimination:  Goal: Ability to reestablish a normal urinary elimination pattern will improve  Outcome: PROGRESSING AS EXPECTED     Problem:  Mobility  Goal: Risk for activity intolerance will decrease  Outcome: PROGRESSING AS EXPECTED
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
## 2020-05-11 NOTE — PROGRESS NOTES
Daily Progress Note:     Date of Service: 5/11/2020  Primary Team: [facility] White Team   Attending: Neymar Garduno M.D.  Senior Resident: Dr. Wallace  Intern: Dr. Valenzuela  Contact:  [phone]    Chief Complaint:   Alcohol withdrawal    ID: Patient with history of alcohol abuse with alcoholic liver disease. She was admitted due to alcohol intoxication and complications of alcoholism including alcoholic hepatitis, central pontine myelinolysis, GIB possibly from alcoholic gastritis.    Subjective:  No overnight events. Comfortably sleeping in a bed. She is oriented, couldn't sleep overnight. Patient is having nausea    Interval updates:  5/8/2020: Hemoglobin dropped to 6.8 ON, repeat is >7. Patient is stable, although had an episode of low SBP 80s, given 250 cc IVF by night team. CIWA 16-1-3-10 last, received 3.5 mg ativan overnight. LFTs trending down, alcoholic hepatitis seems to be improving. Thrombocytes are 34k today, no active bleeding, will monitor closely. MRI brain revealed central pontine myelinolysis, in this case it is likely chronic, will follow neuro recs  5/9/2020: CIWA Stable Hb around 7.5 overnight. No events. GI was consulted ([facility]) for elective endoscopy for possible UGIB. Will obtain COVID test for EGD. Continue PPI. Had 4 bowel movement with lactulose, lowered the dose. Thrombocytes stable at 45k, no active bleeding. She has been tolerating regular diet. Cortisol for low BP and K, NS+D5+K IVF. Developed fever, UCx showing Ecoli and klebsiella, although UA is showing epithelial cells. Will keep patient on abx. Blood cultures, STAT CXR. Suspicion for drug related fever as well.   5/10/2020: CXR did not reveal any PNA, some atelectasis. Negative blood cultures so far. Fever is resolved. Will get EGD today.  5/11/2020: EGD revealed esophageal candidiasis, started on fluconazole, blood oozing stopped with observation, LA grade D esophagitis, hiatal hernia, diffuse gastritis likely secondary to  portal hypertension. She will be on PPI treatment and will follow up with GI consultants outpatient. Hb stable overnight. Patient feel better today. Had vomiting in AM, having severe regurgitation with solid food, will continue with liquid diet. Alcohol cessation counseling given, discussed complications and risks of alcohol. We discussed EGD findings. Patient understands and agrees and promised to stop alcohol intake. Increased midodrine dose to 10. Checking HIV due to esophageal candidiasis.    Consultants/Specialty:  Gastroenterology - [facility]    Review of Systems:   Review of Systems   Constitutional: Positive for malaise/fatigue. Negative for chills and fever.   HENT: Negative for sore throat.    Eyes: Negative for blurred vision and double vision.   Respiratory: Negative for cough and shortness of breath.    Cardiovascular: Negative for chest pain, palpitations, orthopnea and leg swelling.   Gastrointestinal: Positive for melena and nausea. Negative for abdominal pain, blood in stool, constipation, diarrhea, heartburn and vomiting.   Genitourinary: Negative for dysuria and hematuria.   Musculoskeletal: Negative for joint pain.   Neurological: Negative for dizziness, tremors, sensory change, seizures, weakness and headaches.   Psychiatric/Behavioral: Positive for substance abuse. Negative for depression, hallucinations and suicidal ideas. The patient has insomnia. The patient is not nervous/anxious.        Objective Data:   Physical Exam:   Vitals:   Temp:  [36.1 °C (97 °F)-37.4 °C (99.3 °F)] 37.4 °C (99.3 °F)  Pulse:  [] 93  Resp:  [16-20] 16  BP: ()/(45-70) 101/66  SpO2:  [88 %-100 %] 96 %    Physical Exam  Constitutional:       General: She is not in acute distress.     Appearance: She is not ill-appearing.      Comments: Alert and oriented.   HENT:      Head: Atraumatic.      Ears:      Comments: Hearing loss     Nose: No rhinorrhea.   Eyes:      General: Scleral icterus present.       Extraocular Movements: Extraocular movements intact.   Neck:      Musculoskeletal: Neck supple.   Cardiovascular:      Rate and Rhythm: Normal rate and regular rhythm.      Heart sounds: Normal heart sounds. No murmur. No friction rub. No gallop.    Pulmonary:      Breath sounds: No wheezing, rhonchi or rales.   Abdominal:      Tenderness: There is no abdominal tenderness. There is no guarding or rebound.      Comments: Hyperactive bowel sounds  No shifting dullness on abdominal exam - no ascites.  No caput medusa seen on skin exam.   Genitourinary:     Rectum: Guaiac result positive.   Musculoskeletal:      Right lower leg: No edema.      Left lower leg: No edema.   Skin:     Coloration: Skin is pale. Skin is not jaundiced.      Findings: No rash.      Comments: No spider angiomata.   Neurological:      General: No focal deficit present.      Mental Status: She is oriented to person, place, and time.      Cranial Nerves: No cranial nerve deficit.      Sensory: No sensory deficit.      Motor: No weakness.      Deep Tendon Reflexes: Reflexes normal.      Comments: No asterixis           Labs:     Recent Labs     05/09/20  0112  05/10/20  0323 05/10/20  2045 05/11/20  0253   RBC 2.37*  --  2.39*  --  2.34*   HEMOGLOBIN 7.4*  7.4*   < > 7.7* 7.5* 7.5*   HEMATOCRIT 23.0*  23.1*   < > 23.8* 24.3* 23.8*   PLATELETCT 45*  --  53*  --  53*    < > = values in this interval not displayed.     Recent Labs     05/09/20  0112  05/09/20  2057 05/10/20  0323 05/11/20  0253   SODIUM 139   < > 138 138 138   POTASSIUM 3.3*   < > 4.0 4.0 3.8   CHLORIDE 110   < > 112 112 111   CO2 19*   < > 16* 15* 17*   BUN 5*   < > 3* 3* 2*   CREATININE 0.60   < > 0.51 0.48* 0.49*   MAGNESIUM  --    < > 2.1 2.1 2.0   PHOSPHORUS 3.2  --   --  2.2* 2.8   CALCIUM 7.0*   < > 7.5* 7.3* 7.4*    < > = values in this interval not displayed.     Recent Labs     05/09/20 0112 05/09/20 2057 05/10/20  0323 05/11/20  0253   ALTPT 52*  --   --  56* 52*    ASTSGOT 194*  --   --  187* 147*   ALKPHOSPHAT 243*  --   --  255* 237*   TBILIRUBIN 2.2*  --   --  2.8* 2.3*   GLUCOSE 107*   < > 116* 98 102*    < > = values in this interval not displayed.                 Recent Labs     05/09/20  0112 05/10/20  0323 05/11/20  0253   WBC 3.7* 4.2* 4.1*   NEUTSPOLYS 54.00 58.00 51.90   LYMPHOCYTES 32.20 26.50 30.20   MONOCYTES 9.90 11.40 14.00*   EOSINOPHILS 2.90 3.10 3.20   BASOPHILS 0.50 0.50 0.50   ASTSGOT 194* 187* 147*   ALTSGPT 52* 56* 52*   ALKPHOSPHAT 243* 255* 237*   TBILIRUBIN 2.2* 2.8* 2.3*           Imaging:   No imaging today.    * Alcohol intoxication in active alcoholic with complication (HCC)- (present on admission)  Assessment & Plan  Resolved.  No alcoholic seizures in the past. Feels like she is going through a detox,   last drink was AM on admission, drinking 1/5 pint vodka /d.   Has tremor, asterixis in ED.  ETOh level 241.1 on admission  EKG sinus tach in the ED, no arrhythmia no Afib.     Plan:  Admit to neurology with telemetry monitoring  CT head - ?central pontine myelinolysis - neurology following - MRI central pontine myelinolysis.  Aspiration/Seiaure/Fall precautions   MV/thiamine/folate   Ativan per CIWA - discontinue 5/9  IVF  Advance diet - on liquid diet now.  Monitor and replete electrolytes prn   Repeat TSH/B12/Folate/Ammonia level - wnl.    Gastritis due to alcohol without hemorrhage- (present on admission)  Assessment & Plan  EGD revealed diffuse gastritis likely due to portal hypertension.  Alcohol abuse - alcohol cessation counseling provided.  No NSAIDS, ASA, alcohol.  Alcohol cessation  PPI  Sucralfate    Candida esophagitis (HCC)- (present on admission)  Assessment & Plan  EGD 5/10/20: White plaques, likely candidal esophagitis.  Empirically treat with fluconazole oral x 14 days.  Check HIV.    Occult blood positive stool- (present on admission)  Assessment & Plan  Last EGD shows esophagial erosion.  Patient is stable Hb at around  7.5  Has history of hematemesis a week before admission.  GI consult - EGD 5/10/2020: Candida esophagitis, LA grade D esophagitis, diffuse gastritis.  PPI + sucralfate.    Central pontine myelinolysis (HCC)- (present on admission)  Assessment & Plan  Pt has difficulty with walking and states that she has fallen. Last fall about a week ago. Says she hit her head at some point and has a posterior right sided headache and blurry vision. Patient is lethargic.  No dysarthria, dysphagia, paraparesis or quadriparesis, behavioral disturbances, seizures,  No locked in syndrome.  CT head w/o negative for acute intracranial hemorrhage, but there is concern for CPM. She has had hyponatremia in the past  MRI scan showed central pontine myelinolysis possibly 2/2 alcoholism.    Plan:  Neurology consult - following rec's - no treatment indicated at this time  Alcohol cessation.  PT/OT eval - need post-acute placement.  Physiatry  Alcohol cessation. At this time, no proven treatment due to chronicity of the condition.    Alcoholic liver disease (HCC)- (present on admission)  Assessment & Plan  Long history of alcoholism.  MELD 13 6% 3mo mortality   Last RUQ U/S in January shows fatty infiltration vs fibrosis.  Child class B   -Alcohol cessation counseling provided. Discussed about complications of alcoholism.    Macrocytic anemia- (present on admission)  Assessment & Plan  Likely mixed picture, with macrocytic anemia secondary to etoh abuse and untreated hypothyroidism, and possible superimposed blood loss anemia given recent reported coffee ground emesis & +FOBT testing  Previous EGD shows ulcerative esophagitis, no varices was mentioned.  Hgb 8.4 on admission - trend H&H, monitor vitals closely, transfuse prn  B12, folate wnl.  TSH in 50s, very high 2/2 non-adherence. Synthroid increased from 50 mcg to 75 mcg;  Ferritin 61; iron is low at 31 and %sat at low end normal indicating likely superimposed iron deficiency anemia    FOBT+  GI consult - EGD 5/10/2020: Candida esophagitis, LA grade D esophagitis, diffuse gastritis.  PPI  Sucralfate  IVF  Iron replacement.    GERD (gastroesophageal reflux disease)- (present on admission)  Assessment & Plan  Complains of acid reflux and says she can't tolerate PPI.  hx of esophageal erosion in the past EGD  +FOBT - suspected UGI bleed - although BUN/cre 8/0.5  PPI  Has hiatal hernia and esophagitis per EGD.    Fever  Assessment & Plan  Resolved.  UA LE and N positive. Rare epithelial cells  Urine cultures showing ecoli and klebsiella.  Started on C3 5/9 night. After that patient developed fever.  Could be PNA vs UTI.    Plan:  Blood cultures - negative.  STAT CXR - atelectasis, no consolidations.  Continue C3 - dc 5/11/20  Monitor closely.    Hypotension- (present on admission)  Assessment & Plan  Patient with chronic hypotension on midodrine - continue inpatient.    Alcoholic hepatitis- (present on admission)  Assessment & Plan  Improving  , ALT 74, >2 ratio, Alk 264 on admission, improving but still elevated  Last RUQ U/S in January shows fatty infiltration vs fibrosis.  -Supportive care. IVF and trend LFT's   -Maddrey score <32, no steroids indicated.    Hypokalemia- (present on admission)  Assessment & Plan  K 3.5 on admission  Monitor closely - replete prn.    Hypothyroidism- (present on admission)  Assessment & Plan  Last TSH was 31.200 on 1/14/20. - repeat 54k  non compliant with synthroid for the past 2 weeks   -Increased synthroid to 75.    Thrombocytopenia (HCC)- (present on admission)  Assessment & Plan  Likely secondary to chronic liver disease from alcohol   Monitor closely with cbc   SCDs for DVT prophylaxis

## 2020-05-11 NOTE — PROGRESS NOTES
Patient blood pressure 87/56. Patient asymptomatic. RN notified Dr. Valenzuela. 1 liter bolus ordered. Patient also had one episode of emesis, unable to assess due to patient threw up in coffee. RN made MD aware.

## 2020-05-11 NOTE — ASSESSMENT & PLAN NOTE
EGD revealed diffuse gastritis likely due to portal hypertension.  Alcohol abuse - alcohol cessation counseling provided.  No NSAIDS, ASA, alcohol.  Alcohol cessation  PPI  Sucralfate

## 2020-05-11 NOTE — CARE PLAN
Problem: Skin Integrity  Goal: Risk for impaired skin integrity will decrease  Intervention: Assess risk factors for impaired skin integrity and/or pressure ulcers  Note: Pt has frequent incontinent loose stools d/t medication. Cleaned up from incontinence and barrier paste applied frequently.      Problem: Mobility  Goal: Risk for activity intolerance will decrease  Intervention: Encourage patient to increase activity level in collaboration with Interdisciplinary Team  Note: Encouraged pt to call when needing to void so she can be assisted to the bathroom.

## 2020-05-12 ENCOUNTER — APPOINTMENT (OUTPATIENT)
Dept: RADIOLOGY | Facility: MEDICAL CENTER | Age: 55
DRG: 896 | End: 2020-05-12
Attending: INTERNAL MEDICINE
Payer: MEDICAID

## 2020-05-12 PROBLEM — D50.9 IRON DEFICIENCY ANEMIA: Status: ACTIVE | Noted: 2020-01-12

## 2020-05-12 PROBLEM — D64.9 ANEMIA REQUIRING TRANSFUSIONS: Status: ACTIVE | Noted: 2020-01-12

## 2020-05-12 PROBLEM — E46 MALNUTRITION (HCC): Status: ACTIVE | Noted: 2020-05-12

## 2020-05-12 PROBLEM — E55.9 VITAMIN D DEFICIENCY: Status: ACTIVE | Noted: 2020-05-12

## 2020-05-12 LAB
ABO GROUP BLD: NORMAL
ALBUMIN SERPL BCP-MCNC: 2.2 G/DL (ref 3.2–4.9)
ALBUMIN/GLOB SERPL: 0.9 G/DL
ALP SERPL-CCNC: 215 U/L (ref 30–99)
ALT SERPL-CCNC: 41 U/L (ref 2–50)
ANION GAP SERPL CALC-SCNC: 11 MMOL/L (ref 7–16)
ANISOCYTOSIS BLD QL SMEAR: ABNORMAL
AST SERPL-CCNC: 119 U/L (ref 12–45)
BARCODED ABORH UBTYP: 6200
BARCODED PRD CODE UBPRD: NORMAL
BARCODED UNIT NUM UBUNT: NORMAL
BASE EXCESS BLDA CALC-SCNC: -6 MMOL/L (ref -4–3)
BASOPHILS # BLD AUTO: 0.6 % (ref 0–1.8)
BASOPHILS # BLD AUTO: 0.9 % (ref 0–1.8)
BASOPHILS # BLD: 0.03 K/UL (ref 0–0.12)
BASOPHILS # BLD: 0.04 K/UL (ref 0–0.12)
BILIRUB SERPL-MCNC: 2.4 MG/DL (ref 0.1–1.5)
BLD GP AB SCN SERPL QL: NORMAL
BODY TEMPERATURE: ABNORMAL CENTIGRADE
BUN SERPL-MCNC: 2 MG/DL (ref 8–22)
BURR CELLS BLD QL SMEAR: NORMAL
CALCIUM SERPL-MCNC: 7.7 MG/DL (ref 8.5–10.5)
CHLORIDE SERPL-SCNC: 109 MMOL/L (ref 96–112)
CO2 SERPL-SCNC: 15 MMOL/L (ref 20–33)
COMPONENT R 8504R: NORMAL
CREAT SERPL-MCNC: 0.42 MG/DL (ref 0.5–1.4)
EOSINOPHIL # BLD AUTO: 0.07 K/UL (ref 0–0.51)
EOSINOPHIL # BLD AUTO: 0.17 K/UL (ref 0–0.51)
EOSINOPHIL NFR BLD: 1.4 % (ref 0–6.9)
EOSINOPHIL NFR BLD: 4.4 % (ref 0–6.9)
ERYTHROCYTE [DISTWIDTH] IN BLOOD BY AUTOMATED COUNT: 57.1 FL (ref 35.9–50)
ERYTHROCYTE [DISTWIDTH] IN BLOOD BY AUTOMATED COUNT: 76.7 FL (ref 35.9–50)
GLOBULIN SER CALC-MCNC: 2.4 G/DL (ref 1.9–3.5)
GLUCOSE SERPL-MCNC: 89 MG/DL (ref 65–99)
HCO3 BLDA-SCNC: 16 MMOL/L (ref 17–25)
HCT VFR BLD AUTO: 21.4 % (ref 37–47)
HCT VFR BLD AUTO: 29.6 % (ref 37–47)
HGB BLD-MCNC: 6.7 G/DL (ref 12–16)
HGB BLD-MCNC: 9.7 G/DL (ref 12–16)
IMM GRANULOCYTES # BLD AUTO: 0.07 K/UL (ref 0–0.11)
IMM GRANULOCYTES NFR BLD AUTO: 1.4 % (ref 0–0.9)
LACTATE BLD-SCNC: 1.5 MMOL/L (ref 0.5–2)
LYMPHOCYTES # BLD AUTO: 0.64 K/UL (ref 1–4.8)
LYMPHOCYTES # BLD AUTO: 1.21 K/UL (ref 1–4.8)
LYMPHOCYTES NFR BLD: 16.5 % (ref 22–41)
LYMPHOCYTES NFR BLD: 24.8 % (ref 22–41)
MACROCYTES BLD QL SMEAR: ABNORMAL
MAGNESIUM SERPL-MCNC: 1.8 MG/DL (ref 1.5–2.5)
MANUAL DIFF BLD: NORMAL
MCH RBC QN AUTO: 31.5 PG (ref 27–33)
MCH RBC QN AUTO: 32.2 PG (ref 27–33)
MCHC RBC AUTO-ENTMCNC: 31.3 G/DL (ref 33.6–35)
MCHC RBC AUTO-ENTMCNC: 32.2 G/DL (ref 33.6–35)
MCV RBC AUTO: 100 FL (ref 81.4–97.8)
MCV RBC AUTO: 100.5 FL (ref 81.4–97.8)
MONOCYTES # BLD AUTO: 0.51 K/UL (ref 0–0.85)
MONOCYTES # BLD AUTO: 0.68 K/UL (ref 0–0.85)
MONOCYTES NFR BLD AUTO: 13 % (ref 0–13.4)
MONOCYTES NFR BLD AUTO: 14 % (ref 0–13.4)
MORPHOLOGY BLD-IMP: NORMAL
NEUTROPHILS # BLD AUTO: 2.54 K/UL (ref 2–7.15)
NEUTROPHILS # BLD AUTO: 2.81 K/UL (ref 2–7.15)
NEUTROPHILS NFR BLD: 57.8 % (ref 44–72)
NEUTROPHILS NFR BLD: 65.2 % (ref 44–72)
NRBC # BLD AUTO: 0 K/UL
NRBC # BLD AUTO: 0 K/UL
NRBC BLD-RTO: 0 /100 WBC
NRBC BLD-RTO: 0 /100 WBC
O2/TOTAL GAS SETTING VFR VENT: 21 % (ref 30–60)
PCO2 BLDA: 22.1 MMHG (ref 26–37)
PH BLDA: 7.49 [PH] (ref 7.4–7.5)
PHOSPHATE SERPL-MCNC: 2.6 MG/DL (ref 2.5–4.5)
PLATELET # BLD AUTO: 64 K/UL (ref 164–446)
PLATELET # BLD AUTO: 99 K/UL (ref 164–446)
PLATELET BLD QL SMEAR: NORMAL
PMV BLD AUTO: 11.5 FL (ref 9–12.9)
PMV BLD AUTO: 11.8 FL (ref 9–12.9)
PO2 BLDA: 64.9 MMHG (ref 64–87)
POIKILOCYTOSIS BLD QL SMEAR: NORMAL
POLYCHROMASIA BLD QL SMEAR: NORMAL
POTASSIUM SERPL-SCNC: 3.6 MMOL/L (ref 3.6–5.5)
PREALB SERPL-MCNC: 4.4 MG/DL (ref 18–38)
PRODUCT TYPE UPROD: NORMAL
PROT SERPL-MCNC: 4.6 G/DL (ref 6–8.2)
RBC # BLD AUTO: 2.13 M/UL (ref 4.2–5.4)
RBC # BLD AUTO: 2.98 M/UL (ref 4.2–5.4)
RBC BLD AUTO: PRESENT
RH BLD: NORMAL
SAO2 % BLDA: 92.2 % (ref 93–99)
SCHISTOCYTES BLD QL SMEAR: NORMAL
SODIUM SERPL-SCNC: 135 MMOL/L (ref 135–145)
UNIT STATUS USTAT: NORMAL
WBC # BLD AUTO: 3.9 K/UL (ref 4.8–10.8)
WBC # BLD AUTO: 4.9 K/UL (ref 4.8–10.8)

## 2020-05-12 PROCEDURE — 700102 HCHG RX REV CODE 250 W/ 637 OVERRIDE(OP): Performed by: STUDENT IN AN ORGANIZED HEALTH CARE EDUCATION/TRAINING PROGRAM

## 2020-05-12 PROCEDURE — 770006 HCHG ROOM/CARE - MED/SURG/GYN SEMI*

## 2020-05-12 PROCEDURE — 700102 HCHG RX REV CODE 250 W/ 637 OVERRIDE(OP): Performed by: INTERNAL MEDICINE

## 2020-05-12 PROCEDURE — 83605 ASSAY OF LACTIC ACID: CPT

## 2020-05-12 PROCEDURE — 86850 RBC ANTIBODY SCREEN: CPT

## 2020-05-12 PROCEDURE — P9016 RBC LEUKOCYTES REDUCED: HCPCS

## 2020-05-12 PROCEDURE — 36415 COLL VENOUS BLD VENIPUNCTURE: CPT

## 2020-05-12 PROCEDURE — 83735 ASSAY OF MAGNESIUM: CPT

## 2020-05-12 PROCEDURE — A9270 NON-COVERED ITEM OR SERVICE: HCPCS | Performed by: STUDENT IN AN ORGANIZED HEALTH CARE EDUCATION/TRAINING PROGRAM

## 2020-05-12 PROCEDURE — 700111 HCHG RX REV CODE 636 W/ 250 OVERRIDE (IP): Performed by: STUDENT IN AN ORGANIZED HEALTH CARE EDUCATION/TRAINING PROGRAM

## 2020-05-12 PROCEDURE — 85027 COMPLETE CBC AUTOMATED: CPT

## 2020-05-12 PROCEDURE — 700105 HCHG RX REV CODE 258: Performed by: STUDENT IN AN ORGANIZED HEALTH CARE EDUCATION/TRAINING PROGRAM

## 2020-05-12 PROCEDURE — 99233 SBSQ HOSP IP/OBS HIGH 50: CPT | Mod: GC | Performed by: INTERNAL MEDICINE

## 2020-05-12 PROCEDURE — 36430 TRANSFUSION BLD/BLD COMPNT: CPT

## 2020-05-12 PROCEDURE — 80053 COMPREHEN METABOLIC PANEL: CPT

## 2020-05-12 PROCEDURE — 84100 ASSAY OF PHOSPHORUS: CPT

## 2020-05-12 PROCEDURE — 82803 BLOOD GASES ANY COMBINATION: CPT

## 2020-05-12 PROCEDURE — 85007 BL SMEAR W/DIFF WBC COUNT: CPT

## 2020-05-12 PROCEDURE — 97116 GAIT TRAINING THERAPY: CPT | Mod: CQ

## 2020-05-12 PROCEDURE — 97530 THERAPEUTIC ACTIVITIES: CPT | Mod: CQ

## 2020-05-12 PROCEDURE — 84134 ASSAY OF PREALBUMIN: CPT

## 2020-05-12 PROCEDURE — 86900 BLOOD TYPING SEROLOGIC ABO: CPT

## 2020-05-12 PROCEDURE — 86923 COMPATIBILITY TEST ELECTRIC: CPT

## 2020-05-12 PROCEDURE — A9270 NON-COVERED ITEM OR SERVICE: HCPCS | Performed by: INTERNAL MEDICINE

## 2020-05-12 PROCEDURE — 85025 COMPLETE CBC W/AUTO DIFF WBC: CPT

## 2020-05-12 PROCEDURE — 30233N1 TRANSFUSION OF NONAUTOLOGOUS RED BLOOD CELLS INTO PERIPHERAL VEIN, PERCUTANEOUS APPROACH: ICD-10-PCS | Performed by: INTERNAL MEDICINE

## 2020-05-12 PROCEDURE — 86901 BLOOD TYPING SEROLOGIC RH(D): CPT

## 2020-05-12 RX ORDER — FOLIC ACID 1 MG/1
1 TABLET ORAL DAILY
Status: DISCONTINUED | OUTPATIENT
Start: 2020-05-12 | End: 2020-05-13 | Stop reason: HOSPADM

## 2020-05-12 RX ORDER — TRAZODONE HYDROCHLORIDE 50 MG/1
100 TABLET ORAL
Status: DISCONTINUED | OUTPATIENT
Start: 2020-05-12 | End: 2020-05-13 | Stop reason: HOSPADM

## 2020-05-12 RX ORDER — MAGNESIUM SULFATE HEPTAHYDRATE 40 MG/ML
2 INJECTION, SOLUTION INTRAVENOUS ONCE
Status: COMPLETED | OUTPATIENT
Start: 2020-05-12 | End: 2020-05-12

## 2020-05-12 RX ORDER — POTASSIUM CHLORIDE 7.45 MG/ML
10 INJECTION INTRAVENOUS
Status: COMPLETED | OUTPATIENT
Start: 2020-05-12 | End: 2020-05-12

## 2020-05-12 RX ORDER — SODIUM CHLORIDE 9 MG/ML
INJECTION, SOLUTION INTRAVENOUS CONTINUOUS
Status: DISCONTINUED | OUTPATIENT
Start: 2020-05-12 | End: 2020-05-12

## 2020-05-12 RX ORDER — SODIUM CHLORIDE 9 MG/ML
INJECTION, SOLUTION INTRAVENOUS CONTINUOUS
Status: ACTIVE | OUTPATIENT
Start: 2020-05-12 | End: 2020-05-12

## 2020-05-12 RX ORDER — ERGOCALCIFEROL 1.25 MG/1
50000 CAPSULE ORAL
Status: DISCONTINUED | OUTPATIENT
Start: 2020-05-12 | End: 2020-05-13 | Stop reason: HOSPADM

## 2020-05-12 RX ORDER — ERGOCALCIFEROL 1.25 MG/1
50000 CAPSULE ORAL
Status: DISCONTINUED | OUTPATIENT
Start: 2020-05-12 | End: 2020-05-12

## 2020-05-12 RX ADMIN — POTASSIUM CHLORIDE 10 MEQ: 10 INJECTION, SOLUTION INTRAVENOUS at 15:34

## 2020-05-12 RX ADMIN — SUCRALFATE 1 G: 1 SUSPENSION ORAL at 17:26

## 2020-05-12 RX ADMIN — OMEPRAZOLE 20 MG: 20 CAPSULE, DELAYED RELEASE ORAL at 17:26

## 2020-05-12 RX ADMIN — DIBASIC SODIUM PHOSPHATE, MONOBASIC POTASSIUM PHOSPHATE AND MONOBASIC SODIUM PHOSPHATE 1 TABLET: 852; 155; 130 TABLET ORAL at 05:41

## 2020-05-12 RX ADMIN — LACTULOSE 15 ML: 20 SOLUTION ORAL at 05:44

## 2020-05-12 RX ADMIN — DIBASIC SODIUM PHOSPHATE, MONOBASIC POTASSIUM PHOSPHATE AND MONOBASIC SODIUM PHOSPHATE 1 TABLET: 852; 155; 130 TABLET ORAL at 13:08

## 2020-05-12 RX ADMIN — SUCRALFATE 1 G: 1 SUSPENSION ORAL at 13:07

## 2020-05-12 RX ADMIN — Medication 100 MG: at 05:43

## 2020-05-12 RX ADMIN — SODIUM CHLORIDE: 900 INJECTION INTRAVENOUS at 06:30

## 2020-05-12 RX ADMIN — LEVOTHYROXINE SODIUM 75 MCG: 75 TABLET ORAL at 05:42

## 2020-05-12 RX ADMIN — POTASSIUM CHLORIDE 10 MEQ: 10 INJECTION, SOLUTION INTRAVENOUS at 12:33

## 2020-05-12 RX ADMIN — POTASSIUM CHLORIDE 10 MEQ: 10 INJECTION, SOLUTION INTRAVENOUS at 11:00

## 2020-05-12 RX ADMIN — SODIUM CHLORIDE 250 MG: 9 INJECTION, SOLUTION INTRAVENOUS at 06:16

## 2020-05-12 RX ADMIN — OMEPRAZOLE 20 MG: 20 CAPSULE, DELAYED RELEASE ORAL at 05:43

## 2020-05-12 RX ADMIN — FLUCONAZOLE 200 MG: 200 TABLET ORAL at 05:42

## 2020-05-12 RX ADMIN — MIDODRINE HYDROCHLORIDE 10 MG: 5 TABLET ORAL at 11:00

## 2020-05-12 RX ADMIN — FOLIC ACID 1 MG: 1 TABLET ORAL at 07:41

## 2020-05-12 RX ADMIN — DIBASIC SODIUM PHOSPHATE, MONOBASIC POTASSIUM PHOSPHATE AND MONOBASIC SODIUM PHOSPHATE 1 TABLET: 852; 155; 130 TABLET ORAL at 17:26

## 2020-05-12 RX ADMIN — MAGNESIUM SULFATE 2 G: 2 INJECTION INTRAVENOUS at 07:42

## 2020-05-12 RX ADMIN — THERA TABS 1 TABLET: TAB at 11:00

## 2020-05-12 RX ADMIN — Medication 400 MG: at 05:43

## 2020-05-12 RX ADMIN — LACTULOSE 15 ML: 20 SOLUTION ORAL at 17:26

## 2020-05-12 RX ADMIN — MIDODRINE HYDROCHLORIDE 10 MG: 5 TABLET ORAL at 17:26

## 2020-05-12 RX ADMIN — LACTULOSE 15 ML: 20 SOLUTION ORAL at 13:07

## 2020-05-12 RX ADMIN — SUCRALFATE 1 G: 1 SUSPENSION ORAL at 05:44

## 2020-05-12 RX ADMIN — MIDODRINE HYDROCHLORIDE 10 MG: 5 TABLET ORAL at 07:41

## 2020-05-12 RX ADMIN — TRAZODONE HYDROCHLORIDE 100 MG: 50 TABLET ORAL at 21:40

## 2020-05-12 RX ADMIN — POTASSIUM CHLORIDE 10 MEQ: 10 INJECTION, SOLUTION INTRAVENOUS at 14:22

## 2020-05-12 RX ADMIN — ERGOCALCIFEROL 50000 UNITS: 1.25 CAPSULE ORAL at 10:59

## 2020-05-12 ASSESSMENT — ENCOUNTER SYMPTOMS
NERVOUS/ANXIOUS: 0
COUGH: 0
BLOOD IN STOOL: 1
PALPITATIONS: 0
SEIZURES: 0
BRUISES/BLEEDS EASILY: 1
CARDIOVASCULAR NEGATIVE: 1
HALLUCINATIONS: 0
VOMITING: 1
TREMORS: 0
BLURRED VISION: 0
DIARRHEA: 0
WEAKNESS: 0
CONSTIPATION: 0
DIZZINESS: 0
DEPRESSION: 0
NAUSEA: 0
FALLS: 1
VOMITING: 0
INSOMNIA: 1
SHORTNESS OF BREATH: 0
ORTHOPNEA: 0
RESPIRATORY NEGATIVE: 1
DOUBLE VISION: 0
ABDOMINAL PAIN: 0
FEVER: 0
SORE THROAT: 0
CHILLS: 0
ABDOMINAL PAIN: 1
BLOOD IN STOOL: 0
HEARTBURN: 0
NAUSEA: 1
SENSORY CHANGE: 0
HEADACHES: 0

## 2020-05-12 ASSESSMENT — GAIT ASSESSMENTS
GAIT LEVEL OF ASSIST: SUPERVISED
ASSISTIVE DEVICE: FRONT WHEEL WALKER
DEVIATION: SHUFFLED GAIT
DISTANCE (FEET): 200

## 2020-05-12 ASSESSMENT — COGNITIVE AND FUNCTIONAL STATUS - GENERAL
WALKING IN HOSPITAL ROOM: A LITTLE
SUGGESTED CMS G CODE MODIFIER MOBILITY: CJ
CLIMB 3 TO 5 STEPS WITH RAILING: A LITTLE
MOBILITY SCORE: 22

## 2020-05-12 ASSESSMENT — LIFESTYLE VARIABLES: SUBSTANCE_ABUSE: 1

## 2020-05-12 NOTE — DISCHARGE PLANNING
Agency/Facility Name: [facility]   Spoke To: Nataly  Outcome: Unable to accept referral due to Medicaid.

## 2020-05-12 NOTE — DISCHARGE PLANNING
Agency/Facility Name: [facility]   Outcome: Per Epic, referral declined due to unable to accept Medicaid.

## 2020-05-12 NOTE — DISCHARGE PLANNING
Received Choice form at 1010  Agency/Facility Name: [facility]   Referral sent per Choice form @ 4697

## 2020-05-12 NOTE — ASSESSMENT & PLAN NOTE
Patient with chronic alcoholism.  Low albumin and prealbumin  Celiac panel - pending.  Zinc -  VitD - low  B12 wnl  Folate - low normal.  Nutrition consult

## 2020-05-12 NOTE — DISCHARGE PLANNING
Anticipated Discharge Disposition: home with home health    Action: Order rec’d for home health. Reviewed with BSN that pt is not discharging home today.   Reviewed choice for home health agencies with pt over the phone and pt requests referrals be sent to [facility] and [facility]. Pt has Medicaid HMO insurance. For PCP, pt states she has been seen at the [facility] previously but does not remember how long ago. The address listed on the facesheet is pt’s mailing address. Correct address is [address] [place] [address], [address] [place] [address]. Phone is [phone].     Barriers to Discharge: an accepting  agency    Plan: choice form faxed to Lindy RODRIGUEZ to send referral, await home health responses.

## 2020-05-12 NOTE — FACE TO FACE
Face to Face Supporting Documentation - Home Health    The encounter with this patient was in whole or in part the primary reason for home health admission.    Date of encounter:   Patient:                    MRN:                       YOB: 2018  Jumana Kahn  [id number]  1965     Home health to see patient for:  Physical Therapy evaluation and treatment and Occupational therapy evaluation and treatment    Skilled need for:  Comment: Physical and occupational therapy    Skilled nursing interventions to include:  Comment: Physical and occupational therapy    Homebound status evidenced by:  Need the aid of supportive devices such as crutches, canes, wheelchairs or walkers. Leaving home requires a considerable and taxing effort. There is a normal inability to leave the home.    Community Physician to provide follow up care: MILLA JayPAcostaN.     Optional Interventions? No      I certify the face to face encounter for this home health care referral meets the CMS requirements and the encounter/clinical assessment with the patient was, in whole, or in part, for the medical condition(s) listed above, which is the primary reason for home health care. Based on my clinical findings: the service(s) are medically necessary, support the need for home health care, and the homebound criteria are met.  I certify that this patient has had a face to face encounter by myself.  Alistair Valenzuela M.D. - NPI: [id number]
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
## 2020-05-12 NOTE — PROGRESS NOTES
Assumed care of pt at 0715. Report received and bedside rounding completed with night RN. Pt is calm no SOB, or in any acute distress noted.    Pt is considered a high fall risk. edu provided on risk level. Fall precautions in place,  bed alarm. - Treaded non slip socks. Bed locked. Communication board updated with POC. Call light and pt belongings within reach - pt makes needs known - hourly rounding in place. See flowsheets for further assessment.       Multiple IV medications ordered this am. Hg 6.7: 1 U rbcs ordered. 22g in right hand infiltrated during IV mag transfusion- IV removed and two ultrasound guided IVs placed.     1 U rbcs started at 0950    K riders to be given once mag completed.     Patient ambulated back and forth from the bathroom today- has frequency

## 2020-05-12 NOTE — PROGRESS NOTES
Daily Progress Note:     Date of Service: 5/12/2020  Primary Team: [facility] White Team   Attending: Neymar Garduno M.D.  Senior Resident: Dr. Wallace  Intern: Dr. Valenzuela  Contact:  [phone]    Chief Complaint:   Alcohol withdrawal    ID: Patient with history of alcohol abuse with alcoholic liver disease. She was admitted due to alcohol intoxication and complications of alcoholism including alcoholic hepatitis, central pontine myelinolysis, GIB from esophagitis, alcoholic gastritis.    Subjective:  No overnight events. Comfortably sleeping in the bed. No nausea/vomiting/hematemesis/melena/hematochezia. No lightheadedness.    Interval updates:  5/8/2020: Hemoglobin dropped to 6.8 ON, repeat is >7. Patient is stable, although had an episode of low SBP 80s, given 250 cc IVF by night team. CIWA 16-1-3-10 last, received 3.5 mg ativan overnight. LFTs trending down, alcoholic hepatitis seems to be improving. Thrombocytes are 34k today, no active bleeding, will monitor closely. MRI brain revealed central pontine myelinolysis, in this case it is likely chronic, will follow neuro recs  5/9/2020: CIWA Stable Hb around 7.5 overnight. No events. GI was consulted ([facility]) for elective endoscopy for possible UGIB. Will obtain COVID test for EGD. Continue PPI. Had 4 bowel movement with lactulose, lowered the dose. Thrombocytes stable at 45k, no active bleeding. She has been tolerating regular diet. Cortisol for low BP and K, NS+D5+K IVF. Developed fever, UCx showing Ecoli and klebsiella, although UA is showing epithelial cells. Will keep patient on abx. Blood cultures, STAT CXR. Suspicion for drug related fever as well.   5/10/2020: CXR did not reveal any PNA, some atelectasis. Negative blood cultures so far. Fever is resolved. Will get EGD today.  5/11/2020: EGD revealed esophageal candidiasis, started on fluconazole, blood oozing stopped with observation, LA grade D esophagitis, hiatal hernia, diffuse gastritis likely  secondary to portal hypertension. She will be on PPI treatment and will follow up with [facility] outpatient. Hb stable overnight. Patient feel better today. Had vomiting in AM, having severe regurgitation with solid food, will continue with liquid diet. Alcohol cessation counseling given, discussed complications and risks of alcohol. We discussed EGD findings. Patient understands and agrees and promised to stop alcohol intake. Increased midodrine dose to 10. Checking HIV due to esophageal candidiasis.  5/12/2020: HIV negative. Hepatitis B, C, ammonia, B6, B1, liver fibrosis all wnl/negative. Celiac panel is pending. Hb 6.7, giving 1U of RBC, patient does not have active bleeding. VitD is low, started on supplement. Bicarb is low in AM, ordered ABG showing low CO2 and low bicarb suggesting primary chronic respiratory alkalosis. Advance diet as tolerated. PT/OT recommended home health. Prealbumin is low, nutrition was consulted.    Consultants/Specialty:  Gastroenterology - [facility]    Review of Systems:   Review of Systems   Constitutional: Positive for malaise/fatigue. Negative for chills and fever.   HENT: Negative for sore throat.    Eyes: Negative for blurred vision and double vision.   Respiratory: Negative for cough and shortness of breath.    Cardiovascular: Negative for chest pain, palpitations, orthopnea and leg swelling.   Gastrointestinal: Negative for abdominal pain, blood in stool, constipation, diarrhea, heartburn, melena, nausea and vomiting.   Genitourinary: Negative for dysuria and hematuria.   Musculoskeletal: Negative for joint pain.   Neurological: Negative for dizziness, tremors, sensory change, seizures, weakness and headaches.   Psychiatric/Behavioral: Positive for substance abuse. Negative for depression, hallucinations and suicidal ideas. The patient has insomnia. The patient is not nervous/anxious.        Objective Data:   Physical Exam:   Vitals:   Temp:  [36.2 °C (97.1 °F)-37.2  °C (99 °F)] 37 °C (98.6 °F)  Pulse:  [] 94  Resp:  [16-17] 16  BP: ()/(56-73) 111/73  SpO2:  [93 %-98 %] 94 %    Physical Exam  Constitutional:       General: She is not in acute distress.     Appearance: She is not ill-appearing.      Comments: Alert and oriented.   HENT:      Head: Atraumatic.      Ears:      Comments: Hearing loss     Nose: No rhinorrhea.   Eyes:      General: Scleral icterus present.      Extraocular Movements: Extraocular movements intact.   Neck:      Musculoskeletal: Neck supple.   Cardiovascular:      Rate and Rhythm: Normal rate and regular rhythm.      Heart sounds: Normal heart sounds. No murmur. No friction rub. No gallop.    Pulmonary:      Breath sounds: No wheezing, rhonchi or rales.   Abdominal:      Tenderness: There is no abdominal tenderness. There is no guarding or rebound.      Comments: Hyperactive bowel sounds  No shifting dullness on abdominal exam - no ascites.  No caput medusa seen on skin exam.   Genitourinary:     Rectum: Guaiac result positive.   Musculoskeletal:      Right lower leg: No edema.      Left lower leg: No edema.   Skin:     Coloration: Skin is pale. Skin is not jaundiced.      Findings: No rash.      Comments: No spider angiomata.   Neurological:      General: No focal deficit present.      Mental Status: She is oriented to person, place, and time.      Cranial Nerves: No cranial nerve deficit.      Sensory: No sensory deficit.      Motor: No weakness.      Deep Tendon Reflexes: Reflexes normal.      Comments: No asterixis           Labs:     Recent Labs     05/10/20  0323 05/10/20  2045 05/11/20  0253 05/11/20  1406 05/12/20  0320   RBC 2.39*  --  2.34*  --  2.13*   HEMOGLOBIN 7.7* 7.5* 7.5*  --  6.7*   HEMATOCRIT 23.8* 24.3* 23.8*  --  21.4*   PLATELETCT 53*  --  53* 70* 64*     Recent Labs     05/10/20  0323 05/11/20  0253 05/12/20  0320   SODIUM 138 138 135   POTASSIUM 4.0 3.8 3.6   CHLORIDE 112 111 109   CO2 15* 17* 15*   BUN 3* 2* 2*    CREATININE 0.48* 0.49* 0.42*   MAGNESIUM 2.1 2.0 1.8   PHOSPHORUS 2.2* 2.8 2.6   CALCIUM 7.3* 7.4* 7.7*     Recent Labs     05/10/20  0323 05/11/20  0253 05/12/20  0320 05/12/20  0829   ALTSGPT 56* 52* 41  --    ASTSGOT 187* 147* 119*  --    ALKPHOSPHAT 255* 237* 215*  --    TBILIRUBIN 2.8* 2.3* 2.4*  --    PREALBUMIN  --   --   --  4.4*   GLUCOSE 98 102* 89  --                  Recent Labs     05/10/20  0323 05/11/20  0253 05/12/20  0320   WBC 4.2* 4.1* 3.9*   NEUTSPOLYS 58.00 51.90 65.20   LYMPHOCYTES 26.50 30.20 16.50*   MONOCYTES 11.40 14.00* 13.00   EOSINOPHILS 3.10 3.20 4.40   BASOPHILS 0.50 0.50 0.90   ASTSGOT 187* 147* 119*   ALTSGPT 56* 52* 41   ALKPHOSPHAT 255* 237* 215*   TBILIRUBIN 2.8* 2.3* 2.4*           Imaging:   No imaging today.    * Alcoholic liver disease (HCC)- (present on admission)  Assessment & Plan  Long history of alcoholism.  MELD 13 6% 3mo mortality   Last RUQ U/S in January shows fatty infiltration vs fibrosis.  Child class B   -Alcohol cessation counseling provided. Discussed about complications of alcoholism.    Alcoholic hepatitis- (present on admission)  Assessment & Plan  Improving  , ALT 74, >2 ratio, Alk 264 on admission, improving but still elevated  Last RUQ U/S in January shows fatty infiltration vs fibrosis.  Supportive care. IVF and trend LFT's   Maddrey score <32, no steroids indicated.  ASMA/SHAHLA -  IGG -   Anti mitochondrial -  HepB and C - negative.    Anemia requiring transfusions- (present on admission)  Assessment & Plan  Blood loss + alcoholism + malnutrition  Previous EGD shows ulcerative esophagitis, no varices was mentioned.  Hgb 8.4 on admission - CBC daily, monitor vitals closely - transfuse prn  B12, folate wnl.  TSH in 50s, very high 2/2 non-adherence. Synthroid increased from 50 mcg to 75 mcg;  Ferritin 61; iron is low at 31 and %sat at low, FOBT+  GI consult - EGD 5/10/2020: Candida esophagitis, LA grade D esophagitis, diffuse  gastritis.  PPI  Sucralfate  IVF  Iron replacement.  Celiac panel - pending.    Malnutrition (HCC)- (present on admission)  Assessment & Plan  Patient with chronic alcoholism.  Low albumin and prealbumin  Celiac panel - pending.  Zinc -  VitD - low  B12 wnl  Folate - low normal.  Nutrition consult    Gastritis due to alcohol without hemorrhage- (present on admission)  Assessment & Plan  EGD revealed diffuse gastritis likely due to portal hypertension.  Alcohol abuse - alcohol cessation counseling provided.  No NSAIDS, ASA, alcohol.  Alcohol cessation  PPI  Sucralfate    Candida esophagitis (HCC)- (present on admission)  Assessment & Plan  EGD 5/10/20: White plaques, likely candidal esophagitis.  Empirically treat with fluconazole oral x 14 days.  Check HIV - negative.    Occult blood positive stool- (present on admission)  Assessment & Plan  Last EGD shows esophagial erosion.  Patient is stable Hb at around 7.5  Has history of hematemesis a week before admission.  GI consult - EGD 5/10/2020: Candida esophagitis, LA grade D esophagitis, diffuse gastritis.  PPI + sucralfate.    GERD (gastroesophageal reflux disease)- (present on admission)  Assessment & Plan  +FOBT - suspected UGI bleed - although BUN/cre 8/0.5 - EGD - Has hiatal hernia and esophagitis  PPI  No coffee or chocolate.  Alcohol cessation.  Head elevation during meals.    Central pontine myelinolysis (HCC)- (present on admission)  Assessment & Plan  Pt has difficulty with walking and states that she has fallen. Last fall about a week ago. Says she hit her head at some point and has a posterior right sided headache and blurry vision. Patient is lethargic.  No dysarthria, dysphagia, paraparesis or quadriparesis, behavioral disturbances, seizures,  No locked in syndrome.  CT head w/o negative for acute intracranial hemorrhage, but there is concern for CPM. She has had hyponatremia in the past  MRI scan showed central pontine myelinolysis possibly 2/2  alcoholism.    Plan:  Neurology consult - following rec's - no treatment indicated at this time  Alcohol cessation.  PT/OT eval - need home health.  Physiatry - patient is not participating inpatient physiatry.  Alcohol cessation. At this time, no proven treatment due to chronicity of the condition.    Vitamin D deficiency- (present on admission)  Assessment & Plan  ? Malabsorption due to alcoholism or underlying condition - Celiac panel - pending.  Replete    Alcohol intoxication in active alcoholic with complication (HCC)- (present on admission)  Assessment & Plan  Resolved.  No alcoholic seizures in the past. Feels like she is going through a detox,   last drink was AM on admission, drinking 1/5 pint vodka /d.   Has tremor, asterixis in ED.  ETOh level 241.1 on admission  EKG sinus tach in the ED, no arrhythmia no Afib.     Plan:  Admit to neurology with telemetry monitoring  CT head - ?central pontine myelinolysis - neurology following - MRI central pontine myelinolysis.  Aspiration/Seiaure/Fall precautions   MV/thiamine/folate   Ativan per CIWA - discontinue 5/9  IVF  Advance diet  Monitor and replete electrolytes prn   Repeat TSH/B12/Folate/Ammonia level - wnl.    Fever  Assessment & Plan  Resolved.  UA LE and N positive. Rare epithelial cells  Urine cultures showing ecoli and klebsiella.  Started on C3 5/9 night. After that patient developed fever.  Could be PNA vs UTI.    Plan:  Blood cultures - negative.  STAT CXR - atelectasis, no consolidations.  Continue C3 - dc 5/11/20  Monitor closely.    Hypotension- (present on admission)  Assessment & Plan  Patient with chronic hypotension on midodrine - continue inpatient.    Hypokalemia- (present on admission)  Assessment & Plan  K 3.5 on admission  Monitor closely - replete prn.    Hypothyroidism- (present on admission)  Assessment & Plan  Last TSH was 31.200 on 1/14/20. - repeat 54k  non compliant with synthroid for the past 2 weeks   -Increased synthroid to  75.    Thrombocytopenia (HCC)- (present on admission)  Assessment & Plan  Likely secondary to chronic liver disease from alcohol   Monitor closely with cbc   SCDs for DVT prophylaxis

## 2020-05-12 NOTE — CARE PLAN
Problem: Communication  Goal: The ability to communicate needs accurately and effectively will improve  Outcome: PROGRESSING AS EXPECTED  Intervention: [place] patient and significant other/support system to call light to alert staff of needs  Flowsheets (Taken 5/7/2020 2100 by Juan Daniel Diehl RMANOJ.)  Oriented to:: All of the Following : Location of Bathroom, Visiting Policy, Unit Routine, Call Light and Bedside Controls, Bedside Rail Policy, Smoking Policy, Rights and Responsibilities, Bedside Report, and Patient Education Notebook  Note: Patient oriented to use of call light when she needs to alert staff.      Problem: Safety  Goal: Will remain free from falls  Outcome: PROGRESSING AS EXPECTED  Intervention: Assess risk factors for falls  Flowsheets  Taken 5/10/2020 1730 by Erinn Villegas, C.N.A.  Pt Calls for Assistance: Yes  Taken 5/6/2020 2102 by Ni Watt R.N.  History of fall: 0  Note: Patient compliant with fall precautions.

## 2020-05-12 NOTE — PROGRESS NOTES
"Bedside report taken from day shift RN Lavelle and patient. Fall precautions in place and hourly rounding continued. Patient vital signs /62   Pulse 94   Temp 37.2 °C (99 °F) (Temporal)   Resp 17   Ht 1.651 m (5' 5\")   Wt 72.5 kg (159 lb 13.3 oz)   SpO2 98%   BMI 26.60 kg/m² . Full assessment completed in flowsheet. Assumed patient care at 1900.   "

## 2020-05-12 NOTE — DISCHARGE PLANNING
Follow up for rehab chart review indicates limited therapy need for IRF level of care, and current plan for home with outpatient support. [facility] no longer following.

## 2020-05-12 NOTE — DISCHARGE PLANNING
Agency/Facility Name: [facility]   Spoke To: Ev  Outcome: Referral received, pending review.

## 2020-05-13 VITALS
DIASTOLIC BLOOD PRESSURE: 59 MMHG | RESPIRATION RATE: 16 BRPM | WEIGHT: 159.83 LBS | HEIGHT: 65 IN | OXYGEN SATURATION: 92 % | SYSTOLIC BLOOD PRESSURE: 112 MMHG | BODY MASS INDEX: 26.63 KG/M2 | TEMPERATURE: 98.6 F | HEART RATE: 97 BPM

## 2020-05-13 LAB
AFP-TM SERPL-MCNC: 2 NG/ML (ref 0–9)
ALBUMIN SERPL BCP-MCNC: 2.5 G/DL (ref 3.2–4.9)
ALBUMIN/GLOB SERPL: 0.9 G/DL
ALP SERPL-CCNC: 239 U/L (ref 30–99)
ALT SERPL-CCNC: 41 U/L (ref 2–50)
ANION GAP SERPL CALC-SCNC: 11 MMOL/L (ref 7–16)
AST SERPL-CCNC: 103 U/L (ref 12–45)
BASOPHILS # BLD AUTO: 0.8 % (ref 0–1.8)
BASOPHILS # BLD: 0.04 K/UL (ref 0–0.12)
BILIRUB SERPL-MCNC: 3.2 MG/DL (ref 0.1–1.5)
BUN SERPL-MCNC: <2 MG/DL (ref 8–22)
CALCIUM SERPL-MCNC: 7.8 MG/DL (ref 8.5–10.5)
CHLORIDE SERPL-SCNC: 108 MMOL/L (ref 96–112)
CO2 SERPL-SCNC: 17 MMOL/L (ref 20–33)
CREAT SERPL-MCNC: 0.47 MG/DL (ref 0.5–1.4)
EOSINOPHIL # BLD AUTO: 0.11 K/UL (ref 0–0.51)
EOSINOPHIL NFR BLD: 2.1 % (ref 0–6.9)
ERYTHROCYTE [DISTWIDTH] IN BLOOD BY AUTOMATED COUNT: 55 FL (ref 35.9–50)
GLOBULIN SER CALC-MCNC: 2.8 G/DL (ref 1.9–3.5)
GLUCOSE SERPL-MCNC: 100 MG/DL (ref 65–99)
HAV AB SER QL IA: NEGATIVE
HCT VFR BLD AUTO: 26.7 % (ref 37–47)
HGB BLD-MCNC: 8.9 G/DL (ref 12–16)
IGA SERPL-MCNC: 333 MG/DL (ref 68–408)
IGG SERPL-MCNC: 1470 MG/DL (ref 768–1632)
IMM GRANULOCYTES # BLD AUTO: 0.04 K/UL (ref 0–0.11)
IMM GRANULOCYTES NFR BLD AUTO: 0.8 % (ref 0–0.9)
LYMPHOCYTES # BLD AUTO: 1.43 K/UL (ref 1–4.8)
LYMPHOCYTES NFR BLD: 27.8 % (ref 22–41)
MCH RBC QN AUTO: 31.7 PG (ref 27–33)
MCHC RBC AUTO-ENTMCNC: 33.3 G/DL (ref 33.6–35)
MCV RBC AUTO: 95 FL (ref 81.4–97.8)
MITOCHONDRIA M2 IGG SER-ACNC: 5 UNITS (ref 0–20)
MONOCYTES # BLD AUTO: 0.91 K/UL (ref 0–0.85)
MONOCYTES NFR BLD AUTO: 17.7 % (ref 0–13.4)
NEUTROPHILS # BLD AUTO: 2.62 K/UL (ref 2–7.15)
NEUTROPHILS NFR BLD: 50.8 % (ref 44–72)
NRBC # BLD AUTO: 0 K/UL
NRBC BLD-RTO: 0 /100 WBC
NUCLEAR IGG SER QL IA: NORMAL
PLATELET # BLD AUTO: 92 K/UL (ref 164–446)
PMV BLD AUTO: 11.7 FL (ref 9–12.9)
POTASSIUM SERPL-SCNC: 3.6 MMOL/L (ref 3.6–5.5)
PROT SERPL-MCNC: 5.3 G/DL (ref 6–8.2)
RBC # BLD AUTO: 2.81 M/UL (ref 4.2–5.4)
SMA IGG SER-ACNC: 10 UNITS (ref 0–19)
SODIUM SERPL-SCNC: 136 MMOL/L (ref 135–145)
TTG IGA SER IA-ACNC: <2 U/ML (ref 0–3)
WBC # BLD AUTO: 5.2 K/UL (ref 4.8–10.8)

## 2020-05-13 PROCEDURE — 700102 HCHG RX REV CODE 250 W/ 637 OVERRIDE(OP): Performed by: STUDENT IN AN ORGANIZED HEALTH CARE EDUCATION/TRAINING PROGRAM

## 2020-05-13 PROCEDURE — 700105 HCHG RX REV CODE 258: Performed by: STUDENT IN AN ORGANIZED HEALTH CARE EDUCATION/TRAINING PROGRAM

## 2020-05-13 PROCEDURE — 700102 HCHG RX REV CODE 250 W/ 637 OVERRIDE(OP): Performed by: INTERNAL MEDICINE

## 2020-05-13 PROCEDURE — 80053 COMPREHEN METABOLIC PANEL: CPT

## 2020-05-13 PROCEDURE — A9270 NON-COVERED ITEM OR SERVICE: HCPCS | Performed by: STUDENT IN AN ORGANIZED HEALTH CARE EDUCATION/TRAINING PROGRAM

## 2020-05-13 PROCEDURE — A9270 NON-COVERED ITEM OR SERVICE: HCPCS | Performed by: INTERNAL MEDICINE

## 2020-05-13 PROCEDURE — 36415 COLL VENOUS BLD VENIPUNCTURE: CPT

## 2020-05-13 PROCEDURE — 700111 HCHG RX REV CODE 636 W/ 250 OVERRIDE (IP): Performed by: STUDENT IN AN ORGANIZED HEALTH CARE EDUCATION/TRAINING PROGRAM

## 2020-05-13 PROCEDURE — 99239 HOSP IP/OBS DSCHRG MGMT >30: CPT | Mod: GC | Performed by: INTERNAL MEDICINE

## 2020-05-13 PROCEDURE — 85025 COMPLETE CBC W/AUTO DIFF WBC: CPT

## 2020-05-13 RX ORDER — FERROUS SULFATE 325(65) MG
325 TABLET ORAL
Qty: 15 TAB | Refills: 0 | Status: SHIPPED | OUTPATIENT
Start: 2020-05-13 | End: 2020-05-30 | Stop reason: SDUPTHER

## 2020-05-13 RX ORDER — FLUCONAZOLE 200 MG/1
200 TABLET ORAL DAILY
Qty: 9 TAB | Refills: 0 | Status: SHIPPED | OUTPATIENT
Start: 2020-05-14 | End: 2020-05-23

## 2020-05-13 RX ORDER — LEVOTHYROXINE SODIUM 0.07 MG/1
75 TABLET ORAL
Qty: 30 TAB | Refills: 0 | Status: SHIPPED | OUTPATIENT
Start: 2020-05-14 | End: 2020-06-15 | Stop reason: SDUPTHER

## 2020-05-13 RX ORDER — ERGOCALCIFEROL 1.25 MG/1
50000 CAPSULE ORAL
Qty: 4 CAP | Refills: 0 | Status: SHIPPED | OUTPATIENT
Start: 2020-05-19 | End: 2020-06-15 | Stop reason: SDUPTHER

## 2020-05-13 RX ORDER — SUCRALFATE ORAL 1 G/10ML
1 SUSPENSION ORAL EVERY 6 HOURS
Qty: 2400 ML | Refills: 0 | Status: SHIPPED | OUTPATIENT
Start: 2020-05-13 | End: 2020-06-15 | Stop reason: SDUPTHER

## 2020-05-13 RX ORDER — MIDODRINE HYDROCHLORIDE 10 MG/1
10 TABLET ORAL
Qty: 60 TAB | Refills: 0 | Status: SHIPPED | OUTPATIENT
Start: 2020-05-13 | End: 2020-06-15 | Stop reason: SDUPTHER

## 2020-05-13 RX ORDER — POTASSIUM CHLORIDE 7.45 MG/ML
10 INJECTION INTRAVENOUS
Status: COMPLETED | OUTPATIENT
Start: 2020-05-13 | End: 2020-05-13

## 2020-05-13 RX ADMIN — DIBASIC SODIUM PHOSPHATE, MONOBASIC POTASSIUM PHOSPHATE AND MONOBASIC SODIUM PHOSPHATE 1 TABLET: 852; 155; 130 TABLET ORAL at 05:28

## 2020-05-13 RX ADMIN — DIBASIC SODIUM PHOSPHATE, MONOBASIC POTASSIUM PHOSPHATE AND MONOBASIC SODIUM PHOSPHATE 1 TABLET: 852; 155; 130 TABLET ORAL at 00:03

## 2020-05-13 RX ADMIN — POTASSIUM CHLORIDE 10 MEQ: 10 INJECTION, SOLUTION INTRAVENOUS at 09:33

## 2020-05-13 RX ADMIN — LEVOTHYROXINE SODIUM 75 MCG: 75 TABLET ORAL at 05:28

## 2020-05-13 RX ADMIN — MIDODRINE HYDROCHLORIDE 10 MG: 5 TABLET ORAL at 07:39

## 2020-05-13 RX ADMIN — Medication 100 MG: at 05:36

## 2020-05-13 RX ADMIN — FOLIC ACID 1 MG: 1 TABLET ORAL at 05:28

## 2020-05-13 RX ADMIN — OMEPRAZOLE 20 MG: 20 CAPSULE, DELAYED RELEASE ORAL at 05:28

## 2020-05-13 RX ADMIN — SUCRALFATE 1 G: 1 SUSPENSION ORAL at 05:28

## 2020-05-13 RX ADMIN — SODIUM CHLORIDE 250 MG: 9 INJECTION, SOLUTION INTRAVENOUS at 06:07

## 2020-05-13 RX ADMIN — SUCRALFATE 1 G: 1 SUSPENSION ORAL at 00:03

## 2020-05-13 RX ADMIN — Medication 400 MG: at 05:28

## 2020-05-13 RX ADMIN — FLUCONAZOLE 200 MG: 200 TABLET ORAL at 05:27

## 2020-05-13 RX ADMIN — SUCRALFATE 1 G: 1 SUSPENSION ORAL at 11:52

## 2020-05-13 RX ADMIN — MIDODRINE HYDROCHLORIDE 10 MG: 5 TABLET ORAL at 11:52

## 2020-05-13 RX ADMIN — POTASSIUM CHLORIDE 10 MEQ: 10 INJECTION, SOLUTION INTRAVENOUS at 07:29

## 2020-05-13 RX ADMIN — LACTULOSE 15 ML: 20 SOLUTION ORAL at 05:28

## 2020-05-13 RX ADMIN — THERA TABS 1 TABLET: TAB at 05:28

## 2020-05-13 ASSESSMENT — ENCOUNTER SYMPTOMS
PALPITATIONS: 0
VOMITING: 0
BLURRED VISION: 0
BLOOD IN STOOL: 0
DEPRESSION: 0
ORTHOPNEA: 0
CHILLS: 0
TREMORS: 0
SEIZURES: 0
SENSORY CHANGE: 0
DIARRHEA: 0
NERVOUS/ANXIOUS: 0
HEARTBURN: 0
HALLUCINATIONS: 0
FEVER: 0
INSOMNIA: 1
DOUBLE VISION: 0
HEADACHES: 0
COUGH: 0
ABDOMINAL PAIN: 0
CONSTIPATION: 0
SORE THROAT: 0
SHORTNESS OF BREATH: 0
WEAKNESS: 0
NAUSEA: 0
DIZZINESS: 0

## 2020-05-13 ASSESSMENT — LIFESTYLE VARIABLES: SUBSTANCE_ABUSE: 1

## 2020-05-13 NOTE — DISCHARGE INSTRUCTIONS
# Take your omeprazole and sucralfate (stomach protecting medications) regularly as prescribed.  # Eat your meals upright, try small pieces. Avoid from chocolate and coffee.  # Complete abstinence from alcohol is crucial.  # Take fluconazole as prescribed for your food pipe fungal infection.  # Take iron tablets every other day for your anemia  Discharge Instructions    Discharged to home by car with self. Discharged via wheelchair, hospital escort: Yes.  Special equipment needed: Not Applicable    Be sure to schedule a follow-up appointment with your primary care doctor or any specialists as instructed.     Discharge Plan:        I understand that a diet low in cholesterol, fat, and sodium is recommended for good health. Unless I have been given specific instructions below for another diet, I accept this instruction as my diet prescription.   Other diet: reg    Special Instructions: None    · Is patient discharged on Warfarin / Coumadin?   No     Depression / Suicide Risk    As you are discharged from this [facility] facility, it is important to learn how to keep safe from harming yourself.    Recognize the warning signs:  · Abrupt changes in personality, positive or negative- including increase in energy   · Giving away possessions  · Change in eating patterns- significant weight changes-  positive or negative  · Change in sleeping patterns- unable to sleep or sleeping all the time   · Unwillingness or inability to communicate  · Depression  · Unusual sadness, discouragement and loneliness  · Talk of wanting to die  · Neglect of personal appearance   · Rebelliousness- reckless behavior  · Withdrawal from people/activities they love  · Confusion- inability to concentrate     If you or a loved one observes any of these behaviors or has concerns about self-harm, here's what you can do:  · Talk about it- your feelings and reasons for harming yourself  · Remove any means that you might use to hurt yourself  (examples: pills, rope, extension cords, firearm)  · Get professional help from the community (Mental Health, Substance Abuse, psychological counseling)  · Do not be alone:Call your Safe Contact- someone whom you trust who will be there for you.  · Call your local CRISIS HOTLINE 734-8027 or 083-657-2571  · Call your local Children's Mobile Crisis Response Team [place] (440) 434-9586 or www.Storee  · Call the toll free National Suicide Prevention Hotlines   · National Suicide Prevention Lifeline 306-881-ZQTG (3633)  · [employer] Hope Line Network 800-SUICIDE (223-7877)    # See your stomach doctors in 4 weeks.  # See your primary care physician in 2 weeks for your hemoglobin and electrolytes.  # Take your vitamins regularly as prescribed.  # Maintain your oral intake. Take calori supplements if needed.    Alcohol Use Disorder  Alcohol use disorder is when your drinking disrupts your daily life. When you have this condition, you drink too much alcohol and you cannot control your drinking.  Alcohol use disorder can cause serious problems with your physical health. It can affect your brain, heart, liver, pancreas, immune system, stomach, and intestines. Alcohol use disorder can increase your risk for certain cancers and cause problems with your mental health, such as depression, anxiety, psychosis, delirium, and dementia. People with this disorder risk hurting themselves and others.  What are the causes?  This condition is caused by drinking too much alcohol over time. It is not caused by drinking too much alcohol only one or two times. Some people with this condition drink alcohol to cope with or escape from negative life events. Others drink to relieve pain or symptoms of mental illness.  What increases the risk?  You are more likely to develop this condition if:  · You have a family history of alcohol use disorder.  · Your culture encourages drinking to the point of intoxication, or makes alcohol easy  to get.  · You had a mood or conduct disorder in childhood.  · You have been a victim of abuse.  · You are an adolescent and:  ¨ You have poor grades or difficulties in school.  ¨ Your caregivers do not talk to you about saying no to alcohol, or supervise your activities.  ¨ You are impulsive or you have trouble with self-control.  What are the signs or symptoms?  Symptoms of this condition include:  · Drinking more than you want to.  · Drinking for longer than you want to.  · Trying several times to drink less or to control your drinking.  · Spending a lot of time getting alcohol, drinking, or recovering from drinking.  · Craving alcohol.  · Having problems at work, at school, or at home due to drinking.  · Having problems in relationships due to drinking.  · Drinking when it is dangerous to drink, such as before driving a car.  · Continuing to drink even though you know you might have a physical or mental problem related to drinking.  · Needing more and more alcohol to get the same effect you want from the alcohol (building up tolerance).  · Having symptoms of withdrawal when you stop drinking. Symptoms of withdrawal include:  ¨ Fatigue.  ¨ Nightmares.  ¨ Trouble sleeping.  ¨ Depression.  ¨ Anxiety.  ¨ Fever.  ¨ Seizures.  ¨ Severe confusion.  ¨ Feeling or seeing things that are not there (hallucinations).  ¨ Tremors.  ¨ Rapid heart rate.  ¨ Rapid breathing.  ¨ High blood pressure.  · Drinking to avoid symptoms of withdrawal.  How is this diagnosed?  This condition is diagnosed with an assessment. Your health care provider may start the assessment by asking three or four questions about your drinking.  Your health care provider may perform a physical exam or do lab tests to see if you have physical problems resulting from alcohol use. She or he may refer you to a mental health professional for evaluation.  How is this treated?  Some people with alcohol use disorder are able to reduce their alcohol use to low-risk  levels. Others need to completely quit drinking alcohol. When necessary, mental health professionals with specialized training in substance use treatment can help. Your health care provider can help you decide how severe your alcohol use disorder is and what type of treatment you need. The following forms of treatment are available:  · Detoxification. Detoxification involves quitting drinking and using prescription medicines within the first week to help lessen withdrawal symptoms. This treatment is important for people who have had withdrawal symptoms before and for heavy drinkers who are likely to have withdrawal symptoms. Alcohol withdrawal can be dangerous, and in severe cases, it can cause death. Detoxification may be provided in a home, community, or primary care setting, or in a hospital or substance use treatment facility.  · Counseling. This treatment is also called talk therapy. It is provided by substance use treatment counselors. A counselor can address the reasons you use alcohol and suggest ways to keep you from drinking again or to prevent problem drinking. The goals of talk therapy are to:  ¨ Find healthy activities and ways for you to cope with stress.  ¨ Identify and avoid the things that trigger your alcohol use.  ¨ Help you learn how to handle cravings.  · Medicines. Medicines can help treat alcohol use disorder by:  ¨ Decreasing alcohol cravings.  ¨ Decreasing the positive feeling you have when you drink alcohol.  ¨ Causing an uncomfortable physical reaction when you drink alcohol (aversion therapy).  · Support groups. Support groups are led by people who have quit drinking. They provide emotional support, advice, and guidance.  These forms of treatment are often combined. Some people with this condition benefit from a combination of treatments provided by specialized substance use treatment centers.  Follow these instructions at home:  · Take over-the-counter and prescription medicines only as  told by your health care provider.  · Check with your health care provider before starting any new medicines.  · Ask friends and family members not to offer you alcohol.  · Avoid situations where alcohol is served, including gatherings where others are drinking alcohol.  · Create a plan for what to do when you are tempted to use alcohol.  · Find hobbies or activities that you enjoy that do not include alcohol.  · Keep all follow-up visits as told by your health care provider. This is important.  How is this prevented?  · If you drink, limit alcohol intake to no more than 1 drink a day for nonpregnant women and 2 drinks a day for men. One drink equals 12 oz of beer, 5 oz of wine, or 1½ oz of hard liquor.  · If you have a mental health condition, get treatment and support.  · Do not give alcohol to adolescents.  · If you are an adolescent:  ¨ Do not drink alcohol.  ¨ Do not be afraid to say no if someone offers you alcohol. Speak up about why you do not want to drink. You can be a positive role model for your friends and set a good example for those around you by not drinking alcohol.  ¨ If your friends drink, spend time with others who do not drink alcohol. Make new friends who do not use alcohol.  ¨ Find healthy ways to manage stress and emotions, such as meditation or deep breathing, exercise, spending time in nature, listening to music, or talking with a trusted friend or family member.  Contact a health care provider if:  · You are not able to take your medicines as told.  · Your symptoms get worse.  · You return to drinking alcohol (relapse) and your symptoms get worse.  Get help right away if:  · You have thoughts about hurting yourself or others.  If you ever feel like you may hurt yourself or others, or have thoughts about taking your own life, get help right away. You can go to your nearest emergency department or call:  · Your local emergency services (911 in the [place]  · A suicide crisis helpline, such as  the National Suicide Prevention Lifeline at 1-545.731.7266. This is open 24 hours a day.  Summary  · Alcohol use disorder is when your drinking disrupts your daily life. When you have this condition, you drink too much alcohol and you cannot control your drinking.  · Treatment may include detoxification, counseling, medicine, and support groups.  · Ask friends and family members not to offer you alcohol. Avoid situations where alcohol is served.  · Get help right away if you have thoughts about hurting yourself or others.  This information is not intended to replace advice given to you by your health care provider. Make sure you discuss any questions you have with your health care provider.  Document Released: 01/25/2006 Document Revised: 09/14/2017 Document Reviewed: 09/14/2017  [employer] Interactive Patient Education © 2017 [employer]      Gastrointestinal Bleeding  Introduction  Gastrointestinal bleeding is bleeding somewhere along the path food travels through the body (digestive tract). This path is anywhere between the mouth and the opening of the butt (anus). You may have blood in your poop (stools) or have black poop. If you throw up (vomit), there may be blood in it.  This condition can be mild, serious, or even life-threatening. If you have a lot of bleeding, you may need to stay in the hospital.  Follow these instructions at home:  · Take over-the-counter and prescription medicines only as told by your doctor.  · Eat foods that have a lot of fiber in them. These foods include whole grains, fruits, and vegetables. You can also try eating 1-3 prunes each day.  · Drink enough fluid to keep your pee (urine) clear or pale yellow.  · Keep all follow-up visits as told by your doctor. This is important.  Contact a doctor if:  · Your symptoms do not get better.  Get help right away if:  · Your bleeding gets worse.  · You feel dizzy or you pass out (faint).  · You feel weak.  · You have very bad cramps in your  back or belly (abdomen).  · You pass large clumps of blood (clots) in your poop.  · Your symptoms are getting worse.  This information is not intended to replace advice given to you by your health care provider. Make sure you discuss any questions you have with your health care provider.  Document Released: 09/26/2009 Document Revised: 05/25/2017 Document Reviewed: 06/06/2016  © 2017 [employer]

## 2020-05-13 NOTE — CARE PLAN
Problem: Communication  Goal: The ability to communicate needs accurately and effectively will improve  Outcome: PROGRESSING AS EXPECTED  Intervention: [facility] patient and significant other/support system to call light to alert staff of needs  Flowsheets (Taken 5/7/2020 2100 by Juan Daniel Diehl R.N.)  Oriented to:: All of the Following : Location of Bathroom, Visiting Policy, Unit Routine, Call Light and Bedside Controls, Bedside Rail Policy, Smoking Policy, Rights and Responsibilities, Bedside Report, and Patient Education Notebook  Note: Patient makes needs known and uses call light appropriately.      Problem: Safety  Goal: Will remain free from falls  Outcome: PROGRESSING AS EXPECTED  Intervention: Assess risk factors for falls  Flowsheets  Taken 5/12/2020 2300 by Jose Barlow, C.N.A.  Pt Calls for Assistance: Yes  Taken 5/6/2020 2102 by Ni Watt R.N.  History of fall: 0  Note: Patient on bed alarm and alerts staff before she gets up.

## 2020-05-13 NOTE — CARE PLAN
Problem: Communication  Goal: The ability to communicate needs accurately and effectively will improve  5/13/2020 1324 by Ángel Wang R.N.  Outcome: DISCHARGED-GOAL NOT MET  5/13/2020 0943 by Ángel Wang, R.N.  Outcome: PROGRESSING AS EXPECTED     Problem: Safety  Goal: Will remain free from injury  5/13/2020 1324 by Ángel Wang R.N.  Outcome: DISCHARGED-GOAL NOT MET  5/13/2020 0943 by Ángel Wang, R.N.  Outcome: PROGRESSING AS EXPECTED  Goal: Will remain free from falls  5/13/2020 1324 by Ángel Wang R.N.  Outcome: DISCHARGED-GOAL NOT MET  5/13/2020 0943 by Ángel Wang R.N.  Outcome: PROGRESSING AS EXPECTED     Problem: Infection  Goal: Will remain free from infection  Outcome: DISCHARGED-GOAL NOT MET     Problem: Venous Thromboembolism (VTW)/Deep Vein Thrombosis (DVT) Prevention:  Goal: Patient will participate in Venous Thrombosis (VTE)/Deep Vein Thrombosis (DVT)Prevention Measures  Outcome: DISCHARGED-GOAL NOT MET     Problem: Bowel/Gastric:  Goal: Normal bowel function is maintained or improved  Outcome: DISCHARGED-GOAL NOT MET  Goal: Will not experience complications related to bowel motility  Outcome: DISCHARGED-GOAL NOT MET     Problem: Knowledge Deficit  Goal: Knowledge of disease process/condition, treatment plan, diagnostic tests, and medications will improve  5/13/2020 1324 by Ángel Wang R.N.  Outcome: DISCHARGED-GOAL NOT MET  5/13/2020 0943 by Ángel Wang, R.N.  Outcome: PROGRESSING AS EXPECTED  Goal: Knowledge of the prescribed therapeutic regimen will improve  5/13/2020 1324 by Ángel Wang, R.N.  Outcome: DISCHARGED-GOAL NOT MET  5/13/2020 0943 by Ángel Wang, R.N.  Outcome: PROGRESSING AS EXPECTED     Problem: Discharge Barriers/Planning  Goal: Patient's continuum of care needs will be met  Outcome: DISCHARGED-GOAL NOT MET     Problem: Fluid Volume:  Goal: Will maintain balanced intake and output  Outcome: DISCHARGED-GOAL NOT MET     Problem: Psychosocial  Needs:  Goal: Level of anxiety will decrease  Outcome: DISCHARGED-GOAL NOT MET     Problem: Respiratory:  Goal: Respiratory status will improve  Outcome: DISCHARGED-GOAL NOT MET     Problem: Pain Management  Goal: Pain level will decrease to patient's comfort goal  Outcome: DISCHARGED-GOAL NOT MET     Problem: Skin Integrity  Goal: Risk for impaired skin integrity will decrease  Outcome: DISCHARGED-GOAL NOT MET     Problem: Urinary Elimination:  Goal: Ability to reestablish a normal urinary elimination pattern will improve  Outcome: DISCHARGED-GOAL NOT MET     Problem: Mobility  Goal: Risk for activity intolerance will decrease  Outcome: DISCHARGED-GOAL NOT MET

## 2020-05-13 NOTE — DISCHARGE PLANNING
Medicaid Meds-to-Beds: Discharge prescription orders listed below delivered to patient's bedside. RN notified. Patient counseled.      Insurance would not cover sucralfate suspension. Discussed with provider. Order changed to covered sucralfate tablets. Patient verbalized understanding.      Jumana Kahn   Home Medication Instructions CURT:[id number]    Printed on:05/13/20 3877   Medication Information                      ferrous sulfate 325 (65 Fe) MG tablet  Take 1 Tab by mouth every 48 hours for 30 days.             fluconazole (DIFLUCAN) 200 MG Tab  Take 1 Tab by mouth every day for 9 days.             levothyroxine (SYNTHROID) 75 MCG Tab  Take 1 Tab by mouth Every morning on an empty stomach.             magnesium oxide (MAG-OX) 400 MG Tab tablet  Take 1 Tab by mouth every day for 7 days.             midodrine (PROAMATINE) 10 MG tablet  Take 1 Tab by mouth 3 times a day, with meals.             multivitamin (THERAGRAN) Tab  Take 1 Tab by mouth every day.             sucralfate (CARAFATE) 1 GMTablets  Take 1 tablet by mouth every 6 hours for 60 days.             vitamin D, Ergocalciferol, (DRISDOL) 1.25 MG (58789 UT) Cap capsule  Take 1 Cap by mouth every 7 days for 30 days.               Ni Lorenzo, PharmD

## 2020-05-13 NOTE — PROGRESS NOTES
"Bedside report taken from day shift RN Daphne and patient. Fall precautions in place and hourly rounding continued. Patient vital signs /72   Pulse 99   Temp 37 °C (98.6 °F) (Temporal)   Resp 16   Ht 1.651 m (5' 5\")   Wt 72.5 kg (159 lb 13.3 oz)   SpO2 96%   BMI 26.60 kg/m² . Full assessment completed in flowsheet. Assumed patient care at 1900.     "
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
## 2020-05-13 NOTE — PROGRESS NOTES
Daily Progress Note:     Date of Service: 5/13/2020  Primary Team: [facility] IM White Team   Attending: Neymar Garduno M.D.  Senior Resident: Dr. Wallace  Intern: Dr. Valenzuela  Contact:  [phone]    Chief Complaint:   Alcohol withdrawal    ID: Patient with history of alcohol abuse with alcoholic liver disease. She was admitted due to alcohol intoxication and complications of alcoholism including alcoholic hepatitis, central pontine myelinolysis, GIB from esophagitis, alcoholic gastritis.    Subjective:  No overnight events. Comfortably sleeping in the bed. No nausea/vomiting/hematemesis/melena/hematochezia. No lightheadedness.    Interval updates:  5/8/2020: Hemoglobin dropped to 6.8 ON, repeat is >7. Patient is stable, although had an episode of low SBP 80s, given 250 cc IVF by night team. CIWA 16-1-3-10 last, received 3.5 mg ativan overnight. LFTs trending down, alcoholic hepatitis seems to be improving. Thrombocytes are 34k today, no active bleeding, will monitor closely. MRI brain revealed central pontine myelinolysis, in this case it is likely chronic, will follow neuro recs  5/9/2020: CIWA Stable Hb around 7.5 overnight. No events. GI was consulted ([facility]) for elective endoscopy for possible UGIB. Will obtain COVID test for EGD. Continue PPI. Had 4 bowel movement with lactulose, lowered the dose. Thrombocytes stable at 45k, no active bleeding. She has been tolerating regular diet. Cortisol for low BP and K, NS+D5+K IVF. Developed fever, UCx showing Ecoli and klebsiella, although UA is showing epithelial cells. Will keep patient on abx. Blood cultures, STAT CXR. Suspicion for drug related fever as well.   5/10/2020: CXR did not reveal any PNA, some atelectasis. Negative blood cultures so far. Fever is resolved. Will get EGD today.  5/11/2020: EGD revealed esophageal candidiasis, started on fluconazole, blood oozing stopped with observation, LA grade D esophagitis, hiatal hernia, diffuse gastritis likely  secondary to portal hypertension. She will be on PPI treatment and will follow up with [facility] outpatient. Hb stable overnight. Patient feel better today. Had vomiting in AM, having severe regurgitation with solid food, will continue with liquid diet. Alcohol cessation counseling given, discussed complications and risks of alcohol. We discussed EGD findings. Patient understands and agrees and promised to stop alcohol intake. Increased midodrine dose to 10. Checking HIV due to esophageal candidiasis.  5/12/2020: HIV negative. Hepatitis B, C, ammonia, B6, B1, liver fibrosis all wnl/negative. Celiac panel is pending. Hb 6.7, giving 1U of RBC, patient does not have active bleeding. VitD is low, started on supplement. Bicarb is low in AM, ordered ABG showing low CO2 and low bicarb suggesting primary chronic respiratory alkalosis. Advance diet as tolerated. PT/OT recommended home health. Prealbumin is low, nutrition was consulted.  5/13/2020: Hb stable in AM. Patient is discharged.    Consultants/Specialty:  Gastroenterology - [facility]    Review of Systems:   Review of Systems   Constitutional: Positive for malaise/fatigue. Negative for chills and fever.   HENT: Negative for sore throat.    Eyes: Negative for blurred vision and double vision.   Respiratory: Negative for cough and shortness of breath.    Cardiovascular: Negative for chest pain, palpitations, orthopnea and leg swelling.   Gastrointestinal: Negative for abdominal pain, blood in stool, constipation, diarrhea, heartburn, melena, nausea and vomiting.   Genitourinary: Negative for dysuria and hematuria.   Musculoskeletal: Negative for joint pain.   Neurological: Negative for dizziness, tremors, sensory change, seizures, weakness and headaches.   Psychiatric/Behavioral: Positive for substance abuse. Negative for depression, hallucinations and suicidal ideas. The patient has insomnia. The patient is not nervous/anxious.        Objective Data:    Physical Exam:   Vitals:   Temp:  [36.6 °C (97.8 °F)-37.4 °C (99.4 °F)] 37 °C (98.6 °F)  Pulse:  [] 97  Resp:  [15-16] 16  BP: ()/(46-74) 112/59  SpO2:  [92 %-98 %] 92 %    Physical Exam  Constitutional:       General: She is not in acute distress.     Appearance: She is not ill-appearing.      Comments: Alert and oriented.   HENT:      Head: Atraumatic.      Ears:      Comments: Hearing loss     Nose: No rhinorrhea.   Eyes:      General: Scleral icterus present.      Extraocular Movements: Extraocular movements intact.   Neck:      Musculoskeletal: Neck supple.   Cardiovascular:      Rate and Rhythm: Normal rate and regular rhythm.      Heart sounds: Normal heart sounds. No murmur. No friction rub. No gallop.    Pulmonary:      Breath sounds: No wheezing, rhonchi or rales.   Abdominal:      Tenderness: There is no abdominal tenderness. There is no guarding or rebound.      Comments: Hyperactive bowel sounds  No shifting dullness on abdominal exam - no ascites.  No caput medusa seen on skin exam.   Genitourinary:     Rectum: Guaiac result positive.   Musculoskeletal:      Right lower leg: No edema.      Left lower leg: No edema.   Skin:     Coloration: Skin is pale. Skin is not jaundiced.      Findings: No rash.      Comments: No spider angiomata.   Neurological:      General: No focal deficit present.      Mental Status: She is oriented to person, place, and time.      Cranial Nerves: No cranial nerve deficit.      Sensory: No sensory deficit.      Motor: No weakness.      Deep Tendon Reflexes: Reflexes normal.      Comments: No asterixis           Labs:     Recent Labs     05/12/20  0320 05/12/20  1750 05/13/20  0059   RBC 2.13* 2.98* 2.81*   HEMOGLOBIN 6.7* 9.7* 8.9*   HEMATOCRIT 21.4* 29.6* 26.7*   PLATELETCT 64* 99* 92*     Recent Labs     05/11/20  0253 05/12/20  0320 05/13/20  0059   SODIUM 138 135 136   POTASSIUM 3.8 3.6 3.6   CHLORIDE 111 109 108   CO2 17* 15* 17*   BUN 2* 2* <2*   CREATININE  0.49* 0.42* 0.47*   MAGNESIUM 2.0 1.8  --    PHOSPHORUS 2.8 2.6  --    CALCIUM 7.4* 7.7* 7.8*     Recent Labs     05/11/20 0253 05/12/20 0320 05/12/20  0829 05/13/20  0059   ALTSGPT 52* 41  --  41   ASTSGOT 147* 119*  --  103*   ALKPHOSPHAT 237* 215*  --  239*   TBILIRUBIN 2.3* 2.4*  --  3.2*   PREALBUMIN  --   --  4.4*  --    GLUCOSE 102* 89  --  100*                 Recent Labs     05/11/20  0253 05/12/20  0320 05/12/20  1750 05/13/20  0059   WBC 4.1* 3.9* 4.9 5.2   NEUTSPOLYS 51.90 65.20 57.80 50.80   LYMPHOCYTES 30.20 16.50* 24.80 27.80   MONOCYTES 14.00* 13.00 14.00* 17.70*   EOSINOPHILS 3.20 4.40 1.40 2.10   BASOPHILS 0.50 0.90 0.60 0.80   ASTSGOT 147* 119*  --  103*   ALTSGPT 52* 41  --  41   ALKPHOSPHAT 237* 215*  --  239*   TBILIRUBIN 2.3* 2.4*  --  3.2*           Imaging:   No imaging today.    * Alcoholic liver disease (HCC)- (present on admission)  Assessment & Plan  Long history of alcoholism.  MELD 13 6% 3mo mortality   Last RUQ U/S in January shows fatty infiltration vs fibrosis.  Child class B   -Alcohol cessation counseling provided. Discussed about complications of alcoholism.    Alcoholic hepatitis- (present on admission)  Assessment & Plan  Improving  , ALT 74, >2 ratio, Alk 264 on admission, improving but still elevated  Last RUQ U/S in January shows fatty infiltration vs fibrosis.  Supportive care. IVF and trend LFT's   Maddrey score <32, no steroids indicated.  ASMA/SHAHLA -  IGG -   Anti mitochondrial -  HepB and C - negative.    Anemia requiring transfusions- (present on admission)  Assessment & Plan  Blood loss + alcoholism + malnutrition  Previous EGD shows ulcerative esophagitis, no varices was mentioned.  Hgb 8.4 on admission - CBC daily, monitor vitals closely - transfuse prn  B12, folate wnl.  TSH in 50s, very high 2/2 non-adherence. Synthroid increased from 50 mcg to 75 mcg;  Ferritin 61; iron is low at 31 and %sat at low, FOBT+  GI consult - EGD 5/10/2020: Candida esophagitis,  LA grade D esophagitis, diffuse gastritis.  PPI  Sucralfate  IVF  Iron replacement.  Celiac panel - pending.    Malnutrition (HCC)- (present on admission)  Assessment & Plan  Patient with chronic alcoholism.  Low albumin and prealbumin  Celiac panel - pending.  Zinc -  VitD - low  B12 wnl  Folate - low normal.  Nutrition consult    Gastritis due to alcohol without hemorrhage- (present on admission)  Assessment & Plan  EGD revealed diffuse gastritis likely due to portal hypertension.  Alcohol abuse - alcohol cessation counseling provided.  No NSAIDS, ASA, alcohol.  Alcohol cessation  PPI  Sucralfate    Candida esophagitis (HCC)- (present on admission)  Assessment & Plan  EGD 5/10/20: White plaques, likely candidal esophagitis.  Empirically treat with fluconazole oral x 14 days.  Check HIV - negative.    Occult blood positive stool- (present on admission)  Assessment & Plan  Last EGD shows esophagial erosion.  Patient is stable Hb at around 7.5  Has history of hematemesis a week before admission.  GI consult - EGD 5/10/2020: Candida esophagitis, LA grade D esophagitis, diffuse gastritis.  PPI + sucralfate.    GERD (gastroesophageal reflux disease)- (present on admission)  Assessment & Plan  +FOBT - suspected UGI bleed - although BUN/cre 8/0.5 - EGD - Has hiatal hernia and esophagitis  PPI  No coffee or chocolate.  Alcohol cessation.  Head elevation during meals.    Central pontine myelinolysis (HCC)- (present on admission)  Assessment & Plan  Pt has difficulty with walking and states that she has fallen. Last fall about a week ago. Says she hit her head at some point and has a posterior right sided headache and blurry vision. Patient is lethargic.  No dysarthria, dysphagia, paraparesis or quadriparesis, behavioral disturbances, seizures,  No locked in syndrome.  CT head w/o negative for acute intracranial hemorrhage, but there is concern for CPM. She has had hyponatremia in the past  MRI scan showed central pontine  myelinolysis possibly 2/2 alcoholism.    Plan:  Neurology consult - following rec's - no treatment indicated at this time  Alcohol cessation.  PT/OT eval - need home health.  Physiatry - patient is not participating inpatient physiatry.  Alcohol cessation. At this time, no proven treatment due to chronicity of the condition.    Vitamin D deficiency- (present on admission)  Assessment & Plan  ? Malabsorption due to alcoholism or underlying condition - Celiac panel - pending.  Replete    Alcohol intoxication in active alcoholic with complication (HCC)- (present on admission)  Assessment & Plan  Resolved.  No alcoholic seizures in the past. Feels like she is going through a detox,   last drink was AM on admission, drinking 1/5 pint vodka /d.   Has tremor, asterixis in ED.  ETOh level 241.1 on admission  EKG sinus tach in the ED, no arrhythmia no Afib.     Plan:  Admit to neurology with telemetry monitoring  CT head - ?central pontine myelinolysis - neurology following - MRI central pontine myelinolysis.  Aspiration/Seiaure/Fall precautions   MV/thiamine/folate   Ativan per CIWA - discontinue 5/9  IVF  Advance diet  Monitor and replete electrolytes prn   Repeat TSH/B12/Folate/Ammonia level - wnl.    Fever  Assessment & Plan  Resolved.  UA LE and N positive. Rare epithelial cells  Urine cultures showing ecoli and klebsiella.  Started on C3 5/9 night. After that patient developed fever.  Could be PNA vs UTI.    Plan:  Blood cultures - negative.  STAT CXR - atelectasis, no consolidations.  Continue C3 - dc 5/11/20  Monitor closely.    Hypotension- (present on admission)  Assessment & Plan  Patient with chronic hypotension on midodrine - continue inpatient.    Hypokalemia- (present on admission)  Assessment & Plan  K 3.5 on admission  Monitor closely - replete prn.    Hypothyroidism- (present on admission)  Assessment & Plan  Last TSH was 31.200 on 1/14/20. - repeat 54k  non compliant with synthroid for the past 2 weeks    -Increased synthroid to 75.    Thrombocytopenia (HCC)- (present on admission)  Assessment & Plan  Likely secondary to chronic liver disease from alcohol   Monitor closely with cbc   SCDs for DVT prophylaxis

## 2020-05-14 LAB
ALT SERPL-CCNC: 56 U/L (ref 5–40)
ANNOTATION COMMENT IMP: ABNORMAL
ANNOTATION COMMENT IMP: ABNORMAL
AST SERPL-CCNC: 157 U/L (ref 9–40)
BACTERIA BLD CULT: NORMAL
BACTERIA BLD CULT: NORMAL
FERRITIN SERPL-MCNC: 110 NG/ML (ref 18–340)
FIBROSIS STAGE SERPL QL: ABNORMAL
GLUCOSE SERPL-MCNC: 112 MG/DL
LIVER FIBR SCORE SERPL CALC.FIBROMETER: 1
PATHOLOGY STUDY: ABNORMAL
RETINYL PALMITATE SERPL-MCNC: <0.02 MG/L (ref 0–0.1)
SIGNIFICANT IND 70042: NORMAL
SIGNIFICANT IND 70042: NORMAL
SITE SITE: NORMAL
SITE SITE: NORMAL
SOURCE SOURCE: NORMAL
SOURCE SOURCE: NORMAL
VIT A SERPL-MCNC: <0.06 MG/L (ref 0.3–1.2)
VIT B1 BLD-MCNC: 86 NMOL/L (ref 70–180)
ZINC BLD-MCNC: 400.8 UG/DL (ref 440–860)

## 2020-05-15 LAB — VIT C SERPL-MCNC: 21 UMOL/L (ref 23–114)

## 2020-05-29 ENCOUNTER — HOSPITAL ENCOUNTER (OUTPATIENT)
Facility: MEDICAL CENTER | Age: 55
End: 2020-05-29
Attending: STUDENT IN AN ORGANIZED HEALTH CARE EDUCATION/TRAINING PROGRAM
Payer: MEDICAID

## 2020-05-29 LAB
ALBUMIN SERPL BCP-MCNC: 3.3 G/DL (ref 3.2–4.9)
ALBUMIN/GLOB SERPL: 0.8 G/DL
ALP SERPL-CCNC: 140 U/L (ref 30–99)
ALT SERPL-CCNC: 36 U/L (ref 2–50)
ANION GAP SERPL CALC-SCNC: 13 MMOL/L (ref 7–16)
AST SERPL-CCNC: 133 U/L (ref 12–45)
BILIRUB SERPL-MCNC: 1.9 MG/DL (ref 0.1–1.5)
BUN SERPL-MCNC: 9 MG/DL (ref 8–22)
CALCIUM SERPL-MCNC: 9.1 MG/DL (ref 8.5–10.5)
CHLORIDE SERPL-SCNC: 109 MMOL/L (ref 96–112)
CO2 SERPL-SCNC: 18 MMOL/L (ref 20–33)
CREAT SERPL-MCNC: 0.7 MG/DL (ref 0.5–1.4)
GLOBULIN SER CALC-MCNC: 3.9 G/DL (ref 1.9–3.5)
GLUCOSE SERPL-MCNC: 78 MG/DL (ref 65–99)
PHOSPHATE SERPL-MCNC: 3 MG/DL (ref 2.5–4.5)
POTASSIUM SERPL-SCNC: 4.1 MMOL/L (ref 3.6–5.5)
PROT SERPL-MCNC: 7.2 G/DL (ref 6–8.2)
SODIUM SERPL-SCNC: 140 MMOL/L (ref 135–145)

## 2020-05-29 PROCEDURE — 85025 COMPLETE CBC W/AUTO DIFF WBC: CPT

## 2020-05-29 PROCEDURE — 80053 COMPREHEN METABOLIC PANEL: CPT

## 2020-05-29 PROCEDURE — 84100 ASSAY OF PHOSPHORUS: CPT

## 2020-05-29 PROCEDURE — 83735 ASSAY OF MAGNESIUM: CPT

## 2020-05-29 PROCEDURE — 36415 COLL VENOUS BLD VENIPUNCTURE: CPT

## 2020-05-30 ENCOUNTER — HOSPITAL ENCOUNTER (EMERGENCY)
Facility: MEDICAL CENTER | Age: 55
End: 2020-05-30
Attending: EMERGENCY MEDICINE
Payer: MEDICAID

## 2020-05-30 VITALS
RESPIRATION RATE: 16 BRPM | HEART RATE: 80 BPM | BODY MASS INDEX: 24.84 KG/M2 | OXYGEN SATURATION: 98 % | TEMPERATURE: 97.3 F | SYSTOLIC BLOOD PRESSURE: 97 MMHG | DIASTOLIC BLOOD PRESSURE: 63 MMHG | HEIGHT: 64 IN | WEIGHT: 145.5 LBS

## 2020-05-30 DIAGNOSIS — K92.1 HEMATOCHEZIA: ICD-10-CM

## 2020-05-30 DIAGNOSIS — N30.90 CYSTITIS: ICD-10-CM

## 2020-05-30 LAB
ALBUMIN SERPL BCP-MCNC: 3.3 G/DL (ref 3.2–4.9)
ALBUMIN/GLOB SERPL: 0.9 G/DL
ALP SERPL-CCNC: 130 U/L (ref 30–99)
ALT SERPL-CCNC: 33 U/L (ref 2–50)
ANION GAP SERPL CALC-SCNC: 10 MMOL/L (ref 7–16)
ANISOCYTOSIS BLD QL SMEAR: ABNORMAL
APPEARANCE UR: ABNORMAL
APTT PPP: 42.1 SEC (ref 24.7–36)
AST SERPL-CCNC: 114 U/L (ref 12–45)
BACTERIA #/AREA URNS HPF: ABNORMAL /HPF
BASOPHILS # BLD AUTO: 1 % (ref 0–1.8)
BASOPHILS # BLD AUTO: 1.4 % (ref 0–1.8)
BASOPHILS # BLD: 0.06 K/UL (ref 0–0.12)
BASOPHILS # BLD: 0.08 K/UL (ref 0–0.12)
BILIRUB SERPL-MCNC: 1.9 MG/DL (ref 0.1–1.5)
BILIRUB UR QL STRIP.AUTO: ABNORMAL
BUN SERPL-MCNC: 8 MG/DL (ref 8–22)
BURR CELLS BLD QL SMEAR: NORMAL
CALCIUM SERPL-MCNC: 8.7 MG/DL (ref 8.5–10.5)
CHLORIDE SERPL-SCNC: 108 MMOL/L (ref 96–112)
CO2 SERPL-SCNC: 18 MMOL/L (ref 20–33)
COLOR UR: ABNORMAL
COMMENT 1642: NORMAL
CREAT SERPL-MCNC: 0.62 MG/DL (ref 0.5–1.4)
EOSINOPHIL # BLD AUTO: 0.14 K/UL (ref 0–0.51)
EOSINOPHIL # BLD AUTO: 0.17 K/UL (ref 0–0.51)
EOSINOPHIL NFR BLD: 2.4 % (ref 0–6.9)
EOSINOPHIL NFR BLD: 2.8 % (ref 0–6.9)
EPI CELLS #/AREA URNS HPF: ABNORMAL /HPF
ERYTHROCYTE [DISTWIDTH] IN BLOOD BY AUTOMATED COUNT: 83 FL (ref 35.9–50)
ERYTHROCYTE [DISTWIDTH] IN BLOOD BY AUTOMATED COUNT: 87.3 FL (ref 35.9–50)
GLOBULIN SER CALC-MCNC: 3.5 G/DL (ref 1.9–3.5)
GLUCOSE SERPL-MCNC: 91 MG/DL (ref 65–99)
GLUCOSE UR STRIP.AUTO-MCNC: NEGATIVE MG/DL
HCT VFR BLD AUTO: 34 % (ref 37–47)
HCT VFR BLD AUTO: 35.1 % (ref 37–47)
HGB BLD-MCNC: 10.6 G/DL (ref 12–16)
HGB BLD-MCNC: 10.9 G/DL (ref 12–16)
HYALINE CASTS #/AREA URNS LPF: ABNORMAL /LPF
HYPOCHROMIA BLD QL SMEAR: ABNORMAL
IMM GRANULOCYTES # BLD AUTO: 0.02 K/UL (ref 0–0.11)
IMM GRANULOCYTES # BLD AUTO: 0.05 K/UL (ref 0–0.11)
IMM GRANULOCYTES NFR BLD AUTO: 0.3 % (ref 0–0.9)
IMM GRANULOCYTES NFR BLD AUTO: 0.8 % (ref 0–0.9)
INR PPP: 1.28 (ref 0.87–1.13)
KETONES UR STRIP.AUTO-MCNC: ABNORMAL MG/DL
LEUKOCYTE ESTERASE UR QL STRIP.AUTO: ABNORMAL
LYMPHOCYTES # BLD AUTO: 1.65 K/UL (ref 1–4.8)
LYMPHOCYTES # BLD AUTO: 1.71 K/UL (ref 1–4.8)
LYMPHOCYTES NFR BLD: 28 % (ref 22–41)
LYMPHOCYTES NFR BLD: 28 % (ref 22–41)
MACROCYTES BLD QL SMEAR: ABNORMAL
MAGNESIUM SERPL-MCNC: 2 MG/DL (ref 1.5–2.5)
MCH RBC QN AUTO: 31.4 PG (ref 27–33)
MCH RBC QN AUTO: 32 PG (ref 27–33)
MCHC RBC AUTO-ENTMCNC: 31.1 G/DL (ref 33.6–35)
MCHC RBC AUTO-ENTMCNC: 31.2 G/DL (ref 33.6–35)
MCV RBC AUTO: 100.6 FL (ref 81.4–97.8)
MCV RBC AUTO: 102.9 FL (ref 81.4–97.8)
MICRO URNS: ABNORMAL
MONOCYTES # BLD AUTO: 0.48 K/UL (ref 0–0.85)
MONOCYTES # BLD AUTO: 0.51 K/UL (ref 0–0.85)
MONOCYTES NFR BLD AUTO: 7.9 % (ref 0–13.4)
MONOCYTES NFR BLD AUTO: 8.7 % (ref 0–13.4)
MORPHOLOGY BLD-IMP: NORMAL
MORPHOLOGY BLD-IMP: NORMAL
NEUTROPHILS # BLD AUTO: 3.49 K/UL (ref 2–7.15)
NEUTROPHILS # BLD AUTO: 3.64 K/UL (ref 2–7.15)
NEUTROPHILS NFR BLD: 59.2 % (ref 44–72)
NEUTROPHILS NFR BLD: 59.5 % (ref 44–72)
NITRITE UR QL STRIP.AUTO: NEGATIVE
NRBC # BLD AUTO: 0 K/UL
NRBC # BLD AUTO: 0 K/UL
NRBC BLD-RTO: 0 /100 WBC
NRBC BLD-RTO: 0 /100 WBC
OVALOCYTES BLD QL SMEAR: NORMAL
PH UR STRIP.AUTO: 5 [PH] (ref 5–8)
PLATELET # BLD AUTO: 282 K/UL (ref 164–446)
PLATELET # BLD AUTO: 290 K/UL (ref 164–446)
PLATELET BLD QL SMEAR: NORMAL
PMV BLD AUTO: 10.3 FL (ref 9–12.9)
PMV BLD AUTO: 11.2 FL (ref 9–12.9)
POIKILOCYTOSIS BLD QL SMEAR: NORMAL
POTASSIUM SERPL-SCNC: 4 MMOL/L (ref 3.6–5.5)
PROT SERPL-MCNC: 6.8 G/DL (ref 6–8.2)
PROT UR QL STRIP: NEGATIVE MG/DL
PROTHROMBIN TIME: 16.3 SEC (ref 12–14.6)
RBC # BLD AUTO: 3.38 M/UL (ref 4.2–5.4)
RBC # BLD AUTO: 3.41 M/UL (ref 4.2–5.4)
RBC # URNS HPF: ABNORMAL /HPF
RBC BLD AUTO: PRESENT
RBC UR QL AUTO: NEGATIVE
SODIUM SERPL-SCNC: 136 MMOL/L (ref 135–145)
SP GR UR STRIP.AUTO: 1.02
UROBILINOGEN UR STRIP.AUTO-MCNC: 0.2 MG/DL
WBC # BLD AUTO: 5.9 K/UL (ref 4.8–10.8)
WBC # BLD AUTO: 6.1 K/UL (ref 4.8–10.8)
WBC #/AREA URNS HPF: ABNORMAL /HPF

## 2020-05-30 PROCEDURE — 36415 COLL VENOUS BLD VENIPUNCTURE: CPT

## 2020-05-30 PROCEDURE — 81001 URINALYSIS AUTO W/SCOPE: CPT

## 2020-05-30 PROCEDURE — A9270 NON-COVERED ITEM OR SERVICE: HCPCS | Performed by: EMERGENCY MEDICINE

## 2020-05-30 PROCEDURE — 99283 EMERGENCY DEPT VISIT LOW MDM: CPT

## 2020-05-30 PROCEDURE — 80053 COMPREHEN METABOLIC PANEL: CPT

## 2020-05-30 PROCEDURE — 700102 HCHG RX REV CODE 250 W/ 637 OVERRIDE(OP): Performed by: EMERGENCY MEDICINE

## 2020-05-30 PROCEDURE — 85025 COMPLETE CBC W/AUTO DIFF WBC: CPT

## 2020-05-30 PROCEDURE — 85730 THROMBOPLASTIN TIME PARTIAL: CPT

## 2020-05-30 PROCEDURE — 85610 PROTHROMBIN TIME: CPT

## 2020-05-30 RX ORDER — OMEPRAZOLE 20 MG/1
20 CAPSULE, DELAYED RELEASE ORAL 2 TIMES DAILY
Qty: 30 CAP | Refills: 0 | Status: SHIPPED | OUTPATIENT
Start: 2020-05-30 | End: 2020-06-15 | Stop reason: SDUPTHER

## 2020-05-30 RX ORDER — CEFDINIR 300 MG/1
300 CAPSULE ORAL ONCE
Status: COMPLETED | OUTPATIENT
Start: 2020-05-30 | End: 2020-05-30

## 2020-05-30 RX ORDER — FERROUS SULFATE 325(65) MG
325 TABLET ORAL
Qty: 15 TAB | Refills: 0 | Status: SHIPPED | OUTPATIENT
Start: 2020-05-30 | End: 2020-06-15 | Stop reason: SDUPTHER

## 2020-05-30 RX ORDER — CEFDINIR 300 MG/1
300 CAPSULE ORAL 2 TIMES DAILY
Qty: 20 CAP | Refills: 0 | Status: ON HOLD | OUTPATIENT
Start: 2020-05-30 | End: 2021-06-03

## 2020-05-30 RX ORDER — LACTULOSE 20 G/30ML
30 SOLUTION ORAL 3 TIMES DAILY
Qty: 450 EACH | Refills: 0 | Status: SHIPPED | OUTPATIENT
Start: 2020-05-30 | End: 2020-06-15 | Stop reason: SDUPTHER

## 2020-05-30 RX ADMIN — CEFDINIR 300 MG: 300 CAPSULE ORAL at 05:50

## 2020-05-30 ASSESSMENT — LIFESTYLE VARIABLES
HOW MANY TIMES IN THE PAST YEAR HAVE YOU HAD 5 OR MORE DRINKS IN A DAY: 365
CONSUMPTION TOTAL: POSITIVE
EVER HAD A DRINK FIRST THING IN THE MORNING TO STEADY YOUR NERVES TO GET RID OF A HANGOVER: NO
ON A TYPICAL DAY WHEN YOU DRINK ALCOHOL HOW MANY DRINKS DO YOU HAVE: 10
TOTAL SCORE: 2
DO YOU DRINK ALCOHOL: YES
AVERAGE NUMBER OF DAYS PER WEEK YOU HAVE A DRINK CONTAINING ALCOHOL: 7
TOTAL SCORE: 2
TOTAL SCORE: 2
HAVE PEOPLE ANNOYED YOU BY CRITICIZING YOUR DRINKING: YES
HAVE YOU EVER FELT YOU SHOULD CUT DOWN ON YOUR DRINKING: YES
EVER FELT BAD OR GUILTY ABOUT YOUR DRINKING: NO

## 2020-05-30 ASSESSMENT — FIBROSIS 4 INDEX: FIB4 SCORE: 3.97

## 2020-05-30 NOTE — ED TRIAGE NOTES
"Chief Complaint   Patient presents with   • Blood in Urine     bright red blood in urine x 1 this am, + low abd pain, \"irritation\" with urination   • Bloody Stools     x 1 this am, constipation today, hx of hemorrhoids     Pt ambulatory to triage with above complaints. Denies taking blood thinners. Pt is extremely [place]. Instructed on clean catch urine collection. Educated on triage process, encouraged to inform staff of any changes.     "

## 2020-05-30 NOTE — ED NOTES
Pt discharged. Pt provided information about UTI. Pt educated to follow-up w/ PCP and to come back to the hospital w/ any worsening symptoms. Pt provided prescriptions and walked to lobby. Pt picked up by roommate

## 2020-05-30 NOTE — ED PROVIDER NOTES
"ED Provider Note    CHIEF COMPLAINT  Chief Complaint   Patient presents with   • Blood in Urine     bright red blood in urine x 1 this am, + low abd pain, \"irritation\" with urination   • Bloody Stools     x 1 this am, constipation today, hx of hemorrhoids       HPI  Jumana Kahn is a 54 y.o. female, with a past medical history significant for alcoholic liver disease, thrombocytopenia, iron deficiency anemia, hemorrhoids, GERD, hypothyroidism, asthma who presents with complaint of one episode of blood in her urine and stool.  States that since her recent hospitalization she had been doing much better, following instructions a gastroenterologist. Of note, patient had a hospital admission from 5/6/2022 to 5/13/2020 for alcohol withdrawal, her function test at that time showed alcoholic hepatitis pattern Maddrey score 23, need IV iron therapy, FOBT positive, gastroenterology was consulted and patient underwent EGD on 5/10/2020 which revealed Candida esophagitis, hiatal hernia and Davie grade D esophagitis with diffuse gastritis bleed secondary to portal hypertension, required 1 unit of transfusion during that admission.  At this time she denies any fevers, chills, nausea, vomiting, diarrhea, constipation, abdominal pain, weakness, myalgias, chest pain, shortness of breath, but does state some irritation on urination is noticed recently.    REVIEW OF SYSTEMS  GEN/CONST:   Denies fever, fatigue, weakness, or significant weight change.   H/E:     Denies blurry vision or eye pain.  ENT:   Denies sinus pain, sore throat, ear pain, difficulty hearing, or tinnitus.  CARDIO:   Denies chest pain, palpitations, orthopnea, or edema.  RESP:   Denies shortness of breath, wheezing, or coughing.  GI:    Denies nausea, vomiting, diarrhea, constipation, or abdominal pain.  States one episode of bloody streaks around her stools  :   States mild dysuria and irritation, with 1 episode of bloody streaks in urine, denies " "persistent hematuria, hesitancy, or urgency.  HEME:  Denies easy bruising, bleeding, or problem with clots.   ALLG:  Denies allergies, asthma, or hives.    MSK:  Denies weakness, edema, joint pains or swellings, or muscle aches.   SKIN:  Denies rashes, itches, or sores.   NEURO:  Denies numbness or tingling, altered sensation, or focal weakness.    ENDO:  Denies heat or cold intolerance, polyuria, or polydipsia.  PSYCH:  Denies anxiety, depression, substance abuse, or insomnia.   See HPI for further details. All other systems are negative.     PAST MEDICAL HISTORY   has a past medical history of Anemia, Asthma, GERD (gastroesophageal reflux disease), Head ache, Hypothyroid, Psychiatric disorder, and Substance abuse (HCC).    SOCIAL HISTORY  Social History     Tobacco Use   • Smoking status: Former Smoker     Packs/day: 0.00     Last attempt to quit: 2001     Years since quittin.8   • Smokeless tobacco: Never Used   Substance and Sexual Activity   • Alcohol use: Yes     Binge frequency: Daily or almost daily     Comment:  vodka daily    • Drug use: No   • Sexual activity: Not on file       SURGICAL HISTORY   has a past surgical history that includes other orthopedic surgery; gyn surgery; other; upper gi endoscopy,ctrl bleed (N/A, 1/15/2020); and gastroscopy (N/A, 5/10/2020).    CURRENT MEDICATIONS  Home Medications    **Home medications have not yet been reviewed for this encounter**         ALLERGIES  Allergies   Allergen Reactions   • Ampicillin Rash     Skin rash  Tolerated cephalexin (2018)        PHYSICAL EXAM  VITAL SIGNS: BP (!) 99/65   Pulse 76   Temp 36.2 °C (97.2 °F) (Temporal)   Resp 18   Ht 1.626 m (5' 4\")   Wt 66 kg (145 lb 8.1 oz)   SpO2 99%   BMI 24.98 kg/m²  @JESENIA[2001::@   Pulse ox interpretation: I interpret this pulse ox as normal.  Constitutional: Alert in no apparent distress.  HENT: No signs of trauma, Bilateral external ears normal, Nose normal.   Eyes: Pupils are equal and " reactive, Conjunctiva normal, Non-icteric.   Neck: Normal range of motion, No tenderness, Supple, No stridor.   Lymphatic: No lymphadenopathy noted.   Cardiovascular: Regular rate and rhythm, no murmurs.   Thorax & Lungs: Normal breath sounds, No respiratory distress, No wheezing, No chest tenderness.   Abdomen: Bowel sounds normal, Soft, No tenderness, No masses, No pulsatile masses. No peritoneal signs.  Skin: Warm, Dry, No erythema, No rash.   Back: No bony tenderness, No CVA tenderness.   Extremities: Intact distal pulses, No edema, No tenderness, No cyanosis,  Negative Tyler's sign.   Musculoskeletal: Good range of motion in all major joints. No tenderness to palpation or major deformities noted.   Neurologic: Alert , Normal motor function, Normal sensory function, No focal deficits noted.   Psychiatric: Affect normal, Judgment normal, Mood normal.   Rectal: Exam was performed with female chaperone RN at bedside, normal rectal tone, no evidence of prolapsed hemorrhoid or external hemorrhoid, no bleeding seen, mild tenderness to palpation, external skin tag  appreciated at 12:00 near the gluteal cleft, there was no obvious blood visualized.  Stool occult was positive.    DIAGNOSTIC STUDIES / PROCEDURES    EKG  Not performed    LABS  Recent Labs     05/29/20  1300 05/30/20  0345   WBC 5.9 6.1   RBC 3.41* 3.38*   HEMOGLOBIN 10.9* 10.6*   HEMATOCRIT 35.1* 34.0*   .9* 100.6*   MCH 32.0 31.4   RDW 87.3* 83.0*   PLATELETCT 290 282   MPV 11.2 10.3   NEUTSPOLYS 59.20 59.50   LYMPHOCYTES 28.00 28.00   MONOCYTES 8.70 7.90   EOSINOPHILS 2.40 2.80   BASOPHILS 1.40 1.00   RBCMORPHOLO Present  --      Recent Labs     05/29/20  1300 05/30/20  0345   SODIUM 140 136   POTASSIUM 4.1 4.0   CHLORIDE 109 108   CO2 18* 18*   GLUCOSE 78 91   BUN 9 8         RADIOLOGY  No orders to display       COURSE & MEDICAL DECISION MAKING  Pertinent Labs & Imaging studies reviewed. (See chart for details)    Discussed with   Edi.    3:55 AM- Saw and examined patient at bedside, resting comfortably in no acute distress.  Differential diagnosis at this time includes cystitis, urinary tract infection, pyelonephritis with component of GI bleed, hemorrhoidal bleeding, AVM, diverticulosis.    4:06 AM- Lab work CBC, CMP, PT/INR, PTT, urinalysis ordered.    4:15 AM- Rectal exam was performed with female chaperone RN at bedside, normal rectal tone, no evidence of prolapsed hemorrhoid or external hemorrhoid, no bleeding seen, mild tenderness to palpation, external skin tag  appreciated at 12:00 near the gluteal cleft, there was no obvious blood visualized.  Stool occult was positive.    4:27 AM- Patient states she is running out of her ferrous sulfate iron supplementation, lactulose, and omeprazole medications which she was given at the last hospital admission.  States she needs these until next follow-up gastroenterology.    4:30 AM- CBC indicates macrocytic anemia, hemoglobin 10.6, hematocrit 34, .6.  Chemistry panel notable for , alkaline phosphatase 130, total bili 1.9.     5:33 AM- Urinalysis notable for moderate LE, trace ketones, WBCs .    5:34 AM- Based on these findings, seems this is acute cystitis picture, will treat patient with cefdinir 300 mg twice daily for 7 days. Was instructed to follow-up with primary care provider in 1 to 2 weeks for follow-up on her symptoms, and to follow-up with gastroenterology as scheduled.  We will give her refills on her ferrous sulfate, lactulose, omeprazole medications.    The patient will not drink alcohol nor drive with prescribed medications. The patient will return for worsening symptoms and is stable at the time of discharge. The patient verbalizes understanding and will comply.      FINAL IMPRESSION  1. Cystitis    2. Hematochezia        I independently evaluated the patient and repeated the important components of the history and physical.  I discussed the management  with the resident. I have reviewed and agree with the pertinent clinical information above including history, exam, study findings, and recommendations.         Electronically signed by: Adolfo Daley M.D., 5/30/2020 4:00 AM

## 2020-06-15 ENCOUNTER — HOSPITAL ENCOUNTER (EMERGENCY)
Facility: MEDICAL CENTER | Age: 55
End: 2020-06-15
Attending: EMERGENCY MEDICINE
Payer: MEDICAID

## 2020-06-15 VITALS
HEIGHT: 64 IN | SYSTOLIC BLOOD PRESSURE: 100 MMHG | WEIGHT: 146.16 LBS | BODY MASS INDEX: 24.95 KG/M2 | RESPIRATION RATE: 16 BRPM | TEMPERATURE: 98.2 F | OXYGEN SATURATION: 98 % | HEART RATE: 72 BPM | DIASTOLIC BLOOD PRESSURE: 61 MMHG

## 2020-06-15 DIAGNOSIS — Z76.0 MEDICATION REFILL: ICD-10-CM

## 2020-06-15 PROCEDURE — 99282 EMERGENCY DEPT VISIT SF MDM: CPT | Mod: EDC

## 2020-06-15 RX ORDER — M-VIT,TX,IRON,MINS/CALC/FOLIC 27MG-0.4MG
1 TABLET ORAL DAILY
Qty: 30 TAB | Refills: 11 | Status: SHIPPED | OUTPATIENT
Start: 2020-06-15 | End: 2022-01-13

## 2020-06-15 RX ORDER — POTASSIUM CHLORIDE 20 MEQ/1
40 TABLET, EXTENDED RELEASE ORAL DAILY
Qty: 60 TAB | Refills: 11 | Status: ON HOLD | OUTPATIENT
Start: 2020-06-15 | End: 2021-06-03

## 2020-06-15 RX ORDER — ERGOCALCIFEROL 1.25 MG/1
50000 CAPSULE ORAL
Qty: 4 CAP | Refills: 0 | Status: SHIPPED | OUTPATIENT
Start: 2020-06-15 | End: 2020-07-15

## 2020-06-15 RX ORDER — LACTULOSE 20 G/30ML
30 SOLUTION ORAL 3 TIMES DAILY
Qty: 450 EACH | Refills: 0 | Status: ON HOLD | OUTPATIENT
Start: 2020-06-15 | End: 2021-06-03

## 2020-06-15 RX ORDER — SUCRALFATE ORAL 1 G/10ML
1 SUSPENSION ORAL EVERY 6 HOURS
Qty: 2400 ML | Refills: 0 | Status: SHIPPED | OUTPATIENT
Start: 2020-06-15 | End: 2020-08-14

## 2020-06-15 RX ORDER — FERROUS SULFATE 325(65) MG
325 TABLET ORAL
Qty: 15 TAB | Refills: 0 | Status: SHIPPED | OUTPATIENT
Start: 2020-06-15 | End: 2020-07-15

## 2020-06-15 RX ORDER — LEVOTHYROXINE SODIUM 0.07 MG/1
75 TABLET ORAL
Qty: 30 TAB | Refills: 0 | Status: ON HOLD | OUTPATIENT
Start: 2020-06-15 | End: 2021-06-03

## 2020-06-15 RX ORDER — LANOLIN ALCOHOL/MO/W.PET/CERES
100 CREAM (GRAM) TOPICAL DAILY
Qty: 30 TAB | Refills: 0 | Status: ON HOLD | OUTPATIENT
Start: 2020-06-15 | End: 2021-06-03

## 2020-06-15 RX ORDER — FOLIC ACID 1 MG/1
1 TABLET ORAL DAILY
Qty: 30 TAB | Refills: 0 | Status: SHIPPED | OUTPATIENT
Start: 2020-06-15 | End: 2021-07-30

## 2020-06-15 RX ORDER — MIDODRINE HYDROCHLORIDE 10 MG/1
10 TABLET ORAL
Qty: 60 TAB | Refills: 0 | Status: ON HOLD | OUTPATIENT
Start: 2020-06-15 | End: 2021-06-03

## 2020-06-15 RX ORDER — OMEPRAZOLE 20 MG/1
20 CAPSULE, DELAYED RELEASE ORAL 2 TIMES DAILY
Qty: 30 CAP | Refills: 0 | Status: SHIPPED | OUTPATIENT
Start: 2020-06-15 | End: 2021-07-30

## 2020-06-15 ASSESSMENT — FIBROSIS 4 INDEX: FIB4 SCORE: 3.66

## 2020-06-15 NOTE — ED NOTES
Pt to room 40 with family member. Pt states that she arrived at her PCP appt today 5 minutes late and they refused to see her. Pt states that she has another appt made for Friday but has multiple medications that have run out. Pt states she needs her Multivitamin, Sucralfate, Levothyroxine, Midodrine, Lactulose, Omeprazole and Potassium Chloride. Pt provided hospital gown, provided warm blanket and call light within reach. Chart up for ERP

## 2020-06-15 NOTE — ED PROVIDER NOTES
ED Provider Note    CHIEF COMPLAINT  Chief Complaint   Patient presents with   • Medication Refill       HPI  Jumana Kahn is a 54 y.o. female who presents asking for refills on her medication.  The patient showed up a couple minutes late today and she was not able to get into her primary care visit.  She does have an appointment rescheduled for next week.  The patient states that this time she is asymptomatic but she was quite ill a couple of weeks ago.  She states that she is currently out of her lactulose, omeprazole, potassium, levothyroxine, Middaugh drain, and sucralfate.  The patient states a couple weeks ago she did have a low-grade fever and noticed some blood in her urine and she was diagnosed with hematuria.  The patient was treated antibiotics and is no longer symptomatic.  She states she did have baseline laboratory studies performed about 2 weeks ago from her home health nurse and they instructed to continue her potassium.  She does have an appointment scheduled for this Friday with her primary care provider.  States she is been clean from alcohol.    REVIEW OF SYSTEMS  See HPI for further details. All other systems are negative.     PAST MEDICAL HISTORY  Past Medical History:   Diagnosis Date   • Anemia    • Asthma    • GERD (gastroesophageal reflux disease)    • Head ache    • Hypothyroid    • Psychiatric disorder    • Substance abuse (HCC)        FAMILY HISTORY  [unfilled]    SOCIAL HISTORY  Social History     Socioeconomic History   • Marital status: Single     Spouse name: Not on file   • Number of children: Not on file   • Years of education: Not on file   • Highest education level: Not on file   Occupational History   • Not on file   Social Needs   • Financial resource strain: Not on file   • Food insecurity     Worry: Not on file     Inability: Not on file   • Transportation needs     Medical: Not on file     Non-medical: Not on file   Tobacco Use   • Smoking status: Former Smoker      "Packs/day: 0.00     Last attempt to quit: 2001     Years since quittin.9   • Smokeless tobacco: Never Used   Substance and Sexual Activity   • Alcohol use: Yes     Binge frequency: Daily or almost daily     Comment:  vodka daily    • Drug use: No   • Sexual activity: Not on file   Lifestyle   • Physical activity     Days per week: Not on file     Minutes per session: Not on file   • Stress: Not on file   Relationships   • Social connections     Talks on phone: Not on file     Gets together: Not on file     Attends Congregation service: Not on file     Active member of club or organization: Not on file     Attends meetings of clubs or organizations: Not on file     Relationship status: Not on file   • Intimate partner violence     Fear of current or ex partner: Not on file     Emotionally abused: Not on file     Physically abused: Not on file     Forced sexual activity: Not on file   Other Topics Concern   • Not on file   Social History Narrative   • Not on file       SURGICAL HISTORY  Past Surgical History:   Procedure Laterality Date   • GASTROSCOPY N/A 5/10/2020    Procedure: GASTROSCOPY;  Surgeon: Lucas Crespo M.D.;  Location: SURGERY [facility];  Service: Gastroenterology   • PB UPPER GI ENDOSCOPY,CTRL BLEED N/A 1/15/2020    Procedure: EGD, WITH CLIP PLACEMENT;  Surgeon: Pito Davis M.D.;  Location: SURGERY [facility];  Service: Gastroenterology   • GYN SURGERY      tubal ligation   • OTHER      sinus surgery   • OTHER ORTHOPEDIC SURGERY      leg       CURRENT MEDICATIONS  Home Medications    **Home medications have not yet been reviewed for this encounter**         ALLERGIES  Allergies   Allergen Reactions   • Ampicillin Rash     Skin rash  Tolerated cephalexin (2018)        PHYSICAL EXAM  VITAL SIGNS: /74   Pulse 85   Temp 36.6 °C (97.8 °F) (Temporal)   Resp 18   Ht 1.626 m (5' 4\")   Wt 66.3 kg (146 lb 2.6 oz)   SpO2 99%   BMI 25.09 kg/m²       Constitutional: Well " developed, Well nourished, No acute distress, Non-toxic appearance.   HENT: Normocephalic, Atraumatic, Bilateral external ears normal, Oropharynx moist, No oral exudates, Nose normal.   Eyes: PERRLA, EOMI, Conjunctiva normal, No discharge.   Neck: Normal range of motion, No tenderness, Supple, No stridor.   Lymphatic: No lymphadenopathy noted.   Cardiovascular: Normal heart rate, Normal rhythm, No murmurs, No rubs, No gallops.   Thorax & Lungs: Normal breath sounds, No respiratory distress, No wheezing, No chest tenderness.   Abdomen: Bowel sounds normal, Soft, No tenderness, No masses, No pulsatile masses.   Skin: Warm, Dry, No erythema, No rash.   Back: No tenderness, No CVA tenderness.   Extremities: Intact distal pulses, No edema, No tenderness, No cyanosis, No clubbing.   Neurologic: Alert & oriented x 3, Normal motor function, Normal sensory function, No focal deficits noted.   Psychiatric: Affect normal, Judgment normal, Mood normal.     COURSE & MEDICAL DECISION MAKING  Pertinent Labs & Imaging studies reviewed. (See chart for details)  This a 54-year-old female who presents the emergency department asking for refill on her medication.  I did review her past records and she was quite ill and appears the patient had significant esophagitis and gastritis and was also found to have hepatic insufficiency due to alcohol abuse.  She has been stable since discharge.  She has a follow-up appointment on Friday.  Therefore at this time will hold off on any laboratory analysis as she is hemodynamically stable does not have any active signs of bleeding.  The patient will be discharged with clear instructions to follow-up with her appointment on Friday as scheduled and return as needed.    FINAL IMPRESSION  1.  Medication refill  2.  Hepatic insufficiency  3.  Alcohol induced gastritis  4.  Hypokalemia    Disposition  Patient will be discharged in stable condition             Electronically signed by: Logan Bass  M.D., 6/15/2020 9:50 AM

## 2020-06-15 NOTE — ED NOTES
"Discharge instructions reviewed with pt regarding medications, RX's provided.  Pt instructed on signs and symptoms to return to ED, instructed on importance of oral hydration, no questions regarding this.   Instructed to follow-up with   [facility], Emergency Dept  [address] [place] [id number] [phone]    As needed    Pt has no questions at this time, /61   Pulse 72   Temp 36.8 °C (98.2 °F) (Temporal)   Resp 16   Ht 1.626 m (5' 4\")   Wt 66.3 kg (146 lb 2.6 oz)   SpO2 98%   BMI 25.09 kg/m²   Pt leaves alert, age appropriate and in NAD.          "

## 2020-06-15 NOTE — ED TRIAGE NOTES
Chief Complaint   Patient presents with   • Medication Refill     Pt states that she had a follow up appointment with her PCP today but was unable to see PCP.  Pt states that she needs to have some of her medications refilled.  Triage process explained to patient.  Pt back to waiting room.  Pt instructed to inform RN if any changes or questions arise.

## 2020-07-21 NOTE — DOCUMENTATION QUERY
[facility]                                                                       Query Response Note      PATIENT:               ISMAEL SRINIVASAN  ACCT #:                  [id number]  MRN:                     [id number]  :                      1965  ADMIT DATE:       2020 1:43 PM  DISCH DATE:        2020 1:06 PM  RESPONDING  PROVIDER #:        [id number]           QUERY TEXT:    Please clarify documentation or clinical relevance for the clinical / diagnostic findings or whether those are insignificant or unable to be further specified.  If an appropriate response is not listed below, please respond with a new note.      The patient's Clinical Indicators include:  Patient is a 53 y/o female admitted for alcohol withdrawal and AMS.  Patient developed a fever and progress notes document 'Could be PNA v UTI'.  Clarification is required.    Urine cultures grew E Coli 50-100K and Klebsiella >100K.      MAR shows patient received Rocephin from  -   Options provided:   -- UTI ruled in   -- UTI ruled out   -- Unable to determine      Query created by: Merced Anne on 2020 7:06 PM    RESPONSE TEXT:    Unable to determine       QUERY TEXT:    Malnutrition is documented in the Medical Record.  Please specify the severity.    NOTE:  If an appropriate response is not listed below, please respond with a new note.    The patient's Clinical Indicators include:  Patient is a 53 y/o female admitted for alcohol withdrawal and central pontine myelinolysis.  A diagnosis of moderate-severe malnutrition is also documented, and requires clarification for correct code assignment.    BMI 26.6  Albumin   2.7 > 2.2 on   ED Physical Exam: Well developed, well nourished, poor dentition    DS notes that nutrition was consulted and appropriate diet was provided.  Options provided:   -- Mild protein calorie malnutrition   -- Moderate protein  calorie malnutrition   -- Severe protein calorie malnutrition   -- Unable to determine      Query created by: Merced Anne on 6/24/2020 7:06 PM    RESPONSE TEXT:    Unable to determine       QUERY TEXT:    Please clarify the status of UTI diagnosis.    NOTE:  If an appropriate response is not listed below, please respond with a new note.       The patient's Clinical Indicators include:  Patient is a 53 y/o female admitted for alcohol withdrawal and AMS.  Patient developed a fever and progress notes document 'Could be PNA v UTI'.  Clarification is required.    5/9 CXR Impression: Hypoinflation with probable left basilar atelectasis.  No pleural effusion.    MAR shows patient received Rocephin from 5/9 - 5/11  Options provided:   -- Pneumonia is ruled in   -- Pneumonia is ruled out   -- Unable to determine      Query created by: Merced Anne on 6/24/2020 7:08 PM    RESPONSE TEXT:    Pneumonia is ruled out          Electronically signed by:  BRITNI RIVERA MD 7/20/2020 11:02 PM

## 2020-09-17 ENCOUNTER — NURSE TRIAGE (OUTPATIENT)
Dept: HEALTH INFORMATION MANAGEMENT | Facility: OTHER | Age: 55
End: 2020-09-17

## 2020-09-17 NOTE — TELEPHONE ENCOUNTER
"    Reason for Disposition  • Sounds like a severe, unusual reaction to the triager    Additional Information  • Negative: Difficulty with breathing or swallowing starts within 2 hours after injection  • Negative: Difficult to awaken or acting confused (e.g., disoriented, slurred speech)  • Negative: Unresponsive, passed out, or very weak  • Negative: Sounds like a life-threatening emergency to the triager    Answer Assessment - Initial Assessment Questions  1. SYMPTOMS: \"What is the main symptom?\" (e.g., redness, swelling, pain)       Redness, swelling, joint pain, fever  2. ONSET: \"When was the vaccine (shot) given?\" \"How much later did the 1600 yesterday begin?\" (e.g., hours, days ago)         3. SEVERITY: \"How bad is it?\"       severe  4. FEVER: \"Is there a fever?\" If so, ask: \"What is it, how was it measured, and when did it start?\"       102.7 orally  5. IMMUNIZATIONS GIVEN: \"What shots have you recently received?\"      Flu and pneumonia  6. PAST REACTIONS: \"Have you reacted to immunizations before?\" If so, ask: \"What happened?\"      no  7. OTHER SYMPTOMS: \"Do you have any other symptoms?\"      Vomited yesterday    Protocols used: IMMUNIZATION [id number]      "

## 2020-11-11 ENCOUNTER — HOSPITAL ENCOUNTER (OUTPATIENT)
Dept: RADIOLOGY | Facility: MEDICAL CENTER | Age: 55
End: 2020-11-11
Payer: MEDICAID

## 2021-05-30 ENCOUNTER — APPOINTMENT (OUTPATIENT)
Dept: RADIOLOGY | Facility: MEDICAL CENTER | Age: 56
End: 2021-05-30
Attending: EMERGENCY MEDICINE
Payer: MEDICAID

## 2021-05-30 ENCOUNTER — HOSPITAL ENCOUNTER (OUTPATIENT)
Facility: MEDICAL CENTER | Age: 56
End: 2021-06-03
Attending: EMERGENCY MEDICINE | Admitting: HOSPITALIST
Payer: MEDICAID

## 2021-05-30 ENCOUNTER — HOSPITAL ENCOUNTER (EMERGENCY)
Facility: MEDICAL CENTER | Age: 56
End: 2021-05-30
Attending: EMERGENCY MEDICINE
Payer: MEDICAID

## 2021-05-30 VITALS
OXYGEN SATURATION: 97 % | DIASTOLIC BLOOD PRESSURE: 74 MMHG | BODY MASS INDEX: 26.16 KG/M2 | TEMPERATURE: 99.4 F | RESPIRATION RATE: 14 BRPM | HEART RATE: 71 BPM | SYSTOLIC BLOOD PRESSURE: 135 MMHG | WEIGHT: 153.22 LBS | HEIGHT: 64 IN

## 2021-05-30 DIAGNOSIS — N39.0 ACUTE UTI: ICD-10-CM

## 2021-05-30 DIAGNOSIS — R50.9 FEVER, UNSPECIFIED FEVER CAUSE: ICD-10-CM

## 2021-05-30 DIAGNOSIS — N39.0 URINARY TRACT INFECTION WITHOUT HEMATURIA, SITE UNSPECIFIED: ICD-10-CM

## 2021-05-30 DIAGNOSIS — R41.82 ALTERED MENTAL STATUS, UNSPECIFIED ALTERED MENTAL STATUS TYPE: ICD-10-CM

## 2021-05-30 LAB
ALBUMIN SERPL BCP-MCNC: 4.4 G/DL (ref 3.2–4.9)
ALBUMIN/GLOB SERPL: 1.3 G/DL
ALP SERPL-CCNC: 82 U/L (ref 30–99)
ALT SERPL-CCNC: 18 U/L (ref 2–50)
AMMONIA PLAS-SCNC: 31 UMOL/L (ref 11–45)
ANION GAP SERPL CALC-SCNC: 12 MMOL/L (ref 7–16)
APPEARANCE UR: CLEAR
AST SERPL-CCNC: 20 U/L (ref 12–45)
BACTERIA #/AREA URNS HPF: NEGATIVE /HPF
BASOPHILS # BLD AUTO: 0.4 % (ref 0–1.8)
BASOPHILS # BLD: 0.03 K/UL (ref 0–0.12)
BILIRUB SERPL-MCNC: 1 MG/DL (ref 0.1–1.5)
BILIRUB UR QL STRIP.AUTO: NEGATIVE
BUN SERPL-MCNC: 23 MG/DL (ref 8–22)
CALCIUM SERPL-MCNC: 9.4 MG/DL (ref 8.5–10.5)
CHLORIDE SERPL-SCNC: 106 MMOL/L (ref 96–112)
CO2 SERPL-SCNC: 18 MMOL/L (ref 20–33)
COLOR UR: YELLOW
CREAT SERPL-MCNC: 0.79 MG/DL (ref 0.5–1.4)
EOSINOPHIL # BLD AUTO: 0.02 K/UL (ref 0–0.51)
EOSINOPHIL NFR BLD: 0.2 % (ref 0–6.9)
EPI CELLS #/AREA URNS HPF: ABNORMAL /HPF
ERYTHROCYTE [DISTWIDTH] IN BLOOD BY AUTOMATED COUNT: 46.3 FL (ref 35.9–50)
ETHANOL BLD-MCNC: <10.1 MG/DL (ref 0–10)
GLOBULIN SER CALC-MCNC: 3.3 G/DL (ref 1.9–3.5)
GLUCOSE SERPL-MCNC: 81 MG/DL (ref 65–99)
GLUCOSE UR STRIP.AUTO-MCNC: NEGATIVE MG/DL
HCT VFR BLD AUTO: 43.3 % (ref 37–47)
HGB BLD-MCNC: 14.7 G/DL (ref 12–16)
HYALINE CASTS #/AREA URNS LPF: ABNORMAL /LPF
IMM GRANULOCYTES # BLD AUTO: 0.02 K/UL (ref 0–0.11)
IMM GRANULOCYTES NFR BLD AUTO: 0.2 % (ref 0–0.9)
KETONES UR STRIP.AUTO-MCNC: 15 MG/DL
LACTATE BLD-SCNC: 1.1 MMOL/L (ref 0.5–2)
LEUKOCYTE ESTERASE UR QL STRIP.AUTO: ABNORMAL
LYMPHOCYTES # BLD AUTO: 2.86 K/UL (ref 1–4.8)
LYMPHOCYTES NFR BLD: 35.1 % (ref 22–41)
MCH RBC QN AUTO: 31.1 PG (ref 27–33)
MCHC RBC AUTO-ENTMCNC: 33.9 G/DL (ref 33.6–35)
MCV RBC AUTO: 91.7 FL (ref 81.4–97.8)
MICRO URNS: ABNORMAL
MONOCYTES # BLD AUTO: 0.64 K/UL (ref 0–0.85)
MONOCYTES NFR BLD AUTO: 7.9 % (ref 0–13.4)
MUCOUS THREADS #/AREA URNS HPF: ABNORMAL /HPF
NEUTROPHILS # BLD AUTO: 4.57 K/UL (ref 2–7.15)
NEUTROPHILS NFR BLD: 56.2 % (ref 44–72)
NITRITE UR QL STRIP.AUTO: NEGATIVE
NRBC # BLD AUTO: 0 K/UL
NRBC BLD-RTO: 0 /100 WBC
PH UR STRIP.AUTO: 5 [PH] (ref 5–8)
PLATELET # BLD AUTO: 145 K/UL (ref 164–446)
PMV BLD AUTO: 10.6 FL (ref 9–12.9)
POTASSIUM SERPL-SCNC: 3.7 MMOL/L (ref 3.6–5.5)
PROT SERPL-MCNC: 7.7 G/DL (ref 6–8.2)
PROT UR QL STRIP: NEGATIVE MG/DL
RBC # BLD AUTO: 4.72 M/UL (ref 4.2–5.4)
RBC # URNS HPF: ABNORMAL /HPF
RBC UR QL AUTO: NEGATIVE
SODIUM SERPL-SCNC: 136 MMOL/L (ref 135–145)
SP GR UR STRIP.AUTO: 1.03
UROBILINOGEN UR STRIP.AUTO-MCNC: 0.2 MG/DL
WBC # BLD AUTO: 8.1 K/UL (ref 4.8–10.8)
WBC #/AREA URNS HPF: ABNORMAL /HPF

## 2021-05-30 PROCEDURE — 82140 ASSAY OF AMMONIA: CPT

## 2021-05-30 PROCEDURE — 700111 HCHG RX REV CODE 636 W/ 250 OVERRIDE (IP): Performed by: EMERGENCY MEDICINE

## 2021-05-30 PROCEDURE — 71045 X-RAY EXAM CHEST 1 VIEW: CPT

## 2021-05-30 PROCEDURE — 96375 TX/PRO/DX INJ NEW DRUG ADDON: CPT

## 2021-05-30 PROCEDURE — 70450 CT HEAD/BRAIN W/O DYE: CPT

## 2021-05-30 PROCEDURE — 90791 PSYCH DIAGNOSTIC EVALUATION: CPT

## 2021-05-30 PROCEDURE — 87086 URINE CULTURE/COLONY COUNT: CPT

## 2021-05-30 PROCEDURE — 99285 EMERGENCY DEPT VISIT HI MDM: CPT

## 2021-05-30 PROCEDURE — 96365 THER/PROPH/DIAG IV INF INIT: CPT

## 2021-05-30 PROCEDURE — 99283 EMERGENCY DEPT VISIT LOW MDM: CPT

## 2021-05-30 PROCEDURE — 82077 ASSAY SPEC XCP UR&BREATH IA: CPT

## 2021-05-30 PROCEDURE — 81001 URINALYSIS AUTO W/SCOPE: CPT

## 2021-05-30 PROCEDURE — 87077 CULTURE AEROBIC IDENTIFY: CPT

## 2021-05-30 PROCEDURE — 87186 SC STD MICRODIL/AGAR DIL: CPT

## 2021-05-30 PROCEDURE — 84443 ASSAY THYROID STIM HORMONE: CPT

## 2021-05-30 PROCEDURE — 87150 DNA/RNA AMPLIFIED PROBE: CPT

## 2021-05-30 PROCEDURE — 83605 ASSAY OF LACTIC ACID: CPT

## 2021-05-30 PROCEDURE — 85025 COMPLETE CBC W/AUTO DIFF WBC: CPT

## 2021-05-30 PROCEDURE — 80053 COMPREHEN METABOLIC PANEL: CPT

## 2021-05-30 PROCEDURE — 87040 BLOOD CULTURE FOR BACTERIA: CPT

## 2021-05-30 RX ORDER — MECLIZINE HYDROCHLORIDE 25 MG/1
25 TABLET ORAL 3 TIMES DAILY PRN
COMMUNITY
Start: 2021-02-12 | End: 2021-05-30

## 2021-05-30 RX ORDER — ALBUTEROL SULFATE 90 UG/1
2 AEROSOL, METERED RESPIRATORY (INHALATION) EVERY 6 HOURS PRN
Status: ON HOLD | COMMUNITY
End: 2021-06-03

## 2021-05-30 RX ORDER — SULFAMETHOXAZOLE AND TRIMETHOPRIM 800; 160 MG/1; MG/1
1 TABLET ORAL 2 TIMES DAILY
Qty: 14 TABLET | Refills: 0 | Status: ON HOLD | OUTPATIENT
Start: 2021-05-30 | End: 2021-06-03

## 2021-05-30 RX ORDER — LEVOTHYROXINE SODIUM 0.12 MG/1
125 TABLET ORAL
COMMUNITY
End: 2021-12-15

## 2021-05-30 RX ORDER — HALOPERIDOL 5 MG/ML
5 INJECTION INTRAMUSCULAR ONCE
Status: COMPLETED | OUTPATIENT
Start: 2021-05-30 | End: 2021-05-30

## 2021-05-30 RX ORDER — CYCLOBENZAPRINE HCL 5 MG
5-10 TABLET ORAL
Status: ON HOLD | COMMUNITY
End: 2021-06-03

## 2021-05-30 RX ORDER — GABAPENTIN 300 MG/1
600 CAPSULE ORAL
Status: ON HOLD | COMMUNITY
Start: 2020-11-09 | End: 2021-06-03 | Stop reason: SDUPTHER

## 2021-05-30 RX ADMIN — HALOPERIDOL LACTATE 5 MG: 5 INJECTION, SOLUTION INTRAMUSCULAR at 22:27

## 2021-05-30 ASSESSMENT — FIBROSIS 4 INDEX
FIB4 SCORE: 3.73
FIB4 SCORE: 1.72

## 2021-05-30 NOTE — ED TRIAGE NOTES
Chief Complaint   Patient presents with   • Anxiety     Feeling disoriented,hasn't slept for a couple days,  Had a lumbar puncture 4 days ago but denies headache.  Just seems very anxious

## 2021-05-30 NOTE — ED NOTES
Urine collected and sent, pt without current complaints, previously stated she came in today because she was feeling anxious

## 2021-05-30 NOTE — ED NOTES
Pt ambulatory to room, flight of ideas, confused, unable to give RN straight answer.  Pt talks of being and  and an astroid hitting the earth.  Pt able to tell RN she is at the hospital, however unable to tell RN why she wants to see the

## 2021-05-30 NOTE — ED TRIAGE NOTES
"Patient vital signs rechecked and documented per [facility]. Patient denies any new needs at this time.  Patient updated on wait times, thanked for patience. Pt informed to alert triage tech or triage RN with any needs and/or changes in condition; patient verbalized understanding. Patient stated \"everything is the same.\"   "

## 2021-05-30 NOTE — ED PROVIDER NOTES
"ED Provider  Scribed for Julián Arizmendi D.O. by Jovani Toth. 2021  2:25 PM    Means of arrival:Walk-In  History obtained from:Patient  History limited by: Poor Historian    CHIEF COMPLAINT  Chief Complaint   Patient presents with    Anxiety     Feeling disoriented,hasn't slept for a couple days,  Had a lumbar puncture 4 days ago but denies headache.  Just seems very anxious       HPI  Jumana Kahn is a 55 y.o. female who presents for evaluation of acute confusion onset today. She comes into the ED today because she is \"feeling off\". She has not been able to sleep for the last few days and is feeling disoriented. She began explaining that she is \"afraid she may be an alcoholic\", but denies any drinking today. She had a lumbar puncture four days ago, but does not explain why this was necessary. The patient reports associated hot flashes and anxiety. Negative for cough, congestion, nausea, vomiting, diarrhea, fever, chills, headache, sore throat, dizziness, or syncope. No alleviating or exacerbating factors identified. The patient has a history of GERD, psychiatric disorder, and substance abuse. She is fully vaccinated for Covid.     Further history of present illness cannot be obtained due to the patient's poor history.      REVIEW OF SYSTEMS  See HPI for further details. All other systems otherwise negative.   Further review of systems cannot be obtained due to the patient's poor history.     PAST MEDICAL HISTORY   has a past medical history of Anemia, Asthma, GERD (gastroesophageal reflux disease), Head ache, Hypothyroid, Psychiatric disorder, and Substance abuse (HCC).    SOCIAL HISTORY  Social History     Tobacco Use    Smoking status: Former Smoker     Packs/day: 0.00     Quit date: 2001     Years since quittin.8    Smokeless tobacco: Never Used   Vaping Use    Vaping Use: Never used   Substance and Sexual Activity    Alcohol use: Not Currently     Comment: has not drank for over a " "year    Drug use: No    Sexual activity: None pertinent noted       SURGICAL HISTORY   has a past surgical history that includes other orthopedic surgery; gyn surgery; other; upper gi endoscopy,ctrl bleed (N/A, 1/15/2020); and gastroscopy (N/A, 5/10/2020).    CURRENT MEDICATIONS  Home Medications       Reviewed by Izzy Lee R.N. (Registered Nurse) on 05/30/21 at 1148  Med List Status: Not Addressed     Medication Last Dose Status   cefdinir (OMNICEF) 300 MG Cap  Active   cyanocobalamin (VITAMIN B12) 1000 MCG Tab  Active   folic acid (FOLVITE) 1 MG Tab  Active   lactulose 20 GM/30ML Solution  Active   levothyroxine (SYNTHROID) 75 MCG Tab  Active   midodrine (PROAMATINE) 10 MG tablet  Active   multivitamin (THERAGRAN) Tab  Active   omeprazole (PRILOSEC) 20 MG delayed-release capsule  Active   potassium chloride SA (KDUR) 20 MEQ Tab CR  Active   therapeutic multivitamin-minerals (THERAGRAN-M) Tab  Active   thiamine (THIAMINE) 100 MG tablet  Active                    ALLERGIES  Allergies   Allergen Reactions    Ampicillin Rash     Skin rash  Tolerated cephalexin (2018)        PHYSICAL EXAM  VITAL SIGNS: /76   Pulse 71   Temp 37.4 °C (99.4 °F) (Temporal)   Resp 14   Ht 1.626 m (5' 4\")   Wt 69.5 kg (153 lb 3.5 oz)   SpO2 97%   BMI 26.30 kg/m²   Constitutional: Alert in no apparent distress.  HENT: Normocephalic, Atraumatic, mucous membranes are moist.   Eyes: Conjunctiva normal, non-icteric.   Heart: No peripheral cyanosis   Lungs: Respirations are even and unlabored   Skin: Warm, Dry, normal color.   Neurologic: Alert, Grossly non-focal. Having difficulty concentrating.   Psychiatric: Affect normal, Judgment normal, Mood normal, Appears appropriate and not intoxicated.     DIAGNOSTIC STUDIES / PROCEDURES    LABS  Results for orders placed or performed during the hospital encounter of 05/30/21   LACTIC ACID   Result Value Ref Range    Lactic Acid 1.1 0.5 - 2.0 mmol/L   CBC WITH DIFFERENTIAL   Result " Value Ref Range    WBC 8.1 4.8 - 10.8 K/uL    RBC 4.72 4.20 - 5.40 M/uL    Hemoglobin 14.7 12.0 - 16.0 g/dL    Hematocrit 43.3 37.0 - 47.0 %    MCV 91.7 81.4 - 97.8 fL    MCH 31.1 27.0 - 33.0 pg    MCHC 33.9 33.6 - 35.0 g/dL    RDW 46.3 35.9 - 50.0 fL    Platelet Count 145 (L) 164 - 446 K/uL    MPV 10.6 9.0 - 12.9 fL    Neutrophils-Polys 56.20 44.00 - 72.00 %    Lymphocytes 35.10 22.00 - 41.00 %    Monocytes 7.90 0.00 - 13.40 %    Eosinophils 0.20 0.00 - 6.90 %    Basophils 0.40 0.00 - 1.80 %    Immature Granulocytes 0.20 0.00 - 0.90 %    Nucleated RBC 0.00 /100 WBC    Neutrophils (Absolute) 4.57 2.00 - 7.15 K/uL    Lymphs (Absolute) 2.86 1.00 - 4.80 K/uL    Monos (Absolute) 0.64 0.00 - 0.85 K/uL    Eos (Absolute) 0.02 0.00 - 0.51 K/uL    Baso (Absolute) 0.03 0.00 - 0.12 K/uL    Immature Granulocytes (abs) 0.02 0.00 - 0.11 K/uL    NRBC (Absolute) 0.00 K/uL   COMP METABOLIC PANEL   Result Value Ref Range    Sodium 136 135 - 145 mmol/L    Potassium 3.7 3.6 - 5.5 mmol/L    Chloride 106 96 - 112 mmol/L    Co2 18 (L) 20 - 33 mmol/L    Anion Gap 12.0 7.0 - 16.0    Glucose 81 65 - 99 mg/dL    Bun 23 (H) 8 - 22 mg/dL    Creatinine 0.79 0.50 - 1.40 mg/dL    Calcium 9.4 8.5 - 10.5 mg/dL    AST(SGOT) 20 12 - 45 U/L    ALT(SGPT) 18 2 - 50 U/L    Alkaline Phosphatase 82 30 - 99 U/L    Total Bilirubin 1.0 0.1 - 1.5 mg/dL    Albumin 4.4 3.2 - 4.9 g/dL    Total Protein 7.7 6.0 - 8.2 g/dL    Globulin 3.3 1.9 - 3.5 g/dL    A-G Ratio 1.3 g/dL   URINALYSIS    Specimen: Urine   Result Value Ref Range    Color Yellow     Character Clear     Specific Gravity 1.029 <1.035    Ph 5.0 5.0 - 8.0    Glucose Negative Negative mg/dL    Ketones 15 (A) Negative mg/dL    Protein Negative Negative mg/dL    Bilirubin Negative Negative    Urobilinogen, Urine 0.2 Negative    Nitrite Negative Negative    Leukocyte Esterase Moderate (A) Negative    Occult Blood Negative Negative    Micro Urine Req Microscopic    DIAGNOSTIC ALCOHOL   Result Value Ref Range     Diagnostic Alcohol <10.1 0.0 - 10.0 mg/dL   AMMONIA   Result Value Ref Range    Ammonia 31 11 - 45 umol/L   ESTIMATED GFR   Result Value Ref Range    GFR If African American >60 >60 mL/min/1.73 m 2    GFR If Non African American >60 >60 mL/min/1.73 m 2   URINE MICROSCOPIC (W/UA)   Result Value Ref Range    WBC 20-50 (A) /hpf    RBC 0-2 /hpf    Bacteria Negative None /hpf    Epithelial Cells Moderate (A) /hpf    Mucous Threads Few /hpf    Hyaline Cast 3-5 (A) /lpf     All labs reviewed by me.    RADIOLOGY  DX-CHEST-PORTABLE (1 VIEW)   Final Result      1.  No acute cardiopulmonary process is seen.   2.  Atherosclerotic plaque.        The radiologist's interpretations of all radiological studies have been reviewed by me.    Films have been independently by me      COURSE  Pertinent Labs & Imaging studies reviewed. (See chart for details)    2:25 PM - Patient seen and examined at bedside. Discussed plan of care, including labs and radiology. Patient agrees to the plan of care. Ordered for Blood Culture, Urine Culture, Urinalysis, CMP, CBC with Differential, Lactic Acid, Ammonia, Diagnostic Alcohol, and DX Chest to evaluate her symptoms. I will await lab and radiology results before determining whether interventions are necessary. The patient is agreeable and understanding of my plan of care.     3:30 PM - Independently reviewed radiology and it is unremarkable.     4:30 PM - Ordered for Urine Microscopic to further evaluate.     6:19 PM - Upon repeat evaluation, the patient is resting in bed with stable vitals. I updated her on lab and radiology results which show evidence of acute UTI. I prescribed her with oral course of Bactrim. I informed the patient of my plan for discharge, I provided precautions to return for any new or worsening symptoms. The patient verbalized understanding of discharge precautions and is agreeable to my plan.      MEDICAL DECISION MAKING  This is a 55 y.o. female who presents with  complains of just not feeling well.  She did not have lumbar puncture.  She had epidural injections but this was approximately 3 to 4 weeks ago.  Her mentation is mildly slow but she is awake alert and oriented.  She had a temperature of 100.7 on arrival, there is no nuchal rigidity or concern for meningitis.  Her white blood cell count is normal.    Patient does have a urinary tract infection.  Her fever resolved without intervention.  Patient was given a prescription for antibiotics and discharged home.    FINAL IMPRESSION  1. Fever, unspecified fever cause    2. Urinary tract infection without hematuria, site unspecified        PRESCRIPTIONS  Discharge Medication List as of 5/30/2021  6:16 PM        START taking these medications    Details   sulfamethoxazole-trimethoprim (BACTRIM DS) 800-160 MG tablet Take 1 tablet by mouth 2 times a day., Disp-14 tablet, R-0, Normal             FOLLOW UP  No follow-up provider specified.      -DISCHARGE-    FINAL IMPRESSION  1. Fever, unspecified fever cause    2. Urinary tract infection without hematuria, site unspecified         Jovani SALGADO (Lionel), am scribing for, and in the presence of, Julián Arizmendi D.O..    Electronically signed by: Jovani Toth (Lionel), 5/30/2021    IJulián D.O. personally performed the services described in this documentation, as scribed by Jovani Toth in my presence, and it is both accurate and complete.    C.     The note accurately reflects work and decisions made by me.  Julián Arizmendi D.O.  5/30/2021  9:50 PM

## 2021-05-30 NOTE — ED NOTES
IV started, labs drawn and sent, Pt updated on plan of care. Pt denies SI/HI. Pt oriented to date/ place / situation

## 2021-05-31 ENCOUNTER — APPOINTMENT (OUTPATIENT)
Dept: RADIOLOGY | Facility: MEDICAL CENTER | Age: 56
End: 2021-05-31
Attending: STUDENT IN AN ORGANIZED HEALTH CARE EDUCATION/TRAINING PROGRAM
Payer: MEDICAID

## 2021-05-31 PROBLEM — N39.0 UTI (URINARY TRACT INFECTION): Status: ACTIVE | Noted: 2021-05-31

## 2021-05-31 PROBLEM — R41.82 ALTERED MENTAL STATUS, UNSPECIFIED: Status: ACTIVE | Noted: 2021-05-31

## 2021-05-31 LAB
AMPHET UR QL SCN: NEGATIVE
BARBITURATES UR QL SCN: NEGATIVE
BENZODIAZ UR QL SCN: NEGATIVE
BZE UR QL SCN: NEGATIVE
CANNABINOIDS UR QL SCN: POSITIVE
CRP SERPL HS-MCNC: <0.3 MG/DL (ref 0–0.75)
ERYTHROCYTE [SEDIMENTATION RATE] IN BLOOD BY WESTERGREN METHOD: 7 MM/HOUR (ref 0–25)
FLUAV RNA SPEC QL NAA+PROBE: NEGATIVE
FLUBV RNA SPEC QL NAA+PROBE: NEGATIVE
FOLATE SERPL-MCNC: 25.5 NG/ML
HIV 1+2 AB+HIV1 P24 AG SERPL QL IA: NORMAL
METHADONE UR QL SCN: NEGATIVE
OPIATES UR QL SCN: NEGATIVE
OXYCODONE UR QL SCN: NEGATIVE
PCP UR QL SCN: NEGATIVE
PROPOXYPH UR QL SCN: NEGATIVE
RSV RNA SPEC QL NAA+PROBE: NEGATIVE
SARS-COV-2 RNA RESP QL NAA+PROBE: NOTDETECTED
SPECIMEN SOURCE: NORMAL
TREPONEMA PALLIDUM IGG+IGM AB [PRESENCE] IN SERUM OR PLASMA BY IMMUNOASSAY: NORMAL
TSH SERPL DL<=0.005 MIU/L-ACNC: 2.3 UIU/ML (ref 0.38–5.33)
TSH SERPL DL<=0.005 MIU/L-ACNC: 2.64 UIU/ML (ref 0.38–5.33)
VIT B12 SERPL-MCNC: 1289 PG/ML (ref 211–911)

## 2021-05-31 PROCEDURE — A9576 INJ PROHANCE MULTIPACK: HCPCS | Performed by: STUDENT IN AN ORGANIZED HEALTH CARE EDUCATION/TRAINING PROGRAM

## 2021-05-31 PROCEDURE — A9270 NON-COVERED ITEM OR SERVICE: HCPCS | Performed by: EMERGENCY MEDICINE

## 2021-05-31 PROCEDURE — 84443 ASSAY THYROID STIM HORMONE: CPT

## 2021-05-31 PROCEDURE — 99220 PR INITIAL OBSERVATION CARE,LEVL III: CPT | Performed by: HOSPITALIST

## 2021-05-31 PROCEDURE — 82746 ASSAY OF FOLIC ACID SERUM: CPT

## 2021-05-31 PROCEDURE — 700102 HCHG RX REV CODE 250 W/ 637 OVERRIDE(OP): Performed by: EMERGENCY MEDICINE

## 2021-05-31 PROCEDURE — A9270 NON-COVERED ITEM OR SERVICE: HCPCS | Performed by: STUDENT IN AN ORGANIZED HEALTH CARE EDUCATION/TRAINING PROGRAM

## 2021-05-31 PROCEDURE — 700117 HCHG RX CONTRAST REV CODE 255: Performed by: STUDENT IN AN ORGANIZED HEALTH CARE EDUCATION/TRAINING PROGRAM

## 2021-05-31 PROCEDURE — 87389 HIV-1 AG W/HIV-1&-2 AB AG IA: CPT

## 2021-05-31 PROCEDURE — G0378 HOSPITAL OBSERVATION PER HR: HCPCS

## 2021-05-31 PROCEDURE — 700102 HCHG RX REV CODE 250 W/ 637 OVERRIDE(OP): Performed by: STUDENT IN AN ORGANIZED HEALTH CARE EDUCATION/TRAINING PROGRAM

## 2021-05-31 PROCEDURE — 85652 RBC SED RATE AUTOMATED: CPT

## 2021-05-31 PROCEDURE — 82607 VITAMIN B-12: CPT

## 2021-05-31 PROCEDURE — 70553 MRI BRAIN STEM W/O & W/DYE: CPT

## 2021-05-31 PROCEDURE — 86780 TREPONEMA PALLIDUM: CPT

## 2021-05-31 PROCEDURE — 80307 DRUG TEST PRSMV CHEM ANLYZR: CPT

## 2021-05-31 PROCEDURE — 86140 C-REACTIVE PROTEIN: CPT

## 2021-05-31 PROCEDURE — 84590 ASSAY OF VITAMIN A: CPT

## 2021-05-31 PROCEDURE — 700105 HCHG RX REV CODE 258: Performed by: EMERGENCY MEDICINE

## 2021-05-31 PROCEDURE — C9803 HOPD COVID-19 SPEC COLLECT: HCPCS | Performed by: EMERGENCY MEDICINE

## 2021-05-31 PROCEDURE — 0241U HCHG SARS-COV-2 COVID-19 NFCT DS RESP RNA 4 TRGT MIC: CPT

## 2021-05-31 PROCEDURE — 700111 HCHG RX REV CODE 636 W/ 250 OVERRIDE (IP): Performed by: EMERGENCY MEDICINE

## 2021-05-31 PROCEDURE — 86038 ANTINUCLEAR ANTIBODIES: CPT

## 2021-05-31 RX ORDER — ALBUTEROL SULFATE 90 UG/1
2 AEROSOL, METERED RESPIRATORY (INHALATION) EVERY 6 HOURS PRN
Status: DISCONTINUED | OUTPATIENT
Start: 2021-05-31 | End: 2021-06-03 | Stop reason: HOSPADM

## 2021-05-31 RX ORDER — SULFAMETHOXAZOLE AND TRIMETHOPRIM 800; 160 MG/1; MG/1
1 TABLET ORAL EVERY 12 HOURS
Status: DISCONTINUED | OUTPATIENT
Start: 2021-05-31 | End: 2021-05-31

## 2021-05-31 RX ORDER — GAUZE BANDAGE 2" X 2"
100 BANDAGE TOPICAL DAILY
Status: DISCONTINUED | OUTPATIENT
Start: 2021-06-01 | End: 2021-06-03 | Stop reason: HOSPADM

## 2021-05-31 RX ORDER — AMOXICILLIN 250 MG
2 CAPSULE ORAL 2 TIMES DAILY
Status: DISCONTINUED | OUTPATIENT
Start: 2021-05-31 | End: 2021-06-03 | Stop reason: HOSPADM

## 2021-05-31 RX ORDER — HYDROXYZINE 50 MG/1
50 TABLET, FILM COATED ORAL 3 TIMES DAILY PRN
Status: DISCONTINUED | OUTPATIENT
Start: 2021-05-31 | End: 2021-06-03 | Stop reason: HOSPADM

## 2021-05-31 RX ORDER — M-VIT,TX,IRON,MINS/CALC/FOLIC 27MG-0.4MG
1 TABLET ORAL DAILY
Status: DISCONTINUED | OUTPATIENT
Start: 2021-05-31 | End: 2021-05-31

## 2021-05-31 RX ORDER — GABAPENTIN 300 MG/1
600 CAPSULE ORAL
Status: DISCONTINUED | OUTPATIENT
Start: 2021-05-31 | End: 2021-06-02

## 2021-05-31 RX ORDER — POLYETHYLENE GLYCOL 3350 17 G/17G
1 POWDER, FOR SOLUTION ORAL
Status: DISCONTINUED | OUTPATIENT
Start: 2021-05-31 | End: 2021-06-03 | Stop reason: HOSPADM

## 2021-05-31 RX ORDER — LEVOTHYROXINE SODIUM 0.12 MG/1
125 TABLET ORAL
Status: DISCONTINUED | OUTPATIENT
Start: 2021-06-01 | End: 2021-06-03 | Stop reason: HOSPADM

## 2021-05-31 RX ORDER — BISACODYL 10 MG
10 SUPPOSITORY, RECTAL RECTAL
Status: DISCONTINUED | OUTPATIENT
Start: 2021-05-31 | End: 2021-06-03 | Stop reason: HOSPADM

## 2021-05-31 RX ADMIN — DOCUSATE SODIUM 50 MG AND SENNOSIDES 8.6 MG 2 TABLET: 8.6; 5 TABLET, FILM COATED ORAL at 17:39

## 2021-05-31 RX ADMIN — GABAPENTIN 600 MG: 300 CAPSULE ORAL at 20:30

## 2021-05-31 RX ADMIN — SULFAMETHOXAZOLE AND TRIMETHOPRIM 1 TABLET: 800; 160 TABLET ORAL at 06:34

## 2021-05-31 RX ADMIN — SULFAMETHOXAZOLE AND TRIMETHOPRIM 1 TABLET: 800; 160 TABLET ORAL at 17:39

## 2021-05-31 RX ADMIN — GADOTERIDOL 14 ML: 279.3 INJECTION, SOLUTION INTRAVENOUS at 15:15

## 2021-05-31 RX ADMIN — CEFTRIAXONE SODIUM 2 G: 2 INJECTION, POWDER, FOR SOLUTION INTRAMUSCULAR; INTRAVENOUS at 09:02

## 2021-05-31 RX ADMIN — HYDROXYZINE HYDROCHLORIDE 50 MG: 50 TABLET, FILM COATED ORAL at 17:56

## 2021-05-31 ASSESSMENT — ENCOUNTER SYMPTOMS
VOMITING: 0
ABDOMINAL PAIN: 0
CHILLS: 0
FALLS: 0
FEVER: 0
HEADACHES: 0
HALLUCINATIONS: 0
COUGH: 0
NAUSEA: 0
PHOTOPHOBIA: 0
PALPITATIONS: 0
DIZZINESS: 0
LOSS OF CONSCIOUSNESS: 0
INSOMNIA: 0
BLURRED VISION: 0
BACK PAIN: 0
BRUISES/BLEEDS EASILY: 0
SHORTNESS OF BREATH: 0

## 2021-05-31 ASSESSMENT — LIFESTYLE VARIABLES
EVER FELT BAD OR GUILTY ABOUT YOUR DRINKING: NO
HOW MANY TIMES IN THE PAST YEAR HAVE YOU HAD 5 OR MORE DRINKS IN A DAY: 0
EVER HAD A DRINK FIRST THING IN THE MORNING TO STEADY YOUR NERVES TO GET RID OF A HANGOVER: NO
HAVE YOU EVER FELT YOU SHOULD CUT DOWN ON YOUR DRINKING: NO
CONSUMPTION TOTAL: NEGATIVE
ON A TYPICAL DAY WHEN YOU DRINK ALCOHOL HOW MANY DRINKS DO YOU HAVE: 0
AVERAGE NUMBER OF DAYS PER WEEK YOU HAVE A DRINK CONTAINING ALCOHOL: 0
DOES PATIENT WANT TO STOP DRINKING: NO
ALCOHOL_USE: NO
HAVE PEOPLE ANNOYED YOU BY CRITICIZING YOUR DRINKING: NO
TOTAL SCORE: 0

## 2021-05-31 ASSESSMENT — COGNITIVE AND FUNCTIONAL STATUS - GENERAL
MOBILITY SCORE: 24
DAILY ACTIVITIY SCORE: 24
SUGGESTED CMS G CODE MODIFIER MOBILITY: CH
SUGGESTED CMS G CODE MODIFIER DAILY ACTIVITY: CH

## 2021-05-31 ASSESSMENT — PAIN DESCRIPTION - PAIN TYPE: TYPE: CHRONIC PAIN

## 2021-05-31 ASSESSMENT — PATIENT HEALTH QUESTIONNAIRE - PHQ9
1. LITTLE INTEREST OR PLEASURE IN DOING THINGS: NOT AT ALL
2. FEELING DOWN, DEPRESSED, IRRITABLE, OR HOPELESS: NOT AT ALL
SUM OF ALL RESPONSES TO PHQ9 QUESTIONS 1 AND 2: 0

## 2021-05-31 ASSESSMENT — FIBROSIS 4 INDEX: FIB4 SCORE: 1.72

## 2021-05-31 NOTE — PROGRESS NOTES
2 RN skin check   Preformed with CELIO Ferris    Skin grossly intact.   Education provided on activity and mobilization encouraged.

## 2021-05-31 NOTE — PROGRESS NOTES
Pt arrived to unit, AxOx4. VSS. Q15 close Obs in place. Oriented to room and unit. Questions and concerns addressed. Dr. Barnard notified that pt AxOx4, addressed need to continue legal hold. Per Dr. Barnard, Psychiatry to reassess pt tomorrow for continued legal hold. Safety checklist in use.     Pt belongings locked in pt specific locker.

## 2021-05-31 NOTE — ED NOTES
ERP to bedside to speak w/ pt. Pt discharged with instructions and script. Pt verbalizes the understanding of instructions. Pt ambulated out of ER without any difficulty.

## 2021-05-31 NOTE — ED NOTES
Upon assessment patient is AOx3 (name, event, place)  Patient is reticent about current symptoms.  Son at bedside states this is not her baseline.  Patient states she does see objects however did not elaborate.     Patient is calm and cooperative

## 2021-05-31 NOTE — ED NOTES
Daughter In law Ciera called pt gave permission to give information to ciera.  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  Release of records sent to [facility] where PT had MRI, [facility] is closed today.  Release of records in PT's chart

## 2021-05-31 NOTE — ED NOTES
Report received from Nitin PICKENS. POC discussed. Patient continues to be on monitors. Denies pain.     Patient a legal hold. Legal hold in chart. All legal hold precautions in place per hospital policy and protocols.

## 2021-05-31 NOTE — DISCHARGE PLANNING
Alert Team:     Referral: Legal Hold     Intervention: Legal Hold Paperwork given to SW by Life Skills RN Mariia Moreno     Legal Hold Initiated: Date:  5/30/21   Time: 2144     Patient’s Insurance Listed on Face Sheet: Medicaid O     Referrals sent to:     This referral contains the following information:  1. Face sheet __x__  2. Page 1 and Page 2 of Legal Hold __x__  3. Alert Team Assessment/Psych Assessment __x__  4. Head to toe physical exam __x__  5. Urine Drug Screen __pending__  6. Blood Alcohol __x__  7. Vital signs __x__  8. Pregnancy test when applicable _N/A__  9. Medications list __x__     Plan: Patient will transfer to mental health facility once acceptance is obtained

## 2021-05-31 NOTE — DISCHARGE PLANNING
Medical Social Work    MSW received a call from Tony at [facility] that their current on-call doctor is concerned about pt's UTI and would like to hold off on accepting pt at this time.    Per Lynette at [facility] they do not have any female beds at this time.

## 2021-05-31 NOTE — CONSULTS
"PSYCHIATRY CONSULT TEAM NOTE: Consult received. Per chart review, patient's daughter in law reported that patient was recently diagnosed with \"osmotic demyelination\" which psychiatrically can result in behavioral disturbances. Alert team chart review note from today also indicates that the patient was diagnosed with a recent UTI and alcoholic liver disease and a fall with a head injury in 1/2020. Unclear her current prognosis however at that time it was extremely poor. In 5/2021, she was hospitalized with altered mental status and ETOH detox. Important to note, she does not appear to have ever been diagnosed with a specific psychiatric disorder. \"Psychiatric Disorder\" is not an official DSM5 diagnosis and does not provide insight into what psychiatric symptoms the patient may have been experiencing at the time of its documentation. She does appear to have an Alcohol Use Disorder.    It is not clear to this writer that medical reasons for her behavior have been completely ruled out considering the above stated information. The patient is also apparently being admitted to the floors per Alert Team RN. Will hold off on psychiatric consult until hospitalist documentation rules out delirium/encephalopathy.    Thank you.          "

## 2021-05-31 NOTE — SENIOR ADMIT NOTE
"55-year-old female with past medical history of alcohol, abstinent for 1 year, no psychiatric history presents with altered mental status.  Apparently she also had an outpatient MRI of the brain at [facility] which showed \"osmotic delamination\".  On examination she is oriented x4, however does show mild memory deficit, is a little anxious, and per the son she is different over the last few days from her mental baseline.  Differential diagnosis could it include Warnicke's, something related to the osmotic demyelination, illicit drug intoxication, primary psychiatric disorder.  Will work-up with MRI brain, neurology consult, B12 folate, syphilis, HIV.   She is still on a legal hold per psych for psychosis.  Neuro (Margy) to see after MRI  "

## 2021-05-31 NOTE — DISCHARGE INSTRUCTIONS
You have a urinary tract infection.  Please get the prescription filled and start taking it.  Please see your primary care doctor later this week for follow-up.  Return for any change or worsening symptoms

## 2021-05-31 NOTE — ED NOTES
Patient transported to floor via gurney in stable condition accompanied by transport and Security. All belongings accounted for.

## 2021-05-31 NOTE — ED NOTES
PT ambulated to restroom, steady gait. PT denies SI/HI at this time.  PT calm and cooperative. PT A&O x 4

## 2021-05-31 NOTE — CONSULTS
"[facility]   TRIAGE ASSESSMENT    Name: Jumana Kahn  MRN: [id number]  : 1965  Age: 55 y.o.  Date of assessment: 2021  PCP: Germain Luu M.D.  Persons in attendance: Patient and Siblings    CHIEF COMPLAINT/PRESENTING ISSUE (as stated by Patient, Son-AJ at bedside, ER RN, ERP):   Chief Complaint   Patient presents with   • Altered Mental Status     patient arrived with her son c/o altered mental status started yesterday and today. states she's been taking a new medication Gabapentin for her back pain. patient alert and oriented. Son concerned that his mother maybe start drinking again.      Patient is a 56 y/o female patient BIB her son after discharging from this ER within the hour initially reporting anxiety. Son reports when he asked patient what ER interventions were done patient stated \"ER would arrest her if she did not get dog tags.\" Son further reports this is not the patient's baseline; reporting increasing confusion, erratic behavior and delusional comments over the course of 2 days. Son denies patient has had psychiatric evaluations or treatment but has had periods of paranoia and delusions in the past. Patient is a poor historian only confirming her confusion. Patient denies any thoughts of harming herself or others. Patient reports to bedside RN seeing objects that are not there but declined to elaborate further. Patient is guarded and resistant during consult. Son reports patient currently vocalizing concern that ER staff will try and steal her brains. Recent addition of gabapentin to her medication regiment; Lab work and CT scan WNL. Medically cleared at present and placed on legal hold; awaiting transfer to psychiatric facility for further psychiatric evaluation, stabilization and treatment.     CURRENT LIVING SITUATION/SOCIAL SUPPORT: Patient lives with her boyfriend; reports good social support. 24 days sober.     BEHAVIORAL HEALTH TREATMENT HISTORY  Does " patient/parent report a history of prior behavioral health treatment for patient?   No:    SAFETY ASSESSMENT - SELF  Does patient acknowledge current or past symptoms of dangerousness to self? no  Does parent/significant other report patient has current or past symptoms of dangerousness to self? No; son denies patient has any hx of trying to harm herself.   Does presenting problem suggest symptoms of dangerousness to self? No; denies SI; Due to patient current ALOC patient presents as an inability to care for herself thus increasing her risk for possible harm to self    SAFETY ASSESSMENT - OTHERS  Does patient acknowledge current or past symptoms of aggressive behavior or risk to others? no  Does parent/significant other report patient has current or past symptoms of aggressive behavior or risk to others?  No; son reports no aggression hx by the patient.   Does presenting problem suggest symptoms of dangerousness to others? No; denies HI    Crisis Safety Plan completed and copy given to patient? N\A    ABUSE/NEGLECT SCREENING  Does patient report feeling “unsafe” in his/her home, or afraid of anyone?  Yes; patient verbalizing people are trying to teal her brains.  Does patient report any history of physical, sexual, or emotional abuse?  no  Does parent or significant other report any of the above? no  Is there evidence of neglect by self?  yes  Is there evidence of neglect by a caregiver? no  Does the patient/parent report any history of CPS/APS/police involvement related to suspected abuse/neglect or domestic violence? no  Based on the information provided during the current assessment, is a mandated report of suspected abuse/neglect being made?  No    SUBSTANCE USE SCREENING  Yes:  Jude all substances used in the past 30 days:      Last Use Amount   []   Alcohol     []   Marijuana     []   Heroin     []   Prescription Opioids  (used without prescription, for    recreation, or in excess of prescribed amount)     []  "  Other Prescription  (used without prescription, for    recreation, or in excess of prescribed amount)     []   Cocaine      []   Methamphetamine     []   \"\" drugs (ectasy, MDMA)     []   Other substances        UDS results: pending collection  Breathalyzer results: Diagnostic alcohol <10.1    What consequences does the patient associate with any of the above substance use and or addictive behaviors? Health problems: Alcohol addiction; liver disease    Risk factors for detox (check all that apply):  []  Seizures   []  Diaphoretic (sweating)   []  Tremors   []  Hallucinations   []  Increased blood pressure   []  Decreased blood pressure   []  Other   [x]  None; pt currently 24 days sober      [x] Patient education on risk factors for detoxification and instructed to return to ER as needed.      MENTAL STATUS   Participation: Limited verbal participation and Guarded  Grooming: Casual  Orientation: Alert, Confused, Evidence of delusions present and Evidence of hallucinations present  Behavior: Calm  Eye contact: Limited  Mood: Anxious  Affect: Constricted  Thought process: Loose associations  Thought content: Evidence of delusion and Paranoia  Speech: Latency of response  Perception: Evidence of hallucination  Memory:  Poor memory for chronology of events  Insight: Poor  Judgment:  Poor  Other:    Collateral information:   Source:  [x] Son present in person: JENIFER Masterson [phone]  [] Significant other by telephone  [] [facility]   [x] [facility]  [x] [facility]  [x] Other: ERP    [] Unable to complete full assessment due to:  [] Acute intoxication  [] Patient declined to participate/engage  [] Patient verbally unresponsive  [] Significant cognitive deficits  [] Significant perceptual distortions or behavioral disorganization  [x] Other: N/A     CLINICAL IMPRESSIONS:  Primary:  Delusions, Paranoia, VH  Secondary:         IDENTIFIED NEEDS/PLAN:  [Trigger DISPOSITION list for any " items marked]    []  Imminent safety risk - self [] Imminent safety risk - others   []  Acute substance withdrawal [x]  Psychosis/Impaired reality testing   [x]  Mood/anxiety []  Substance use/Addictive behavior   [x]  Maladaptive behaviro []  Parent/child conflict   []  Family/Couples conflict []  Biomedical   []  Housing []  Financial   []   Legal  Occupational/Educational   []  Domestic violence []  Other:     Recommended Plan of Care:  Actively being addressed by Legal [facility], [facility], [facility] and [facility]; routine observation appropriate; no SI, no HI. Please reassess day shift for possible elopement risk if patient has not transferred to psychiatric facility.     Does patient express agreement with the above plan? yes    Referral appointment(s) scheduled? N\A    Alert team only: Patient placed on legal hold for inability to care for self due to ALOC and would benefit further psychiatric stabilization and treatment at this time.   I have discussed findings and recommendations with Dr. Arizmendi who is in agreement with these recommendations.     Referral information sent to the following community providers : [facility], [facility]    If applicable : Referred  to : [facility] for legal hold follow up at 2220      Mariia Moreno R.N.  5/30/2021
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
## 2021-05-31 NOTE — ED TRIAGE NOTES
Jumana Jaylin Kahn  55 y.o.  female  Chief Complaint   Patient presents with   • Altered Mental Status     patient arrived with her son c/o altered mental status started yesterday and today. states she's been taking a new medication Gabapentin for her back pain. patient alert and oriented. Son concerned that his mother maybe start drinking again.

## 2021-05-31 NOTE — ED PROVIDER NOTES
ED Provider Note    ED Provider    Means of arrival: Private vehicle  History obtained from: Patient and son who is at the bedside  History limited by: Patient has mild psychosis    CHIEF COMPLAINT  Chief Complaint   Patient presents with   • Altered Mental Status     patient arrived with her son c/o altered mental status started yesterday and today. states she's been taking a new medication Gabapentin for her back pain. patient alert and oriented. Son concerned that his mother maybe start drinking again.       HPI  Jumana Kahn is a 55 y.o. female who presents with altered mental status.  The patient was seen by me earlier.  She was awake alert and oriented, her mentation was mildly slow.  Son has brought her back because she is not acting appropriately again.  Apparently during the day today and yesterday she has been acting appropriately, acting intermittently confused, and wandering off and not remembering where she came from.  Now she is saying that we are trying to steal her brain and that we are trying to call the police on her.  Her alcohol level was negative.  She has no history of mental illness, none is diagnosed with the family had suspicions.  She does have a history of alcoholism and her alcohol level is 0 on visit earlier today    REVIEW OF SYSTEMS  See HPI for further details. All other systems are negative.     PAST MEDICAL HISTORY   has a past medical history of Anemia, Asthma, GERD (gastroesophageal reflux disease), Head ache, Hypothyroid, Psychiatric disorder, and Substance abuse (HCC).    SOCIAL HISTORY  Social History     Tobacco Use   • Smoking status: Former Smoker     Packs/day: 0.00     Quit date: 2001     Years since quittin.8   • Smokeless tobacco: Never Used   Vaping Use   • Vaping Use: Never used   Substance and Sexual Activity   • Alcohol use: Not Currently     Comment: has not drank for over a year   • Drug use: No   • Sexual activity: Not on file       SURGICAL  "HISTORY   has a past surgical history that includes other orthopedic surgery; gyn surgery; other; upper gi endoscopy,ctrl bleed (N/A, 1/15/2020); and gastroscopy (N/A, 5/10/2020).    CURRENT MEDICATIONS  Home Medications     Reviewed by Maurice Courtney R.N. (Registered Nurse) on 05/30/21 at 2012  Med List Status: Complete   Medication Last Dose Status   cefdinir (OMNICEF) 300 MG Cap  Active   cyanocobalamin (VITAMIN B12) 1000 MCG Tab  Active   folic acid (FOLVITE) 1 MG Tab  Active   lactulose 20 GM/30ML Solution  Active   levothyroxine (SYNTHROID) 75 MCG Tab  Active   midodrine (PROAMATINE) 10 MG tablet  Active   multivitamin (THERAGRAN) Tab  Active   omeprazole (PRILOSEC) 20 MG delayed-release capsule  Active   potassium chloride SA (KDUR) 20 MEQ Tab CR  Active   sulfamethoxazole-trimethoprim (BACTRIM DS) 800-160 MG tablet  Active   therapeutic multivitamin-minerals (THERAGRAN-M) Tab  Active   thiamine (THIAMINE) 100 MG tablet  Active                ALLERGIES  Allergies   Allergen Reactions   • Ampicillin Rash     Skin rash  Tolerated cephalexin (2018)        PHYSICAL EXAM  VITAL SIGNS: /80   Pulse 85   Temp 36.1 °C (97 °F) (Temporal)   Resp 16   Ht 1.626 m (5' 4\")   Wt 69.5 kg (153 lb 3.5 oz)   SpO2 95%   BMI 26.30 kg/m²   Constitutional: Alert in no apparent distress.  HENT: No signs of trauma, Mucous membranes are moist   Eyes:  Conjunctiva normal, Non-icteric.   Neck: Normal range of motion, No tenderness, Supple,  Lymphatic: No lymphadenopathy noted.   Cardiovascular: Regular rate and rhythm, no murmurs.   Thorax & Lungs: Normal breath sounds, No respiratory distress, No wheezing, No chest tenderness.   Abdomen: Bowel sounds normal, Soft, No tenderness, No masses, No pulsatile masses. No peritoneal signs.  Skin: Warm, Dry,Normal color  Back: No bony tenderness, No CVA tenderness.   Extremities:No edema, No tenderness, No cyanosis,    Musculoskeletal: Good range of motion in all major joints. No " tenderness to palpation or major deformities noted.   Neurologic: Alert ,Oriented x4, Normal motor function, Normal sensory function, No focal deficits noted.   Psychiatric: Patient has mild psychosis, is paranoid, believes returns to her brain      MEDICAL DECISION MAKING  This is a 55 y.o. female who presents with episodes of intermittent psychosis.  She has not been diagnosed with psychiatric disease but I can find no medical cause for her symptoms.  The patient will be placed on legal hold, and given Haldol to assist in sleep and psychosis.    DIAGNOSTIC STUDIES / PROCEDURES    EKG      LABS      RADIOLOGY  CT-HEAD W/O   Final Result         NO ACUTE ABNORMALITIES ARE NOTED ON CT SCAN OF THE HEAD.             COURSE  Pertinent Labs & Imaging studies reviewed. (See chart for details)    9:38 PM - Patient seen and examined at bedside. Discussed plan of care,    Transfer to psychiatric facility    FINAL IMPRESSION  1. Acute psychosis (HCC)        The note accurately reflects work and decisions made by me.  Julián Arizmendi D.O.  5/30/2021  9:41 PM

## 2021-05-31 NOTE — ED NOTES
Med rec updated and complete. Allergies reviewed. Pt was unable to participate in an interview. Placed call to listed home pharmacy to confirm current prescriptions.  Pt was discharged from [facility] today ( 05/30/21). Prescription for BACTRIM DS currently at he pharmacy.        Home pharmacy [facility] [phone]

## 2021-05-31 NOTE — ED PROVIDER NOTES
ED Provider Note      Was signed out to me as stable pending transfer to psychiatric facility.  I was called by the [facility] clinician to let me know psych refuses patient concerning for possible medical etiology of her symptoms.  I have reviewed her chart and have examined the patient.  And she is awake and she is alert she is oriented but she does seem a little bit confused.      She does not report a significant history of psychiatric disease nor do I see any apparent psychiatric disease in her chart.  She had a bit of a work-up but I think she requires further work-up and treatment.  I think she requires more work-up we can do here in the emergency department and I do not think she can be safely discharged home.  She remains on a legal hold.  The patient be hospitalized for continued work-up and treatment.  I discussed case the hospitalist and care is transferred at the time.    FINAL IMPRESSION  1. Altered mental status, unspecified altered mental status type     2. Acute UTI         2.   3.         Electronically signed by: Adolfo Estrada M.D., 5/31/2021 8:54 AM     76

## 2021-05-31 NOTE — ED NOTES
Report received from CELIO Zhao. Patient sleeping on gurney in R lateral position. No acute distress. Active chest rise and fall noted. No aggression/agitation noted. No behavioral pain indiators noted. Close observation remains in progress.

## 2021-05-31 NOTE — ED NOTES
Patient sleeping on gurney in supine position. No acute distress. Active chest rise and fall noted. No aggression/agitation noted. No behavioral pain indiators noted. Close observation remains in progress.

## 2021-06-01 ENCOUNTER — APPOINTMENT (OUTPATIENT)
Dept: CARDIOLOGY | Facility: MEDICAL CENTER | Age: 56
End: 2021-06-01
Attending: STUDENT IN AN ORGANIZED HEALTH CARE EDUCATION/TRAINING PROGRAM
Payer: MEDICAID

## 2021-06-01 PROBLEM — G62.9 NEUROPATHY: Status: ACTIVE | Noted: 2021-06-01

## 2021-06-01 PROBLEM — R78.81 GRAM-POSITIVE BACTEREMIA: Status: ACTIVE | Noted: 2021-06-01

## 2021-06-01 LAB
ALBUMIN SERPL BCP-MCNC: 4 G/DL (ref 3.2–4.9)
ALBUMIN/GLOB SERPL: 1.3 G/DL
ALP SERPL-CCNC: 81 U/L (ref 30–99)
ALT SERPL-CCNC: 17 U/L (ref 2–50)
ANION GAP SERPL CALC-SCNC: 10 MMOL/L (ref 7–16)
AST SERPL-CCNC: 17 U/L (ref 12–45)
BACTERIA UR CULT: NORMAL
BASOPHILS # BLD AUTO: 0.2 % (ref 0–1.8)
BASOPHILS # BLD: 0.02 K/UL (ref 0–0.12)
BILIRUB SERPL-MCNC: 0.6 MG/DL (ref 0.1–1.5)
BUN SERPL-MCNC: 21 MG/DL (ref 8–22)
CALCIUM SERPL-MCNC: 9.1 MG/DL (ref 8.5–10.5)
CHLORIDE SERPL-SCNC: 108 MMOL/L (ref 96–112)
CO2 SERPL-SCNC: 20 MMOL/L (ref 20–33)
CREAT SERPL-MCNC: 0.94 MG/DL (ref 0.5–1.4)
EOSINOPHIL # BLD AUTO: 0.1 K/UL (ref 0–0.51)
EOSINOPHIL NFR BLD: 1.2 % (ref 0–6.9)
ERYTHROCYTE [DISTWIDTH] IN BLOOD BY AUTOMATED COUNT: 44.3 FL (ref 35.9–50)
GLOBULIN SER CALC-MCNC: 3.1 G/DL (ref 1.9–3.5)
GLUCOSE SERPL-MCNC: 116 MG/DL (ref 65–99)
HCT VFR BLD AUTO: 42.5 % (ref 37–47)
HGB BLD-MCNC: 14.6 G/DL (ref 12–16)
IMM GRANULOCYTES # BLD AUTO: 0.04 K/UL (ref 0–0.11)
IMM GRANULOCYTES NFR BLD AUTO: 0.5 % (ref 0–0.9)
LV EJECT FRACT  99904: 60
LV EJECT FRACT MOD 2C 99903: 51.01
LV EJECT FRACT MOD 4C 99902: 64.73
LV EJECT FRACT MOD BP 99901: 59.02
LYMPHOCYTES # BLD AUTO: 2.84 K/UL (ref 1–4.8)
LYMPHOCYTES NFR BLD: 33.3 % (ref 22–41)
MCH RBC QN AUTO: 30.9 PG (ref 27–33)
MCHC RBC AUTO-ENTMCNC: 34.4 G/DL (ref 33.6–35)
MCV RBC AUTO: 90 FL (ref 81.4–97.8)
MONOCYTES # BLD AUTO: 0.65 K/UL (ref 0–0.85)
MONOCYTES NFR BLD AUTO: 7.6 % (ref 0–13.4)
NEUTROPHILS # BLD AUTO: 4.87 K/UL (ref 2–7.15)
NEUTROPHILS NFR BLD: 57.2 % (ref 44–72)
NRBC # BLD AUTO: 0 K/UL
NRBC BLD-RTO: 0 /100 WBC
PLATELET # BLD AUTO: 152 K/UL (ref 164–446)
PMV BLD AUTO: 10.3 FL (ref 9–12.9)
POTASSIUM SERPL-SCNC: 4.4 MMOL/L (ref 3.6–5.5)
PROT SERPL-MCNC: 7.1 G/DL (ref 6–8.2)
RBC # BLD AUTO: 4.72 M/UL (ref 4.2–5.4)
SIGNIFICANT IND 70042: NORMAL
SITE SITE: NORMAL
SODIUM SERPL-SCNC: 138 MMOL/L (ref 135–145)
SOURCE SOURCE: NORMAL
WBC # BLD AUTO: 8.5 K/UL (ref 4.8–10.8)

## 2021-06-01 PROCEDURE — 36415 COLL VENOUS BLD VENIPUNCTURE: CPT

## 2021-06-01 PROCEDURE — 700111 HCHG RX REV CODE 636 W/ 250 OVERRIDE (IP): Performed by: STUDENT IN AN ORGANIZED HEALTH CARE EDUCATION/TRAINING PROGRAM

## 2021-06-01 PROCEDURE — G0378 HOSPITAL OBSERVATION PER HR: HCPCS

## 2021-06-01 PROCEDURE — 93306 TTE W/DOPPLER COMPLETE: CPT

## 2021-06-01 PROCEDURE — 80053 COMPREHEN METABOLIC PANEL: CPT

## 2021-06-01 PROCEDURE — A9270 NON-COVERED ITEM OR SERVICE: HCPCS | Performed by: STUDENT IN AN ORGANIZED HEALTH CARE EDUCATION/TRAINING PROGRAM

## 2021-06-01 PROCEDURE — 87040 BLOOD CULTURE FOR BACTERIA: CPT

## 2021-06-01 PROCEDURE — 99243 OFF/OP CNSLTJ NEW/EST LOW 30: CPT | Performed by: PSYCHIATRY & NEUROLOGY

## 2021-06-01 PROCEDURE — 700102 HCHG RX REV CODE 250 W/ 637 OVERRIDE(OP): Performed by: STUDENT IN AN ORGANIZED HEALTH CARE EDUCATION/TRAINING PROGRAM

## 2021-06-01 PROCEDURE — 93306 TTE W/DOPPLER COMPLETE: CPT | Mod: 26 | Performed by: INTERNAL MEDICINE

## 2021-06-01 PROCEDURE — 96372 THER/PROPH/DIAG INJ SC/IM: CPT

## 2021-06-01 PROCEDURE — 700105 HCHG RX REV CODE 258: Performed by: STUDENT IN AN ORGANIZED HEALTH CARE EDUCATION/TRAINING PROGRAM

## 2021-06-01 PROCEDURE — 99225 PR SUBSEQUENT OBSERVATION CARE,LEVEL II: CPT | Performed by: INTERNAL MEDICINE

## 2021-06-01 PROCEDURE — 85025 COMPLETE CBC W/AUTO DIFF WBC: CPT

## 2021-06-01 RX ADMIN — THIAMINE HCL TAB 100 MG 100 MG: 100 TAB at 05:29

## 2021-06-01 RX ADMIN — POLYETHYLENE GLYCOL 3350 1 PACKET: 17 POWDER, FOR SOLUTION ORAL at 09:12

## 2021-06-01 RX ADMIN — CEFTRIAXONE SODIUM 1 G: 1 INJECTION, POWDER, FOR SOLUTION INTRAMUSCULAR; INTRAVENOUS at 05:51

## 2021-06-01 RX ADMIN — THERA TABS 1 TABLET: TAB at 05:29

## 2021-06-01 RX ADMIN — ENOXAPARIN SODIUM 40 MG: 40 INJECTION SUBCUTANEOUS at 05:28

## 2021-06-01 RX ADMIN — GABAPENTIN 600 MG: 300 CAPSULE ORAL at 20:16

## 2021-06-01 RX ADMIN — DOCUSATE SODIUM 50 MG AND SENNOSIDES 8.6 MG 2 TABLET: 8.6; 5 TABLET, FILM COATED ORAL at 05:29

## 2021-06-01 RX ADMIN — DOCUSATE SODIUM 50 MG AND SENNOSIDES 8.6 MG 2 TABLET: 8.6; 5 TABLET, FILM COATED ORAL at 17:10

## 2021-06-01 RX ADMIN — LEVOTHYROXINE SODIUM 125 MCG: 0.12 TABLET ORAL at 05:29

## 2021-06-01 ASSESSMENT — ENCOUNTER SYMPTOMS
COUGH: 0
CHILLS: 0
BACK PAIN: 0
SHORTNESS OF BREATH: 0
HEARTBURN: 0
FEVER: 0
EYE REDNESS: 0
NECK PAIN: 0
DIARRHEA: 0
FOCAL WEAKNESS: 0
HEADACHES: 0
SORE THROAT: 0
CONSTIPATION: 0
BLURRED VISION: 1
DIZZINESS: 0
MYALGIAS: 0
ABDOMINAL PAIN: 0
PALPITATIONS: 0
NAUSEA: 0
VOMITING: 0
DEPRESSION: 0

## 2021-06-01 ASSESSMENT — PAIN DESCRIPTION - PAIN TYPE: TYPE: CHRONIC PAIN

## 2021-06-01 NOTE — CARE PLAN
The patient is Stable - Low risk of patient condition declining or worsening    Shift Goals  Clinical Goals: Safety      Problem: Mobility  Goal: Patient's capacity to carry out activities will improve  Outcome: Progressing  Note: Pt is A&Ox4, up SBA with steady gait. Room close to nursing station, pt calls appropriately for assistance.      Problem: Infection - Standard  Goal: Patient will remain free from infection  Outcome: Progressing  Note: Pt is afebrile, receiving antibiotics. Hand hygiene performed before and after pt interaction.

## 2021-06-01 NOTE — CARE PLAN
The patient is Watcher - Medium risk of patient condition declining or worsening    Shift Goals  Clinical Goals: Safety  Patient Goals: Sleep    Progress made toward(s) clinical / shift goals:  Safety regarding legal hold.    Patient is not progressing towards the following goals:
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
## 2021-06-01 NOTE — CARE PLAN
The patient is Stable - Low risk of patient condition declining or worsening    Shift Goals  Clinical Goals: Saftey  Patient Goals: Move around, drink fluids, listen to doctors    Progress made toward(s) clinical / shift goals:  Fall precautions in place. Bed alarm in use. Fall band and non skid socks applied. Q 15 safety checks in place.     Patient is not progressing towards the following goals:

## 2021-06-01 NOTE — PROGRESS NOTES
2 RN skin check done with Mel PICKENS    Devices in place: NA  Skin assessed under devices: NA  New or potential pressure ulcers: NA  Skin assessment: CDI  Interventions in place: Waffle overlay on mattress, pt turns self from side to side, ambulates frequently.
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
## 2021-06-01 NOTE — PROGRESS NOTES
Pt to transfer to R300. Attempted to call report to RN. Phone extension left for RN to call back.     1240: report given. Pt waiting for transport.

## 2021-06-01 NOTE — PROGRESS NOTES
Daily Progress Note    Date of Service: 6/1/2021  Primary Team: [facility] IM Purple Team   Attending: Anibal Monzon M.D.   Senior Resident: Dr. Sykes  Intern: Dr. Rodriguez  Contact:  [phone]    ID: 55 year-old with history of central pontine demyelinosis, alcohol abuse, admitted for altered mental status, found to be bacteremic.    Chief Complaint: feeling off    Subjective:  Interval Updates: Legal hold discontinued    Ms. Kahn says she feels much better today. She is unable to tell me what made her feel off yesterday, just that she had a sense something was not right and therefore asked to be brought to the hospital. She described her thinking at that time to be foggy. However today she feels improved. She did note some dysuria with increased frequency and malodorous urine at that time.    Consultants/Specialty:  Neurology    Review of Systems:   Review of Systems   Constitutional: Negative for chills, fever and malaise/fatigue.   HENT: Positive for hearing loss (chronic). Negative for sore throat.    Eyes: Positive for blurred vision (chronic). Negative for redness.   Respiratory: Negative for cough and shortness of breath.    Cardiovascular: Positive for leg swelling. Negative for chest pain and palpitations.   Gastrointestinal: Negative for abdominal pain, constipation, diarrhea, heartburn, nausea and vomiting.   Genitourinary: Negative for dysuria and urgency.   Musculoskeletal: Negative for back pain, myalgias and neck pain.   Skin: Negative for itching and rash.   Neurological: Negative for dizziness, focal weakness and headaches.   Psychiatric/Behavioral: Negative for depression and suicidal ideas.       Objective Data:   Physical Exam:   Vitals:   Temp:  [36.2 °C (97.1 °F)-36.9 °C (98.5 °F)] 36.9 °C (98.5 °F)  Pulse:  [61-86] 76  Resp:  [17-19] 17  BP: ()/(54-66) 107/63  SpO2:  [93 %-98 %] 93 %    Physical Exam  Constitutional:       General: She is not in acute distress.     Appearance: Normal  appearance. She is not toxic-appearing.      Comments: AOx3   HENT:      Head: Normocephalic and atraumatic.      Right Ear: External ear normal.      Left Ear: External ear normal.      Nose: No congestion.      Mouth/Throat:      Mouth: Mucous membranes are moist.      Pharynx: Oropharynx is clear.   Eyes:      General: No scleral icterus.     Extraocular Movements: Extraocular movements intact.      Pupils: Pupils are equal, round, and reactive to light.   Cardiovascular:      Rate and Rhythm: Normal rate and regular rhythm.      Pulses: Normal pulses.      Heart sounds: No murmur heard.   No friction rub. No gallop.    Pulmonary:      Effort: Pulmonary effort is normal.      Breath sounds: Normal breath sounds. No rales.   Abdominal:      General: Abdomen is flat. There is no distension.      Palpations: Abdomen is soft.      Comments: No asterixis   Musculoskeletal:         General: No swelling. Normal range of motion.      Cervical back: Normal range of motion and neck supple. No rigidity.      Right lower leg: Edema present.      Left lower leg: Edema present.   Skin:     General: Skin is warm and dry.      Capillary Refill: Capillary refill takes less than 2 seconds.      Findings: No bruising.   Neurological:      General: No focal deficit present.      Mental Status: She is alert and oriented to person, place, and time. Mental status is at baseline.      Cranial Nerves: No cranial nerve deficit.      Motor: No weakness.   Psychiatric:         Mood and Affect: Mood normal.         Thought Content: Thought content normal.         Judgment: Judgment normal.         Labs:   Recent Labs     05/30/21  1455 06/01/21  0303   SODIUM 136 138   POTASSIUM 3.7 4.4   CHLORIDE 106 108   CO2 18* 20   GLUCOSE 81 116*   BUN 23* 21      Recent Labs     05/30/21  1455 06/01/21  0303   ALBUMIN 4.4 4.0   TBILIRUBIN 1.0 0.6   ALKPHOSPHAT 82 81   TOTPROTEIN 7.7 7.1   ALTSGPT 18 17   ASTSGOT 20 17   CREATININE 0.79 0.94       Recent Labs     05/30/21  1455 06/01/21  0303   WBC 8.1 8.5   RBC 4.72 4.72   HEMOGLOBIN 14.7 14.6   HEMATOCRIT 43.3 42.5   MCV 91.7 90.0   MCH 31.1 30.9   RDW 46.3 44.3   PLATELETCT 145* 152*   MPV 10.6 10.3   NEUTSPOLYS 56.20 57.20   LYMPHOCYTES 35.10 33.30   MONOCYTES 7.90 7.60   EOSINOPHILS 0.20 1.20   BASOPHILS 0.40 0.20        Imaging:   MR-BRAIN-WITH & W/O   Final Result      1.  No evidence of acute territorial infarct, intracranial hemorrhage or mass lesion.   2.  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 Findings appear slightly less prominent when compared with the previous exam.   3.  Symmetric T1 signal hyperintensity in the bilateral basal ganglia typical for hepatic dysfunction.   4.  Mild diffuse cerebral substance loss.   5.  Mild migraines hepatic ischemic change versus the myelination gliosis.      CT-HEAD W/O   Final Result         NO ACUTE ABNORMALITIES ARE NOTED ON CT SCAN OF THE HEAD.         EC-ECHOCARDIOGRAM COMPLETE W/O CONT    (Results Pending)        Problem Representation:     * Gram-positive bacteremia  Assessment & Plan  2/2 cultures drawn 5/30 during ED visit did return positive, urine culture only showing usual skin dc. Gram positive cocci, negative for staph aureus by PCR.  -Continue ceftriaxone for suspected staph or strep bacteremia  -Source unclear, most likely dental  -Repeat cultures this afternoon  -Transthoracic Echo  -Continue to monitor for signs of infection    Neuropathy  Assessment & Plan  -Continue home gabapentin    Altered mental status  Assessment & Plan  Resolving  Patient reports subjective feeling of altered state in the last 1.5 months? Patient's son has noted behavioral changes over the last few days. She has an extensive hx of TBI (at least 8 separate incidents), hx of alcoholism (last drink 2 years ago), and hx of central pontine myelinolysis.  -A&Ox3, improving mental status, feels relatively normal and back to baseline  -May  be related to bacteremia, source is possibly poor dentition vs urinary  -Neurology signed off  -MRI brain did not show any new findings, redemonstrated known central pontine demyelinosis  -HIV/RPR negative  -TSH wnl  -B12/Folate wnl  -ESR/CRP wnl  -SHAHLA pending    UTI (urinary tract infection)  Assessment & Plan  Patient has recent hx of UTI treated with short course of antibiotics  -Ceftriaxone as above    Hypothyroidism- (present on admission)  Assessment & Plan  -continue levothyroxine 125 mcg qd      Code Status: FC  Diet: regular  Lines/Tubes/Drains:  IVF: n/a  Prophylaxis:  DVT: lovenox  GI:   Disposition: admitted for bacteremia

## 2021-06-02 ENCOUNTER — APPOINTMENT (OUTPATIENT)
Dept: RADIOLOGY | Facility: MEDICAL CENTER | Age: 56
End: 2021-06-02
Attending: STUDENT IN AN ORGANIZED HEALTH CARE EDUCATION/TRAINING PROGRAM
Payer: MEDICAID

## 2021-06-02 PROBLEM — R82.71 ASYMPTOMATIC BACTERIURIA: Status: ACTIVE | Noted: 2021-05-31

## 2021-06-02 PROBLEM — B95.7 BACTEREMIA DUE TO COAGULASE-NEGATIVE STAPHYLOCOCCUS: Status: ACTIVE | Noted: 2021-06-01

## 2021-06-02 LAB — NUCLEAR IGG SER QL IA: NORMAL

## 2021-06-02 PROCEDURE — 99226 PR SUBSEQUENT OBSERVATION CARE,LEVEL III: CPT | Mod: GC | Performed by: HOSPITALIST

## 2021-06-02 PROCEDURE — 96372 THER/PROPH/DIAG INJ SC/IM: CPT

## 2021-06-02 PROCEDURE — 700111 HCHG RX REV CODE 636 W/ 250 OVERRIDE (IP): Performed by: STUDENT IN AN ORGANIZED HEALTH CARE EDUCATION/TRAINING PROGRAM

## 2021-06-02 PROCEDURE — A9270 NON-COVERED ITEM OR SERVICE: HCPCS | Performed by: STUDENT IN AN ORGANIZED HEALTH CARE EDUCATION/TRAINING PROGRAM

## 2021-06-02 PROCEDURE — 700102 HCHG RX REV CODE 250 W/ 637 OVERRIDE(OP): Performed by: STUDENT IN AN ORGANIZED HEALTH CARE EDUCATION/TRAINING PROGRAM

## 2021-06-02 PROCEDURE — 72158 MRI LUMBAR SPINE W/O & W/DYE: CPT

## 2021-06-02 PROCEDURE — 99244 OFF/OP CNSLTJ NEW/EST MOD 40: CPT | Performed by: INTERNAL MEDICINE

## 2021-06-02 PROCEDURE — 73030 X-RAY EXAM OF SHOULDER: CPT | Mod: RT

## 2021-06-02 PROCEDURE — 700105 HCHG RX REV CODE 258: Performed by: STUDENT IN AN ORGANIZED HEALTH CARE EDUCATION/TRAINING PROGRAM

## 2021-06-02 PROCEDURE — G0378 HOSPITAL OBSERVATION PER HR: HCPCS

## 2021-06-02 RX ORDER — GABAPENTIN 300 MG/1
300 CAPSULE ORAL
Status: DISCONTINUED | OUTPATIENT
Start: 2021-06-02 | End: 2021-06-03 | Stop reason: HOSPADM

## 2021-06-02 RX ADMIN — THERA TABS 1 TABLET: TAB at 05:33

## 2021-06-02 RX ADMIN — ENOXAPARIN SODIUM 40 MG: 40 INJECTION SUBCUTANEOUS at 05:31

## 2021-06-02 RX ADMIN — HYDROXYZINE HYDROCHLORIDE 50 MG: 50 TABLET, FILM COATED ORAL at 03:20

## 2021-06-02 RX ADMIN — THIAMINE HCL TAB 100 MG 100 MG: 100 TAB at 05:33

## 2021-06-02 RX ADMIN — CEFTRIAXONE SODIUM 1 G: 1 INJECTION, POWDER, FOR SOLUTION INTRAMUSCULAR; INTRAVENOUS at 05:29

## 2021-06-02 RX ADMIN — LEVOTHYROXINE SODIUM 125 MCG: 0.12 TABLET ORAL at 05:33

## 2021-06-02 RX ADMIN — GABAPENTIN 300 MG: 300 CAPSULE ORAL at 21:29

## 2021-06-02 RX ADMIN — DOCUSATE SODIUM 50 MG AND SENNOSIDES 8.6 MG 2 TABLET: 8.6; 5 TABLET, FILM COATED ORAL at 05:33

## 2021-06-02 ASSESSMENT — ENCOUNTER SYMPTOMS
FOCAL WEAKNESS: 0
SHORTNESS OF BREATH: 0
DEPRESSION: 0
MYALGIAS: 0
COUGH: 0
BACK PAIN: 0
NAUSEA: 0
VOMITING: 0
ABDOMINAL PAIN: 0
FEVER: 0
BLURRED VISION: 1
HEARTBURN: 0
CHILLS: 0
DIZZINESS: 0
PALPITATIONS: 0
SORE THROAT: 0
DIARRHEA: 0
EYE REDNESS: 0
NECK PAIN: 0
HEADACHES: 0
CONSTIPATION: 0

## 2021-06-02 ASSESSMENT — COGNITIVE AND FUNCTIONAL STATUS - GENERAL
DAILY ACTIVITIY SCORE: 24
SUGGESTED CMS G CODE MODIFIER MOBILITY: CH
MOBILITY SCORE: 24
SUGGESTED CMS G CODE MODIFIER DAILY ACTIVITY: CH

## 2021-06-02 ASSESSMENT — PAIN DESCRIPTION - PAIN TYPE: TYPE: CHRONIC PAIN

## 2021-06-02 NOTE — CARE PLAN
The patient is Stable - Low risk of patient condition declining or worsening    Shift Goals  Clinical Goals: Safety    Progress made toward(s) clinical / shift goals:       Problem: Psychosocial  Goal: Patient's level of anxiety will decrease  Outcome: Progressing  Note: Pt reports anxiety overnight, states the Atarax relieved this and she is feeling much better. Pt is able to make needs known appropriately.      Problem: Communication  Goal: The ability to communicate needs accurately and effectively will improve  Outcome: Progressing  Note: Discussed POC with pt and daughter-in-law over the phone, questions answered. Will clarify DC plan with MD.

## 2021-06-02 NOTE — CARE PLAN
The patient is Stable - Low risk of patient condition declining or worsening    Shift Goals  Clinical Goals: Saftey  Patient Goals: Move around, drink fluids, listen to doctors    Progress made toward(s) clinical / shift goals:    Problem: Knowledge Deficit - Standard  Goal: Patient and family/care givers will demonstrate understanding of plan of care, disease process/condition, diagnostic tests and medications  Outcome: Progressing     Problem: Pain - Standard  Goal: Alleviation of pain or a reduction in pain to the patient’s comfort goal  Outcome: Progressing     Problem: Psychosocial  Goal: Patient's level of anxiety will decrease  Outcome: Progressing  Goal: Patient's ability to verbalize feelings about condition will improve  Outcome: Progressing  Goal: Patient's ability to re-evaluate and adapt role responsibilities will improve  Outcome: Progressing  Goal: Patient and family will demonstrate ability to cope with life altering diagnosis and/or procedure  Outcome: Progressing  Goal: Spiritual and cultural needs incorporated into hospitalization  Outcome: Progressing     Problem: Communication  Goal: The ability to communicate needs accurately and effectively will improve  Outcome: Progressing     Problem: Mobility  Goal: Patient's capacity to carry out activities will improve  Outcome: Progressing     Problem: Self Care  Goal: Patient will have the ability to perform ADLs independently or with assistance (bathe, groom, dress, toilet and feed)  Outcome: Progressing     Problem: Infection - Standard  Goal: Patient will remain free from infection  Outcome: Progressing       Patient is not progressing towards the following goals:

## 2021-06-03 ENCOUNTER — PHARMACY VISIT (OUTPATIENT)
Dept: PHARMACY | Facility: MEDICAL CENTER | Age: 56
End: 2021-06-03
Payer: COMMERCIAL

## 2021-06-03 VITALS
DIASTOLIC BLOOD PRESSURE: 66 MMHG | SYSTOLIC BLOOD PRESSURE: 103 MMHG | OXYGEN SATURATION: 97 % | WEIGHT: 148.81 LBS | BODY MASS INDEX: 25.41 KG/M2 | TEMPERATURE: 99 F | RESPIRATION RATE: 17 BRPM | HEART RATE: 71 BPM | HEIGHT: 64 IN

## 2021-06-03 LAB
ANNOTATION COMMENT IMP: NORMAL
BACTERIA BLD CULT: ABNORMAL
BASOPHILS # BLD AUTO: 0.6 % (ref 0–1.8)
BASOPHILS # BLD: 0.05 K/UL (ref 0–0.12)
EOSINOPHIL # BLD AUTO: 0.18 K/UL (ref 0–0.51)
EOSINOPHIL NFR BLD: 2.2 % (ref 0–6.9)
ERYTHROCYTE [DISTWIDTH] IN BLOOD BY AUTOMATED COUNT: 45.1 FL (ref 35.9–50)
HCT VFR BLD AUTO: 40.7 % (ref 37–47)
HGB BLD-MCNC: 13.8 G/DL (ref 12–16)
IMM GRANULOCYTES # BLD AUTO: 0.03 K/UL (ref 0–0.11)
IMM GRANULOCYTES NFR BLD AUTO: 0.4 % (ref 0–0.9)
LYMPHOCYTES # BLD AUTO: 2.97 K/UL (ref 1–4.8)
LYMPHOCYTES NFR BLD: 37 % (ref 22–41)
MCH RBC QN AUTO: 30.6 PG (ref 27–33)
MCHC RBC AUTO-ENTMCNC: 33.9 G/DL (ref 33.6–35)
MCV RBC AUTO: 90.2 FL (ref 81.4–97.8)
MONOCYTES # BLD AUTO: 0.79 K/UL (ref 0–0.85)
MONOCYTES NFR BLD AUTO: 9.8 % (ref 0–13.4)
NEUTROPHILS # BLD AUTO: 4.01 K/UL (ref 2–7.15)
NEUTROPHILS NFR BLD: 50 % (ref 44–72)
NRBC # BLD AUTO: 0 K/UL
NRBC BLD-RTO: 0 /100 WBC
PLATELET # BLD AUTO: 149 K/UL (ref 164–446)
PMV BLD AUTO: 10.9 FL (ref 9–12.9)
RBC # BLD AUTO: 4.51 M/UL (ref 4.2–5.4)
RETINYL PALMITATE SERPL-MCNC: <0.02 MG/L (ref 0–0.1)
SIGNIFICANT IND 70042: ABNORMAL
SIGNIFICANT IND 70042: ABNORMAL
SITE SITE: ABNORMAL
SITE SITE: ABNORMAL
SOURCE SOURCE: ABNORMAL
SOURCE SOURCE: ABNORMAL
VIT A SERPL-MCNC: 0.53 MG/L (ref 0.3–1.2)
WBC # BLD AUTO: 8 K/UL (ref 4.8–10.8)

## 2021-06-03 PROCEDURE — A9270 NON-COVERED ITEM OR SERVICE: HCPCS | Performed by: STUDENT IN AN ORGANIZED HEALTH CARE EDUCATION/TRAINING PROGRAM

## 2021-06-03 PROCEDURE — RXMED WILLOW AMBULATORY MEDICATION CHARGE: Performed by: STUDENT IN AN ORGANIZED HEALTH CARE EDUCATION/TRAINING PROGRAM

## 2021-06-03 PROCEDURE — G0378 HOSPITAL OBSERVATION PER HR: HCPCS

## 2021-06-03 PROCEDURE — 36415 COLL VENOUS BLD VENIPUNCTURE: CPT

## 2021-06-03 PROCEDURE — 85025 COMPLETE CBC W/AUTO DIFF WBC: CPT

## 2021-06-03 PROCEDURE — 700102 HCHG RX REV CODE 250 W/ 637 OVERRIDE(OP): Performed by: STUDENT IN AN ORGANIZED HEALTH CARE EDUCATION/TRAINING PROGRAM

## 2021-06-03 PROCEDURE — 99217 PR OBSERVATION CARE DISCHARGE: CPT | Mod: GC | Performed by: HOSPITALIST

## 2021-06-03 RX ORDER — CEPHALEXIN 500 MG/1
1000 CAPSULE ORAL 3 TIMES DAILY
Qty: 30 CAPSULE | Refills: 0 | Status: SHIPPED | OUTPATIENT
Start: 2021-06-03 | End: 2021-06-08

## 2021-06-03 RX ORDER — GABAPENTIN 300 MG/1
300 CAPSULE ORAL
Qty: 30 CAPSULE | Refills: 0 | Status: SHIPPED | OUTPATIENT
Start: 2021-06-03 | End: 2022-01-13

## 2021-06-03 RX ADMIN — LEVOTHYROXINE SODIUM 125 MCG: 0.12 TABLET ORAL at 04:58

## 2021-06-03 RX ADMIN — THIAMINE HCL TAB 100 MG 100 MG: 100 TAB at 04:58

## 2021-06-03 RX ADMIN — THERA TABS 1 TABLET: TAB at 04:58

## 2021-06-03 RX ADMIN — HYDROXYZINE HYDROCHLORIDE 50 MG: 50 TABLET, FILM COATED ORAL at 04:58

## 2021-06-03 RX ADMIN — DOCUSATE SODIUM 50 MG AND SENNOSIDES 8.6 MG 2 TABLET: 8.6; 5 TABLET, FILM COATED ORAL at 04:59

## 2021-06-03 ASSESSMENT — PAIN DESCRIPTION - PAIN TYPE: TYPE: CHRONIC PAIN

## 2021-06-03 NOTE — CARE PLAN
Problem: Communication  Goal: The ability to communicate needs accurately and effectively will improve  Outcome: Progressing  Note: Pt is alert and oriented to person, place, time, and situation. Is able to communicate effectively and makes needs known       Problem: Infection - Standard  Goal: Patient will remain free from infection  Outcome: Progressing  Note: Pt is not showing any signs/symptoms of an infection; vital signs are stable   The patient is Stable - Low risk of patient condition declining or worsening    Shift Goals  Clinical Goals: Saftey  Patient Goals: Move around, drink fluids, listen to doctors    Progress made toward(s) clinical / shift goals:  pt has been ambulating in her room    Patient is not progressing towards the following goals: n/a
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
## 2021-06-03 NOTE — DISCHARGE INSTRUCTIONS
Discharge Instructions    Discharged to home by car with friend. Discharged via walking, hospital escort: Refused.  Special equipment needed: Not Applicable    Be sure to schedule a follow-up appointment with your primary care doctor or any specialists as instructed.     Discharge Plan:   Diet Plan: Discussed  Activity Level: Discussed  Confirmed Follow up Appointment: Appointment Scheduled  Confirmed Symptoms Management: Discussed  Medication Reconciliation Updated: Yes    I understand that a diet low in cholesterol, fat, and sodium is recommended for good health. Unless I have been given specific instructions below for another diet, I accept this instruction as my diet prescription.   Other diet: Regular    Special Instructions: None    · Is patient discharged on Warfarin / Coumadin?   No     Depression / Suicide Risk    As you are discharged from this [facility] facility, it is important to learn how to keep safe from harming yourself.    Recognize the warning signs:  · Abrupt changes in personality, positive or negative- including increase in energy   · Giving away possessions  · Change in eating patterns- significant weight changes-  positive or negative  · Change in sleeping patterns- unable to sleep or sleeping all the time   · Unwillingness or inability to communicate  · Depression  · Unusual sadness, discouragement and loneliness  · Talk of wanting to die  · Neglect of personal appearance   · Rebelliousness- reckless behavior  · Withdrawal from people/activities they love  · Confusion- inability to concentrate     If you or a loved one observes any of these behaviors or has concerns about self-harm, here's what you can do:  · Talk about it- your feelings and reasons for harming yourself  · Remove any means that you might use to hurt yourself (examples: pills, rope, extension cords, firearm)  · Get professional help from the community (Mental Health, Substance Abuse, psychological counseling)  · Do not be  alone:Call your Safe Contact- someone whom you trust who will be there for you.  · Call your local CRISIS HOTLINE 696-5599 or 775-477-6220  · Call your local Children's Mobile Crisis Response Team [place] (048) 961-2983 or www.[facility]  · Call the toll free National Suicide Prevention Hotlines   · National Suicide Prevention Lifeline 529-219-ETHC (2844)  · National [employer] Network 800-SUICIDE (264-0270)

## 2021-06-03 NOTE — PROGRESS NOTES
"Daily Progress Note    Date of Service: 6/2/2021  Primary Team: [facility] Red Team    Attending: Lennie Wilson M.D.  Senior Resident: Dr. Babin  Contact:  [phone]    Chief Complaint: Altered mental status    Subjective:  No acute events overnight, feeling well this morning, fully alert, oriented, ambulatory, and with good appetite. We had a long conversation regarding her presentation to the hospital; apparently her dose of gabapentin was doubled shortly before presentation and she also received an epidural corticosteroid injection. She did not have any fever, chills, or worsening joint pain. She did have a rotator cuff repair on the right and states \"anchors\" were placed however we discussed with her that no hardware is visible on a plain film of the shoulder. Her shoulder has good range of motion per her and has been improving. She does have chronic low back pain and neck pain which is not new or acutely worsened.     Consultants/Specialty:  Neurology    Review of Systems:   Review of Systems   Constitutional: Negative for chills, fever and malaise/fatigue.   HENT: Positive for hearing loss (chronic). Negative for sore throat.    Eyes: Positive for blurred vision (chronic). Negative for redness.   Respiratory: Negative for cough and shortness of breath.    Cardiovascular: Positive for leg swelling. Negative for chest pain and palpitations.   Gastrointestinal: Negative for abdominal pain, constipation, diarrhea, heartburn, nausea and vomiting.   Genitourinary: Negative for dysuria and urgency.   Musculoskeletal: Negative for back pain, myalgias and neck pain.   Skin: Negative for itching and rash.   Neurological: Negative for dizziness, focal weakness and headaches.   Psychiatric/Behavioral: Negative for depression and suicidal ideas.       Objective Data:   Physical Exam:   Vitals:   Temp:  [36.9 °C (98.4 °F)-37.3 °C (99.1 °F)] 37.3 °C (99.1 °F)  Pulse:  [62-85] 66  Resp:  [16-18] 16  BP: (102-109)/(55-70) " 102/70  SpO2:  [94 %-96 %] 96 %    Physical Exam  Constitutional:       General: She is not in acute distress.     Appearance: Normal appearance. She is not toxic-appearing.      Comments: AOx3   HENT:      Head: Normocephalic and atraumatic.      Right Ear: External ear normal.      Left Ear: External ear normal.      Nose: No congestion.      Mouth/Throat:      Mouth: Mucous membranes are moist.      Pharynx: Oropharynx is clear.   Eyes:      General: No scleral icterus.     Extraocular Movements: Extraocular movements intact.      Pupils: Pupils are equal, round, and reactive to light.   Cardiovascular:      Rate and Rhythm: Normal rate and regular rhythm.      Pulses: Normal pulses.      Heart sounds: No friction rub. No gallop.       Comments: No murmur  Pulmonary:      Effort: Pulmonary effort is normal.      Breath sounds: Normal breath sounds. No rales.   Abdominal:      General: Abdomen is flat. There is no distension.      Palpations: Abdomen is soft.      Comments: No asterixis   Musculoskeletal:         General: No swelling. Normal range of motion.      Cervical back: Normal range of motion and neck supple. No rigidity.      Comments: No vertebral point tenderness, no shoulder tenderness, good ROM of shoulder   Skin:     General: Skin is warm and dry.      Capillary Refill: Capillary refill takes less than 2 seconds.      Findings: No bruising.   Neurological:      General: No focal deficit present.      Mental Status: She is alert and oriented to person, place, and time. Mental status is at baseline.      Cranial Nerves: No cranial nerve deficit.      Motor: No weakness.   Psychiatric:         Mood and Affect: Mood normal.         Thought Content: Thought content normal.         Judgment: Judgment normal.         Labs:   Recent Labs     06/01/21  0303   SODIUM 138   POTASSIUM 4.4   CHLORIDE 108   CO2 20   GLUCOSE 116*   BUN 21      Recent Labs     06/01/21  0303   ALBUMIN 4.0   TBILIRUBIN 0.6    ALKPHOSPHAT 81   TOTPROTEIN 7.1   ALTSGPT 17   ASTSGOT 17   CREATININE 0.94      Recent Labs     06/01/21  0303   WBC 8.5   RBC 4.72   HEMOGLOBIN 14.6   HEMATOCRIT 42.5   MCV 90.0   MCH 30.9   RDW 44.3   PLATELETCT 152*   MPV 10.3   NEUTSPOLYS 57.20   LYMPHOCYTES 33.30   MONOCYTES 7.60   EOSINOPHILS 1.20   BASOPHILS 0.20        Imaging:   DX-SHOULDER 2+ RIGHT   Final Result      No radiographic evidence of acute traumatic injury. Postsurgical changes in the shoulder are not identified.      EC-ECHOCARDIOGRAM COMPLETE W/O CONT   Final Result      MR-BRAIN-WITH & W/O   Final Result      1.  No evidence of acute territorial infarct, intracranial hemorrhage or mass lesion.   2.  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 Findings appear slightly less prominent when compared with the previous exam.   3.  Symmetric T1 signal hyperintensity in the bilateral basal ganglia typical for hepatic dysfunction.   4.  Mild diffuse cerebral substance loss.   5.  Mild migraines hepatic ischemic change versus the myelination gliosis.      CT-HEAD W/O   Final Result         NO ACUTE ABNORMALITIES ARE NOTED ON CT SCAN OF THE HEAD.         MR-LUMBAR SPINE-WITH & W/O    (Results Pending)        Problem Representation:     * Bacteremia due to coagulase-negative Staphylococcus  Assessment & Plan  Patient presented with altered mental status with psychosis and was incidentally found to have 2/2 blood cultures positive for pansensitive Staphylococcus capitis, a coagulase negative staphylococcus. Repeat cultures are pending and negative at this time. She does not appear to have any orthopedic hardware, although she did have a rotator cuff repair 7 months ago no hardware is visible on imaging. Her altered mental status is likely medication-associated by history. Her CoNS bacteremia may represent contamination as there is no clear source and this type of infection is almost always associated with prosthetic or venous  catheter infection. There is no elevated sedimentation rate, fever, or leukocytosis. A transthoracic echocardiogram was negative.  -Hold antibiotics  -Follow repeat cultures  -MRI given back pain  -Infectious disease consultation    Altered mental status  Assessment & Plan  Resolved  Patient reports subjective feeling of altered state in the last 1.5 months? Patient's son has noted behavioral changes over the last few days. She has an extensive hx of TBI (at least 8 separate incidents), hx of alcoholism (last drink 2 years ago), and hx of central pontine myelinolysis.  -A&Ox3, improving mental status, feels relatively normal and back to baseline  -Likely related to medication changes and possible systemic corticosteroid absorption given psychotic symptoms  -Decrease gabapentin go 300mg QHS    Hypothyroidism- (present on admission)  Assessment & Plan  -continue levothyroxine 125 mcg qd    Neuropathy  Assessment & Plan  -Continue home gabapentin    Asymptomatic bacteriuria  Assessment & Plan  Culture negative, no symptoms

## 2021-06-03 NOTE — CARE PLAN
Problem: Communication  Goal: The ability to communicate needs accurately and effectively will improve  Outcome: Progressing  Flowsheets (Taken 6/2/2021 1919)  Communication:   Assessed patient's ability to understand and communicate   Oriented patient to call light   Oriented family/support system to call light     Problem: Mobility  Goal: Patient's capacity to carry out activities will improve  Outcome: Met  Flowsheets  Taken 6/2/2021 1919 by Gini Broderick R.NAcosta  Mobility:   Provided rest periods between activities   Monitored for signs of activity intolerance  Activity Performed:   Ambulate   Edge of bed   Sitting up in bed   Up to chair  Assistance: No Assistance Required  Taken 6/2/2021 0800 by Juana Temple RAcostaNAcosta  Level of Mobility: Level IV     Problem: Self Care  Goal: Patient will have the ability to perform ADLs independently or with assistance (bathe, groom, dress, toilet and feed)  Outcome: Met     The patient is Stable - Low risk of patient condition declining or worsening    Shift Goals  Clinical Goals: Saftey  Patient Goals: Move around, drink fluids, listen to doctors    Progress made toward(s) clinical / shift goals:  Ambulatory in room with steady gait    Patient is not progressing towards the following goals:

## 2021-06-04 NOTE — DISCHARGE PLANNING
Meds-to-Beds: Discharge prescription orders listed below delivered to patient's bedside. RN Jovani notified. Patient counseled.  Patient verbalizes understanding of medications and states she has used gabapentin previously.      Femi Jumana Mosqueda   Home Medication Instructions CURT:[id number]    Printed on:06/03/21 4888   Medication Information                      cephALEXin (KEFLEX) 500 MG Cap  Take 2 Capsules by mouth 3 times a day for 5 days.             gabapentin (NEURONTIN) 300 MG Cap  Take 1 capsule by mouth at bedtime.                 Vaishnavi Das, PharmD

## 2021-06-04 NOTE — DISCHARGE SUMMARY
Discharge Summary    Date of Admission: 5/30/2021  Date of Discharge: 6/3/2021  2:29 PM  Discharging Attending: Dr. Lennie Wilson  Discharging Senior Resident: Dr. Fly Joseph    CHIEF COMPLAINT ON ADMISSION  Chief Complaint   Patient presents with   • Altered Mental Status     patient arrived with her son c/o altered mental status started yesterday and today. states she's been taking a new medication Gabapentin for her back pain. patient alert and oriented. Son concerned that his mother maybe start drinking again.       Reason for Admission  Bacteremia Due to Coagulase-Negative Staphylococcus    Admission Date  5/30/2021    CODE STATUS  FULL    HPI & HOSPITAL COURSE  This is a 55 y.o. female with a past medical history significant for alcohol use disorder abstinent for 1 year who presented to the hospital with symptoms of confusion with psychosis after she was found to incidentally have 2/2 blood cultures that were positive for pansensitive Staphylococcus Aureus.    #Bacteremia due to coagulase-negative Staphylococcus  As part of her admission, the patient was found to have 2 out of 2 blood cultures positive for pansensitive Staph aureus capitis which is a coagulase-negative staph.  Of note, the patient had no orthopedic hardware.  She did have a previous history of rotator cuff tear and an x-ray was ordered which confirmed that the patient had no prosthetic hardware present.  Infectious disease was consulted as the literature is sparse on 2 out of 2 blood cultures being positive for coagulase-negative staph.  The patient was treated with ceftriaxone.  Patient had an MRI of the lumbar spine to rule out any underlying abscess as a potential source of infection as she reported that she had steroid injection a few weeks prior to admission. MRI was negative for abscess or signs of infection. Repeat blood cultures were negative.  As the patient had no signs of infection including no leukocytosis, fever, chills, rand  her confusion had resolved, she will be discharged home on 5 additional days of cephalexin 1000 mg 3 times daily to complete a 7-day course. The patient was advised to no longer have steroid injections as they have no significant medical benefit and expose her to risk of infection.  Patient verbalized understanding and agreed with the plan of care.    #Neuropathy  -The patient's dose of gabapentin was recently increased prior to admission to 600 mg nightly.  Patient is agreed to decrease her gabapentin to 300 mg and follow-up outpatient with her PCP.    #Hypothyroidism  -Continue levothyroxine 125 mcg daily    #Asymptomatic bacteriuria  -Positive UA, culture negative, no symptoms.         Therefore, she is discharged in fair and stable condition to home with close outpatient follow-up.    The patient recovered much more quickly than anticipated on admission.    PHYSICAL EXAM ON DISCHARGE  Temp:  [36.4 °C (97.5 °F)-37.2 °C (99 °F)] 37.2 °C (99 °F)  Pulse:  [70-71] 71  Resp:  [17-18] 17  BP: (103-108)/(66-68) 103/66  SpO2:  [95 %-97 %] 97 %    Physical Exam  Constitutional:       General: She is not in acute distress.     Appearance: Normal appearance. She is not ill-appearing, toxic-appearing or diaphoretic.   HENT:      Head: Normocephalic.      Nose: No congestion or rhinorrhea.      Mouth/Throat:      Pharynx: No oropharyngeal exudate or posterior oropharyngeal erythema.   Eyes:      General:         Right eye: No discharge.         Left eye: No discharge.   Cardiovascular:      Rate and Rhythm: Normal rate and regular rhythm.      Pulses: Normal pulses.      Heart sounds: Normal heart sounds. No murmur heard.   No friction rub. No gallop.    Abdominal:      General: There is no distension.      Palpations: There is no mass.      Tenderness: There is no abdominal tenderness.      Hernia: No hernia is present.   Musculoskeletal:         General: No swelling, tenderness, deformity or signs of injury.      Cervical  back: No rigidity or tenderness.   Neurological:      General: No focal deficit present.      Mental Status: She is alert and oriented to person, place, and time. Mental status is at baseline.         Discharge Date  6/3/2021    FOLLOW UP ITEMS POST DISCHARGE  -Follow-up outpatient with PCP for further reduction in gabapentin dosage  -Follow-up with PCP for further management of chronic medical problems    DISCHARGE DIAGNOSES  Principal Problem:    Bacteremia due to coagulase-negative Staphylococcus POA: Unknown  Active Problems:    Hypothyroidism POA: Yes    Asymptomatic bacteriuria POA: Unknown    Altered mental status POA: Unknown    Neuropathy POA: Unknown  Resolved Problems:    * No resolved hospital problems. *      FOLLOW UP    Germain Luu M.D.  [address]  [facility] [address] [phone]          Follow-up with PCP in 1 week    MEDICATIONS ON DISCHARGE     Medication List      START taking these medications      Instructions   cephALEXin 500 MG Caps  Commonly known as: KEFLEX   Doctor's comments: For infection bacteremia; Patient tolerated ceftriaxone inpatient  Take 2 Capsules by mouth 3 times a day for 5 days.  Dose: 1,000 mg        CHANGE how you take these medications      Instructions   gabapentin 300 MG Caps  What changed: how much to take  Commonly known as: NEURONTIN   Doctor's comments: Note decrease dose to 300mg from 600mg  Take 1 capsule by mouth at bedtime.  Dose: 300 mg     levothyroxine 125 MCG Tabs  What changed: Another medication with the same name was removed. Continue taking this medication, and follow the directions you see here.  Commonly known as: SYNTHROID   Take 125 mcg by mouth every morning on an empty stomach.  Dose: 125 mcg     omeprazole 20 MG delayed-release capsule  What changed: when to take this  Commonly known as: PRILOSEC   Take 1 Cap by mouth 2 Times a Day.  Dose: 20 mg        CONTINUE taking these medications      Instructions   folic acid 1 MG  Tabs  Commonly known as: FOLVITE   Take 1 Tab by mouth every day.  Dose: 1 mg     therapeutic multivitamin-minerals Tabs   Take 1 Tab by mouth every day.  Dose: 1 tablet        STOP taking these medications    albuterol 108 (90 Base) MCG/ACT Aers inhalation aerosol     cefdinir 300 MG Caps  Commonly known as: OMNICEF     cyclobenzaprine 5 mg tablet  Commonly known as: Flexeril     lactulose 20 GM/30ML Soln     midodrine 10 MG tablet  Commonly known as: PROAMATINE     multivitamin Tabs     potassium chloride SA 20 MEQ Tbcr  Commonly known as: Kdur     sulfamethoxazole-trimethoprim 800-160 MG tablet  Commonly known as: BACTRIM DS     thiamine 100 MG tablet  Commonly known as: THIAMINE            Allergies  Allergies   Allergen Reactions   • Ampicillin Rash     Skin rash  Tolerates cephalosporins       DIET  Healthy Diet    ACTIVITY  As tolerated.  Weight bearing as tolerated    CONSULTATIONS  Infectious Diseases, Dr. Suh    PROCEDURES  MRI of lumbar spine 6/3/21

## 2021-06-06 LAB
BACTERIA BLD CULT: NORMAL
BACTERIA BLD CULT: NORMAL
SIGNIFICANT IND 70042: NORMAL
SIGNIFICANT IND 70042: NORMAL
SITE SITE: NORMAL
SITE SITE: NORMAL
SOURCE SOURCE: NORMAL
SOURCE SOURCE: NORMAL

## 2021-07-30 ENCOUNTER — OFFICE VISIT (OUTPATIENT)
Dept: URGENT CARE | Facility: CLINIC | Age: 56
End: 2021-07-30
Payer: MEDICAID

## 2021-07-30 ENCOUNTER — APPOINTMENT (OUTPATIENT)
Dept: PHYSICAL THERAPY | Facility: REHABILITATION | Age: 56
End: 2021-07-30
Attending: STUDENT IN AN ORGANIZED HEALTH CARE EDUCATION/TRAINING PROGRAM
Payer: MEDICAID

## 2021-07-30 VITALS
WEIGHT: 162 LBS | HEART RATE: 76 BPM | RESPIRATION RATE: 14 BRPM | OXYGEN SATURATION: 97 % | TEMPERATURE: 98.1 F | SYSTOLIC BLOOD PRESSURE: 98 MMHG | BODY MASS INDEX: 27.66 KG/M2 | DIASTOLIC BLOOD PRESSURE: 60 MMHG | HEIGHT: 64 IN

## 2021-07-30 DIAGNOSIS — K04.7 DENTAL ABSCESS: ICD-10-CM

## 2021-07-30 DIAGNOSIS — K04.7 DENTAL INFECTION: ICD-10-CM

## 2021-07-30 PROCEDURE — 99213 OFFICE O/P EST LOW 20 MIN: CPT | Performed by: NURSE PRACTITIONER

## 2021-07-30 RX ORDER — CLINDAMYCIN HYDROCHLORIDE 150 MG/1
150 CAPSULE ORAL 3 TIMES DAILY
Qty: 21 CAPSULE | Refills: 0 | Status: SHIPPED | OUTPATIENT
Start: 2021-07-30 | End: 2021-08-06

## 2021-07-30 ASSESSMENT — FIBROSIS 4 INDEX: FIB4 SCORE: 1.49

## 2021-07-30 NOTE — PROGRESS NOTES
Subjective:      Jumana Kahn is a 56 y.o. female who presents with Dental Pain (broken tooth. painful, hard to sleep, pus/infected. low grade fever.  Upcoming appointment 8/3 with dental. )    Past Medical History:   Diagnosis Date   • Anemia    • Asthma    • GERD (gastroesophageal reflux disease)    • Head ache    • Hypothyroid    • Psychiatric disorder    • Substance abuse (HCC)        Social History     Socioeconomic History   • Marital status: Single     Spouse name: Not on file   • Number of children: Not on file   • Years of education: Not on file   • Highest education level: Not on file   Occupational History   • Not on file   Tobacco Use   • Smoking status: Former Smoker     Packs/day: 0.00     Quit date: 2001     Years since quittin.0   • Smokeless tobacco: Never Used   Vaping Use   • Vaping Use: Never used   Substance and Sexual Activity   • Alcohol use: Not Currently     Comment: has not drank for over a year   • Drug use: No   • Sexual activity: Not on file   Other Topics Concern   • Not on file   Social History Narrative   • Not on file     Social Determinants of Health     Financial Resource Strain:    • Difficulty of Paying Living Expenses:    Food Insecurity:    • Worried About Running Out of Food in the Last Year:    • Ran Out of Food in the Last Year:    Transportation Needs:    • Lack of Transportation (Medical):    • Lack of Transportation (Non-Medical):    Physical Activity:    • Days of Exercise per Week:    • Minutes of Exercise per Session:    Stress:    • Feeling of Stress :    Social Connections:    • Frequency of Communication with Friends and Family:    • Frequency of Social Gatherings with Friends and Family:    • Attends Anab[employer]ist Services:    • Active Member of Clubs or Organizations:    • Attends Club or Organization Meetings:    • Marital Status:    Intimate Partner Violence:    • Fear of Current or Ex-Partner:    • Emotionally Abused:    • Physically Abused:    •  "Sexually Abused:      No family history on file.    Allergies: Ampicillin    Patient is a 56-year-old female who presents today with complaint of dental pain and mouth pain.  Symptoms started over the last week.  Patient states she has had a history of poor dentition and multiple dental abscesses.  Patient states she does have an appointment in September with her dentist and states that she is planning to have all of her teeth pulled and to get dentures.  She states no fever with this.        Dental Pain   This is a recurrent problem. The current episode started in the past 7 days. The problem occurs constantly. The problem has been unchanged. The pain is moderate. She has tried nothing for the symptoms. The treatment provided no relief.       Review of Systems   HENT:        Mouth pain and dental abscess   All other systems reviewed and are negative.         Objective:     BP (!) 98/60   Pulse 76   Temp 36.7 °C (98.1 °F)   Resp 14   Ht 1.626 m (5' 4\")   Wt 73.5 kg (162 lb)   SpO2 97%   BMI 27.81 kg/m²      Physical Exam  Vitals reviewed.   Constitutional:       Appearance: Normal appearance.   Neurological:      General: No focal deficit present.      Mental Status: She is alert and oriented to person, place, and time.   Psychiatric:         Mood and Affect: Mood normal.         Behavior: Behavior normal.         Thought Content: Thought content normal.         Judgment: Judgment normal.          Patient has generally poor dentition noted with multiple cracked teeth.  Gingiva generally red and swollen.  No obvious purulent discharge at this time              Assessment/Plan:   Dental abscess    Clindamycin  Warm saltwater gargles  Keep follow up appointment with dentist  ER precautions for worsening of symptoms      There are no diagnoses linked to this encounter.  "

## 2021-08-03 ENCOUNTER — APPOINTMENT (OUTPATIENT)
Dept: PHYSICAL THERAPY | Facility: REHABILITATION | Age: 56
End: 2021-08-03
Attending: STUDENT IN AN ORGANIZED HEALTH CARE EDUCATION/TRAINING PROGRAM
Payer: MEDICAID

## 2021-08-05 ENCOUNTER — APPOINTMENT (OUTPATIENT)
Dept: PHYSICAL THERAPY | Facility: REHABILITATION | Age: 56
End: 2021-08-05
Attending: STUDENT IN AN ORGANIZED HEALTH CARE EDUCATION/TRAINING PROGRAM
Payer: MEDICAID

## 2021-08-10 ENCOUNTER — APPOINTMENT (OUTPATIENT)
Dept: PHYSICAL THERAPY | Facility: REHABILITATION | Age: 56
End: 2021-08-10
Attending: STUDENT IN AN ORGANIZED HEALTH CARE EDUCATION/TRAINING PROGRAM
Payer: MEDICAID

## 2021-08-12 ENCOUNTER — APPOINTMENT (OUTPATIENT)
Dept: PHYSICAL THERAPY | Facility: REHABILITATION | Age: 56
End: 2021-08-12
Attending: STUDENT IN AN ORGANIZED HEALTH CARE EDUCATION/TRAINING PROGRAM
Payer: MEDICAID

## 2021-08-13 NOTE — CARE PLAN
Problem: Nutritional:  Goal: Achieve adequate nutritional intake  Description  Diet advancement with PO >50% of meals.    Outcome: NOT MET     Admit day 3.   Pt has been off/on NPO/clear liquids. No documentation of PO intake in ADLs.  S/p EGD WITH CLIP PLACEMENT today per GI note.    RD following.      PAST MEDICAL HISTORY:  CAD (coronary artery disease)     Hypothyroidism     Multiple sclerosis

## 2021-08-17 ENCOUNTER — APPOINTMENT (OUTPATIENT)
Dept: PHYSICAL THERAPY | Facility: REHABILITATION | Age: 56
End: 2021-08-17
Attending: STUDENT IN AN ORGANIZED HEALTH CARE EDUCATION/TRAINING PROGRAM
Payer: MEDICAID

## 2021-08-19 ENCOUNTER — APPOINTMENT (OUTPATIENT)
Dept: PHYSICAL THERAPY | Facility: REHABILITATION | Age: 56
End: 2021-08-19
Attending: STUDENT IN AN ORGANIZED HEALTH CARE EDUCATION/TRAINING PROGRAM
Payer: MEDICAID

## 2021-08-31 ENCOUNTER — PHYSICAL THERAPY (OUTPATIENT)
Dept: PHYSICAL THERAPY | Facility: REHABILITATION | Age: 56
End: 2021-08-31
Attending: STUDENT IN AN ORGANIZED HEALTH CARE EDUCATION/TRAINING PROGRAM
Payer: MEDICAID

## 2021-08-31 DIAGNOSIS — R26.89 IMBALANCE: ICD-10-CM

## 2021-08-31 DIAGNOSIS — R26.2 DIFFICULTY WALKING: ICD-10-CM

## 2021-08-31 PROCEDURE — 97162 PT EVAL MOD COMPLEX 30 MIN: CPT

## 2021-08-31 PROCEDURE — 95992 CANALITH REPOSITIONING PROC: CPT

## 2021-08-31 ASSESSMENT — ENCOUNTER SYMPTOMS
PAIN SCALE: 4
PAIN SCALE AT LOWEST: 3
PAIN SCALE AT HIGHEST: 7
POSTURAL HEADACHE: 1
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
## 2021-09-02 ENCOUNTER — PHYSICAL THERAPY (OUTPATIENT)
Dept: PHYSICAL THERAPY | Facility: REHABILITATION | Age: 56
End: 2021-09-02
Attending: STUDENT IN AN ORGANIZED HEALTH CARE EDUCATION/TRAINING PROGRAM
Payer: MEDICAID

## 2021-09-02 DIAGNOSIS — R26.89 IMBALANCE: ICD-10-CM

## 2021-09-02 DIAGNOSIS — R26.2 DIFFICULTY WALKING: ICD-10-CM

## 2021-09-02 PROCEDURE — 97112 NEUROMUSCULAR REEDUCATION: CPT

## 2021-09-02 NOTE — OP THERAPY DAILY TREATMENT
Outpatient Physical Therapy  DAILY TREATMENT     [facility] Physical [address].  [address]  [place] [address]  Phone:  [phone]  Fax:  [phone]    Date: 09/02/2021    Patient: Jumana Kahn  YOB: 1965  MRN: [id number]     Time Calculation    Start time: 1445  Stop time: 1527 Time Calculation (min): 42 minutes         Chief Complaint: Vertigo    Visit #: 2    SUBJECTIVE:  I haven't been feeling the vertigo, but still dizzy with quick HTs and feeling unbalanced.     OBJECTIVE:  Current objective measures: NEG B hallpike, roll and hang        Therapeutic Treatments and Modalities:     1. Neuromuscular Re-education (CPT 74454)    Therapeutic Treatment and Modalities Summary:   - Positional exam- see above  - MCTSIB: cond 1= 50%, cond2= 213%, cond 3= 76%, cond 4= 28%, comp= 82%  - Ankle sways: AP x 2 min EO/EC- assigned for HEP  - Tad string: trombone x 10, bug walks x 2 rnds.     Time-based treatments/modalities:    Physical Therapy Timed Treatment Charges  Neuromusc re-ed, balance, coor, post minutes (CPT 07527): 42 minutes    ASSESSMENT:   Response to treatment: BPPV resolved, but should be rechecked due to relapses in the past. MCTSIB shows limited proprioceptive integration and post positioned COG. L eye fatigues quickly and only able to control convergence at near point intermittently.     PLAN/RECOMMENDATIONS:   Plan for treatment: therapy treatment to continue next visit.  Planned interventions for next visit: continue with current treatment. Balance training for proprioceptive control and vision progressions.

## 2021-09-07 ENCOUNTER — PHYSICAL THERAPY (OUTPATIENT)
Dept: PHYSICAL THERAPY | Facility: REHABILITATION | Age: 56
End: 2021-09-07
Attending: STUDENT IN AN ORGANIZED HEALTH CARE EDUCATION/TRAINING PROGRAM
Payer: MEDICAID

## 2021-09-07 DIAGNOSIS — R26.89 IMBALANCE: ICD-10-CM

## 2021-09-07 DIAGNOSIS — R26.2 DIFFICULTY WALKING: ICD-10-CM

## 2021-09-07 PROCEDURE — 97112 NEUROMUSCULAR REEDUCATION: CPT

## 2021-09-07 NOTE — OP THERAPY DAILY TREATMENT
Outpatient Physical Therapy  DAILY TREATMENT     [facility] Physical Therapy [address].  [address]  [place] [address]  Phone:  [phone]  Fax:  [phone]    Date: 09/07/2021    Patient: Jumana Kahn  YOB: 1965  MRN: [id number]     Time Calculation    Start time: 1446  Stop time: 1530 Time Calculation (min): 44 minutes         Chief Complaint: Loss Of Balance    Visit #: 3    SUBJECTIVE:  My positional vertigo seems to be better, but still feeling off/strange when I move my head too quickly.  It feels like my eyes aren't working together or that there is a delay.     OBJECTIVE:      Therapeutic Treatments and Modalities:     1. Neuromuscular Re-education (CPT 51237)    Therapeutic Treatment and Modalities Summary:   - Seated close eye stretches: vert and horiz x 10 ea  - Standing ankle sways: AP x 2 min ea with EO and EC (HEP)  - Seated head circles: EC x 10 ea (HEP)  - Tad string: trombone x 10, 3 bead saccade x 5 ea  - VORx1 seated: 1/2 hz for 1 min.   - Discussed sleep hygiene- importance of full night sleep to improve healing and mental clarity.  Discussed how to set a routine, decrease vision stimulus, focus on smells or sound to train sleep patterns (aromatherapy or meditation music).       Time-based treatments/modalities:    Physical Therapy Timed Treatment Charges  Neuromusc re-ed, balance, coor, post minutes (CPT 52922): 44 minutes    ASSESSMENT:   Response to treatment: Improved convergence control to within 6 cm, but fatigues after 3-4 reps and requires reset through blinking. Head circles reproduces R pulsion.  Not able to combine balance and head motion tasks. Poor sleep quality is likely a contributor to fluctuating sx.     PLAN/RECOMMENDATIONS:   Plan for treatment: therapy treatment to continue next visit.  Planned interventions for next visit: continue with current treatment.

## 2021-09-09 ENCOUNTER — PHYSICAL THERAPY (OUTPATIENT)
Dept: PHYSICAL THERAPY | Facility: REHABILITATION | Age: 56
End: 2021-09-09
Attending: STUDENT IN AN ORGANIZED HEALTH CARE EDUCATION/TRAINING PROGRAM
Payer: MEDICAID

## 2021-09-09 DIAGNOSIS — R26.89 IMBALANCE: ICD-10-CM

## 2021-09-09 DIAGNOSIS — R26.2 DIFFICULTY WALKING: ICD-10-CM

## 2021-09-09 PROCEDURE — 97112 NEUROMUSCULAR REEDUCATION: CPT

## 2021-09-09 NOTE — OP THERAPY DAILY TREATMENT
"  Outpatient Physical Therapy  DAILY TREATMENT     [facility] Physical Therapy [address].  [address]  [place] [address]  Phone:  [phone]  Fax:  [phone]    Date: 09/09/2021    Patient: Jumana Kahn  YOB: 1965  MRN: [id number]     Time Calculation    Start time: 1445  Stop time: 1538 Time Calculation (min): 53 minutes         Chief Complaint: Vertigo    Visit #: 4    SUBJECTIVE:  I did ok after LV. My eyes have been really blurry, not sure if it is related to dry eye.     OBJECTIVE:      Therapeutic Treatments and Modalities:     1. Neuromuscular Re-education (CPT 06306)    Therapeutic Treatment and Modalities Summary:   - OA nod x 15  - OA rotation x 15 ea  - DNF lift off: 5x10\"  - Seated close eye stretches: vert and horiz x 10 ea  - laser guided c/s AROM (diagonals): x 10 ea  - laser guided c/s kinesth rotations: x 2 min  - VORx1 x 30\"... Had visual memory of the diagonal imprinted on the wall. Having to rest and close eyes for a moment  - Rocker board: AP (intermittent Raheem) and lateral (indep) with EO x 2 min ea      Time-based treatments/modalities:    Physical Therapy Timed Treatment Charges  Neuromusc re-ed, balance, coor, post minutes (CPT 68470): 53 minutes    ASSESSMENT:   Response to treatment: DNF holds of 10\" near max effort.  Would benefit from continued c/s strengthening.  C/S proprioceptive control improves with practice and biofeedback.  Poor AP control limited by ankle weakness.     PLAN/RECOMMENDATIONS:   Plan for treatment: therapy treatment to continue next visit.  Planned interventions for next visit: continue with current treatment.       "

## 2021-09-16 ENCOUNTER — PHYSICAL THERAPY (OUTPATIENT)
Dept: PHYSICAL THERAPY | Facility: REHABILITATION | Age: 56
End: 2021-09-16
Attending: STUDENT IN AN ORGANIZED HEALTH CARE EDUCATION/TRAINING PROGRAM
Payer: MEDICAID

## 2021-09-16 DIAGNOSIS — R26.2 DIFFICULTY WALKING: ICD-10-CM

## 2021-09-16 DIAGNOSIS — R26.89 IMBALANCE: ICD-10-CM

## 2021-09-16 PROCEDURE — 97112 NEUROMUSCULAR REEDUCATION: CPT

## 2021-09-16 NOTE — OP THERAPY DAILY TREATMENT
"  Outpatient Physical Therapy  DAILY TREATMENT     [facility] Physical Therapy [address].  [address]  [place] [address]  Phone:  [phone]  Fax:  [phone]    Date: 09/16/2021    Patient: Jumana Kahn  YOB: 1965  MRN: [id number]     Time Calculation    Start time: 0815  Stop time: 0900 Time Calculation (min): 45 minutes         Chief Complaint: Difficulty Walking    Visit #: 5    SUBJECTIVE:  I've been doing ok. Tired due to continued poor sleep. Still no vertigo, but eyes are feeling blurry.     OBJECTIVE:      Therapeutic Treatments and Modalities:     1. Neuromuscular Re-education (CPT 13197)    Therapeutic Treatment and Modalities Summary:   - OA nod x 15  - OA rotation x 15 ea  - DNF lift off: 5x10\"  - c/s kinesthetic rotations: standing pivot board and mirror feedback  - airex EC balance: x 1 min (good learning effect- mod->mild sway)  - airex HTs with EO: x 10 ea horiz and vert  - airex EC balance with HTs horiz: cuing for core recruitment  - ball circles: x 5 ea way  - self ball toss: vert x 20, to PT x 10       Time-based treatments/modalities:    Physical Therapy Timed Treatment Charges  Neuromusc re-ed, balance, coor, post minutes (CPT 58803): 45 minutes    ASSESSMENT:   Response to treatment: Able to tolerate combo balance/head motion tasks with good tolerance and learning effect.     PLAN/RECOMMENDATIONS:   Plan for treatment: therapy treatment to continue next visit.  Planned interventions for next visit: continue with current treatment.       "

## 2021-09-21 ENCOUNTER — PHYSICAL THERAPY (OUTPATIENT)
Dept: PHYSICAL THERAPY | Facility: REHABILITATION | Age: 56
End: 2021-09-21
Attending: STUDENT IN AN ORGANIZED HEALTH CARE EDUCATION/TRAINING PROGRAM
Payer: MEDICAID

## 2021-09-21 DIAGNOSIS — R26.2 DIFFICULTY WALKING: ICD-10-CM

## 2021-09-21 DIAGNOSIS — R26.89 IMBALANCE: ICD-10-CM

## 2021-09-21 PROCEDURE — 97112 NEUROMUSCULAR REEDUCATION: CPT

## 2021-09-21 PROCEDURE — 97110 THERAPEUTIC EXERCISES: CPT

## 2021-09-21 NOTE — OP THERAPY DAILY TREATMENT
"  Outpatient Physical Therapy  DAILY TREATMENT     [facility] Physical Therapy [address]  [address]  [place] [address]  Phone:  [phone]  Fax:  [phone]    Date: 09/21/2021    Patient: Jumana Kahn  YOB: 1965  MRN: [id number]     Time Calculation    Start time: 1448  Stop time: 1530 Time Calculation (min): 42 minutes         Chief Complaint: Loss Of Balance and Vertigo    Visit #: 6    SUBJECTIVE:  Feeling off, but no vertigo.  May be related to chronic tooth infection, starting on antibiotics this week.     OBJECTIVE:      Therapeutic Treatments and Modalities:     1. Neuromuscular Re-education (CPT 09519)    Therapeutic Treatment and Modalities Summary:   - OA nod x 15  - OA rotation x 15 ea  - DNF lift off: 5x10\"  -  Bridges to fatigue  - rocker board: AP and lateral EO x 1 min ea  - balance on tilt EC lateral- 30\" ea  - Airex STS: EO/EC x 10 ea (cuing for proprio grounding)  - Rebounder: lateral with 6# ball toss x 20 ea  - VORx1- narrow standing x 1 min. Difficulty limiting trunk rotation. Having to stop and restart 3-4x    Time-based treatments/modalities:    Physical Therapy Timed Treatment Charges  Neuromusc re-ed, balance, coor, post minutes (CPT 01199): 42 minutes    ASSESSMENT:   Response to treatment: Able to tolerate small advancements in combined balance/vestibular/vision demands. Showing good indep with HEP progression/reg, may be able to d/c next visit.     PLAN/RECOMMENDATIONS:   Plan for treatment: therapy treatment to continue next visit.  Planned interventions for next visit: continue with current treatment.       "

## 2021-09-23 ENCOUNTER — PHYSICAL THERAPY (OUTPATIENT)
Dept: PHYSICAL THERAPY | Facility: REHABILITATION | Age: 56
End: 2021-09-23
Attending: STUDENT IN AN ORGANIZED HEALTH CARE EDUCATION/TRAINING PROGRAM
Payer: MEDICAID

## 2021-09-23 DIAGNOSIS — R26.89 IMBALANCE: ICD-10-CM

## 2021-09-23 DIAGNOSIS — R26.2 DIFFICULTY WALKING: ICD-10-CM

## 2021-09-23 PROCEDURE — 97112 NEUROMUSCULAR REEDUCATION: CPT

## 2021-09-23 NOTE — OP THERAPY DAILY TREATMENT
"  Outpatient Physical Therapy  DAILY TREATMENT     [facility] Physical Therapy [address]  [address]  [place] [address]  Phone:  [phone]  Fax:  [phone]    Date: 09/23/2021    Patient: Jumana Kahn  YOB: 1965  MRN: [id number]     Time Calculation    Start time: 1445  Stop time: 1529 Time Calculation (min): 44 minutes         Chief Complaint: Loss Of Balance    Visit #: 7    SUBJECTIVE:  Vertigo continues to be resolved.  \"My weirdness is not so bad today\" but still comes and goes.     OBJECTIVE:  Dizziness Handicap Inventory - Physical Items Score: 18  Dizziness Handicap Inventory - Emotional Items Score: 18  Dizziness Handicap Inventory - Functional Items Score: 28  Dizziness Handicap Inventory - Total Score: 64     Therapeutic Treatments and Modalities:     1. Neuromuscular Re-education (CPT 34307)    Therapeutic Treatment and Modalities Summary:   - DHI reviewed  - MCTSIB performed: without change from eval. Composite score still= 82% more sway than averages  - VOMS= 26 points.  See scans for details. Only sx of dizziness reproduced. No HA, or nausea. Convergence avg= 18 cm.   - Results and HEP reviewed with pt.  Pt return demo's indep    Time-based treatments/modalities:    Physical Therapy Timed Treatment Charges  Neuromusc re-ed, balance, coor, post minutes (CPT 36243): 44 minutes    ASSESSMENT:   Response to treatment: See d/c note    PLAN/RECOMMENDATIONS:   Plan for treatment: no further treatment needed     "

## 2021-09-24 NOTE — OP THERAPY DISCHARGE SUMMARY
Outpatient Physical Therapy  DISCHARGE SUMMARY NOTE      [place] [address] [facility]  [address]  [facility] [address]  Phone:  [phone]  Fax:  [phone]    Date of Visit: 09/23/2021    Patient: Jumana Kahn  YOB: 1965  MRN: [id number]     Referring Provider: Tiffany Morel M.D.  [address] [facility] [place] [address]   Referring Diagnosis Dizziness and giddiness [R42];Polyneuropathy, unspecified [G62.9];Personal history of traumatic brain injury [Z87.820];Low back pain [M54.5]         Functional Assessment Used  Dizziness Handicap Inventory - Physical Items Score: 18  Dizziness Handicap Inventory - Emotional Items Score: 18  Dizziness Handicap Inventory - Functional Items Score: 28  Dizziness Handicap Inventory - Total Score: 64     Your patient is being discharged from Physical Therapy with the following comments:   · Goals partially met    Comments:  Ms. Kahn was seen for 7 PT sessions treating her dizziness and imbalance. PT evaluation revealed sx to be multifactorial. Most notably, pt tested positive for L posterior canalithasis type BPPV.  The BPPV resolved quickly with indicated CRM.  By nature of the BPPV being post traumatic, relapses may be frequent.  Pt education was provided to improve independence with self assessment and home CRMs.  Additionally, pt demonstrates signs of visual motion sensitivity, impaired depth perception and vestibular hypofunction.  Pt was able to progress through moderate VRT challenges with good learning response, but continues to demonstrate difficulty and dizziness with complex/combined balance demands and dual tasking.  She is indep with the HEP to address her current needs.  With progress plateauing, she will continue HEP and follow up neurophthalmology consult.  May benefit from additional VRT progressions once she has had some time to work through and master the current level.      Recommendations:  D/C to HEP.      Whit Richmond, PT, DPT    Date: 9/24/2021

## 2021-09-29 ENCOUNTER — OFFICE VISIT (OUTPATIENT)
Dept: OPHTHALMOLOGY | Facility: MEDICAL CENTER | Age: 56
End: 2021-09-29
Payer: MEDICAID

## 2021-09-29 DIAGNOSIS — H46.9 OPTIC NEUROPATHY: ICD-10-CM

## 2021-09-29 DIAGNOSIS — G37.2 CENTRAL PONTINE MYELINOLYSIS (HCC): ICD-10-CM

## 2021-09-29 DIAGNOSIS — H43.813 PVD (POSTERIOR VITREOUS DETACHMENT), BILATERAL: ICD-10-CM

## 2021-09-29 DIAGNOSIS — H52.4 ACCOMMODATIVE INSUFFICIENCY: ICD-10-CM

## 2021-09-29 PROCEDURE — 92015 DETERMINE REFRACTIVE STATE: CPT | Performed by: OPHTHALMOLOGY

## 2021-09-29 PROCEDURE — 99205 OFFICE O/P NEW HI 60 MIN: CPT | Mod: 25 | Performed by: OPHTHALMOLOGY

## 2021-09-29 PROCEDURE — 92250 FUNDUS PHOTOGRAPHY W/I&R: CPT | Performed by: OPHTHALMOLOGY

## 2021-09-29 RX ORDER — TRAZODONE HYDROCHLORIDE 50 MG/1
TABLET ORAL
COMMUNITY
Start: 2021-09-10 | End: 2022-01-13

## 2021-09-29 RX ORDER — CLINDAMYCIN HYDROCHLORIDE 150 MG/1
CAPSULE ORAL
COMMUNITY
Start: 2021-08-04 | End: 2021-10-05

## 2021-09-29 RX ORDER — ZONISAMIDE 50 MG/1
CAPSULE ORAL
COMMUNITY
Start: 2021-09-15 | End: 2021-10-05

## 2021-09-29 RX ORDER — OXCARBAZEPINE 300 MG/1
TABLET, FILM COATED ORAL
COMMUNITY
Start: 2021-07-31 | End: 2021-10-05

## 2021-09-29 RX ORDER — OMEPRAZOLE 20 MG/1
CAPSULE, DELAYED RELEASE ORAL
COMMUNITY
Start: 2021-09-07 | End: 2022-11-07

## 2021-09-29 RX ORDER — OFLOXACIN 3 MG/ML
SOLUTION/ DROPS OPHTHALMIC
COMMUNITY
Start: 2021-07-16 | End: 2021-10-05

## 2021-09-29 RX ORDER — CALCIUM CARBONATE/VITAMIN D3 600 MG-10
TABLET ORAL
COMMUNITY
Start: 2021-09-07 | End: 2021-12-14 | Stop reason: SDUPTHER

## 2021-09-29 RX ORDER — MECLIZINE HYDROCHLORIDE 25 MG/1
25 TABLET ORAL PRN
COMMUNITY
End: 2024-02-13

## 2021-09-29 ASSESSMENT — REFRACTION
OS_AXIS: 163
OD_AXIS: 008
OS_CYLINDER: +0.25
OS_SPHERE: +1.50
OD_SPHERE: +1.50
OD_CYLINDER: +0.50

## 2021-09-29 ASSESSMENT — REFRACTION_MANIFEST
OS_CYLINDER: +0.50
OD_CYLINDER: +0.50
METHOD_AUTOREFRACTION: 1
OS_SPHERE: +1.25
OD_SPHERE: +1.50
OS_AXIS: 175
OD_AXIS: 175

## 2021-09-29 ASSESSMENT — REFRACTION_WEARINGRX
SPECS_TYPE: SVL
OS_SPHERE: +0.75
OS_CYLINDER: SPHERE
OD_CYLINDER: SPHERE
OD_SPHERE: +1.25

## 2021-09-29 ASSESSMENT — VISUAL ACUITY
OD_SC: J7
OD_SC: 20/40
OS_SC: 20/50
METHOD: SNELLEN - LINEAR
OS_CC: 20/25
OS_CC+: +3
OS_SC: J7
OS_SC+: -1
OD_CC: 20/20

## 2021-09-29 ASSESSMENT — SLIT LAMP EXAM - LIDS
COMMENTS: NORMAL
COMMENTS: NORMAL

## 2021-09-29 ASSESSMENT — TONOMETRY
IOP_METHOD: I-CARE
OD_IOP_MMHG: 13
OS_IOP_MMHG: 13

## 2021-09-29 ASSESSMENT — CUP TO DISC RATIO
OD_RATIO: 0.2
OS_RATIO: 0.2

## 2021-09-29 ASSESSMENT — CONF VISUAL FIELD
OS_NORMAL: 1
OD_NORMAL: 1

## 2021-09-29 ASSESSMENT — ENCOUNTER SYMPTOMS
DIZZINESS: 1
BLURRED VISION: 1
PHOTOPHOBIA: 1

## 2021-09-29 ASSESSMENT — EXTERNAL EXAM - LEFT EYE: OS_EXAM: NORMAL

## 2021-09-29 ASSESSMENT — EXTERNAL EXAM - RIGHT EYE: OD_EXAM: NORMAL

## 2021-09-29 NOTE — PROGRESS NOTES
Peds/Neuro Ophthalmology:   Lance Ureña M.D.    Date & Time note created:    9/29/2021   3:24 PM     Referring MD / APRN:  Tiffany Morel M.D., No att. providers found    Patient ID:  Name:             Jumana Kahn   YOB: 1965  Age:                 56 y.o.  female   MRN:               [id number]    Chief Complaint/Reason for Visit:     Other (New patient eval for Visual disturbance after head injurys)      History of Present Illness:    Jumana Kahn is a 56 y.o. female   Pt is here for New patient evaluation for visual disturbance after head injury. Pt states she has had 8 head injury's. Pt states vision with glasses is blurry she does not use them. Pt states she is really light sensitive when she wears her glasses. She states she gets really bad vertigo. Pt feels eyes can not work together it helps when she closes one eye. Pt is doing brain therapy.  Pt states she feels dizzy all the time.      Review of Systems:  Review of Systems   Eyes: Positive for blurred vision and photophobia.        Eyes don't work together well   Neurological: Positive for dizziness.        Vertigo   All other systems reviewed and are negative.      Past Medical History:   Past Medical History:   Diagnosis Date   • Anemia    • Asthma    • GERD (gastroesophageal reflux disease)    • Head ache    • Hypothyroid    • Psychiatric disorder    • Substance abuse (HCC)        Past Surgical History:  Past Surgical History:   Procedure Laterality Date   • GASTROSCOPY N/A 5/10/2020    Procedure: GASTROSCOPY;  Surgeon: Lucas Crespo M.D.;  Location: [facility];  Service: Gastroenterology   • PB UPPER GI ENDOSCOPY,CTRL BLEED N/A 1/15/2020    Procedure: EGD, WITH CLIP PLACEMENT;  Surgeon: Pito Davis M.D.;  Location: SURGERY [facility];  Service: Gastroenterology   • GYN SURGERY      tubal ligation   • OTHER      sinus surgery   • OTHER ORTHOPEDIC SURGERY      leg        Current Outpatient Medications:  Current Outpatient Medications   Medication Sig Dispense Refill   • clindamycin (CLEOCIN) 150 MG Cap CLEOCIN 150 MG CAPS     • omeprazole (PRILOSEC) 20 MG delayed-release capsule TAKE 1 CAPSULE BY MOUTH ONCE DAILY BEFORE BREAKFAST     • Thiamine Mononitrate (B1) 100 MG Tab TAKE ONE DAILY FOR THE NEXT SEVERAL MONTHS UNLESS TOLD TO STOP     • traZODone (DESYREL) 50 MG Tab TAKE 1 TO 2 TABLETS BY MOUTH AT BEDTIME AS NEEDED FOR INSOMNIA     • meclizine (ANTIVERT) 25 MG Tab Take 25 mg by mouth as needed for Vertigo.     • gabapentin (NEURONTIN) 300 MG Cap Take 1 capsule by mouth at bedtime. 30 capsule 0   • levothyroxine (SYNTHROID) 125 MCG Tab Take 125 mcg by mouth every morning on an empty stomach.     • therapeutic multivitamin-minerals (THERAGRAN-M) Tab Take 1 Tab by mouth every day. 30 Tab 11   • ofloxacin (OCUFLOX) 0.3 % Solution 4 DROPS OPHTHALMIC TWICE A DAY (Patient not taking: Reported on 2021)     • OXcarbazepine (TRILEPTAL) 300 MG Tab 1 TABLET BY MOUTH TWICE A DAY TO PREVENT THE BAD DAYS OF BRAIN FOGGINESS (Patient not taking: Reported on 2021)     • zonisamide (ZONEGRAN) 50 MG capsule TAKE 1 CAP BY MOUTH AT BEDTIME TO PREVENT DIZZINESS AND BRAIN FOGGINESS. STOP IF ANY SIDE EFFECTS (Patient not taking: Reported on 2021)       No current facility-administered medications for this visit.       Allergies:  Allergies   Allergen Reactions   • Ampicillin Rash     Skin rash  Tolerates cephalosporins       Family History:  History reviewed. No pertinent family history.    Social History:  Social History     Socioeconomic History   • Marital status: Single     Spouse name: Not on file   • Number of children: Not on file   • Years of education: Not on file   • Highest education level: Not on file   Occupational History   • Not on file   Tobacco Use   • Smoking status: Former Smoker     Packs/day: 0.00     Quit date: 2001     Years since quittin.1   •  Smokeless tobacco: Never Used   Vaping Use   • Vaping Use: Never used   Substance and Sexual Activity   • Alcohol use: Not Currently     Comment: has not drank for over a year   • Drug use: No   • Sexual activity: Not on file   Other Topics Concern   • Not on file   Social History Narrative    Not able to work     Social Determinants of Health     Financial Resource Strain:    • Difficulty of Paying Living Expenses:    Food Insecurity:    • Worried About Running Out of Food in the Last Year:    • Ran Out of Food in the Last Year:    Transportation Needs:    • Lack of Transportation (Medical):    • Lack of Transportation (Non-Medical):    Physical Activity:    • Days of Exercise per Week:    • Minutes of Exercise per Session:    Stress:    • Feeling of Stress :    Social Connections:    • Frequency of Communication with Friends and Family:    • Frequency of Social Gatherings with Friends and Family:    • Attends Taoism Services:    • Active Member of Clubs or Organizations:    • Attends Club or Organization Meetings:    • Marital Status:    Intimate Partner Violence:    • Fear of Current or Ex-Partner:    • Emotionally Abused:    • Physically Abused:    • Sexually Abused:           Physical Exam:  Physical Exam    Oriented x 3  Weight/BMI: There is no height or weight on file to calculate BMI.  There were no vitals taken for this visit.    Base Eye Exam     Visual Acuity (Snellen - Linear)       Right Left    Dist sc 20/40 20/50 -1    Dist cc 20/20 20/25 +3    Near sc J7 J7          Tonometry (I-care, 12:59 PM)       Right Left    Pressure 13 13          Pupils       Pupils    Right PERRL    Left PERRL          Visual Fields       Right Left     Full Full          Extraocular Movement       Right Left     Full, Ortho Full, Ortho          Neuro/Psych     Oriented x3: Yes    Mood/Affect: Normal          Dilation     Both eyes: Tropicamide (MYDRIACYL) 1% ophthalmic solution, Phenylephrine (NEOSYNEPHRINE) ophthalmic  solution 2.5%, Cyclopentolate (CYCLOGYL) 1% ophthalmic solution @ 2:22 PM            Additional Tests     Color       Right Left    Ishihara 9/9 9/9          Stereo     Fly: -    Animals: 3/3    Circles: 0/9            Slit Lamp and Fundus Exam     External Exam       Right Left    External Normal Normal          Slit Lamp Exam       Right Left    Lids/Lashes Normal Normal    Conjunctiva/Sclera White and quiet White and quiet    Cornea Clear Clear    Anterior Chamber Deep and quiet Deep and quiet    Iris Round and reactive Round and reactive    Lens Clear Clear    Vitreous Normal Normal          Fundus Exam       Right Left    Disc Normal Normal    C/D Ratio 0.2 0.2    Macula Normal Normal    Vessels Normal Normal    Periphery Normal Normal            Refraction     Wearing Rx       Sphere Cylinder    Right +1.25 Sphere    Left +0.75 Sphere    Type: SVL          Manifest Refraction (Auto)       Sphere Cylinder Axis    Right +1.50 +0.50 175    Left +1.25 +0.50 175          Cycloplegic Refraction (Auto)       Sphere Cylinder Axis    Right +1.50 +0.50 008    Left +1.50 +0.25 163          Final Rx       Sphere Cylinder Axis    Right +1.25 +0.50 180    Left +1.25 +0.50 180                Pertinent Lab/Test/Imaging Review:  [facility]    Assessment and Plan:     Central pontine myelinolysis (HCC)  9/29/2021 - Reviewed MRI images. OCT NFL thickness normal at 88 OD and 93 OS, but slight decrease in temporal quadrant OU and some decrease in temporal ganglion cell thickness OD and nasal OS. So may have some subclinical change to optic nerve and right visual pathways, but not confirmed on MRI or examination of the nerve. Also color vision normal. No motility disturbance, ie 6th nerve palsy or elevation deficit than can be seen in central pointe myelinolysis.     Accommodative insufficiency  9/29/2021 - Accommodative insufficieny in patient with underlying latent hyperopia. Not wearing glasses  full time and suspect the reason why she notices intermittent blurry vision. Probably exacerbated by underlying CNS processes and concussions. Adjusted the rx and discussed needs to wear full time. No evidence of an underlying significant maculopathy nor optic neuropathy.     PVD (posterior vitreous detachment), bilateral  9/29/2021 - No retinal tears or holes    Greater than 60 minute were spent examining the patient, reviewing records from referring providers including MRI images if available and records within the EPIC system, counselling the patient and family regarding the examination findings, diagnostic testing preformed, recommendations for management, prognosis, further testing and referrals as appropriate and follow up. This in addition to time spent interpreting separate billable tests done during the visit.     Lance Ureña M.D.

## 2021-09-29 NOTE — ASSESSMENT & PLAN NOTE
9/29/2021 - Accommodative insufficieny in patient with underlying latent hyperopia. Not wearing glasses full time and suspect the reason why she notices intermittent blurry vision. Probably exacerbated by underlying CNS processes and concussions. Adjusted the rx and discussed needs to wear full time. No evidence of an underlying significant maculopathy nor optic neuropathy.

## 2021-09-29 NOTE — ASSESSMENT & PLAN NOTE
9/29/2021 - Reviewed MRI images. OCT NFL thickness normal at 88 OD and 93 OS, but slight decrease in temporal quadrant OU and some decrease in temporal ganglion cell thickness OD and nasal OS. So may have some subclinical change to optic nerve and right visual pathways, but not confirmed on MRI or examination of the nerve. Also color vision normal. No motility disturbance, ie 6th nerve palsy or elevation deficit than can be seen in central pointe myelinolysis.

## 2021-10-05 ENCOUNTER — OFFICE VISIT (OUTPATIENT)
Dept: INTERNAL MEDICINE | Facility: OTHER | Age: 56
End: 2021-10-05
Payer: MEDICAID

## 2021-10-05 VITALS
SYSTOLIC BLOOD PRESSURE: 109 MMHG | TEMPERATURE: 96.6 F | DIASTOLIC BLOOD PRESSURE: 70 MMHG | BODY MASS INDEX: 29.28 KG/M2 | HEART RATE: 89 BPM | WEIGHT: 170.6 LBS | OXYGEN SATURATION: 95 %

## 2021-10-05 DIAGNOSIS — G37.2 CENTRAL PONTINE MYELINOLYSIS (HCC): ICD-10-CM

## 2021-10-05 DIAGNOSIS — K02.9 DENTAL CARIES: ICD-10-CM

## 2021-10-05 DIAGNOSIS — R41.89 BRAIN FOG: ICD-10-CM

## 2021-10-05 DIAGNOSIS — Z12.11 SCREENING FOR COLON CANCER: ICD-10-CM

## 2021-10-05 DIAGNOSIS — Z87.81 HISTORY OF FRACTURE: ICD-10-CM

## 2021-10-05 DIAGNOSIS — G89.29 NECK PAIN, CHRONIC: ICD-10-CM

## 2021-10-05 DIAGNOSIS — R42 VERTIGO: ICD-10-CM

## 2021-10-05 DIAGNOSIS — S09.90XS CLOSED HEAD INJURY, SEQUELA: ICD-10-CM

## 2021-10-05 DIAGNOSIS — M54.40 CHRONIC LOW BACK PAIN WITH SCIATICA, SCIATICA LATERALITY UNSPECIFIED, UNSPECIFIED BACK PAIN LATERALITY: ICD-10-CM

## 2021-10-05 DIAGNOSIS — Z00.00 HEALTHCARE MAINTENANCE: ICD-10-CM

## 2021-10-05 DIAGNOSIS — E03.9 HYPOTHYROIDISM, UNSPECIFIED TYPE: ICD-10-CM

## 2021-10-05 DIAGNOSIS — M54.9 CHRONIC BACK PAIN, UNSPECIFIED BACK LOCATION, UNSPECIFIED BACK PAIN LATERALITY: ICD-10-CM

## 2021-10-05 DIAGNOSIS — R63.5 WEIGHT GAIN: ICD-10-CM

## 2021-10-05 DIAGNOSIS — K76.9 CHRONIC LIVER DISEASE: ICD-10-CM

## 2021-10-05 DIAGNOSIS — F10.11 ALCOHOL USE DISORDER, MILD, IN SUSTAINED REMISSION: ICD-10-CM

## 2021-10-05 DIAGNOSIS — M54.2 NECK PAIN, CHRONIC: ICD-10-CM

## 2021-10-05 DIAGNOSIS — H52.4 ACCOMMODATIVE INSUFFICIENCY: ICD-10-CM

## 2021-10-05 DIAGNOSIS — G89.29 CHRONIC LOW BACK PAIN WITH SCIATICA, SCIATICA LATERALITY UNSPECIFIED, UNSPECIFIED BACK PAIN LATERALITY: ICD-10-CM

## 2021-10-05 DIAGNOSIS — G89.29 CHRONIC BACK PAIN, UNSPECIFIED BACK LOCATION, UNSPECIFIED BACK PAIN LATERALITY: ICD-10-CM

## 2021-10-05 PROBLEM — R19.5 OCCULT BLOOD POSITIVE STOOL: Status: RESOLVED | Noted: 2020-05-09 | Resolved: 2021-10-05

## 2021-10-05 PROBLEM — E46 MALNUTRITION (HCC): Status: RESOLVED | Noted: 2020-05-12 | Resolved: 2021-10-05

## 2021-10-05 PROBLEM — H91.93 HEARING LOSS, BILATERAL: Status: ACTIVE | Noted: 2021-02-16

## 2021-10-05 PROBLEM — K29.20 GASTRITIS DUE TO ALCOHOL WITHOUT HEMORRHAGE: Status: RESOLVED | Noted: 2020-05-11 | Resolved: 2021-10-05

## 2021-10-05 PROBLEM — F10.229 ALCOHOL INTOXICATION IN ACTIVE ALCOHOLIC WITH COMPLICATION (HCC): Status: RESOLVED | Noted: 2020-05-06 | Resolved: 2021-10-05

## 2021-10-05 PROBLEM — D69.6 THROMBOCYTOPENIA (HCC): Status: RESOLVED | Noted: 2020-01-12 | Resolved: 2021-10-05

## 2021-10-05 PROBLEM — K59.00 CONSTIPATION: Status: ACTIVE | Noted: 2020-12-21

## 2021-10-05 PROBLEM — R41.82 ALTERED MENTAL STATUS, UNSPECIFIED: Status: RESOLVED | Noted: 2021-05-31 | Resolved: 2021-10-05

## 2021-10-05 PROBLEM — R79.89 TROPONIN LEVEL ELEVATED: Status: RESOLVED | Noted: 2020-01-12 | Resolved: 2021-10-05

## 2021-10-05 PROBLEM — Z98.890 HISTORY OF REPAIR OF ROTATOR CUFF: Status: ACTIVE | Noted: 2020-12-21

## 2021-10-05 PROBLEM — M54.50 LOWER BACK PAIN: Status: ACTIVE | Noted: 2020-11-09

## 2021-10-05 PROBLEM — K70.10 ALCOHOLIC HEPATITIS: Status: RESOLVED | Noted: 2020-01-31 | Resolved: 2021-10-05

## 2021-10-05 PROBLEM — B95.7 BACTEREMIA DUE TO COAGULASE-NEGATIVE STAPHYLOCOCCUS: Status: RESOLVED | Noted: 2021-06-01 | Resolved: 2021-10-05

## 2021-10-05 PROBLEM — F10.939 ALCOHOL WITHDRAWAL SYNDROME WITH COMPLICATION (HCC): Status: RESOLVED | Noted: 2020-01-12 | Resolved: 2021-10-05

## 2021-10-05 PROBLEM — R82.71 ASYMPTOMATIC BACTERIURIA: Status: RESOLVED | Noted: 2021-05-31 | Resolved: 2021-10-05

## 2021-10-05 PROBLEM — R50.9 FEVER: Status: RESOLVED | Noted: 2020-01-21 | Resolved: 2021-10-05

## 2021-10-05 PROBLEM — Z87.820 HX OF TRAUMATIC BRAIN INJURY: Status: ACTIVE | Noted: 2021-03-30

## 2021-10-05 PROBLEM — R78.81 BACTEREMIA DUE TO COAGULASE-NEGATIVE STAPHYLOCOCCUS: Status: RESOLVED | Noted: 2021-06-01 | Resolved: 2021-10-05

## 2021-10-05 PROBLEM — B37.81 CANDIDA ESOPHAGITIS (HCC): Status: RESOLVED | Noted: 2020-05-11 | Resolved: 2021-10-05

## 2021-10-05 PROBLEM — D72.829 LEUKOCYTOSIS: Status: RESOLVED | Noted: 2020-01-29 | Resolved: 2021-10-05

## 2021-10-05 PROBLEM — I95.9 HYPOTENSION: Status: RESOLVED | Noted: 2020-01-12 | Resolved: 2021-10-05

## 2021-10-05 PROBLEM — K70.9 ALCOHOLIC LIVER DISEASE (HCC): Status: RESOLVED | Noted: 2020-01-12 | Resolved: 2021-10-05

## 2021-10-05 PROBLEM — E87.6 HYPOKALEMIA: Status: RESOLVED | Noted: 2020-01-12 | Resolved: 2021-10-05

## 2021-10-05 PROBLEM — H93.13 TINNITUS, BILATERAL: Status: ACTIVE | Noted: 2021-02-16

## 2021-10-05 PROBLEM — H53.8 BLURRING OF VISUAL IMAGE: Status: ACTIVE | Noted: 2021-03-30

## 2021-10-05 PROBLEM — E87.1 HYPONATREMIA: Status: RESOLVED | Noted: 2020-01-12 | Resolved: 2021-10-05

## 2021-10-05 PROCEDURE — 99214 OFFICE O/P EST MOD 30 MIN: CPT | Mod: GC | Performed by: INTERNAL MEDICINE

## 2021-10-05 ASSESSMENT — FIBROSIS 4 INDEX: FIB4 SCORE: 1.49

## 2021-10-05 NOTE — PROGRESS NOTES
Jumana Kahn  is a 56 y.o. female with a PMH of   Patient Active Problem List   Diagnosis   • Anemia requiring transfusions   • Hypothyroidism   • Closed fracture of nasal bone   • Central pontine myelinolysis (HCC)   • GERD (gastroesophageal reflux disease)   • Vitamin D deficiency   • Neuropathy   • Accommodative insufficiency   • PVD (posterior vitreous detachment), bilateral   • Blurring of visual image   • Chronic liver disease   • Neck pain, chronic   • Constipation   • Dental caries   • Encounter for general adult medical examination without abnormal findings   • Hearing loss, bilateral   • History of alcohol abuse   • History of repair of rotator cuff   • Hx of traumatic brain injury   • Lower back pain   • Tinnitus, bilateral   • Vertigo   • Healthcare maintenance       54 y/o  PMH alcohol abuse in remission since 2019, hearing loss- due to otosclerosis per ENT (since 2005) and vertigo (since 2018 following TBI- 7 concussions in 5 years) ,  DJD cervical spine, lower back pain, h/o hypothyroidism found to mild  thrombocytopenia  on recent labs, seen for follow up.    Recently hospitalized  on 5/30-6/1 for AMS. Bcx 5/30 revealed staph capitis ( skin and mucosal dc).  UA with 20-50k wbcs but Ucx was negative. Repeat Bcx on 6/1 negative. S. Capitis is a rare cause of bacteremia and it is uncertain whether this was a contaminant. TTE with nosigns of vegetation. MRI spine with no signs of abscess. MRI brain revealed central pontine myelinolysis. Symmetric T1 signal hyperintensity in the bilateral basal ganglia typical for hepatic dysfunction. Mild diffuse cerebral substance loss.Mild hepatic ischemic change versus the myelination gliosis.She was treated with 7 day course of cephalexin.     Patient saw dentist and is wating on referral for  oral surgeon, which takes several months.Was on Clinda for tooth infection. Scheduled for tooth removal in Dec and getting fitted for denchers.     Brain Fog: Saw  "neurologist  in July  he states there was a \"hole in the brain\" based on recent head CT. He supected seizure was started on antiseizure meds had 1 hour EEG on 8/9 which did not reveal any signs of seizure. Stopped seizure medications 1 month ago as these did not help symptoms of brain fog and insomnia. Feels B1 was helping. Episodes may be related to quality of sleep.     Has high risk of central sleep apnea due to brain injury and multiple sedating medications. Unable to sleep without gabapentin. Denies waking up gasping for air. Sometimes has frontal headaches at night. Sometimes wakes up with headaches.     Gained 10lbs in 3 months and has reported dry skin and constipation. Possibly contributed to by Gabapentin. Will obtain TSH.     Has appt with ENT for intermittent episodes of vertigo and brain fog taht last all day to evealuate for possible Meniere's. continues to have tinnitus with these episodes. Hearnig slightly worse. Also has intermittent episodes of blurred vision. ENT appointment had to  be postponed.     Saw OT for imbalance in combination with blurred vision and vertigo. Epley helped. Found OT to be helpful.     Saw ophtho for blurred vision.  Found to have some accomodative insufficiency. No contributing deficits form CPM seen on MRI.     Seeing PT for back pain, neck pain, and right leg weakness ( h/o of right tibial fracture and right ankle sprain).   Wears brace PRN.     Back pain. Sees [facility]  For injections         Physical Exam  Neurological:      Comments: 4/5 strength right lower extremity and right foot drop.   5/5 strength upper and right lower extremity             Current Outpatient Medications:   •  omeprazole, TAKE 1 CAPSULE BY MOUTH ONCE DAILY BEFORE BREAKFAST, Taking  •  B1, TAKE ONE DAILY FOR THE NEXT SEVERAL MONTHS UNLESS TOLD TO STOP, Taking  •  traZODone, TAKE 1 TO 2 TABLETS BY MOUTH AT BEDTIME AS NEEDED FOR INSOMNIA, PRN  •  meclizine, 25 mg, Oral, PRN, Taking  •  gabapentin, " 300 mg, Oral, QHS, Taking  •  levothyroxine, 125 mcg, Oral, AM ES, Taking  •  therapeutic multivitamin-minerals, 1 Tablet, Oral, DAILY, Taking       Neck pain, chronic  Patient reports mild bilateral upper extremity weakness, right leg weaker than left.   MRI cervical spine notable for normal appearance of craniocervical junction and cervical cord.  Mild to moderate disc height loss and desiccation at C4-C5, C5-C6, and C6-C7 mildly increased endplate STIR signal at C6-C7 compatible with   acute marrow edema, mild stenosis of right neuroforamen at C4-C5, mildly narrowed central canal along C4-C5, mild stenosis of central canal along C6-C7  Currently following with [facility] for epidurals, however needs additional PT order to treat neck pain as current order is for lower back pain  Trying to limit how many steroids now.         Chronic liver disease  Will consider fibrosure in the future.        1. Weight gain  - TSH+FREE T4    2. Brain fog  - TSH+FREE T4  - REFERRAL TO PULMONARY AND SLEEP MEDICINE    3. Hypothyroidism, unspecified type  - Lipid Profile; Future  - VITAMIN D,25 HYDROXY; Future    4. Accommodative insufficiency  - REFERRAL TO OCCUPATIONAL THERAPY    5. Vertigo  - REFERRAL TO OCCUPATIONAL THERAPY    6. Alcohol use disorder, mild, in sustained remission  - DS-BONE DENSITY STUDY (DEXA); Future    7. History of fracture  - DS-BONE DENSITY STUDY (DEXA); Future    8. Chronic back pain, unspecified back location, unspecified back pain laterality  - DS-BONE DENSITY STUDY (DEXA); Future  - VITAMIN D,25 HYDROXY; Future    9. Central pontine myelinolysis (HCC)  - REFERRAL TO PULMONARY AND SLEEP MEDICINE    10. Closed head injury, sequela  - REFERRAL TO PULMONARY AND SLEEP MEDICINE    11. Screening for colon cancer  - COLOGTREVER (FIT DNA)    12. Dental caries    13. Neck pain, chronic    14. Chronic liver disease    15. Chronic low back pain with sciatica, sciatica laterality unspecified, unspecified back pain  laterality    16. Healthcare maintenance     Addendum : After further review of this visit, it was determined that this needs to be coded at a higher level. Correcting now.

## 2021-10-05 NOTE — PATIENT INSTRUCTIONS
Obtain attached labs before next visit  Referral was sent for occupational therapy.  I'll send an order for a cologuard.   Order has been sent for bone scan  I placed a referral for sleep study. Please let us know if there is any trouble with insurance approving this.   Schedule follow up in 1-2 months for pap and to review labs

## 2021-10-06 NOTE — ASSESSMENT & PLAN NOTE
Patient reports mild bilateral upper extremity weakness, right leg weaker than left.   MRI cervical spine notable for normal appearance of craniocervical junction and cervical cord.  Mild to moderate disc height loss and desiccation at C4-C5, C5-C6, and C6-C7 mildly increased endplate STIR signal at C6-C7 compatible with   acute marrow edema, mild stenosis of right neuroforamen at C4-C5, mildly narrowed central canal along C4-C5, mild stenosis of central canal along C6-C7  Currently following with [facility] for epidurals, however needs additional PT order to treat neck pain as current order is for lower back pain  Trying to limit how many steroids now.

## 2021-11-22 ENCOUNTER — APPOINTMENT (OUTPATIENT)
Dept: INTERNAL MEDICINE | Facility: OTHER | Age: 56
End: 2021-11-22
Payer: MEDICAID

## 2021-12-07 ENCOUNTER — TELEPHONE (OUTPATIENT)
Dept: INTERNAL MEDICINE | Facility: OTHER | Age: 56
End: 2021-12-07

## 2021-12-07 NOTE — TELEPHONE ENCOUNTER
----- Message from Tiffany Morel M.D. sent at 12/6/2021  3:28 PM PST -----  Hector Menchaca        Could you please let her know labs look normal.

## 2021-12-14 ENCOUNTER — HOSPITAL ENCOUNTER (OUTPATIENT)
Facility: MEDICAL CENTER | Age: 56
End: 2021-12-14
Attending: STUDENT IN AN ORGANIZED HEALTH CARE EDUCATION/TRAINING PROGRAM
Payer: MEDICAID

## 2021-12-14 ENCOUNTER — OFFICE VISIT (OUTPATIENT)
Dept: INTERNAL MEDICINE | Facility: OTHER | Age: 56
End: 2021-12-14
Payer: MEDICAID

## 2021-12-14 VITALS
TEMPERATURE: 98 F | DIASTOLIC BLOOD PRESSURE: 70 MMHG | BODY MASS INDEX: 29.43 KG/M2 | HEIGHT: 64 IN | WEIGHT: 172.4 LBS | HEART RATE: 88 BPM | SYSTOLIC BLOOD PRESSURE: 114 MMHG | OXYGEN SATURATION: 98 %

## 2021-12-14 DIAGNOSIS — Z12.4 CERVICAL CANCER SCREENING: ICD-10-CM

## 2021-12-14 DIAGNOSIS — G62.9 NEUROPATHY: ICD-10-CM

## 2021-12-14 PROBLEM — Z00.00 HEALTHCARE MAINTENANCE: Status: RESOLVED | Noted: 2021-10-05 | Resolved: 2021-12-14

## 2021-12-14 PROBLEM — K76.9 CHRONIC LIVER DISEASE: Status: RESOLVED | Noted: 2020-05-12 | Resolved: 2021-12-14

## 2021-12-14 PROBLEM — D64.9 ANEMIA REQUIRING TRANSFUSIONS: Status: RESOLVED | Noted: 2020-01-12 | Resolved: 2021-12-14

## 2021-12-14 PROBLEM — Z00.00 ENCOUNTER FOR GENERAL ADULT MEDICAL EXAMINATION WITHOUT ABNORMAL FINDINGS: Status: RESOLVED | Noted: 2020-11-09 | Resolved: 2021-12-14

## 2021-12-14 PROBLEM — S02.2XXA CLOSED FRACTURE OF NASAL BONE: Status: RESOLVED | Noted: 2020-01-12 | Resolved: 2021-12-14

## 2021-12-14 PROCEDURE — G0101 CA SCREEN;PELVIC/BREAST EXAM: HCPCS | Performed by: INTERNAL MEDICINE

## 2021-12-14 PROCEDURE — 99999 PR NO CHARGE: CPT | Mod: GC | Performed by: STUDENT IN AN ORGANIZED HEALTH CARE EDUCATION/TRAINING PROGRAM

## 2021-12-14 RX ORDER — ZONISAMIDE 50 MG/1
CAPSULE ORAL
COMMUNITY
Start: 2021-10-13 | End: 2022-01-13

## 2021-12-14 RX ORDER — CALCIUM CARBONATE/VITAMIN D3 600 MG-10
100 TABLET ORAL
Qty: 90 TABLET | Refills: 3 | Status: SHIPPED | OUTPATIENT
Start: 2021-12-14 | End: 2022-04-20

## 2021-12-14 RX ORDER — HYDROCODONE BITARTRATE AND ACETAMINOPHEN 5; 325 MG/1; MG/1
TABLET ORAL
COMMUNITY
Start: 2021-10-12 | End: 2022-01-13

## 2021-12-14 ASSESSMENT — FIBROSIS 4 INDEX: FIB4 SCORE: 1.49

## 2021-12-14 NOTE — PROCEDURES
"Subjective:      This 56 y.o. woman comes in today for pap smear only. Her most recent annual exam was on 10/5/21. Her most recent Pap smear was in 201 and showed abnormal cells. Last menstrual cycle was 5 years ago as she is currently post menopause. She has one daughter delivered via uncomplicated vaginal delivery.     She states that when she was 17 she was diagnosed with HPV and at that time Pap smear revealed abnormal cells and she underwent cryo therapy. She states that Pap smears are consistently abnormal  However recent HPV testing was negative and was advised to continue with annual Pap smears.  Denies any known history of STDs. She is not currently sexually active. Last Pap was in 2018 at [facility] and was subsequently referred to OB/GYN following an abnormal Pap smear. At that time OB/GYN was not concerned by findings during that visit ist in not known whether she underwent colposcopy at that time.       Previous abnormal Pap smears:Maura 2018.  Contraception: Not currently sexually active. No IUD currently    Objective:     Ht 1.626 m (5' 4\")   Wt 78.2 kg (172 lb 6.4 oz)   BMI 29.59 kg/m²   Pelvic Exam: :External genitalia normal, Vagina normal without discharge, cervix normal in appearance. Pap smear obtained.    Assessment:     Screening pap smear with HPV    Plan:     Follow up in 3-4 months or as indicated by Pap results.  "

## 2021-12-15 LAB
FORWARD REASON: SPWHY: NORMAL
FORWARDED TO LAB: SPWHR: NORMAL
SPECIMEN SENT: SPWT1: NORMAL

## 2021-12-15 NOTE — PATIENT INSTRUCTIONS
We will give you call with your results from the HPV and Pap smear.  Please follow-up in 3 to 4 months for wellness visit at that time we can review other results.

## 2021-12-15 NOTE — PROGRESS NOTES
"Subjective:      This 56 y.o. woman comes in today for pap smear only. Her most recent annual exam was on 10/5/21. Her most recent Pap smear was in 201 and showed abnormal cells. Last menstrual cycle was 5 years ago as she is currently post menopause. She has one daughter delivered via uncomplicated vaginal delivery.     She states that when she was 17 she was diagnosed with HPV and at that time Pap smear revealed abnormal cells and she underwent cryo therapy. She states that Pap smears are consistently abnormal  However recent HPV testing was negative and was advised to continue with annual Pap smears.  Denies any known history of STDs. She is not currently sexually active. Last Pap was in 2018 at [facility] and was subsequently referred to OB/GYN following an abnormal Pap smear. At that time OB/GYN was not concerned by findings during that visit ist in not known whether she underwent colposcopy at that time.       Previous abnormal Pap smears:Maura 2018.  Contraception: Not currently sexually active. No IUD currently    Objective:     Ht 1.626 m (5' 4\")   Wt 78.2 kg (172 lb 6.4 oz)   BMI 29.59 kg/m²   Pelvic Exam: :External genitalia normal, Vagina normal without discharge, cervix normal in appearance. Pap smear obtained.    Assessment:     Screening pap smear with HPV    Plan:     Follow up in 3-4 months or as indicated by Pap results.    "

## 2021-12-20 DIAGNOSIS — Z12.4 CERVICAL CANCER SCREENING: ICD-10-CM

## 2022-01-13 ENCOUNTER — HOSPITAL ENCOUNTER (OUTPATIENT)
Facility: MEDICAL CENTER | Age: 57
End: 2022-01-13
Attending: PHYSICIAN ASSISTANT
Payer: COMMERCIAL

## 2022-01-13 ENCOUNTER — OFFICE VISIT (OUTPATIENT)
Dept: URGENT CARE | Facility: CLINIC | Age: 57
End: 2022-01-13
Payer: COMMERCIAL

## 2022-01-13 VITALS
HEIGHT: 64 IN | OXYGEN SATURATION: 98 % | HEART RATE: 85 BPM | SYSTOLIC BLOOD PRESSURE: 118 MMHG | RESPIRATION RATE: 20 BRPM | TEMPERATURE: 98.2 F | BODY MASS INDEX: 29.19 KG/M2 | WEIGHT: 171 LBS | DIASTOLIC BLOOD PRESSURE: 80 MMHG

## 2022-01-13 DIAGNOSIS — J02.9 SORE THROAT: ICD-10-CM

## 2022-01-13 DIAGNOSIS — R05.9 COUGH: ICD-10-CM

## 2022-01-13 DIAGNOSIS — R53.83 OTHER FATIGUE: ICD-10-CM

## 2022-01-13 DIAGNOSIS — R50.9 FEVER, UNSPECIFIED FEVER CAUSE: ICD-10-CM

## 2022-01-13 DIAGNOSIS — R30.0 DYSURIA: ICD-10-CM

## 2022-01-13 LAB
APPEARANCE UR: CLEAR
BILIRUB UR STRIP-MCNC: NEGATIVE MG/DL
COLOR UR AUTO: YELLOW
FLUAV+FLUBV AG SPEC QL IA: NEGATIVE
GLUCOSE UR STRIP.AUTO-MCNC: NEGATIVE MG/DL
INT CON NEG: NEGATIVE
INT CON NEG: NEGATIVE
INT CON POS: POSITIVE
INT CON POS: POSITIVE
KETONES UR STRIP.AUTO-MCNC: NEGATIVE MG/DL
LEUKOCYTE ESTERASE UR QL STRIP.AUTO: NORMAL
NITRITE UR QL STRIP.AUTO: NEGATIVE
PH UR STRIP.AUTO: 6.5 [PH] (ref 5–8)
PROT UR QL STRIP: NORMAL MG/DL
RBC UR QL AUTO: NEGATIVE
S PYO AG THROAT QL: NEGATIVE
SP GR UR STRIP.AUTO: 1.02
UROBILINOGEN UR STRIP-MCNC: 0.2 MG/DL

## 2022-01-13 PROCEDURE — U0003 INFECTIOUS AGENT DETECTION BY NUCLEIC ACID (DNA OR RNA); SEVERE ACUTE RESPIRATORY SYNDROME CORONAVIRUS 2 (SARS-COV-2) (CORONAVIRUS DISEASE [COVID-19]), AMPLIFIED PROBE TECHNIQUE, MAKING USE OF HIGH THROUGHPUT TECHNOLOGIES AS DESCRIBED BY CMS-2020-01-R: HCPCS

## 2022-01-13 PROCEDURE — U0005 INFEC AGEN DETEC AMPLI PROBE: HCPCS

## 2022-01-13 PROCEDURE — 81002 URINALYSIS NONAUTO W/O SCOPE: CPT | Performed by: PHYSICIAN ASSISTANT

## 2022-01-13 PROCEDURE — 99214 OFFICE O/P EST MOD 30 MIN: CPT | Performed by: PHYSICIAN ASSISTANT

## 2022-01-13 PROCEDURE — 87804 INFLUENZA ASSAY W/OPTIC: CPT | Performed by: PHYSICIAN ASSISTANT

## 2022-01-13 PROCEDURE — 87880 STREP A ASSAY W/OPTIC: CPT | Performed by: PHYSICIAN ASSISTANT

## 2022-01-13 PROCEDURE — 87086 URINE CULTURE/COLONY COUNT: CPT

## 2022-01-13 ASSESSMENT — ENCOUNTER SYMPTOMS: COUGH: 1

## 2022-01-13 ASSESSMENT — FIBROSIS 4 INDEX: FIB4 SCORE: 1.49

## 2022-01-13 NOTE — LETTER
January 13, 2022         Patient: Jumana Kahn   YOB: 1965   Date of Visit: 1/13/2022           To Whom it May Concern:    Jumana Kahn was seen in my clinic on 1/13/2022. She may return to work on 1/20/2022.    If you have any questions or concerns, please don't hesitate to call.        Sincerely,           Mita Fulton P.A.-C.  Electronically Signed

## 2022-01-13 NOTE — PROGRESS NOTES
"Subjective     Jumana Kahn is a 56 y.o. female who presents with Cough (fever, sore throat, BA, HA, cough, nasal kayla x 4 days, Negative covid 1/12/2021, UTI sxs x 4 days)            Patient presents with:  Cough, fever, sore throat, BA, HA, nasal congestion x 4 days, Negative covid yesterday. PT has had all covid vaccines including booster.      PT also co UTI sxs x 4 days.              Cough  This is a new problem. The current episode started in the past 7 days. The problem has been gradually worsening. The problem occurs every few minutes. The cough is non-productive. Associated symptoms include a fever, headaches, myalgias, nasal congestion, rhinorrhea and a sore throat. Pertinent negatives include no chest pain, chills, rash, shortness of breath or wheezing. She has tried body position changes and OTC cough suppressant for the symptoms. The treatment provided mild relief. There is no history of asthma, bronchitis or COPD.       Review of Systems   Constitutional: Positive for fever and malaise/fatigue. Negative for chills.   HENT: Positive for congestion, rhinorrhea and sore throat.    Eyes: Negative for discharge.   Respiratory: Positive for cough and sputum production. Negative for shortness of breath and wheezing.    Cardiovascular: Negative for chest pain.   Gastrointestinal: Negative for diarrhea, nausea and vomiting.   Genitourinary: Positive for dysuria.   Musculoskeletal: Positive for myalgias.   Skin: Negative for rash.   Neurological: Positive for headaches. Negative for dizziness.   All other systems reviewed and are negative.             Objective     /80 (BP Location: Left arm, Patient Position: Sitting, BP Cuff Size: Adult long)   Pulse 85   Temp 36.8 °C (98.2 °F) (Temporal)   Resp 20   Ht 1.626 m (5' 4\")   Wt 77.6 kg (171 lb)   SpO2 98%   BMI 29.35 kg/m²      Physical Exam  Vitals and nursing note reviewed.   Constitutional:       General: She is not in acute distress.     " Appearance: Normal appearance. She is well-developed. She is obese. She is not toxic-appearing.   HENT:      Head: Normocephalic and atraumatic.      Right Ear: Tympanic membrane normal.      Left Ear: Tympanic membrane normal.      Nose: Mucosal edema, congestion and rhinorrhea present.      Mouth/Throat:      Mouth: Mucous membranes are moist.      Pharynx: Uvula midline. No posterior oropharyngeal erythema.   Eyes:      Extraocular Movements: Extraocular movements intact.      Conjunctiva/sclera: Conjunctivae normal.      Pupils: Pupils are equal, round, and reactive to light.   Cardiovascular:      Rate and Rhythm: Normal rate and regular rhythm.      Pulses: Normal pulses.      Heart sounds: Normal heart sounds.   Pulmonary:      Effort: Pulmonary effort is normal. No respiratory distress.      Breath sounds: Normal breath sounds. No stridor. No wheezing, rhonchi or rales.   Abdominal:      Palpations: Abdomen is soft.   Musculoskeletal:         General: Normal range of motion.      Cervical back: Normal range of motion and neck supple.   Skin:     General: Skin is warm and dry.      Capillary Refill: Capillary refill takes less than 2 seconds.   Neurological:      General: No focal deficit present.      Mental Status: She is alert and oriented to person, place, and time.      Gait: Gait normal.   Psychiatric:         Mood and Affect: Mood normal.         Behavior: Behavior is cooperative.               UA results interpreted by me::   Results for ISMAEL SRINIVASAN (MRN [id number]) as of 1/25/2022 13:55   Ref. Range 1/13/2022 17:54   POC Color Latest Ref Range: Negative  yellow   POC Appearance Latest Ref Range: Negative  clear   POC Specific Gravity Latest Ref Range: <1.005 - >1.030  1.020   POC Urine PH Latest Ref Range: 5.0 - 8.0  6.5   POC Glucose Latest Ref Range: Negative mg/dL negative   POC Ketones Latest Ref Range: Negative mg/dL negative   POC Protein Latest Ref Range: Negative mg/dL neg   POC  Nitrites Latest Ref Range: Negative  negative   POC Leukocyte Esterase Latest Ref Range: Negative  trace   POC Blood Latest Ref Range: Negative  negative   POC Bilirubin Latest Ref Range: Negative mg/dL negative   POC Urobiligen Latest Ref Range: Negative (0.2) mg/dL 0.2                Assessment & Plan                1. Sore throat  POCT Urinalysis    URINE CULTURE(NEW)    POCT Rapid Strep A    POCT Influenza A/B    COVID/SARS CoV-2 PCR   2. Other fatigue  POCT Urinalysis    URINE CULTURE(NEW)    POCT Rapid Strep A    POCT Influenza A/B    COVID/SARS CoV-2 PCR   3. Fever, unspecified fever cause  POCT Urinalysis    URINE CULTURE(NEW)    POCT Rapid Strep A    POCT Influenza A/B    COVID/SARS CoV-2 PCR   4. Cough  POCT Urinalysis    URINE CULTURE(NEW)    POCT Rapid Strep A    POCT Influenza A/B    COVID/SARS CoV-2 PCR   5. Dysuria  POCT Urinalysis    URINE CULTURE(NEW)    POCT Rapid Strep A    POCT Influenza A/B    COVID/SARS CoV-2 PCR     Per protocol for PUI/ISO patients, the patient was evaluated by me while I was wearing PPE.  Per CDC guidelines, patient has been instructed to self quarantine at home until test results are known.  IF positive, pt to remain at home for 10 days from onset of symptoms.  If negative, pt to remain at home until fever has resolved.       Rapid flu: neg  Rapid strep: neg  UA: neg     Culture sent to lab, will call with any necessary treatment or treatment changes.     PT should follow up with PCP in 1-2 days for re-evaluation if symptoms have not improved.      Discussed red flags and reasons to return to UC or ED.      Pt and/or family verbalized understanding of diagnosis and follow up instructions and was offered informational handout on diagnosis.  PT discharged.     I have spent at least 30 minutes on the care of this patient.  This includes preparing for visit which includes review of previous visits if available in EMR, obtaining HPI, exam and evaluation of patient, ordering and  independent interpretation of labs, imaging, tests, medical management, counseling, education and documentation.

## 2022-01-14 DIAGNOSIS — J02.9 SORE THROAT: ICD-10-CM

## 2022-01-14 DIAGNOSIS — R05.9 COUGH: ICD-10-CM

## 2022-01-14 DIAGNOSIS — R30.0 DYSURIA: ICD-10-CM

## 2022-01-14 DIAGNOSIS — R53.83 OTHER FATIGUE: ICD-10-CM

## 2022-01-14 DIAGNOSIS — R50.9 FEVER, UNSPECIFIED FEVER CAUSE: ICD-10-CM

## 2022-01-14 LAB
COVID ORDER STATUS COVID19: NORMAL
SARS-COV-2 RNA RESP QL NAA+PROBE: DETECTED
SPECIMEN SOURCE: ABNORMAL

## 2022-01-16 LAB
BACTERIA UR CULT: NORMAL
SIGNIFICANT IND 70042: NORMAL
SITE SITE: NORMAL
SOURCE SOURCE: NORMAL

## 2022-01-25 ASSESSMENT — ENCOUNTER SYMPTOMS
SORE THROAT: 1
NAUSEA: 0
SHORTNESS OF BREATH: 0
MYALGIAS: 1
HEADACHES: 1
WHEEZING: 0
DIARRHEA: 0
EYE DISCHARGE: 0
FEVER: 1
CHILLS: 0
VOMITING: 0
RHINORRHEA: 1
SPUTUM PRODUCTION: 1
DIZZINESS: 0

## 2022-01-25 ASSESSMENT — COPD QUESTIONNAIRES: COPD: 0

## 2022-02-09 NOTE — PROGRESS NOTES
Jumana Kahn  is a 56 y.o. female with a PMH of   Patient Active Problem List   Diagnosis   • Hypothyroidism   • Central pontine myelinolysis (HCC)   • GERD (gastroesophageal reflux disease)   • Vitamin D deficiency   • Neuropathy   • Accommodative insufficiency   • PVD (posterior vitreous detachment), bilateral   • Blurring of visual image   • Neck pain, chronic   • Constipation   • Dental caries   • Hearing loss, bilateral   • History of alcohol abuse   • History of repair of rotator cuff   • Hx of traumatic brain injury   • Lower back pain   • Tinnitus, bilateral   • Vertigo   • Visual distortion   • Near syncope    here to address the following:      Recently tested positive for COVID-19 on 1/13/2022.  She had had episode with neck pain which radiated to her head when driving. The headache lasted about a week but since resolved.      5 days ago she had teeth pulled and new denchers    Last night she woke up with visual distortion with ceiling moving. She said when she tilted her head back she would have an episode of near syncope, however did not fall or synopsize. Denies any tremors or chest palpitations.     She is following with Jenniffer capellan at [place], however does not feel hear by her current neurologist.     Insomnia- She was never called for sleep study      ROS All ROS negative unless otherwise mentioned in HPI    Vitals:    02/11/22 1301   BP: 118/76   Pulse: 83   Temp: 37.2 °C (99 °F)   SpO2: 97%         Physical Exam  Constitutional:       Appearance: Normal appearance.   HENT:      Head: Normocephalic and atraumatic.      Nose: No rhinorrhea.      Mouth/Throat:      Mouth: Mucous membranes are moist.      Pharynx: Oropharynx is clear. No oropharyngeal exudate or posterior oropharyngeal erythema.   Eyes:      Conjunctiva/sclera: Conjunctivae normal.   Neck:      Vascular: No carotid bruit.      Comments: No neck swelling  Cardiovascular:      Rate and Rhythm: Normal rate and regular rhythm.       Heart sounds: No murmur heard.      Pulmonary:      Effort: Pulmonary effort is normal.      Breath sounds: Normal breath sounds. No wheezing or rales.   Abdominal:      General: There is no distension.      Palpations: Abdomen is soft. There is no mass.      Tenderness: There is no abdominal tenderness.      Comments: No costovertebral or suprapubic tenderness   Musculoskeletal:         General: No swelling or deformity.      Cervical back: Normal range of motion.   Skin:     General: Skin is warm and dry.      Findings: No rash.   Neurological:      Mental Status: She is alert and oriented to person, place, and time.      Sensory: No sensory deficit.      Gait: Gait normal.      Comments: Mild unidirectional nystagmus, , equivocal exam of skew, negative head impulse   Psychiatric:         Mood and Affect: Mood normal.         Behavior: Behavior normal.          Past Medical History:   Diagnosis Date   • Anemia    • Asthma    • GERD (gastroesophageal reflux disease)    • Head ache    • Hypothyroid    • Psychiatric disorder    • Substance abuse (HCC)         Social History     Socioeconomic History   • Marital status: Single     Spouse name: Not on file   • Number of children: Not on file   • Years of education: Not on file   • Highest education level: Not on file   Occupational History   • Not on file   Tobacco Use   • Smoking status: Former Smoker     Packs/day: 0.00     Quit date: 2001     Years since quittin.5   • Smokeless tobacco: Never Used   Vaping Use   • Vaping Use: Never used   Substance and Sexual Activity   • Alcohol use: Not Currently     Comment: has not drank for over a year   • Drug use: No   • Sexual activity: Not on file   Other Topics Concern   • Not on file   Social History Narrative    Not able to work     Social Determinants of Health     Financial Resource Strain:    • Difficulty of Paying Living Expenses: Not on file   Food Insecurity:    • Worried About Running Out of Food in  the Last Year: Not on file   • Ran Out of Food in the Last Year: Not on file   Transportation Needs:    • Lack of Transportation (Medical): Not on file   • Lack of Transportation (Non-Medical): Not on file   Physical Activity:    • Days of Exercise per Week: Not on file   • Minutes of Exercise per Session: Not on file   Stress:    • Feeling of Stress : Not on file   Social Connections:    • Frequency of Communication with Friends and Family: Not on file   • Frequency of Social Gatherings with Friends and Family: Not on file   • Attends Islam Services: Not on file   • Active Member of Clubs or Organizations: Not on file   • Attends Club or Organization Meetings: Not on file   • Marital Status: Not on file   Intimate Partner Violence:    • Fear of Current or Ex-Partner: Not on file   • Emotionally Abused: Not on file   • Physically Abused: Not on file   • Sexually Abused: Not on file   Housing Stability:    • Unable to Pay for Housing in the Last Year: Not on file   • Number of Places Lived in the Last Year: Not on file   • Unstable Housing in the Last Year: Not on file        No family history on file.         Current Outpatient Medications:   •  gabapentin, TAKE ONE TABLET BEFORE BED, PRN  •  traZODone, 50 mg, Oral, Nightly  •  levothyroxine, TAKE 1 TABLET BY MOUTH EVERY DAY, Taking  •  B1, 100 mg, Oral, QDAY PRN, Taking  •  omeprazole, TAKE 1 CAPSULE BY MOUTH ONCE DAILY BEFORE BREAKFAST, Taking  •  meclizine, 25 mg, Oral, PRN, Taking       Vertigo  Patient continues to experience positional vertigo when turning her head from side to side. She also reports  A recent episode of vertigo which woke her from sleep associated with visual distortions of space. This was followed by an episode of near syncope. Vertigo since resolved, and there were no clearly concerning findings on exam, therefore central vertigo is unlikely. She was old by PM that her symptoms may be associated with cervical vertigo. This alfredo also be  related to variations in cerebral perfusion or aberrant proprioceptive signals . A basilar migraine may also be a possibility.     Visual distortion  Patient described transient visual distortion when she awoke followed by vertigo and near syncope triggered by tilting her head back. Differentials include vertebrobasilar insufficiency, basilar migraine, occipital seizure. It is uncertain whether achange in sodium levels or abrupt discontinuation of gabapentin would contribute to such findings.   -She is following with neuro-ophthamology and neurology. I encouraged her to discuss these symptoms with these physicians.   - Will obtain BMP and thiamine levels for now and consider carotid US vs EEG in the future if these episodes recur    Near syncope  She reported an episode of near syncope today which occurred tilting her head back. It is unclear whether this is associated with vertebrobasilar or carotid  Insufficiency or due to a different mechanism associated with cervical vertigo.   -If this recurs, will consider obtaining carotid US       1. Central pontine myelinolysis (HCC)  - Basic Metabolic Panel; Future  - VITAMIN B1; Future    2. Vertigo  - Basic Metabolic Panel; Future  - VITAMIN B1; Future    3. Screening for cervical cancer  - THINPREP PAP ONLY; Future    4. Visual distortion    5. Near syncope    Other orders  - gabapentin (NEURONTIN) 600 MG tablet; TAKE ONE TABLET BEFORE BED  - traZODone (DESYREL) 50 MG Tab; Take 50 mg by mouth every evening.

## 2022-02-11 ENCOUNTER — OFFICE VISIT (OUTPATIENT)
Dept: INTERNAL MEDICINE | Facility: OTHER | Age: 57
End: 2022-02-11
Payer: MEDICAID

## 2022-02-11 VITALS
HEART RATE: 83 BPM | OXYGEN SATURATION: 97 % | SYSTOLIC BLOOD PRESSURE: 118 MMHG | TEMPERATURE: 99 F | DIASTOLIC BLOOD PRESSURE: 76 MMHG | BODY MASS INDEX: 30.22 KG/M2 | WEIGHT: 177 LBS | HEIGHT: 64 IN

## 2022-02-11 DIAGNOSIS — R42 VERTIGO: ICD-10-CM

## 2022-02-11 DIAGNOSIS — Z12.4 SCREENING FOR CERVICAL CANCER: ICD-10-CM

## 2022-02-11 DIAGNOSIS — H53.19 VISUAL DISTORTION: ICD-10-CM

## 2022-02-11 DIAGNOSIS — G37.2 CENTRAL PONTINE MYELINOLYSIS (HCC): ICD-10-CM

## 2022-02-11 DIAGNOSIS — R55 NEAR SYNCOPE: ICD-10-CM

## 2022-02-11 PROCEDURE — 99213 OFFICE O/P EST LOW 20 MIN: CPT | Mod: GE | Performed by: STUDENT IN AN ORGANIZED HEALTH CARE EDUCATION/TRAINING PROGRAM

## 2022-02-11 RX ORDER — GABAPENTIN 600 MG/1
TABLET ORAL
COMMUNITY
Start: 2022-01-21 | End: 2022-11-07

## 2022-02-11 RX ORDER — TRAZODONE HYDROCHLORIDE 50 MG/1
50 TABLET ORAL NIGHTLY
COMMUNITY
End: 2024-02-13

## 2022-02-11 ASSESSMENT — FIBROSIS 4 INDEX: FIB4 SCORE: 1.49

## 2022-02-11 ASSESSMENT — PATIENT HEALTH QUESTIONNAIRE - PHQ9: CLINICAL INTERPRETATION OF PHQ2 SCORE: 0

## 2022-02-11 NOTE — PROCEDURES
"  Procedures This 56 y.o. woman who comes in today for pap smear only. Her most recent annual exam was on 10/5/21. Her most recent Pap smear was in Dec 2021, however sample was inadequate at this time. HPV from 12/21/21 was positive for E6E7. Her last pap in 2018 showed abnormal cells. Last menstrual cycle was 6 years ago as she is currently post menopause. She has one daughter delivered via uncomplicated vaginal delivery and she is currently not sexually active.     Her most recent PAP was on 2018 and per patient this was abnormal.   Previous abnormal PAPs: yes  Contraception: NA, post menopause.     O: External genitalia normal\",\"Vagina normal without discharge\",\"cervix normal in appearance  A:   Screening pap smear.   P:   Pap smear done.  FU one year or as indicated by results.      Billing for pap    No billing code    In wrap up go to charge capture   search       And add both      "

## 2022-02-11 NOTE — PATIENT INSTRUCTIONS
Please obtain the attached labs.   If you keep having lightheadedness we can consider getting a carotid ultrasound.   Please follow up in 2-4 weeks to go over these symptoms and to see if we need an additional workup.

## 2022-02-12 NOTE — ASSESSMENT & PLAN NOTE
Patient continues to experience positional vertigo when turning her head from side to side. She also reports  A recent episode of vertigo which woke her from sleep associated with visual distortions of space. This was followed by an episode of near syncope. Vertigo since resolved, and there were no clearly concerning findings on exam, therefore central vertigo is unlikely. She was old by PM that her symptoms may be associated with cervical vertigo. This alfredo also be related to variations in cerebral perfusion or aberrant proprioceptive signals . A basilar migraine may also be a possibility.

## 2022-02-12 NOTE — ASSESSMENT & PLAN NOTE
Patient described transient visual distortion when she awoke followed by vertigo and near syncope triggered by tilting her head back. Differentials include vertebrobasilar insufficiency, basilar migraine, occipital seizure. It is uncertain whether achange in sodium levels or abrupt discontinuation of gabapentin would contribute to such findings.   -She is following with neuro-ophthamology and neurology. I encouraged her to discuss these symptoms with these physicians.   - Will obtain BMP and thiamine levels for now and consider carotid US vs EEG in the future if these episodes recur

## 2022-02-12 NOTE — ASSESSMENT & PLAN NOTE
She reported an episode of near syncope today which occurred tilting her head back. It is unclear whether this is associated with vertebrobasilar or carotid  Insufficiency or due to a different mechanism associated with cervical vertigo.   -If this recurs, will consider obtaining carotid US

## 2022-02-24 ENCOUNTER — OFFICE VISIT (OUTPATIENT)
Dept: INTERNAL MEDICINE | Facility: OTHER | Age: 57
End: 2022-02-24
Payer: MEDICAID

## 2022-02-24 VITALS
OXYGEN SATURATION: 97 % | DIASTOLIC BLOOD PRESSURE: 72 MMHG | BODY MASS INDEX: 30.2 KG/M2 | HEART RATE: 69 BPM | TEMPERATURE: 98.2 F | SYSTOLIC BLOOD PRESSURE: 110 MMHG | WEIGHT: 176.9 LBS | HEIGHT: 64 IN

## 2022-02-24 DIAGNOSIS — M25.561 PAIN IN BOTH KNEES, UNSPECIFIED CHRONICITY: ICD-10-CM

## 2022-02-24 DIAGNOSIS — R40.0 DAYTIME SOMNOLENCE: ICD-10-CM

## 2022-02-24 DIAGNOSIS — M25.562 PAIN IN BOTH KNEES, UNSPECIFIED CHRONICITY: ICD-10-CM

## 2022-02-24 DIAGNOSIS — R51.9 MORNING HEADACHE: ICD-10-CM

## 2022-02-24 DIAGNOSIS — G44.89 OTHER HEADACHE SYNDROME: ICD-10-CM

## 2022-02-24 DIAGNOSIS — R55 NEAR SYNCOPE: ICD-10-CM

## 2022-02-24 PROCEDURE — 99213 OFFICE O/P EST LOW 20 MIN: CPT | Mod: GE | Performed by: STUDENT IN AN ORGANIZED HEALTH CARE EDUCATION/TRAINING PROGRAM

## 2022-02-24 RX ORDER — ACETAMINOPHEN 500 MG
500-1000 TABLET ORAL EVERY 6 HOURS PRN
COMMUNITY

## 2022-02-24 RX ORDER — SUMATRIPTAN 100 MG/1
TABLET, FILM COATED ORAL
COMMUNITY
Start: 2022-02-18 | End: 2023-01-30

## 2022-02-24 RX ORDER — CHLORHEXIDINE GLUCONATE ORAL RINSE 1.2 MG/ML
SOLUTION DENTAL
COMMUNITY
Start: 2022-02-09 | End: 2022-02-24

## 2022-02-24 RX ORDER — TOPIRAMATE 50 MG/1
TABLET, FILM COATED ORAL
COMMUNITY
Start: 2022-02-18 | End: 2022-11-07

## 2022-02-24 ASSESSMENT — FIBROSIS 4 INDEX: FIB4 SCORE: 1.49

## 2022-02-24 ASSESSMENT — PATIENT HEALTH QUESTIONNAIRE - PHQ9: CLINICAL INTERPRETATION OF PHQ2 SCORE: 0

## 2022-02-24 NOTE — PROGRESS NOTES
Jumana Kahn  is a 56 y.o. female with a PMH of   Patient Active Problem List   Diagnosis   • Hypothyroidism   • Central pontine myelinolysis (HCC)   • GERD (gastroesophageal reflux disease)   • Vitamin D deficiency   • Neuropathy   • Accommodative insufficiency   • PVD (posterior vitreous detachment), bilateral   • Blurring of visual image   • Neck pain, chronic   • Constipation   • Dental caries   • Hearing loss, bilateral   • History of alcohol abuse   • History of repair of rotator cuff   • Hx of traumatic brain injury   • Lower back pain   • Tinnitus, bilateral   • Vertigo   • Visual distortion   • Near syncope   • Other headache syndrome   • Knee pain    here to address the following      Recent near syncopal episode on 2/11: Recently saw neurologist.  Recommended EEG and repeat MRI.     Headaches in the middle of the night that are waking her up. Had one headache that started in neck and shot up to the scalp. First time was end of Jan 2 weeks after COVID and again a few days later. Headaches lasted for days.on 2/9 she had a headache at home and 2 days prior it occurred driving.  Continues to have daily pounding headaches.  Sometimes wakes up with headache in the morning. Takes Tylenol and CBD water ( CBD water on and off for a year). Neurologist,  Dr. Taylor sent Rx for migraine meds but hasn't taken it yet. No auras with these headaches .Reports chronic photosensitivity and sensitivity to sound. Never had sleep study  done. Saw Neuroophthomologist in  the past and was  told to come back in 2 years    ROS All ROS negative unless otherwise mentioned in HPI    Vitals:    02/24/22 1410   BP: 110/72   Pulse: 69   Temp: 36.8 °C (98.2 °F)   SpO2: 97%         Physical Exam  Constitutional:       Appearance: Normal appearance.   HENT:      Head: Normocephalic and atraumatic.      Nose: No rhinorrhea.      Mouth/Throat:      Mouth: Mucous membranes are moist.      Pharynx: Oropharynx is clear. No oropharyngeal  exudate or posterior oropharyngeal erythema.      Comments: [place]  Eyes:      Conjunctiva/sclera: Conjunctivae normal.   Neck:      Comments: No neck swelling  Cardiovascular:      Rate and Rhythm: Normal rate and regular rhythm.      Heart sounds: No murmur heard.  Pulmonary:      Effort: Pulmonary effort is normal.      Breath sounds: Normal breath sounds. No wheezing or rales.   Abdominal:      General: There is no distension.      Palpations: Abdomen is soft. There is no mass.      Tenderness: There is no abdominal tenderness.      Comments: No costovertebral or suprapubic tenderness   Musculoskeletal:         General: No swelling or deformity.      Cervical back: Normal range of motion.   Skin:     General: Skin is warm and dry.      Findings: No rash.   Neurological:      Mental Status: She is alert and oriented to person, place, and time.      Sensory: No sensory deficit.      Gait: Gait normal.      Comments: Mild horizontal nystagmus. Negative head impuse, no skew on HINTS. EOMI, however reports some blurred vision along some points. Otherwise cranial nerves intact   Psychiatric:         Mood and Affect: Mood normal.         Behavior: Behavior normal.          Past Medical History:   Diagnosis Date   • Anemia    • Asthma    • GERD (gastroesophageal reflux disease)    • Head ache    • Hypothyroid    • Psychiatric disorder    • Substance abuse (HCC)         Social History     Socioeconomic History   • Marital status: Single     Spouse name: Not on file   • Number of children: Not on file   • Years of education: Not on file   • Highest education level: Not on file   Occupational History   • Not on file   Tobacco Use   • Smoking status: Former Smoker     Packs/day: 0.00     Quit date: 2001     Years since quittin.6   • Smokeless tobacco: Never Used   Vaping Use   • Vaping Use: Never used   Substance and Sexual Activity   • Alcohol use: Not Currently     Comment: has not drank for over a year    • Drug use: No   • Sexual activity: Not on file   Other Topics Concern   • Not on file   Social History Narrative    Not able to work     Social Determinants of Health     Financial Resource Strain: Not on file   Food Insecurity: Not on file   Transportation Needs: Not on file   Physical Activity: Not on file   Stress: Not on file   Social Connections: Not on file   Intimate Partner Violence: Not on file   Housing Stability: Not on file        No family history on file.         Current Outpatient Medications:   •  sumatriptan, , Taking  •  topiramate, , Taking  •  acetaminophen, 500-1,000 mg, Oral, Q6HRS PRN, Taking  •  gabapentin, TAKE ONE TABLET BEFORE BED, Taking  •  traZODone, 50 mg, Oral, Nightly, Taking  •  levothyroxine, TAKE 1 TABLET BY MOUTH EVERY DAY, Taking  •  B1, 100 mg, Oral, QDAY PRN, Taking  •  omeprazole, TAKE 1 CAPSULE BY MOUTH ONCE DAILY BEFORE BREAKFAST, Taking  •  meclizine, 25 mg, Oral, PRN, Taking       Near syncope  She reported an episode of near syncope today which occurred tilting her head back. It is unclear whether this is associated with vertebrobasilar or carotid  Insufficiency interruption of cerebellar tracts.   - Following with Dr. Taylor, neurology, planning on obtaining EEG and MRI  - Will order carotid US    Other headache syndrome  Reports throbbing headache that starts in the neck worse in the morning. Likely in part tension headache. Possibly migraine.  She reported once incidence of aura and chronic photosensitivity. Headaches are not positional, low suspicion of mass. She did have a tooth infection complicated by bacteremia several months ago, however this was treated. Suspicion for cavernous sinus thrombosis is low since her headache is not positional and EOM  Are intact.   Will obtain sleep study due to risk for central sleep apnea, with daytime somnolence and AM headache  Following with neuro. MRI and EEG pending.

## 2022-02-24 NOTE — PATIENT INSTRUCTIONS
I'll reach out to Dr. Taylor and get back to you to see if there is anything else we need to order at this time.    Ill send an order for knee Xray.    Follow in 2-3 months for wellness visit.

## 2022-02-25 ENCOUNTER — TELEPHONE (OUTPATIENT)
Dept: INTERNAL MEDICINE | Facility: OTHER | Age: 57
End: 2022-02-25
Payer: MEDICAID

## 2022-02-25 DIAGNOSIS — R42 VERTIGO: ICD-10-CM

## 2022-02-25 DIAGNOSIS — G37.2 CENTRAL PONTINE MYELINOLYSIS (HCC): ICD-10-CM

## 2022-02-25 PROBLEM — M25.569 KNEE PAIN: Status: ACTIVE | Noted: 2022-02-25

## 2022-02-25 PROBLEM — G44.89 OTHER HEADACHE SYNDROME: Status: ACTIVE | Noted: 2022-02-25

## 2022-02-25 NOTE — LETTER
[facility]  Tiffany Morel M.D.  [address] [place] [address]  Fax: [phone]   Authorization for Release/Disclosure of   Protected Health Information   Name: ISMAEL SRINIVASAN : 1965 SSN: [id number]   Address: [address]  [facility] [address] Phone:    [phone] (home)    I authorize the entity listed below to release/disclose the PHI below to:   [facility]/Tiffany Morel M.D. and Tiffany Morel M.D.   Provider or Entity Name:     Address   City, State, Zip   Phone:      Fax:     Reason for request: continuity of care   Information to be released:    [  ] LAST COLONOSCOPY,  including any PATH REPORT and follow-up  [  ] LAST FIT/COLOGUARD RESULT [  ] LAST DEXA  [  ] LAST MAMMOGRAM  [  ] LAST PAP  [  ] LAST LABS [  ] RETINA EXAM REPORT  [  ] IMMUNIZATION RECORDS  [  ] Release all info      [  ] Check here and initial the line next to each item to release ALL health information INCLUDING  _____ Care and treatment for drug and / or alcohol abuse  _____ HIV testing, infection status, or AIDS  _____ Genetic Testing    DATES OF SERVICE OR TIME PERIOD TO BE DISCLOSED: _____________  I understand and acknowledge that:  * This Authorization may be revoked at any time by you in writing, except if your health information has already been used or disclosed.  * Your health information that will be used or disclosed as a result of you signing this authorization could be re-disclosed by the recipient. If this occurs, your re-disclosed health information may no longer be protected by State or Federal laws.  * You may refuse to sign this Authorization. Your refusal will not affect your ability to obtain treatment.  * This Authorization becomes effective upon signing and will  on (date) __________.      If no date is indicated, this Authorization will  one (1) year from the signature date.    Name: Ismael Srinivasan    Signature:   Date:     2022        PLEASE FAX REQUESTED RECORDS BACK TO: [phone]

## 2022-02-25 NOTE — ASSESSMENT & PLAN NOTE
She reported an episode of near syncope today which occurred tilting her head back. It is unclear whether this is associated with vertebrobasilar or carotid  Insufficiency interruption of cerebellar tracts.   - Following with Dr. Taylor, neurology, planning on obtaining EEG and MRI  - Will order carotid US

## 2022-02-25 NOTE — ASSESSMENT & PLAN NOTE
Reports throbbing headache that starts in the neck worse in the morning. Likely in part tension headache. Possibly migraine.  She reported once incidence of aura and chronic photosensitivity. Headaches are not positional, low suspicion of mass. She did have a tooth infection complicated by bacteremia several months ago, however this was treated. Suspicion for cavernous sinus thrombosis is low since her headache is not positional and EOM  Are intact.   Will obtain sleep study due to risk for central sleep apnea, with daytime somnolence and AM headache  Following with neuro. MRI and EEG pending.

## 2022-03-08 ENCOUNTER — HOSPITAL ENCOUNTER (OUTPATIENT)
Dept: RADIOLOGY | Facility: MEDICAL CENTER | Age: 57
End: 2022-03-08
Attending: PSYCHIATRY & NEUROLOGY
Payer: MEDICAID

## 2022-03-08 DIAGNOSIS — M47.22 CERVICAL SPONDYLOSIS WITH RADICULOPATHY: ICD-10-CM

## 2022-03-08 DIAGNOSIS — G31.84 MILD COGNITIVE IMPAIRMENT: ICD-10-CM

## 2022-03-08 DIAGNOSIS — G37.2 CENTRAL PONTINE MYELINOLYSIS (HCC): ICD-10-CM

## 2022-03-08 PROCEDURE — 72141 MRI NECK SPINE W/O DYE: CPT

## 2022-03-08 PROCEDURE — 70551 MRI BRAIN STEM W/O DYE: CPT

## 2022-03-22 ENCOUNTER — HOSPITAL ENCOUNTER (OUTPATIENT)
Dept: RADIOLOGY | Facility: MEDICAL CENTER | Age: 57
End: 2022-03-22
Attending: STUDENT IN AN ORGANIZED HEALTH CARE EDUCATION/TRAINING PROGRAM
Payer: MEDICAID

## 2022-03-22 DIAGNOSIS — M25.562 PAIN IN BOTH KNEES, UNSPECIFIED CHRONICITY: ICD-10-CM

## 2022-03-22 DIAGNOSIS — M25.561 PAIN IN BOTH KNEES, UNSPECIFIED CHRONICITY: ICD-10-CM

## 2022-03-22 PROCEDURE — 73565 X-RAY EXAM OF KNEES: CPT

## 2022-03-28 ENCOUNTER — TELEPHONE (OUTPATIENT)
Dept: INTERNAL MEDICINE | Facility: OTHER | Age: 57
End: 2022-03-28
Payer: MEDICAID

## 2022-03-30 ENCOUNTER — HOSPITAL ENCOUNTER (OUTPATIENT)
Dept: RADIOLOGY | Facility: MEDICAL CENTER | Age: 57
End: 2022-03-30
Attending: STUDENT IN AN ORGANIZED HEALTH CARE EDUCATION/TRAINING PROGRAM
Payer: MEDICAID

## 2022-03-30 DIAGNOSIS — R55 NEAR SYNCOPE: ICD-10-CM

## 2022-03-30 PROCEDURE — 93880 EXTRACRANIAL BILAT STUDY: CPT

## 2022-03-31 NOTE — TELEPHONE ENCOUNTER
Called back and left message.  Recommend voltaren gel for now. If we would like I can place a referral for PT for knee pain. If it becomes very difficult to tolerate we can consider follow up for knee injections.

## 2022-04-05 NOTE — TELEPHONE ENCOUNTER
Patient called back and states she missed our call.    Called patient back and left another message

## 2022-04-12 ENCOUNTER — TELEPHONE (OUTPATIENT)
Dept: INTERNAL MEDICINE | Facility: OTHER | Age: 57
End: 2022-04-12
Payer: MEDICAID

## 2022-04-12 DIAGNOSIS — M25.562 PAIN IN BOTH KNEES, UNSPECIFIED CHRONICITY: ICD-10-CM

## 2022-04-12 DIAGNOSIS — M25.561 PAIN IN BOTH KNEES, UNSPECIFIED CHRONICITY: ICD-10-CM

## 2022-04-12 NOTE — TELEPHONE ENCOUNTER
"Caller Name: Jumana Kahn  Call Back Number: [phone]    How would the patient prefer to be contacted with a response: Phone call OK to leave a detailed message    Referrals:the patient called and is requesting for the PT referral to be placed. I informed her that  recommended Voltaren Gel via [email or web address]. \" I don't  remember my password so I cannot see anything on [email or web address], so don't use that, always call me\"     Per patient she has had a lot of things happening since she was last seen and doesn't think voltaren gel will work for her knee.  She has a new back doctor as of 04/12/22. She will be seeing [facility] and bring it up to them but wanted to let  know also.     Please place orders  "

## 2022-04-20 ENCOUNTER — OFFICE VISIT (OUTPATIENT)
Dept: INTERNAL MEDICINE | Facility: OTHER | Age: 57
End: 2022-04-20
Payer: MEDICAID

## 2022-04-20 VITALS
TEMPERATURE: 98.6 F | DIASTOLIC BLOOD PRESSURE: 79 MMHG | SYSTOLIC BLOOD PRESSURE: 116 MMHG | OXYGEN SATURATION: 95 % | HEART RATE: 80 BPM | WEIGHT: 177.4 LBS | HEIGHT: 64 IN | BODY MASS INDEX: 30.29 KG/M2

## 2022-04-20 DIAGNOSIS — I65.23 ATHEROSCLEROSIS OF BOTH CAROTID ARTERIES: ICD-10-CM

## 2022-04-20 DIAGNOSIS — E03.9 HYPOTHYROIDISM, UNSPECIFIED TYPE: ICD-10-CM

## 2022-04-20 DIAGNOSIS — D75.839 THROMBOCYTHEMIA: ICD-10-CM

## 2022-04-20 DIAGNOSIS — M25.561 PAIN IN BOTH KNEES, UNSPECIFIED CHRONICITY: ICD-10-CM

## 2022-04-20 DIAGNOSIS — M25.562 PAIN IN BOTH KNEES, UNSPECIFIED CHRONICITY: ICD-10-CM

## 2022-04-20 DIAGNOSIS — G37.2 CENTRAL PONTINE MYELINOLYSIS (HCC): ICD-10-CM

## 2022-04-20 PROBLEM — Z87.81 HISTORY OF TIBIAL FRACTURE: Status: ACTIVE | Noted: 2022-04-20

## 2022-04-20 PROCEDURE — 99213 OFFICE O/P EST LOW 20 MIN: CPT | Mod: GE | Performed by: STUDENT IN AN ORGANIZED HEALTH CARE EDUCATION/TRAINING PROGRAM

## 2022-04-20 RX ORDER — LANOLIN ALCOHOL/MO/W.PET/CERES
CREAM (GRAM) TOPICAL
COMMUNITY
Start: 2022-03-30 | End: 2023-01-30

## 2022-04-20 ASSESSMENT — FIBROSIS 4 INDEX: FIB4 SCORE: 1.49

## 2022-04-20 NOTE — PATIENT INSTRUCTIONS
I placed a referral for PT for knee pain.   I placed a Rx for atorvastatin. You can take this every day and if you have muscle pain you can take this every other day.   Please request records from [facility] to be sent over.   Please obtain attached labs.   Follow up in 4-6 weeks to go over lab results.

## 2022-04-20 NOTE — PROGRESS NOTES
Jumana Kahn  is a 56 y.o. female with a PMH of   Patient Active Problem List   Diagnosis   • Hypothyroidism   • Central pontine myelinolysis (HCC)   • GERD (gastroesophageal reflux disease)   • Vitamin D deficiency   • Neuropathy   • Accommodative insufficiency   • PVD (posterior vitreous detachment), bilateral   • Blurring of visual image   • Neck pain, chronic   • Constipation   • Dental caries   • Hearing loss, bilateral   • History of alcohol abuse   • History of repair of rotator cuff   • Hx of traumatic brain injury   • Lower back pain   • Tinnitus, bilateral   • Vertigo   • Visual distortion   • Near syncope   • Other headache syndrome   • Knee pain   • History of tibial fracture   • Atherosclerosis of both carotid arteries    here to address       Knee Pain:  She has not initiated PT yet. History of old fracture and nerve damage along right ankl complicated by foot drop.      Had right shoulder surgery in the past. Trying injections now.     Neck/ back pain: Following with new spine doctor    Near syncope  Continues to have dizziness. Last fall was 1 week ago from rolling ankle.  No syncope.  Wakes up with blurred vision. Neurooptho said she needs glasses with prism and a lot of her issues are due to brain damage.     Underwent Neuropsych eval for disability. Feels she has some trouble with working memory.      Migraines.  Began after COVID infection. Better after Topomax. Hasnt taken topomax lately. Last migraine was in Feb after COVID.        ROS All ROS negative unless otherwise mentioned in HPI    Vitals:    04/20/22 1433   BP: 116/79   Pulse: 80   Temp: 37 °C (98.6 °F)   SpO2: 95%         Physical Exam  Constitutional:       Appearance: Normal appearance.   HENT:      Head: Normocephalic and atraumatic.      Nose: No rhinorrhea.      Mouth/Throat:      Mouth: Mucous membranes are moist.      Pharynx: Oropharynx is clear. No oropharyngeal exudate or posterior oropharyngeal erythema.   Eyes:       Conjunctiva/sclera: Conjunctivae normal.   Neck:      Comments: No neck swelling  Cardiovascular:      Rate and Rhythm: Normal rate and regular rhythm.      Heart sounds: No murmur heard.  Pulmonary:      Effort: Pulmonary effort is normal.      Breath sounds: Normal breath sounds. No wheezing or rales.   Abdominal:      General: There is no distension.      Palpations: Abdomen is soft. There is no mass.      Tenderness: There is no abdominal tenderness.      Comments: No costovertebral or suprapubic tenderness   Musculoskeletal:         General: No swelling or deformity.      Cervical back: Normal range of motion.   Skin:     General: Skin is warm and dry.      Findings: No rash.   Neurological:      Mental Status: She is alert and oriented to person, place, and time.      Sensory: No sensory deficit.      Gait: Gait normal.      Comments: Vertigo triggered by extraocular movements.  Right foot drop   Psychiatric:         Mood and Affect: Mood normal.         Behavior: Behavior normal.          Past Medical History:   Diagnosis Date   • Anemia    • Asthma    • GERD (gastroesophageal reflux disease)    • Head ache    • Hypothyroid    • Psychiatric disorder    • Substance abuse (HCC)         Social History     Socioeconomic History   • Marital status: Single     Spouse name: Not on file   • Number of children: Not on file   • Years of education: Not on file   • Highest education level: Not on file   Occupational History   • Not on file   Tobacco Use   • Smoking status: Former Smoker     Packs/day: 0.00     Quit date: 2001     Years since quittin.7   • Smokeless tobacco: Never Used   Vaping Use   • Vaping Use: Never used   Substance and Sexual Activity   • Alcohol use: Not Currently     Comment: has not drank for over a year   • Drug use: No   • Sexual activity: Not on file   Other Topics Concern   • Not on file   Social History Narrative    Not able to work     Social Determinants of Health     Financial  Resource Strain: Not on file   Food Insecurity: Not on file   Transportation Needs: Not on file   Physical Activity: Not on file   Stress: Not on file   Social Connections: Not on file   Intimate Partner Violence: Not on file   Housing Stability: Not on file        No family history on file.         Current Outpatient Medications:   •  thiamine, TAKE 1 TABLET BY MOUTH ONCE A DAY AS NEEDED, Taking  •  sumatriptan, , Taking  •  topiramate, , Taking  •  acetaminophen, 500-1,000 mg, Oral, Q6HRS PRN, Taking  •  gabapentin, TAKE ONE TABLET BEFORE BED, Taking  •  traZODone, 50 mg, Oral, Nightly, Taking  •  levothyroxine, TAKE 1 TABLET BY MOUTH EVERY DAY, Taking  •  omeprazole, TAKE 1 CAPSULE BY MOUTH ONCE DAILY BEFORE BREAKFAST, Taking  •  meclizine, 25 mg, Oral, PRN, Taking       Hypothyroidism  Reports occasional episodes where her heart is fluttering.  States that she has having some difficulty losing weight.  Denies any diarrhea, constipation, temperature sensitivity.  Will obtain repeat thyroid levels  Continue levothyroxine 125 mcg daily    Knee pain  Bilateral knee pain and occasional instability  No recent trauma  No laxity on exam  Xray revealed mild OA along bilateral knees.   Physical therapy referral has been placed.    Central pontine myelinolysis (HCC)  MRI 2022 revealed Gliosis in the central pontine white matter likely related to remote central pontine myelinolysis, stable.    Atherosclerosis of both carotid arteries  Carotid ultrasound on 3/30 revealed mild atherosclerotic plaque  Will initiate low-dose statin for secondary prevention.     Thrombocythemia  - CBC WITH DIFFERENTIAL; Future

## 2022-04-21 NOTE — ASSESSMENT & PLAN NOTE
Reports occasional episodes where her heart is fluttering.  States that she has having some difficulty losing weight.  Denies any diarrhea, constipation, temperature sensitivity.  Will obtain repeat thyroid levels  Continue levothyroxine 125 mcg daily

## 2022-04-21 NOTE — ASSESSMENT & PLAN NOTE
Bilateral knee pain and occasional instability  No recent trauma  No laxity on exam  Xray revealed mild OA along bilateral knees.   Physical therapy referral has been placed.

## 2022-04-21 NOTE — ASSESSMENT & PLAN NOTE
MRI 2022 revealed Gliosis in the central pontine white matter likely related to remote central pontine myelinolysis, stable.

## 2022-04-21 NOTE — ASSESSMENT & PLAN NOTE
Carotid ultrasound on 3/30 revealed mild atherosclerotic plaque  Will initiate low-dose statin for secondary prevention.

## 2022-05-04 ENCOUNTER — DOCUMENTATION (OUTPATIENT)
Dept: OPHTHALMOLOGY | Facility: MEDICAL CENTER | Age: 57
End: 2022-05-04
Payer: MEDICAID

## 2022-05-04 ASSESSMENT — REFRACTION_WEARINGRX
OS_CYLINDER: +0.50
OS_AXIS: 180
OD_AXIS: 180
OD_SPHERE: +1.25
OD_CYLINDER: +0.50
OS_SPHERE: +1.25

## 2022-05-05 DIAGNOSIS — E03.9 HYPOTHYROIDISM, UNSPECIFIED TYPE: ICD-10-CM

## 2022-05-05 DIAGNOSIS — D75.839 THROMBOCYTHEMIA: ICD-10-CM

## 2022-05-25 NOTE — PROGRESS NOTES
Jumana Kahn  is a 56 y.o. female with a PMH of   Patient Active Problem List   Diagnosis   • Hypothyroidism   • Central pontine myelinolysis (HCC)   • GERD (gastroesophageal reflux disease)   • Vitamin D deficiency   • Neuropathy   • Accommodative insufficiency   • PVD (posterior vitreous detachment), bilateral   • Blurring of visual image   • Neck pain, chronic   • Constipation   • Dental caries   • Hearing loss, bilateral   • History of alcohol abuse   • History of repair of rotator cuff   • Hx of traumatic brain injury   • Lower back pain   • Tinnitus, bilateral   • Vertigo   • Visual distortion   • Near syncope   • Other headache syndrome   • Knee pain   • History of tibial fracture   • Atherosclerosis of both carotid arteries   • Weight gain    here to address     Hypothyroidism   5/5/22 TSH 0.48    lipid panel 5/5: total chol 182, HDL 52,      Weight gain: Only eating 1500 calories per day. Typically weights around 150 lbs. Has a hard time exercising.     ROS All ROS negative unless otherwise mentioned in HPI    Vitals:    05/26/22 1357   BP: 134/70   Pulse: 81   Temp: 36.7 °C (98.1 °F)   SpO2: 95%         Physical Exam  Constitutional:       Appearance: Normal appearance.   HENT:      Head: Normocephalic and atraumatic.      Nose: No rhinorrhea.      Mouth/Throat:      Mouth: Mucous membranes are moist.      Pharynx: Oropharynx is clear. No oropharyngeal exudate or posterior oropharyngeal erythema.   Eyes:      Conjunctiva/sclera: Conjunctivae normal.   Neck:      Comments: No neck swelling  Cardiovascular:      Rate and Rhythm: Normal rate and regular rhythm.      Heart sounds: No murmur heard.  Pulmonary:      Effort: Pulmonary effort is normal.      Breath sounds: Normal breath sounds. No wheezing or rales.   Abdominal:      General: There is no distension.      Palpations: Abdomen is soft. There is no mass.      Tenderness: There is no abdominal tenderness.      Comments: No  costovertebral or suprapubic tenderness   Musculoskeletal:         General: No swelling or deformity.      Cervical back: Normal range of motion.   Skin:     General: Skin is warm and dry.      Findings: No rash.   Neurological:      Mental Status: She is alert and oriented to person, place, and time.      Gait: Gait abnormal.      Comments:  limited internal rotation or right arm.   3/5 strength RLE.     4/5 rue  4.5 lue.    Psychiatric:         Mood and Affect: Mood normal.         Behavior: Behavior normal.          Past Medical History:   Diagnosis Date   • Anemia    • Asthma    • GERD (gastroesophageal reflux disease)    • Head ache    • Hypothyroid    • Psychiatric disorder    • Substance abuse (HCC)         Social History     Socioeconomic History   • Marital status: Single     Spouse name: Not on file   • Number of children: Not on file   • Years of education: Not on file   • Highest education level: Not on file   Occupational History   • Not on file   Tobacco Use   • Smoking status: Former Smoker     Packs/day: 0.00     Quit date: 2001     Years since quittin.8   • Smokeless tobacco: Never Used   Vaping Use   • Vaping Use: Never used   Substance and Sexual Activity   • Alcohol use: Not Currently     Comment: has not drank for over a year   • Drug use: No   • Sexual activity: Not on file   Other Topics Concern   • Not on file   Social History Narrative    Not able to work     Social Determinants of Health     Financial Resource Strain: Not on file   Food Insecurity: Not on file   Transportation Needs: Not on file   Physical Activity: Not on file   Stress: Not on file   Social Connections: Not on file   Intimate Partner Violence: Not on file   Housing Stability: Not on file        No family history on file.         Current Outpatient Medications:   •  atorvastatin, 40 mg, Oral, Nightly  •  thiamine, TAKE 1 TABLET BY MOUTH ONCE A DAY AS NEEDED, Taking  •  sumatriptan, , Taking  •  topiramate, ,  Taking  •  acetaminophen, 500-1,000 mg, Oral, Q6HRS PRN, PRN  •  gabapentin, TAKE ONE TABLET BEFORE BED, PRN  •  traZODone, 50 mg, Oral, Nightly, Taking  •  levothyroxine, TAKE 1 TABLET BY MOUTH EVERY DAY, Taking  •  omeprazole, TAKE 1 CAPSULE BY MOUTH ONCE DAILY BEFORE BREAKFAST, Taking  •  meclizine, 25 mg, Oral, PRN, PRN       Hypothyroidism  5/5/22 TSH 0.48  Patient reports trouble losing weight. Denies palpitations, temperature sensitivity.   PLAN  Continue Levothyroxine 125 ug daily    Weight gain  Patient states she gained about 60lbs over the last year  TSH wnl  Trouble exercising due to DJD and multiple surgeries  PLAN  Continue 0523-2505 calories per day  Exercise at least 30-60 min daily with low impact pilates, weight training, swimming with kickboard  Follow up in 2 months to assess progress    Atherosclerosis of both carotid arteries  Carotid ultrasound on 3/30 revealed mild atherosclerotic plaque  Will initiate low-dose statin for secondary prevention.

## 2022-05-26 ENCOUNTER — OFFICE VISIT (OUTPATIENT)
Dept: INTERNAL MEDICINE | Facility: OTHER | Age: 57
End: 2022-05-26
Payer: MEDICAID

## 2022-05-26 VITALS
SYSTOLIC BLOOD PRESSURE: 134 MMHG | HEART RATE: 81 BPM | DIASTOLIC BLOOD PRESSURE: 70 MMHG | OXYGEN SATURATION: 95 % | BODY MASS INDEX: 30.59 KG/M2 | WEIGHT: 179.2 LBS | TEMPERATURE: 98.1 F | HEIGHT: 64 IN

## 2022-05-26 DIAGNOSIS — I65.23 ATHEROSCLEROSIS OF BOTH CAROTID ARTERIES: ICD-10-CM

## 2022-05-26 DIAGNOSIS — R63.5 WEIGHT GAIN: ICD-10-CM

## 2022-05-26 DIAGNOSIS — E03.9 HYPOTHYROIDISM, UNSPECIFIED TYPE: ICD-10-CM

## 2022-05-26 PROCEDURE — 99213 OFFICE O/P EST LOW 20 MIN: CPT | Mod: GE | Performed by: STUDENT IN AN ORGANIZED HEALTH CARE EDUCATION/TRAINING PROGRAM

## 2022-05-26 ASSESSMENT — FIBROSIS 4 INDEX: FIB4 SCORE: 1.49

## 2022-05-26 NOTE — PATIENT INSTRUCTIONS
Try to swimming with a kickboard for exercise you can also see if PT clears you for weight training.     Stick to 1200 to 1400 calories a day alternate you caloric budget day to day so that 2 says during the week you alow yourself more.     Referral information sent to the following:  Sleep Medicine      [facility]  [address] [address]  Ph: [phone]  Fax: [phone]

## 2022-05-26 NOTE — PROGRESS NOTES
Teaching Physician Attestation      Level of Participation    I discussed with the resident physician the patient's history, exam, assessment and plan in detail.  Topics listed in my addendum were the focus of the visit.  Healthcare maintenance was not addressed this visit unless listed as a topic in my addendum.  I agree with plan as written along with the following additions/modifications:    Obesity  -Patient has significant pain in multiple joints which limits her his ability to exercise, however counseled that swimming/aqua therapy and physical therapy are still options.  -Patient reports counting calories, reports ingesting 1200 to 1500 emily/day, however states she is still not losing weight.  This seems very unlikely given basal metabolic requirements of adults.  On review of her diet she reports eating predominantly high glycemic foods, which could be impacting her weight loss.  Counseled on transitioning to lean proteins, would recommend a vegetarian inclined diet with whole foods/whole grains.  Would make gradual substitutions to this or optimal diet.  Follow-up closely in 5 weeks.  TSH nl 5/31/21  Encouraged f/u cbc.    On review, appears Dr. Morel addressed the topics of hypothyroidism and carotid artery disease, these were not discussed during precepting.  I asked Dr. Morel to please clarify and to follow up these topics at a close future visit.

## 2022-05-27 PROBLEM — R63.5 WEIGHT GAIN: Status: ACTIVE | Noted: 2022-05-27

## 2022-05-27 RX ORDER — ATORVASTATIN CALCIUM 40 MG/1
40 TABLET, FILM COATED ORAL NIGHTLY
Qty: 30 TABLET | Refills: 5 | Status: SHIPPED | OUTPATIENT
Start: 2022-05-27 | End: 2022-11-14

## 2022-05-28 NOTE — ASSESSMENT & PLAN NOTE
Patient states she gained about 60lbs over the last year  TSH wnl  Trouble exercising due to DJD and multiple surgeries  PLAN  Continue 8949-7239 calories per day  Exercise at least 30-60 min daily with low impact pilates, weight training, swimming with kickboard  Follow up in 2 months to assess progress

## 2022-05-28 NOTE — ASSESSMENT & PLAN NOTE
5/5/22 TSH 0.48  Patient reports trouble losing weight. Denies palpitations, temperature sensitivity.   PLAN  Continue Levothyroxine 125 ug daily

## 2022-06-17 ENCOUNTER — HOSPITAL ENCOUNTER (OUTPATIENT)
Dept: RADIOLOGY | Facility: MEDICAL CENTER | Age: 57
End: 2022-06-17
Attending: STUDENT IN AN ORGANIZED HEALTH CARE EDUCATION/TRAINING PROGRAM
Payer: MEDICAID

## 2022-06-17 DIAGNOSIS — M54.9 CHRONIC BACK PAIN, UNSPECIFIED BACK LOCATION, UNSPECIFIED BACK PAIN LATERALITY: ICD-10-CM

## 2022-06-17 DIAGNOSIS — Z87.81 HISTORY OF FRACTURE: ICD-10-CM

## 2022-06-17 DIAGNOSIS — F10.11 ALCOHOL USE DISORDER, MILD, IN SUSTAINED REMISSION: ICD-10-CM

## 2022-06-17 DIAGNOSIS — G89.29 CHRONIC BACK PAIN, UNSPECIFIED BACK LOCATION, UNSPECIFIED BACK PAIN LATERALITY: ICD-10-CM

## 2022-06-17 PROCEDURE — 77080 DXA BONE DENSITY AXIAL: CPT

## 2022-07-05 ENCOUNTER — OFFICE VISIT (OUTPATIENT)
Dept: INTERNAL MEDICINE | Facility: OTHER | Age: 57
End: 2022-07-05
Payer: MEDICAID

## 2022-07-05 VITALS
SYSTOLIC BLOOD PRESSURE: 120 MMHG | WEIGHT: 173 LBS | BODY MASS INDEX: 29.53 KG/M2 | DIASTOLIC BLOOD PRESSURE: 66 MMHG | OXYGEN SATURATION: 97 % | HEIGHT: 64 IN | TEMPERATURE: 97.6 F | HEART RATE: 63 BPM

## 2022-07-05 DIAGNOSIS — Z86.79 HX OF HYPOTENSION: ICD-10-CM

## 2022-07-05 DIAGNOSIS — L98.9 SKIN LESION OF CHEEK: ICD-10-CM

## 2022-07-05 DIAGNOSIS — L98.9 SKIN LESION OF BACK: ICD-10-CM

## 2022-07-05 PROCEDURE — 99213 OFFICE O/P EST LOW 20 MIN: CPT | Mod: GE | Performed by: STUDENT IN AN ORGANIZED HEALTH CARE EDUCATION/TRAINING PROGRAM

## 2022-07-05 ASSESSMENT — FIBROSIS 4 INDEX: FIB4 SCORE: 1.49

## 2022-07-05 NOTE — PROGRESS NOTES
Jumana Kahn  is a 56 y.o. female with a PMH of   Patient Active Problem List   Diagnosis   • Hypothyroidism   • Central pontine myelinolysis (HCC)   • GERD (gastroesophageal reflux disease)   • Vitamin D deficiency   • Neuropathy   • Accommodative insufficiency   • PVD (posterior vitreous detachment), bilateral   • Blurring of visual image   • Neck pain, chronic   • Constipation   • Dental caries   • Hearing loss, bilateral   • History of alcohol abuse   • History of repair of rotator cuff   • Hx of traumatic brain injury   • Lower back pain   • Tinnitus, bilateral   • Vertigo   • Visual distortion   • Near syncope   • Other headache syndrome   • Knee pain   • History of tibial fracture   • Atherosclerosis of both carotid arteries   • Weight gain   • Hx of hypotension   • Skin lesion of back   • Skin lesion of cheek    here to address skin lesions     She noticed a small lesion on her left cheek 1 mo ago that's been growing.  It is occasionally a bit scaly and itchy.     She also noticed a lesion along her back that began as a possible bug bite noticed 3-4 y ago  And later became a cyst.     FH : Dad had melanoma and SCC mom had basal cell     Other updates      Planning on nerve ablation for neck and back     Lost 6 lbs since last visit. Cutting back on  gabapentin    No migraine in 1 month. Stopped topomax. Visual disturbances resolved.  Vertigo is improving      Occ palpitations since COVID which occur at night in bed for a min or 2. No dizziness or shortness of breath with these    Last week she noticed an episode of hypotension BP 70/50s.  No associated symptoms of palpitations. No diarrhea, polyuria, fevers.     ROS All ROS negative unless otherwise mentioned in HPI    Vitals:    07/05/22 1334   BP: 120/66   Pulse: 63   Temp: 36.4 °C (97.6 °F)   SpO2: 97%         Physical Exam  Constitutional:       Appearance: Normal appearance.   HENT:      Head: Normocephalic and atraumatic.      Nose: No  rhinorrhea.      Mouth/Throat:      Mouth: Mucous membranes are moist.      Pharynx: Oropharynx is clear. No oropharyngeal exudate or posterior oropharyngeal erythema.   Eyes:      Conjunctiva/sclera: Conjunctivae normal.   Neck:      Comments: No neck swelling  Cardiovascular:      Rate and Rhythm: Normal rate and regular rhythm.      Heart sounds: No murmur heard.  Pulmonary:      Effort: Pulmonary effort is normal.      Breath sounds: Normal breath sounds. No wheezing or rales.   Abdominal:      General: There is no distension.      Palpations: Abdomen is soft. There is no mass.      Tenderness: There is no abdominal tenderness.      Comments: No costovertebral or suprapubic tenderness   Musculoskeletal:         General: No swelling or deformity.      Cervical back: Normal range of motion.   Skin:     General: Skin is warm and dry.      Findings: No rash.      Comments: Pearly lesion with raised borders and central pitting along left scapula    Small macule on left cheek with slight scale and fine surrounding telangiectasias   Neurological:      Mental Status: She is alert and oriented to person, place, and time.      Gait: Gait abnormal.      Comments:  limited internal rotation or right arm.   3/5 strength RLE.     4/5 rue  4.5 lue.    Psychiatric:         Mood and Affect: Mood normal.         Behavior: Behavior normal.          Past Medical History:   Diagnosis Date   • Anemia    • Asthma    • GERD (gastroesophageal reflux disease)    • Head ache    • Hypothyroid    • Psychiatric disorder    • Substance abuse (HCC)         Social History     Socioeconomic History   • Marital status: Single     Spouse name: Not on file   • Number of children: Not on file   • Years of education: Not on file   • Highest education level: Not on file   Occupational History   • Not on file   Tobacco Use   • Smoking status: Former Smoker     Packs/day: 0.00     Quit date: 2001     Years since quittin.9   • Smokeless  tobacco: Never Used   Vaping Use   • Vaping Use: Never used   Substance and Sexual Activity   • Alcohol use: Not Currently     Comment: has not drank for over a year   • Drug use: No   • Sexual activity: Not on file   Other Topics Concern   • Not on file   Social History Narrative    Not able to work     Social Determinants of Health     Financial Resource Strain: Not on file   Food Insecurity: Not on file   Transportation Needs: Not on file   Physical Activity: Not on file   Stress: Not on file   Social Connections: Not on file   Intimate Partner Violence: Not on file   Housing Stability: Not on file        No family history on file.         Current Outpatient Medications:   •  atorvastatin, 40 mg, Oral, Nightly, Taking  •  thiamine, TAKE 1 TABLET BY MOUTH ONCE A DAY AS NEEDED, Taking  •  sumatriptan, , PRN  •  topiramate, , Taking  •  acetaminophen, 500-1,000 mg, Oral, Q6HRS PRN, PRN  •  gabapentin, TAKE ONE TABLET BEFORE BED, PRN  •  traZODone, 50 mg, Oral, Nightly, PRN  •  levothyroxine, TAKE 1 TABLET BY MOUTH EVERY DAY, Taking  •  omeprazole, TAKE 1 CAPSULE BY MOUTH ONCE DAILY BEFORE BREAKFAST, Taking  •  meclizine, 25 mg, Oral, PRN, PRN       Hx of hypotension  Patient reports episodes of hypotension not associated with palpitations. She is on trazodone at night which has a1 blocking properties and may be associated orthostatic hypotension. Meclizine is also associated with hypotension and palpitations. She no longer takes gabapentin or topomax. She denies any known episodes of dehydration. /66 inclinic  PLAN  Encouraged hydration and increasing salt intake  She is getting a cervical ablation for chronic neck and back pain. Will monitor for recurrent episodes after this and consider orthostatics possible evaluation for baroreceptor dysfunction vs autonomic dysfunction.       Skin lesion of back  Pink lesion with central depression and raised edges  Likely scarring from old cyst vs BCC  PLAN   - Derm  referral    Skin lesion of cheek  Small slightly raised macule with fine scale  Likely seborrheic keratosis vs actinic keratosis  PLAN  Derm referral for dermascopy       1. Skin lesion of back  - Referral to Dermatology    2. Hx of hypotension    3. Skin lesion of cheek

## 2022-07-05 NOTE — PATIENT INSTRUCTIONS
Continue Miralax for constipation  Im placing a derm referral to check on the lesion on your back.   Call if you have other episodes of hypotension or lightheadedness with racing heart rate.    Remain hydrated and add salt to food with no restriction.

## 2022-07-06 NOTE — ASSESSMENT & PLAN NOTE
Pink lesion with central depression and raised edges  Likely scarring from old cyst vs BCC  PLAN   - Derm referral

## 2022-07-06 NOTE — ASSESSMENT & PLAN NOTE
Small slightly raised macule with fine scale  Likely seborrheic keratosis vs actinic keratosis  PLAN  Derm referral for dermascopy

## 2022-07-06 NOTE — ASSESSMENT & PLAN NOTE
Patient reports episodes of hypotension not associated with palpitations. She is on trazodone at night which has a1 blocking properties and may be associated orthostatic hypotension. Meclizine is also associated with hypotension and palpitations. She no longer takes gabapentin or topomax. She denies any known episodes of dehydration. /66 inclinic  PLAN  Encouraged hydration and increasing salt intake  She is getting a cervical ablation for chronic neck and back pain. Will monitor for recurrent episodes after this and consider orthostatics possible evaluation for baroreceptor dysfunction vs autonomic dysfunction.

## 2022-08-23 NOTE — ED NOTES
--Trach care per protocol   Patient resting Comfortably. VSS. Patient continue to be on monitors.

## 2022-09-14 RX ORDER — LEVOTHYROXINE SODIUM 0.12 MG/1
TABLET ORAL
Qty: 30 TABLET | Refills: 8 | Status: SHIPPED | OUTPATIENT
Start: 2022-09-14 | End: 2023-08-01 | Stop reason: SDUPTHER

## 2022-09-14 NOTE — TELEPHONE ENCOUNTER
Levothyroxine Refill    Last seen: 7/5/22 by Dr. Morel  Next appt: 11/7/22 with Dr. Morel    Was the patient seen in the last year in this department? Yes   Does patient have an active prescription for medications requested? No   Received Request Via: Pharmacy

## 2022-09-22 ENCOUNTER — TELEPHONE (OUTPATIENT)
Dept: INTERNAL MEDICINE | Facility: OTHER | Age: 57
End: 2022-09-22
Payer: MEDICAID

## 2022-09-22 NOTE — TELEPHONE ENCOUNTER
Pt called today trying to get in to PCP to be seen for vertigo maneuver. We are fully booked for all next week and patient is not sure where to go if she is unable to make an appointment. Her ear doctor told her not to go to Urgent Care because they wont do anything for her

## 2022-09-23 ENCOUNTER — TELEPHONE (OUTPATIENT)
Dept: INTERNAL MEDICINE | Facility: OTHER | Age: 57
End: 2022-09-23
Payer: MEDICAID

## 2022-09-23 NOTE — TELEPHONE ENCOUNTER
After doing confirmation calls for Monday we had a few cancellations so I called the patient and scheduled her at 8am with Dr Urbina

## 2022-09-23 NOTE — TELEPHONE ENCOUNTER
We had some cancellations this morning but they were all 8am appointment. It has been passed that time. Would you like us to differ them to ER or UC?

## 2022-09-23 NOTE — TELEPHONE ENCOUNTER
Patient called to check on status on Derm referral. She called a few times and have not heard back on the status.

## 2022-09-23 NOTE — TELEPHONE ENCOUNTER
Looked through chart, I have not received any messages to schedule with Dr. Garcia, I have forwarded to her to review referral to schedule.    Dr. Garcia please let me know if okay to schedule, please review referral

## 2022-09-26 ENCOUNTER — TELEPHONE (OUTPATIENT)
Dept: INTERNAL MEDICINE | Facility: OTHER | Age: 57
End: 2022-09-26
Payer: MEDICAID

## 2022-09-26 NOTE — TELEPHONE ENCOUNTER
Duyen Garcia M.D. routed conversation to You 51 minutes ago (11:48 AM)     Duyen Garcia M.D. 51 minutes ago (11:48 AM)     HERB Ellington to schedule

## 2022-09-27 ENCOUNTER — OFFICE VISIT (OUTPATIENT)
Dept: OPHTHALMOLOGY | Facility: MEDICAL CENTER | Age: 57
End: 2022-09-27
Payer: MEDICAID

## 2022-09-27 DIAGNOSIS — H52.4 ACCOMMODATIVE INSUFFICIENCY: ICD-10-CM

## 2022-09-27 DIAGNOSIS — H53.8 BLURRING OF VISUAL IMAGE: ICD-10-CM

## 2022-09-27 DIAGNOSIS — H40.003 GLAUCOMA SUSPECT OF BOTH EYES: ICD-10-CM

## 2022-09-27 DIAGNOSIS — M54.2 NECK PAIN, CHRONIC: ICD-10-CM

## 2022-09-27 DIAGNOSIS — H53.19 VISUAL DISTORTION: ICD-10-CM

## 2022-09-27 DIAGNOSIS — G37.2 CENTRAL PONTINE MYELINOLYSIS (HCC): ICD-10-CM

## 2022-09-27 DIAGNOSIS — H43.813 PVD (POSTERIOR VITREOUS DETACHMENT), BILATERAL: ICD-10-CM

## 2022-09-27 DIAGNOSIS — G89.29 NECK PAIN, CHRONIC: ICD-10-CM

## 2022-09-27 PROCEDURE — 92250 FUNDUS PHOTOGRAPHY W/I&R: CPT | Performed by: OPHTHALMOLOGY

## 2022-09-27 PROCEDURE — 99213 OFFICE O/P EST LOW 20 MIN: CPT | Mod: 25 | Performed by: OPHTHALMOLOGY

## 2022-09-27 ASSESSMENT — VISUAL ACUITY
OD_CC: 20/20
METHOD: SNELLEN - LINEAR
OD_CC: J1
OS_CC: J1
CORRECTION_TYPE: GLASSES
OS_CC: 20/20-1

## 2022-09-27 ASSESSMENT — TONOMETRY
IOP_METHOD: ICARE
OD_IOP_MMHG: 12
OS_IOP_MMHG: 12

## 2022-09-27 ASSESSMENT — REFRACTION_MANIFEST
OS_SPHERE: +1.50
METHOD_AUTOREFRACTION: 1
OD_AXIS: 179
OD_SPHERE: +1.50
OD_CYLINDER: +1.00
OS_AXIS: 174
OS_CYLINDER: +0.50

## 2022-09-27 ASSESSMENT — CONF VISUAL FIELD
OS_NORMAL: 1
OD_INFERIOR_TEMPORAL_RESTRICTION: 0
OD_SUPERIOR_NASAL_RESTRICTION: 0
OS_SUPERIOR_NASAL_RESTRICTION: 0
OD_NORMAL: 1
OS_INFERIOR_NASAL_RESTRICTION: 0
OS_INFERIOR_TEMPORAL_RESTRICTION: 0
OD_INFERIOR_NASAL_RESTRICTION: 0
OS_SUPERIOR_TEMPORAL_RESTRICTION: 0
OD_SUPERIOR_TEMPORAL_RESTRICTION: 0

## 2022-09-27 ASSESSMENT — REFRACTION_WEARINGRX
SPECS_TYPE: SVL
OD_AXIS: 180
OS_SPHERE: +0.75
OD_CYLINDER: SPHERE
OD_SPHERE: +1.25
OS_AXIS: 180
OS_SPHERE: +1.25
OS_CYLINDER: SPHERE
OD_SPHERE: +1.25
OD_CYLINDER: +0.50
OS_CYLINDER: +0.50

## 2022-09-27 ASSESSMENT — CUP TO DISC RATIO
OD_RATIO: 0.2
OS_RATIO: 0.2

## 2022-09-27 ASSESSMENT — ENCOUNTER SYMPTOMS: HEADACHES: 1

## 2022-09-27 ASSESSMENT — EXTERNAL EXAM - RIGHT EYE: OD_EXAM: NORMAL

## 2022-09-27 ASSESSMENT — EXTERNAL EXAM - LEFT EYE: OS_EXAM: NORMAL

## 2022-09-27 ASSESSMENT — SLIT LAMP EXAM - LIDS
COMMENTS: NORMAL
COMMENTS: NORMAL

## 2022-09-27 NOTE — ASSESSMENT & PLAN NOTE
9/29/2021 - Accommodative insufficieny in patient with underlying latent hyperopia. Not wearing glasses full time and suspect the reason why she notices intermittent blurry vision. Probably exacerbated by underlying CNS processes and concussions. Adjusted the rx and discussed needs to wear full time. No evidence of an underlying significant maculopathy nor optic neuropathy.   9/27/2022 - discussed need to wear glasses full time. Gave new rx.

## 2022-09-27 NOTE — ASSESSMENT & PLAN NOTE
9/27/2022 - Episode of acephalic migraine. Discussed taking migraine med given to her by neurologist

## 2022-09-27 NOTE — ASSESSMENT & PLAN NOTE
9/29/2021 - Reviewed MRI images. OCT NFL thickness normal at 88 OD and 93 OS, but slight decrease in temporal quadrant OU and some decrease in temporal ganglion cell thickness OD and nasal OS. So may have some subclinical change to optic nerve and right visual pathways, but not confirmed on MRI or examination of the nerve. Also color vision normal. No motility disturbance, ie 6th nerve palsy or elevation deficit than can be seen in central pointe myelinolysis.   9/27/2022 - Optic nerve function stable. Oct NFL thickness 91 Od and 92 OS

## 2022-09-27 NOTE — PROGRESS NOTES
Peds/Neuro Ophthalmology:   Lance Ureña M.D.    Date & Time note created:    9/27/2022   3:36 PM     Referring MD / APRN:  Tiffany Morel M.D., No att. providers found    Patient ID:  Name:             Jumana Kahn   YOB: 1965  Age:                 57 y.o.  female   MRN:               [id number]    Chief Complaint/Reason for Visit:     Other (Vertigo)      History of Present Illness:    Jumana Kahn is a 57 y.o. female   Follow up vertigo and visual distortion.Episode of blurry area of both eyes lasting 45 minutes about 1 month ago.+ for ADD.Patient feels glasses help vision but cause her some distortion and feels like eyes not working together.      Review of Systems:  Review of Systems   Neurological:  Positive for headaches.        Vertigo   All other systems reviewed and are negative.    Past Medical History:   Past Medical History:   Diagnosis Date    Anemia     Asthma     GERD (gastroesophageal reflux disease)     Head ache     Hypothyroid     Psychiatric disorder     Substance abuse (HCC)        Past Surgical History:  Past Surgical History:   Procedure Laterality Date    GASTROSCOPY N/A 5/10/2020    Procedure: GASTROSCOPY;  Surgeon: Lucas Crespo M.D.;  Location: SURGERY [facility];  Service: Gastroenterology    UT UPPER GI ENDOSCOPY,CTRL BLEED N/A 1/15/2020    Procedure: EGD, WITH CLIP PLACEMENT;  Surgeon: Pito Davis M.D.;  Location: SURGERY [facility];  Service: Gastroenterology    GYN SURGERY      tubal ligation    OTHER      sinus surgery    OTHER ORTHOPEDIC SURGERY      leg       Current Outpatient Medications:  Current Outpatient Medications   Medication Sig Dispense Refill    levothyroxine (SYNTHROID) 125 MCG Tab TAKE 1 TABLET BY MOUTH EVERY DAY 30 Tablet 8    thiamine (THIAMINE) 100 MG tablet TAKE 1 TABLET BY MOUTH ONCE A DAY AS NEEDED      sumatriptan (IMITREX) 100 MG tablet       acetaminophen (TYLENOL) 500 MG Tab Take  500-1,000 mg by mouth every 6 hours as needed.      traZODone (DESYREL) 50 MG Tab Take 50 mg by mouth every evening.      omeprazole (PRILOSEC) 20 MG delayed-release capsule TAKE 1 CAPSULE BY MOUTH ONCE DAILY BEFORE BREAKFAST      meclizine (ANTIVERT) 25 MG Tab Take 25 mg by mouth as needed for Vertigo.      atorvastatin (LIPITOR) 40 MG Tab Take 1 Tablet by mouth every evening. (Patient not taking: Reported on 2022) 30 Tablet 5    topiramate (TOPAMAX) 50 MG tablet  (Patient not taking: Reported on 2022)      gabapentin (NEURONTIN) 600 MG tablet TAKE ONE TABLET BEFORE BED (Patient not taking: Reported on 2022)       No current facility-administered medications for this visit.       Allergies:  Allergies   Allergen Reactions    Ampicillin Rash     Skin rash  Tolerates cephalosporins       Family History:  History reviewed. No pertinent family history.    Social History:  Social History     Socioeconomic History    Marital status: Single     Spouse name: Not on file    Number of children: Not on file    Years of education: Not on file    Highest education level: Not on file   Occupational History    Not on file   Tobacco Use    Smoking status: Former     Packs/day: 0.00     Types: Cigarettes     Quit date: 2001     Years since quittin.1    Smokeless tobacco: Never   Vaping Use    Vaping Use: Never used   Substance and Sexual Activity    Alcohol use: Not Currently     Comment: has not drank for over a year    Drug use: No    Sexual activity: Not on file   Other Topics Concern    Not on file   Social History Narrative    Not able to work     Social Determinants of Health     Financial Resource Strain: Not on file   Food Insecurity: Not on file   Transportation Needs: Not on file   Physical Activity: Not on file   Stress: Not on file   Social Connections: Not on file   Intimate Partner Violence: Not on file   Housing Stability: Not on file          Physical Exam:  Physical Exam    Oriented x  3  Weight/BMI: There is no height or weight on file to calculate BMI.  There were no vitals taken for this visit.    Base Eye Exam       Visual Acuity (Snellen - Linear)         Right Left    Dist cc 20/20 20/20-1    Near cc J1 J1      Correction: Glasses              Tonometry (icare, 2:07 PM)         Right Left    Pressure 12 12              Pupils         Pupils    Right PERRL    Left PERRL              Visual Fields         Right Left     Full Full              Extraocular Movement         Right Left     Full, Ortho Full, Ortho              Neuro/Psych       Oriented x3: Yes    Mood/Affect: Normal              Dilation       Both eyes: able to view wihtout dilation @ 3:32 PM                  Additional Tests       Color         Right Left    Ishihara 9/9 9/9              Stereo       Fly: +    Animals: 3/3    Circles: 9/9                  Slit Lamp and Fundus Exam       External Exam         Right Left    External Normal Normal              Slit Lamp Exam         Right Left    Lids/Lashes Normal Normal    Conjunctiva/Sclera White and quiet White and quiet    Cornea Clear Clear    Anterior Chamber Deep and quiet Deep and quiet    Iris Round and reactive Round and reactive    Lens Clear Clear    Vitreous Normal Normal              Fundus Exam         Right Left    Disc Normal Normal    C/D Ratio 0.2 0.2    Macula Normal Normal    Vessels Normal Normal    Periphery Normal Normal                  Refraction       Wearing Rx         Sphere Cylinder Axis    Right +1.25 Sphere     Left +0.75 Sphere       Age: 1yr    Type: SVL              Wearing Rx #2         Sphere Cylinder Axis    Right +1.25 +0.50 180    Left +1.25 +0.50 180              Manifest Refraction (Auto)         Sphere Cylinder Axis    Right +1.50 +1.00 179    Left +1.50 +0.50 174              Final Rx         Sphere Cylinder Axis Add    Right +1.25 +0.50 180 +2.50    Left +1.25 +0.50 180 +2.50                    Pertinent Lab/Test/Imaging  Review:      Assessment and Plan:     Accommodative insufficiency  9/29/2021 - Accommodative insufficieny in patient with underlying latent hyperopia. Not wearing glasses full time and suspect the reason why she notices intermittent blurry vision. Probably exacerbated by underlying CNS processes and concussions. Adjusted the rx and discussed needs to wear full time. No evidence of an underlying significant maculopathy nor optic neuropathy.   9/27/2022 - discussed need to wear glasses full time. Gave new rx.     Central pontine myelinolysis (HCC)  9/29/2021 - Reviewed MRI images. OCT NFL thickness normal at 88 OD and 93 OS, but slight decrease in temporal quadrant OU and some decrease in temporal ganglion cell thickness OD and nasal OS. So may have some subclinical change to optic nerve and right visual pathways, but not confirmed on MRI or examination of the nerve. Also color vision normal. No motility disturbance, ie 6th nerve palsy or elevation deficit than can be seen in central pointe myelinolysis.   9/27/2022 - Optic nerve function stable. Oct NFL thickness 91 Od and 92 OS    PVD (posterior vitreous detachment), bilateral  9/29/2021 - No retinal tears or holes    Visual distortion  9/27/2022 - Episode of acephalic migraine. Discussed taking migraine med given to her by neurologist    Blurring of visual image  9/27/2022 - discussed need for full time glasses rx    Neck pain, chronic  9/27/2022 - recent episode of occipital neuroalgia        Lance Ureña M.D.

## 2022-11-06 PROBLEM — S93.409A SPRAIN OF ANKLE: Status: ACTIVE | Noted: 2022-11-06

## 2022-11-06 PROBLEM — I95.0 IDIOPATHIC HYPOTENSION: Status: ACTIVE | Noted: 2020-01-12

## 2022-11-07 ENCOUNTER — OFFICE VISIT (OUTPATIENT)
Dept: INTERNAL MEDICINE | Facility: OTHER | Age: 57
End: 2022-11-07
Payer: MEDICAID

## 2022-11-07 VITALS
DIASTOLIC BLOOD PRESSURE: 70 MMHG | SYSTOLIC BLOOD PRESSURE: 109 MMHG | HEIGHT: 64 IN | BODY MASS INDEX: 29.81 KG/M2 | TEMPERATURE: 98.5 F | HEART RATE: 68 BPM | OXYGEN SATURATION: 98 % | WEIGHT: 174.6 LBS

## 2022-11-07 DIAGNOSIS — K76.6 PORTAL HYPERTENSION (HCC): ICD-10-CM

## 2022-11-07 DIAGNOSIS — Z12.31 ENCOUNTER FOR SCREENING MAMMOGRAM FOR MALIGNANT NEOPLASM OF BREAST: ICD-10-CM

## 2022-11-07 DIAGNOSIS — K74.00 LIVER FIBROSIS: ICD-10-CM

## 2022-11-07 DIAGNOSIS — K21.00 GASTROESOPHAGEAL REFLUX DISEASE WITH ESOPHAGITIS WITHOUT HEMORRHAGE: ICD-10-CM

## 2022-11-07 DIAGNOSIS — S83.281D ACUTE LATERAL MENISCUS TEAR OF RIGHT KNEE, SUBSEQUENT ENCOUNTER: ICD-10-CM

## 2022-11-07 DIAGNOSIS — Z87.19 HISTORY OF CIRRHOSIS: ICD-10-CM

## 2022-11-07 PROBLEM — M25.569 KNEE PAIN: Status: RESOLVED | Noted: 2022-02-25 | Resolved: 2022-11-07

## 2022-11-07 PROBLEM — S83.281A ACUTE LATERAL MENISCUS TEAR OF RIGHT KNEE: Status: ACTIVE | Noted: 2022-11-07

## 2022-11-07 PROBLEM — G43.109 OCULAR MIGRAINE: Status: ACTIVE | Noted: 2022-11-07

## 2022-11-07 PROBLEM — J32.9 RECURRENT SINUSITIS: Status: ACTIVE | Noted: 2022-11-07

## 2022-11-07 PROBLEM — G44.89 OTHER HEADACHE SYNDROME: Status: RESOLVED | Noted: 2022-02-25 | Resolved: 2022-11-07

## 2022-11-07 PROCEDURE — 99214 OFFICE O/P EST MOD 30 MIN: CPT | Mod: GC | Performed by: STUDENT IN AN ORGANIZED HEALTH CARE EDUCATION/TRAINING PROGRAM

## 2022-11-07 RX ORDER — OMEPRAZOLE 20 MG/1
20 CAPSULE, DELAYED RELEASE ORAL DAILY
Qty: 90 CAPSULE | Refills: 3 | Status: SHIPPED | OUTPATIENT
Start: 2022-11-07 | End: 2023-10-25 | Stop reason: SDUPTHER

## 2022-11-07 RX ORDER — CEFDINIR 300 MG/1
300 CAPSULE ORAL 2 TIMES DAILY
COMMUNITY
Start: 2022-11-03 | End: 2023-01-16

## 2022-11-07 ASSESSMENT — FIBROSIS 4 INDEX: FIB4 SCORE: 1.52

## 2022-11-07 NOTE — PATIENT INSTRUCTIONS
I placed a referral to GI and ortho  Please obtain labs and ultrasound before next visit  Talk to PM for the other knee.
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
## 2022-11-07 NOTE — ASSESSMENT & PLAN NOTE
Patient reports varices after 1 year after 1st pregnancy ( no drinking at the time)  EGD 2020  1. Scattered 1 mm whitish plaques in upper third of esophagus c/w mild candidiasis, not biopsied due to coagulopathy.  2. Clot overlying area of inflamed esophageal mucosa at 26 cm with diffuse slow oozing which stopped.  No focal visible vessel.  3. Hemoclip attached to esophageal mucosa at 28 cm.  4. Severe esophagitis with whitish exudate becoming confluent in distal third of esophagus.  5. Hiatal hernia, 34-37 cm.  6. Diffuse gastritis with portal hypertensive changes.  7. Normal duodenum.  - referral to GI  - continue omeprazole 20mg daily

## 2022-11-08 PROBLEM — K74.00 LIVER FIBROSIS: Status: ACTIVE | Noted: 2022-11-08

## 2022-11-08 NOTE — ASSESSMENT & PLAN NOTE
Labs from May 202 revealed F3/F4 fibrosis, negative hepatitis panel,    Labs from May 2022 revealed albumin 4.5, TB 0.7, AST 21 ALT 20  - RUQ US  - repeat CMP  - DEREK fibrosure  - GI referral

## 2022-11-12 DIAGNOSIS — I65.23 ATHEROSCLEROSIS OF BOTH CAROTID ARTERIES: ICD-10-CM

## 2022-11-14 RX ORDER — ATORVASTATIN CALCIUM 40 MG/1
TABLET, FILM COATED ORAL
Qty: 30 TABLET | Refills: 11 | Status: SHIPPED | OUTPATIENT
Start: 2022-11-14 | End: 2023-10-17

## 2022-11-14 NOTE — TELEPHONE ENCOUNTER
Received request via: Pharmacy    Was the patient seen in the last year in this department? Yes    Does the patient have an active prescription (recently filled or refills available) for medication(s) requested? yes    Does the patient have care home Plus and need 100 day supply (blood pressure, diabetes and cholesterol meds only)? Patient does not have SCP

## 2022-11-27 NOTE — DISCHARGE PLANNING
Anticipated Discharge Disposition: [facility]    Action: Discussed pt in IT rounds.Pt not medically clear for transfer to SNF at this time per MD r/t bilirubin and hypotension.     Barriers to Discharge: medical clearance    Plan: transfer pt to [facility] once medically clear      Length of Stay: 10   74

## 2022-11-28 ENCOUNTER — HOSPITAL ENCOUNTER (OUTPATIENT)
Dept: LAB | Facility: MEDICAL CENTER | Age: 57
End: 2022-11-28
Attending: STUDENT IN AN ORGANIZED HEALTH CARE EDUCATION/TRAINING PROGRAM
Payer: MEDICAID

## 2022-12-07 ENCOUNTER — HOSPITAL ENCOUNTER (OUTPATIENT)
Dept: RADIOLOGY | Facility: MEDICAL CENTER | Age: 57
End: 2022-12-07
Payer: MEDICAID

## 2022-12-20 ENCOUNTER — HOSPITAL ENCOUNTER (OUTPATIENT)
Dept: RADIOLOGY | Facility: MEDICAL CENTER | Age: 57
End: 2022-12-20
Attending: STUDENT IN AN ORGANIZED HEALTH CARE EDUCATION/TRAINING PROGRAM
Payer: MEDICAID

## 2022-12-20 DIAGNOSIS — Z12.31 ENCOUNTER FOR SCREENING MAMMOGRAM FOR MALIGNANT NEOPLASM OF BREAST: ICD-10-CM

## 2022-12-20 DIAGNOSIS — Z87.19 HISTORY OF CIRRHOSIS: ICD-10-CM

## 2022-12-20 PROCEDURE — 76705 ECHO EXAM OF ABDOMEN: CPT

## 2022-12-20 PROCEDURE — 77063 BREAST TOMOSYNTHESIS BI: CPT

## 2022-12-23 ENCOUNTER — TELEPHONE (OUTPATIENT)
Dept: INTERNAL MEDICINE | Facility: OTHER | Age: 57
End: 2022-12-23
Payer: MEDICAID

## 2022-12-23 DIAGNOSIS — K74.60 HEPATIC CIRRHOSIS, UNSPECIFIED HEPATIC CIRRHOSIS TYPE, UNSPECIFIED WHETHER ASCITES PRESENT (HCC): ICD-10-CM

## 2023-01-07 ENCOUNTER — HOSPITAL ENCOUNTER (EMERGENCY)
Facility: MEDICAL CENTER | Age: 58
End: 2023-01-07
Attending: EMERGENCY MEDICINE
Payer: MEDICAID

## 2023-01-07 ENCOUNTER — APPOINTMENT (OUTPATIENT)
Dept: RADIOLOGY | Facility: MEDICAL CENTER | Age: 58
End: 2023-01-07
Attending: EMERGENCY MEDICINE
Payer: MEDICAID

## 2023-01-07 VITALS
WEIGHT: 169.75 LBS | TEMPERATURE: 98.5 F | DIASTOLIC BLOOD PRESSURE: 72 MMHG | HEIGHT: 64 IN | RESPIRATION RATE: 18 BRPM | BODY MASS INDEX: 28.98 KG/M2 | HEART RATE: 63 BPM | SYSTOLIC BLOOD PRESSURE: 104 MMHG | OXYGEN SATURATION: 99 %

## 2023-01-07 DIAGNOSIS — R42 DIZZINESS: ICD-10-CM

## 2023-01-07 DIAGNOSIS — R51.9 INTERMITTENT HEADACHE: ICD-10-CM

## 2023-01-07 LAB
ALBUMIN SERPL BCP-MCNC: 4.8 G/DL (ref 3.2–4.9)
ALBUMIN/GLOB SERPL: 1.5 G/DL
ALP SERPL-CCNC: 68 U/L (ref 30–99)
ALT SERPL-CCNC: 20 U/L (ref 2–50)
ANION GAP SERPL CALC-SCNC: 13 MMOL/L (ref 7–16)
APPEARANCE UR: CLEAR
AST SERPL-CCNC: 24 U/L (ref 12–45)
BASOPHILS # BLD AUTO: 0.8 % (ref 0–1.8)
BASOPHILS # BLD: 0.05 K/UL (ref 0–0.12)
BILIRUB SERPL-MCNC: 0.5 MG/DL (ref 0.1–1.5)
BILIRUB UR QL STRIP.AUTO: NEGATIVE
BUN SERPL-MCNC: 14 MG/DL (ref 8–22)
CALCIUM ALBUM COR SERPL-MCNC: 9.5 MG/DL (ref 8.5–10.5)
CALCIUM SERPL-MCNC: 10.1 MG/DL (ref 8.5–10.5)
CHLORIDE SERPL-SCNC: 106 MMOL/L (ref 96–112)
CO2 SERPL-SCNC: 21 MMOL/L (ref 20–33)
COLOR UR: YELLOW
CREAT SERPL-MCNC: 0.86 MG/DL (ref 0.5–1.4)
EOSINOPHIL # BLD AUTO: 0.1 K/UL (ref 0–0.51)
EOSINOPHIL NFR BLD: 1.5 % (ref 0–6.9)
ERYTHROCYTE [DISTWIDTH] IN BLOOD BY AUTOMATED COUNT: 42.1 FL (ref 35.9–50)
GFR SERPLBLD CREATININE-BSD FMLA CKD-EPI: 79 ML/MIN/1.73 M 2
GLOBULIN SER CALC-MCNC: 3.3 G/DL (ref 1.9–3.5)
GLUCOSE SERPL-MCNC: 93 MG/DL (ref 65–99)
GLUCOSE UR STRIP.AUTO-MCNC: NEGATIVE MG/DL
HCT VFR BLD AUTO: 43.1 % (ref 37–47)
HGB BLD-MCNC: 15.2 G/DL (ref 12–16)
IMM GRANULOCYTES # BLD AUTO: 0.01 K/UL (ref 0–0.11)
IMM GRANULOCYTES NFR BLD AUTO: 0.2 % (ref 0–0.9)
KETONES UR STRIP.AUTO-MCNC: NEGATIVE MG/DL
LEUKOCYTE ESTERASE UR QL STRIP.AUTO: NEGATIVE
LYMPHOCYTES # BLD AUTO: 3.08 K/UL (ref 1–4.8)
LYMPHOCYTES NFR BLD: 47 % (ref 22–41)
MCH RBC QN AUTO: 30.8 PG (ref 27–33)
MCHC RBC AUTO-ENTMCNC: 35.3 G/DL (ref 33.6–35)
MCV RBC AUTO: 87.2 FL (ref 81.4–97.8)
MICRO URNS: NORMAL
MONOCYTES # BLD AUTO: 0.42 K/UL (ref 0–0.85)
MONOCYTES NFR BLD AUTO: 6.4 % (ref 0–13.4)
NEUTROPHILS # BLD AUTO: 2.9 K/UL (ref 2–7.15)
NEUTROPHILS NFR BLD: 44.1 % (ref 44–72)
NITRITE UR QL STRIP.AUTO: NEGATIVE
NRBC # BLD AUTO: 0 K/UL
NRBC BLD-RTO: 0 /100 WBC
PH UR STRIP.AUTO: 5 [PH] (ref 5–8)
PLATELET # BLD AUTO: 191 K/UL (ref 164–446)
PMV BLD AUTO: 10.7 FL (ref 9–12.9)
POTASSIUM SERPL-SCNC: 3.9 MMOL/L (ref 3.6–5.5)
PROT SERPL-MCNC: 8.1 G/DL (ref 6–8.2)
PROT UR QL STRIP: NEGATIVE MG/DL
RBC # BLD AUTO: 4.94 M/UL (ref 4.2–5.4)
RBC UR QL AUTO: NEGATIVE
SODIUM SERPL-SCNC: 140 MMOL/L (ref 135–145)
SP GR UR STRIP.AUTO: 1.02
UROBILINOGEN UR STRIP.AUTO-MCNC: 0.2 MG/DL
WBC # BLD AUTO: 6.6 K/UL (ref 4.8–10.8)

## 2023-01-07 PROCEDURE — 81003 URINALYSIS AUTO W/O SCOPE: CPT

## 2023-01-07 PROCEDURE — 700117 HCHG RX CONTRAST REV CODE 255: Performed by: EMERGENCY MEDICINE

## 2023-01-07 PROCEDURE — 85025 COMPLETE CBC W/AUTO DIFF WBC: CPT

## 2023-01-07 PROCEDURE — 80053 COMPREHEN METABOLIC PANEL: CPT

## 2023-01-07 PROCEDURE — 36415 COLL VENOUS BLD VENIPUNCTURE: CPT

## 2023-01-07 PROCEDURE — 99283 EMERGENCY DEPT VISIT LOW MDM: CPT

## 2023-01-07 PROCEDURE — 70496 CT ANGIOGRAPHY HEAD: CPT

## 2023-01-07 RX ADMIN — IOHEXOL 85 ML: 350 INJECTION, SOLUTION INTRAVENOUS at 16:45

## 2023-01-07 ASSESSMENT — FIBROSIS 4 INDEX: FIB4 SCORE: 1.52

## 2023-01-07 NOTE — ED PROVIDER NOTES
"  ER Provider Note    Scribed for Julián Arizmendi D.O. by Latoya Kingsley. 1/7/2023  3:20 PM    Primary Care Provider: Tiffany Morel M.D.    CHIEF COMPLAINT  Chief Complaint   Patient presents with    Ringing in Ear     Pt has had multiple episodes of a sudden onset of ringing in her ears. Pt says \"it felt like there was a 'boing' in my head\". Pt believes this may be related to her covid vaccine she received last week.     HPI/ROS    Jumana Kahn is a 57 y.o. female who presents to the ED for tinnitus onset five days ago. Patient got her second COVID-19 booster shot last Friday. She had a low grade fever after. On 1/2/2023, she states that she had a \"nerve pain in my brain\" and felt near syncopal for about 40 seconds, her neck began hurting and the ringing in her ear worsened from baseline. She stood up and felt dizziness that is not vertigo in nature. She described it as \"weird and off balanced\". She went to bed and then the next morning feeling slightly better. She had a sleep study done for her insomnia on 1/3/2023 and was told that she did have any abnormalities. The next day, her headache and symptoms persisted. Last night, she was on her phone when her neck pain worsened and the pain shot down both her legs. The ringing in her ears also worsened and now has tingling to her feet. Her neck also feels weak. During this time, she had a fever of 99.2 °F, but states that her temperature usually runs low. She denies any cough, congestion, rhinorrhea, or chest pain. She states that she has central pontine myelitis and had symptoms like this before but it is much worse this time and is concerned that the COVID-19 booster is affecting her negatively. She was given Topamax for her migraines after getting COVID-19 in the past. Patient also has a slipped disc in her back.     PAST MEDICAL HISTORY  Past Medical History:   Diagnosis Date    Anemia     Asthma     GERD (gastroesophageal reflux disease)     Head " ache     Hypothyroid     Psychiatric disorder     Substance abuse (HCC)        SURGICAL HISTORY  Past Surgical History:   Procedure Laterality Date    GASTROSCOPY N/A 5/10/2020    Procedure: GASTROSCOPY;  Surgeon: Lucas Crespo M.D.;  Location: SURGERY [facility];  Service: Gastroenterology    AK UPPER GI ENDOSCOPY,CTRL BLEED N/A 1/15/2020    Procedure: EGD, WITH CLIP PLACEMENT;  Surgeon: Pito Davis M.D.;  Location: SURGERY [facility];  Service: Gastroenterology    GYN SURGERY      tubal ligation    OTHER      sinus surgery    OTHER ORTHOPEDIC SURGERY      leg       FAMILY HISTORY  No family history noted.     SOCIAL HISTORY   reports that she quit smoking about 21 years ago. She has never used smokeless tobacco. She reports that she does not currently use alcohol. She reports that she does not use drugs.    CURRENT MEDICATIONS  Discharge Medication List as of 1/7/2023  5:29 PM        CONTINUE these medications which have NOT CHANGED    Details   atorvastatin (LIPITOR) 40 MG Tab TAKE 1 TABLET BY MOUTH EVERY DAY IN THE EVENING, Disp-30 Tablet, R-11, Normal      cefdinir (OMNICEF) 300 MG Cap Take 1 Capsule by mouth 2 times a day., Historical Med      omeprazole (PRILOSEC) 20 MG delayed-release capsule Take 1 Capsule by mouth every day., Disp-90 Capsule, R-3, Normal      levothyroxine (SYNTHROID) 125 MCG Tab TAKE 1 TABLET BY MOUTH EVERY DAY, Disp-30 Tablet, R-8, Normal      thiamine (THIAMINE) 100 MG tablet TAKE 1 TABLET BY MOUTH ONCE A DAY AS NEEDED, Historical Med      sumatriptan (IMITREX) 100 MG tablet Historical Med      acetaminophen (TYLENOL) 500 MG Tab Take 1-2 Tablets by mouth every 6 hours as needed., Historical Med      traZODone (DESYREL) 50 MG Tab Take 1 Tablet by mouth every evening., Historical Med      meclizine (ANTIVERT) 25 MG Tab Take 1 Tablet by mouth as needed for Vertigo., Historical Med             ALLERGIES  Ampicillin    PHYSICAL EXAM  /85   Pulse 84   Temp 37 °C  "(98.6 °F) (Oral)   Resp 16   Ht 1.626 m (5' 4\")   Wt 77 kg (169 lb 12.1 oz)   SpO2 99%   BMI 29.14 kg/m²     General: No acute distress  HENT: Normocephalic, Mucus membranes are moist.   Chest: Lungs have even and unlabored respirations, Clear to auscultation.   Cardiovascular: Regular rate and rhythm, No peripheral cyanosis.  Abdomen: Non distended.  Neuro: Awake, Conversive, Able to relay recent events, Ambulatory with steady gait, No speech dysarthria.   Psychiatric: Calm and cooperative.      DIAGNOSTIC STUDIES    Labs:   Results for orders placed or performed during the hospital encounter of 01/07/23   CBC WITH DIFFERENTIAL   Result Value Ref Range    WBC 6.6 4.8 - 10.8 K/uL    RBC 4.94 4.20 - 5.40 M/uL    Hemoglobin 15.2 12.0 - 16.0 g/dL    Hematocrit 43.1 37.0 - 47.0 %    MCV 87.2 81.4 - 97.8 fL    MCH 30.8 27.0 - 33.0 pg    MCHC 35.3 (H) 33.6 - 35.0 g/dL    RDW 42.1 35.9 - 50.0 fL    Platelet Count 191 164 - 446 K/uL    MPV 10.7 9.0 - 12.9 fL    Neutrophils-Polys 44.10 44.00 - 72.00 %    Lymphocytes 47.00 (H) 22.00 - 41.00 %    Monocytes 6.40 0.00 - 13.40 %    Eosinophils 1.50 0.00 - 6.90 %    Basophils 0.80 0.00 - 1.80 %    Immature Granulocytes 0.20 0.00 - 0.90 %    Nucleated RBC 0.00 /100 WBC    Neutrophils (Absolute) 2.90 2.00 - 7.15 K/uL    Lymphs (Absolute) 3.08 1.00 - 4.80 K/uL    Monos (Absolute) 0.42 0.00 - 0.85 K/uL    Eos (Absolute) 0.10 0.00 - 0.51 K/uL    Baso (Absolute) 0.05 0.00 - 0.12 K/uL    Immature Granulocytes (abs) 0.01 0.00 - 0.11 K/uL    NRBC (Absolute) 0.00 K/uL   Comp Metabolic Panel   Result Value Ref Range    Sodium 140 135 - 145 mmol/L    Potassium 3.9 3.6 - 5.5 mmol/L    Chloride 106 96 - 112 mmol/L    Co2 21 20 - 33 mmol/L    Anion Gap 13.0 7.0 - 16.0    Glucose 93 65 - 99 mg/dL    Bun 14 8 - 22 mg/dL    Creatinine 0.86 0.50 - 1.40 mg/dL    Calcium 10.1 8.5 - 10.5 mg/dL    AST(SGOT) 24 12 - 45 U/L    ALT(SGPT) 20 2 - 50 U/L    Alkaline Phosphatase 68 30 - 99 U/L    Total " Bilirubin 0.5 0.1 - 1.5 mg/dL    Albumin 4.8 3.2 - 4.9 g/dL    Total Protein 8.1 6.0 - 8.2 g/dL    Globulin 3.3 1.9 - 3.5 g/dL    A-G Ratio 1.5 g/dL   URINALYSIS CULTURE, IF INDICATED    Specimen: Urine, Clean Catch   Result Value Ref Range    Color Yellow     Character Clear     Specific Gravity 1.023 <1.035    Ph 5.0 5.0 - 8.0    Glucose Negative Negative mg/dL    Ketones Negative Negative mg/dL    Protein Negative Negative mg/dL    Bilirubin Negative Negative    Urobilinogen, Urine 0.2 Negative    Nitrite Negative Negative    Leukocyte Esterase Negative Negative    Occult Blood Negative Negative    Micro Urine Req see below    CORRECTED CALCIUM   Result Value Ref Range    Correct Calcium 9.5 8.5 - 10.5 mg/dL   ESTIMATED GFR   Result Value Ref Range    GFR (CKD-EPI) 79 >60 mL/min/1.73 m 2        Radiology:   The attending emergency physician has independently interpreted the diagnostic imaging associated with this visit and am waiting the final reading from the radiologist.     CT-CTA HEAD WITH & W/O-POST PROCESS   Final Result      1.  No acute intracranial abnormality.   2.  No intracranial aneurysm, focal stenosis or abrupt large vessel cut off.            COURSE & MEDICAL DECISION MAKING     ED Observation Status? Yes; I am placing the patient in to an observation status due to a diagnostic uncertainty as well as therapeutic intensity. Patient placed in observation status at 3:38 PM, 1/7/2023.     INITIAL ASSESSMENT AND PLAN  Care Narrative: Presents with nonspecific multiple intermittent possible neurological symptoms. She is afebrile here, I do not suspect sepsis. She does have a history of central pontine myelitis. Her symptoms may be related to an exacerbation of this, CT-CT Head with and without contrast will be done to evaluate for sings of inflammation or edema. Labs will be done to evaluate for electrolyte imbalance or anemia. Patient is requesting UA because she has had UTI and sepsis before.    5:09  PM - Patient was reevaluated at bedside. Discussed lab and radiology results with the patient and informed her that they are reassuring. Patient had the opportunity to ask any questions. The plan for discharge was discussed with them and she was told to return for any new or worsening symptoms. She was also informed of the plans for follow up with Neurology. Patient is understanding and agreeable to the plan for discharge.       ADDITIONAL PROBLEM LIST AND DISPOSITION    Problem #1: Intermittent Tinnitus  Problem #2: Intermittent headache   Problem #3: Intermittent feelings of off-balanced  Problem #4: Tingling of legs    ED Course: Patient presents with multiple nonspecific symptoms.  They are transient in nature.  And on evaluation she is asymptomatic.  CT of the head shows no inflammatory process no bleed no circulatory problems on CTA.    Lab test shows no electrolyte imbalance.  There is no signs of urinary tract infection.  This patient is stable for discharge home to follow-up with her neurologist.    The patient will return for new or worsening symptoms and is stable at the time of discharge.    The patient is referred to a primary physician for blood pressure management, diabetic screening, and for all other preventative health concerns.    DISPOSITION:  Patient will be discharged home from ED observation in stable condition.    FINAL DISPOSITION:  Patient will be discharged home in stable condition.    FOLLOW UP:  Tiffany Morel M.D.  [address] [facility] [address]  [phone]    In 1 week       FINAL DIANGOSIS  1. Dizziness    2. Intermittent headache      Latoya SALGADO (Lionel), am scribing for, and in the presence of, Julián Arizmendi D.O..    Electronically signed by: Latoya Friedman), 1/7/2023    Julián SALGADO D.O. personally performed the services described in this documentation, as scribed by Latoya Kingsley in my presence, and it is both accurate and complete.      The  note accurately reflects work and decisions made by me.  Julián Arizmendi D.O.  1/7/2023  7:05 PM

## 2023-01-07 NOTE — ED TRIAGE NOTES
"Chief Complaint   Patient presents with    Ringing in Ear     Pt has had multiple episodes of a sudden onset of ringing in her ears. Pt says \"it felt like there was a 'boing' in my head\". Pt believes this may be related to her covid vaccine she received last week.     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 Pt has multiple medical complaints she believes is related to the issue. Pt amb with steady gait in triage. Pt educated to inform stadd of any changes.    /85   Pulse 84   Temp 37 °C (98.6 °F) (Oral)   Resp 16   Ht 1.626 m (5' 4\")   Wt 77 kg (169 lb 12.1 oz)   SpO2 99%   BMI 29.14 kg/m²     "

## 2023-01-08 NOTE — DISCHARGE INSTRUCTIONS
CT scan shows no brain injury, no trouble with the circulation of the brain.    Your lab test and urine test are normal.  At this time there is no specific cause for the symptoms, please call your neurologist Monday to make a follow-up appointment return for any change or worsening symptoms

## 2023-01-16 ENCOUNTER — OFFICE VISIT (OUTPATIENT)
Dept: INTERNAL MEDICINE | Facility: OTHER | Age: 58
End: 2023-01-16
Payer: MEDICAID

## 2023-01-16 VITALS
BODY MASS INDEX: 28.82 KG/M2 | WEIGHT: 168.8 LBS | HEART RATE: 69 BPM | DIASTOLIC BLOOD PRESSURE: 80 MMHG | SYSTOLIC BLOOD PRESSURE: 121 MMHG | HEIGHT: 64 IN | TEMPERATURE: 98.7 F | OXYGEN SATURATION: 98 %

## 2023-01-16 DIAGNOSIS — G43.109 OCULAR MIGRAINE: ICD-10-CM

## 2023-01-16 PROBLEM — L98.9 SKIN LESION OF BACK: Status: RESOLVED | Noted: 2022-07-05 | Resolved: 2023-01-16

## 2023-01-16 PROBLEM — L98.9 SKIN LESION OF CHEEK: Status: RESOLVED | Noted: 2022-07-05 | Resolved: 2023-01-16

## 2023-01-16 PROBLEM — Z87.81 HISTORY OF TIBIAL FRACTURE: Status: RESOLVED | Noted: 2022-04-20 | Resolved: 2023-01-16

## 2023-01-16 PROBLEM — S83.281A ACUTE LATERAL MENISCUS TEAR OF RIGHT KNEE: Status: RESOLVED | Noted: 2022-11-07 | Resolved: 2023-01-16

## 2023-01-16 PROBLEM — S93.409A SPRAIN OF ANKLE: Status: RESOLVED | Noted: 2022-11-06 | Resolved: 2023-01-16

## 2023-01-16 PROBLEM — R63.5 WEIGHT GAIN: Status: RESOLVED | Noted: 2022-05-27 | Resolved: 2023-01-16

## 2023-01-16 PROCEDURE — 99214 OFFICE O/P EST MOD 30 MIN: CPT | Mod: GC | Performed by: STUDENT IN AN ORGANIZED HEALTH CARE EDUCATION/TRAINING PROGRAM

## 2023-01-16 RX ORDER — AMITRIPTYLINE HYDROCHLORIDE 10 MG/1
10 TABLET, FILM COATED ORAL NIGHTLY
Qty: 60 TABLET | Refills: 0 | Status: SHIPPED | OUTPATIENT
Start: 2023-01-16 | End: 2023-01-30

## 2023-01-16 RX ORDER — ALBUTEROL SULFATE 90 UG/1
AEROSOL, METERED RESPIRATORY (INHALATION)
COMMUNITY
End: 2023-04-28 | Stop reason: SDUPTHER

## 2023-01-16 RX ORDER — THIAMINE MONONITRATE (VIT B1) 100 MG
TABLET ORAL
COMMUNITY
End: 2023-07-17

## 2023-01-16 RX ORDER — LEVOTHYROXINE SODIUM 0.1 MG/1
TABLET ORAL
COMMUNITY
End: 2023-01-30

## 2023-01-16 ASSESSMENT — FIBROSIS 4 INDEX: FIB4 SCORE: 1.55

## 2023-01-16 NOTE — ASSESSMENT & PLAN NOTE
Visual disturbances, electrical pain in the head/neck. Has seen neurology/neuroophthalmology who have suggested topamax for migraine prophylaxis. Has not been taking due to the concerns of toxicity in setting of liver disease.  Discussed risks/benefits of amitryptyline as possible prophylaxis of migraine. Per medication information review, no hepatic dosage adjustements indicated in liver disease.  -Amitryptyline 10mg PO qHS  -Neurology referral placed (requested other than [facility]ERALDINE)  -Follow up 1-2 months, earlier if needed.

## 2023-01-16 NOTE — PROGRESS NOTES
CC:   Chief Complaint   Patient presents with    Headache    Lab Results     Questions about elevated labs                                                                                                                                            HPI:   Jumana presents today with the following.    Over the past 2-3 weeks has been having off/on headahces with associated sporadic numbness/tingling that lasts 10-45 seconds. There was an episode where she had vertigo, which was abnormal from her previous vertigo episodes--mostly visual rather than positional changes.  Has had neck/posterior head pain and tingling, which has been consistently off/on during the past 2 weeks.     Patient Active Problem List    Diagnosis Date Noted    Liver fibrosis 11/08/2022    Ocular migraine 11/07/2022    Recurrent sinusitis 11/07/2022    Atherosclerosis of both carotid arteries 04/20/2022    Visual distortion 02/11/2022    Near syncope 02/11/2022    Accommodative insufficiency 09/29/2021    PVD (posterior vitreous detachment), bilateral 09/29/2021    Dental caries 08/04/2021    Neuropathy 06/01/2021    Blurring of visual image 03/30/2021    Hx of traumatic brain injury 03/30/2021    Hearing loss, bilateral 02/16/2021    Tinnitus, bilateral 02/16/2021    History of alcohol abuse 02/12/2021    Vertigo 02/12/2021    Neck pain, chronic 02/01/2021    Constipation 12/21/2020    History of repair of rotator cuff 12/21/2020    Lower back pain 11/09/2020    Vitamin D deficiency 05/12/2020    GERD (gastroesophageal reflux disease) 05/06/2020    Central pontine myelinolysis (HCC) 01/19/2020    Hypothyroidism 01/12/2020    Idiopathic hypotension 01/12/2020       Current Outpatient Medications   Medication Sig Dispense Refill    amitriptyline (ELAVIL) 10 MG Tab Take 1 Tablet by mouth every evening. 60 Tablet 0    thiamine (VITAMIN B-1) 100 MG Tab vitamin b-1  100 mg tabs      albuterol (VENTOLIN HFA) 108 (90 Base) MCG/ACT Aero Soln  "inhalation aerosol Ventolin HFA 90 mcg/actuation aerosol inhaler   INHALE 1 TO 2 PUFFS BY MOUTH EVERY 4 TO 6 HOURS AS NEEDED FOR DIFFICULTY BREATHING      levothyroxine (SYNTHROID) 100 MCG Tab levothyroxine 100 mcg tablet   TAKE 1 TABLET BY ORAL ROUTE EVERY DAY      atorvastatin (LIPITOR) 40 MG Tab TAKE 1 TABLET BY MOUTH EVERY DAY IN THE EVENING 30 Tablet 11    omeprazole (PRILOSEC) 20 MG delayed-release capsule Take 1 Capsule by mouth every day. 90 Capsule 3    levothyroxine (SYNTHROID) 125 MCG Tab TAKE 1 TABLET BY MOUTH EVERY DAY 30 Tablet 8    thiamine (THIAMINE) 100 MG tablet TAKE 1 TABLET BY MOUTH ONCE A DAY AS NEEDED      sumatriptan (IMITREX) 100 MG tablet       acetaminophen (TYLENOL) 500 MG Tab Take 1-2 Tablets by mouth every 6 hours as needed.      traZODone (DESYREL) 50 MG Tab Take 1 Tablet by mouth every evening.      meclizine (ANTIVERT) 25 MG Tab Take 1 Tablet by mouth as needed for Vertigo.       No current facility-administered medications for this visit.         Allergies as of 01/16/2023 - Reviewed 01/16/2023   Allergen Reaction Noted    Ampicillin Rash 01/15/2014          /80 (BP Location: Right arm, Patient Position: Sitting, BP Cuff Size: Adult)   Pulse 69   Temp 37.1 °C (98.7 °F) (Temporal)   Ht 1.626 m (5' 4\")   Wt 76.6 kg (168 lb 12.8 oz)   SpO2 98%   BMI 28.97 kg/m²     ROS     Physical Exam:  Gen:  Alert and oriented, No apparent distress.  Neuro:  CN II-XII intact, no vertical/horizontal nystagmus, point tenderness of occiput, C7/T1. No rash/lesions of neck/head/within hair.  Lungs:  CTAB  CV:  RRR no m/g/r  Abd:  Soft, nontender, nondistended  Skin:  No acute rash/lesions   Ext:  Shoulder/elbow flexion/extension 5/5 bilaterally and symmetric; ambulates independently with steady gait.      Assessment and Plan.   Jumana Kahn is a 57 y.o. female with the following issues:    Ocular migraine  Visual disturbances, electrical pain in the head/neck. Has seen " neurology/neuroophthalmology who have suggested topamax for migraine prophylaxis. Has not been taking due to the concerns of toxicity in setting of liver disease.  Discussed risks/benefits of amitryptyline as possible prophylaxis of migraine. Per medication information review, no hepatic dosage adjustements indicated in liver disease.  -Amitryptyline 10mg PO qHS  -Neurology referral placed (requested other than GERALDINE)  -Follow up 1-2 months, earlier if needed.       Follow up PCP (Maria Teresa) in 1-2 months, earlier if needed for additional migraine/visual disturbance follow up, Liver disease follow up.

## 2023-01-21 SDOH — HEALTH STABILITY: PHYSICAL HEALTH: ON AVERAGE, HOW MANY MINUTES DO YOU ENGAGE IN EXERCISE AT THIS LEVEL?: 40 MIN

## 2023-01-21 SDOH — ECONOMIC STABILITY: HOUSING INSECURITY
IN THE LAST 12 MONTHS, WAS THERE A TIME WHEN YOU DID NOT HAVE A STEADY PLACE TO SLEEP OR SLEPT IN A SHELTER (INCLUDING NOW)?: NO

## 2023-01-21 SDOH — HEALTH STABILITY: PHYSICAL HEALTH: ON AVERAGE, HOW MANY DAYS PER WEEK DO YOU ENGAGE IN MODERATE TO STRENUOUS EXERCISE (LIKE A BRISK WALK)?: 3 DAYS

## 2023-01-21 SDOH — ECONOMIC STABILITY: TRANSPORTATION INSECURITY
IN THE PAST 12 MONTHS, HAS LACK OF TRANSPORTATION KEPT YOU FROM MEETINGS, WORK, OR FROM GETTING THINGS NEEDED FOR DAILY LIVING?: NO

## 2023-01-21 SDOH — ECONOMIC STABILITY: INCOME INSECURITY: HOW HARD IS IT FOR YOU TO PAY FOR THE VERY BASICS LIKE FOOD, HOUSING, MEDICAL CARE, AND HEATING?: HARD

## 2023-01-21 SDOH — HEALTH STABILITY: MENTAL HEALTH
STRESS IS WHEN SOMEONE FEELS TENSE, NERVOUS, ANXIOUS, OR CAN'T SLEEP AT NIGHT BECAUSE THEIR MIND IS TROUBLED. HOW STRESSED ARE YOU?: ONLY A LITTLE

## 2023-01-21 SDOH — ECONOMIC STABILITY: HOUSING INSECURITY

## 2023-01-21 SDOH — ECONOMIC STABILITY: INCOME INSECURITY: IN THE LAST 12 MONTHS, WAS THERE A TIME WHEN YOU WERE NOT ABLE TO PAY THE MORTGAGE OR RENT ON TIME?: PATIENT REFUSED

## 2023-01-21 SDOH — ECONOMIC STABILITY: HOUSING INSECURITY
IN THE LAST 12 MONTHS, WAS THERE A TIME WHEN YOU DID NOT HAVE A STEADY PLACE TO SLEEP OR SLEPT IN A SHELTER (INCLUDING NOW)?: PATIENT REFUSED

## 2023-01-21 SDOH — ECONOMIC STABILITY: TRANSPORTATION INSECURITY
IN THE PAST 12 MONTHS, HAS THE LACK OF TRANSPORTATION KEPT YOU FROM MEDICAL APPOINTMENTS OR FROM GETTING MEDICATIONS?: NO

## 2023-01-21 SDOH — ECONOMIC STABILITY: FOOD INSECURITY: WITHIN THE PAST 12 MONTHS, THE FOOD YOU BOUGHT JUST DIDN'T LAST AND YOU DIDN'T HAVE MONEY TO GET MORE.: PATIENT DECLINED

## 2023-01-21 SDOH — ECONOMIC STABILITY: TRANSPORTATION INSECURITY
IN THE PAST 12 MONTHS, HAS LACK OF RELIABLE TRANSPORTATION KEPT YOU FROM MEDICAL APPOINTMENTS, MEETINGS, WORK OR FROM GETTING THINGS NEEDED FOR DAILY LIVING?: NO

## 2023-01-21 SDOH — ECONOMIC STABILITY: FOOD INSECURITY: WITHIN THE PAST 12 MONTHS, YOU WORRIED THAT YOUR FOOD WOULD RUN OUT BEFORE YOU GOT MONEY TO BUY MORE.: PATIENT DECLINED

## 2023-01-21 ASSESSMENT — SOCIAL DETERMINANTS OF HEALTH (SDOH)
WITHIN THE PAST 12 MONTHS, YOU WORRIED THAT YOUR FOOD WOULD RUN OUT BEFORE YOU GOT THE MONEY TO BUY MORE: PATIENT DECLINED
IN A TYPICAL WEEK, HOW MANY TIMES DO YOU TALK ON THE PHONE WITH FAMILY, FRIENDS, OR NEIGHBORS?: THREE TIMES A WEEK
HOW OFTEN DO YOU HAVE A DRINK CONTAINING ALCOHOL: NEVER
HOW OFTEN DO YOU GET TOGETHER WITH FRIENDS OR RELATIVES?: TWICE A WEEK
HOW OFTEN DO YOU ATTEND CHURCH OR RELIGIOUS SERVICES?: MORE THAN 4 TIMES PER YEAR
HOW MANY DRINKS CONTAINING ALCOHOL DO YOU HAVE ON A TYPICAL DAY WHEN YOU ARE DRINKING: PATIENT DOES NOT DRINK
HOW OFTEN DO YOU ATTENT MEETINGS OF THE CLUB OR ORGANIZATION YOU BELONG TO?: PATIENT DECLINED
HOW OFTEN DO YOU ATTENT MEETINGS OF THE CLUB OR ORGANIZATION YOU BELONG TO?: PATIENT DECLINED
DO YOU BELONG TO ANY CLUBS OR ORGANIZATIONS SUCH AS CHURCH GROUPS UNIONS, FRATERNAL OR ATHLETIC GROUPS, OR SCHOOL GROUPS?: PATIENT DECLINED
HOW OFTEN DO YOU HAVE SIX OR MORE DRINKS ON ONE OCCASION: NEVER
IN A TYPICAL WEEK, HOW MANY TIMES DO YOU TALK ON THE PHONE WITH FAMILY, FRIENDS, OR NEIGHBORS?: THREE TIMES A WEEK
HOW OFTEN DO YOU ATTEND CHURCH OR RELIGIOUS SERVICES?: MORE THAN 4 TIMES PER YEAR
HOW HARD IS IT FOR YOU TO PAY FOR THE VERY BASICS LIKE FOOD, HOUSING, MEDICAL CARE, AND HEATING?: HARD
HOW OFTEN DO YOU GET TOGETHER WITH FRIENDS OR RELATIVES?: TWICE A WEEK
DO YOU BELONG TO ANY CLUBS OR ORGANIZATIONS SUCH AS CHURCH GROUPS UNIONS, FRATERNAL OR ATHLETIC GROUPS, OR SCHOOL GROUPS?: PATIENT DECLINED

## 2023-01-21 ASSESSMENT — LIFESTYLE VARIABLES
HOW OFTEN DO YOU HAVE A DRINK CONTAINING ALCOHOL: NEVER
AUDIT-C TOTAL SCORE: 0
SKIP TO QUESTIONS 9-10: 1
HOW MANY STANDARD DRINKS CONTAINING ALCOHOL DO YOU HAVE ON A TYPICAL DAY: PATIENT DOES NOT DRINK
HOW OFTEN DO YOU HAVE SIX OR MORE DRINKS ON ONE OCCASION: NEVER

## 2023-01-24 ENCOUNTER — OFFICE VISIT (OUTPATIENT)
Dept: INTERNAL MEDICINE | Facility: OTHER | Age: 58
End: 2023-01-24
Payer: MEDICAID

## 2023-01-24 VITALS
WEIGHT: 165 LBS | SYSTOLIC BLOOD PRESSURE: 104 MMHG | TEMPERATURE: 97 F | DIASTOLIC BLOOD PRESSURE: 69 MMHG | BODY MASS INDEX: 30.36 KG/M2 | HEIGHT: 62 IN | HEART RATE: 70 BPM | OXYGEN SATURATION: 98 %

## 2023-01-24 DIAGNOSIS — L30.9 DERMATITIS: ICD-10-CM

## 2023-01-24 DIAGNOSIS — L82.1 SEBORRHEIC KERATOSES: ICD-10-CM

## 2023-01-24 DIAGNOSIS — D48.5 NEOPLASM OF UNCERTAIN BEHAVIOR OF SKIN: ICD-10-CM

## 2023-01-24 DIAGNOSIS — D18.01 HEMANGIOMA OF SKIN: ICD-10-CM

## 2023-01-24 DIAGNOSIS — L57.0 ACTINIC KERATOSES: ICD-10-CM

## 2023-01-24 DIAGNOSIS — L81.4 LENTIGO: ICD-10-CM

## 2023-01-24 PROCEDURE — 99204 OFFICE O/P NEW MOD 45 MIN: CPT | Mod: 25 | Performed by: DERMATOLOGY

## 2023-01-24 PROCEDURE — 11102 TANGNTL BX SKIN SINGLE LES: CPT | Performed by: DERMATOLOGY

## 2023-01-24 RX ORDER — FLUOROURACIL 50 MG/G
CREAM TOPICAL
Qty: 40 G | Refills: 3 | Status: SHIPPED | OUTPATIENT
Start: 2023-01-24 | End: 2024-01-08

## 2023-01-24 RX ORDER — TRIAMCINOLONE ACETONIDE 1 MG/G
CREAM TOPICAL
Qty: 80 G | Refills: 1 | Status: SHIPPED | OUTPATIENT
Start: 2023-01-24 | End: 2023-01-30

## 2023-01-24 ASSESSMENT — PATIENT HEALTH QUESTIONNAIRE - PHQ9: CLINICAL INTERPRETATION OF PHQ2 SCORE: 0

## 2023-01-24 ASSESSMENT — FIBROSIS 4 INDEX: FIB4 SCORE: 1.55

## 2023-01-24 NOTE — PATIENT INSTRUCTIONS
Biopsy care back     Wash area with soap and water daily.   Apply vaseline and keep covered with bandaid daily until spot is healed.   Call if problems with healing such as redness, warmth, drainage, pain.     R forehead above eyebrow, R cheek -   Apply carac fluorouracil to scaly spots only on Monday, Wednesday, Friday at night. Use for about a week. Stop if irritated. Try to use one week and then stop for a week. Try to use for 4 weeks total. If spots are gone, then stop cream. If scaly spots are Still there, can use the same way for another 4 weeks.     For itchy spot on R chest and L foot, try steroid cream triamcinolone daily for a few weeks.

## 2023-01-24 NOTE — PROGRESS NOTES
"Jumana Kahn  1965  [id number]    Consultation             Vitals:    01/24/23 0826   BP: 104/69   BP Location: Left arm   Patient Position: Sitting   BP Cuff Size: Adult   Pulse: 70   Temp: 36.1 °C (97 °F)   TempSrc: Temporal   SpO2: 98%   Weight: 74.8 kg (165 lb)   Height: 1.575 m (5' 2\")       Chief Complaint   Patient presents with    New Patient    Other     Skin check     ___________________________________________________________________            History of Present Illness (HPI)   C/o spot on L cheek x few months. Was dark and raised. Pt admits to picking spot off and now spot has healed with a scar.      Here for skin check. C/o spot on L upper back for a year. Pt thought foreign body reaction. No previous treatment.         Dr. Petit has referred for a consultation to evaluate spots    Current Outpatient Medications on File Prior to Visit   Medication Sig Dispense Refill    thiamine (VITAMIN B-1) 100 MG Tab vitamin b-1  100 mg tabs      albuterol 108 (90 Base) MCG/ACT Aero Soln inhalation aerosol Ventolin HFA 90 mcg/actuation aerosol inhaler   INHALE 1 TO 2 PUFFS BY MOUTH EVERY 4 TO 6 HOURS AS NEEDED FOR DIFFICULTY BREATHING      amitriptyline (ELAVIL) 10 MG Tab Take 1 Tablet by mouth every evening. 60 Tablet 0    atorvastatin (LIPITOR) 40 MG Tab TAKE 1 TABLET BY MOUTH EVERY DAY IN THE EVENING 30 Tablet 11    omeprazole (PRILOSEC) 20 MG delayed-release capsule Take 1 Capsule by mouth every day. 90 Capsule 3    levothyroxine (SYNTHROID) 125 MCG Tab TAKE 1 TABLET BY MOUTH EVERY DAY 30 Tablet 8    thiamine (THIAMINE) 100 MG tablet TAKE 1 TABLET BY MOUTH ONCE A DAY AS NEEDED      acetaminophen (TYLENOL) 500 MG Tab Take 1-2 Tablets by mouth every 6 hours as needed.      traZODone (DESYREL) 50 MG Tab Take 1 Tablet by mouth every evening.      meclizine (ANTIVERT) 25 MG Tab Take 1 Tablet by mouth as needed for Vertigo.      levothyroxine (SYNTHROID) 100 MCG Tab levothyroxine 100 mcg " tablet   TAKE 1 TABLET BY ORAL ROUTE EVERY DAY      sumatriptan (IMITREX) 100 MG tablet  (Patient not taking: Reported on 2023)       No current facility-administered medications on file prior to visit.     Ampicillin      Review of Systems:        General: Denies fever      Hematologic: no excessive bleeding problems              Past Medical History:   Diagnosis Date    Anemia     Asthma     GERD (gastroesophageal reflux disease)     Head ache     Hypothyroid     Psychiatric disorder     Substance abuse (HCC)      Skin Cancer no h/o    Social History     Tobacco Use    Smoking status: Former     Packs/day: 0.00     Types: Cigarettes     Quit date: 2001     Years since quittin.5    Smokeless tobacco: Never   Vaping Use    Vaping Use: Never used   Substance Use Topics    Alcohol use: Not Currently     Comment: has not drank for over a year    Drug use: No     Sunscreen Use:No     Past Surgical History:   Procedure Laterality Date    GASTROSCOPY N/A 5/10/2020    Procedure: GASTROSCOPY;  Surgeon: Lucas Crespo M.D.;  Location: SURGERY [facility];  Service: Gastroenterology    [place] UPPER GI ENDOSCOPY,CTRL BLEED N/A 1/15/2020    Procedure: EGD, WITH CLIP PLACEMENT;  Surgeon: Pito Davis M.D.;  Location: SURGERY [facility];  Service: Gastroenterology    GYN SURGERY      tubal ligation    OTHER      sinus surgery    OTHER ORTHOPEDIC SURGERY      leg           History reviewed. No pertinent family history.      Physical Exam:   Constitutional: Well nourished, NAD, pleasant  alert and cooperative; normal mood and affect; normal attention span and concentration.    Skin Full body exam done: no suspicious lesions on eyelids, lips, face, ears, neck, chest, back, abdomen, upper extremities, fingernails, lower extremities except as noted   Face - wearing makeup. Rare brown macs on L cheek  R forehead above R eyebrow, R cheek 2 pink scaly paps  L upper back about 9 mm pink scaly pap  F/l forearms  and dorsal hands several brown macs and few brown stuck on paps     Upper back rare red vascular paps  R cheek pink irritated pap, L dorsal foot crusted pap           Assessment and Plan:   1. Actinic keratoses  Pt has a few scaly spot son R eyebrow, R cheek.   Try carac.   Recommend no makeup at next visit- fluorouracil (EFUDEX) 5 % cream; Apply to affected area once a day at night Monday, Weds., Friday for a week. Stop if irritated. Try to use for 4 weeks  Dispense: 40 g; Refill: 3    2. Lentigo  Recommend follow for changes. ABCD's of changing skin lesions were reviewed, and the appropriate use of sunscreen was outlined and recommended..    3. Hemangioma of skin  Explained spot is a blood vessel. Recommend follow for changes. ABCD's of changing skin lesions were reviewed, and the appropriate use of sunscreen was outlined and recommended.      4. Seborrheic keratoses  Explained spot is a wisdom spot. Recommend follow for changes. ABCD's of changing skin lesions were reviewed, and the appropriate use of sunscreen was outlined and recommended.      5. Dermatitis  Will try steroid cream for spots on R chest and L foot.   - triamcinolone acetonide (KENALOG) 0.1 % Cream; Apply to affected area once a day for a week, topical steroid  Dispense: 80 g; Refill: 1    6. Neoplasm of uncertain behavior of skin  Photo of back taken with pt's cell phone   L upper back r/o foreign body versus skin cancer.   SHAVE BIOPSY    Informed consent. area was cleansed with Alcohol. The lesion area was infiltrated with    Lidocaine 1% . flexible blade to remove specimen. Hemostasis with    aluminum chloride. Bandaid applied. Wound care instructions were given orally.    - Pathology Specimen; Future      Duyen Garcia M.D.   Return in about 3 months (around 4/24/2023).

## 2023-01-27 ENCOUNTER — TELEPHONE (OUTPATIENT)
Dept: INTERNAL MEDICINE | Facility: OTHER | Age: 58
End: 2023-01-27
Payer: MEDICAID

## 2023-01-27 DIAGNOSIS — D48.5 NEOPLASM OF UNCERTAIN BEHAVIOR OF SKIN: ICD-10-CM

## 2023-01-30 ENCOUNTER — OFFICE VISIT (OUTPATIENT)
Dept: INTERNAL MEDICINE | Facility: OTHER | Age: 58
End: 2023-01-30
Payer: MEDICAID

## 2023-01-30 ENCOUNTER — TELEPHONE (OUTPATIENT)
Dept: INTERNAL MEDICINE | Facility: OTHER | Age: 58
End: 2023-01-30

## 2023-01-30 VITALS
TEMPERATURE: 98.2 F | WEIGHT: 164.2 LBS | OXYGEN SATURATION: 99 % | HEART RATE: 67 BPM | SYSTOLIC BLOOD PRESSURE: 108 MMHG | BODY MASS INDEX: 30.22 KG/M2 | HEIGHT: 62 IN | DIASTOLIC BLOOD PRESSURE: 77 MMHG

## 2023-01-30 DIAGNOSIS — G43.109 OCULAR MIGRAINE: ICD-10-CM

## 2023-01-30 DIAGNOSIS — G43.109 BASILAR MIGRAINE: ICD-10-CM

## 2023-01-30 DIAGNOSIS — K74.00 LIVER FIBROSIS: ICD-10-CM

## 2023-01-30 PROBLEM — K59.00 CONSTIPATION: Status: RESOLVED | Noted: 2020-12-21 | Resolved: 2023-01-30

## 2023-01-30 PROBLEM — R55 NEAR SYNCOPE: Status: RESOLVED | Noted: 2022-02-11 | Resolved: 2023-01-30

## 2023-01-30 PROBLEM — H53.19 VISUAL DISTORTION: Status: RESOLVED | Noted: 2022-02-11 | Resolved: 2023-01-30

## 2023-01-30 PROCEDURE — 99213 OFFICE O/P EST LOW 20 MIN: CPT | Mod: GC | Performed by: STUDENT IN AN ORGANIZED HEALTH CARE EDUCATION/TRAINING PROGRAM

## 2023-01-30 ASSESSMENT — FIBROSIS 4 INDEX: FIB4 SCORE: 1.55

## 2023-01-30 NOTE — PROGRESS NOTES
Jumana Kahn  is a 57 y.o. female with a PMH of   Patient Active Problem List   Diagnosis    Hypothyroidism    Idiopathic hypotension    Central pontine myelinolysis (HCC)    GERD (gastroesophageal reflux disease)    Vitamin D deficiency    Neuropathy    Accommodative insufficiency    PVD (posterior vitreous detachment), bilateral    Blurring of visual image    Neck pain, chronic    Dental caries    Hearing loss, bilateral    History of alcohol abuse    History of repair of rotator cuff    Hx of traumatic brain injury    Lower back pain    Tinnitus, bilateral    Vertigo    Atherosclerosis of both carotid arteries    Ocular migraine    Recurrent sinusitis    Liver fibrosis    Basilar migraine    here to address the following:    Migraines  She presented to the ED on 1/7/2023 for a near syncopal episodes associated with tinnitus, vertigo 1 week after her COVID booster. Episodes lasted 3-40 sec but felt unstable since.  She also reported occipital headache and and neck tingling that had been on and off for 2 weeks. She is seeing ENT for on and off vertigo that is positional. She states vision is more blurred since this incident.      First headache she had was with COVID. Before ED had headache in the middle of head ad then behind right eye. She typically has a headache every morning that is quick and sharp and not pulsating but then is dull and nagging and lasts several days. Denies nausea.  Reports photosensitivity.     She visited with Dr. Catherine on 1/16 and was given an Rx for amitryptyline for migraine prophylaxis and referred to neurology.       # RLQ pain on and off nonradiating. No bowel or urinary sx.  No constipation. Lost 10 lbs intentionally and this got better.  Possibly related to muscle strain.     Ask about AFP    ROS  currently has some vertigo and tinnitus.   All ROS negative unless otherwise mentioned in HPI    Vitals:    01/30/23 1443   BP: 108/77   Pulse: 67   Temp: 36.8 °C (98.2 °F)    SpO2: 99%         Physical Exam  Constitutional:       Appearance: Normal appearance.   HENT:      Head: Normocephalic and atraumatic.      Nose: No rhinorrhea.      Mouth/Throat:      Mouth: Mucous membranes are moist.      Pharynx: Oropharynx is clear. No oropharyngeal exudate or posterior oropharyngeal erythema.   Eyes:      Conjunctiva/sclera: Conjunctivae normal.   Neck:      Comments: No neck swelling  Cardiovascular:      Rate and Rhythm: Normal rate and regular rhythm.      Heart sounds: No murmur heard.  Pulmonary:      Effort: Pulmonary effort is normal.      Breath sounds: Normal breath sounds. No wheezing or rales.   Abdominal:      General: There is no distension.      Palpations: Abdomen is soft. There is no mass.      Tenderness: There is no abdominal tenderness.      Comments: No costovertebral or suprapubic tenderness   Musculoskeletal:         General: No swelling or deformity.      Cervical back: Normal range of motion.      Comments: Right knee in brace. No limitation in flexion or extension. Mild lateral laxity and crepitus on exam bilaterally   Skin:     General: Skin is warm and dry.      Findings: No rash.   Neurological:      Mental Status: She is alert and oriented to person, place, and time.      Gait: Gait abnormal.      Comments:  No nystagmus EOMI,  however lateral gaze triggered sx of vertigo   Psychiatric:         Mood and Affect: Mood normal.         Behavior: Behavior normal.          Current Outpatient Medications:     thiamine, vitamin b-1  100 mg tabs, Taking    albuterol, Ventolin HFA 90 mcg/actuation aerosol inhaler  INHALE 1 TO 2 PUFFS BY MOUTH EVERY 4 TO 6 HOURS AS NEEDED FOR DIFFICULTY BREATHING, Taking    atorvastatin, TAKE 1 TABLET BY MOUTH EVERY DAY IN THE EVENING (Patient taking differently: 20 mg, Oral, DAILY), Taking Differently    omeprazole, 20 mg, Oral, DAILY, Taking    levothyroxine, TAKE 1 TABLET BY MOUTH EVERY DAY, Taking    acetaminophen, 500-1,000 mg, Oral,  Q6HRS PRN, PRN    meclizine, 25 mg, Oral, PRN, PRN    fluorouracil, Apply to affected area once a day at night Monday, Weds., Friday for a week. Stop if irritated. Try to use for 4 weeks (Patient not taking: Reported on 1/30/2023), Not Taking    traZODone, 50 mg, Oral, Nightly (Patient not taking: Reported on 1/30/2023), Not Taking       Basilar migraine  These were first noted end of Dec 2022, however she has had episodes of vertigo and tinnitus and hearing loss in the past and has a history of ocular migraines and CPM  Patient reports at least 5 episodes lasting 5 min to 24 hours associated with photophobia, vertigo, tinnitus, therefore meeting criteria for basilar migraine or migraine with brain stem aura  -Recommended avoiding triptans and ergot alkaloids  - Since she has fewer than 9 episodes a month there is no need for migraine prophylaxis at this time ( will avoid verapamil for hypotension, patient states she did not like how she felt on topomax in the past and is concerned about her liver with lamictal)  - May try amitriptyline, however it is uncertain if this is effective for basilar migraines  - Continue PRN Excedrin, tylenol.   - Minimize NSAID and narcotics to decrease risk of rebound migraines    Liver fibrosis  F3F4 fibrosis seen on  [employer] NAFLD profile in 2020  Results may be inaccurate in patients who were not   fasting at blood draw, or with other etiologies or chronic liver   diseases, such as viral hepatitis or alcoholic liver disease   US from Dec 2022 revealed liver with enlarged cirrhotic appearance  ELF score 9.54 in Dec 2022 with low risk progression to cirrhosis  - AFP pending  - LFTs, albumin wnl on recent labs  -Recommended following up with GI for fibroscan

## 2023-01-30 NOTE — PATIENT INSTRUCTIONS
Avoid triptans,  ergot alkaloids.  No need for migraine prophylaxis if you have fewer than 9 migraines a month.   You can ask your GI doc for a liver elastography scan.   Please obtain shingrix vaccine for shingles.

## 2023-02-01 NOTE — ASSESSMENT & PLAN NOTE
These were first noted end of Dec 2022, however she has had episodes of vertigo and tinnitus and hearing loss in the past and has a history of ocular migraines and CPM  Patient reports at least 5 episodes lasting 5 min to 24 hours associated with photophobia, vertigo, tinnitus, therefore meeting criteria for basilar migraine or migraine with brain stem aura  -Recommended avoiding triptans and ergot alkaloids  - Since she has fewer than 9 episodes a month there is no need for migraine prophylaxis at this time ( will avoid verapamil for hypotension, patient states she did not like how she felt on topomax in the past and is concerned about her liver with lamictal)  - May try amitriptyline, however it is uncertain if this is effective for basilar migraines  - Continue PRN Excedrin, tylenol.   - Minimize NSAID and narcotics to decrease risk of rebound migraines

## 2023-02-01 NOTE — ASSESSMENT & PLAN NOTE
F3F4 fibrosis seen on  [employer] NAFLD profile in 2020  Results may be inaccurate in patients who were not   fasting at blood draw, or with other etiologies or chronic liver   diseases, such as viral hepatitis or alcoholic liver disease   US from Dec 2022 revealed liver with enlarged cirrhotic appearance  ELF score 9.54 in Dec 2022 with low risk progression to cirrhosis  - AFP pending  - LFTs, albumin wnl on recent labs  -Recommended following up with GI for fibroscan
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
## 2023-02-09 ENCOUNTER — PHYSICAL THERAPY (OUTPATIENT)
Dept: PHYSICAL THERAPY | Facility: MEDICAL CENTER | Age: 58
End: 2023-02-09
Attending: OTOLARYNGOLOGY
Payer: MEDICAID

## 2023-02-09 DIAGNOSIS — R42 DIZZINESS: ICD-10-CM

## 2023-02-09 PROCEDURE — 97162 PT EVAL MOD COMPLEX 30 MIN: CPT

## 2023-02-09 ASSESSMENT — ENCOUNTER SYMPTOMS
PAIN SCALE AT HIGHEST: 10
PAIN SCALE AT LOWEST: 4
PAIN SCALE: 6

## 2023-02-16 NOTE — OP THERAPY DAILY TREATMENT
Outpatient Physical Therapy  DAILY TREATMENT     [facility] Outpatient Physical Therapy  [address]  [place] [address]  Phone:  [phone]  Fax:  [phone]    Date: 02/17/2023    Patient: Jumana Kahn  YOB: 1965  MRN: [id number]     Time Calculation    Start time: 0800  Stop time: 0841 Time Calculation (min): 41 minutes         Chief Complaint: Vertigo    Visit #: 2    SUBJECTIVE:  I've been better since last visit (L epley was performed). I feel a little bit like it might be on the R side. Has been active at the gym. Notes feeling very unsteady when she gets off the elliptical.     OBJECTIVE:        Therapeutic Treatments and Modalities:     1. Canalith Repositioning (CPT 72485), L epley completed x2. The first was performed by the clinician. Pt reports a short light burst of dizziness in L hallpike, but no nystagmus was evident. The 2nd CRM was performed by the pt with PT verbally guiding to improve indep with home treatment. Pt required 60% cuing. Symptoms remained the same in the 2nd round.    2. Neuromuscular Re-education (CPT 41543), see below    Therapeutic Treatment and Modalities Summary:   - Discussed contributions of vision/vestibular/proprio systems for balance, how recurrent episodes of BPPV should be managed and how prolonged vertigo experiences can lead to vestibular hypofunction (jack in light of her TBI). Reviewed goals with regard to VRT. Discussed compensation vs habituation techniques.   Time-based treatments/modalities:    Physical Therapy Timed Treatment Charges  Neuromusc re-ed, balance, coor, post minutes (CPT 98917): 10 minutes    ASSESSMENT:   Response to treatment: BPPV appears to be clear. She was mildly symptomatic in the L hallpike, but seems to be more consistent with motion sensitivity from vestibular hypofunction. Focused discussion regarding ideal concepts with managing the BPPV and improving pt's independence. Is  able to verbalize understanding of the concepts. Will need further VRT progressions. Recommend starting next visit if the BPPV remains stable.     PLAN/RECOMMENDATIONS:   Plan for treatment: therapy treatment to continue next visit.  Planned interventions for next visit: continue with current treatment.

## 2023-02-17 ENCOUNTER — PHYSICAL THERAPY (OUTPATIENT)
Dept: PHYSICAL THERAPY | Facility: MEDICAL CENTER | Age: 58
End: 2023-02-17
Attending: OTOLARYNGOLOGY
Payer: MEDICAID

## 2023-02-17 DIAGNOSIS — R26.2 DIFFICULTY WALKING: ICD-10-CM

## 2023-02-17 DIAGNOSIS — R42 DIZZINESS: ICD-10-CM

## 2023-02-17 DIAGNOSIS — R26.89 IMBALANCE: ICD-10-CM

## 2023-02-17 PROCEDURE — 97112 NEUROMUSCULAR REEDUCATION: CPT | Mod: XU

## 2023-02-17 PROCEDURE — 95992 CANALITH REPOSITIONING PROC: CPT

## 2023-02-20 ENCOUNTER — PHYSICAL THERAPY (OUTPATIENT)
Dept: PHYSICAL THERAPY | Facility: MEDICAL CENTER | Age: 58
End: 2023-02-20
Attending: OTOLARYNGOLOGY
Payer: MEDICAID

## 2023-02-20 DIAGNOSIS — R26.89 IMBALANCE: ICD-10-CM

## 2023-02-20 DIAGNOSIS — R26.2 DIFFICULTY WALKING: ICD-10-CM

## 2023-02-20 DIAGNOSIS — R42 DIZZINESS: ICD-10-CM

## 2023-02-20 PROCEDURE — 97110 THERAPEUTIC EXERCISES: CPT

## 2023-02-20 PROCEDURE — 97112 NEUROMUSCULAR REEDUCATION: CPT

## 2023-02-20 NOTE — OP THERAPY DAILY TREATMENT
"  Outpatient Physical Therapy  DAILY TREATMENT     [facility]patient Physical Therapy  [address]  [place] [address]  Phone:  [phone]  Fax:  [phone]    Date: 02/20/2023    Patient: Jumana Kahn  YOB: 1965  MRN: [id number]     Time Calculation    Start time: 0846  Stop time: 0930 Time Calculation (min): 44 minutes         Chief Complaint: Loss Of Balance    Visit #: 3    SUBJECTIVE:  I'm feeling better overall, though I have had a couple moments where it felt like it was going to come on (rolling in bed or turning the head too quickly). I feel like I'm closer to my baseline.     OBJECTIVE:      Therapeutic Exercises (CPT 52866):     1. LTR, x 10 ea    2. UT str, x 1 min ea    3. gastroc str, x 1 min ea    4. heel raises, x 20    Therapeutic Treatments and Modalities:     1. Neuromuscular Re-education (CPT 97282)    Therapeutic Treatment and Modalities Summary:   - Reassessment of positional exam. NEG for hallpike, roll and hang bilaterally   - [employer]ology pews EO/EC x 1 min ea  - Narrow stance EO trunk rotations with spotting x 10 ea  - EC head circles: x 5 ea   - VORx1: 80 bpm x 30\", 90 bpm x 30\", 100 bpm symptomatic after 15\"   - Airex EC x 1 min  Time-based treatments/modalities:    Physical Therapy Timed Treatment Charges  Neuromusc re-ed, balance, coor, post minutes (CPT 70948): 28 minutes  Therapeutic exercise minutes (CPT 81780): 15 minutes    ASSESSMENT:   Response to treatment: BPPV is stable. Able to work back into VRT progressions with good recall from prior episodes of care. Good learning response on foam with EC-> reduced from mod to min sway over the course of 1 min.     PLAN/RECOMMENDATIONS:   Plan for treatment: therapy treatment to continue next visit.  Planned interventions for next visit: continue with current treatment.         "

## 2023-02-23 DIAGNOSIS — K74.60 HEPATIC CIRRHOSIS, UNSPECIFIED HEPATIC CIRRHOSIS TYPE, UNSPECIFIED WHETHER ASCITES PRESENT (HCC): ICD-10-CM

## 2023-03-06 ENCOUNTER — PHYSICAL THERAPY (OUTPATIENT)
Dept: PHYSICAL THERAPY | Facility: MEDICAL CENTER | Age: 58
End: 2023-03-06
Attending: OTOLARYNGOLOGY
Payer: MEDICAID

## 2023-03-06 DIAGNOSIS — R42 DIZZINESS: ICD-10-CM

## 2023-03-06 DIAGNOSIS — R26.89 IMBALANCE: ICD-10-CM

## 2023-03-06 DIAGNOSIS — R26.2 DIFFICULTY WALKING: ICD-10-CM

## 2023-03-06 PROCEDURE — 95992 CANALITH REPOSITIONING PROC: CPT

## 2023-03-06 PROCEDURE — 97112 NEUROMUSCULAR REEDUCATION: CPT | Mod: XU

## 2023-03-06 NOTE — OP THERAPY DAILY TREATMENT
"  Outpatient Physical Therapy  DAILY TREATMENT     [facility]  [address]  [place] [address]  Phone:  [phone]  Fax:  [phone]    Date: 03/06/2023    Patient: Jumana Kahn  YOB: 1965  MRN: [id number]     Time Calculation    Start time: 0929  Stop time: 1015 Time Calculation (min): 46 minutes         Chief Complaint: Vertigo    Visit #: 4    SUBJECTIVE:  States she has had a relapse with the vertigo. Felt it some on and off over the last couple days when moving head quickly, but hit hard last night during sleep. Tried to treat it twice, but is still feeling it some.     OBJECTIVE:      Therapeutic Treatments and Modalities:     1. Canalith Repositioning (CPT 61549), L hallpike + for subjective vertigo x 2\", no nystagmus. L epley was completed. Did have sx in position 3 as well and reports sx relief upon sitting.    2. Neuromuscular Re-education (CPT 94171)    Therapeutic Treatment and Modalities Summary:   - Balance on tilt 4 way with EC. X 30\" ea (req EO with toes up due to weakness in R ant tib)  - Airex balance ankle sways: EO/EC x 1 min ea  - Airex balance with HTs: narrow with horiz, wide with vert  - Self balloon tap: static and with gait, CGA x 2 min attempts ea  - Long lunge travolta with eyes on the ball x 10 ea  - Standing VORx1 at 120 bpm x 30:  - Gait with VORx1: very difficult to coordinate. Required manual feedback for fwd trunk lean to increase gait speed  - Gait with 3 way HTs: 1 x 25 feet ea with turns verbally cued.   Time-based treatments/modalities:    Physical Therapy Timed Treatment Charges  Neuromusc re-ed, balance, coor, post minutes (CPT 12485): 30 minutes    ASSESSMENT:   Response to treatment: Mild relapse in BPPV, though showed much improved indep with self management strategies. Had almost completely resolved the relapse on her own. Continues to demo ataxic movement quality with high level " vestibular challenges (airex vert HTs and gait with HTs). Understands VRT progressions from previous episodes of care. Would like to hold further skilled therapy to work on HEP x 30 days. Ok to d/c if no return in that time.     PLAN/RECOMMENDATIONS:   Plan for treatment: therapy treatment to continue next visit.  Planned interventions for next visit: continue with current treatment.

## 2023-04-18 ENCOUNTER — TELEPHONE (OUTPATIENT)
Dept: INTERNAL MEDICINE | Facility: OTHER | Age: 58
End: 2023-04-18

## 2023-04-18 ENCOUNTER — OFFICE VISIT (OUTPATIENT)
Dept: INTERNAL MEDICINE | Facility: OTHER | Age: 58
End: 2023-04-18
Payer: MEDICAID

## 2023-04-18 VITALS
BODY MASS INDEX: 26.84 KG/M2 | HEART RATE: 66 BPM | SYSTOLIC BLOOD PRESSURE: 106 MMHG | TEMPERATURE: 97.8 F | WEIGHT: 157.2 LBS | DIASTOLIC BLOOD PRESSURE: 68 MMHG | OXYGEN SATURATION: 98 % | HEIGHT: 64 IN

## 2023-04-18 DIAGNOSIS — L81.4 LENTIGO: ICD-10-CM

## 2023-04-18 DIAGNOSIS — L57.0 ACTINIC KERATOSES: ICD-10-CM

## 2023-04-18 DIAGNOSIS — L90.5 SCAR: ICD-10-CM

## 2023-04-18 PROCEDURE — 99213 OFFICE O/P EST LOW 20 MIN: CPT | Performed by: DERMATOLOGY

## 2023-04-18 ASSESSMENT — FIBROSIS 4 INDEX: FIB4 SCORE: 1.55

## 2023-04-18 NOTE — PROGRESS NOTES
"           Jumana Kahn  1965  [id number]    Follow up             Vitals:    04/18/23 0840   BP: 106/68   BP Location: Right arm   Patient Position: Sitting   BP Cuff Size: Adult   Pulse: 66   Temp: 36.6 °C (97.8 °F)   TempSrc: Temporal   SpO2: 98%   Weight: 71.3 kg (157 lb 3.2 oz)   Height: 1.626 m (5' 4\")       Chief Complaint   Patient presents with    Follow-Up     Pt not using the medication prescribed as she was told she has liver cirrhosis and medication has bad affects to liver     ___________________________________________________________________            History of Present Illness (HPI)   F/u spots on face - pt used carac about 4 times on face. Got really red and irritated so pt stopped cream. Spots may be a little better.     Pt didn't use steroid cream triamcinolone for rash on body because liver doctor told her that not good for her liver.     Current Outpatient Medications on File Prior to Visit   Medication Sig Dispense Refill    thiamine (VITAMIN B-1) 100 MG Tab vitamin b-1  100 mg tabs      albuterol 108 (90 Base) MCG/ACT Aero Soln inhalation aerosol Ventolin HFA 90 mcg/actuation aerosol inhaler   INHALE 1 TO 2 PUFFS BY MOUTH EVERY 4 TO 6 HOURS AS NEEDED FOR DIFFICULTY BREATHING      atorvastatin (LIPITOR) 40 MG Tab TAKE 1 TABLET BY MOUTH EVERY DAY IN THE EVENING (Patient taking differently: Take 20 mg by mouth every day.) 30 Tablet 11    omeprazole (PRILOSEC) 20 MG delayed-release capsule Take 1 Capsule by mouth every day. 90 Capsule 3    levothyroxine (SYNTHROID) 125 MCG Tab TAKE 1 TABLET BY MOUTH EVERY DAY 30 Tablet 8    acetaminophen (TYLENOL) 500 MG Tab Take 1-2 Tablets by mouth every 6 hours as needed.      meclizine (ANTIVERT) 25 MG Tab Take 1 Tablet by mouth as needed for Vertigo.      fluorouracil (EFUDEX) 5 % cream Apply to affected area once a day at night Monday, Weds., Friday for a week. Stop if irritated. Try to use for 4 weeks (Patient not taking: Reported on 1/30/2023) " 40 g 3    traZODone (DESYREL) 50 MG Tab Take 1 Tablet by mouth every evening. (Patient not taking: Reported on 2023)       No current facility-administered medications on file prior to visit.     Ampicillin      Review of Systems:        General: Denies fever      Hematologic: no excessive bleeding problems              Past Medical History:   Diagnosis Date    Anemia     Asthma     GERD (gastroesophageal reflux disease)     Head ache     Hypothyroid     Psychiatric disorder     Substance abuse (HCC)      Skin Cancer no h/o    Social History     Tobacco Use    Smoking status: Former     Packs/day: 0.00     Types: Cigarettes     Quit date: 2001     Years since quittin.7    Smokeless tobacco: Never   Vaping Use    Vaping Use: Never used   Substance Use Topics    Alcohol use: Not Currently     Comment: has not drank for over a year    Drug use: No     Sunscreen Use:No     Past Surgical History:   Procedure Laterality Date    GASTROSCOPY N/A 5/10/2020    Procedure: GASTROSCOPY;  Surgeon: Lucas Crespo M.D.;  Location: SURGERY [facility];  Service: Gastroenterology    AR UPPER GI ENDOSCOPY,CTRL BLEED N/A 1/15/2020    Procedure: EGD, WITH CLIP PLACEMENT;  Surgeon: Pito Davis M.D.;  Location: SURGERY [facility];  Service: Gastroenterology    GYN SURGERY      tubal ligation    OTHER      sinus surgery    OTHER ORTHOPEDIC SURGERY      leg           History reviewed. No pertinent family history.      Physical Exam:   Constitutional: Well nourished, NAD, pleasant  alert and cooperative; normal mood and affect; normal attention span and concentration.    Skin Full body exam done: no suspicious lesions on eyelids, lips, face, ears, neck, chest, back, abdomen, upper extremities, fingernails, lower extremities except as noted   Face - no makeup. Rare brown macs on L cheek  R forehead above R eyebrow, R lower cheek 2 pink scaly paps   b/l forearms and dorsal hands several brown macs and few brown  stuck on paps   R forearm about 4 pink scaly paps  L mid upper back just above bra pink scar  L dorsal foot pink healing pink spot  L upper chest 1 pink scaly pap          Assessment and Plan:   1. Actinic keratoses, unchanged  Pt has a few scaly spot son R eyebrow, R cheek. L cheek.   Pt got a big reaction after 4 uses of carac so pt stopped cream. Explained reaction is expected if sun damage.   Discussed option of ln2. Pt will try carac again only once a week . Recommend no makeup at next visit- fluorouracil (EFUDEX) 5 % cream; Apply to affected area once a day at night once a week. Stop if irritated. Try to use for 4 weeks  Dispense: 40 g; Refill: 3  After using on face, can use cream to R forearm and L chest.   Explained risk of liver problems is low with less surface area and using cream once a week.   Reviewed GI consultants EGD and AFP 2/2023.   Pt has f/u with GI consultants.   Will get copies of recent labs 4/2023.   Pt has waqar to f/u with PCP next week.       2. Lentigo unchanged  Recommend follow for changes. ABCD's of changing skin lesions were reviewed, and the appropriate use of sunscreen was outlined and recommended..     3. Scar stable h/o biopsy 1/2023 for dermatofibroma on L mid upper back. Pt says healed well and not itchy.     Duyen Garcia M.D.   Return in about 6 months (around 10/18/2023).

## 2023-04-18 NOTE — PATIENT INSTRUCTIONS
Apply carac fluorouracil to scaly spots on face only once a week at night.   Use for about a week. Stop if irritated. Try to use one week and then stop for a week.   Try to use for 4 weeks total. If spots are gone, then stop cream. If scaly spots are   Still there, can use the same way for another 4 weeks.       After doing cream to face, try carac to scaly spots on arms R and L chest. Try Apply carac fluorouracil to scaly spots only on Monday, Wednesday, Friday at night.   Use for about a week. Stop if irritated. Try to use one week and then stop for a week.  Try to use for 4 weeks total. If spots are gone, then stop cream. If scaly spots are Still there, can use the same way for another 4 weeks.

## 2023-04-19 DIAGNOSIS — K76.6 PORTAL HYPERTENSION (HCC): ICD-10-CM

## 2023-04-19 DIAGNOSIS — Z87.19 HISTORY OF CIRRHOSIS: ICD-10-CM

## 2023-04-19 DIAGNOSIS — K21.00 GASTROESOPHAGEAL REFLUX DISEASE WITH ESOPHAGITIS WITHOUT HEMORRHAGE: ICD-10-CM

## 2023-04-28 ENCOUNTER — OFFICE VISIT (OUTPATIENT)
Dept: INTERNAL MEDICINE | Facility: OTHER | Age: 58
End: 2023-04-28
Payer: MEDICAID

## 2023-04-28 VITALS
SYSTOLIC BLOOD PRESSURE: 110 MMHG | DIASTOLIC BLOOD PRESSURE: 68 MMHG | HEIGHT: 64 IN | HEART RATE: 56 BPM | TEMPERATURE: 98.2 F | BODY MASS INDEX: 26.84 KG/M2 | RESPIRATION RATE: 19 BRPM | OXYGEN SATURATION: 97 % | WEIGHT: 157.2 LBS

## 2023-04-28 DIAGNOSIS — Z87.19 HISTORY OF CIRRHOSIS: ICD-10-CM

## 2023-04-28 DIAGNOSIS — Z12.11 SCREENING FOR COLON CANCER: ICD-10-CM

## 2023-04-28 PROCEDURE — 99213 OFFICE O/P EST LOW 20 MIN: CPT | Mod: GC

## 2023-04-28 RX ORDER — ALBUTEROL SULFATE 90 UG/1
AEROSOL, METERED RESPIRATORY (INHALATION)
Qty: 8.5 G | Refills: 1 | Status: SHIPPED | OUTPATIENT
Start: 2023-04-28 | End: 2023-06-23

## 2023-04-28 ASSESSMENT — ENCOUNTER SYMPTOMS
DIZZINESS: 0
VOMITING: 0
HEARTBURN: 0
NAUSEA: 0
SPUTUM PRODUCTION: 0
HEADACHES: 0
COUGH: 0
BACK PAIN: 0
CLAUDICATION: 0
WEAKNESS: 0
PALPITATIONS: 0
SORE THROAT: 0
NECK PAIN: 0
BLURRED VISION: 0
CHILLS: 0
EYE DISCHARGE: 0
NERVOUS/ANXIOUS: 0
DEPRESSION: 0
DIARRHEA: 0
WEIGHT LOSS: 0
BRUISES/BLEEDS EASILY: 0
ABDOMINAL PAIN: 0
SHORTNESS OF BREATH: 0
SENSORY CHANGE: 0
WHEEZING: 0
CONSTIPATION: 0
DOUBLE VISION: 0
FEVER: 0
ORTHOPNEA: 0
BLOOD IN STOOL: 0
INSOMNIA: 0

## 2023-04-28 ASSESSMENT — FIBROSIS 4 INDEX: FIB4 SCORE: 1.55
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
## 2023-06-04 RX ORDER — AMOXICILLIN AND CLAVULANATE POTASSIUM 875; 125 MG/1; MG/1
1 TABLET, FILM COATED ORAL 2 TIMES DAILY
Qty: 14 TABLET | Refills: 0 | OUTPATIENT
Start: 2023-06-04 | End: 2023-10-17

## 2023-06-05 ENCOUNTER — TELEPHONE (OUTPATIENT)
Dept: PHYSICAL THERAPY | Facility: MEDICAL CENTER | Age: 58
End: 2023-06-05
Payer: MEDICAID

## 2023-06-05 RX ORDER — AMOXICILLIN AND CLAVULANATE POTASSIUM 875; 125 MG/1; MG/1
1 TABLET, FILM COATED ORAL 2 TIMES DAILY
Qty: 14 TABLET | Refills: 0 | Status: CANCELLED | OUTPATIENT
Start: 2023-06-05

## 2023-06-05 NOTE — OP THERAPY DISCHARGE SUMMARY
Outpatient Physical Therapy  DISCHARGE SUMMARY NOTE      [facility] Therapy  [address]  [place] [address]  Phone:  [phone]  Fax:  [phone]    Date of Visit: 2023    Patient: Jumana Kahn  YOB: 1965  MRN: [id number]     Referring Provider: Aram Keating M.D.  [address] [facility] [place] [address]   Referring Diagnosis R42         Functional Assessment Used        Your patient is being discharged from Physical Therapy with the following comments:   Goals met    Comments:  Ms. Kahn was seen for 4 PT visits treating her chronic vertigo and imbalance related to a chronic history of BPPV and TBI. Her BPPV has mainly involved the L posterior canal and is highly recurrent. She demonstrates positive response the L epley. She is willing to perform the home CRM, but is not always successful in clearing this on her own. During this episode of care, the BPPV was successfully cleared. Ms. Kahn showed good independence with her VRT HEP after review. Formal follow up in the clinic was held to work on her HEP and return only if the vertigo relapsed. Fortunately, she has not required additional care. Will discharge as auth is now .     Whit Richmond, PT, DPT    Date: 2023

## 2023-06-05 NOTE — TELEPHONE ENCOUNTER
Patient states she was in the hospital at [facility] and was prescribed an antibiotic that she could not take. She states that she spoke to one of our doctors here in the office and that doctor told her she would send an alternative medication. There are no phone notes in the patient chart regarding this but there is an Augmentin prescription that was entered by Dr. Gan. The medication was never received by the pharmacy. I called the [facility] and confirmed they did not receive it. Please advise.

## 2023-06-07 NOTE — TELEPHONE ENCOUNTER
Called her back and she said she picked up amoxicillin for sinusitis and her pharmacist reviewed that this was not a true allergy in the past and rash was likely secondary to scarlet fever . She tolerated amoxicillin in the past.

## 2023-06-12 ENCOUNTER — OFFICE VISIT (OUTPATIENT)
Dept: INTERNAL MEDICINE | Facility: OTHER | Age: 58
End: 2023-06-12
Payer: MEDICAID

## 2023-06-12 VITALS
OXYGEN SATURATION: 96 % | HEART RATE: 52 BPM | BODY MASS INDEX: 28.52 KG/M2 | HEIGHT: 62 IN | SYSTOLIC BLOOD PRESSURE: 109 MMHG | DIASTOLIC BLOOD PRESSURE: 74 MMHG | TEMPERATURE: 98.1 F | WEIGHT: 155 LBS

## 2023-06-12 DIAGNOSIS — H53.9 VISION ABNORMALITIES: ICD-10-CM

## 2023-06-12 DIAGNOSIS — S93.401S SPRAIN OF RIGHT ANKLE, UNSPECIFIED LIGAMENT, SEQUELA: ICD-10-CM

## 2023-06-12 DIAGNOSIS — G44.89 OTHER HEADACHE SYNDROME: ICD-10-CM

## 2023-06-12 DIAGNOSIS — H53.8 BLURRING OF VISUAL IMAGE: ICD-10-CM

## 2023-06-12 DIAGNOSIS — E78.5 HYPERLIPIDEMIA, UNSPECIFIED HYPERLIPIDEMIA TYPE: ICD-10-CM

## 2023-06-12 DIAGNOSIS — G43.109 BASILAR MIGRAINE: ICD-10-CM

## 2023-06-12 DIAGNOSIS — J32.9 RECURRENT SINUSITIS: ICD-10-CM

## 2023-06-12 DIAGNOSIS — K74.00 LIVER FIBROSIS: ICD-10-CM

## 2023-06-12 DIAGNOSIS — S82.891D CLOSED FRACTURE OF RIGHT ANKLE WITH ROUTINE HEALING: ICD-10-CM

## 2023-06-12 PROBLEM — S93.401A SPRAIN OF RIGHT ANKLE: Status: ACTIVE | Noted: 2022-11-06

## 2023-06-12 PROBLEM — K44.9 DIAPHRAGMATIC HERNIA WITHOUT OBSTRUCTION OR GANGRENE: Status: ACTIVE | Noted: 2023-02-13

## 2023-06-12 PROBLEM — S82.891B OPEN FRACTURE OF RIGHT ANKLE: Status: ACTIVE | Noted: 2023-06-12

## 2023-06-12 PROCEDURE — 2023F DILAT RTA XM W/O RTNOPTHY: CPT | Performed by: STUDENT IN AN ORGANIZED HEALTH CARE EDUCATION/TRAINING PROGRAM

## 2023-06-12 PROCEDURE — 99214 OFFICE O/P EST MOD 30 MIN: CPT | Mod: GC | Performed by: STUDENT IN AN ORGANIZED HEALTH CARE EDUCATION/TRAINING PROGRAM

## 2023-06-12 PROCEDURE — 3074F SYST BP LT 130 MM HG: CPT | Performed by: STUDENT IN AN ORGANIZED HEALTH CARE EDUCATION/TRAINING PROGRAM

## 2023-06-12 PROCEDURE — 3078F DIAST BP <80 MM HG: CPT | Performed by: STUDENT IN AN ORGANIZED HEALTH CARE EDUCATION/TRAINING PROGRAM

## 2023-06-12 ASSESSMENT — FIBROSIS 4 INDEX: FIB4 SCORE: 1.55

## 2023-06-12 NOTE — ASSESSMENT & PLAN NOTE
F3F4 fibrosis seen on  [employer] FibroMeter NAFLD profile in 2020  Results may be inaccurate in patients who were not   fasting at blood draw, or with other etiologies or chronic liver   diseases, such as viral hepatitis or alcoholic liver disease   US from Dec 2022 revealed liver with enlarged cirrhotic appearance  ELF score 9.54 in Dec 2022 with low risk progression to cirrhosis    Fibrosure from 4/17 revealed to F1 score (minimal fibrosis), alpha-2-macroglobulin 180, haptoglobin 75, apolipoprotein A1 127, total bilirubin 0.7, GGT 29, ALT 23    However, this has limited diagnostic accuracy in the detection of fibrosis with a documented sensitivity, specificity, and diagnostic odds ratio (TO) for significant fibrosis were 61 (48-72%), 80 (72-86%), and 6.2% (3.3-11.9)   [Mara NN, Deirdre P, Steven G, Antwan M. FibroTest/Fibrosure for significant liver fibrosis and cirrhosis in chronic hepatitis B: a meta-analysis. Am J Gastroenterol. 2014 Bassem;109(6):796-809. doi: 10.1038/ajg.2014.21. Epub 2014 Feb 18. PMID: [id number]. ]    No signs of decompensated cirrhosis at this time  She underwent an endoscopy on 2/13/2023 which revealed patch of salmon-colored mucosa in the esophagus and a small hiatal hernia.  There were no reported varices or gastropathy.  She has no ascites or signs of fluid overload    AFP 2 in 2020  Hepatitis panel negative in 2020  labs from April 2023  notable for AST 21 ALT 23, albumin 4.3,  Na 141,  TBili, 0.7    She is following with GI for further evaluation to evaluate degree of fibrosis/cirrhosis.  She is planning to have an MRI elastogram    Obtain INR and repeat AFP

## 2023-06-12 NOTE — ASSESSMENT & PLAN NOTE
"Reports dull headache \" in the middle of her head\"  that is alleviated by tylenol and more mild with her migraine and not associated with aura. There are no associated vision changes or eye pain concerning for pituitary tumor or sinus vein thrombosis. She has a history of CPM, however no known ventricular obstruction. No new urinary issues or incontinence concerning for NPH  - Encouraged to follow with her neurologist and go  To the ED  If pain is severe and associated with focal deficits.   "

## 2023-06-12 NOTE — PROGRESS NOTES
"Jumana Kahn  is a 57 y.o. female with a PMH of   Patient Active Problem List   Diagnosis    Hypothyroidism    Idiopathic hypotension    Central pontine myelinolysis (HCC)    GERD (gastroesophageal reflux disease)    Vitamin D deficiency    Neuropathy    Accommodative insufficiency    PVD (posterior vitreous detachment), bilateral    Blurring of visual image    Neck pain, chronic    Dental caries    Hearing loss, bilateral    History of alcohol abuse    History of repair of rotator cuff    Hx of traumatic brain injury    Lower back pain    Tinnitus, bilateral    Vertigo    Atherosclerosis of both carotid arteries    Sprain of right ankle    Ocular migraine    Recurrent sinusitis    Liver fibrosis    Basilar migraine    Diaphragmatic hernia without obstruction or gangrene    Hyperlipidemia    Closed fracture of right ankle with routine healing    Other headache syndrome    Vision abnormalities    here to address the following:    Symptoms for sinusitis started 2-3 weeks ago. Strep negative.   She was started on Augment for sinusitis 5 days ag for total 7 day course   She states that although ampicillin was listed in her allergies,  she only had a rash in the past and is now tolerating it without any adverse effects.     She's been having a headache that started a few months ago when she wakes up.  It is located in the \"middle of her brain\".  It is dull but may last all day. Not like a migraine- not as debilitating, no aura.  Partial and almost complete improvement with tylenol. History of 2 sinus surgeries and recurrent sinus infections. No sinus infections for 10 y prior to this one. No change in vertigo.     Intermittent episodes with feeling lights are flickering and associated with neck pain. Flickering started last year. Was told it was a migraine in the past. Now flickering is more frequent. Neurologist was seen 1.5 mo ago. Has not taken any seizure or migraine meds.      Neurophthamologist also " thought this could be a migraine     Has intermittent low blood pressures.     ?Cirrhosis/ Fibrosis-She is following with GI      ROS All ROS negative unless otherwise mentioned in HPI    Vitals:    06/12/23 1454   BP: 109/74   Pulse: (!) 52   Temp: 36.7 °C (98.1 °F)   SpO2: 96%         Physical Exam  Constitutional:       Appearance: Normal appearance.   HENT:      Head: Normocephalic and atraumatic.      Nose: No rhinorrhea.      Mouth/Throat:      Mouth: Mucous membranes are moist.      Pharynx: Oropharynx is clear. No oropharyngeal exudate or posterior oropharyngeal erythema.   Eyes:      Conjunctiva/sclera: Conjunctivae normal.   Neck:      Comments: No neck swelling  Cardiovascular:      Rate and Rhythm: Normal rate and regular rhythm.      Heart sounds: No murmur heard.  Pulmonary:      Effort: Pulmonary effort is normal.      Breath sounds: Normal breath sounds. No wheezing or rales.   Abdominal:      General: There is no distension.      Palpations: Abdomen is soft. There is no mass.      Tenderness: There is no abdominal tenderness.      Comments: No costovertebral or suprapubic tenderness   Musculoskeletal:         General: No swelling or deformity.      Cervical back: Normal range of motion.      Comments: Right ankle with weakness with dorsiflexion and eversion   Skin:     General: Skin is warm and dry.      Findings: No rash.   Neurological:      Mental Status: She is alert and oriented to person, place, and time.      Cranial Nerves: No cranial nerve deficit.      Gait: Gait abnormal.      Comments:  No nystagmus EOMI,  however lateral gaze triggered sx of vertigo   Psychiatric:         Mood and Affect: Mood normal.         Behavior: Behavior normal.                Current Outpatient Medications:     amoxicillin-clavulanate, 1 Tablet, Oral, BID, Taking    albuterol, Ventolin HFA 90 mcg/actuation aerosol inhale INHALE 1 TO 2 PUFFS BY MOUTH EVERY 4 TO 6 HOURS AS NEEDED FOR DIFFICULTY BREATHING, Taking     fluorouracil, Apply to affected area once a day at night Monday, Weds., Friday for a week. Stop if irritated. Try to use for 4 weeks, Taking    thiamine, vitamin b-1  100 mg tabs, Taking    atorvastatin, TAKE 1 TABLET BY MOUTH EVERY DAY IN THE EVENING (Patient taking differently: 20 mg, Oral, DAILY), Taking    omeprazole, 20 mg, Oral, DAILY, Taking    levothyroxine, TAKE 1 TABLET BY MOUTH EVERY DAY, Taking    acetaminophen, 500-1,000 mg, Oral, Q6HRS PRN, Taking    traZODone, 50 mg, Oral, Nightly, Taking    meclizine, 25 mg, Oral, PRN, Taking       Basilar migraine  These were first noted end of Dec 2022, however she has had episodes of vertigo and tinnitus and hearing loss in the past and has a history of ocular migraines and CPM    Liver fibrosis  F3F4 fibrosis seen on  [employer] FibroMeter NAFLD profile in 2020  Results may be inaccurate in patients who were not   fasting at blood draw, or with other etiologies or chronic liver   diseases, such as viral hepatitis or alcoholic liver disease   US from Dec 2022 revealed liver with enlarged cirrhotic appearance  ELF score 9.54 in Dec 2022 with low risk progression to cirrhosis    Fibrosure from 4/17 revealed to F1 score (minimal fibrosis), alpha-2-macroglobulin 180, haptoglobin 75, apolipoprotein A1 127, total bilirubin 0.7, GGT 29, ALT 23    However, this has limited diagnostic accuracy in the detection of fibrosis with a documented sensitivity, specificity, and diagnostic odds ratio (TO) for significant fibrosis were 61 (48-72%), 80 (72-86%), and 6.2% (3.3-11.9)   [Mara NN, Deirdre P, Steven G, Antwan M. FibroTest/Fibrosure for significant liver fibrosis and cirrhosis in chronic hepatitis B: a meta-analysis. Am J Gastroenterol. 2014 Bassem;109(6):796-809. doi: 10.1038/ajg.2014.21. Epub 2014 Feb 18. PMID: 15323142. ]    No signs of decompensated cirrhosis at this time  She underwent an endoscopy on 2/13/2023 which revealed patch of salmon-colored mucosa in the  "esophagus and a small hiatal hernia.  There were no reported varices or gastropathy.  She has no ascites or signs of fluid overload    AFP 2 in 2020  Hepatitis panel negative in 2020  labs from April 2023  notable for AST 21 ALT 23, albumin 4.3,  Na 141,  TBili, 0.7    She is following with GI for further evaluation to evaluate degree of fibrosis/cirrhosis.  She is planning to have an MRI elastogram    Obtain INR and repeat AFP      Other headache syndrome  Reports dull headache \" in the middle of her head\"  that is alleviated by tylenol and more mild with her migraine and not associated with aura. There are no associated vision changes or eye pain concerning for pituitary tumor or sinus vein thrombosis. She has a history of CPM, however no known ventricular obstruction. No new urinary issues or incontinence concerning for NPH  - Encouraged to follow with her neurologist and go  To the ED  If pain is severe and associated with focal deficits.     Vision abnormalities  Reports occasional light flickering lasting a few seconds. No loss of consciousness, no associated tremors, no associated palpitations.   She does have some low blood pressure at baseline, perhaps dropping blood pressure is contributing vs focal seizure.   Encouraged to follow up with neurology as negative EEG cannot rule out seizure    Recurrent sinusitis  Currently on augmentin for sinusitis  Continues to have a frontal headache, however this is improving.     Hyperlipidemia  Currently on Atorvastatin 20 with no adverse reaction  Repeat labs pending ( lipid panel and CMP)    Sprain of right ankle  History of ankle fracture  States she has never had an MRI of her ankle  Continues to have right ankle swelling reduced dorsiflexion and eversion and ankle instability  - Referral to ortho for MRI of ankle and possible surgery     "

## 2023-06-12 NOTE — ASSESSMENT & PLAN NOTE
These were first noted end of Dec 2022, however she has had episodes of vertigo and tinnitus and hearing loss in the past and has a history of ocular migraines and CPM

## 2023-06-12 NOTE — PATIENT INSTRUCTIONS
Please follow up with neuro  and ophthamology about flickering-I'm not sure if this is related to seizure-like activity.   I sent a referral for orthopedics for the ankle.   I ordered 2 labs to add on to labs for GI.   Go to hospital for persistent loss of vision or loss of consciousness.   Please follow up in 2-3 months to establish with Dr. Ariza and go over labs.   Please obtain Shingrix vaccine  You can wait another 2 years for colonoscopy

## 2023-06-13 NOTE — ASSESSMENT & PLAN NOTE
History of ankle fracture  States she has never had an MRI of her ankle  Continues to have right ankle swelling reduced dorsiflexion and eversion and ankle instability  - Referral to ortho for MRI of ankle and possible surgery

## 2023-06-13 NOTE — ASSESSMENT & PLAN NOTE
Reports occasional light flickering lasting a few seconds. No loss of consciousness, no associated tremors, no associated palpitations.   She does have some low blood pressure at baseline, perhaps dropping blood pressure is contributing vs focal seizure.   Encouraged to follow up with neurology as negative EEG cannot rule out seizure

## 2023-06-13 NOTE — ASSESSMENT & PLAN NOTE
Currently on augmentin for sinusitis  Continues to have a frontal headache, however this is improving.

## 2023-06-23 RX ORDER — ALBUTEROL SULFATE 90 UG/1
AEROSOL, METERED RESPIRATORY (INHALATION)
Qty: 8.5 EACH | Refills: 0 | Status: SHIPPED | OUTPATIENT
Start: 2023-06-23

## 2023-07-17 ENCOUNTER — OFFICE VISIT (OUTPATIENT)
Dept: INTERNAL MEDICINE | Facility: OTHER | Age: 58
End: 2023-07-17
Payer: MEDICAID

## 2023-07-17 VITALS
WEIGHT: 155 LBS | SYSTOLIC BLOOD PRESSURE: 107 MMHG | HEART RATE: 66 BPM | TEMPERATURE: 98.1 F | OXYGEN SATURATION: 97 % | DIASTOLIC BLOOD PRESSURE: 73 MMHG | BODY MASS INDEX: 27.46 KG/M2 | HEIGHT: 63 IN

## 2023-07-17 DIAGNOSIS — E78.5 HYPERLIPIDEMIA, UNSPECIFIED HYPERLIPIDEMIA TYPE: ICD-10-CM

## 2023-07-17 PROCEDURE — 3074F SYST BP LT 130 MM HG: CPT

## 2023-07-17 PROCEDURE — 2023F DILAT RTA XM W/O RTNOPTHY: CPT

## 2023-07-17 PROCEDURE — 99213 OFFICE O/P EST LOW 20 MIN: CPT | Mod: GC

## 2023-07-17 PROCEDURE — 3078F DIAST BP <80 MM HG: CPT

## 2023-07-17 ASSESSMENT — FIBROSIS 4 INDEX: FIB4 SCORE: 1.57

## 2023-07-17 NOTE — PROGRESS NOTES
Subjective:     CC: Follow-up on lab results    HPI: 89  Jumana presents today for follow-up and to discuss her lab results.  Patient states that she was previously told she has a history of liver cirrhosis.  However, extensive testing was done and her FibroSure testing shows that she has mild to moderate fibrosis and scarring, but no cirrhosis.  Patient is currently more healthy.  She has discontinued her alcohol consumption.  Patient is established with [facility] and is followed up with them regularly.  Lab results for PT and INR are within normal limits for genetic function of the liver.    Patient has previously taking atorvastatin.  However, her ASCVD score is 1.6%.  Discussed the possibility of discontinuation of statin therapy if next lipid panel is within normal limits no low ASCVD score.  Patient otherwise has no acute concerns at this time.      ROS:  Review of Systems   Constitutional:  Negative for chills, fever and weight loss.   HENT:  Negative for congestion, hearing loss and sore throat.    Eyes:  Negative for blurred vision, double vision and discharge.   Respiratory:  Negative for cough, sputum production, shortness of breath and wheezing.    Cardiovascular:  Negative for chest pain, palpitations, orthopnea, claudication and leg swelling.   Gastrointestinal:  Negative for abdominal pain, blood in stool, constipation, diarrhea, heartburn, melena, nausea and vomiting.   Genitourinary:  Negative for dysuria, frequency, hematuria and urgency.   Musculoskeletal:  Negative for back pain, joint pain and neck pain.   Skin:  Negative for rash.   Neurological:  Negative for dizziness, sensory change, weakness and headaches.   Endo/Heme/Allergies:  Negative for environmental allergies. Does not bruise/bleed easily.   Psychiatric/Behavioral:  Negative for depression. The patient is not nervous/anxious and does not have insomnia.        Objective:     Exam:  /73 (BP Location: Left arm, Patient  "Position: Sitting, BP Cuff Size: Adult)   Pulse 66   Temp 36.7 °C (98.1 °F) (Temporal)   Ht 1.59 m (5' 2.6\")   Wt 70.3 kg (155 lb)   SpO2 97%   BMI 27.81 kg/m²  Body mass index is 27.81 kg/m².    Physical Exam  Constitutional:       General: She is not in acute distress.     Appearance: Normal appearance. She is normal weight.   HENT:      Head: Normocephalic and atraumatic.      Right Ear: External ear normal.      Left Ear: External ear normal.      Nose: Nose normal. No congestion or rhinorrhea.      Mouth/Throat:      Mouth: Mucous membranes are moist.      Pharynx: Oropharynx is clear. No oropharyngeal exudate or posterior oropharyngeal erythema.   Eyes:      General: No scleral icterus.        Right eye: No discharge.         Left eye: No discharge.      Extraocular Movements: Extraocular movements intact.      Conjunctiva/sclera: Conjunctivae normal.      Pupils: Pupils are equal, round, and reactive to light.   Neck:      Vascular: No carotid bruit.   Cardiovascular:      Rate and Rhythm: Normal rate and regular rhythm.      Pulses: Normal pulses.      Heart sounds: Normal heart sounds. No murmur heard.     No friction rub.   Pulmonary:      Effort: Pulmonary effort is normal. No respiratory distress.      Breath sounds: Normal breath sounds. No wheezing or rhonchi.   Abdominal:      General: Abdomen is flat. Bowel sounds are normal.      Tenderness: There is no abdominal tenderness. There is no right CVA tenderness or left CVA tenderness.   Musculoskeletal:         General: No tenderness. Normal range of motion.      Cervical back: Normal range of motion and neck supple. No rigidity.      Right lower leg: No edema.      Left lower leg: No edema.   Skin:     General: Skin is warm.      Capillary Refill: Capillary refill takes less than 2 seconds.      Findings: No bruising, lesion or rash.   Neurological:      General: No focal deficit present.      Mental Status: She is alert and oriented to person, " place, and time. Mental status is at baseline.      Cranial Nerves: No cranial nerve deficit.      Coordination: Coordination normal.      Gait: Gait normal.   Psychiatric:         Mood and Affect: Mood normal.         Behavior: Behavior normal.         Thought Content: Thought content normal.         Judgment: Judgment normal.       Labs: Previous labs discussed with the patient, no follow-up questions.    Assessment & Plan:     58 y.o. female with the following -     1. Liver fibrosis  Jumana presents today for follow-up and to discuss her lab results.  Patient states that she was previously told she has a history of liver cirrhosis.  However, extensive testing was done and her FibroSure testing shows that she has mild to moderate fibrosis and scarring, but no cirrhosis.  Patient is currently more healthy.  She has discontinued her alcohol consumption.  Patient is established with [facility] and is followed up with them regularly.  Lab results for PT and INR are within normal limits for genetic function of the liver.    2. Hyperlipidemia, unspecified hyperlipidemia type  Patient has previously taking atorvastatin.  However, her ASCVD score is 1.6%.  Discussed the possibility of discontinuation of statin therapy if next lipid panel is within normal limits no low ASCVD score.  Patient otherwise has no acute concerns at this time.  - Lipid Profile; Future    I spent a total of 30 minutes with record review, exam, communication with the patient, communication with other providers, and documentation of this encounter.      Return in about 3 months (around 10/17/2023).    Please note that this dictation was created using voice recognition software. I have made every reasonable attempt to correct obvious errors, but I expect that there are errors of grammar and possibly content that I did not discover before finalizing the note.

## 2023-07-19 ASSESSMENT — ENCOUNTER SYMPTOMS
PALPITATIONS: 0
CLAUDICATION: 0
DEPRESSION: 0
WHEEZING: 0
ABDOMINAL PAIN: 0
INSOMNIA: 0
NERVOUS/ANXIOUS: 0
CONSTIPATION: 0
COUGH: 0
WEIGHT LOSS: 0
SPUTUM PRODUCTION: 0
EYE DISCHARGE: 0
SORE THROAT: 0
WEAKNESS: 0
BRUISES/BLEEDS EASILY: 0
ORTHOPNEA: 0
SENSORY CHANGE: 0
FEVER: 0
NECK PAIN: 0
DIZZINESS: 0
HEARTBURN: 0
CHILLS: 0
DOUBLE VISION: 0
BACK PAIN: 0
HEADACHES: 0
BLOOD IN STOOL: 0
DIARRHEA: 0
BLURRED VISION: 0
VOMITING: 0
NAUSEA: 0
SHORTNESS OF BREATH: 0

## 2023-07-21 ENCOUNTER — OFFICE VISIT (OUTPATIENT)
Dept: INTERNAL MEDICINE | Facility: OTHER | Age: 58
End: 2023-07-21
Payer: MEDICAID

## 2023-07-21 VITALS
TEMPERATURE: 97.8 F | DIASTOLIC BLOOD PRESSURE: 75 MMHG | SYSTOLIC BLOOD PRESSURE: 117 MMHG | BODY MASS INDEX: 27.46 KG/M2 | WEIGHT: 155 LBS | OXYGEN SATURATION: 97 % | HEIGHT: 63 IN | HEART RATE: 65 BPM

## 2023-07-21 DIAGNOSIS — H91.93 BILATERAL HEARING LOSS, UNSPECIFIED HEARING LOSS TYPE: ICD-10-CM

## 2023-07-21 PROCEDURE — 3078F DIAST BP <80 MM HG: CPT

## 2023-07-21 PROCEDURE — 3074F SYST BP LT 130 MM HG: CPT

## 2023-07-21 PROCEDURE — 99213 OFFICE O/P EST LOW 20 MIN: CPT | Mod: GE

## 2023-07-21 PROCEDURE — 2023F DILAT RTA XM W/O RTNOPTHY: CPT

## 2023-07-21 ASSESSMENT — ENCOUNTER SYMPTOMS
CONSTIPATION: 0
ORTHOPNEA: 0
EYE DISCHARGE: 0
SHORTNESS OF BREATH: 0
BACK PAIN: 0
PALPITATIONS: 0
DOUBLE VISION: 0
HEADACHES: 0
CHILLS: 0
DIZZINESS: 0
NAUSEA: 0
VOMITING: 0
DIARRHEA: 0
WHEEZING: 0
NERVOUS/ANXIOUS: 0
NECK PAIN: 0
BLOOD IN STOOL: 0
FEVER: 0
BRUISES/BLEEDS EASILY: 0
HEARTBURN: 0
SENSORY CHANGE: 0
ABDOMINAL PAIN: 0
BLURRED VISION: 0
WEAKNESS: 0
CLAUDICATION: 0
INSOMNIA: 0
COUGH: 0
SPUTUM PRODUCTION: 0
SORE THROAT: 0
DEPRESSION: 0
WEIGHT LOSS: 0

## 2023-07-21 ASSESSMENT — FIBROSIS 4 INDEX: FIB4 SCORE: 1.57

## 2023-07-21 NOTE — PROGRESS NOTES
"Subjective:     CC: Requesting referral for audiology and ENT    HPI:   Jumana presents today requesting referral for audiology and ENT.  Patient has a past medical history of bilateral hearing loss, for which she follows up with ENT and audiology for years.  Due to some insurance logistics, it appears that she needs a new referral since her last appointment was 6 months ago.  A universal referral for ENT and audiology was provided.  Patient sees [facility], Dr. Aram Keating, for this.  Patient otherwise has no acute concerns or complaints at this visit.      ROS:  Review of Systems   Constitutional:  Negative for chills, fever and weight loss.   HENT:  Positive for hearing loss and tinnitus. Negative for congestion and sore throat.    Eyes:  Negative for blurred vision, double vision and discharge.   Respiratory:  Negative for cough, sputum production, shortness of breath and wheezing.    Cardiovascular:  Negative for chest pain, palpitations, orthopnea, claudication and leg swelling.   Gastrointestinal:  Negative for abdominal pain, blood in stool, constipation, diarrhea, heartburn, melena, nausea and vomiting.   Genitourinary:  Negative for dysuria, frequency, hematuria and urgency.   Musculoskeletal:  Negative for back pain, joint pain and neck pain.   Skin:  Negative for rash.   Neurological:  Negative for dizziness, sensory change, weakness and headaches.   Endo/Heme/Allergies:  Negative for environmental allergies. Does not bruise/bleed easily.   Psychiatric/Behavioral:  Negative for depression. The patient is not nervous/anxious and does not have insomnia.        Objective:     Exam:  /75 (BP Location: Right arm, Patient Position: Sitting, BP Cuff Size: Adult long)   Pulse 65   Temp 36.6 °C (97.8 °F) (Temporal)   Ht 1.588 m (5' 2.5\")   Wt 70.3 kg (155 lb)   SpO2 97%   BMI 27.90 kg/m²  Body mass index is 27.9 kg/m².    Physical Exam  Constitutional:       General: She is not in acute distress.   "   Appearance: Normal appearance. She is normal weight.   HENT:      Head: Normocephalic and atraumatic.      Right Ear: External ear normal.      Left Ear: External ear normal.      Nose: Nose normal. No congestion or rhinorrhea.      Mouth/Throat:      Mouth: Mucous membranes are moist.      Pharynx: Oropharynx is clear. No oropharyngeal exudate or posterior oropharyngeal erythema.   Eyes:      General: No scleral icterus.        Right eye: No discharge.         Left eye: No discharge.      Extraocular Movements: Extraocular movements intact.      Conjunctiva/sclera: Conjunctivae normal.      Pupils: Pupils are equal, round, and reactive to light.   Neck:      Vascular: No carotid bruit.   Cardiovascular:      Rate and Rhythm: Normal rate and regular rhythm.      Pulses: Normal pulses.      Heart sounds: Normal heart sounds. No murmur heard.     No friction rub.   Pulmonary:      Effort: Pulmonary effort is normal. No respiratory distress.      Breath sounds: Normal breath sounds. No wheezing or rhonchi.   Abdominal:      General: Abdomen is flat. Bowel sounds are normal.      Tenderness: There is no abdominal tenderness. There is no right CVA tenderness or left CVA tenderness.   Musculoskeletal:         General: No tenderness. Normal range of motion.      Cervical back: Normal range of motion and neck supple. No rigidity.      Right lower leg: No edema.      Left lower leg: No edema.   Skin:     General: Skin is warm.      Capillary Refill: Capillary refill takes less than 2 seconds.      Findings: No bruising, lesion or rash.   Neurological:      General: No focal deficit present.      Mental Status: She is alert and oriented to person, place, and time. Mental status is at baseline.      Cranial Nerves: No cranial nerve deficit.      Coordination: Coordination normal.      Gait: Gait normal.   Psychiatric:         Mood and Affect: Mood normal.         Behavior: Behavior normal.         Thought Content: Thought  content normal.         Judgment: Judgment normal.       Labs:   No previous lab which were discussed at this visit.    Assessment & Plan:     58 y.o. female with the following -     1. Bilateral hearing loss, unspecified hearing loss type  Jumana presents today requesting referral for audiology and ENT.  Patient has a past medical history of bilateral hearing loss, for which she follows up with ENT and audiology for years.  Due to some insurance logistics, it appears that she needs a new referral since her last appointment was 6 months ago.  A universal referral for ENT and audiology was provided.  Patient sees [facility], Dr. Aram Keating, for this.  Patient otherwise has no acute concerns or complaints at this visit.  - Referral to ENT  - Referral to Audiology      I spent a total of 20 minutes with record review, exam, communication with the patient, communication with other providers, and documentation of this encounter.    Patient is scheduled for follow-up in 3 months with PCP.    Please note that this dictation was created using voice recognition software. I have made every reasonable attempt to correct obvious errors, but I expect that there are errors of grammar and possibly content that I did not discover before finalizing the note.

## 2023-07-31 ENCOUNTER — TELEPHONE (OUTPATIENT)
Dept: INTERNAL MEDICINE | Facility: OTHER | Age: 58
End: 2023-07-31
Payer: MEDICAID

## 2023-07-31 NOTE — TELEPHONE ENCOUNTER
Patient is not able to review labs on mychart and would like a call back with results of their lipid profile

## 2023-08-01 NOTE — TELEPHONE ENCOUNTER
Received request via: Pharmacy    Was the patient seen in the last year in this department? Yes    Does the patient have an active prescription (recently filled or refills available) for medication(s) requested? No    Does the patient have [facility] and need 100 day supply (blood pressure, diabetes and cholesterol meds only)? Patient does not have SCP

## 2023-08-04 RX ORDER — LEVOTHYROXINE SODIUM 0.12 MG/1
125 TABLET ORAL
Qty: 30 TABLET | Refills: 8 | Status: SHIPPED | OUTPATIENT
Start: 2023-08-04

## 2023-10-12 ENCOUNTER — OFFICE VISIT (OUTPATIENT)
Dept: OPHTHALMOLOGY | Facility: MEDICAL CENTER | Age: 58
End: 2023-10-12
Payer: MEDICAID

## 2023-10-12 DIAGNOSIS — H52.4 ACCOMMODATIVE INSUFFICIENCY: ICD-10-CM

## 2023-10-12 DIAGNOSIS — G37.2 CENTRAL PONTINE MYELINOLYSIS (HCC): ICD-10-CM

## 2023-10-12 DIAGNOSIS — H43.813 PVD (POSTERIOR VITREOUS DETACHMENT), BILATERAL: ICD-10-CM

## 2023-10-12 DIAGNOSIS — H53.19 VISUAL DISTORTION: ICD-10-CM

## 2023-10-12 DIAGNOSIS — H53.8 BLURRING OF VISUAL IMAGE: ICD-10-CM

## 2023-10-12 DIAGNOSIS — M54.2 NECK PAIN, CHRONIC: ICD-10-CM

## 2023-10-12 DIAGNOSIS — H40.003 GLAUCOMA SUSPECT OF BOTH EYES: ICD-10-CM

## 2023-10-12 DIAGNOSIS — G89.29 NECK PAIN, CHRONIC: ICD-10-CM

## 2023-10-12 PROCEDURE — 92250 FUNDUS PHOTOGRAPHY W/I&R: CPT | Performed by: OPHTHALMOLOGY

## 2023-10-12 PROCEDURE — 99214 OFFICE O/P EST MOD 30 MIN: CPT | Mod: 25 | Performed by: OPHTHALMOLOGY

## 2023-10-12 RX ORDER — MAGNESIUM OXIDE 400 MG/1
400 TABLET ORAL DAILY
COMMUNITY

## 2023-10-12 ASSESSMENT — REFRACTION_WEARINGRX
OD_SPHERE: +1.25
OD_ADD: +2.50
OD_ADD: +2.75
OS_CYLINDER: +0.50
OD_AXIS: 180
OS_SPHERE: +1.25
OS_ADD: +2.50
OD_AXIS: 174
OS_ADD: +2.75
OS_CYLINDER: +0.50
OS_SPHERE: +1.25
OS_AXIS: 180
OD_CYLINDER: +0.50
OD_CYLINDER: +0.50
SPECS_TYPE: TRIFOCAL
OS_AXIS: 178
OD_SPHERE: +1.25

## 2023-10-12 ASSESSMENT — CONF VISUAL FIELD
OS_SUPERIOR_TEMPORAL_RESTRICTION: 0
OD_INFERIOR_TEMPORAL_RESTRICTION: 0
OD_INFERIOR_NASAL_RESTRICTION: 0
OS_NORMAL: 1
OD_NORMAL: 1
OD_SUPERIOR_TEMPORAL_RESTRICTION: 0
OS_SUPERIOR_NASAL_RESTRICTION: 0
OD_SUPERIOR_NASAL_RESTRICTION: 0
OS_INFERIOR_TEMPORAL_RESTRICTION: 0
OS_INFERIOR_NASAL_RESTRICTION: 0

## 2023-10-12 ASSESSMENT — CUP TO DISC RATIO
OS_RATIO: 0.2
OD_RATIO: 0.2

## 2023-10-12 ASSESSMENT — REFRACTION_MANIFEST
OD_AXIS: 012
OD_SPHERE: +1.25
OD_CYLINDER: +1.25
OS_AXIS: 168
OS_SPHERE: +1.50
METHOD_AUTOREFRACTION: 1
OS_CYLINDER: +1.00

## 2023-10-12 ASSESSMENT — VISUAL ACUITY
OS_CC: 20/20
OD_CC: J1+
OS_CC: J1+
CORRECTION_TYPE: GLASSES
METHOD: SNELLEN - LINEAR
OD_CC: 20/20

## 2023-10-12 ASSESSMENT — EXTERNAL EXAM - RIGHT EYE: OD_EXAM: NORMAL

## 2023-10-12 ASSESSMENT — SLIT LAMP EXAM - LIDS
COMMENTS: NORMAL
COMMENTS: NORMAL

## 2023-10-12 ASSESSMENT — ENCOUNTER SYMPTOMS
PHOTOPHOBIA: 1
BLURRED VISION: 1

## 2023-10-12 ASSESSMENT — TONOMETRY
OD_IOP_MMHG: 12
OS_IOP_MMHG: 12

## 2023-10-12 ASSESSMENT — EXTERNAL EXAM - LEFT EYE: OS_EXAM: NORMAL

## 2023-10-12 NOTE — PROGRESS NOTES
Peds/Neuro Ophthalmology:   Lance Ureña M.D.    Date & Time note created:    10/12/2023   3:59 PM     Referring MD / APRN:  Keon Gonzales M.D., No att. providers found    Patient ID:  Name:             Jumana Kahn   YOB: 1965  Age:                 58 y.o.  female   MRN:               [id number]    Chief Complaint/Reason for Visit:     Other (TBI and accommodative insufficiency)      History of Present Illness:    Jumana Kahn is a 58 y.o. female   Follow up TBI and accommodative insuffiencey.Decreased vision for night driving with some glare issue.No double vision but daily headaches.        Review of Systems:  Review of Systems   Eyes:  Positive for blurred vision and photophobia.   All other systems reviewed and are negative.      Past Medical History:   Past Medical History:   Diagnosis Date    Anemia     Asthma     GERD (gastroesophageal reflux disease)     Head ache     Hypothyroid     Psychiatric disorder     Substance abuse (HCC)        Past Surgical History:  Past Surgical History:   Procedure Laterality Date    GASTROSCOPY N/A 5/10/2020    Procedure: GASTROSCOPY;  Surgeon: Lucas Crespo M.D.;  Location: SURGERY [facility];  Service: Gastroenterology    [place] UPPER GI ENDOSCOPY,CTRL BLEED N/A 1/15/2020    Procedure: EGD, WITH CLIP PLACEMENT;  Surgeon: Pito Davis M.D.;  Location: SURGERY [facility];  Service: Gastroenterology    GYN SURGERY      tubal ligation    OTHER      sinus surgery    OTHER ORTHOPEDIC SURGERY      leg       Current Outpatient Medications:  Current Outpatient Medications   Medication Sig Dispense Refill    magnesium oxide (MAG-OX) 400 MG Tab tablet Take 400 mg by mouth every day.      levothyroxine (SYNTHROID) 125 MCG Tab Take 1 Tablet by mouth every day. 30 Tablet 8    albuterol 108 (90 Base) MCG/ACT Aero Soln inhalation aerosol INHALE 1 TO 2 PUFFS BY MOUTH EVERY 4 TO 6 HOURS AS NEEDED FOR DIFFICULTY BREATHING 8.5 Each 0     omeprazole (PRILOSEC) 20 MG delayed-release capsule Take 1 Capsule by mouth every day. 90 Capsule 3    acetaminophen (TYLENOL) 500 MG Tab Take 1-2 Tablets by mouth every 6 hours as needed.      traZODone (DESYREL) 50 MG Tab Take 50 mg by mouth every evening.      meclizine (ANTIVERT) 25 MG Tab Take 1 Tablet by mouth as needed for Vertigo.      amoxicillin-clavulanate (AUGMENTIN) 875-125 MG Tab Take 1 Tablet by mouth 2 times a day. 14 Tablet 0    fluorouracil (EFUDEX) 5 % cream Apply to affected area once a day at night Monday, ., Friday for a week. Stop if irritated. Try to use for 4 weeks 40 g 3    atorvastatin (LIPITOR) 40 MG Tab TAKE 1 TABLET BY MOUTH EVERY DAY IN THE EVENING (Patient not taking: Reported on 10/12/2023) 30 Tablet 11     No current facility-administered medications for this visit.       Allergies:  Allergies   Allergen Reactions    Ampicillin Rash     Skin rash  Tolerates cephalosporins       Family History:  History reviewed. No pertinent family history.    Social History:  Social History     Socioeconomic History    Marital status: Single     Spouse name: Not on file    Number of children: Not on file    Years of education: Not on file    Highest education level: Associate degree: occupational, technical, or vocational program   Occupational History    Not on file   Tobacco Use    Smoking status: Former     Current packs/day: 0.00     Types: Cigarettes     Quit date: 2001     Years since quittin.2    Smokeless tobacco: Never   Vaping Use    Vaping Use: Never used   Substance and Sexual Activity    Alcohol use: Not Currently     Comment: has not drank for over a year    Drug use: No    Sexual activity: Not on file   Other Topics Concern    Not on file   Social History Narrative    Not able to work     Social Determinants of Health     Financial Resource Strain: High Risk (2023)    Overall Financial Resource Strain (CARDIA)     Difficulty of Paying Living Expenses: Hard    Food Insecurity: Unknown (1/21/2023)    Hunger Vital Sign     Worried About Running Out of Food in the Last Year: Patient refused     Ran Out of Food in the Last Year: Patient refused   Transportation Needs: No Transportation Needs (1/21/2023)    PRAPARE - Transportation     Lack of Transportation (Medical): No     Lack of Transportation (Non-Medical): No   Physical Activity: Insufficiently Active (1/21/2023)    Exercise Vital Sign     Days of Exercise per Week: 3 days     Minutes of Exercise per Session: 40 min   Stress: No Stress Concern Present (1/21/2023)    [employer] - Occupational Stress Questionnaire     Feeling of Stress : Only a little   Social Connections: Unknown (1/21/2023)    Social Connection and Isolation Panel [NHANES]     Frequency of Communication with Friends and Family: Three times a week     Frequency of Social Gatherings with Friends and Family: Twice a week     Attends Buddhist Services: More than 4 times per year     Active Member of Clubs or Organizations: Patient refused     Attends Club or Organization Meetings: Patient refused     Marital Status:    Intimate Partner Violence: Not on file   Housing Stability: Unknown (1/21/2023)    Housing Stability Vital Sign     Unable to Pay for Housing in the Last Year: Patient refused     Number of Places Lived in the Last Year: Not on file     Unstable Housing in the Last Year: No          Physical Exam:  Physical Exam    Oriented x 3  Weight/BMI: There is no height or weight on file to calculate BMI.  There were no vitals taken for this visit.    Base Eye Exam       Visual Acuity (Snellen - Linear)         Right Left    Dist cc 20/20 20/20    Near cc J1+ J1+      Correction: Glasses              Tonometry ( care, 2:24 PM)         Right Left    Pressure 12 12              Pupils         Pupils    Right PERRL    Left PERRL              Visual Fields         Right Left     Full Full              Extraocular  Movement         Right Left     Full, Ortho Full, Ortho              Neuro/Psych       Oriented x3: Yes    Mood/Affect: Normal                  Additional Tests       Stereo       Fly: +    Animals: 3/3    Circles: 6/9                  Slit Lamp and Fundus Exam       External Exam         Right Left    External Normal Normal              Slit Lamp Exam         Right Left    Lids/Lashes Normal Normal    Conjunctiva/Sclera White and quiet White and quiet    Cornea Clear Clear    Anterior Chamber Deep and quiet Deep and quiet    Iris Round and reactive Round and reactive    Lens Clear Clear    Vitreous Normal Normal              Fundus Exam         Right Left    Disc Normal Normal    C/D Ratio 0.2 0.2    Macula Normal Normal    Vessels Normal Normal    Periphery Normal Normal                  Refraction       Wearing Rx         Sphere Cylinder Axis Add    Right +1.25 +0.50 174 +2.75    Left +1.25 +0.50 178 +2.75      Type: Trifocal              Wearing Rx #2         Sphere Cylinder Axis Add    Right +1.25 +0.50 180 +2.50    Left +1.25 +0.50 180 +2.50              Manifest Refraction (Auto)         Sphere Cylinder Axis    Right +1.25 +1.25 012    Left +1.50 +1.00 168              Final Rx         Sphere Cylinder Axis Add    Right +1.25 +1.00 180 +2.75    Left +1.25 +1.00 180 +2.75                    Pertinent Lab/Test/Imaging Review:      Assessment and Plan:     Central pontine myelinolysis (HCC)  9/29/2021 - Reviewed MRI images. OCT NFL thickness normal at 88 OD and 93 OS, but slight decrease in temporal quadrant OU and some decrease in temporal ganglion cell thickness OD and nasal OS. So may have some subclinical change to optic nerve and right visual pathways, but not confirmed on MRI or examination of the nerve. Also color vision normal. No motility disturbance, ie 6th nerve palsy or elevation deficit than can be seen in central pointe myelinolysis.   9/27/2022 - Optic nerve function stable. Oct NFL thickness 91 Od  and 92 OS  10/12/2023-stable optic nerve function as well as ocular motility    PVD (posterior vitreous detachment), bilateral  9/29/2021 - No retinal tears or holes  10/12/2020-no tears or holes    Visual distortion  9/27/2022 - Episode of acephalic migraine. Discussed taking migraine med given to her by neurologist    Blurring of visual image  9/27/2022 - discussed need for full time glasses rx    Accommodative insufficiency  9/29/2021 - Accommodative insufficieny in patient with underlying latent hyperopia. Not wearing glasses full time and suspect the reason why she notices intermittent blurry vision. Probably exacerbated by underlying CNS processes and concussions. Adjusted the rx and discussed needs to wear full time. No evidence of an underlying significant maculopathy nor optic neuropathy.   9/27/2022 - discussed need to wear glasses full time. Gave new rx.   10/12/2023-slight adjustment in Rx    Neck pain, chronic  9/27/2022 - recent episode of occipital neuroalgia  10/12/2023-being evaluated for cervical spine and disc problems.  Especially given some weakness down the arms as well as pain radiating up to neck.        Lance Ureña M.D.

## 2023-10-12 NOTE — ASSESSMENT & PLAN NOTE
9/29/2021 - Accommodative insufficieny in patient with underlying latent hyperopia. Not wearing glasses full time and suspect the reason why she notices intermittent blurry vision. Probably exacerbated by underlying CNS processes and concussions. Adjusted the rx and discussed needs to wear full time. No evidence of an underlying significant maculopathy nor optic neuropathy.   9/27/2022 - discussed need to wear glasses full time. Gave new rx.   10/12/2023-slight adjustment in Rx

## 2023-10-12 NOTE — ASSESSMENT & PLAN NOTE
9/29/2021 - Reviewed MRI images. OCT NFL thickness normal at 88 OD and 93 OS, but slight decrease in temporal quadrant OU and some decrease in temporal ganglion cell thickness OD and nasal OS. So may have some subclinical change to optic nerve and right visual pathways, but not confirmed on MRI or examination of the nerve. Also color vision normal. No motility disturbance, ie 6th nerve palsy or elevation deficit than can be seen in central pointe myelinolysis.   9/27/2022 - Optic nerve function stable. Oct NFL thickness 91 Od and 92 OS  10/12/2023-stable optic nerve function as well as ocular motility

## 2023-10-12 NOTE — ASSESSMENT & PLAN NOTE
9/27/2022 - recent episode of occipital neuroalgia  10/12/2023-being evaluated for cervical spine and disc problems.  Especially given some weakness down the arms as well as pain radiating up to neck.

## 2023-10-17 ENCOUNTER — OFFICE VISIT (OUTPATIENT)
Dept: INTERNAL MEDICINE | Facility: OTHER | Age: 58
End: 2023-10-17
Payer: MEDICAID

## 2023-10-17 VITALS
HEART RATE: 58 BPM | BODY MASS INDEX: 26.97 KG/M2 | WEIGHT: 152.2 LBS | TEMPERATURE: 98.2 F | SYSTOLIC BLOOD PRESSURE: 116 MMHG | DIASTOLIC BLOOD PRESSURE: 79 MMHG | HEIGHT: 63 IN | OXYGEN SATURATION: 99 %

## 2023-10-17 DIAGNOSIS — M50.20 CERVICAL DISC HERNIATION: ICD-10-CM

## 2023-10-17 PROCEDURE — 3074F SYST BP LT 130 MM HG: CPT

## 2023-10-17 PROCEDURE — 2023F DILAT RTA XM W/O RTNOPTHY: CPT

## 2023-10-17 PROCEDURE — 3078F DIAST BP <80 MM HG: CPT

## 2023-10-17 PROCEDURE — 99213 OFFICE O/P EST LOW 20 MIN: CPT | Mod: GE

## 2023-10-17 ASSESSMENT — ENCOUNTER SYMPTOMS
SENSORY CHANGE: 0
EYE DISCHARGE: 0
HEARTBURN: 0
SHORTNESS OF BREATH: 0
DIZZINESS: 0
BLURRED VISION: 0
NAUSEA: 0
DEPRESSION: 0
ABDOMINAL PAIN: 0
FEVER: 0
SORE THROAT: 0
PALPITATIONS: 0
CHILLS: 0
BLOOD IN STOOL: 0
BACK PAIN: 1
WEIGHT LOSS: 0
HEADACHES: 0
DIARRHEA: 0
VOMITING: 0
NERVOUS/ANXIOUS: 0
WHEEZING: 0
DOUBLE VISION: 0
ORTHOPNEA: 0
NECK PAIN: 1
BRUISES/BLEEDS EASILY: 0
CONSTIPATION: 0
INSOMNIA: 0
COUGH: 0
FOCAL WEAKNESS: 0
WEAKNESS: 0
SPUTUM PRODUCTION: 0
CLAUDICATION: 0

## 2023-10-17 ASSESSMENT — FIBROSIS 4 INDEX: FIB4 SCORE: 1.57

## 2023-10-17 NOTE — PROGRESS NOTES
Subjective:     CC: Follow-up to discuss lab results and imaging    HPI:   Jumana presents today for follow-up and to discuss her health updates.    Patient states that recently she had a worsening back injury.  Her roommate had a fall, she tried to protect his fall.  That resulted in a bulging disc injury to her neck and upper thoracic spine.  Patient was seen by lucero Melton and had an MRI done.  Results showed a 3 mm central protrusion at the T3-T4 level with partial compression of the ventral cord.  Additionally, retrolisthesis is noted with a 3 mm broad-based disc bulge with partial compression of the ventral cord at the C5-C6 level and a 3 mm broad-based disc bulge uncovertebral and facet hypertrophy at the C6-C7 level and abutment of bilateral exiting C7 nerves.  Says that she is following up with lucero Melton for this.  She was concerned about getting a facet joint injection since her previous injection of the cervical spine have resulted in her getting septic.  Counseled patient that infection is a risk factor for these procedures.  However, recommend that she discuss these concerns with therapy provider during the procedure.  Additionally, patient was also recommended to follow-up with physical therapy for this new injury.  Patient amenable to the idea.    Patient is also following up with GI doctors for colonoscopy on 11/17/2023.  She also states that she is regularly following up with neuro-ophthalmology for her migraine management.      ROS:  Review of Systems   Constitutional:  Negative for chills, fever and weight loss.   HENT:  Negative for congestion, hearing loss and sore throat.    Eyes:  Negative for blurred vision, double vision and discharge.   Respiratory:  Negative for cough, sputum production, shortness of breath and wheezing.    Cardiovascular:  Negative for chest pain, palpitations, orthopnea, claudication and leg swelling.   Gastrointestinal:  Negative for abdominal pain, blood in  "stool, constipation, diarrhea, heartburn, melena, nausea and vomiting.   Genitourinary:  Negative for dysuria, frequency, hematuria and urgency.   Musculoskeletal:  Positive for back pain, joint pain and neck pain.   Skin:  Negative for rash.   Neurological:  Negative for dizziness, sensory change, focal weakness, weakness and headaches.   Endo/Heme/Allergies:  Negative for environmental allergies. Does not bruise/bleed easily.   Psychiatric/Behavioral:  Negative for depression. The patient is not nervous/anxious and does not have insomnia.        Objective:     Exam:  /79 (BP Location: Left arm, Patient Position: Sitting, BP Cuff Size: Adult)   Pulse (!) 58   Temp 36.8 °C (98.2 °F) (Temporal)   Ht 1.588 m (5' 2.5\")   Wt 69 kg (152 lb 3.2 oz)   SpO2 99%   BMI 27.39 kg/m²  Body mass index is 27.39 kg/m².    Physical Exam  Constitutional:       General: She is not in acute distress.     Appearance: Normal appearance. She is normal weight.   HENT:      Head: Normocephalic and atraumatic.      Right Ear: External ear normal.      Left Ear: External ear normal.      Nose: Nose normal. No congestion or rhinorrhea.      Mouth/Throat:      Mouth: Mucous membranes are moist.      Pharynx: Oropharynx is clear. No oropharyngeal exudate or posterior oropharyngeal erythema.   Eyes:      General: No scleral icterus.        Right eye: No discharge.         Left eye: No discharge.      Extraocular Movements: Extraocular movements intact.      Conjunctiva/sclera: Conjunctivae normal.      Pupils: Pupils are equal, round, and reactive to light.   Neck:      Vascular: No carotid bruit.   Cardiovascular:      Rate and Rhythm: Normal rate and regular rhythm.      Pulses: Normal pulses.      Heart sounds: Normal heart sounds. No murmur heard.     No friction rub.   Pulmonary:      Effort: Pulmonary effort is normal. No respiratory distress.      Breath sounds: Normal breath sounds. No wheezing or rhonchi.   Abdominal:      " General: Abdomen is flat. Bowel sounds are normal.      Tenderness: There is no abdominal tenderness. There is no right CVA tenderness or left CVA tenderness.   Musculoskeletal:         General: Tenderness (Right lateral shoulder) present. Normal range of motion.      Cervical back: Normal range of motion and neck supple. No rigidity.      Right lower leg: No edema.      Left lower leg: No edema.   Skin:     General: Skin is warm.      Capillary Refill: Capillary refill takes less than 2 seconds.      Findings: No bruising, lesion or rash.   Neurological:      General: No focal deficit present.      Mental Status: She is alert and oriented to person, place, and time. Mental status is at baseline.      Cranial Nerves: No cranial nerve deficit.      Coordination: Coordination normal.      Gait: Gait normal.   Psychiatric:         Mood and Affect: Mood normal.         Behavior: Behavior normal.         Thought Content: Thought content normal.         Judgment: Judgment normal.       Labs:   Previous labs were discussed with the patient, no follow-up questions.    Assessment & Plan:     58 y.o. female with the following -     1. Cervical disc herniation  Patient states that recently she had a worsening back injury.  Her roommate had a fall, she tried to protect his fall.  That resulted in a bulging disc injury to her neck and upper thoracic spine.  Patient was seen by lucero Melton and had an MRI done.  Results showed a 3 mm central protrusion at the T3-T4 level with partial compression of the ventral cord.  Additionally, retrolisthesis is noted with a 3 mm broad-based disc bulge with partial compression of the ventral cord at the C5-C6 level and a 3 mm broad-based disc bulge uncovertebral and facet hypertrophy at the C6-C7 level and abutment of bilateral exiting C7 nerves.  Says that she is following up with lucero Melton for this.  She was concerned about getting a facet joint injection since her previous injection of  the cervical spine have resulted in her getting septic.  Counseled patient that infection is a risk factor for these procedures.  However, recommend that she discuss these concerns with therapy provider during the procedure.  Additionally, patient was also recommended to follow-up with physical therapy for this new injury.  Patient amenable to the idea.  - Referral to Physical Therapy    I spent a total of 20 minutes with record review, exam, communication with the patient, communication with other providers, and documentation of this encounter.    Return in about 6 months (around 4/17/2024).    Please note that this dictation was created using voice recognition software. I have made every reasonable attempt to correct obvious errors, but I expect that there are errors of grammar and possibly content that I did not discover before finalizing the note.

## 2023-10-24 ENCOUNTER — OFFICE VISIT (OUTPATIENT)
Dept: INTERNAL MEDICINE | Facility: OTHER | Age: 58
End: 2023-10-24
Payer: MEDICAID

## 2023-10-24 VITALS
HEIGHT: 62 IN | WEIGHT: 152.8 LBS | TEMPERATURE: 98.1 F | BODY MASS INDEX: 28.12 KG/M2 | HEART RATE: 55 BPM | DIASTOLIC BLOOD PRESSURE: 73 MMHG | OXYGEN SATURATION: 98 % | SYSTOLIC BLOOD PRESSURE: 105 MMHG

## 2023-10-24 DIAGNOSIS — D69.2 PURPURA (HCC): ICD-10-CM

## 2023-10-24 DIAGNOSIS — L81.4 LENTIGO: ICD-10-CM

## 2023-10-24 DIAGNOSIS — L57.0 ACTINIC KERATOSES: ICD-10-CM

## 2023-10-24 PROCEDURE — 3074F SYST BP LT 130 MM HG: CPT | Performed by: DERMATOLOGY

## 2023-10-24 PROCEDURE — 99214 OFFICE O/P EST MOD 30 MIN: CPT | Performed by: DERMATOLOGY

## 2023-10-24 PROCEDURE — 3078F DIAST BP <80 MM HG: CPT | Performed by: DERMATOLOGY

## 2023-10-24 RX ORDER — FLUOROURACIL 50 MG/G
CREAM TOPICAL
Qty: 40 G | Refills: 3 | Status: SHIPPED | OUTPATIENT
Start: 2023-10-24 | End: 2024-02-13

## 2023-10-24 ASSESSMENT — FIBROSIS 4 INDEX: FIB4 SCORE: 1.57

## 2023-10-24 NOTE — PROGRESS NOTES
"Jumana Kahn  1965  [id number]    Follow up             Vitals:    10/24/23 0835   BP: 105/73   BP Location: Right arm   Patient Position: Sitting   BP Cuff Size: Adult   Pulse: (!) 55   Temp: 36.7 °C (98.1 °F)   TempSrc: Temporal   SpO2: 98%   Weight: 69.3 kg (152 lb 12.8 oz)   Height: 1.575 m (5' 2\")       Chief Complaint   Patient presents with    Follow-Up     ___________________________________________________________________            History of Present Illness (HPI)   F/u spots on face - pt used carac every other day for a couple weeks and spots are gone. Starting to use on L upper cutaneous lip.     Pt didn't use on arms - not sure which spots to use cream on.     Pt got liver check up and is ok.        Current Outpatient Medications on File Prior to Visit   Medication Sig Dispense Refill    magnesium oxide (MAG-OX) 400 MG Tab tablet Take 400 mg by mouth every day.      levothyroxine (SYNTHROID) 125 MCG Tab Take 1 Tablet by mouth every day. 30 Tablet 8    albuterol 108 (90 Base) MCG/ACT Aero Soln inhalation aerosol INHALE 1 TO 2 PUFFS BY MOUTH EVERY 4 TO 6 HOURS AS NEEDED FOR DIFFICULTY BREATHING 8.5 Each 0    omeprazole (PRILOSEC) 20 MG delayed-release capsule Take 1 Capsule by mouth every day. 90 Capsule 3    acetaminophen (TYLENOL) 500 MG Tab Take 1-2 Tablets by mouth every 6 hours as needed.      traZODone (DESYREL) 50 MG Tab Take 50 mg by mouth every evening.      meclizine (ANTIVERT) 25 MG Tab Take 1 Tablet by mouth as needed for Vertigo.      fluorouracil (EFUDEX) 5 % cream Apply to affected area once a day at night Monday, Weds., Friday for a week. Stop if irritated. Try to use for 4 weeks (Patient not taking: Reported on 10/24/2023) 40 g 3     No current facility-administered medications on file prior to visit.     Ampicillin      Review of Systems:        General: Denies fever      Hematologic: no excessive bleeding problems              Past Medical History:   Diagnosis Date "    Anemia     Asthma     GERD (gastroesophageal reflux disease)     Head ache     Hypothyroid     Psychiatric disorder     Substance abuse (HCC)      Skin Cancer no h/o    Social History     Tobacco Use    Smoking status: Former     Current packs/day: 0.00     Types: Cigarettes     Quit date: 2001     Years since quittin.2    Smokeless tobacco: Never   Vaping Use    Vaping Use: Never used   Substance Use Topics    Alcohol use: Not Currently     Comment: has not drank for over a year    Drug use: No     Sunscreen Use:No     Past Surgical History:   Procedure Laterality Date    GASTROSCOPY N/A 5/10/2020    Procedure: GASTROSCOPY;  Surgeon: Lucas Crespo M.D.;  Location: SURGERY [facility];  Service: Gastroenterology    ME UPPER GI ENDOSCOPY,CTRL BLEED N/A 1/15/2020    Procedure: EGD, WITH CLIP PLACEMENT;  Surgeon: Pito Davis M.D.;  Location: SURGERY [facility];  Service: Gastroenterology    GYN SURGERY      tubal ligation    OTHER      sinus surgery    OTHER ORTHOPEDIC SURGERY      leg           History reviewed. No pertinent family history.      Physical Exam:   Constitutional: Well nourished, NAD, pleasant  alert and cooperative; normal mood and affect; normal attention span and concentration.    Skin Full body exam done: no suspicious lesions on eyelids, lips, face, ears, neck, chest, back, abdomen, upper extremities, fingernails, lower extremities except as noted   Face - no makeup. Rare brown macs on L cheek  R mid malar cheek  Tip of nose  L upper cutaneous lip   b/l forearms and dorsal hands several brown macs and few brown stuck on paps   B/l forearms rare pink scaly paps  L forearm rare purpura      Assessment and Plan:   1. Actinic keratoses, stable  Spots on R eyebrow, R cheek. L cheek better.   Pt got a big reaction after 4 uses of carac so pt stopped cream. Explained reaction is expected if sun damage.   Discussed option of ln2.   Pt did well with carac 3 times a week. Will  do spot son R cheek, tip of nose and R upper cutaneous lip    Pt will try to use on forearms - pointed out the pink scaly spots. Explained doesn't work on brown spots.   Refilled efux.     fluorouracil (EFUDEX) 5 % cream; Apply to affected area once a day at night once a week. Stop if irritated. Try to use for 4 weeks  Dispense: 40 g; Refill: 3       2. Lentigo unchanged  Recommend follow for changes. ABCD's of changing skin lesions were reviewed, and the appropriate use of sunscreen was outlined and recommended.    3. Purpura new dx   Explained due to thin skin and sun damage. No aspirin or supplements. Pt says she always bruises easily.        Duyen Garcia M.D.   Return in about 1 year (around 10/24/2024).

## 2023-10-25 DIAGNOSIS — K21.00 GASTROESOPHAGEAL REFLUX DISEASE WITH ESOPHAGITIS WITHOUT HEMORRHAGE: ICD-10-CM

## 2023-10-25 RX ORDER — OMEPRAZOLE 20 MG/1
20 CAPSULE, DELAYED RELEASE ORAL DAILY
Qty: 90 CAPSULE | Refills: 2 | Status: SHIPPED | OUTPATIENT
Start: 2023-10-25

## 2024-01-02 ENCOUNTER — HOSPITAL ENCOUNTER (EMERGENCY)
Facility: MEDICAL CENTER | Age: 59
End: 2024-01-02
Attending: EMERGENCY MEDICINE
Payer: MEDICAID

## 2024-01-02 ENCOUNTER — APPOINTMENT (OUTPATIENT)
Dept: URGENT CARE | Facility: CLINIC | Age: 59
End: 2024-01-02
Payer: MEDICAID

## 2024-01-02 VITALS
SYSTOLIC BLOOD PRESSURE: 98 MMHG | BODY MASS INDEX: 27.59 KG/M2 | DIASTOLIC BLOOD PRESSURE: 56 MMHG | OXYGEN SATURATION: 97 % | HEART RATE: 84 BPM | RESPIRATION RATE: 17 BRPM | HEIGHT: 62 IN | TEMPERATURE: 99.6 F | WEIGHT: 149.91 LBS

## 2024-01-02 DIAGNOSIS — U07.1 COVID-19: ICD-10-CM

## 2024-01-02 LAB
ALBUMIN SERPL BCP-MCNC: 4.9 G/DL (ref 3.2–4.9)
ALBUMIN/GLOB SERPL: 1.4 G/DL
ALP SERPL-CCNC: 66 U/L (ref 30–99)
ALT SERPL-CCNC: 25 U/L (ref 2–50)
ANION GAP SERPL CALC-SCNC: 14 MMOL/L (ref 7–16)
APPEARANCE UR: CLEAR
AST SERPL-CCNC: 29 U/L (ref 12–45)
BACTERIA #/AREA URNS HPF: NEGATIVE /HPF
BASOPHILS # BLD AUTO: 0.8 % (ref 0–1.8)
BASOPHILS # BLD: 0.04 K/UL (ref 0–0.12)
BILIRUB SERPL-MCNC: 0.4 MG/DL (ref 0.1–1.5)
BILIRUB UR QL STRIP.AUTO: NEGATIVE
BUN SERPL-MCNC: 18 MG/DL (ref 8–22)
CALCIUM ALBUM COR SERPL-MCNC: 9.2 MG/DL (ref 8.5–10.5)
CALCIUM SERPL-MCNC: 9.9 MG/DL (ref 8.5–10.5)
CHLORIDE SERPL-SCNC: 102 MMOL/L (ref 96–112)
CO2 SERPL-SCNC: 22 MMOL/L (ref 20–33)
COLOR UR: YELLOW
CREAT SERPL-MCNC: 0.78 MG/DL (ref 0.5–1.4)
EOSINOPHIL # BLD AUTO: 0.03 K/UL (ref 0–0.51)
EOSINOPHIL NFR BLD: 0.6 % (ref 0–6.9)
EPI CELLS #/AREA URNS HPF: NEGATIVE /HPF
ERYTHROCYTE [DISTWIDTH] IN BLOOD BY AUTOMATED COUNT: 42.5 FL (ref 35.9–50)
FLUAV RNA SPEC QL NAA+PROBE: NEGATIVE
FLUBV RNA SPEC QL NAA+PROBE: NEGATIVE
GFR SERPLBLD CREATININE-BSD FMLA CKD-EPI: 89 ML/MIN/1.73 M 2
GLOBULIN SER CALC-MCNC: 3.4 G/DL (ref 1.9–3.5)
GLUCOSE SERPL-MCNC: 79 MG/DL (ref 65–99)
GLUCOSE UR STRIP.AUTO-MCNC: NEGATIVE MG/DL
HCT VFR BLD AUTO: 43.8 % (ref 37–47)
HGB BLD-MCNC: 14.8 G/DL (ref 12–16)
HYALINE CASTS #/AREA URNS LPF: ABNORMAL /LPF
IMM GRANULOCYTES # BLD AUTO: 0.02 K/UL (ref 0–0.11)
IMM GRANULOCYTES NFR BLD AUTO: 0.4 % (ref 0–0.9)
KETONES UR STRIP.AUTO-MCNC: NEGATIVE MG/DL
LEUKOCYTE ESTERASE UR QL STRIP.AUTO: ABNORMAL
LIPASE SERPL-CCNC: 30 U/L (ref 11–82)
LYMPHOCYTES # BLD AUTO: 0.44 K/UL (ref 1–4.8)
LYMPHOCYTES NFR BLD: 8.5 % (ref 22–41)
MCH RBC QN AUTO: 30.8 PG (ref 27–33)
MCHC RBC AUTO-ENTMCNC: 33.8 G/DL (ref 32.2–35.5)
MCV RBC AUTO: 91.3 FL (ref 81.4–97.8)
MICRO URNS: ABNORMAL
MONOCYTES # BLD AUTO: 0.53 K/UL (ref 0–0.85)
MONOCYTES NFR BLD AUTO: 10.3 % (ref 0–13.4)
NEUTROPHILS # BLD AUTO: 4.1 K/UL (ref 1.82–7.42)
NEUTROPHILS NFR BLD: 79.4 % (ref 44–72)
NITRITE UR QL STRIP.AUTO: NEGATIVE
NRBC # BLD AUTO: 0 K/UL
NRBC BLD-RTO: 0 /100 WBC (ref 0–0.2)
PH UR STRIP.AUTO: 7.5 [PH] (ref 5–8)
PLATELET # BLD AUTO: 139 K/UL (ref 164–446)
PMV BLD AUTO: 10.4 FL (ref 9–12.9)
POTASSIUM SERPL-SCNC: 3.7 MMOL/L (ref 3.6–5.5)
PROT SERPL-MCNC: 8.3 G/DL (ref 6–8.2)
PROT UR QL STRIP: NEGATIVE MG/DL
RBC # BLD AUTO: 4.8 M/UL (ref 4.2–5.4)
RBC # URNS HPF: ABNORMAL /HPF
RBC UR QL AUTO: NEGATIVE
RSV RNA SPEC QL NAA+PROBE: NEGATIVE
SARS-COV-2 RNA RESP QL NAA+PROBE: DETECTED
SODIUM SERPL-SCNC: 138 MMOL/L (ref 135–145)
SP GR UR STRIP.AUTO: 1.02
UROBILINOGEN UR STRIP.AUTO-MCNC: 1 MG/DL
WBC # BLD AUTO: 5.2 K/UL (ref 4.8–10.8)
WBC #/AREA URNS HPF: ABNORMAL /HPF

## 2024-01-02 PROCEDURE — 99284 EMERGENCY DEPT VISIT MOD MDM: CPT

## 2024-01-02 PROCEDURE — 700102 HCHG RX REV CODE 250 W/ 637 OVERRIDE(OP): Mod: UD | Performed by: EMERGENCY MEDICINE

## 2024-01-02 PROCEDURE — 85025 COMPLETE CBC W/AUTO DIFF WBC: CPT

## 2024-01-02 PROCEDURE — 83690 ASSAY OF LIPASE: CPT

## 2024-01-02 PROCEDURE — 36415 COLL VENOUS BLD VENIPUNCTURE: CPT

## 2024-01-02 PROCEDURE — 0241U HCHG SARS-COV-2 COVID-19 NFCT DS RESP RNA 4 TRGT ED POC: CPT

## 2024-01-02 PROCEDURE — 80053 COMPREHEN METABOLIC PANEL: CPT

## 2024-01-02 PROCEDURE — A9270 NON-COVERED ITEM OR SERVICE: HCPCS | Mod: UD | Performed by: EMERGENCY MEDICINE

## 2024-01-02 PROCEDURE — 81001 URINALYSIS AUTO W/SCOPE: CPT

## 2024-01-02 RX ORDER — ACETAMINOPHEN 500 MG
500 TABLET ORAL ONCE
Status: COMPLETED | OUTPATIENT
Start: 2024-01-02 | End: 2024-01-02

## 2024-01-02 RX ADMIN — ACETAMINOPHEN 500 MG: 500 TABLET ORAL at 18:24

## 2024-01-02 ASSESSMENT — PAIN DESCRIPTION - PAIN TYPE: TYPE: ACUTE PAIN

## 2024-01-02 ASSESSMENT — FIBROSIS 4 INDEX: FIB4 SCORE: 1.57

## 2024-01-03 NOTE — ED TRIAGE NOTES
"Chief Complaint   Patient presents with    RLQ Pain     RLQ and RT low back pain x 1 week. Throughout week has gotten worse.   On Friday she had her covid booster. Felt like she had a rx.   Started to feel febrile today.     Low Back Pain     Pt ambulatory to triage for above. Protocol ordered.     /73   Pulse 87   Temp 36.8 °C (98.2 °F) (Temporal)   Resp 16   Ht 1.575 m (5' 2\")   Wt 68 kg (149 lb 14.6 oz)   SpO2 97%   BMI 27.42 kg/m²     "

## 2024-01-03 NOTE — ED PROVIDER NOTES
ER Provider Note    Scribed for Jeremiah Miller M.d. by Angel Sorensen. 1/2/2024  5:30 PM    Primary Care Provider: Keon Gonzales M.D.    CHIEF COMPLAINT  Chief Complaint   Patient presents with    RLQ Pain     RLQ and RT low back pain x 1 week. Throughout week has gotten worse.   On Friday she had her covid booster. Felt like she had a rx.   Started to feel febrile today.     Low Back Pain     EXTERNAL RECORDS REVIEWED  Outpatient notes show regular visits with [facility] internal medicine primary care.    HPI/ROS  LIMITATION TO HISTORY   This patient is very pleasant, though very scattered historian.    OUTSIDE HISTORIAN(S):  None    Jumana Kahn is a 58 y.o. female who presents to the ED complaining of right lower quadrant abdominal pain onset 1.5 weeks ago. The patient reports she has chronic back and neck pain and had an ablation surgery done on her lower back and her pain alleviated, but it came back recently. She reports she woke up today and it was hard for her to walk. She reports associated low back pain, fever, and vision changes, but denies any dysuria. She notes her pain is The patient states she got her 4th dose of the COVID vaccine 4 days ago and took a COVID test recently which was negative. She notes she is supposed to have an MRI done here soon. The patient reports a history of migraines and notes she uses hearing aides because she is 75% deaf.     PAST MEDICAL HISTORY  Past Medical History:   Diagnosis Date    Anemia     Asthma     GERD (gastroesophageal reflux disease)     Head ache     Hypothyroid     Psychiatric disorder     Substance abuse (HCC)        SURGICAL HISTORY  Past Surgical History:   Procedure Laterality Date    GASTROSCOPY N/A 5/10/2020    Procedure: GASTROSCOPY;  Surgeon: Lucas Crespo M.D.;  Location: SURGERY [facility];  Service: Gastroenterology    [place] UPPER GI ENDOSCOPY,CTRL BLEED N/A 1/15/2020    Procedure: EGD, WITH CLIP PLACEMENT;  Surgeon:  "Pito Davis M.D.;  Location: SURGERY [facility];  Service: Gastroenterology    GYN SURGERY      tubal ligation    OTHER      sinus surgery    OTHER ORTHOPEDIC SURGERY      leg       FAMILY HISTORY  No family history noted.    SOCIAL HISTORY   reports that she quit smoking about 22 years ago. Her smoking use included cigarettes. She has never used smokeless tobacco. She reports that she does not currently use alcohol. She reports that she does not use drugs.    CURRENT MEDICATIONS  Previous Medications    ACETAMINOPHEN (TYLENOL) 500 MG TAB    Take 1-2 Tablets by mouth every 6 hours as needed.    ALBUTEROL 108 (90 BASE) MCG/ACT AERO SOLN INHALATION AEROSOL    INHALE 1 TO 2 PUFFS BY MOUTH EVERY 4 TO 6 HOURS AS NEEDED FOR DIFFICULTY BREATHING    FLUOROURACIL (EFUDEX) 5 % CREAM    Apply to affected area once a day at night Monday, Weds., Friday for a week. Stop if irritated. Try to use for 4 weeks    FLUOROURACIL (EFUDEX) 5 % CREAM    Apply to affected area once a day at night Monday, Weds., Friday for a week. Stop if irritated. Try to use for 4 weeks    LEVOTHYROXINE (SYNTHROID) 125 MCG TAB    Take 1 Tablet by mouth every day.    MAGNESIUM OXIDE (MAG-OX) 400 MG TAB TABLET    Take 400 mg by mouth every day.    MECLIZINE (ANTIVERT) 25 MG TAB    Take 1 Tablet by mouth as needed for Vertigo.    OMEPRAZOLE (PRILOSEC) 20 MG DELAYED-RELEASE CAPSULE    Take 1 Capsule by mouth every day.    TRAZODONE (DESYREL) 50 MG TAB    Take 50 mg by mouth every evening.       ALLERGIES  Patient has no known allergies.    PHYSICAL EXAM  VITAL SIGNS: /73   Pulse 87   Temp 36.8 °C (98.2 °F) (Temporal)   Resp 16   Ht 1.575 m (5' 2\")   Wt 68 kg (149 lb 14.6 oz)   SpO2 97%   BMI 27.42 kg/m²   Pulse ox interpretation: I interpret this pulse ox as normal.  Constitutional: Alert in no apparent distress.  HENT: No signs of trauma, Bilateral external ears normal, Nose normal.   Eyes: Conjunctiva normal, Non-icteric.   Neck: " Normal range of motion, Supple, No stridor.   Lymphatic: No lymphadenopathy noted.   Cardiovascular: Regular rate and rhythm, no murmurs.   Thorax & Lungs: Normal breath sounds, No respiratory distress, No wheezing  Abdomen: Bowel sounds normal, Soft, No tenderness, No masses, No pulsatile masses. No peritoneal signs.  Skin: Warm, Dry, No erythema, No rash.   Back: No midline bony tenderness.   Extremities: Intact distal pulses, No edema, No cyanosis.  Musculoskeletal: Good range of motion in all major joints. No or major deformities noted.   Neurologic: Alert , Normal motor function, Normal sensory function, No focal deficits noted.   Psychiatric: Affect normal, Judgment normal, Mood normal.     DIAGNOSTIC STUDIES    Labs:   Labs Reviewed   CBC WITH DIFFERENTIAL - Abnormal; Notable for the following components:       Result Value    Platelet Count 139 (*)     Neutrophils-Polys 79.40 (*)     Lymphocytes 8.50 (*)     Lymphs (Absolute) 0.44 (*)     All other components within normal limits   COMP METABOLIC PANEL - Abnormal; Notable for the following components:    Total Protein 8.3 (*)     All other components within normal limits   URINALYSIS - Abnormal; Notable for the following components:    Leukocyte Esterase Trace (*)     All other components within normal limits   URINE MICROSCOPIC (W/UA) - Abnormal; Notable for the following components:    RBC 2-5 (*)     All other components within normal limits   POC COV-2, FLU A/B, RSV BY PCR - Abnormal; Notable for the following components:    POC SARS-CoV-2, PCR DETECTED (*)     All other components within normal limits   LIPASE   ESTIMATED GFR   COV-2, FLU A/B, AND RSV BY PCR (CEPHEID)        COURSE & MEDICAL DECISION MAKING     ED Observation Status? No; Patient does not meet criteria for ED Observation.     INITIAL ASSESSMENT, COURSE AND PLAN  Care Narrative:       5:30 PM Patient presents to the ED with right lower quadrant abdominal pain. Over inclusive history also  includes transient changes in vision, hearing, fever not present here, occasional vertigo, covid exposure, chronic back and neck pain, recently missed steroid injections. Complaints of frequent urination without pain. Patient evaluated at bedside and discussed plan of care, including obtaining las to monitor her symptoms. Ordered for CoV-2, Flu A/B, and RSV by PCR, CBC w/ diff, CMP, lipase, and UA to evaluate her symptoms. Differential diagnoses include but not limited to: Viral respiratory illness, intra-abdominal infection, urinary tract infection, chronic low back pain with thoracic radiculopathy.    6:37 PM this patient's COVID test is positive.  This likely explains some of her symptoms including headache and fever.  She reports these symptoms have been present for about a week, so she is outside of the range where starting antiviral treatment would be helpful or appropriate.  I think that a number of the symptoms she describes are related to her chronic back pain, though may be exacerbated by viral illness.  She also reports missing recent scheduled steroid injections which she finds helpful, which may also be contributing to her discomfort.  She is safe and appropriate for discharge with supportive care recommendations and return precautions.    ADDITIONAL PROBLEM LIST  Chronic upper and lower back pain complicates this visit somewhat.    DISPOSITION AND DISCUSSIONS    Escalation of care considered, and ultimately not performed: acute inpatient care management, however at this time, the patient is most appropriate for outpatient management.  No COVID-positive, she has normal vital signs.  She has chronic back pain, but there is no reason at this time to suspect an infectious or acute traumatic cause of her discomfort.    Decision tools and prescription drugs considered including, but not limited to: Medication modification considered, but patient has a number of home medications, and needs only supportive  care and return precautions for management of .     The patient will return for new or worsening symptoms and is stable at the time of discharge.    The patient is referred to a primary physician for blood pressure management, diabetic screening, and for all other preventative health concerns.    DISPOSITION:  Patient will be discharged home in stable condition.    FOLLOW UP:  Keon Gonzales M.D.  [address] [place] [address]  [phone]      As needed    [facility], Emergency Dept  [address] [id number] [phone]    If symptoms worsen      OUTPATIENT MEDICATIONS:  New Prescriptions    No medications on file         FINAL DIAGNOSIS  1. COVID-19          Angel SALGADO (Scribe), am scribing for, and in the presence of, Jeremiah Miller M.D..    Electronically signed by: Angel Sorensen (Gurpreetibe), 1/2/2024    Jeremiah SALGADO M.D. personally performed the services described in this documentation, as scribed by Angel Sorensen in my presence, and it is both accurate and complete.

## 2024-01-07 ENCOUNTER — HOSPITAL ENCOUNTER (EMERGENCY)
Facility: MEDICAL CENTER | Age: 59
End: 2024-01-07
Attending: STUDENT IN AN ORGANIZED HEALTH CARE EDUCATION/TRAINING PROGRAM
Payer: MEDICAID

## 2024-01-07 ENCOUNTER — APPOINTMENT (OUTPATIENT)
Dept: RADIOLOGY | Facility: MEDICAL CENTER | Age: 59
End: 2024-01-07
Attending: STUDENT IN AN ORGANIZED HEALTH CARE EDUCATION/TRAINING PROGRAM
Payer: MEDICAID

## 2024-01-07 VITALS
DIASTOLIC BLOOD PRESSURE: 62 MMHG | TEMPERATURE: 98 F | WEIGHT: 147.71 LBS | HEART RATE: 59 BPM | SYSTOLIC BLOOD PRESSURE: 92 MMHG | OXYGEN SATURATION: 97 % | BODY MASS INDEX: 25.22 KG/M2 | RESPIRATION RATE: 16 BRPM | HEIGHT: 64 IN

## 2024-01-07 DIAGNOSIS — K59.00 CONSTIPATION, UNSPECIFIED CONSTIPATION TYPE: ICD-10-CM

## 2024-01-07 DIAGNOSIS — R10.31 RIGHT LOWER QUADRANT ABDOMINAL PAIN: ICD-10-CM

## 2024-01-07 LAB
ALBUMIN SERPL BCP-MCNC: 4.4 G/DL (ref 3.2–4.9)
ALBUMIN/GLOB SERPL: 1.3 G/DL
ALP SERPL-CCNC: 56 U/L (ref 30–99)
ALT SERPL-CCNC: 18 U/L (ref 2–50)
ANION GAP SERPL CALC-SCNC: 12 MMOL/L (ref 7–16)
APPEARANCE UR: CLEAR
AST SERPL-CCNC: 20 U/L (ref 12–45)
BACTERIA #/AREA URNS HPF: NEGATIVE /HPF
BASOPHILS # BLD AUTO: 1.1 % (ref 0–1.8)
BASOPHILS # BLD: 0.04 K/UL (ref 0–0.12)
BILIRUB SERPL-MCNC: 0.5 MG/DL (ref 0.1–1.5)
BILIRUB UR QL STRIP.AUTO: NEGATIVE
BUN SERPL-MCNC: 11 MG/DL (ref 8–22)
CALCIUM ALBUM COR SERPL-MCNC: 9.1 MG/DL (ref 8.5–10.5)
CALCIUM SERPL-MCNC: 9.4 MG/DL (ref 8.5–10.5)
CHLORIDE SERPL-SCNC: 104 MMOL/L (ref 96–112)
CO2 SERPL-SCNC: 24 MMOL/L (ref 20–33)
COLOR UR: YELLOW
CREAT SERPL-MCNC: 0.77 MG/DL (ref 0.5–1.4)
EOSINOPHIL # BLD AUTO: 0.15 K/UL (ref 0–0.51)
EOSINOPHIL NFR BLD: 4.1 % (ref 0–6.9)
EPI CELLS #/AREA URNS HPF: NORMAL /HPF
ERYTHROCYTE [DISTWIDTH] IN BLOOD BY AUTOMATED COUNT: 41.1 FL (ref 35.9–50)
GFR SERPLBLD CREATININE-BSD FMLA CKD-EPI: 90 ML/MIN/1.73 M 2
GLOBULIN SER CALC-MCNC: 3.4 G/DL (ref 1.9–3.5)
GLUCOSE SERPL-MCNC: 98 MG/DL (ref 65–99)
GLUCOSE UR STRIP.AUTO-MCNC: NEGATIVE MG/DL
HCT VFR BLD AUTO: 41.1 % (ref 37–47)
HGB BLD-MCNC: 14.2 G/DL (ref 12–16)
IMM GRANULOCYTES # BLD AUTO: 0.01 K/UL (ref 0–0.11)
IMM GRANULOCYTES NFR BLD AUTO: 0.3 % (ref 0–0.9)
KETONES UR STRIP.AUTO-MCNC: NEGATIVE MG/DL
LEUKOCYTE ESTERASE UR QL STRIP.AUTO: ABNORMAL
LIPASE SERPL-CCNC: 40 U/L (ref 11–82)
LYMPHOCYTES # BLD AUTO: 2.2 K/UL (ref 1–4.8)
LYMPHOCYTES NFR BLD: 59.9 % (ref 22–41)
MCH RBC QN AUTO: 30.6 PG (ref 27–33)
MCHC RBC AUTO-ENTMCNC: 34.5 G/DL (ref 32.2–35.5)
MCV RBC AUTO: 88.6 FL (ref 81.4–97.8)
MICRO URNS: ABNORMAL
MONOCYTES # BLD AUTO: 0.38 K/UL (ref 0–0.85)
MONOCYTES NFR BLD AUTO: 10.4 % (ref 0–13.4)
NEUTROPHILS # BLD AUTO: 0.89 K/UL (ref 1.82–7.42)
NEUTROPHILS NFR BLD: 24.2 % (ref 44–72)
NITRITE UR QL STRIP.AUTO: NEGATIVE
NRBC # BLD AUTO: 0 K/UL
NRBC BLD-RTO: 0 /100 WBC (ref 0–0.2)
PH UR STRIP.AUTO: 6.5 [PH] (ref 5–8)
PLATELET # BLD AUTO: 147 K/UL (ref 164–446)
PMV BLD AUTO: 10.6 FL (ref 9–12.9)
POTASSIUM SERPL-SCNC: 3.8 MMOL/L (ref 3.6–5.5)
PROT SERPL-MCNC: 7.8 G/DL (ref 6–8.2)
PROT UR QL STRIP: NEGATIVE MG/DL
RBC # BLD AUTO: 4.64 M/UL (ref 4.2–5.4)
RBC # URNS HPF: NORMAL /HPF
RBC UR QL AUTO: NEGATIVE
SODIUM SERPL-SCNC: 140 MMOL/L (ref 135–145)
SP GR UR STRIP.AUTO: 1.01
UROBILINOGEN UR STRIP.AUTO-MCNC: 0.2 MG/DL
WBC # BLD AUTO: 3.7 K/UL (ref 4.8–10.8)
WBC #/AREA URNS HPF: NORMAL /HPF

## 2024-01-07 PROCEDURE — 83690 ASSAY OF LIPASE: CPT

## 2024-01-07 PROCEDURE — 74176 CT ABD & PELVIS W/O CONTRAST: CPT

## 2024-01-07 PROCEDURE — 36415 COLL VENOUS BLD VENIPUNCTURE: CPT

## 2024-01-07 PROCEDURE — 99284 EMERGENCY DEPT VISIT MOD MDM: CPT

## 2024-01-07 PROCEDURE — 80053 COMPREHEN METABOLIC PANEL: CPT

## 2024-01-07 PROCEDURE — 85025 COMPLETE CBC W/AUTO DIFF WBC: CPT

## 2024-01-07 PROCEDURE — 81001 URINALYSIS AUTO W/SCOPE: CPT

## 2024-01-07 RX ORDER — POLYETHYLENE GLYCOL 3350 17 G/17G
17 POWDER, FOR SOLUTION ORAL DAILY
Qty: 15 PACKET | Refills: 0 | Status: SHIPPED | OUTPATIENT
Start: 2024-01-07 | End: 2024-01-22

## 2024-01-07 ASSESSMENT — FIBROSIS 4 INDEX: FIB4 SCORE: 2.34

## 2024-01-07 NOTE — ED TRIAGE NOTES
"Chief Complaint   Patient presents with    Abdominal Pain     Patient complaining of lower abdominal pain in lower right quadrant. Patient also complaining of lower back pain to right back.       Pt is alert and oriented, speaking in full sentences, follows commands and responds appropriately to questions. Resperations are even and unlabored.      Pt placed in lobby. Pt educated on triage process. Pt encouraged to alert staff for any changes.     Patient and staff wearing appropriate PPE.    /77   Pulse 75   Temp 36.4 °C (97.6 °F) (Temporal)   Resp 16   Ht 1.626 m (5' 4\")   Wt 67 kg (147 lb 11.3 oz)   SpO2 96%    "

## 2024-01-07 NOTE — ED PROVIDER NOTES
ED Provider Note    CHIEF COMPLAINT  Chief Complaint   Patient presents with    Abdominal Pain     Patient complaining of lower abdominal pain in lower right quadrant. Patient also complaining of lower back pain to right back.       EXTERNAL RECORDS REVIEWED      HPI/ROS  LIMITATION TO HISTORY   Select: : None  OUTSIDE HISTORIAN(S):      Jumana Kahn is a 58 y.o. female who presents with lower abdominal pain radiating into her lower right back.  Patient reports this has been an ongoing issue for over a week.  She reports this has been worsening throughout this time and will often wake her from sleep.  She denies vomiting, diarrhea    PAST MEDICAL HISTORY   has a past medical history of Anemia, Asthma, GERD (gastroesophageal reflux disease), Head ache, Hypothyroid, Psychiatric disorder, and Substance abuse (HCC).    SURGICAL HISTORY   has a past surgical history that includes other orthopedic surgery; gyn surgery; other; upper gi endoscopy,ctrl bleed (N/A, 1/15/2020); and gastroscopy (N/A, 5/10/2020).    FAMILY HISTORY  History reviewed. No pertinent family history.    SOCIAL HISTORY  Social History     Tobacco Use    Smoking status: Former     Current packs/day: 0.00     Types: Cigarettes     Quit date: 2001     Years since quittin.4    Smokeless tobacco: Never   Vaping Use    Vaping Use: Never used   Substance and Sexual Activity    Alcohol use: Not Currently     Comment: has not drank for over a year    Drug use: No    Sexual activity: Not on file       CURRENT MEDICATIONS  Home Medications       Reviewed by Santino Sauer R.N. (Registered Nurse) on 24 at 0127  Med List Status: Not Addressed     Medication Last Dose Status   acetaminophen (TYLENOL) 500 MG Tab  Active   albuterol 108 (90 Base) MCG/ACT Aero Soln inhalation aerosol  Active   fluorouracil (EFUDEX) 5 % cream  Active   fluorouracil (EFUDEX) 5 % cream  Active   levothyroxine (SYNTHROID) 125 MCG Tab  Active   magnesium oxide  "(MAG-OX) 400 MG Tab tablet  Active   meclizine (ANTIVERT) 25 MG Tab  Active   omeprazole (PRILOSEC) 20 MG delayed-release capsule  Active   traZODone (DESYREL) 50 MG Tab  Active                    ALLERGIES  No Known Allergies    PHYSICAL EXAM  VITAL SIGNS: BP 92/62   Pulse (!) 59   Temp 36.7 °C (98 °F) (Temporal)   Resp 16   Ht 1.626 m (5' 4\")   Wt 67 kg (147 lb 11.3 oz)   SpO2 97%   BMI 25.35 kg/m²    Physical Exam      DIAGNOSTIC STUDIES / PROCEDURES      LABS  Labs Reviewed   CBC WITH DIFFERENTIAL - Abnormal; Notable for the following components:       Result Value    WBC 3.7 (*)     Platelet Count 147 (*)     Neutrophils-Polys 24.20 (*)     Lymphocytes 59.90 (*)     Neutrophils (Absolute) 0.89 (*)     All other components within normal limits    Narrative:     Enhanced Droplet, Contact, and Eye Protection   URINALYSIS - Abnormal; Notable for the following components:    Leukocyte Esterase Trace (*)     All other components within normal limits    Narrative:     Enhanced Droplet, Contact, and Eye Protection   COMP METABOLIC PANEL    Narrative:     Enhanced Droplet, Contact, and Eye Protection   LIPASE    Narrative:     Enhanced Droplet, Contact, and Eye Protection   URINE MICROSCOPIC (W/UA)    Narrative:     Enhanced Droplet, Contact, and Eye Protection   ESTIMATED GFR    Narrative:     Enhanced Droplet, Contact, and Eye Protection         RADIOLOGY  I have independently interpreted the diagnostic imaging associated with this visit and am waiting the final reading from the radiologist.   My preliminary interpretation is as follows: CT abdomen no acute process  Radiologist interpretation:   CT-ABDOMEN-PELVIS W/O   Final Result      1.  Hepatosplenomegaly.      2.  Marked constipation          COURSE & MEDICAL DECISION MAKING    ED Observation Status? No; Patient does not meet criteria for ED Observation.     INITIAL ASSESSMENT, COURSE AND PLAN  Care Narrative: 58-year-old female history of chronic back " pain presents to the ED for lower abdominal pain.  Seen here recently with reassuring workup.  On exam she has mild lower abdominal tenderness and is overall well-appearing with normal vital signs.  Laboratory workup and CT scan obtained to rule out metabolic derangement, SARA, mesenteric ischemia, appendicitis, diverticulitis.  This was all overall reassuring her CT was notable for significant constipation which I feel could certainly explain her pain.  I placed her on a bowel regimen for this.  She was advised to follow-up with her PCP and given return precautions for significant worsening        ADDITIONAL PROBLEM LIST  Past Medical History:   Diagnosis Date    Anemia     Asthma     GERD (gastroesophageal reflux disease)     Head ache     Hypothyroid     Psychiatric disorder     Substance abuse (HCC)        DISPOSITION AND DISCUSSIONS      Escalation of care considered, and ultimately not performed:acute inpatient care management, however at this time, the patient is most appropriate for outpatient management    Barriers to care at this time, including but not limited to: .     Decision tools and prescription drugs considered including, but not limited to:  Laxative regimen .    FINAL DIAGNOSIS  1. Constipation, unspecified constipation type Acute   2. Right lower quadrant abdominal pain Acute          Electronically signed by: Mikel Chavarria M.D., 1/7/2024 4:50 AM

## 2024-01-07 NOTE — ED NOTES
Patient discharged home per ERP.  Discharge teaching and education discussed with patient. POC discussed.   Patient verbalized understanding of discharge teaching and education. No other questions at this time.     RX x 1 given to patient.     VSS. Patient alert and oriented. Patient arranged ride for self. Able to ambulate off unit safely with steady gait.

## 2024-01-08 ENCOUNTER — OFFICE VISIT (OUTPATIENT)
Dept: INTERNAL MEDICINE | Facility: OTHER | Age: 59
End: 2024-01-08
Payer: MEDICAID

## 2024-01-08 VITALS
HEIGHT: 64 IN | OXYGEN SATURATION: 100 % | DIASTOLIC BLOOD PRESSURE: 78 MMHG | BODY MASS INDEX: 25.57 KG/M2 | HEART RATE: 57 BPM | SYSTOLIC BLOOD PRESSURE: 116 MMHG | WEIGHT: 149.8 LBS

## 2024-01-08 DIAGNOSIS — E55.9 VITAMIN D DEFICIENCY: ICD-10-CM

## 2024-01-08 DIAGNOSIS — M54.50 ACUTE MIDLINE LOW BACK PAIN WITHOUT SCIATICA: ICD-10-CM

## 2024-01-08 PROCEDURE — 3074F SYST BP LT 130 MM HG: CPT

## 2024-01-08 PROCEDURE — 99214 OFFICE O/P EST MOD 30 MIN: CPT | Mod: GC

## 2024-01-08 PROCEDURE — 3078F DIAST BP <80 MM HG: CPT

## 2024-01-08 PROCEDURE — 2023F DILAT RTA XM W/O RTNOPTHY: CPT

## 2024-01-08 RX ORDER — CYCLOBENZAPRINE HCL 10 MG
10 TABLET ORAL 3 TIMES DAILY PRN
Qty: 30 TABLET | Refills: 0 | Status: SHIPPED | OUTPATIENT
Start: 2024-01-08

## 2024-01-08 ASSESSMENT — ENCOUNTER SYMPTOMS
SENSORY CHANGE: 0
NECK PAIN: 0
DEPRESSION: 0
WEAKNESS: 0
NERVOUS/ANXIOUS: 0
SPUTUM PRODUCTION: 0
SHORTNESS OF BREATH: 0
EYE DISCHARGE: 0
BLOOD IN STOOL: 0
FEVER: 0
WEIGHT LOSS: 0
WHEEZING: 0
NAUSEA: 0
HEADACHES: 0
VOMITING: 0
PALPITATIONS: 0
BLURRED VISION: 0
DOUBLE VISION: 0
COUGH: 0
CHILLS: 0
DIARRHEA: 0
BACK PAIN: 1
HEARTBURN: 0
ORTHOPNEA: 0
CLAUDICATION: 0
INSOMNIA: 0
BRUISES/BLEEDS EASILY: 0
ABDOMINAL PAIN: 0
CONSTIPATION: 0
SORE THROAT: 0
DIZZINESS: 0

## 2024-01-08 ASSESSMENT — FIBROSIS 4 INDEX: FIB4 SCORE: 1.8

## 2024-01-08 NOTE — PROGRESS NOTES
Subjective:     CC: 3-month follow-up, posthospital follow-up    HPI:   Jumana presents today to follow-up on her previous visit as well as a posthospital follow-up.  Patient states that about a month ago, her roommate got really sick, they brought her back, and she was helping them with chores at home.  Since then, patient has increased low back pain.  Additionally, she also was taking care of her roommate in the hospital, and she tested positive for COVID on 1/2/2024.  Since then, her symptoms have gotten better, she denies any shortness of breath or chest pain.  On 1/7/2024, patient was seen in the [facility] ED for abdominal pain as well as back pain.  CT imaging of the abdomen showed acute constipation which was attributed to be the cause of her abdominal and back pain.  She was given a bowel regimen, and since then has had 2 bowel movements, and states that her back pain has gotten better.  No other acute conditions to be managed at this time.    Additionally, patient has a prior diagnosis of vitamin D deficiency.  Her prior vitamin D levels back in 2020 were decreased.  She was started on vitamin D supplementation by her prior PCP.  We will check her vitamin D levels at this visit again and adjust medications if needed.      ROS:  Review of Systems   Constitutional:  Negative for chills, fever and weight loss.   HENT:  Negative for congestion, hearing loss and sore throat.    Eyes:  Negative for blurred vision, double vision and discharge.   Respiratory:  Negative for cough, sputum production, shortness of breath and wheezing.    Cardiovascular:  Negative for chest pain, palpitations, orthopnea, claudication and leg swelling.   Gastrointestinal:  Negative for abdominal pain, blood in stool, constipation, diarrhea, heartburn, melena, nausea and vomiting.   Genitourinary:  Negative for dysuria, frequency, hematuria and urgency.   Musculoskeletal:  Positive for back pain. Negative for joint pain and neck pain.  "  Skin:  Negative for rash.   Neurological:  Negative for dizziness, sensory change, weakness and headaches.   Endo/Heme/Allergies:  Negative for environmental allergies. Does not bruise/bleed easily.   Psychiatric/Behavioral:  Negative for depression. The patient is not nervous/anxious and does not have insomnia.        Objective:     Exam:  /78 (BP Location: Left arm, Patient Position: Sitting, BP Cuff Size: Adult)   Pulse (!) 57   Ht 1.626 m (5' 4\")   Wt 67.9 kg (149 lb 12.8 oz)   SpO2 100%   BMI 25.71 kg/m²  Body mass index is 25.71 kg/m².    Physical Exam  Constitutional:       General: She is not in acute distress.     Appearance: Normal appearance. She is normal weight.   HENT:      Head: Normocephalic and atraumatic.      Right Ear: External ear normal.      Left Ear: External ear normal.      Nose: Nose normal. No congestion or rhinorrhea.      Mouth/Throat:      Mouth: Mucous membranes are moist.      Pharynx: Oropharynx is clear. No oropharyngeal exudate or posterior oropharyngeal erythema.   Eyes:      General: No scleral icterus.        Right eye: No discharge.         Left eye: No discharge.      Extraocular Movements: Extraocular movements intact.      Conjunctiva/sclera: Conjunctivae normal.      Pupils: Pupils are equal, round, and reactive to light.   Neck:      Vascular: No carotid bruit.   Cardiovascular:      Rate and Rhythm: Normal rate and regular rhythm.      Pulses: Normal pulses.      Heart sounds: Normal heart sounds. No murmur heard.     No friction rub.   Pulmonary:      Effort: Pulmonary effort is normal. No respiratory distress.      Breath sounds: Normal breath sounds. No wheezing or rhonchi.   Abdominal:      General: Abdomen is flat. Bowel sounds are normal.      Tenderness: There is no abdominal tenderness. There is no right CVA tenderness or left CVA tenderness.   Musculoskeletal:         General: No tenderness. Normal range of motion.      Cervical back: Normal range " of motion and neck supple. No rigidity.      Right lower leg: No edema.      Left lower leg: No edema.   Skin:     General: Skin is warm.      Capillary Refill: Capillary refill takes less than 2 seconds.      Findings: No bruising, lesion or rash.   Neurological:      General: No focal deficit present.      Mental Status: She is alert and oriented to person, place, and time. Mental status is at baseline.      Cranial Nerves: No cranial nerve deficit.      Coordination: Coordination normal.      Gait: Gait normal.   Psychiatric:         Mood and Affect: Mood normal.         Behavior: Behavior normal.         Thought Content: Thought content normal.         Judgment: Judgment normal.       Labs:   Previous labs were discussed with the patient, no follow-up questions were    Assessment & Plan:     58 y.o. female with the following -    1. Acute midline low back pain without sciatica  Jumana presents today to follow-up on her previous visit as well as a posthospital follow-up.  Patient states that about a month ago, her roommate got really sick, they brought her back, and she was helping them with chores at home.  Since then, patient has increased low back pain.  Additionally, she also was taking care of her roommate in the hospital, and she tested positive for COVID on 1/2/2024.  Since then, her symptoms have gotten better, she denies any shortness of breath or chest pain.  On 1/7/2024, patient was seen in the [facility] ED for abdominal pain as well as back pain.  CT imaging of the abdomen showed acute constipation which was attributed to be the cause of her abdominal and back pain.  She was given a bowel regimen, and since then has had 2 bowel movements, and states that her back pain has gotten better.  No other acute conditions to be managed at this time.  - cyclobenzaprine (FLEXERIL) 10 mg Tab; Take 1 Tablet by mouth 3 times a day as needed for Moderate Pain or Muscle Spasms.  Dispense: 30 Tablet; Refill: 0    2.  Vitamin D deficiency  Additionally, patient has a prior diagnosis of vitamin D deficiency.  Her prior vitamin D levels back in 2020 were decreased.  She was started on vitamin D supplementation by her prior PCP.  We will check her vitamin D levels at this visit again and adjust medications if needed.  - VITAMIN D,25 HYDROXY (DEFICIENCY); Future      I spent a total of 30 minutes with record review, exam, communication with the patient, communication with other providers, and documentation of this encounter.    Return in about 6 months (around 7/8/2024).    Please note that this dictation was created using voice recognition software. I have made every reasonable attempt to correct obvious errors, but I expect that there are errors of grammar and possibly content that I did not discover before finalizing the note.    Keon Gonzales M.D.  Internal Medicine Resident  [facility]

## 2024-01-17 ENCOUNTER — HOSPITAL ENCOUNTER (OUTPATIENT)
Dept: RADIOLOGY | Facility: MEDICAL CENTER | Age: 59
End: 2024-01-17
Payer: MEDICAID

## 2024-01-17 DIAGNOSIS — G37.2 CENTRAL PONTINE MYELINOLYSIS (HCC): ICD-10-CM

## 2024-01-17 PROCEDURE — 70551 MRI BRAIN STEM W/O DYE: CPT

## 2024-01-20 ENCOUNTER — APPOINTMENT (OUTPATIENT)
Dept: URGENT CARE | Facility: PHYSICIAN GROUP | Age: 59
End: 2024-01-20
Payer: MEDICAID

## 2024-01-20 ENCOUNTER — HOSPITAL ENCOUNTER (EMERGENCY)
Facility: MEDICAL CENTER | Age: 59
End: 2024-01-20
Attending: EMERGENCY MEDICINE
Payer: MEDICAID

## 2024-01-20 VITALS
TEMPERATURE: 98.4 F | SYSTOLIC BLOOD PRESSURE: 116 MMHG | RESPIRATION RATE: 15 BRPM | HEIGHT: 64 IN | DIASTOLIC BLOOD PRESSURE: 77 MMHG | BODY MASS INDEX: 25.37 KG/M2 | HEART RATE: 78 BPM | WEIGHT: 148.59 LBS | OXYGEN SATURATION: 98 %

## 2024-01-20 DIAGNOSIS — K59.00 CONSTIPATION, UNSPECIFIED CONSTIPATION TYPE: Primary | ICD-10-CM

## 2024-01-20 LAB
ALBUMIN SERPL BCP-MCNC: 4.5 G/DL (ref 3.2–4.9)
ALBUMIN/GLOB SERPL: 1.6 G/DL
ALP SERPL-CCNC: 52 U/L (ref 30–99)
ALT SERPL-CCNC: 19 U/L (ref 2–50)
ANION GAP SERPL CALC-SCNC: 9 MMOL/L (ref 7–16)
APPEARANCE UR: CLEAR
AST SERPL-CCNC: 21 U/L (ref 12–45)
BASOPHILS # BLD AUTO: 1 % (ref 0–1.8)
BASOPHILS # BLD: 0.06 K/UL (ref 0–0.12)
BILIRUB SERPL-MCNC: 0.5 MG/DL (ref 0.1–1.5)
BILIRUB UR QL STRIP.AUTO: NEGATIVE
BUN SERPL-MCNC: 13 MG/DL (ref 8–22)
CALCIUM ALBUM COR SERPL-MCNC: 9.2 MG/DL (ref 8.5–10.5)
CALCIUM SERPL-MCNC: 9.6 MG/DL (ref 8.5–10.5)
CHLORIDE SERPL-SCNC: 106 MMOL/L (ref 96–112)
CO2 SERPL-SCNC: 24 MMOL/L (ref 20–33)
COLOR UR: YELLOW
CREAT SERPL-MCNC: 0.78 MG/DL (ref 0.5–1.4)
EOSINOPHIL # BLD AUTO: 0.11 K/UL (ref 0–0.51)
EOSINOPHIL NFR BLD: 1.8 % (ref 0–6.9)
ERYTHROCYTE [DISTWIDTH] IN BLOOD BY AUTOMATED COUNT: 42 FL (ref 35.9–50)
GFR SERPLBLD CREATININE-BSD FMLA CKD-EPI: 89 ML/MIN/1.73 M 2
GLOBULIN SER CALC-MCNC: 2.8 G/DL (ref 1.9–3.5)
GLUCOSE SERPL-MCNC: 102 MG/DL (ref 65–99)
GLUCOSE UR STRIP.AUTO-MCNC: NEGATIVE MG/DL
HCT VFR BLD AUTO: 43.4 % (ref 37–47)
HGB BLD-MCNC: 14.8 G/DL (ref 12–16)
IMM GRANULOCYTES # BLD AUTO: 0.02 K/UL (ref 0–0.11)
IMM GRANULOCYTES NFR BLD AUTO: 0.3 % (ref 0–0.9)
KETONES UR STRIP.AUTO-MCNC: NEGATIVE MG/DL
LEUKOCYTE ESTERASE UR QL STRIP.AUTO: NEGATIVE
LIPASE SERPL-CCNC: 37 U/L (ref 11–82)
LYMPHOCYTES # BLD AUTO: 2.89 K/UL (ref 1–4.8)
LYMPHOCYTES NFR BLD: 47.4 % (ref 22–41)
MCH RBC QN AUTO: 30.9 PG (ref 27–33)
MCHC RBC AUTO-ENTMCNC: 34.1 G/DL (ref 32.2–35.5)
MCV RBC AUTO: 90.6 FL (ref 81.4–97.8)
MICRO URNS: NORMAL
MONOCYTES # BLD AUTO: 0.41 K/UL (ref 0–0.85)
MONOCYTES NFR BLD AUTO: 6.7 % (ref 0–13.4)
NEUTROPHILS # BLD AUTO: 2.61 K/UL (ref 1.82–7.42)
NEUTROPHILS NFR BLD: 42.8 % (ref 44–72)
NITRITE UR QL STRIP.AUTO: NEGATIVE
NRBC # BLD AUTO: 0 K/UL
NRBC BLD-RTO: 0 /100 WBC (ref 0–0.2)
PH UR STRIP.AUTO: 5.5 [PH] (ref 5–8)
PLATELET # BLD AUTO: 166 K/UL (ref 164–446)
PMV BLD AUTO: 10.6 FL (ref 9–12.9)
POTASSIUM SERPL-SCNC: 4 MMOL/L (ref 3.6–5.5)
PROT SERPL-MCNC: 7.3 G/DL (ref 6–8.2)
PROT UR QL STRIP: NEGATIVE MG/DL
RBC # BLD AUTO: 4.79 M/UL (ref 4.2–5.4)
RBC UR QL AUTO: NEGATIVE
SODIUM SERPL-SCNC: 139 MMOL/L (ref 135–145)
SP GR UR STRIP.AUTO: 1.01
UROBILINOGEN UR STRIP.AUTO-MCNC: 0.2 MG/DL
WBC # BLD AUTO: 6.1 K/UL (ref 4.8–10.8)

## 2024-01-20 PROCEDURE — 83690 ASSAY OF LIPASE: CPT

## 2024-01-20 PROCEDURE — 81003 URINALYSIS AUTO W/O SCOPE: CPT

## 2024-01-20 PROCEDURE — 36415 COLL VENOUS BLD VENIPUNCTURE: CPT

## 2024-01-20 PROCEDURE — 85025 COMPLETE CBC W/AUTO DIFF WBC: CPT

## 2024-01-20 PROCEDURE — 99284 EMERGENCY DEPT VISIT MOD MDM: CPT

## 2024-01-20 PROCEDURE — 80053 COMPREHEN METABOLIC PANEL: CPT

## 2024-01-20 RX ORDER — POLYETHYLENE GLYCOL 3350, SODIUM SULFATE ANHYDROUS, SODIUM BICARBONATE, SODIUM CHLORIDE, POTASSIUM CHLORIDE 236; 22.74; 6.74; 5.86; 2.97 G/4L; G/4L; G/4L; G/4L; G/4L
240 POWDER, FOR SOLUTION ORAL ONCE
Qty: 240 ML | Refills: 0 | Status: SHIPPED | OUTPATIENT
Start: 2024-01-20 | End: 2024-01-20

## 2024-01-20 RX ORDER — POLYETHYLENE GLYCOL 3350, SODIUM SULFATE ANHYDROUS, SODIUM BICARBONATE, SODIUM CHLORIDE, POTASSIUM CHLORIDE 236; 22.74; 6.74; 5.86; 2.97 G/4L; G/4L; G/4L; G/4L; G/4L
4 POWDER, FOR SOLUTION ORAL ONCE
Qty: 4000 ML | Refills: 0 | Status: SHIPPED | OUTPATIENT
Start: 2024-01-20 | End: 2024-01-20

## 2024-01-20 RX ORDER — POLYETHYLENE GLYCOL 3350, SODIUM SULFATE ANHYDROUS, SODIUM BICARBONATE, SODIUM CHLORIDE, POTASSIUM CHLORIDE 236; 22.74; 6.74; 5.86; 2.97 G/4L; G/4L; G/4L; G/4L; G/4L
4 POWDER, FOR SOLUTION ORAL ONCE
Qty: 240 ML | Refills: 0 | Status: SHIPPED | OUTPATIENT
Start: 2024-01-20 | End: 2024-01-20

## 2024-01-20 ASSESSMENT — FIBROSIS 4 INDEX: FIB4 SCORE: 1.8

## 2024-01-21 NOTE — ED NOTES
Taken patient from triage waiting room, ambulatory with steady gait, alert/ oriented x 4.Verified patient identification.  Assumed patient care.   Placed on patient room.Given the call light and instructed to call for any assistance needed/ or concerns.   Bed on lowest position, side rails up, breaks locked. Awaiting for ERP.

## 2024-01-21 NOTE — DISCHARGE INSTRUCTIONS
I want to take the GoLytely medication.  I sent a 24-hour pharmacy significant up tonight.  Drink it and it will make you have a bowel movement.  You follow-up with GI for further evaluation.  If any worse symptoms or concerns please return to ED.  For coming in today.    Above is the name and number of our GI specialist and call their office to make an appointment.

## 2024-01-21 NOTE — ED PROVIDER NOTES
"ED Provider Note    Scribed for Aquiles Gibson by Pranay Cedeno. 1/20/2024  7:41 PM    Primary care provider: Keon Gonzales M.D.  Means of arrival: walk in  History obtained from: Patient  History limited by: None    CHIEF COMPLAINT  Chief Complaint   Patient presents with    Constipation     Pt was seen on 1/7 for abdominal pain and lower back discomfort. Was sent home on miralax and did not notice a huge improvement w/ BM's. Pt has been having small BM but reports still having abdominal pain.     Abdominal Pain     Improved after visit on 1/7 but pain has come back intermittently.      EXTERNAL RECORDS REVIEWED  Outpatient Notes Seen at PCP for back pain one day after being seen in the ED    HPI/ROS    LIMITATION TO HISTORY   Select: : None    HPI  Jumana Kahn is a 58 y.o. female who presents to the Emergency Department for abdominal pain onset over 2 weeks ago. She was seen here on 1/7 for the same complaint, was started on Miralax for constipation. Since her discharge she has had continued abdominal pain. Her pain is waking her up at night and alleviated with movement. She thinks that there is something \"raw\" in there. She did manage to have one bowel movement after starting Miralax but has not had many since then. If she does have a BM it is very small in nature. She has a history of constipation, but it has not been as severe as this. She also had associated chills. She denies any vomiting. She has not been able to pass gas. She used to be a heavy drinker but does not drink anymore. She had a recent colonoscopy which was normal.     REVIEW OF SYSTEMS  As above, all other systems reviewed and are negative.   See HPI for further details.     PAST MEDICAL HISTORY   has a past medical history of Anemia, Asthma, GERD (gastroesophageal reflux disease), Head ache, Hypothyroid, Psychiatric disorder, and Substance abuse (HCC).    SURGICAL HISTORY   has a past surgical history that includes other " "orthopedic surgery; gyn surgery; other; upper gi endoscopy,ctrl bleed (N/A, 1/15/2020); and gastroscopy (N/A, 5/10/2020).    SOCIAL HISTORY  Social History     Tobacco Use    Smoking status: Former     Current packs/day: 0.00     Types: Cigarettes     Quit date: 2001     Years since quittin.5    Smokeless tobacco: Never   Vaping Use    Vaping Use: Never used   Substance Use Topics    Alcohol use: Not Currently     Comment: has not drank for over a year    Drug use: No      Social History     Substance and Sexual Activity   Drug Use No     FAMILY HISTORY  No family history on file.  CURRENT MEDICATIONS  Home Medications       Reviewed by Lilliana Romero R.N. (Registered Nurse) on 24 at 1800  Med List Status: Partial     Medication Last Dose Status   acetaminophen (TYLENOL) 500 MG Tab  Active   albuterol 108 (90 Base) MCG/ACT Aero Soln inhalation aerosol  Active   cyclobenzaprine (FLEXERIL) 10 mg Tab  Active   fluorouracil (EFUDEX) 5 % cream  Active   levothyroxine (SYNTHROID) 125 MCG Tab  Active   magnesium oxide (MAG-OX) 400 MG Tab tablet  Active   meclizine (ANTIVERT) 25 MG Tab  Active   omeprazole (PRILOSEC) 20 MG delayed-release capsule  Active   polyethylene glycol/lytes (MIRALAX) Pack  Active   traZODone (DESYREL) 50 MG Tab  Active                  ALLERGIES  No Known Allergies    PHYSICAL EXAM    VITAL SIGNS:   Vitals:    24 1751 24 1752   BP: 117/76    Pulse: 71    Resp: 16    Temp: 36 °C (96.8 °F)    TempSrc: Temporal    SpO2: 98%    Weight:  67.4 kg (148 lb 9.4 oz)   Height:  1.626 m (5' 4\")     Vitals: My interpretation: normotensive, not tachycardic, afebrile, not hypoxic    Reinterpretation of vitals: Unchanged unremarkable    PE:   Gen: sitting comfortably, speaking clearly, appears extremely anxious   ENT: Mucous membranes moist, posterior pharynx clear, uvula midline, nares patent bilaterally   Neck: Supple, FROM  Pulmonary: Lungs are clear to auscultation bilaterally. No " tachypnea  CV:  RRR, no murmur appreciated, pulses 2+ in both upper and lower extremities  Abdomen: soft, NT/ND; no rebound/guarding  : no CVA or suprapubic tenderness   Neuro: A&Ox4 (person, place, time, situation), speech fluent, gait steady, no focal deficits appreciated  Skin: No rash or lesions.  No pallor or jaundice.  No cyanosis.  Warm and dry.     DIAGNOSTIC STUDIES / PROCEDURES    LABS  Results for orders placed or performed during the hospital encounter of 01/20/24   CBC with Differential   Result Value Ref Range    WBC 6.1 4.8 - 10.8 K/uL    RBC 4.79 4.20 - 5.40 M/uL    Hemoglobin 14.8 12.0 - 16.0 g/dL    Hematocrit 43.4 37.0 - 47.0 %    MCV 90.6 81.4 - 97.8 fL    MCH 30.9 27.0 - 33.0 pg    MCHC 34.1 32.2 - 35.5 g/dL    RDW 42.0 35.9 - 50.0 fL    Platelet Count 166 164 - 446 K/uL    MPV 10.6 9.0 - 12.9 fL    Neutrophils-Polys 42.80 (L) 44.00 - 72.00 %    Lymphocytes 47.40 (H) 22.00 - 41.00 %    Monocytes 6.70 0.00 - 13.40 %    Eosinophils 1.80 0.00 - 6.90 %    Basophils 1.00 0.00 - 1.80 %    Immature Granulocytes 0.30 0.00 - 0.90 %    Nucleated RBC 0.00 0.00 - 0.20 /100 WBC    Neutrophils (Absolute) 2.61 1.82 - 7.42 K/uL    Lymphs (Absolute) 2.89 1.00 - 4.80 K/uL    Monos (Absolute) 0.41 0.00 - 0.85 K/uL    Eos (Absolute) 0.11 0.00 - 0.51 K/uL    Baso (Absolute) 0.06 0.00 - 0.12 K/uL    Immature Granulocytes (abs) 0.02 0.00 - 0.11 K/uL    NRBC (Absolute) 0.00 K/uL   Complete Metabolic Panel   Result Value Ref Range    Sodium 139 135 - 145 mmol/L    Potassium 4.0 3.6 - 5.5 mmol/L    Chloride 106 96 - 112 mmol/L    Co2 24 20 - 33 mmol/L    Anion Gap 9.0 7.0 - 16.0    Glucose 102 (H) 65 - 99 mg/dL    Bun 13 8 - 22 mg/dL    Creatinine 0.78 0.50 - 1.40 mg/dL    Calcium 9.6 8.5 - 10.5 mg/dL    Correct Calcium 9.2 8.5 - 10.5 mg/dL    AST(SGOT) 21 12 - 45 U/L    ALT(SGPT) 19 2 - 50 U/L    Alkaline Phosphatase 52 30 - 99 U/L    Total Bilirubin 0.5 0.1 - 1.5 mg/dL    Albumin 4.5 3.2 - 4.9 g/dL    Total Protein  7.3 6.0 - 8.2 g/dL    Globulin 2.8 1.9 - 3.5 g/dL    A-G Ratio 1.6 g/dL   Lipase   Result Value Ref Range    Lipase 37 11 - 82 U/L   Urinalysis    Specimen: Urine   Result Value Ref Range    Color Yellow     Character Clear     Specific Gravity 1.006 <1.035    Ph 5.5 5.0 - 8.0    Glucose Negative Negative mg/dL    Ketones Negative Negative mg/dL    Protein Negative Negative mg/dL    Bilirubin Negative Negative    Urobilinogen, Urine 0.2 Negative    Nitrite Negative Negative    Leukocyte Esterase Negative Negative    Occult Blood Negative Negative    Micro Urine Req see below    ESTIMATED GFR   Result Value Ref Range    GFR (CKD-EPI) 89 >60 mL/min/1.73 m 2      All labs reviewed by me. Labs were compared to prior labs if they were available. Significant for no leukocytosis, no anemia, normal electrolytes, normal renal function, normal liver enzymes, normal bilirubin, lipase normal, urinalysis negative for ketones, infection or blood.    COURSE & MEDICAL DECISION MAKING  Nursing notes, VS, PMSFHx, labs, imaging, EKG reviewed in chart.    ED Observation Status? No; Patient does not meet criteria for ED Observation.     Ddx: Constipation, obstruction, appendicitis    MDM: 7:41 PM Jumana Kahn is a 58 y.o. female who presented with concerns of acute on chronic constipation.  Patient suffers from this but the majority of her life.  She has not had normal bowel movements for a few weeks.  She had a normal colonoscopy done in November and will return to GI in 10 years per the recommendations.  She was seen here on the seventh where she underwent workup including CT imaging which was all negative, diagnosed with constipation.  She has been compliant with taking twice a day MiraLAX without improvement.  She is frustrated at not having a solid bowel movement.  She is still passing small bowel movements but has mild abdominal discomfort throughout the day and sometimes wakes her up from night.  Upon arrival here vital  signs unremarkable, she denies history of fever.  Her abdomen is soft and benign and no acute findings.  Workup here including labs including CBC, CMP, lipase and urinalysis are all negative.  I have high suspicion for continued constipation considering patient's CT findings from a few weeks ago.  No indication for repeat CT scan at this time as patient has no nausea or vomiting, no concerns for obstructive process currently.  Will start her on GoLytely and have her follow-up with GI.  She verbalized understanding plan for outpatient follow-up and is amenable.    ADDITIONAL PROBLEM LIST AND DISPOSITION    Discussion of management with other QHP or appropriate source(s): None     Escalation of care considered, and ultimately not performed:diagnostic imaging, CT imaging done 1/7    Barriers to care at this time, including but not limited to: None    FINAL IMPRESSION  1. Constipation, unspecified constipation type Acute       Pranay SALGADO (Scribe), am scribing for, and in the presence of, Aquiles Gibson.    Electronically signed by: Pranay Cedeno (Gurpreetibe), 1/20/2024    IAquiles personally performed the services described in this documentation, as scribed by Pranay Cedeno in my presence, and it is both accurate and complete.    The note accurately reflects work and decisions made by me.  Aquiles Gibson  1/20/2024  8:06 PM

## 2024-01-21 NOTE — ED TRIAGE NOTES
Chief Complaint   Patient presents with    Constipation     Pt was seen on 1/7 for abdominal pain and lower back discomfort. Was sent home on miralax and did not notice a huge improvement w/ BM's. Pt has been having small BM but reports still having abdominal pain.     Abdominal Pain     Improved after visit on 1/7 but pain has come back intermittently.      Vitals:    01/20/24 1751   BP: 117/76   Pulse: 71   Resp: 16   Temp: 36 °C (96.8 °F)   SpO2: 98%     Pt reports overall not feeling good and not herself. Also states abdominal pain moves down to pelvic area. States some dysuria but doesn't feel like a UTI.   Pt ambulatory to triage w/ above complaint.   Abdominal pain protocol ordered.  Placed pt back in lobby, educated on NPO status.

## 2024-01-23 ENCOUNTER — APPOINTMENT (OUTPATIENT)
Dept: INTERNAL MEDICINE | Facility: OTHER | Age: 59
End: 2024-01-23
Payer: MEDICAID

## 2024-01-25 DIAGNOSIS — E55.9 VITAMIN D DEFICIENCY: ICD-10-CM

## 2024-02-12 ENCOUNTER — APPOINTMENT (OUTPATIENT)
Dept: RADIOLOGY | Facility: MEDICAL CENTER | Age: 59
End: 2024-02-12
Payer: MEDICAID

## 2024-02-13 ENCOUNTER — OFFICE VISIT (OUTPATIENT)
Dept: INTERNAL MEDICINE | Facility: OTHER | Age: 59
End: 2024-02-13
Payer: MEDICAID

## 2024-02-13 VITALS
DIASTOLIC BLOOD PRESSURE: 60 MMHG | WEIGHT: 150.6 LBS | SYSTOLIC BLOOD PRESSURE: 99 MMHG | BODY MASS INDEX: 26.68 KG/M2 | TEMPERATURE: 97.6 F | OXYGEN SATURATION: 99 % | HEART RATE: 67 BPM | HEIGHT: 63 IN

## 2024-02-13 DIAGNOSIS — R16.2 HEPATOSPLENOMEGALY: ICD-10-CM

## 2024-02-13 DIAGNOSIS — K59.00 CONSTIPATION, UNSPECIFIED CONSTIPATION TYPE: ICD-10-CM

## 2024-02-13 PROCEDURE — 3074F SYST BP LT 130 MM HG: CPT

## 2024-02-13 PROCEDURE — 2023F DILAT RTA XM W/O RTNOPTHY: CPT

## 2024-02-13 PROCEDURE — 99213 OFFICE O/P EST LOW 20 MIN: CPT | Mod: GE

## 2024-02-13 PROCEDURE — 3078F DIAST BP <80 MM HG: CPT

## 2024-02-13 ASSESSMENT — FIBROSIS 4 INDEX: FIB4 SCORE: 1.63

## 2024-02-13 ASSESSMENT — PATIENT HEALTH QUESTIONNAIRE - PHQ9: CLINICAL INTERPRETATION OF PHQ2 SCORE: 0

## 2024-02-13 NOTE — PROGRESS NOTES
Subjective:     CC: Follow-up, GI upset    HPI:   Jumana presents today to follow-up on her previous visit.  Previously, patient was seen for abdominal pain and low back pain.  Her past medical history significant for liver fibrosis.  Previously, she was prescribed cyclobenzaprine for her low back pain, patient states that she took the medication for a while, however, since then her lower back pain is resolved so she is not taking her medication currently.    Patient states that she visited the ER once more on 1/20/2024, where she was diagnosed with severe constipation and given a prescription of GoLytely.  She took a gallon prescription, and it helped with her constipation as well.  Patient denies any weight loss, melena, hematochezia.  No red flag signs.    For her neurosymptoms, patient follows up with neurology.  She is scheduled to have a lumbar puncture on 2/27/2024, and follows up with neurology on 3/6/2024 with Dr. Malika Taylor.  Her previous MRI showed no acute pathology but chronic changes.  Will assess the LP and neurology notes when available    Main thing discussed today was splenomegaly that was noted on her CT imaging.  Patient does not have any concerns for melena or hematochezia, no pulm hypertension as noted on imaging.  CBC and lab work shows no anemia or schistocytes, no thrombocytopenia.  This was an incidental finding on imaging, will continue to monitor.  Will repeat lab work in 6 months.    ROS:  Review of Systems   Constitutional:  Negative for chills, fever and weight loss.   HENT:  Negative for congestion, hearing loss and sore throat.    Eyes:  Negative for blurred vision, double vision and discharge.   Respiratory:  Negative for cough, sputum production, shortness of breath and wheezing.    Cardiovascular:  Negative for chest pain, palpitations, orthopnea, claudication and leg swelling.   Gastrointestinal:  Negative for abdominal pain, blood in stool, constipation, diarrhea, heartburn,  "melena, nausea and vomiting.   Genitourinary:  Negative for dysuria, frequency, hematuria and urgency.   Musculoskeletal:  Negative for back pain, joint pain and neck pain.   Skin:  Negative for rash.   Neurological:  Negative for dizziness, sensory change, weakness and headaches.   Endo/Heme/Allergies:  Negative for environmental allergies. Does not bruise/bleed easily.   Psychiatric/Behavioral:  Negative for depression. The patient is not nervous/anxious and does not have insomnia.        Objective:     Exam:  BP 99/60 (BP Location: Right arm, Patient Position: Sitting, BP Cuff Size: Adult)   Pulse 67   Temp 36.4 °C (97.6 °F) (Temporal)   Ht 1.588 m (5' 2.5\")   Wt 68.3 kg (150 lb 9.6 oz)   SpO2 99%   BMI 27.11 kg/m²  Body mass index is 27.11 kg/m².    Physical Exam  Constitutional:       General: She is not in acute distress.     Appearance: Normal appearance. She is normal weight.   HENT:      Head: Normocephalic and atraumatic.      Right Ear: External ear normal.      Left Ear: External ear normal.      Nose: Nose normal. No congestion or rhinorrhea.      Mouth/Throat:      Mouth: Mucous membranes are moist.      Pharynx: Oropharynx is clear. No oropharyngeal exudate or posterior oropharyngeal erythema.   Eyes:      General: No scleral icterus.        Right eye: No discharge.         Left eye: No discharge.      Extraocular Movements: Extraocular movements intact.      Conjunctiva/sclera: Conjunctivae normal.      Pupils: Pupils are equal, round, and reactive to light.   Neck:      Vascular: No carotid bruit.   Cardiovascular:      Rate and Rhythm: Normal rate and regular rhythm.      Pulses: Normal pulses.      Heart sounds: Normal heart sounds. No murmur heard.     No friction rub.   Pulmonary:      Effort: Pulmonary effort is normal. No respiratory distress.      Breath sounds: Normal breath sounds. No wheezing or rhonchi.   Abdominal:      General: Abdomen is flat. Bowel sounds are normal.      " Tenderness: There is no abdominal tenderness. There is no right CVA tenderness or left CVA tenderness.   Musculoskeletal:         General: No tenderness. Normal range of motion.      Cervical back: Normal range of motion and neck supple. No rigidity.      Right lower leg: No edema.      Left lower leg: No edema.   Skin:     General: Skin is warm.      Capillary Refill: Capillary refill takes less than 2 seconds.      Findings: No bruising, lesion or rash.   Neurological:      General: No focal deficit present.      Mental Status: She is alert and oriented to person, place, and time. Mental status is at baseline.      Cranial Nerves: No cranial nerve deficit.      Coordination: Coordination normal.      Gait: Gait normal.   Psychiatric:         Mood and Affect: Mood normal.         Behavior: Behavior normal.         Thought Content: Thought content normal.         Judgment: Judgment normal.       Labs:   Previous labs were discussed with the patient, no further questions.      Assessment & Plan:     58 y.o. female with the following -    1. Hepatosplenomegaly  Main thing discussed today was splenomegaly that was noted on her CT imaging.  Patient does not have any concerns for melena or hematochezia, no pulm hypertension as noted on imaging.  CBC and lab work shows no anemia or schistocytes, no thrombocytopenia.  This was an incidental finding on imaging, will continue to monitor.  Will repeat lab work in 6 months.  - Comp Metabolic Panel; Future    2. Constipation, unspecified constipation type  Patient states that she visited the ER once more on 1/20/2024, where she was diagnosed with severe constipation and given a prescription of GoLytely.  She took a gallon prescription, and it helped with her constipation as well.  Patient denies any weight loss, melena, hematochezia.  No red flag signs.      I spent a total of 30 minutes with record review, exam, communication with the patient, communication with other  providers, and documentation of this encounter.    Return in about 4 months (around 6/13/2024).    Please note that this dictation was created using voice recognition software. I have made every reasonable attempt to correct obvious errors, but I expect that there are errors of grammar and possibly content that I did not discover before finalizing the note.    Keon Gonzales M.D.  Internal Medicine Resident  [facility]

## 2024-02-14 PROBLEM — R16.2 HEPATOSPLENOMEGALY: Status: ACTIVE | Noted: 2024-02-14

## 2024-02-14 ASSESSMENT — ENCOUNTER SYMPTOMS
HEADACHES: 0
SPUTUM PRODUCTION: 0
SHORTNESS OF BREATH: 0
EYE DISCHARGE: 0
BRUISES/BLEEDS EASILY: 0
WHEEZING: 0
SENSORY CHANGE: 0
BLURRED VISION: 0
PALPITATIONS: 0
WEIGHT LOSS: 0
ABDOMINAL PAIN: 0
COUGH: 0
BACK PAIN: 0
VOMITING: 0
FEVER: 0
ORTHOPNEA: 0
DEPRESSION: 0
CONSTIPATION: 0
BLOOD IN STOOL: 0
CLAUDICATION: 0
INSOMNIA: 0
DOUBLE VISION: 0
HEARTBURN: 0
NERVOUS/ANXIOUS: 0
WEAKNESS: 0
NAUSEA: 0
NECK PAIN: 0
DIZZINESS: 0
SORE THROAT: 0
CHILLS: 0
DIARRHEA: 0

## 2024-02-22 ENCOUNTER — HOSPITAL ENCOUNTER (OUTPATIENT)
Dept: LAB | Facility: MEDICAL CENTER | Age: 59
End: 2024-02-22
Payer: MEDICAID

## 2024-02-22 LAB
APTT PPP: 32.2 SEC (ref 24.7–36)
BASOPHILS # BLD AUTO: 1.3 % (ref 0–1.8)
BASOPHILS # BLD: 0.07 K/UL (ref 0–0.12)
EOSINOPHIL # BLD AUTO: 0.14 K/UL (ref 0–0.51)
EOSINOPHIL NFR BLD: 2.7 % (ref 0–6.9)
ERYTHROCYTE [DISTWIDTH] IN BLOOD BY AUTOMATED COUNT: 46.2 FL (ref 35.9–50)
HCT VFR BLD AUTO: 44.9 % (ref 37–47)
HGB BLD-MCNC: 15.4 G/DL (ref 12–16)
IMM GRANULOCYTES # BLD AUTO: 0.01 K/UL (ref 0–0.11)
IMM GRANULOCYTES NFR BLD AUTO: 0.2 % (ref 0–0.9)
INR PPP: 1.09 (ref 0.87–1.13)
LYMPHOCYTES # BLD AUTO: 2.54 K/UL (ref 1–4.8)
LYMPHOCYTES NFR BLD: 48.1 % (ref 22–41)
MCH RBC QN AUTO: 31.1 PG (ref 27–33)
MCHC RBC AUTO-ENTMCNC: 34.3 G/DL (ref 32.2–35.5)
MCV RBC AUTO: 90.7 FL (ref 81.4–97.8)
MONOCYTES # BLD AUTO: 0.38 K/UL (ref 0–0.85)
MONOCYTES NFR BLD AUTO: 7.2 % (ref 0–13.4)
NEUTROPHILS # BLD AUTO: 2.14 K/UL (ref 1.82–7.42)
NEUTROPHILS NFR BLD: 40.5 % (ref 44–72)
NRBC # BLD AUTO: 0 K/UL
NRBC BLD-RTO: 0 /100 WBC (ref 0–0.2)
PLATELET # BLD AUTO: 160 K/UL (ref 164–446)
PMV BLD AUTO: 10.9 FL (ref 9–12.9)
PROTHROMBIN TIME: 14.2 SEC (ref 12–14.6)
RBC # BLD AUTO: 4.95 M/UL (ref 4.2–5.4)
WBC # BLD AUTO: 5.3 K/UL (ref 4.8–10.8)

## 2024-02-22 PROCEDURE — 36415 COLL VENOUS BLD VENIPUNCTURE: CPT

## 2024-02-22 PROCEDURE — 85025 COMPLETE CBC W/AUTO DIFF WBC: CPT

## 2024-02-22 PROCEDURE — 85610 PROTHROMBIN TIME: CPT

## 2024-02-22 PROCEDURE — 85730 THROMBOPLASTIN TIME PARTIAL: CPT

## 2024-02-27 ENCOUNTER — HOSPITAL ENCOUNTER (OUTPATIENT)
Facility: MEDICAL CENTER | Age: 59
End: 2024-02-27
Payer: MEDICAID

## 2024-02-27 ENCOUNTER — HOSPITAL ENCOUNTER (OUTPATIENT)
Dept: RADIOLOGY | Facility: MEDICAL CENTER | Age: 59
End: 2024-02-27
Payer: MEDICAID

## 2024-02-27 DIAGNOSIS — R29.90 UNSPECIFIED SYMPTOMS AND SIGNS INVOLVING THE NERVOUS SYSTEM: ICD-10-CM

## 2024-02-27 DIAGNOSIS — R41.3 AMNESIA: ICD-10-CM

## 2024-02-27 DIAGNOSIS — G31.84 MILD COGNITIVE IMPAIRMENT: ICD-10-CM

## 2024-02-27 DIAGNOSIS — G37.2 CENTRAL PONTINE MYELINOLYSIS (HCC): ICD-10-CM

## 2024-02-27 DIAGNOSIS — H53.8 OTHER VISUAL DISTURBANCES: ICD-10-CM

## 2024-02-27 DIAGNOSIS — R26.81 UNSTEADINESS ON FEET: ICD-10-CM

## 2024-02-27 DIAGNOSIS — M54.2 CERVICALGIA: ICD-10-CM

## 2024-02-27 DIAGNOSIS — E07.9 DISORDER OF THYROID GLAND: ICD-10-CM

## 2024-02-27 LAB
BURR CELLS/RBC NFR CSF MANUAL: 1 %
CLARITY CSF: NORMAL
COLOR CSF: NORMAL
COLOR SPUN CSF: COLORLESS
EOSINOPHIL NFR CSF MANUAL: 1 %
GLUCOSE CSF-MCNC: 54 MG/DL (ref 40–80)
LYMPHOCYTES NFR CSF: 55 %
MONOS+MACROS NFR CSF MANUAL: 3 %
NEUTROPHILS NFR CSF: 41 %
NUC CELL # CSF: 4 CELLS/UL (ref 0–10)
PROT CSF-MCNC: 24 MG/DL (ref 15–45)
RBC # CSF: 3000 CELLS/UL
SPECIMEN VOL CSF: 11 ML
TUBE # CSF: 3
TUBE # CSF: NORMAL

## 2024-02-27 PROCEDURE — 84157 ASSAY OF PROTEIN OTHER: CPT

## 2024-02-27 PROCEDURE — 82945 GLUCOSE OTHER FLUID: CPT

## 2024-02-27 PROCEDURE — 89051 BODY FLUID CELL COUNT: CPT

## 2024-02-27 PROCEDURE — 62328 DX LMBR SPI PNXR W/FLUOR/CT: CPT

## 2024-02-27 PROCEDURE — 83916 OLIGOCLONAL BANDS: CPT

## 2024-03-01 LAB
OLIGOCLONAL BANDS CSF ELPH-IMP: NORMAL
OLIGOCLONAL BANDS CSF IEF: 0 BANDS (ref 0–1)
OLIGOCLONAL BANDS.IT SER+CSF QL: NEGATIVE

## 2024-03-11 ENCOUNTER — OFFICE VISIT (OUTPATIENT)
Dept: INTERNAL MEDICINE | Facility: OTHER | Age: 59
End: 2024-03-11
Payer: MEDICAID

## 2024-03-11 ENCOUNTER — APPOINTMENT (OUTPATIENT)
Dept: INTERNAL MEDICINE | Facility: OTHER | Age: 59
End: 2024-03-11
Payer: MEDICAID

## 2024-03-11 VITALS
TEMPERATURE: 98.4 F | HEIGHT: 63 IN | HEART RATE: 65 BPM | WEIGHT: 149.6 LBS | OXYGEN SATURATION: 99 % | BODY MASS INDEX: 26.51 KG/M2 | SYSTOLIC BLOOD PRESSURE: 105 MMHG | DIASTOLIC BLOOD PRESSURE: 67 MMHG

## 2024-03-11 DIAGNOSIS — Z77.011 LEAD EXPOSURE: ICD-10-CM

## 2024-03-11 PROCEDURE — 3078F DIAST BP <80 MM HG: CPT

## 2024-03-11 PROCEDURE — 2023F DILAT RTA XM W/O RTNOPTHY: CPT

## 2024-03-11 PROCEDURE — 3074F SYST BP LT 130 MM HG: CPT

## 2024-03-11 PROCEDURE — 99213 OFFICE O/P EST LOW 20 MIN: CPT | Mod: GE

## 2024-03-11 RX ORDER — AVOBENZONE, HOMOSALATE, OCTISALATE, OCTOCRYLENE, OXYBENZONE 30; 130; 50; 70; 40 MG/ML; MG/ML; MG/ML; MG/ML; MG/ML
LOTION TOPICAL
COMMUNITY

## 2024-03-11 ASSESSMENT — FIBROSIS 4 INDEX: FIB4 SCORE: 1.69

## 2024-03-11 NOTE — LETTER
[facility]  Keon Gonzales M.D.  [address] [place] [address]  Fax: [phone]   Authorization for Release/Disclosure of   Protected Health Information   Name: ISMAEL SRINIVASAN : 1965 SSN: [id number]   Address: [address]   [address]  [facility] [address] Phone:    There are no phone numbers on file.   I authorize the entity listed below to release/disclose the PHI below to:   [facility]/Keon Gonzales M.D. and Nawaf Hammond M.D.   Provider or Entity Name:     Address   City, State, [facility]   Phone:      Fax:     Reason for request: continuity of care   Information to be released:    [  ] LAST COLONOSCOPY,  including any PATH REPORT and follow-up  [  ] LAST FIT/COLOGUARD RESULT [  ] LAST DEXA  [  ] LAST MAMMOGRAM  [  ] LAST PAP  [  ] LAST LABS [  ] RETINA EXAM REPORT  [  ] IMMUNIZATION RECORDS  [  ] Release all info      [  ] Check here and initial the line next to each item to release ALL health information INCLUDING  _____ Care and treatment for drug and / or alcohol abuse  _____ HIV testing, infection status, or AIDS  _____ Genetic Testing    DATES OF SERVICE OR TIME PERIOD TO BE DISCLOSED: _____________  I understand and acknowledge that:  * This Authorization may be revoked at any time by you in writing, except if your health information has already been used or disclosed.  * Your health information that will be used or disclosed as a result of you signing this authorization could be re-disclosed by the recipient. If this occurs, your re-disclosed health information may no longer be protected by State or Federal laws.  * You may refuse to sign this Authorization. Your refusal will not affect your ability to obtain treatment.  * This Authorization becomes effective upon signing and will  on (date) __________.      If no date is indicated, this Authorization will  one (1) year from the signature date.    Name: Ismael Srinivasan  Signature: Date:   3/11/2024     PLEASE FAX  REQUESTED RECORDS BACK TO: [phone]

## 2024-03-12 NOTE — PROGRESS NOTES
Chief Complaint   Patient presents with    Allergic Reaction     -eats a lot of cinnamon, and had strange symptoms to it and felt very sick   -thinks it may be due to high lead levels, as cinnamon she ate from [employer] got recalled        HPI: Jumana Kahn is a 58 y.o. female who presented to the clinic for the following.    #Suspected lead poisoning    Patient coming here today with concern about lead poisoning: Last October the brand of applesauce she was taking was recalled due to possible lead contamination, which she probably took 15 small bags; recently the brand of ground cinnamon she has been used for more than 1 year and a half was also called recalled due to lead contamination; she described since last year she has extensive neurologic symptoms such as severe migraine, GI symptoms such as constipation. She is afraid all these symptoms are due to lead poisoning and so far there is no clear explanation about his neurologic change although she is following with her neurologist.  As we cannot access her neurological visit information, we request patient to sign the consent form to release medical records,  patient agrees.   Meanwhile,  discussed with the patient after reviewing her previous lab test, she has no history of anemia, which is not consistent with lead poisoning for the levels that can cause her symptoms, she needs to continue follow-up with her PCP and specialist,but we can still check her blood lead level to evaluate her situation of lead exposure, patient agrees to the plan.     Patient Active Problem List    Diagnosis Date Noted    Hepatosplenomegaly 02/14/2024    Hyperlipidemia 06/12/2023    Closed fracture of right ankle with routine healing 06/12/2023    Other headache syndrome 06/12/2023    Vision abnormalities 06/12/2023    Diaphragmatic hernia without obstruction or gangrene 02/13/2023    Basilar migraine 01/30/2023    Liver fibrosis 11/08/2022    Ocular migraine  11/07/2022    Recurrent sinusitis 11/07/2022    Sprain of right ankle 11/06/2022    Atherosclerosis of both carotid arteries 04/20/2022    Visual distortion 02/11/2022    Accommodative insufficiency 09/29/2021    PVD (posterior vitreous detachment), bilateral 09/29/2021    Dental caries 08/04/2021    Neuropathy 06/01/2021    Blurring of visual image 03/30/2021    Hx of traumatic brain injury 03/30/2021    Hearing loss, bilateral 02/16/2021    Tinnitus, bilateral 02/16/2021    History of alcohol abuse 02/12/2021    Vertigo 02/12/2021    Neck pain, chronic 02/01/2021    Constipation 12/21/2020    History of repair of rotator cuff 12/21/2020    Acute midline low back pain without sciatica 11/09/2020    Vitamin D deficiency 05/12/2020    GERD (gastroesophageal reflux disease) 05/06/2020    Central pontine myelinolysis (HCC) 01/19/2020    Hypothyroidism 01/12/2020    Idiopathic hypotension 01/12/2020       Current Outpatient Medications   Medication Sig Dispense Refill    Multiple Vitamins-Minerals (MULTIVITAMIN ADULT, MINERALS,) Tab Take  by mouth.      B Complex-Biotin-FA (B COMPLEX 100 TR) Tab CR Take  by mouth.      cyclobenzaprine (FLEXERIL) 10 mg Tab Take 1 Tablet by mouth 3 times a day as needed for Moderate Pain or Muscle Spasms. 30 Tablet 0    omeprazole (PRILOSEC) 20 MG delayed-release capsule Take 1 Capsule by mouth every day. 90 Capsule 2    magnesium oxide (MAG-OX) 400 MG Tab tablet Take 400 mg by mouth every day.      levothyroxine (SYNTHROID) 125 MCG Tab Take 1 Tablet by mouth every day. 30 Tablet 8    albuterol 108 (90 Base) MCG/ACT Aero Soln inhalation aerosol INHALE 1 TO 2 PUFFS BY MOUTH EVERY 4 TO 6 HOURS AS NEEDED FOR DIFFICULTY BREATHING 8.5 Each 0    acetaminophen (TYLENOL) 500 MG Tab Take 1-2 Tablets by mouth every 6 hours as needed.       No current facility-administered medications for this visit.       ROS: As per HPI. Additional pertinent systems as noted below.  Constitutional:  Negative for  "fever, chills   HENT:  Negative for ear pain, sore throat, runny nose   Eyes:  Negative for blurred vision and double vision   Cardiovascular:  Negative for chest pain, palpitations   Respiratory:  Negative for cough, sputum production, shortness of breath   Gastrointestinal: Negative for abdominal pain, nausea, vomiting, diarrhea, or blood in stools   Genitourinary: Negative for dysuria, flank pain and hematuria  Skin: Negative for rash, itching  Extremities: Negative for leg swelling   Neurologic: Negative for headaches, dizziness, seizures, weakness   Endo/Heme/Allergies: Negative for polydipsia. Does not bruise/bleed easily  Psychiatric: Negative for suicidal or homicidal ideas     /67 (BP Location: Right arm, Patient Position: Sitting, BP Cuff Size: Adult)   Pulse 65   Temp 36.9 °C (98.4 °F) (Temporal)   Ht 1.6 m (5' 3\")   Wt 67.9 kg (149 lb 9.6 oz)   SpO2 99%   BMI 26.50 kg/m²     Physical Exam   Constitutional:  Well developed, well nourished. Not in acute distress   HENT:  Normocephalic, Atraumatic, Oropharynx is pink and moist. No oral exudates, Nose normal   Eyes:  EOMI, Conjunctiva normal, No discharge. PERRLA   Neck:  Normal range of motion, No cervical lymphadenopathy. No thyromegaly.   Cardiovascular:  Regular rate and rhythm, No pedal edema, Intact distal pulses   Respiratory: Clear to auscultation bilaterally, No use of accessory muscles   Gastrointestinal: Bowel sounds normal, Soft, No tenderness, No palpable masses/hepatosplenomegaly   Skin: Warm, Dry, No erythema, No rash, no induration.   Musculoskeletal: No cyanosis or clubbing, normal ROM   Neurologic: Alert & oriented x 4, No focal deficits noted, cranial nerves II through X are grossly intact.   Psychiatric: Judgment normal, Mood normal, Memory normal     Note: I have reviewed all pertinent labs and diagnostic tests associated with this visit with specific comments listed under the assessment and plan below    Assessment and " Plan  1. Lead exposure  Patient expresses concern about the possible lead exposure history due to food contamination, she wants to exclude the possibility of lead poisoning causing her neurological symptoms.  Her past CBC test are in the normal range with no anemia sign, which put her at the low possibility to have lead poisoning, but we can still check the blood lead level to evaluate her lead exposure level.  Meanwhile we will ask her to consent release of information to get all the neurological visiting records.    - LEAD, BLOOD; Future  - Consent for Media Release  - Continue follow-up with PCP and specialist      Followup: Patient will continue to follow-up with her PCP        Signed by: Nawaf Hammond M.D.  [employer] [facility], PGY-1

## 2024-03-15 ENCOUNTER — TELEPHONE (OUTPATIENT)
Dept: INTERNAL MEDICINE | Facility: OTHER | Age: 59
End: 2024-03-15
Payer: MEDICAID

## 2024-03-15 NOTE — TELEPHONE ENCOUNTER
Received the results of the patient's blood lead level, it is in the normal range, call the patient to notify her results and ask patient to continue follow-up with neurologist for further workup of about her neurological symptoms, patient understands.

## 2024-04-25 ENCOUNTER — TELEPHONE (OUTPATIENT)
Dept: INTERNAL MEDICINE | Facility: OTHER | Age: 59
End: 2024-04-25
Payer: MEDICAID

## 2024-04-25 NOTE — TELEPHONE ENCOUNTER
Received request via: Pharmacy    Was the patient seen in the last year in this department? Yes    Does the patient have an active prescription (recently filled or refills available) for medication(s) requested? No    Pharmacy Name:    [facility] #[id number] - [place], [place] - [address] AT [address]          Does the patient have nursing home Plus and need 100 day supply (blood pressure, diabetes and cholesterol meds only)? Patient does not have SCP

## 2024-04-26 RX ORDER — LEVOTHYROXINE SODIUM 0.12 MG/1
125 TABLET ORAL
Qty: 30 TABLET | Refills: 11 | Status: SHIPPED | OUTPATIENT
Start: 2024-04-26

## 2024-05-15 DIAGNOSIS — R16.2 HEPATOSPLENOMEGALY: ICD-10-CM

## 2024-05-15 DIAGNOSIS — E78.5 HYPERLIPIDEMIA, UNSPECIFIED HYPERLIPIDEMIA TYPE: ICD-10-CM

## 2024-05-17 ENCOUNTER — APPOINTMENT (OUTPATIENT)
Dept: INTERNAL MEDICINE | Facility: OTHER | Age: 59
End: 2024-05-17
Payer: MEDICAID

## 2024-05-17 VITALS
WEIGHT: 150.4 LBS | BODY MASS INDEX: 27.68 KG/M2 | SYSTOLIC BLOOD PRESSURE: 92 MMHG | TEMPERATURE: 98.6 F | OXYGEN SATURATION: 100 % | RESPIRATION RATE: 18 BRPM | HEART RATE: 70 BPM | DIASTOLIC BLOOD PRESSURE: 64 MMHG | HEIGHT: 62 IN

## 2024-05-17 DIAGNOSIS — Z00.00 HEALTHCARE MAINTENANCE: ICD-10-CM

## 2024-05-17 PROCEDURE — 99213 OFFICE O/P EST LOW 20 MIN: CPT | Mod: GE | Performed by: INTERNAL MEDICINE

## 2024-05-17 RX ORDER — AMITRIPTYLINE HYDROCHLORIDE 10 MG/1
1 TABLET, FILM COATED ORAL EVERY EVENING
COMMUNITY

## 2024-05-17 ASSESSMENT — FIBROSIS 4 INDEX: FIB4 SCORE: 1.69

## 2024-05-17 ASSESSMENT — ENCOUNTER SYMPTOMS
VOMITING: 0
DOUBLE VISION: 0
MYALGIAS: 0
HEARTBURN: 0
NECK PAIN: 0
PALPITATIONS: 0
BLURRED VISION: 0
NERVOUS/ANXIOUS: 0
FEVER: 0
NAUSEA: 0
CHILLS: 0
HEMOPTYSIS: 0
HEADACHES: 0
DIZZINESS: 0
COUGH: 0

## 2024-05-17 NOTE — PATIENT INSTRUCTIONS
Thank you for visiting today!    Please follow-up in 4 weeks     Please try and eat healthy, get at least 30 minutes of cardiovascular exercise a day to help keep your health as best as it can be.    If you have any questions or concerns please feel free to contact us at [phone].    If you feel like you are experiencing a medical emergency please seek immediate medical attention at an urgent care or in the emergency department.

## 2024-05-17 NOTE — PROGRESS NOTES
"CC:   Chief Complaint   Patient presents with    Follow-Up     4 month follow up           HPI:   Jumana presents today with encounter to review labs  Lipid panel with cholesterol 122, HDL 45, triglycerides 51 and LDL 64  CMP with BUN 17, creatinine 0.91, GFR 74.  Sodium 139, potassium 3.8, chloride 108, bicarb 23, calcium 8.8  LFTs normal limits          No problems updated.    Health Maintenance: Completed    ROS:  Review of Systems   Constitutional:  Negative for chills, fever and malaise/fatigue.   HENT:  Negative for hearing loss and tinnitus.    Eyes:  Negative for blurred vision and double vision.   Respiratory:  Negative for cough and hemoptysis.    Cardiovascular:  Negative for chest pain and palpitations.   Gastrointestinal:  Negative for heartburn, nausea and vomiting.   Genitourinary:  Negative for dysuria, frequency and urgency.   Musculoskeletal:  Negative for myalgias and neck pain.   Skin:  Negative for itching and rash.   Neurological:  Negative for dizziness and headaches.   Psychiatric/Behavioral:  The patient is not nervous/anxious.        Objective:     Exam:  BP 92/64   Pulse 70   Temp 37 °C (98.6 °F) (Temporal)   Resp 18   Ht 1.575 m (5' 2\")   Wt 68.2 kg (150 lb 6.4 oz)   SpO2 100%   BMI 27.51 kg/m²  Body mass index is 27.51 kg/m².    Physical Exam  Constitutional:       General: She is not in acute distress.     Appearance: She is not ill-appearing.   HENT:      Head: Normocephalic and atraumatic.      Right Ear: External ear normal.      Left Ear: External ear normal.      Nose: Nose normal. No congestion.      Mouth/Throat:      Mouth: Mucous membranes are moist.   Eyes:      Extraocular Movements: Extraocular movements intact.      Pupils: Pupils are equal, round, and reactive to light.   Cardiovascular:      Rate and Rhythm: Normal rate.   Pulmonary:      Effort: Pulmonary effort is normal.   Musculoskeletal:         General: Normal range of motion.   Skin:     General: Skin is " warm.      Capillary Refill: Capillary refill takes less than 2 seconds.   Neurological:      Mental Status: She is alert and oriented to person, place, and time.   Psychiatric:         Mood and Affect: Mood normal.               Assessment & Plan:     58 y.o. female with the following -       # Encounter to review labs   # Healthcare maintenance   - Lipid panel with cholesterol 122, HDL 45, triglycerides 51 and LDL 64  - CMP with BUN 17, creatinine 0.91, GFR 74.  Sodium 139, potassium 3.8, chloride 108, bicarb 23, calcium 8.8  LFTs normal limits    Describes concerns with questionable migraines, established with neurology.  Also with history of visual distortion, established with neuro-ophthalmology.  Chronic back issues, established with specialist.  History of abnormal Pap smear.  Mammogram and colonoscopy reviewed.  History of GI related issues, per chart review.  History of central pontine myelinolysis    We addressed reestablishing with a new provider, considering Dr. Gonzales is leaving clinic and she has been seeing a different provider every visit for quite some time.  Considering she is a complex patient, she would benefit from seeing the same provider every month for continuity of care and to make sure things are not missed out.  Patient agrees and will set up with the preferred provider in 1 month.      I spent a total of 30 minutes with record review, exam, communication with the patient, communication with other providers, and documentation of this encounter.      Return in about 4 weeks (around 6/14/2024).    Please note that this dictation was created using voice recognition software. I have made every reasonable attempt to correct obvious errors, but I expect that there are errors of grammar and possibly content that I did not discover before finalizing the note.

## 2024-06-17 NOTE — DISCHARGE PLANNING
14 day supply sent to [facility]  90 day supply sent to [facility]  Will not let me close until RN completes some part    Care Transition Team Discharge Planning    Anticipated Discharge Disposition: TBD    Action: Lsw spoke to liaison for inpt addiction agencies.    She will meet w/ pt at bedside tonight at 6PM or so.    Her name is Alia Santos, and her program accepts Medicaid insurances. She will discuss assisting pt, if accepted.     Barriers to Discharge: TBD    Plan: f/u w/ medical team, pt

## 2024-06-19 ENCOUNTER — OFFICE VISIT (OUTPATIENT)
Dept: INTERNAL MEDICINE | Facility: OTHER | Age: 59
End: 2024-06-19
Payer: MEDICAID

## 2024-06-19 VITALS
OXYGEN SATURATION: 98 % | WEIGHT: 152 LBS | SYSTOLIC BLOOD PRESSURE: 96 MMHG | HEIGHT: 63 IN | TEMPERATURE: 98.3 F | DIASTOLIC BLOOD PRESSURE: 59 MMHG | BODY MASS INDEX: 26.93 KG/M2 | HEART RATE: 63 BPM

## 2024-06-19 DIAGNOSIS — H53.9 VISION ABNORMALITIES: ICD-10-CM

## 2024-06-19 DIAGNOSIS — G44.89 OTHER HEADACHE SYNDROME: ICD-10-CM

## 2024-06-19 DIAGNOSIS — E78.5 HYPERLIPIDEMIA, UNSPECIFIED HYPERLIPIDEMIA TYPE: ICD-10-CM

## 2024-06-19 DIAGNOSIS — E55.9 VITAMIN D DEFICIENCY: ICD-10-CM

## 2024-06-19 DIAGNOSIS — E03.9 HYPOTHYROIDISM, UNSPECIFIED TYPE: ICD-10-CM

## 2024-06-19 PROCEDURE — 3074F SYST BP LT 130 MM HG: CPT | Performed by: INTERNAL MEDICINE

## 2024-06-19 PROCEDURE — 3078F DIAST BP <80 MM HG: CPT | Performed by: INTERNAL MEDICINE

## 2024-06-19 PROCEDURE — 99214 OFFICE O/P EST MOD 30 MIN: CPT | Performed by: INTERNAL MEDICINE

## 2024-06-19 RX ORDER — RIMEGEPANT SULFATE 75 MG/75MG
TABLET, ORALLY DISINTEGRATING ORAL
COMMUNITY
Start: 2024-05-19

## 2024-06-19 RX ORDER — FLUOROURACIL 50 MG/G
CREAM TOPICAL 2 TIMES DAILY
COMMUNITY

## 2024-06-19 ASSESSMENT — FIBROSIS 4 INDEX: FIB4 SCORE: 1.69
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
## 2024-07-17 ENCOUNTER — HOSPITAL ENCOUNTER (OUTPATIENT)
Dept: RADIOLOGY | Facility: MEDICAL CENTER | Age: 59
End: 2024-07-17
Payer: MEDICAID

## 2024-07-17 DIAGNOSIS — R29.90 UNSPECIFIED SYMPTOMS AND SIGNS INVOLVING THE NERVOUS SYSTEM: ICD-10-CM

## 2024-07-17 DIAGNOSIS — H53.8 OTHER VISUAL DISTURBANCES: ICD-10-CM

## 2024-07-17 DIAGNOSIS — G44.89 OTHER HEADACHE SYNDROME: ICD-10-CM

## 2024-07-17 PROCEDURE — 70496 CT ANGIOGRAPHY HEAD: CPT

## 2024-07-17 PROCEDURE — 700117 HCHG RX CONTRAST REV CODE 255: Mod: UD

## 2024-07-17 RX ADMIN — IOHEXOL 100 ML: 350 INJECTION, SOLUTION INTRAVENOUS at 14:40

## 2024-07-31 ENCOUNTER — OFFICE VISIT (OUTPATIENT)
Dept: INTERNAL MEDICINE | Facility: OTHER | Age: 59
End: 2024-07-31
Payer: MEDICAID

## 2024-07-31 VITALS
OXYGEN SATURATION: 96 % | HEART RATE: 71 BPM | BODY MASS INDEX: 26.86 KG/M2 | WEIGHT: 151.6 LBS | HEIGHT: 63 IN | TEMPERATURE: 99.4 F | SYSTOLIC BLOOD PRESSURE: 117 MMHG | DIASTOLIC BLOOD PRESSURE: 76 MMHG

## 2024-07-31 DIAGNOSIS — H53.9 VISION ABNORMALITIES: ICD-10-CM

## 2024-07-31 DIAGNOSIS — E55.9 VITAMIN D DEFICIENCY: ICD-10-CM

## 2024-07-31 DIAGNOSIS — E78.5 HYPERLIPIDEMIA, UNSPECIFIED HYPERLIPIDEMIA TYPE: ICD-10-CM
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
## 2024-07-31 DIAGNOSIS — R94.6 ABNORMAL THYROID FUNCTION TEST: ICD-10-CM

## 2024-07-31 DIAGNOSIS — E03.9 HYPOTHYROIDISM, UNSPECIFIED TYPE: ICD-10-CM

## 2024-07-31 PROCEDURE — 99214 OFFICE O/P EST MOD 30 MIN: CPT | Performed by: INTERNAL MEDICINE

## 2024-07-31 PROCEDURE — 3078F DIAST BP <80 MM HG: CPT | Performed by: INTERNAL MEDICINE

## 2024-07-31 PROCEDURE — 3074F SYST BP LT 130 MM HG: CPT | Performed by: INTERNAL MEDICINE

## 2024-07-31 ASSESSMENT — FIBROSIS 4 INDEX: FIB4 SCORE: 1.72
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
## 2024-09-17 DIAGNOSIS — R94.6 ABNORMAL THYROID FUNCTION TEST: ICD-10-CM

## 2024-09-19 ENCOUNTER — TELEPHONE (OUTPATIENT)
Dept: INTERNAL MEDICINE | Facility: OTHER | Age: 59
End: 2024-09-19
Payer: MEDICAID

## 2024-09-19 DIAGNOSIS — R94.6 ABNORMAL THYROID FUNCTION TEST: ICD-10-CM

## 2024-09-19 DIAGNOSIS — E03.9 HYPOTHYROIDISM, UNSPECIFIED TYPE: ICD-10-CM

## 2024-09-19 RX ORDER — LEVOTHYROXINE SODIUM 112 UG/1
112 TABLET ORAL
Qty: 60 TABLET | Refills: 1 | Status: SHIPPED | OUTPATIENT
Start: 2024-09-19

## 2024-09-20 ENCOUNTER — OFFICE VISIT (OUTPATIENT)
Dept: URGENT CARE | Facility: CLINIC | Age: 59
End: 2024-09-20
Payer: MEDICAID

## 2024-09-20 ENCOUNTER — APPOINTMENT (OUTPATIENT)
Dept: RADIOLOGY | Facility: IMAGING CENTER | Age: 59
End: 2024-09-20
Attending: PHYSICIAN ASSISTANT
Payer: MEDICAID

## 2024-09-20 VITALS
WEIGHT: 151 LBS | HEART RATE: 95 BPM | TEMPERATURE: 98.5 F | SYSTOLIC BLOOD PRESSURE: 122 MMHG | DIASTOLIC BLOOD PRESSURE: 80 MMHG | HEIGHT: 63 IN | OXYGEN SATURATION: 98 % | RESPIRATION RATE: 18 BRPM | BODY MASS INDEX: 26.75 KG/M2

## 2024-09-20 DIAGNOSIS — Z20.822 EXPOSURE TO COVID-19 VIRUS: ICD-10-CM

## 2024-09-20 DIAGNOSIS — M79.672 LEFT FOOT PAIN: ICD-10-CM

## 2024-09-20 LAB
FLUAV RNA SPEC QL NAA+PROBE: NEGATIVE
FLUBV RNA SPEC QL NAA+PROBE: NEGATIVE
RSV RNA SPEC QL NAA+PROBE: NEGATIVE
SARS-COV-2 RNA RESP QL NAA+PROBE: NEGATIVE

## 2024-09-20 PROCEDURE — 87637 SARSCOV2&INF A&B&RSV AMP PRB: CPT | Mod: QW | Performed by: PHYSICIAN ASSISTANT

## 2024-09-20 PROCEDURE — 3079F DIAST BP 80-89 MM HG: CPT | Performed by: PHYSICIAN ASSISTANT

## 2024-09-20 PROCEDURE — 3074F SYST BP LT 130 MM HG: CPT | Performed by: PHYSICIAN ASSISTANT

## 2024-09-20 PROCEDURE — 2023F DILAT RTA XM W/O RTNOPTHY: CPT | Performed by: PHYSICIAN ASSISTANT

## 2024-09-20 PROCEDURE — 73630 X-RAY EXAM OF FOOT: CPT | Mod: TC,LT | Performed by: RADIOLOGY

## 2024-09-20 PROCEDURE — 99214 OFFICE O/P EST MOD 30 MIN: CPT | Performed by: PHYSICIAN ASSISTANT

## 2024-09-20 ASSESSMENT — ENCOUNTER SYMPTOMS
RESPIRATORY NEGATIVE: 1
NEUROLOGICAL NEGATIVE: 1
CARDIOVASCULAR NEGATIVE: 1
CONSTITUTIONAL NEGATIVE: 1
GASTROINTESTINAL NEGATIVE: 1

## 2024-09-20 ASSESSMENT — FIBROSIS 4 INDEX: FIB4 SCORE: 1.72

## 2024-09-20 NOTE — PROGRESS NOTES
Subjective     Jumana Kahn is a very pleasant 59 y.o. female who presents with Coronavirus Screening (Need COVID test; potentially exposed. ) and Foot Problem (X10 days: caught a can of soup falling on top of R foot. Was fine but x3 days ago started having foot pain and have lump on foot, tripped yesterday. Throbbing at night, but gets worse during the day. )            HPI  Pain and swelling on the top of the left foot for 10 days.  She dropped a soup can on her foot but it did not initially hurt.  Over the last several days with walking it has began to swell and become more painful.      Patient also requesting COVID test that she was potentially exposed.  She is completely asymptomatic denying cough, shortness of breath, fever, vomiting or diarrhea.        PMH:  has a past medical history of Anemia, Asthma, GERD (gastroesophageal reflux disease), Head ache, Hypothyroid, Psychiatric disorder, and Substance abuse ([facility]).    She has no past medical history of Seizure ([facility]).  MEDS:   Current Outpatient Medications:     levothyroxine (SYNTHROID) 112 MCG Tab, Take 1 Tablet by mouth every morning on an empty stomach., Disp: 60 Tablet, Rfl: 1    NURTEC 75 MG TABLET DISPERSIBLE, TAKE 1 TABLET BY MOUTH EVERY OTHER DAY STOP IF ANY SIDE EFFECTS, Disp: , Rfl:     fluorouracil (EFUDEX) 5 % cream, Apply  topically 2 times a day., Disp: , Rfl:     Multiple Vitamins-Minerals (MULTIVITAMIN ADULT, MINERALS,) Tab, Take  by mouth., Disp: , Rfl:     B Complex-Biotin-FA (B COMPLEX 100 TR) Tab CR, Take  by mouth., Disp: , Rfl:     cyclobenzaprine (FLEXERIL) 10 mg Tab, Take 1 Tablet by mouth 3 times a day as needed for Moderate Pain or Muscle Spasms., Disp: 30 Tablet, Rfl: 0    omeprazole (PRILOSEC) 20 MG delayed-release capsule, Take 1 Capsule by mouth every day., Disp: 90 Capsule, Rfl: 2    magnesium oxide (MAG-OX) 400 MG Tab tablet, Take 400 mg by mouth every day., Disp: , Rfl:     albuterol 108 (90 Base) MCG/ACT Aero Soln  "inhalation aerosol, INHALE 1 TO 2 PUFFS BY MOUTH EVERY 4 TO 6 HOURS AS NEEDED FOR DIFFICULTY BREATHING, Disp: 8.5 Each, Rfl: 0    acetaminophen (TYLENOL) 500 MG Tab, Take 1-2 Tablets by mouth every 6 hours as needed., Disp: , Rfl:     amitriptyline (ELAVIL) 10 MG Tab, Take 1 Tablet by mouth every evening., Disp: , Rfl:   ALLERGIES: No Known Allergies  SURGHX:   Past Surgical History:   Procedure Laterality Date    GASTROSCOPY N/A 5/10/2020    Procedure: GASTROSCOPY;  Surgeon: Lucas Crespo M.D.;  Location: SURGERY [facility];  Service: Gastroenterology    WA UPPER GI ENDOSCOPY,CTRL BLEED N/A 1/15/2020    Procedure: EGD, WITH CLIP PLACEMENT;  Surgeon: Pito Davis M.D.;  Location: SURGERY [facility];  Service: Gastroenterology    GYN SURGERY      tubal ligation    OTHER      sinus surgery    OTHER ORTHOPEDIC SURGERY      leg     SOCHX:  reports that she quit smoking about 23 years ago. Her smoking use included cigarettes. She has never used smokeless tobacco. She reports that she does not currently use alcohol. She reports that she does not use drugs.  FH: family history is not on file.        Review of Systems   Constitutional: Negative.    HENT: Negative.     Respiratory: Negative.     Cardiovascular: Negative.    Gastrointestinal: Negative.    Musculoskeletal:  Positive for joint pain.   Neurological: Negative.        Medications, Allergies, and current problem list reviewed today in Epic           Objective     /80   Pulse 95   Temp 36.9 °C (98.5 °F)   Resp 18   Ht 1.591 m (5' 2.64\")   Wt 68.5 kg (151 lb)   SpO2 98%   BMI 27.06 kg/m²      Physical Exam  Vitals and nursing note reviewed.   Constitutional:       General: She is not in acute distress.     Appearance: Normal appearance. She is well-developed. She is not ill-appearing, toxic-appearing or diaphoretic.   HENT:      Head: Normocephalic and atraumatic.      Right Ear: Tympanic membrane, ear canal and external ear normal.    "   Left Ear: Tympanic membrane, ear canal and external ear normal.      Nose: Nose normal. No congestion or rhinorrhea.      Mouth/Throat:      Mouth: Mucous membranes are moist.      Pharynx: No oropharyngeal exudate or posterior oropharyngeal erythema.   Eyes:      General:         Right eye: No discharge.         Left eye: No discharge.      Conjunctiva/sclera: Conjunctivae normal.   Cardiovascular:      Rate and Rhythm: Normal rate and regular rhythm.      Pulses: Normal pulses.      Heart sounds: Normal heart sounds.   Pulmonary:      Effort: Pulmonary effort is normal. No respiratory distress.      Breath sounds: Normal breath sounds. No wheezing, rhonchi or rales.   Musculoskeletal:      Cervical back: Normal range of motion and neck supple.      Right lower leg: No edema.      Left lower leg: No edema.      Left foot: Normal range of motion and normal capillary refill. Swelling, deformity and tenderness present. No bony tenderness or crepitus. Normal pulse.        Feet:    Lymphadenopathy:      Cervical: No cervical adenopathy.   Skin:     General: Skin is warm and dry.   Neurological:      General: No focal deficit present.      Mental Status: She is alert and oriented to person, place, and time. Mental status is at baseline.   Psychiatric:         Mood and Affect: Mood normal.         Behavior: Behavior normal.         Thought Content: Thought content normal.         Judgment: Judgment normal.                             Assessment & Plan        Assessment & Plan  Left foot pain  Fortunately, x-ray was negative for fracture.  Likely a contusion with bruising and hematoma.  RICE therapy and OTC meds      Orders:    DX-FOOT-COMPLETE 3+ LEFT; Future    Exposure to COVID-19 virus  COVID test negative.  Patient completely asymptomatic.  Monitor developing symptoms and follow-up as needed    Orders:    POCT Cepheid CoV-2, Flu A/B, RSV - PCR                I personally reviewed prior external notes and test  results pertinent to today's visit. Return to clinic or go to ED if symptoms worsen or persist. Red flag symptoms and indications for ED discussed at length. Patient/Parent/Guardian voices understanding.  AVS with post-visit instructions printed and provided or given verbally.  Follow-up with your primary care provider in 3-5 days. All side effects and potential interactions of prescribed medication discussed including allergic response, GI upset, tendon injury, rash, sedation, OCP effectiveness, etc.    Please note that this dictation was created using voice recognition software. I have made every reasonable attempt to correct obvious errors, but I expect that there are errors of grammar and possibly content that I did not discover before finalizing the note.

## 2024-10-14 ENCOUNTER — OFFICE VISIT (OUTPATIENT)
Dept: OPHTHALMOLOGY | Facility: MEDICAL CENTER | Age: 59
End: 2024-10-14
Payer: MEDICAID

## 2024-10-14 DIAGNOSIS — H40.003 GLAUCOMA SUSPECT OF BOTH EYES: ICD-10-CM

## 2024-10-14 DIAGNOSIS — G37.2 CENTRAL PONTINE MYELINOLYSIS (HCC): ICD-10-CM

## 2024-10-14 DIAGNOSIS — H52.03 HYPEROPIA OF BOTH EYES: ICD-10-CM

## 2024-10-14 DIAGNOSIS — H43.813 PVD (POSTERIOR VITREOUS DETACHMENT), BILATERAL: ICD-10-CM

## 2024-10-14 DIAGNOSIS — E03.9 HYPOTHYROIDISM, UNSPECIFIED TYPE: ICD-10-CM

## 2024-10-14 DIAGNOSIS — G44.89 OTHER HEADACHE SYNDROME: ICD-10-CM

## 2024-10-14 DIAGNOSIS — H25.13 AGE-RELATED NUCLEAR CATARACT OF BOTH EYES: ICD-10-CM

## 2024-10-14 DIAGNOSIS — H52.4 ACCOMMODATIVE INSUFFICIENCY: ICD-10-CM

## 2024-10-14 PROBLEM — H26.9 CATARACT OF BOTH EYES: Status: ACTIVE | Noted: 2024-10-14

## 2024-10-14 PROCEDURE — 92250 FUNDUS PHOTOGRAPHY W/I&R: CPT | Performed by: OPHTHALMOLOGY

## 2024-10-14 PROCEDURE — 99214 OFFICE O/P EST MOD 30 MIN: CPT | Mod: 25 | Performed by: OPHTHALMOLOGY

## 2024-10-14 ASSESSMENT — CONF VISUAL FIELD
OS_SUPERIOR_NASAL_RESTRICTION: 0
OS_SUPERIOR_TEMPORAL_RESTRICTION: 0
OD_INFERIOR_TEMPORAL_RESTRICTION: 0
OD_INFERIOR_NASAL_RESTRICTION: 0
OD_SUPERIOR_NASAL_RESTRICTION: 0
OS_INFERIOR_TEMPORAL_RESTRICTION: 0
OS_INFERIOR_NASAL_RESTRICTION: 0
OD_SUPERIOR_TEMPORAL_RESTRICTION: 0
OS_NORMAL: 1
OD_NORMAL: 1

## 2024-10-14 ASSESSMENT — REFRACTION_WEARINGRX
OD_CYLINDER: +1.00
OD_AXIS: 180
OS_ADD: +2.75
OD_SPHERE: +1.25
OS_SPHERE: +1.25
OD_ADD: +2.75
OS_AXIS: 180
OS_CYLINDER: +1.00

## 2024-10-14 ASSESSMENT — REFRACTION_MANIFEST
OS_CYLINDER: +0.50
METHOD_AUTOREFRACTION: 1
OS_AXIS: 174
OS_SPHERE: +1.50
OD_CYLINDER: +0.75
OD_AXIS: 172
OD_SPHERE: +1.75

## 2024-10-14 ASSESSMENT — TONOMETRY
OD_IOP_MMHG: 13
IOP_METHOD: ICARE
OS_IOP_MMHG: 11

## 2024-10-14 ASSESSMENT — CUP TO DISC RATIO
OD_RATIO: 0.2
OS_RATIO: 0.2

## 2024-10-14 ASSESSMENT — VISUAL ACUITY
OD_CC: 20/20
OS_CC: 20/20
METHOD: SNELLEN - LINEAR

## 2024-10-14 ASSESSMENT — ENCOUNTER SYMPTOMS
BLURRED VISION: 1
DOUBLE VISION: 1

## 2024-10-14 ASSESSMENT — SLIT LAMP EXAM - LIDS
COMMENTS: NORMAL
COMMENTS: NORMAL

## 2024-10-14 ASSESSMENT — EXTERNAL EXAM - LEFT EYE: OS_EXAM: NORMAL

## 2024-10-14 ASSESSMENT — EXTERNAL EXAM - RIGHT EYE: OD_EXAM: NORMAL

## 2024-10-15 ENCOUNTER — OFFICE VISIT (OUTPATIENT)
Dept: INTERNAL MEDICINE | Facility: OTHER | Age: 59
End: 2024-10-15
Payer: MEDICAID

## 2024-10-15 VITALS
HEART RATE: 80 BPM | DIASTOLIC BLOOD PRESSURE: 65 MMHG | TEMPERATURE: 97.7 F | WEIGHT: 152.6 LBS | BODY MASS INDEX: 27.04 KG/M2 | HEIGHT: 63 IN | OXYGEN SATURATION: 97 % | SYSTOLIC BLOOD PRESSURE: 94 MMHG

## 2024-10-15 DIAGNOSIS — L57.0 ACTINIC KERATOSES: ICD-10-CM

## 2024-10-15 DIAGNOSIS — D23.9 DERMATOFIBROMA: ICD-10-CM

## 2024-10-15 DIAGNOSIS — D18.01 HEMANGIOMA OF SKIN: ICD-10-CM

## 2024-10-15 DIAGNOSIS — L82.1 SEBORRHEIC KERATOSES: ICD-10-CM

## 2024-10-15 DIAGNOSIS — L81.4 LENTIGO: ICD-10-CM

## 2024-10-15 PROCEDURE — 99214 OFFICE O/P EST MOD 30 MIN: CPT | Performed by: DERMATOLOGY

## 2024-10-15 PROCEDURE — 3078F DIAST BP <80 MM HG: CPT | Performed by: DERMATOLOGY

## 2024-10-15 PROCEDURE — 3074F SYST BP LT 130 MM HG: CPT | Performed by: DERMATOLOGY

## 2024-10-15 RX ORDER — FLUOROURACIL 50 MG/G
CREAM TOPICAL
Qty: 40 G | Refills: 3 | Status: SHIPPED | OUTPATIENT
Start: 2024-10-15

## 2024-10-15 ASSESSMENT — FIBROSIS 4 INDEX: FIB4 SCORE: 1.72
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
## 2024-11-01 ENCOUNTER — APPOINTMENT (OUTPATIENT)
Dept: INTERNAL MEDICINE | Facility: OTHER | Age: 59
End: 2024-11-01
Payer: MEDICAID

## 2024-11-01 VITALS
WEIGHT: 154.6 LBS | HEART RATE: 63 BPM | OXYGEN SATURATION: 98 % | SYSTOLIC BLOOD PRESSURE: 104 MMHG | HEIGHT: 63 IN | BODY MASS INDEX: 27.39 KG/M2 | DIASTOLIC BLOOD PRESSURE: 69 MMHG | TEMPERATURE: 98.1 F

## 2024-11-01 DIAGNOSIS — E03.9 HYPOTHYROIDISM, UNSPECIFIED TYPE: ICD-10-CM

## 2024-11-01 DIAGNOSIS — R22.9 LOCALIZED SUPERFICIAL SWELLING, MASS, OR LUMP: ICD-10-CM

## 2024-11-01 DIAGNOSIS — Z12.31 ENCOUNTER FOR SCREENING MAMMOGRAM FOR MALIGNANT NEOPLASM OF BREAST: ICD-10-CM

## 2024-11-01 DIAGNOSIS — D69.6 THROMBOCYTOPENIA (HCC): ICD-10-CM

## 2024-11-01 PROCEDURE — 3074F SYST BP LT 130 MM HG: CPT | Performed by: INTERNAL MEDICINE

## 2024-11-01 PROCEDURE — 3078F DIAST BP <80 MM HG: CPT | Performed by: INTERNAL MEDICINE

## 2024-11-01 PROCEDURE — 99214 OFFICE O/P EST MOD 30 MIN: CPT | Performed by: INTERNAL MEDICINE

## 2024-11-01 ASSESSMENT — FIBROSIS 4 INDEX: FIB4 SCORE: 1.72

## 2024-11-05 ENCOUNTER — HOSPITAL ENCOUNTER (OUTPATIENT)
Dept: RADIOLOGY | Facility: MEDICAL CENTER | Age: 59
End: 2024-11-05
Attending: INTERNAL MEDICINE
Payer: MEDICAID

## 2024-11-05 DIAGNOSIS — Z12.31 ENCOUNTER FOR SCREENING MAMMOGRAM FOR MALIGNANT NEOPLASM OF BREAST: ICD-10-CM

## 2024-11-05 PROCEDURE — 77067 SCR MAMMO BI INCL CAD: CPT

## 2024-11-14 DIAGNOSIS — E03.9 HYPOTHYROIDISM, UNSPECIFIED TYPE: ICD-10-CM

## 2024-11-21 ENCOUNTER — HOSPITAL ENCOUNTER (OUTPATIENT)
Dept: RADIOLOGY | Facility: MEDICAL CENTER | Age: 59
End: 2024-11-21
Attending: INTERNAL MEDICINE
Payer: MEDICAID

## 2024-11-21 DIAGNOSIS — R22.9 LOCALIZED SUPERFICIAL SWELLING, MASS, OR LUMP: ICD-10-CM

## 2024-11-21 PROCEDURE — 76882 US LMTD JT/FCL EVL NVASC XTR: CPT | Mod: LT

## 2024-12-02 ENCOUNTER — OFFICE VISIT (OUTPATIENT)
Dept: INTERNAL MEDICINE | Facility: OTHER | Age: 59
End: 2024-12-02
Payer: MEDICAID

## 2024-12-02 VITALS
WEIGHT: 153.2 LBS | BODY MASS INDEX: 27.14 KG/M2 | OXYGEN SATURATION: 97 % | TEMPERATURE: 98.5 F | DIASTOLIC BLOOD PRESSURE: 68 MMHG | HEIGHT: 63 IN | SYSTOLIC BLOOD PRESSURE: 103 MMHG | HEART RATE: 68 BPM

## 2024-12-02 DIAGNOSIS — R16.2 HEPATOSPLENOMEGALY: ICD-10-CM

## 2024-12-02 DIAGNOSIS — R00.2 PALPITATIONS: ICD-10-CM

## 2024-12-02 DIAGNOSIS — E78.5 HYPERLIPIDEMIA, UNSPECIFIED HYPERLIPIDEMIA TYPE: ICD-10-CM

## 2024-12-02 DIAGNOSIS — D69.6 THROMBOCYTOPENIA (HCC): ICD-10-CM

## 2024-12-02 DIAGNOSIS — E55.9 VITAMIN D DEFICIENCY: ICD-10-CM

## 2024-12-02 DIAGNOSIS — M79.89 FOOT SWELLING: ICD-10-CM

## 2024-12-02 PROCEDURE — 3078F DIAST BP <80 MM HG: CPT | Performed by: INTERNAL MEDICINE

## 2024-12-02 PROCEDURE — 99214 OFFICE O/P EST MOD 30 MIN: CPT | Performed by: INTERNAL MEDICINE

## 2024-12-02 PROCEDURE — 3074F SYST BP LT 130 MM HG: CPT | Performed by: INTERNAL MEDICINE

## 2024-12-02 ASSESSMENT — FIBROSIS 4 INDEX: FIB4 SCORE: 1.72
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
## 2024-12-09 PROBLEM — R00.2 PALPITATIONS: Status: ACTIVE | Noted: 2024-12-09

## 2024-12-09 PROBLEM — M79.89 FOOT SWELLING: Status: ACTIVE | Noted: 2024-12-09

## 2024-12-12 ENCOUNTER — HOSPITAL ENCOUNTER (OUTPATIENT)
Dept: LAB | Facility: MEDICAL CENTER | Age: 59
End: 2024-12-12
Payer: MEDICAID

## 2024-12-12 ENCOUNTER — HOSPITAL ENCOUNTER (OUTPATIENT)
Dept: LAB | Facility: MEDICAL CENTER | Age: 59
End: 2024-12-12
Attending: INTERNAL MEDICINE
Payer: MEDICAID

## 2024-12-12 DIAGNOSIS — D69.6 THROMBOCYTOPENIA (HCC): ICD-10-CM

## 2024-12-12 DIAGNOSIS — R16.2 HEPATOSPLENOMEGALY: ICD-10-CM

## 2024-12-12 DIAGNOSIS — E55.9 VITAMIN D DEFICIENCY: ICD-10-CM

## 2024-12-12 DIAGNOSIS — E78.5 HYPERLIPIDEMIA, UNSPECIFIED HYPERLIPIDEMIA TYPE: ICD-10-CM

## 2024-12-12 LAB
25(OH)D3 SERPL-MCNC: 36 NG/ML (ref 30–100)
ALBUMIN SERPL BCP-MCNC: 4.8 G/DL (ref 3.2–4.9)
ALBUMIN SERPL BCP-MCNC: 4.8 G/DL (ref 3.2–4.9)
ALBUMIN/GLOB SERPL: 1.5 G/DL
ALBUMIN/GLOB SERPL: 1.5 G/DL
ALP SERPL-CCNC: 58 U/L (ref 30–99)
ALP SERPL-CCNC: 59 U/L (ref 30–99)
ALT SERPL-CCNC: 19 U/L (ref 2–50)
ALT SERPL-CCNC: 19 U/L (ref 2–50)
ANION GAP SERPL CALC-SCNC: 10 MMOL/L (ref 7–16)
ANION GAP SERPL CALC-SCNC: 12 MMOL/L (ref 7–16)
AST SERPL-CCNC: 27 U/L (ref 12–45)
AST SERPL-CCNC: 27 U/L (ref 12–45)
BILIRUB SERPL-MCNC: 0.4 MG/DL (ref 0.1–1.5)
BILIRUB SERPL-MCNC: 0.4 MG/DL (ref 0.1–1.5)
BUN SERPL-MCNC: 17 MG/DL (ref 8–22)
BUN SERPL-MCNC: 17 MG/DL (ref 8–22)
CALCIUM ALBUM COR SERPL-MCNC: 9.3 MG/DL (ref 8.5–10.5)
CALCIUM ALBUM COR SERPL-MCNC: 9.4 MG/DL (ref 8.5–10.5)
CALCIUM SERPL-MCNC: 10 MG/DL (ref 8.5–10.5)
CALCIUM SERPL-MCNC: 9.9 MG/DL (ref 8.5–10.5)
CHLORIDE SERPL-SCNC: 105 MMOL/L (ref 96–112)
CHLORIDE SERPL-SCNC: 105 MMOL/L (ref 96–112)
CHOLEST SERPL-MCNC: 180 MG/DL (ref 100–199)
CO2 SERPL-SCNC: 25 MMOL/L (ref 20–33)
CO2 SERPL-SCNC: 25 MMOL/L (ref 20–33)
CREAT SERPL-MCNC: 0.93 MG/DL (ref 0.5–1.4)
CREAT SERPL-MCNC: 0.96 MG/DL (ref 0.5–1.4)
ERYTHROCYTE [DISTWIDTH] IN BLOOD BY AUTOMATED COUNT: 45 FL (ref 35.9–50)
GFR SERPLBLD CREATININE-BSD FMLA CKD-EPI: 69 ML/MIN/1.73 M 2
GFR SERPLBLD CREATININE-BSD FMLA CKD-EPI: 71 ML/MIN/1.73 M 2
GLOBULIN SER CALC-MCNC: 3.2 G/DL (ref 1.9–3.5)
GLOBULIN SER CALC-MCNC: 3.2 G/DL (ref 1.9–3.5)
GLUCOSE SERPL-MCNC: 88 MG/DL (ref 65–99)
GLUCOSE SERPL-MCNC: 88 MG/DL (ref 65–99)
HCT VFR BLD AUTO: 46.2 % (ref 37–47)
HDLC SERPL-MCNC: 56 MG/DL
HGB BLD-MCNC: 15.8 G/DL (ref 12–16)
LDLC SERPL CALC-MCNC: 110 MG/DL
MCH RBC QN AUTO: 31.5 PG (ref 27–33)
MCHC RBC AUTO-ENTMCNC: 34.2 G/DL (ref 32.2–35.5)
MCV RBC AUTO: 92.2 FL (ref 81.4–97.8)
PLATELET # BLD AUTO: 156 K/UL (ref 164–446)
PMV BLD AUTO: 10.8 FL (ref 9–12.9)
POTASSIUM SERPL-SCNC: 4.4 MMOL/L (ref 3.6–5.5)
POTASSIUM SERPL-SCNC: 4.5 MMOL/L (ref 3.6–5.5)
PROT SERPL-MCNC: 8 G/DL (ref 6–8.2)
PROT SERPL-MCNC: 8 G/DL (ref 6–8.2)
RBC # BLD AUTO: 5.01 M/UL (ref 4.2–5.4)
SODIUM SERPL-SCNC: 140 MMOL/L (ref 135–145)
SODIUM SERPL-SCNC: 142 MMOL/L (ref 135–145)
TRIGL SERPL-MCNC: 71 MG/DL (ref 0–149)
WBC # BLD AUTO: 5.1 K/UL (ref 4.8–10.8)

## 2024-12-12 PROCEDURE — 82105 ALPHA-FETOPROTEIN SERUM: CPT

## 2024-12-12 PROCEDURE — 85027 COMPLETE CBC AUTOMATED: CPT

## 2024-12-12 PROCEDURE — 82306 VITAMIN D 25 HYDROXY: CPT

## 2024-12-12 PROCEDURE — 80053 COMPREHEN METABOLIC PANEL: CPT | Mod: 91

## 2024-12-12 PROCEDURE — 80053 COMPREHEN METABOLIC PANEL: CPT

## 2024-12-12 PROCEDURE — 80061 LIPID PANEL: CPT

## 2024-12-12 PROCEDURE — 36415 COLL VENOUS BLD VENIPUNCTURE: CPT

## 2024-12-13 LAB — AFP-TM SERPL-MCNC: 2 NG/ML (ref 0–9)

## 2024-12-23 ENCOUNTER — HOSPITAL ENCOUNTER (OUTPATIENT)
Dept: LAB | Facility: MEDICAL CENTER | Age: 59
End: 2024-12-23
Payer: MEDICAID

## 2024-12-23 ENCOUNTER — HOSPITAL ENCOUNTER (OUTPATIENT)
Dept: RADIOLOGY | Facility: MEDICAL CENTER | Age: 59
End: 2024-12-23
Payer: MEDICAID

## 2024-12-23 DIAGNOSIS — M07.60 PERIPHERAL ARTHROPATHY DUE TO ULCERATIVE COLITIS (HCC): ICD-10-CM

## 2024-12-23 DIAGNOSIS — K74.00 HEPATIC FIBROSIS: ICD-10-CM

## 2024-12-23 DIAGNOSIS — K59.00 COLONIC CONSTIPATION: ICD-10-CM

## 2024-12-23 DIAGNOSIS — K51.90 PERIPHERAL ARTHROPATHY DUE TO ULCERATIVE COLITIS (HCC): ICD-10-CM

## 2024-12-23 LAB
INR PPP: 1.1 (ref 0.87–1.13)
PROTHROMBIN TIME: 14.2 SEC (ref 12–14.6)

## 2024-12-23 PROCEDURE — 76705 ECHO EXAM OF ABDOMEN: CPT

## 2024-12-23 PROCEDURE — 85610 PROTHROMBIN TIME: CPT

## 2024-12-23 PROCEDURE — 36415 COLL VENOUS BLD VENIPUNCTURE: CPT

## 2025-01-17 DIAGNOSIS — E03.9 HYPOTHYROIDISM, UNSPECIFIED TYPE: ICD-10-CM

## 2025-01-17 RX ORDER — LEVOTHYROXINE SODIUM 112 UG/1
112 TABLET ORAL
Qty: 60 TABLET | Refills: 1 | Status: SHIPPED | OUTPATIENT
Start: 2025-01-17

## 2025-01-17 NOTE — TELEPHONE ENCOUNTER
Received request via: Pharmacy    Was the patient seen in the last year in this department? Yes    Does the patient have an active prescription (recently filled or refills available) for medication(s) requested? No    Pharmacy Name: [facility]/pharmacy #[id number] - [place], [place] - [address] AT [address]     Does the patient have care home Plus and need 100-day supply? (This applies to ALL medications) Patient does not have SCP

## 2025-03-03 ENCOUNTER — OFFICE VISIT (OUTPATIENT)
Dept: INTERNAL MEDICINE | Facility: OTHER | Age: 60
End: 2025-03-03
Payer: MEDICAID

## 2025-03-03 VITALS
DIASTOLIC BLOOD PRESSURE: 71 MMHG | OXYGEN SATURATION: 97 % | HEART RATE: 72 BPM | WEIGHT: 159 LBS | SYSTOLIC BLOOD PRESSURE: 105 MMHG | HEIGHT: 63 IN | TEMPERATURE: 98.9 F | BODY MASS INDEX: 28.17 KG/M2

## 2025-03-03 DIAGNOSIS — H93.13 TINNITUS, BILATERAL: ICD-10-CM

## 2025-03-03 DIAGNOSIS — D69.6 THROMBOCYTOPENIA (HCC): ICD-10-CM

## 2025-03-03 DIAGNOSIS — K74.00 LIVER FIBROSIS: ICD-10-CM

## 2025-03-03 DIAGNOSIS — R16.2 HEPATOSPLENOMEGALY: ICD-10-CM

## 2025-03-03 DIAGNOSIS — E03.9 HYPOTHYROIDISM, UNSPECIFIED TYPE: ICD-10-CM

## 2025-03-03 PROCEDURE — 3074F SYST BP LT 130 MM HG: CPT | Mod: GC

## 2025-03-03 PROCEDURE — 3078F DIAST BP <80 MM HG: CPT | Mod: GC

## 2025-03-03 PROCEDURE — 99214 OFFICE O/P EST MOD 30 MIN: CPT | Mod: GC

## 2025-03-03 PROCEDURE — 2023F DILAT RTA XM W/O RTNOPTHY: CPT | Mod: GC

## 2025-03-03 ASSESSMENT — ENCOUNTER SYMPTOMS
HEADACHES: 0
DOUBLE VISION: 0
BRUISES/BLEEDS EASILY: 1
BLURRED VISION: 0

## 2025-03-03 ASSESSMENT — PATIENT HEALTH QUESTIONNAIRE - PHQ9: CLINICAL INTERPRETATION OF PHQ2 SCORE: 0

## 2025-03-03 ASSESSMENT — FIBROSIS 4 INDEX: FIB4 SCORE: 2.263274451453811268

## 2025-03-03 NOTE — PROGRESS NOTES
Established Patient    Patient Care Team:  Cedric Grimm M.D. as PCP - General (Internal Medicine)    Jumana Kahn is a 59 y.o. female who presents today with the following Chief Complaint(s): Follow up for Diagnoses of Hypothyroidism, unspecified type and Thrombocytopenia (HCC) were pertinent to this visit.    HPI:  Last seen by Dr. Grimm December 2024 at which time labs including CBC, lipid panel, CMP, vitamin D were ordered due to thrombocytopenia, dyslipidemia, vitamin D deficiency. Labs showed ongoing thrombocytopenia (156) but no anemia or leukopenia, CMP was without abnormalities, lipid panel came back with  (ASCVD risk 1.4%). She had a RUQ US done that showed possible hepatic steatosis but no evidence of cirrhosis and INR was WNL. Today she returns to clinic to discuss:    -Lab results/thrombocytopenia: she rarely takes Tylenol (1/2 of a 325 mg tablet once per month), does not take naproxen, does not drink alcohol, negative hep C and HIV screens, no known family history of platelet issue. She says she used to bruise easily as a child. She does not have easy bleeding, has not had large joint bleeding. No small joint pain and no other symptoms suggestive of autoimmune condition like lupus or RA. On chart review she has signs of cirrhosis as an etiology (hepatosplenomegaly with evidence of portal HTN, leading to significant GI bleed in 2020 at the same time her platelets were lowest).    -Skin concern: she follows with dermatology for AK, SK and is taking the Efudex cream on her chest for a skin change that was told was an age spot. The cream seemed to cause inflammation however. It is itchy and bumpy but does not bleed. She wants to get in to dermatology sooner than October. Cortisone cream is also not helpful.     -Tinnitus: she gets migraines with visual aura but lately is having a change in her symptoms- she will get flashing in her eye accompanied by severe tinnitus. It went away after  a few hours after her tinnitus improved. She gets vertigo with it as well. There is concern for vestibular migraine. She has follow up with her neurologist (Dr. Taylor at [facility]) in a few days.     Of note her levothyroxine dose was reduced last September due to palpitations but patient feels maybe those were due to too much coffee. She now feels like she is gaining weight, has worsening dry skin, and is wondering if the dose reduction was too much. She would like thyroid to be rechecked.     Review of Systems   HENT:  Positive for hearing loss and tinnitus.    Eyes:  Negative for blurred vision and double vision.   Neurological:  Negative for headaches.   Endo/Heme/Allergies:  Bruises/bleeds easily.       Past Medical History:   Diagnosis Date    Anemia     Asthma     GERD (gastroesophageal reflux disease)     Head ache     Hypothyroid     Psychiatric disorder     Substance abuse ([facility])        Social History     Tobacco Use    Smoking status: Former     Current packs/day: 0.00     Types: Cigarettes     Quit date: 2001     Years since quittin.6    Smokeless tobacco: Never   Vaping Use    Vaping status: Never Used   Substance Use Topics    Alcohol use: Not Currently     Comment: has not drank for over a year    Drug use: No       Current Outpatient Medications   Medication Sig Dispense Refill    levothyroxine (SYNTHROID) 112 MCG Tab TAKE 1 TABLET BY MOUTH EVERY DAY IN THE MORNING ON AN EMPTY STOMACH 60 Tablet 1    NURTEC 75 MG TABLET DISPERSIBLE TAKE 1 TABLET BY MOUTH EVERY OTHER DAY STOP IF ANY SIDE EFFECTS      fluorouracil (EFUDEX) 5 % cream Apply  topically 2 times a day.      Multiple Vitamins-Minerals (MULTIVITAMIN ADULT, MINERALS,) Tab Take  by mouth.      B Complex-Biotin-FA (B COMPLEX 100 TR) Tab CR Take  by mouth.      cyclobenzaprine (FLEXERIL) 10 mg Tab Take 1 Tablet by mouth 3 times a day as needed for Moderate Pain or Muscle Spasms. 30 Tablet 0    omeprazole (PRILOSEC) 20 MG  "delayed-release capsule Take 1 Capsule by mouth every day. 90 Capsule 2    magnesium oxide (MAG-OX) 400 MG Tab tablet Take 400 mg by mouth every day.      albuterol 108 (90 Base) MCG/ACT Aero Soln inhalation aerosol INHALE 1 TO 2 PUFFS BY MOUTH EVERY 4 TO 6 HOURS AS NEEDED FOR DIFFICULTY BREATHING 8.5 Each 0    acetaminophen (TYLENOL) 500 MG Tab Take 1-2 Tablets by mouth every 6 hours as needed.       No current facility-administered medications for this visit.         /71 (BP Location: Right arm, Patient Position: Sitting, BP Cuff Size: Adult)   Pulse 72   Temp 37.2 °C (98.9 °F) (Temporal)   Ht 1.6 m (5' 3\")   Wt 72.1 kg (159 lb)   SpO2 97%   BMI 28.17 kg/m²     Physical Exam  Vitals reviewed.   Constitutional:       General: She is not in acute distress.     Appearance: She is not ill-appearing, toxic-appearing or diaphoretic.   HENT:      Head: Normocephalic and atraumatic.   Eyes:      Extraocular Movements: Extraocular movements intact.   Cardiovascular:      Rate and Rhythm: Normal rate.   Pulmonary:      Effort: Pulmonary effort is normal.   Skin:     General: Skin is warm and dry.      Coloration: Skin is not jaundiced.      Findings: Erythema (chest hyperpigmentation) present. No bruising or rash.      Comments: No malar rash    Neurological:      Mental Status: She is alert and oriented to person, place, and time.   Psychiatric:         Mood and Affect: Mood normal.         Behavior: Behavior normal.           Assessment and Plan:     Thrombocytopenia (HCC)  Chronic issue for patient, essentially stable (156). Most likely due to portal hypertension (hepatosplenomegaly, early liver fibrosis, history of GI bleed when was drinking alcohol). No longer drinking alcohol, negative HIV and Hep C, not on medications that cause thrombocytopenia, no easy bleeding    -Given stability best to monitor q1-2 times/year  -Continue abstinence from alcohol   -ER precautions given for any major bleeding "     Hypothyroidism  History of, treated with levothyroxine 112 mcg (reduced from 125 mcg several months ago due to palpitations). Feels 112mcg may be too low    -Recheck thyroid studies, adjust levothyroxine based on results        Skin lesion   Lesion on chest likely irritated age spot. Not improved with topical steroid, worsened with fluorouracil cream     -Follow up with dermatology (instructed patient to request earlier appointment)     Tinnitus  Chronic problem for patient, feels may be related to her migraines (concerns for vestibular migraine). Not taking her migraine medication     -Has follow up with neurologist this week, will discuss going back on her Nurtec     Orders Placed This Encounter    TSH    FREE THYROXINE         Case discussed with Dr. Khadra Garcia M.D. PGY III  [facility] [employer] [facility] [employer]

## 2025-03-03 NOTE — ASSESSMENT & PLAN NOTE
History of, treated with levothyroxine 112 mcg (reduced from 125 mcg several months ago due to palpitations). Feels 112mcg may be too low    -Recheck thyroid studies, adjust levothyroxine based on results

## 2025-03-03 NOTE — PATIENT INSTRUCTIONS
Call to get in to dermatology sooner due to new problem. If they are unable to get you in sooner please let us know and we can put in a new referral  Get thyroid labs done prior to your appointment with Dr. Grimm

## 2025-03-03 NOTE — ASSESSMENT & PLAN NOTE
Chronic issue for patient, essentially stable (156). Most likely due to portal hypertension (hepatosplenomegaly, early liver fibrosis, history of GI bleed when was drinking alcohol). No longer drinking alcohol, negative HIV and Hep C, not on medications that cause thrombocytopenia, no easy bleeding    -Given stability best to monitor q1-2 times/year  -Continue abstinence from alcohol   -ER precautions given for any major bleeding

## 2025-03-04 ENCOUNTER — OFFICE VISIT (OUTPATIENT)
Dept: INTERNAL MEDICINE | Facility: OTHER | Age: 60
End: 2025-03-04
Payer: MEDICAID

## 2025-03-04 DIAGNOSIS — D48.5 NEOPLASM OF UNCERTAIN BEHAVIOR OF SKIN: ICD-10-CM

## 2025-03-04 PROCEDURE — 11102 TANGNTL BX SKIN SINGLE LES: CPT | Performed by: DERMATOLOGY

## 2025-03-04 RX ADMIN — Medication 20 ML: at 11:29

## 2025-03-04 NOTE — PROGRESS NOTES
Jumana Kahn  1965  [id number]    Follow up             There were no vitals filed for this visit.      Chief Complaint   Patient presents with    Follow-Up     ___________________________________________________________________            History of Present Illness (HPI)   Here for spot on chest x 2 months.   Got a little puffy and red. Scaly. Tried fluorouracil and got worse. Then stopped 5FU.        Current Outpatient Medications on File Prior to Visit   Medication Sig Dispense Refill    levothyroxine (SYNTHROID) 112 MCG Tab TAKE 1 TABLET BY MOUTH EVERY DAY IN THE MORNING ON AN EMPTY STOMACH 60 Tablet 1    NURTEC 75 MG TABLET DISPERSIBLE TAKE 1 TABLET BY MOUTH EVERY OTHER DAY STOP IF ANY SIDE EFFECTS      fluorouracil (EFUDEX) 5 % cream Apply  topically 2 times a day.      Multiple Vitamins-Minerals (MULTIVITAMIN ADULT, MINERALS,) Tab Take  by mouth.      B Complex-Biotin-FA (B COMPLEX 100 TR) Tab CR Take  by mouth.      cyclobenzaprine (FLEXERIL) 10 mg Tab Take 1 Tablet by mouth 3 times a day as needed for Moderate Pain or Muscle Spasms. 30 Tablet 0    omeprazole (PRILOSEC) 20 MG delayed-release capsule Take 1 Capsule by mouth every day. 90 Capsule 2    magnesium oxide (MAG-OX) 400 MG Tab tablet Take 400 mg by mouth every day.      albuterol 108 (90 Base) MCG/ACT Aero Soln inhalation aerosol INHALE 1 TO 2 PUFFS BY MOUTH EVERY 4 TO 6 HOURS AS NEEDED FOR DIFFICULTY BREATHING 8.5 Each 0    acetaminophen (TYLENOL) 500 MG Tab Take 1-2 Tablets by mouth every 6 hours as needed.       No current facility-administered medications on file prior to visit.     Ampicillin      Review of Systems:        General: Denies fever      Hematologic: no excessive bleeding problems              Past Medical History:   Diagnosis Date    Anemia     Asthma     GERD (gastroesophageal reflux disease)     Head ache     Hypothyroid     Psychiatric disorder     Substance abuse (HCC)      Skin Cancer no h/o    Social  History     Tobacco Use    Smoking status: Former     Current packs/day: 0.00     Types: Cigarettes     Quit date: 2001     Years since quittin.6    Smokeless tobacco: Never   Vaping Use    Vaping status: Never Used   Substance Use Topics    Alcohol use: Not Currently     Comment: has not drank for over a year    Drug use: No     Sunscreen Use:No     Past Surgical History:   Procedure Laterality Date    GASTROSCOPY N/A 05/10/2020    Procedure: GASTROSCOPY;  Surgeon: Lucas Crespo M.D.;  Location: SURGERY [facility];  Service: Gastroenterology    [facility] UPPER GI ENDOSCOPY,CTRL BLEED N/A 01/15/2020    Procedure: EGD, WITH CLIP PLACEMENT;  Surgeon: Pito Davis M.D.;  Location: SURGERY [facility];  Service: Gastroenterology    GYN SURGERY      tubal ligation    NECK DISSECTION Bilateral     Ablation of the neck NO DISSECTION    OTHER      sinus surgery    OTHER ORTHOPEDIC SURGERY      leg           History reviewed. No pertinent family history.      Physical Exam:   Constitutional: Well nourished, NAD, pleasant  alert and cooperative; normal mood and affect; normal attention span and concentration.    Skin      Focused exam    L upper chest about 1.5 cm plaque      Assessment and Plan:   1. Neoplasm of uncertain behavior of skin  Chest r/o SCC vs dermatitis  Photo taken with pt's cell phone   SHAVE BIOPSY    Informed consent. area was cleansed with Alcohol. The lesion area was infiltrated with    Lidocaine 1% . flexible blade to remove specimen. Hemostasis with    aluminum chloride. Bandaid applied. Wound care instructions were given orally.   - Pathology Specimen  - lidocaine (Xylocaine) 1 % injection 20 mL         Duyen Garcia M.D.   Return if symptoms worsen or fail to improve.

## 2025-03-07 ENCOUNTER — HOSPITAL ENCOUNTER (OUTPATIENT)
Dept: LAB | Facility: MEDICAL CENTER | Age: 60
End: 2025-03-07
Payer: MEDICAID

## 2025-03-07 DIAGNOSIS — E03.9 HYPOTHYROIDISM, UNSPECIFIED TYPE: ICD-10-CM

## 2025-03-07 LAB
T4 FREE SERPL-MCNC: 1.65 NG/DL (ref 0.93–1.7)
TSH SERPL-ACNC: 1.43 UIU/ML (ref 0.35–5.5)

## 2025-03-07 PROCEDURE — 84439 ASSAY OF FREE THYROXINE: CPT

## 2025-03-07 PROCEDURE — 84443 ASSAY THYROID STIM HORMONE: CPT

## 2025-03-07 PROCEDURE — 36415 COLL VENOUS BLD VENIPUNCTURE: CPT

## 2025-03-11 ENCOUNTER — TELEPHONE (OUTPATIENT)
Dept: INTERNAL MEDICINE | Facility: OTHER | Age: 60
End: 2025-03-11

## 2025-03-13 NOTE — TELEPHONE ENCOUNTER
Spoke with patient regarding results. She believes she may have had an allergic reaction after sing a topical cream. Patient wants to know if there are medications that she should take for this.     Thank you.

## 2025-03-14 NOTE — TELEPHONE ENCOUNTER
Stop the topical cream that the patient is having a reaction to. She can try over the counter hydrocortisone. If she is still having a problem, then she can see her primary care.

## 2025-04-05 ENCOUNTER — OFFICE VISIT (OUTPATIENT)
Dept: URGENT CARE | Facility: CLINIC | Age: 60
End: 2025-04-05
Payer: MEDICAID

## 2025-04-05 ENCOUNTER — RESULTS FOLLOW-UP (OUTPATIENT)
Dept: URGENT CARE | Facility: CLINIC | Age: 60
End: 2025-04-05

## 2025-04-05 VITALS
WEIGHT: 150 LBS | OXYGEN SATURATION: 97 % | TEMPERATURE: 98.4 F | DIASTOLIC BLOOD PRESSURE: 58 MMHG | HEIGHT: 64 IN | BODY MASS INDEX: 25.61 KG/M2 | RESPIRATION RATE: 18 BRPM | HEART RATE: 79 BPM | SYSTOLIC BLOOD PRESSURE: 108 MMHG

## 2025-04-05 DIAGNOSIS — Z03.818 ENCOUNTER FOR PATIENT CONCERN ABOUT EXPOSURE TO INFECTIOUS ORGANISM: ICD-10-CM

## 2025-04-05 PROCEDURE — 87637 SARSCOV2&INF A&B&RSV AMP PRB: CPT | Mod: QW | Performed by: FAMILY MEDICINE

## 2025-04-05 PROCEDURE — 99213 OFFICE O/P EST LOW 20 MIN: CPT | Performed by: FAMILY MEDICINE

## 2025-04-05 PROCEDURE — 3074F SYST BP LT 130 MM HG: CPT | Performed by: FAMILY MEDICINE

## 2025-04-05 PROCEDURE — 3078F DIAST BP <80 MM HG: CPT | Performed by: FAMILY MEDICINE

## 2025-04-05 ASSESSMENT — FIBROSIS 4 INDEX: FIB4 SCORE: 2.263274451453811268

## 2025-04-06 NOTE — PROGRESS NOTES
"  Subjective:      59 y.o. female presents to urgent care for cold symptoms that started last night. She is experiencing fever, headache, body aches, nasal congestion, increased sinus pressure, sore throat, and cough. No tobacco product use. She has mild, intermittent asthma but has not needed medications for years. She is vaccinated against COVID. She had a possible RSV exposure through work.     She denies any other questions or concerns at this time.    Current problem list, medication, and past medical/surgical history were reviewed in Epic.    ROS  See HPI     Objective:      /58   Pulse 79   Temp 36.9 °C (98.4 °F) (Temporal)   Resp 18   Ht 1.626 m (5' 4\")   Wt 68 kg (150 lb)   SpO2 97%   BMI 25.75 kg/m²     Physical Exam  Constitutional:       General: She is not in acute distress.     Appearance: She is not diaphoretic.   HENT:      Right Ear: Tympanic membrane, ear canal and external ear normal.      Left Ear: Tympanic membrane, ear canal and external ear normal.      Mouth/Throat:      Tongue: Tongue does not deviate from midline.      Palate: No lesions.      Pharynx: Uvula midline. No oropharyngeal exudate or posterior oropharyngeal erythema.      Tonsils: No tonsillar exudate. 1+ on the right. 1+ on the left.   Cardiovascular:      Rate and Rhythm: Normal rate and regular rhythm.      Heart sounds: Normal heart sounds.   Pulmonary:      Effort: Pulmonary effort is normal. No respiratory distress.      Breath sounds: Normal breath sounds.   Neurological:      Mental Status: She is alert.   Psychiatric:         Mood and Affect: Affect normal.         Judgment: Judgment normal.       Assessment/Plan:     1. Encounter for patient concern about exposure to infectious organism  Negative COVID, influenza, and RSV.  Symptoms are still most consistent with virus.  Tylenol, ibuprofen, rest, and hydration as needed for symptomatic relief.  - POCT CEPHEID COV-2, FLU A/B, RSV - PCR      Instructed to " return to Urgent Care or nearest Emergency Department if symptoms fail to improve, for any change in condition, further concerns, or new concerning symptoms. Patient states understanding of the plan of care and discharge instructions.    Melissa Srinivasan M.D.

## 2025-04-07 ENCOUNTER — OFFICE VISIT (OUTPATIENT)
Dept: URGENT CARE | Facility: CLINIC | Age: 60
End: 2025-04-07
Payer: MEDICAID

## 2025-04-07 VITALS
RESPIRATION RATE: 14 BRPM | SYSTOLIC BLOOD PRESSURE: 106 MMHG | WEIGHT: 150 LBS | BODY MASS INDEX: 25.61 KG/M2 | TEMPERATURE: 98.6 F | HEIGHT: 64 IN | DIASTOLIC BLOOD PRESSURE: 60 MMHG | HEART RATE: 88 BPM | OXYGEN SATURATION: 98 %

## 2025-04-07 DIAGNOSIS — J01.90 ACUTE BACTERIAL SINUSITIS: ICD-10-CM

## 2025-04-07 DIAGNOSIS — B96.89 ACUTE BACTERIAL SINUSITIS: ICD-10-CM

## 2025-04-07 PROCEDURE — 3078F DIAST BP <80 MM HG: CPT | Performed by: PHYSICIAN ASSISTANT

## 2025-04-07 PROCEDURE — 99213 OFFICE O/P EST LOW 20 MIN: CPT | Performed by: PHYSICIAN ASSISTANT

## 2025-04-07 PROCEDURE — 3074F SYST BP LT 130 MM HG: CPT | Performed by: PHYSICIAN ASSISTANT

## 2025-04-07 PROCEDURE — 2023F DILAT RTA XM W/O RTNOPTHY: CPT | Performed by: PHYSICIAN ASSISTANT

## 2025-04-07 ASSESSMENT — ENCOUNTER SYMPTOMS
DIARRHEA: 0
COUGH: 0
NAUSEA: 0
MYALGIAS: 0
HEADACHES: 0
VOMITING: 0
ABDOMINAL PAIN: 0
SINUS PAIN: 1
PALPITATIONS: 0
WHEEZING: 0
CHILLS: 0
SORE THROAT: 0
DIZZINESS: 0
SHORTNESS OF BREATH: 0
SPUTUM PRODUCTION: 0
DIAPHORESIS: 0
FEVER: 0

## 2025-04-07 ASSESSMENT — FIBROSIS 4 INDEX: FIB4 SCORE: 2.263274451453811268

## 2025-04-07 NOTE — PROGRESS NOTES
Subjective:     CHIEF COMPLAINT  Chief Complaint   Patient presents with    Follow-Up     Sinus infection [id number]        HPI  Jumana Kahn is a very pleasant 59 y.o. female who presents to clinic with progressively worsening sinus pain and pressure that has been ongoing for the last 3 weeks.  Patient was seen in clinic 2 days ago for the symptoms and states the provider at that time did not fully understand her situation.  She informs me she has had ongoing sinus congestion and pressure for the last 3 weeks however when she was seen 2 days ago she had worsening symptoms and recent RSV exposure which she was requesting testing for.  States she was told it was viral however she states she has a history of recurrent sinusitis and knows this is a bacterial infection.  States she has undergone 2 sinus surgeries in the past and is requesting antibiotics.  States she has been performing nasal flushes without improvement.  Experiencing pain and pressure over both frontal and maxillary sinuses.  Mucus is thick and discolored.  No fever.    REVIEW OF SYSTEMS  Review of Systems   Constitutional:  Positive for malaise/fatigue. Negative for chills, diaphoresis and fever.   HENT:  Positive for congestion and sinus pain. Negative for ear pain and sore throat.    Respiratory:  Negative for cough, sputum production, shortness of breath and wheezing.    Cardiovascular:  Negative for chest pain and palpitations.   Gastrointestinal:  Negative for abdominal pain, diarrhea, nausea and vomiting.   Musculoskeletal:  Negative for myalgias.   Neurological:  Negative for dizziness and headaches.       PAST MEDICAL HISTORY  Patient Active Problem List    Diagnosis Date Noted    Foot swelling 12/09/2024    Palpitations 12/09/2024    Hyperopia of both eyes 10/14/2024    Glaucoma suspect of both eyes 10/14/2024    Cataract of both eyes 10/14/2024    Hepatosplenomegaly 02/14/2024    Hyperlipidemia 06/12/2023    Closed fracture of right  "ankle with routine healing 06/12/2023    Other headache syndrome 06/12/2023    Vision abnormalities 06/12/2023    Diaphragmatic hernia without obstruction or gangrene 02/13/2023    Basilar migraine 01/30/2023    Liver fibrosis 11/08/2022    Ocular migraine 11/07/2022    Recurrent sinusitis 11/07/2022    Sprain of right ankle 11/06/2022    Atherosclerosis of both carotid arteries 04/20/2022    Visual distortion 02/11/2022    Accommodative insufficiency 09/29/2021    PVD (posterior vitreous detachment), bilateral 09/29/2021    Dental caries 08/04/2021    Neuropathy 06/01/2021    Blurring of visual image 03/30/2021    Hx of traumatic brain injury 03/30/2021    Hearing loss, bilateral 02/16/2021    Tinnitus, bilateral 02/16/2021    History of alcohol abuse 02/12/2021    Vertigo 02/12/2021    Neck pain, chronic 02/01/2021    Constipation 12/21/2020    History of repair of rotator cuff 12/21/2020    Acute midline low back pain without sciatica 11/09/2020    Vitamin D deficiency 05/12/2020    GERD (gastroesophageal reflux disease) 05/06/2020    Central pontine myelinolysis (HCC) 01/19/2020    Thrombocytopenia (HCC) 01/12/2020    Hypothyroidism 01/12/2020    Idiopathic hypotension 01/12/2020       SURGICAL HISTORY   has a past surgical history that includes other orthopedic surgery; gyn surgery; other; upper gi endoscopy,ctrl bleed (N/A, 01/15/2020); gastroscopy (N/A, 05/10/2020); and neck dissection (Bilateral).    ALLERGIES  Allergies   Allergen Reactions    Sinus Maximum Strength      Other Reaction(s): \"Room was spinning\"       CURRENT MEDICATIONS  Home Medications       Reviewed by Romaine Marie P.A.-C. (Physician Assistant) on 04/07/25 at 0838  Med List Status: <None>     Medication Last Dose Status   acetaminophen (TYLENOL) 500 MG Tab PRN Active   albuterol 108 (90 Base) MCG/ACT Aero Soln inhalation aerosol PRN Active   B Complex-Biotin-FA (B COMPLEX 100 TR) Tab CR Taking Active   cyclobenzaprine (FLEXERIL) 10 mg " "Tab PRN Active   fluorouracil (EFUDEX) 5 % cream Taking Active   levothyroxine (SYNTHROID) 112 MCG Tab Taking Active   magnesium oxide (MAG-OX) 400 MG Tab tablet Taking Active   Multiple Vitamins-Minerals (MULTIVITAMIN ADULT, MINERALS,) Tab Taking Active   NURTEC 75 MG TABLET DISPERSIBLE Taking Active   omeprazole (PRILOSEC) 20 MG delayed-release capsule Taking Active                    SOCIAL HISTORY  Social History     Tobacco Use    Smoking status: Former     Current packs/day: 0.00     Types: Cigarettes     Quit date: 2001     Years since quittin.7    Smokeless tobacco: Never   Vaping Use    Vaping status: Never Used   Substance and Sexual Activity    Alcohol use: Not Currently     Comment: has not drank for over a year    Drug use: No    Sexual activity: Not on file       FAMILY HISTORY  History reviewed. No pertinent family history.       Objective:     VITAL SIGNS: /60   Pulse 88   Temp 37 °C (98.6 °F) (Temporal)   Resp 14   Ht 1.626 m (5' 4\")   Wt 68 kg (150 lb)   SpO2 98%   BMI 25.75 kg/m²     PHYSICAL EXAM  Physical Exam  Constitutional:       General: She is not in acute distress.     Appearance: Normal appearance. She is not ill-appearing, toxic-appearing or diaphoretic.   HENT:      Head: Normocephalic and atraumatic.      Comments: Tenderness over the bilateral frontal and maxillary sinuses.     Right Ear: Tympanic membrane, ear canal and external ear normal.      Left Ear: Tympanic membrane, ear canal and external ear normal.      Nose: Congestion present. No rhinorrhea.      Mouth/Throat:      Mouth: Mucous membranes are moist.      Pharynx: No oropharyngeal exudate or posterior oropharyngeal erythema.   Eyes:      Conjunctiva/sclera: Conjunctivae normal.   Cardiovascular:      Rate and Rhythm: Normal rate and regular rhythm.      Pulses: Normal pulses.      Heart sounds: Normal heart sounds.   Pulmonary:      Effort: Pulmonary effort is normal.      Breath sounds: Normal " breath sounds. No wheezing, rhonchi or rales.   Musculoskeletal:      Cervical back: Normal range of motion. No muscular tenderness.   Lymphadenopathy:      Cervical: No cervical adenopathy.   Skin:     General: Skin is warm and dry.      Capillary Refill: Capillary refill takes less than 2 seconds.   Neurological:      Mental Status: She is alert.   Psychiatric:         Mood and Affect: Mood normal.         Thought Content: Thought content normal.         Assessment/Plan:     1. Acute bacterial sinusitis  - amoxicillin-clavulanate (AUGMENTIN) 875-125 MG Tab; Take 1 Tablet by mouth 2 times a day for 7 days.  Dispense: 14 Tablet; Refill: 0      MDM/Comments:    -Nasal spray and allergy medications as directed (Zyrtec or Loratadine).  -You may try saline irrigation or neti pot.   -Drink plenty of fluids.  -Ibuprofen or Tylenol as directed for pain.   -Warm compress to sinuses.      Follow up with primary care provider. Urgently for worsening symptoms, persistent fevers, facial swelling, visual changes, weakness, elevated heart rate, stiff neck, symptoms last longer than 10 days, or any other concerns.    Differential diagnosis, natural history, supportive care, and indications for immediate follow-up discussed. All questions answered. Patient agrees with the plan of care.    Follow-up as needed if symptoms worsen or fail to improve to PCP, Urgent care or Emergency Room.    I have personally reviewed prior external notes and test results pertinent to today's visit.  I have independently reviewed and interpreted all diagnostics ordered during this urgent care acute visit.   Discussed management options (risks,benefits, and alternatives to treatment). Pt expresses understanding and the treatment plan was agreed upon. Questions were encouraged and answered to pt's satisfaction.    Please note that this dictation was created using voice recognition software. I have made a reasonable attempt to correct obvious errors, but I  expect that there are errors of grammar and possibly content that I did not discover before finalizing the note.

## 2025-04-09 ENCOUNTER — TELEPHONE (OUTPATIENT)
Dept: INTERNAL MEDICINE | Facility: OTHER | Age: 60
End: 2025-04-09

## 2025-04-09 ENCOUNTER — OFFICE VISIT (OUTPATIENT)
Dept: INTERNAL MEDICINE | Facility: OTHER | Age: 60
End: 2025-04-09
Payer: MEDICAID

## 2025-04-09 VITALS
SYSTOLIC BLOOD PRESSURE: 103 MMHG | HEIGHT: 64 IN | RESPIRATION RATE: 16 BRPM | WEIGHT: 157 LBS | BODY MASS INDEX: 26.8 KG/M2 | OXYGEN SATURATION: 97 % | HEART RATE: 69 BPM | TEMPERATURE: 97.6 F | DIASTOLIC BLOOD PRESSURE: 72 MMHG

## 2025-04-09 DIAGNOSIS — R00.2 PALPITATIONS: ICD-10-CM

## 2025-04-09 DIAGNOSIS — R10.11 RIGHT UPPER QUADRANT PAIN: ICD-10-CM

## 2025-04-09 DIAGNOSIS — R63.5 WEIGHT GAIN: ICD-10-CM

## 2025-04-09 DIAGNOSIS — E03.9 HYPOTHYROIDISM, UNSPECIFIED TYPE: ICD-10-CM

## 2025-04-09 PROCEDURE — 3074F SYST BP LT 130 MM HG: CPT | Performed by: INTERNAL MEDICINE

## 2025-04-09 PROCEDURE — 3078F DIAST BP <80 MM HG: CPT | Performed by: INTERNAL MEDICINE

## 2025-04-09 PROCEDURE — 99214 OFFICE O/P EST MOD 30 MIN: CPT | Performed by: INTERNAL MEDICINE

## 2025-04-09 ASSESSMENT — FIBROSIS 4 INDEX: FIB4 SCORE: 2.263274451453811268
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
## 2025-04-22 ENCOUNTER — TELEMEDICINE (OUTPATIENT)
Dept: INTERNAL MEDICINE | Facility: OTHER | Age: 60
End: 2025-04-22
Payer: MEDICAID

## 2025-04-22 DIAGNOSIS — J32.9 RECURRENT SINUSITIS: ICD-10-CM

## 2025-04-22 NOTE — PROGRESS NOTES
OFFICE VISIT    Jumana Kahn is a 59 y.o. female with PMH of Hypothyroidism, Cervical radiculopathy, Dyslipidemia, Vit D deficiency, prior TBI's. Presents today with the following:    Reason for visit:      FOR FURTHER DETAILS REGARDING MANAGEMENT PLAN, PLEASE SEE BELOW.     ROS    There were no vitals taken for this visit.    Physical Exam    Assessment and Plan:     No problem-specific Assessment & Plan notes found for this encounter.      No orders of the defined types were placed in this encounter.      No follow-ups on file.      Patient case was seen/ assessed/ discussed with .       Please note that this dictation was created using voice recognition software. I have made every reasonable attempt to correct obvious errors, but I expect that there are errors of grammar and possibly content that I did not discover before finalizing the note.       Signed by:    Brandon Delgadillo M.D.  PGY-3 Internal Medicine Resident

## 2025-05-07 ENCOUNTER — APPOINTMENT (OUTPATIENT)
Dept: RADIOLOGY | Facility: MEDICAL CENTER | Age: 60
End: 2025-05-07
Attending: EMERGENCY MEDICINE
Payer: MEDICAID

## 2025-05-07 ENCOUNTER — HOSPITAL ENCOUNTER (EMERGENCY)
Facility: MEDICAL CENTER | Age: 60
End: 2025-05-07
Attending: EMERGENCY MEDICINE
Payer: MEDICAID

## 2025-05-07 VITALS
DIASTOLIC BLOOD PRESSURE: 65 MMHG | OXYGEN SATURATION: 98 % | TEMPERATURE: 98.7 F | HEIGHT: 64 IN | HEART RATE: 57 BPM | RESPIRATION RATE: 17 BRPM | SYSTOLIC BLOOD PRESSURE: 110 MMHG | WEIGHT: 157.85 LBS | BODY MASS INDEX: 26.95 KG/M2

## 2025-05-07 DIAGNOSIS — R10.31 RIGHT LOWER QUADRANT ABDOMINAL PAIN: ICD-10-CM

## 2025-05-07 LAB
ALBUMIN SERPL BCP-MCNC: 4.5 G/DL (ref 3.2–4.9)
ALBUMIN/GLOB SERPL: 1.6 G/DL
ALP SERPL-CCNC: 42 U/L (ref 30–99)
ALT SERPL-CCNC: 15 U/L (ref 2–50)
ANION GAP SERPL CALC-SCNC: 9 MMOL/L (ref 7–16)
APPEARANCE UR: CLEAR
AST SERPL-CCNC: 23 U/L (ref 12–45)
BASOPHILS # BLD AUTO: 0.9 % (ref 0–1.8)
BASOPHILS # BLD: 0.05 K/UL (ref 0–0.12)
BILIRUB SERPL-MCNC: 0.5 MG/DL (ref 0.1–1.5)
BILIRUB UR QL STRIP.AUTO: NEGATIVE
BUN SERPL-MCNC: 15 MG/DL (ref 8–22)
CALCIUM ALBUM COR SERPL-MCNC: 9.8 MG/DL (ref 8.5–10.5)
CALCIUM SERPL-MCNC: 10.2 MG/DL (ref 8.5–10.5)
CHLORIDE SERPL-SCNC: 106 MMOL/L (ref 96–112)
CO2 SERPL-SCNC: 24 MMOL/L (ref 20–33)
COLOR UR: YELLOW
CREAT SERPL-MCNC: 0.98 MG/DL (ref 0.5–1.4)
EOSINOPHIL # BLD AUTO: 0.06 K/UL (ref 0–0.51)
EOSINOPHIL NFR BLD: 1.1 % (ref 0–6.9)
ERYTHROCYTE [DISTWIDTH] IN BLOOD BY AUTOMATED COUNT: 44.6 FL (ref 35.9–50)
GFR SERPLBLD CREATININE-BSD FMLA CKD-EPI: 67 ML/MIN/1.73 M 2
GLOBULIN SER CALC-MCNC: 2.9 G/DL (ref 1.9–3.5)
GLUCOSE SERPL-MCNC: 97 MG/DL (ref 65–99)
GLUCOSE UR STRIP.AUTO-MCNC: NEGATIVE MG/DL
HCT VFR BLD AUTO: 41.6 % (ref 37–47)
HGB BLD-MCNC: 14.4 G/DL (ref 12–16)
IMM GRANULOCYTES # BLD AUTO: 0.01 K/UL (ref 0–0.11)
IMM GRANULOCYTES NFR BLD AUTO: 0.2 % (ref 0–0.9)
KETONES UR STRIP.AUTO-MCNC: NEGATIVE MG/DL
LEUKOCYTE ESTERASE UR QL STRIP.AUTO: NEGATIVE
LIPASE SERPL-CCNC: 35 U/L (ref 11–82)
LYMPHOCYTES # BLD AUTO: 1.49 K/UL (ref 1–4.8)
LYMPHOCYTES NFR BLD: 27.1 % (ref 22–41)
MCH RBC QN AUTO: 31 PG (ref 27–33)
MCHC RBC AUTO-ENTMCNC: 34.6 G/DL (ref 32.2–35.5)
MCV RBC AUTO: 89.5 FL (ref 81.4–97.8)
MICRO URNS: NORMAL
MONOCYTES # BLD AUTO: 0.29 K/UL (ref 0–0.85)
MONOCYTES NFR BLD AUTO: 5.3 % (ref 0–13.4)
NEUTROPHILS # BLD AUTO: 3.59 K/UL (ref 1.82–7.42)
NEUTROPHILS NFR BLD: 65.4 % (ref 44–72)
NITRITE UR QL STRIP.AUTO: NEGATIVE
NRBC # BLD AUTO: 0 K/UL
NRBC BLD-RTO: 0 /100 WBC (ref 0–0.2)
PH UR STRIP.AUTO: 5.5 [PH] (ref 5–8)
PLATELET # BLD AUTO: 142 K/UL (ref 164–446)
PMV BLD AUTO: 10.6 FL (ref 9–12.9)
POTASSIUM SERPL-SCNC: 4 MMOL/L (ref 3.6–5.5)
PROT SERPL-MCNC: 7.4 G/DL (ref 6–8.2)
PROT UR QL STRIP: NEGATIVE MG/DL
RBC # BLD AUTO: 4.65 M/UL (ref 4.2–5.4)
RBC UR QL AUTO: NEGATIVE
SODIUM SERPL-SCNC: 139 MMOL/L (ref 135–145)
SP GR UR STRIP.AUTO: 1.02
UROBILINOGEN UR STRIP.AUTO-MCNC: 0.2 EU/DL
WBC # BLD AUTO: 5.5 K/UL (ref 4.8–10.8)

## 2025-05-07 PROCEDURE — 96374 THER/PROPH/DIAG INJ IV PUSH: CPT | Mod: XU

## 2025-05-07 PROCEDURE — 81003 URINALYSIS AUTO W/O SCOPE: CPT

## 2025-05-07 PROCEDURE — 85025 COMPLETE CBC W/AUTO DIFF WBC: CPT

## 2025-05-07 PROCEDURE — 74177 CT ABD & PELVIS W/CONTRAST: CPT

## 2025-05-07 PROCEDURE — 83690 ASSAY OF LIPASE: CPT

## 2025-05-07 PROCEDURE — 80053 COMPREHEN METABOLIC PANEL: CPT

## 2025-05-07 PROCEDURE — 700117 HCHG RX CONTRAST REV CODE 255: Performed by: EMERGENCY MEDICINE

## 2025-05-07 PROCEDURE — 96375 TX/PRO/DX INJ NEW DRUG ADDON: CPT

## 2025-05-07 PROCEDURE — 700111 HCHG RX REV CODE 636 W/ 250 OVERRIDE (IP): Mod: JZ,UD | Performed by: EMERGENCY MEDICINE

## 2025-05-07 PROCEDURE — 99284 EMERGENCY DEPT VISIT MOD MDM: CPT

## 2025-05-07 PROCEDURE — 36415 COLL VENOUS BLD VENIPUNCTURE: CPT

## 2025-05-07 RX ORDER — HYDROMORPHONE HYDROCHLORIDE 1 MG/ML
0.5 INJECTION, SOLUTION INTRAMUSCULAR; INTRAVENOUS; SUBCUTANEOUS ONCE
Status: COMPLETED | OUTPATIENT
Start: 2025-05-07 | End: 2025-05-07

## 2025-05-07 RX ORDER — ONDANSETRON 2 MG/ML
4 INJECTION INTRAMUSCULAR; INTRAVENOUS ONCE
Status: COMPLETED | OUTPATIENT
Start: 2025-05-07 | End: 2025-05-07

## 2025-05-07 RX ADMIN — ONDANSETRON 4 MG: 2 INJECTION INTRAMUSCULAR; INTRAVENOUS at 13:07

## 2025-05-07 RX ADMIN — IOHEXOL 80 ML: 350 INJECTION, SOLUTION INTRAVENOUS at 14:30

## 2025-05-07 RX ADMIN — HYDROMORPHONE HYDROCHLORIDE 0.5 MG: 1 INJECTION, SOLUTION INTRAMUSCULAR; INTRAVENOUS; SUBCUTANEOUS at 13:08

## 2025-05-07 ASSESSMENT — PAIN DESCRIPTION - PAIN TYPE
TYPE: ACUTE PAIN
TYPE: ACUTE PAIN

## 2025-05-07 ASSESSMENT — FIBROSIS 4 INDEX: FIB4 SCORE: 2.263274451453811268

## 2025-05-07 NOTE — ED PROVIDER NOTES
ED Provider Note    CHIEF COMPLAINT  Chief Complaint   Patient presents with    Abdominal Pain     X 1 day RLQ pain   Woke up this AM with increased pain, unable to stand up straight    Low Back Pain       EXTERNAL RECORDS REVIEWED  Reviewed baseline laboratory studies primary care physician notes    HPI/ROS  LIMITATION TO HISTORY   None  OUTSIDE HISTORIAN(S):  None    Jumana Kahn is a 59 y.o. female who presents for evaluation of abrupt onset right lower quadrant discomfort initially came and go in waves but now is dull and constant.  Worse with walking.  She reports an episode of nausea but no hematemesis or hematochezia.  No reported diarrhea melena or urinary symptoms such as dysuria or hematuria.  She still has her appendix.  No report of any vaginal bleeding or discharge.  No chest pain or productive cough.  No report of any chest or back pain.  Symptoms have been present for around 24 hours now.    PAST MEDICAL HISTORY   has a past medical history of Anemia, Asthma, GERD (gastroesophageal reflux disease), Head ache, Hypothyroid, Psychiatric disorder, and Substance abuse (HCC).    SURGICAL HISTORY   has a past surgical history that includes other orthopedic surgery; gyn surgery; other; upper gi endoscopy,ctrl bleed (N/A, 01/15/2020); gastroscopy (N/A, 05/10/2020); and neck dissection (Bilateral).    FAMILY HISTORY  History reviewed. No pertinent family history.    SOCIAL HISTORY  Social History     Tobacco Use    Smoking status: Former     Current packs/day: 0.00     Types: Cigarettes     Quit date: 2001     Years since quittin.8    Smokeless tobacco: Never   Vaping Use    Vaping status: Never Used   Substance and Sexual Activity    Alcohol use: Not Currently     Comment: has not drank for over a year    Drug use: No    Sexual activity: Not on file     Former smoker  CURRENT MEDICATIONS  Home Medications       Reviewed by Christi Mccartney R.N. (Registered Nurse) on 25 at 1014  Med  "List Status: Partial     Medication Last Dose Status   acetaminophen (TYLENOL) 500 MG Tab  Active   albuterol 108 (90 Base) MCG/ACT Aero Soln inhalation aerosol  Active   amoxicillin-clavulanate (AUGMENTIN) 875-125 MG Tab  Active   B Complex-Biotin-FA (B COMPLEX 100 TR) Tab CR  Active   cyclobenzaprine (FLEXERIL) 10 mg Tab  Active   fluorouracil (EFUDEX) 5 % cream  Active   levothyroxine (SYNTHROID) 112 MCG Tab  Active   magnesium oxide (MAG-OX) 400 MG Tab tablet  Active   Multiple Vitamins-Minerals (MULTIVITAMIN ADULT, MINERALS,) Tab  Active   NURTEC 75 MG TABLET DISPERSIBLE  Active   omeprazole (PRILOSEC) 20 MG delayed-release capsule  Active                  Audit from Redirected Encounters    **Home medications have not yet been reviewed for this encounter**         ALLERGIES  Allergies   Allergen Reactions    Sinus Maximum Strength      Other Reaction(s): \"Room was spinning\"       PHYSICAL EXAM  VITAL SIGNS: /65   Pulse 61   Temp 37.1 °C (98.8 °F) (Temporal)   Resp 16   Ht 1.626 m (5' 4\")   Wt 71.6 kg (157 lb 13.6 oz)   SpO2 97%   BMI 27.09 kg/m²    Pulse ox interpretation: I interpret this pulse ox as normal.  Constitutional: Alert and oriented x 3, moderate distress  HEENT: Atraumatic normocephalic, pupils are equal round reactive to light extraocular movements are intact. The nares is clear, external ears are normal, mouth shows moist mucous membranes normal dentition for age  Neck: Supple, no JVD no tracheal deviation  Cardiovascular: Regular rate and rhythm no murmur rub or gallop 2+ pulses peripherally x4  Thorax & Lungs: No respiratory distress, no wheezes rales or rhonchi, No chest tenderness.   GI: Soft diffuse central abdominal pain with radiation to the right lower quadrant no palpable hernia or mass no pulsatile mass no rebound guarding or rigidity or peritoneal signs   skin: Warm dry no acute rash or lesion  Musculoskeletal: Moving all extremities with full range and 5 of 5 strength no " acute  deformity  Neurologic: Cranial nerves III through XII are grossly intact no sensory deficit no cerebellar dysfunction   Psychiatric: Appropriate affect for situation at this time          EKG/LABS  Results for orders placed or performed during the hospital encounter of 05/07/25   CBC with Differential    Collection Time: 05/07/25 10:23 AM   Result Value Ref Range    WBC 5.5 4.8 - 10.8 K/uL    RBC 4.65 4.20 - 5.40 M/uL    Hemoglobin 14.4 12.0 - 16.0 g/dL    Hematocrit 41.6 37.0 - 47.0 %    MCV 89.5 81.4 - 97.8 fL    MCH 31.0 27.0 - 33.0 pg    MCHC 34.6 32.2 - 35.5 g/dL    RDW 44.6 35.9 - 50.0 fL    Platelet Count 142 (L) 164 - 446 K/uL    MPV 10.6 9.0 - 12.9 fL    Neutrophils-Polys 65.40 44.00 - 72.00 %    Lymphocytes 27.10 22.00 - 41.00 %    Monocytes 5.30 0.00 - 13.40 %    Eosinophils 1.10 0.00 - 6.90 %    Basophils 0.90 0.00 - 1.80 %    Immature Granulocytes 0.20 0.00 - 0.90 %    Nucleated RBC 0.00 0.00 - 0.20 /100 WBC    Neutrophils (Absolute) 3.59 1.82 - 7.42 K/uL    Lymphs (Absolute) 1.49 1.00 - 4.80 K/uL    Monos (Absolute) 0.29 0.00 - 0.85 K/uL    Eos (Absolute) 0.06 0.00 - 0.51 K/uL    Baso (Absolute) 0.05 0.00 - 0.12 K/uL    Immature Granulocytes (abs) 0.01 0.00 - 0.11 K/uL    NRBC (Absolute) 0.00 K/uL   Complete Metabolic Panel    Collection Time: 05/07/25 10:23 AM   Result Value Ref Range    Sodium 139 135 - 145 mmol/L    Potassium 4.0 3.6 - 5.5 mmol/L    Chloride 106 96 - 112 mmol/L    Co2 24 20 - 33 mmol/L    Anion Gap 9.0 7.0 - 16.0    Glucose 97 65 - 99 mg/dL    Bun 15 8 - 22 mg/dL    Creatinine 0.98 0.50 - 1.40 mg/dL    Calcium 10.2 8.5 - 10.5 mg/dL    Correct Calcium 9.8 8.5 - 10.5 mg/dL    AST(SGOT) 23 12 - 45 U/L    ALT(SGPT) 15 2 - 50 U/L    Alkaline Phosphatase 42 30 - 99 U/L    Total Bilirubin 0.5 0.1 - 1.5 mg/dL    Albumin 4.5 3.2 - 4.9 g/dL    Total Protein 7.4 6.0 - 8.2 g/dL    Globulin 2.9 1.9 - 3.5 g/dL    A-G Ratio 1.6 g/dL   Lipase    Collection Time: 05/07/25 10:23 AM   Result  Value Ref Range    Lipase 35 11 - 82 U/L   ESTIMATED GFR    Collection Time: 05/07/25 10:23 AM   Result Value Ref Range    GFR (CKD-EPI) 67 >60 mL/min/1.73 m 2   Urinalysis    Collection Time: 05/07/25 10:50 AM    Specimen: Urine   Result Value Ref Range    Color Yellow     Character Clear     Specific Gravity 1.017 <1.035    Ph 5.5 5.0 - 8.0    Glucose Negative Negative mg/dL    Ketones Negative Negative mg/dL    Protein Negative Negative mg/dL    Bilirubin Negative Negative    Urobilinogen, Urine 0.2 <=1.0 EU/dL    Nitrite Negative Negative    Leukocyte Esterase Negative Negative    Occult Blood Negative Negative    Micro Urine Req see below      *Note: Due to a large number of results and/or encounters for the requested time period, some results have not been displayed. A complete set of results can be found in Results Review.      I have independently interpreted this EKG    RADIOLOGY/PROCEDURES   I have independently interpreted the diagnostic imaging associated with this visit and am waiting the final reading from the radiologist.   My preliminary interpretation is as follows: No acute surgical process    Radiologist interpretation:  CT-ABDOMEN-PELVIS WITH   Final Result      1.  No acute abnormality.   2.  Mild hepatomegaly. Borderline splenomegaly.   3.  Small hiatal hernia.             COURSE & MEDICAL DECISION MAKING    ASSESSMENT, COURSE AND PLAN  Care Narrative:     This is a very pleasant 59-year-old female presented here with relatively acute onset lower and mid abdominal pain.  On arrival her vital signs are reassuring and normal.  Specifically she did not have high fever hypotension tachycardia increased work of breathing hypoxia.  On physical exam she had mild diffuse tenderness but no peritoneal signs.  Extensive workup was performed as differential diagnosis included perforated ulcer bowel obstruction perforated viscus appendicitis kidney stone intra-abdominal abscess or mass not exclusively.  An  IV was established she was given parenteral nausea and pain medication.  She did not have peritoneal signs and I performed serial exams that were reassuring.  Her workup here is reassuring and that she has no leukocytosis anemia or bandemia.  Metabolic panel including lipase and LFTs are all normal.  Urinalysis on microscopy does not demonstrate any blood or white cells or bacteria to suggest infection.  CT scan with contrast was performed and there is a small hiatal hernia borderline hepato and splenomegaly but no acute process to merit surgical consultation antibiotics or admission.  I long discussion with the patient.  I counseled her that this could be a viral enteritis type picture but we need to give this time to sorted out.  I considered but did not feel that antibiotics or hospitalization or surgical consultation is indicated.  I counseled the patient to give it 1 to 2 days and if she develops any red flag symptoms including worsening pain high fever black or bloody stools vomiting blood to return or immediately          ADDITIONAL PROBLEMS MANAGED      DISPOSITION AND DISCUSSIONS  I have discussed management of the patient with the following physicians and ELIEL's: None    Discussion of management with other QHP or appropriate source(s): None    Escalation of care considered, and ultimately not performed: None    Barriers to care at this time, including but not limited to: None.     Decision tools and prescription drugs considered including, but not limited to: None.    FINAL DIAGNOSIS  1. Right lower quadrant abdominal pain                 Electronically signed by: Adolfo Sims M.D., 5/7/2025 11:25 AM

## 2025-05-07 NOTE — ED TRIAGE NOTES
".  Chief Complaint   Patient presents with    Abdominal Pain     X 1 day RLQ pain   Woke up this AM with increased pain, unable to stand up straight    Low Back Pain     Last BM yesterday    .Patient ambulatory to triage for above complaint. Patient aware to notify RN of any changes in symptoms. Patient educated on triage process. A&O x 4, speaking in full sentences    Pt just completed course of Augmentin for sinus infection    ./72   Pulse 71   Temp 37.1 °C (98.8 °F) (Temporal)   Resp 16   Ht 1.626 m (5' 4\")   Wt 71.6 kg (157 lb 13.6 oz)   SpO2 99%   BMI 27.09 kg/m²       "

## 2025-05-12 ENCOUNTER — TELEPHONE (OUTPATIENT)
Dept: INTERNAL MEDICINE | Facility: OTHER | Age: 60
End: 2025-05-12
Payer: MEDICAID

## 2025-05-12 NOTE — TELEPHONE ENCOUNTER
Very dark brown stool may be due to diet changes, dehydration.   If Black tar like ( not dark brown ) then that may indicate old blood.   If she is overall feeling better -- she may continue to monitor stool. If continuing to be botehrsome - may come in for a sooner appt.   I will leave a mychart msg with this info

## 2025-05-12 NOTE — TELEPHONE ENCOUNTER
Patient states she was in the hospital for bad stomach pain, Pain has gotten better but when she pooped she stated the poop was very dark(brown).

## 2025-05-13 ENCOUNTER — HOSPITAL ENCOUNTER (OUTPATIENT)
Dept: LAB | Facility: MEDICAL CENTER | Age: 60
End: 2025-05-13
Attending: INTERNAL MEDICINE
Payer: MEDICAID

## 2025-05-13 DIAGNOSIS — R63.5 WEIGHT GAIN: ICD-10-CM

## 2025-05-13 LAB
EST. AVERAGE GLUCOSE BLD GHB EST-MCNC: 97 MG/DL
HBA1C MFR BLD: 5 % (ref 4–5.6)

## 2025-05-13 PROCEDURE — 83036 HEMOGLOBIN GLYCOSYLATED A1C: CPT

## 2025-05-13 PROCEDURE — 36415 COLL VENOUS BLD VENIPUNCTURE: CPT

## 2025-05-14 ENCOUNTER — RESULTS FOLLOW-UP (OUTPATIENT)
Dept: INTERNAL MEDICINE | Facility: OTHER | Age: 60
End: 2025-05-14

## 2025-05-29 NOTE — Clinical Note
REFERRAL APPROVAL NOTICE         Sent on May 29, 2025                   Jumana Kahn  [address]  [address]  [place] [address]                   Dear Ms. Kahn,    After a careful review of the medical information and benefit coverage, [facility] has processed your referral. See below for additional details.    If applicable, you must be actively enrolled with your insurance for coverage of the authorized service. If you have any questions regarding your coverage, please contact your insurance directly.    REFERRAL INFORMATION   Referral #:  [id number]  Referred-To Provider    Referred-By Provider:  Otolaryngology    Cedric Grimm M.D.   [facility]      [address]   [place] [address]  [phone] [address] [facility] [address]  [phone]    Referral Start Date:  05/20/2025  Referral End Date:   05/20/2026             SCHEDULING  If you do not already have an appointment, please call [phone] to make an appointment.     MORE INFORMATION  If you do not already have a [employer] account, sign up at: [employer] [employer]org  You can access your medical information, make appointments, see lab results, billing information, and more.  If you have questions regarding this referral, please contact  the [facility] Referrals department at:             [phone]. Monday - Friday 8:00AM - 5:00PM.     Sincerely,    [facility]

## 2025-06-08 ENCOUNTER — OFFICE VISIT (OUTPATIENT)
Dept: URGENT CARE | Facility: CLINIC | Age: 60
End: 2025-06-08
Payer: MEDICAID

## 2025-06-08 ENCOUNTER — HOSPITAL ENCOUNTER (OUTPATIENT)
Dept: RADIOLOGY | Facility: MEDICAL CENTER | Age: 60
End: 2025-06-08
Payer: MEDICAID

## 2025-06-08 VITALS
SYSTOLIC BLOOD PRESSURE: 116 MMHG | BODY MASS INDEX: 26.95 KG/M2 | HEIGHT: 64 IN | HEART RATE: 85 BPM | OXYGEN SATURATION: 97 % | WEIGHT: 157.85 LBS | TEMPERATURE: 98.4 F | RESPIRATION RATE: 16 BRPM | DIASTOLIC BLOOD PRESSURE: 64 MMHG

## 2025-06-08 DIAGNOSIS — K21.9 CHALASIA OF LOWER ESOPHAGEAL SPHINCTER: ICD-10-CM

## 2025-06-08 DIAGNOSIS — K70.10 ACUTE ALCOHOLIC HEPATITIS: ICD-10-CM

## 2025-06-08 DIAGNOSIS — J01.10 ACUTE NON-RECURRENT FRONTAL SINUSITIS: Primary | ICD-10-CM

## 2025-06-08 DIAGNOSIS — K74.00 HEPATIC FIBROSIS: ICD-10-CM

## 2025-06-08 PROCEDURE — 99213 OFFICE O/P EST LOW 20 MIN: CPT

## 2025-06-08 PROCEDURE — 2023F DILAT RTA XM W/O RTNOPTHY: CPT

## 2025-06-08 PROCEDURE — 3078F DIAST BP <80 MM HG: CPT

## 2025-06-08 PROCEDURE — 76705 ECHO EXAM OF ABDOMEN: CPT

## 2025-06-08 PROCEDURE — 3074F SYST BP LT 130 MM HG: CPT

## 2025-06-08 RX ORDER — BENZONATATE 100 MG/1
100 CAPSULE ORAL 3 TIMES DAILY PRN
Qty: 30 CAPSULE | Refills: 0 | Status: SHIPPED | OUTPATIENT
Start: 2025-06-08

## 2025-06-08 RX ORDER — PREDNISONE 10 MG/1
10 TABLET ORAL 2 TIMES DAILY
Qty: 10 TABLET | Refills: 0 | Status: SHIPPED | OUTPATIENT
Start: 2025-06-08 | End: 2025-06-08

## 2025-06-08 RX ORDER — PREDNISONE 10 MG/1
10 TABLET ORAL 2 TIMES DAILY
Qty: 6 TABLET | Refills: 0 | Status: SHIPPED | OUTPATIENT
Start: 2025-06-08 | End: 2025-06-11

## 2025-06-08 RX ORDER — DOXYCYCLINE HYCLATE 100 MG
100 TABLET ORAL 2 TIMES DAILY
Qty: 14 TABLET | Refills: 0 | Status: SHIPPED | OUTPATIENT
Start: 2025-06-08

## 2025-06-08 RX ORDER — FLUTICASONE PROPIONATE 50 MCG
1 SPRAY, SUSPENSION (ML) NASAL DAILY
Qty: 16 G | Refills: 0 | Status: SHIPPED | OUTPATIENT
Start: 2025-06-08

## 2025-06-08 ASSESSMENT — ENCOUNTER SYMPTOMS
CHILLS: 0
SHORTNESS OF BREATH: 0
EYE PAIN: 0
TINGLING: 0
DOUBLE VISION: 0
COUGH: 0
WEAKNESS: 0
HEADACHES: 1
SINUS PAIN: 1
BLURRED VISION: 0
FEVER: 0
DIZZINESS: 0

## 2025-06-08 ASSESSMENT — FIBROSIS 4 INDEX: FIB4 SCORE: 2.38

## 2025-06-08 NOTE — PROGRESS NOTES
Chief Complaint   Patient presents with    Sinus Problem     X1.5 week: Green discharge, blood in mucus, burning, clogged nose, sore throat.        HISTORY OF PRESENT ILLNESS: Patient is a pleasant 59 y.o. female who presents to urgent care today ongoing recurrent sinus infection.  Patient was seen in April and states she has never fully improved.  She has taken 2 courses of antibiotics with only minor relief of her symptoms.  She notes ongoing swelling, pain along with drainage especially from her left nasal area.  She has an appointment with the ENT specialty on the 17th.  She denies any fevers.  Positive headache.    Patient Active Problem List    Diagnosis Date Noted    Right upper quadrant pain 04/09/2025    Foot swelling 12/09/2024    Palpitations 12/09/2024    Hyperopia of both eyes 10/14/2024    Glaucoma suspect of both eyes 10/14/2024    Cataract of both eyes 10/14/2024    Hepatosplenomegaly 02/14/2024    Hyperlipidemia 06/12/2023    Closed fracture of right ankle with routine healing 06/12/2023    Other headache syndrome 06/12/2023    Vision abnormalities 06/12/2023    Diaphragmatic hernia without obstruction or gangrene 02/13/2023    Basilar migraine 01/30/2023    Liver fibrosis 11/08/2022    Ocular migraine 11/07/2022    Recurrent sinusitis 11/07/2022    Sprain of right ankle 11/06/2022    Atherosclerosis of both carotid arteries 04/20/2022    Visual distortion 02/11/2022    Accommodative insufficiency 09/29/2021    PVD (posterior vitreous detachment), bilateral 09/29/2021    Dental caries 08/04/2021    Neuropathy 06/01/2021    Blurring of visual image 03/30/2021    Hx of traumatic brain injury 03/30/2021    Hearing loss, bilateral 02/16/2021    Tinnitus, bilateral 02/16/2021    History of alcohol abuse 02/12/2021    Vertigo 02/12/2021    Neck pain, chronic 02/01/2021    Constipation 12/21/2020    History of repair of rotator cuff 12/21/2020    Acute midline low back pain without sciatica 11/09/2020     "Vitamin D deficiency 05/12/2020    GERD (gastroesophageal reflux disease) 05/06/2020    Central pontine myelinolysis (HCC) 01/19/2020    Thrombocytopenia (HCC) 01/12/2020    Hypothyroidism 01/12/2020    Idiopathic hypotension 01/12/2020       Allergies:Sinus maximum strength    Current Medications and Prescriptions Ordered in Epic[1]    Past Medical History[2]    Social History[3]    No family status information on file.   History reviewed. No pertinent family history.    Review of Systems   Constitutional:  Negative for chills, fever and malaise/fatigue.   HENT:  Positive for congestion, ear pain and sinus pain.    Eyes:  Negative for blurred vision, double vision and pain.   Respiratory:  Negative for cough and shortness of breath.    Neurological:  Positive for headaches. Negative for dizziness, tingling and weakness.       Exam:  /64   Pulse 85   Temp 36.9 °C (98.4 °F)   Resp 16   Ht 1.626 m (5' 4\")   Wt 71.6 kg (157 lb 13.6 oz)   SpO2 97%   Physical Exam  Vitals reviewed.   Constitutional:       General: She is not in acute distress.     Appearance: Normal appearance. She is normal weight.   HENT:      Head: Normocephalic.      Right Ear: Tympanic membrane, ear canal and external ear normal.      Left Ear: Tympanic membrane, ear canal and external ear normal.      Nose:      Right Turbinates: Not enlarged.      Left Turbinates: Enlarged.      Right Sinus: Frontal sinus tenderness present.      Left Sinus: Frontal sinus tenderness present.      Mouth/Throat:      Mouth: Mucous membranes are moist.      Pharynx: Oropharynx is clear.   Eyes:      Extraocular Movements: Extraocular movements intact.      Pupils: Pupils are equal, round, and reactive to light.   Cardiovascular:      Rate and Rhythm: Normal rate and regular rhythm.      Pulses: Normal pulses.      Heart sounds: Normal heart sounds. No murmur heard.  Pulmonary:      Effort: Pulmonary effort is normal. No respiratory distress.      Breath " sounds: Normal breath sounds.   Musculoskeletal:         General: Normal range of motion.      Cervical back: Normal range of motion.   Skin:     General: Skin is warm and dry.   Neurological:      General: No focal deficit present.      Mental Status: She is alert.   Psychiatric:         Mood and Affect: Mood normal.             Assessment/Plan:  1. Acute non-recurrent frontal sinusitis  - fluticasone (FLONASE) 50 MCG/ACT nasal spray; Administer 1 Spray into affected nostril(S) every day.  Dispense: 16 g; Refill: 0  - benzonatate (TESSALON) 100 MG Cap; Take 1 Capsule by mouth 3 times a day as needed for Cough.  Dispense: 30 Capsule; Refill: 0  - doxycycline (VIBRAMYCIN) 100 MG Tab; Take 1 Tablet by mouth 2 times a day.  Dispense: 14 Tablet; Refill: 0  - predniSONE (DELTASONE) 10 MG Tab; Take 1 Tablet by mouth 2 times a day for 3 days.  Dispense: 6 Tablet; Refill: 0    Based on physical exam along with review of systems I did provide Doxy as she has been on Augmentin twice prior to this with little to no relief today as patient has been sick for over a week with no relief from over-the-counter cold medications.  Medications in the form of Flonase sent as well.  Given the nature of the complaint with ongoing swelling to her left nare and headache for the last several weeks I did go ahead and place her on a 3-day course of prednisone as well.  Patient advised take medication with food, drink plenty of fluids.  Patient advised to keep her appointment with ENT specialty.    Supportive care, differential diagnoses, and indications for immediate follow-up discussed with patient.   Pathogenesis of diagnosis discussed including typical length and natural progression.   Instructed to return to clinic or nearest emergency department for any change in condition, further concerns, or worsening of symptoms.  Patient states understanding of the plan of care and discharge instructions.  Instructed to make an appointment, for  follow up, with primary care provider.    Please note that this dictation was created using voice recognition software. I have made every reasonable attempt to correct obvious errors, but I expect that there are errors of grammar and possibly content that I did not discover before finalizing the note.      Ashlie YOUNGER          [1]   Current Outpatient Medications Ordered in Epic   Medication Sig Dispense Refill    fluticasone (FLONASE) 50 MCG/ACT nasal spray Administer 1 Spray into affected nostril(S) every day. 16 g 0    benzonatate (TESSALON) 100 MG Cap Take 1 Capsule by mouth 3 times a day as needed for Cough. 30 Capsule 0    doxycycline (VIBRAMYCIN) 100 MG Tab Take 1 Tablet by mouth 2 times a day. 14 Tablet 0    predniSONE (DELTASONE) 10 MG Tab Take 1 Tablet by mouth 2 times a day for 3 days. 6 Tablet 0    levothyroxine (SYNTHROID) 112 MCG Tab TAKE 1 TABLET BY MOUTH EVERY DAY IN THE MORNING ON AN EMPTY STOMACH 90 Tablet 1    NURTEC 75 MG TABLET DISPERSIBLE TAKE 1 TABLET BY MOUTH EVERY OTHER DAY STOP IF ANY SIDE EFFECTS      fluorouracil (EFUDEX) 5 % cream Apply  topically 2 times a day.      Multiple Vitamins-Minerals (MULTIVITAMIN ADULT, MINERALS,) Tab Take  by mouth.      B Complex-Biotin-FA (B COMPLEX 100 TR) Tab CR Take  by mouth.      cyclobenzaprine (FLEXERIL) 10 mg Tab Take 1 Tablet by mouth 3 times a day as needed for Moderate Pain or Muscle Spasms. 30 Tablet 0    omeprazole (PRILOSEC) 20 MG delayed-release capsule Take 1 Capsule by mouth every day. 90 Capsule 2    magnesium oxide (MAG-OX) 400 MG Tab tablet Take 400 mg by mouth every day.      albuterol 108 (90 Base) MCG/ACT Aero Soln inhalation aerosol INHALE 1 TO 2 PUFFS BY MOUTH EVERY 4 TO 6 HOURS AS NEEDED FOR DIFFICULTY BREATHING 8.5 Each 0    acetaminophen (TYLENOL) 500 MG Tab Take 1-2 Tablets by mouth every 6 hours as needed.       No current Epic-ordered facility-administered medications on file.   [2]   Past Medical History:  Diagnosis  Date    Anemia     Asthma     GERD (gastroesophageal reflux disease)     Head ache     Hypothyroid     Psychiatric disorder     Substance abuse (HCC)    [3]   Social History  Tobacco Use    Smoking status: Former     Current packs/day: 0.00     Types: Cigarettes     Quit date: 2001     Years since quittin.8    Smokeless tobacco: Never   Vaping Use    Vaping status: Never Used   Substance Use Topics    Alcohol use: Not Currently     Comment: has not drank for over a year    Drug use: No

## 2025-06-08 NOTE — LETTER
June 8, 2025    To Whom It May Concern:         This is confirmation that Jumana Kahn attended her scheduled appointment with PATY Metzger on 6/08/25. Please excuse from any missed work this week.           If you have any questions please do not hesitate to call me at the phone number listed below.    Sincerely,          ABRAHAM Metzger.  [phone]

## 2025-06-09 ENCOUNTER — TELEPHONE (OUTPATIENT)
Dept: INTERNAL MEDICINE | Facility: OTHER | Age: 60
End: 2025-06-09
Payer: MEDICAID

## 2025-06-09 NOTE — TELEPHONE ENCOUNTER
Patient left  stating she went to urgent care and she still has a sinus infection. She was prescribed doxycycline but found out that it may be bad for her liver and she wants to be sure it is safe to take.

## 2025-06-11 ENCOUNTER — HOSPITAL ENCOUNTER (OUTPATIENT)
Dept: LAB | Facility: MEDICAL CENTER | Age: 60
End: 2025-06-11
Payer: MEDICAID

## 2025-06-11 DIAGNOSIS — M54.50 ACUTE MIDLINE LOW BACK PAIN WITHOUT SCIATICA: ICD-10-CM

## 2025-06-11 LAB
ALBUMIN SERPL BCP-MCNC: 4.5 G/DL (ref 3.2–4.9)
ALBUMIN/GLOB SERPL: 1.5 G/DL
ALP SERPL-CCNC: 51 U/L (ref 30–99)
ALT SERPL-CCNC: 14 U/L (ref 2–50)
ANION GAP SERPL CALC-SCNC: 11 MMOL/L (ref 7–16)
AST SERPL-CCNC: 20 U/L (ref 12–45)
BASOPHILS # BLD AUTO: 1.2 % (ref 0–1.8)
BASOPHILS # BLD: 0.06 K/UL (ref 0–0.12)
BILIRUB SERPL-MCNC: 0.6 MG/DL (ref 0.1–1.5)
BUN SERPL-MCNC: 21 MG/DL (ref 8–22)
CALCIUM ALBUM COR SERPL-MCNC: 9.1 MG/DL (ref 8.5–10.5)
CALCIUM SERPL-MCNC: 9.5 MG/DL (ref 8.5–10.5)
CHLORIDE SERPL-SCNC: 108 MMOL/L (ref 96–112)
CO2 SERPL-SCNC: 21 MMOL/L (ref 20–33)
CREAT SERPL-MCNC: 0.97 MG/DL (ref 0.5–1.4)
EOSINOPHIL # BLD AUTO: 0.23 K/UL (ref 0–0.51)
EOSINOPHIL NFR BLD: 4.5 % (ref 0–6.9)
ERYTHROCYTE [DISTWIDTH] IN BLOOD BY AUTOMATED COUNT: 45 FL (ref 35.9–50)
GFR SERPLBLD CREATININE-BSD FMLA CKD-EPI: 68 ML/MIN/1.73 M 2
GLOBULIN SER CALC-MCNC: 3.1 G/DL (ref 1.9–3.5)
GLUCOSE SERPL-MCNC: 94 MG/DL (ref 65–99)
HCT VFR BLD AUTO: 43.9 % (ref 37–47)
HGB BLD-MCNC: 14.6 G/DL (ref 12–16)
IMM GRANULOCYTES # BLD AUTO: 0.01 K/UL (ref 0–0.11)
IMM GRANULOCYTES NFR BLD AUTO: 0.2 % (ref 0–0.9)
INR PPP: 1.16 (ref 0.87–1.13)
LYMPHOCYTES # BLD AUTO: 2.33 K/UL (ref 1–4.8)
LYMPHOCYTES NFR BLD: 46 % (ref 22–41)
MCH RBC QN AUTO: 30.7 PG (ref 27–33)
MCHC RBC AUTO-ENTMCNC: 33.3 G/DL (ref 32.2–35.5)
MCV RBC AUTO: 92.4 FL (ref 81.4–97.8)
MONOCYTES # BLD AUTO: 0.44 K/UL (ref 0–0.85)
MONOCYTES NFR BLD AUTO: 8.7 % (ref 0–13.4)
NEUTROPHILS # BLD AUTO: 2 K/UL (ref 1.82–7.42)
NEUTROPHILS NFR BLD: 39.4 % (ref 44–72)
NRBC # BLD AUTO: 0 K/UL
NRBC BLD-RTO: 0 /100 WBC (ref 0–0.2)
PLATELET # BLD AUTO: 150 K/UL (ref 164–446)
PMV BLD AUTO: 11.3 FL (ref 9–12.9)
POTASSIUM SERPL-SCNC: 4.1 MMOL/L (ref 3.6–5.5)
PROT SERPL-MCNC: 7.6 G/DL (ref 6–8.2)
PROTHROMBIN TIME: 14.8 SEC (ref 12–14.6)
RBC # BLD AUTO: 4.75 M/UL (ref 4.2–5.4)
SODIUM SERPL-SCNC: 140 MMOL/L (ref 135–145)
WBC # BLD AUTO: 5.1 K/UL (ref 4.8–10.8)

## 2025-06-11 PROCEDURE — 85025 COMPLETE CBC W/AUTO DIFF WBC: CPT

## 2025-06-11 PROCEDURE — 85610 PROTHROMBIN TIME: CPT

## 2025-06-11 PROCEDURE — 80053 COMPREHEN METABOLIC PANEL: CPT

## 2025-06-11 PROCEDURE — 82105 ALPHA-FETOPROTEIN SERUM: CPT

## 2025-06-11 PROCEDURE — 36415 COLL VENOUS BLD VENIPUNCTURE: CPT

## 2025-06-11 RX ORDER — CYCLOBENZAPRINE HCL 10 MG
10 TABLET ORAL 3 TIMES DAILY PRN
Qty: 30 TABLET | Refills: 1 | Status: SHIPPED | OUTPATIENT
Start: 2025-06-11

## 2025-06-11 NOTE — TELEPHONE ENCOUNTER
Received request via: Pharmacy    Was the patient seen in the last year in this department? Yes    Does the patient have an active prescription (recently filled or refills available) for medication(s) requested? No    Pharmacy Name: [facility] - [facility]    Does the patient have intermediate Plus and need 100-day supply? (This applies to ALL medications) Patient does not have SCP

## 2025-06-13 LAB — AFP-TM SERPL-MCNC: 2 NG/ML (ref 0–9)

## 2025-06-19 ENCOUNTER — TELEPHONE (OUTPATIENT)
Dept: INTERNAL MEDICINE | Facility: OTHER | Age: 60
End: 2025-06-19
Payer: MEDICAID

## 2025-06-19 DIAGNOSIS — K59.00 CONSTIPATION, UNSPECIFIED CONSTIPATION TYPE: ICD-10-CM

## 2025-06-19 DIAGNOSIS — K74.00 LIVER FIBROSIS: ICD-10-CM

## 2025-06-19 DIAGNOSIS — D69.6 THROMBOCYTOPENIA (HCC): Primary | ICD-10-CM

## 2025-06-19 DIAGNOSIS — K21.00 GASTROESOPHAGEAL REFLUX DISEASE WITH ESOPHAGITIS WITHOUT HEMORRHAGE: ICD-10-CM

## 2025-06-19 DIAGNOSIS — R16.2 HEPATOSPLENOMEGALY: ICD-10-CM

## 2025-06-19 NOTE — TELEPHONE ENCOUNTER
----- Message -----   From: Umu Goff, Med Ass't   Sent: 6/19/2025   9:28 AM PDT   To: Yaritza Patiño Med Ass't   Subject: FW: [facility]erral [facility]..                         ----- Message -----   From: Leola Hernandez   Sent: 6/19/2025   9:27 AM PDT   To: Elmira Knowles Ma   Subject: FW: [facility]..                         ----- Message -----   From: Jumana Kahn   Sent: 6/19/2025   7:07 AM PDT   To: [facility] Customer Service   Subject: [facility]erral [facility]..                          Topic: [facility] Referral Status.      Hello I've received a message from [facility] on [facility] stating I have to have a referral by Dr Grimm or I will not be seen for my June 27 apt..I[facility] y[facility] and have a very important follow up for imaging/ blood work  ect on that date .can you please expedite a referral so they don't cancel my appt .thank you..       I have sent referral for GERD, liver fibrosis , and constipation. Assuming she is being seen for one of these. I will follow up with her on scheduled appt.

## 2025-06-19 NOTE — TELEPHONE ENCOUNTER
Jumana LAWRENCE [employer] (supporting Cedric Grimm M.D.)2 hours ago (12:07 PM)     KM  Thank you ...they said I could hand walk a copy from you to them as well..       VÍCTOR Saab4 hours ago (10:37 AM)     NL  Hello I have sent the referral and requested referral dept to process it soon.   I have sent it for diagnosis of GERd, Liver fibrosis and constipation which are problems we have talked about in the past.      -Dr. Alfa Duke routed conversation to Cedric Grimm M.D.4 hours ago (10:15 AM)     Payam Salvador Ass't routed conversation to You5 hours ago (9:28 AM)     Leola Hernandez routed conversation to [facility]5 hours ago (9:27 AM)     Jumana LAWRENCE [employer]7 hours ago (7:07 AM)     KM  Topic: [facility] Referral Status.     Hello I've received a message from [facility] on [facility] stating I have to have a referral by Dr Grimm or I will not be seen for my June 27 apt..I've been a patient there at [facility] for 6 yrs and have a very important follow up for imaging/ blood work  ect on that date .can you please expedite a referral so they don't cancel my appt .thank you..

## 2025-06-25 NOTE — Clinical Note
REFERRAL APPROVAL NOTICE         Sent on June 25, 2025                   Jumana Kahn  [address]  [place] [facility] [address]                   Dear Ms. Kahn,    After a careful review of the medical information and benefit coverage, [facility] has processed your referral. See below for additional details.    If applicable, you must be actively enrolled with your insurance for coverage of the authorized service. If you have any questions regarding your coverage, please contact your insurance directly.    REFERRAL INFORMATION   Referral #:  [id number]  Referred-To Provider    Referred-By Provider:  Gastroenterology    Cedric Grimm M.D.   [facility]      [address] [facility] [address]  [phone] [address] [facility] [address]  [phone]    Referral Start Date:  06/19/2025  Referral End Date:   06/19/2026             SCHEDULING  If you do not already have an appointment, please call [phone] to make an appointment.     MORE INFORMATION  If you do not already have a [employer] account, sign up at: [email or web address]  You can access your medical information, make appointments, see lab results, billing information, and more.  If you have questions regarding this referral, please contact  the [facility] Referrals department at:             [phone]. Monday - Friday 8:00AM - 5:00PM.     Sincerely,    [facility]

## 2025-06-26 ENCOUNTER — OFFICE VISIT (OUTPATIENT)
Dept: INTERNAL MEDICINE | Facility: OTHER | Age: 60
End: 2025-06-26
Payer: MEDICAID

## 2025-06-26 VITALS
WEIGHT: 153.8 LBS | DIASTOLIC BLOOD PRESSURE: 65 MMHG | TEMPERATURE: 97.9 F | HEART RATE: 72 BPM | OXYGEN SATURATION: 98 % | BODY MASS INDEX: 26.26 KG/M2 | HEIGHT: 64 IN | SYSTOLIC BLOOD PRESSURE: 97 MMHG

## 2025-06-26 DIAGNOSIS — G89.29 NECK PAIN, CHRONIC: ICD-10-CM

## 2025-06-26 DIAGNOSIS — M25.561 CHRONIC PAIN OF BOTH KNEES: ICD-10-CM

## 2025-06-26 DIAGNOSIS — M54.12 CERVICAL RADICULOPATHY: ICD-10-CM

## 2025-06-26 DIAGNOSIS — G89.29 CHRONIC PAIN OF BOTH KNEES: ICD-10-CM

## 2025-06-26 DIAGNOSIS — R00.2 PALPITATIONS: ICD-10-CM

## 2025-06-26 DIAGNOSIS — M25.562 CHRONIC PAIN OF BOTH KNEES: ICD-10-CM

## 2025-06-26 DIAGNOSIS — D18.01 CHERRY ANGIOMA: ICD-10-CM

## 2025-06-26 DIAGNOSIS — S82.891D CLOSED FRACTURE OF RIGHT ANKLE WITH ROUTINE HEALING: ICD-10-CM

## 2025-06-26 DIAGNOSIS — G89.29 CHRONIC PAIN OF BOTH SHOULDERS: ICD-10-CM

## 2025-06-26 DIAGNOSIS — M54.2 NECK PAIN, CHRONIC: ICD-10-CM

## 2025-06-26 DIAGNOSIS — M25.512 CHRONIC PAIN OF BOTH SHOULDERS: ICD-10-CM

## 2025-06-26 DIAGNOSIS — M54.6 ACUTE LEFT-SIDED THORACIC BACK PAIN: ICD-10-CM

## 2025-06-26 DIAGNOSIS — R25.1 OCCASIONAL TREMORS: Primary | ICD-10-CM

## 2025-06-26 DIAGNOSIS — K82.9 GALL BLADDER DISEASE: ICD-10-CM

## 2025-06-26 DIAGNOSIS — M54.50 ACUTE MIDLINE LOW BACK PAIN WITHOUT SCIATICA: ICD-10-CM

## 2025-06-26 DIAGNOSIS — M25.511 CHRONIC PAIN OF BOTH SHOULDERS: ICD-10-CM

## 2025-06-26 PROCEDURE — 3078F DIAST BP <80 MM HG: CPT | Performed by: INTERNAL MEDICINE

## 2025-06-26 PROCEDURE — 3074F SYST BP LT 130 MM HG: CPT | Performed by: INTERNAL MEDICINE

## 2025-06-26 PROCEDURE — 99214 OFFICE O/P EST MOD 30 MIN: CPT | Performed by: INTERNAL MEDICINE

## 2025-06-26 RX ORDER — CHOLECALCIFEROL (VITAMIN D3) 25 MCG
TABLET ORAL
COMMUNITY
Start: 2025-01-01

## 2025-06-26 ASSESSMENT — FIBROSIS 4 INDEX: FIB4 SCORE: 2.031185438534425323
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
## 2025-06-27 PROBLEM — M25.562 CHRONIC PAIN OF BOTH KNEES: Status: ACTIVE | Noted: 2025-06-27

## 2025-06-27 PROBLEM — M25.512 CHRONIC PAIN OF BOTH SHOULDERS: Status: ACTIVE | Noted: 2025-06-27

## 2025-06-27 PROBLEM — R25.1 OCCASIONAL TREMORS: Status: ACTIVE | Noted: 2025-06-27

## 2025-06-27 PROBLEM — M54.12 CERVICAL RADICULOPATHY: Status: ACTIVE | Noted: 2025-06-27

## 2025-06-27 PROBLEM — D18.01 CHERRY ANGIOMA: Status: ACTIVE | Noted: 2025-06-27

## 2025-06-27 PROBLEM — M25.511 CHRONIC PAIN OF BOTH SHOULDERS: Status: ACTIVE | Noted: 2025-06-27

## 2025-06-27 PROBLEM — M25.561 CHRONIC PAIN OF BOTH KNEES: Status: ACTIVE | Noted: 2025-06-27

## 2025-06-27 PROBLEM — M54.6 ACUTE LEFT-SIDED THORACIC BACK PAIN: Status: ACTIVE | Noted: 2025-06-27

## 2025-06-27 PROBLEM — K82.9 GALL BLADDER DISEASE: Status: ACTIVE | Noted: 2025-06-27

## 2025-06-27 PROBLEM — G89.29 CHRONIC PAIN OF BOTH KNEES: Status: ACTIVE | Noted: 2025-06-27

## 2025-06-27 PROBLEM — G89.29 CHRONIC PAIN OF BOTH SHOULDERS: Status: ACTIVE | Noted: 2025-06-27

## 2025-07-07 NOTE — Clinical Note
REFERRAL APPROVAL NOTICE         Sent on July 7, 2025                   Jumana Kahn  [address]  [place] [address]                   Dear Ms. Kahn,    After a careful review of the medical information and benefit coverage, [facility] has processed your referral. See below for additional details.    If applicable, you must be actively enrolled with your insurance for coverage of the authorized service. If you have any questions regarding your coverage, please contact your insurance directly.    REFERRAL INFORMATION   Referral #:  [id number]  Referred-To Department    Referred-By Provider:  Physical Therapy    Cedric Grimm M.D.   [facility] [address]      [address]  [place] [address]  [phone] [address]  [address]  [place] [id number] [phone]    Referral Start Date:  06/27/2025  Referral End Date:   06/27/2026             SCHEDULING  If you do not already have an appointment, please call [phone] to make an appointment.     MORE INFORMATION  If you do not already have a [facility] account, sign up at: [email or web address]  You can access your medical information, make appointments, see lab results, billing information, and more.  If you have questions regarding this referral, please contact  the [facility] Referrals department at:             [phone]. Monday - Friday 8:00AM - 5:00PM.     Sincerely,    [facility]

## 2025-07-11 ENCOUNTER — OFFICE VISIT (OUTPATIENT)
Dept: INTERNAL MEDICINE | Facility: OTHER | Age: 60
End: 2025-07-11
Payer: MEDICAID

## 2025-07-11 VITALS
BODY MASS INDEX: 26.33 KG/M2 | SYSTOLIC BLOOD PRESSURE: 98 MMHG | TEMPERATURE: 98.4 F | WEIGHT: 153.4 LBS | HEART RATE: 71 BPM | OXYGEN SATURATION: 91 % | DIASTOLIC BLOOD PRESSURE: 50 MMHG

## 2025-07-11 DIAGNOSIS — M25.512 CHRONIC PAIN OF BOTH SHOULDERS: ICD-10-CM

## 2025-07-11 DIAGNOSIS — M25.511 CHRONIC PAIN OF BOTH SHOULDERS: ICD-10-CM

## 2025-07-11 DIAGNOSIS — S93.402A SPRAIN OF LEFT ANKLE, UNSPECIFIED LIGAMENT, INITIAL ENCOUNTER: ICD-10-CM

## 2025-07-11 DIAGNOSIS — M54.12 CERVICAL RADICULOPATHY: ICD-10-CM

## 2025-07-11 DIAGNOSIS — M54.50 ACUTE MIDLINE LOW BACK PAIN WITHOUT SCIATICA: ICD-10-CM

## 2025-07-11 DIAGNOSIS — M54.2 NECK PAIN, CHRONIC: Primary | ICD-10-CM

## 2025-07-11 DIAGNOSIS — G89.29 CHRONIC PAIN OF BOTH SHOULDERS: ICD-10-CM

## 2025-07-11 DIAGNOSIS — R20.2 PARESTHESIAS: ICD-10-CM

## 2025-07-11 DIAGNOSIS — R53.83 OTHER FATIGUE: ICD-10-CM

## 2025-07-11 DIAGNOSIS — M79.672 LEFT FOOT PAIN: ICD-10-CM

## 2025-07-11 DIAGNOSIS — M54.6 ACUTE LEFT-SIDED THORACIC BACK PAIN: ICD-10-CM

## 2025-07-11 DIAGNOSIS — G89.29 NECK PAIN, CHRONIC: Primary | ICD-10-CM

## 2025-07-11 DIAGNOSIS — M25.561 CHRONIC PAIN OF BOTH KNEES: ICD-10-CM

## 2025-07-11 DIAGNOSIS — M25.562 CHRONIC PAIN OF BOTH KNEES: ICD-10-CM

## 2025-07-11 DIAGNOSIS — S82.891D CLOSED FRACTURE OF RIGHT ANKLE WITH ROUTINE HEALING: ICD-10-CM

## 2025-07-11 DIAGNOSIS — G89.29 CHRONIC PAIN OF BOTH KNEES: ICD-10-CM

## 2025-07-11 PROCEDURE — 3074F SYST BP LT 130 MM HG: CPT | Performed by: INTERNAL MEDICINE

## 2025-07-11 PROCEDURE — 99214 OFFICE O/P EST MOD 30 MIN: CPT | Performed by: INTERNAL MEDICINE

## 2025-07-11 PROCEDURE — 3078F DIAST BP <80 MM HG: CPT | Performed by: INTERNAL MEDICINE

## 2025-07-11 ASSESSMENT — FIBROSIS 4 INDEX: FIB4 SCORE: 2.07

## 2025-07-11 NOTE — PROGRESS NOTES
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 physical therapy referral to be sent to [facility] where she also has her pain management doctor for continuity of care  - Replacing new physical therapy order.  - Referral to Physical Therapy    8. Acute midline low back pain without sciatica  We had feet placed physical therapy referral for this problem that was addressed on the last visit.  -patient hoping for physical therapy referral to be sent to [facility] where she also has her pain management doctor for continuity of care  - Replacing new physical therapy order.  - Referral to Physical Therapy    9. Chronic pain of both shoulders  We had feet placed physical therapy referral for this problem that was addressed on the last visit.  -patient hoping for physical therapy referral to be sent to [facility] where she also has her pain management doctor for continuity of care  - Replacing new physical therapy order.  - Referral to Physical Therapy    10. Closed fracture of right ankle with routine healing  We had feet placed physical therapy referral for this problem that was addressed on the last visit.  -patient hoping for physical therapy referral to be sent to [facility] where she also has her pain management doctor for continuity of care  - Replacing new physical therapy order.  - Referral to Physical Therapy    11. Sprain of left ankle,  unspecified ligament, initial encounter  We had feet placed physical therapy referral for this problem that was addressed on the last visit.  -patient hoping for physical therapy referral to be sent to [facility] physical therapy where she also has her pain management doctor for continuity of care  - Replacing new physical therapy order.  - Referral to Physical Therapy           Followup: Return in about 3 months (around 10/11/2025).        Signed by: Cedric Grimm M.D.

## 2025-07-14 ENCOUNTER — HOSPITAL ENCOUNTER (OUTPATIENT)
Dept: LAB | Facility: MEDICAL CENTER | Age: 60
End: 2025-07-14
Attending: INTERNAL MEDICINE
Payer: MEDICAID

## 2025-07-14 DIAGNOSIS — R20.2 PARESTHESIAS: ICD-10-CM

## 2025-07-14 DIAGNOSIS — R53.83 OTHER FATIGUE: ICD-10-CM

## 2025-07-14 LAB
25(OH)D3 SERPL-MCNC: 38 NG/ML (ref 30–100)
FERRITIN SERPL-MCNC: 25.6 NG/ML (ref 10–291)
FOLATE SERPL-MCNC: 13.1 NG/ML
IRON SATN MFR SERPL: 20 % (ref 15–55)
IRON SERPL-MCNC: 56 UG/DL (ref 40–170)
T4 FREE SERPL-MCNC: 1.21 NG/DL (ref 0.93–1.7)
TIBC SERPL-MCNC: 281 UG/DL (ref 250–450)
TSH SERPL DL<=0.005 MIU/L-ACNC: 6.91 UIU/ML (ref 0.38–5.33)
UIBC SERPL-MCNC: 225 UG/DL (ref 110–370)
VIT B12 SERPL-MCNC: 857 PG/ML (ref 211–911)

## 2025-07-14 PROCEDURE — 82746 ASSAY OF FOLIC ACID SERUM: CPT

## 2025-07-14 PROCEDURE — 83550 IRON BINDING TEST: CPT

## 2025-07-14 PROCEDURE — 82728 ASSAY OF FERRITIN: CPT

## 2025-07-14 PROCEDURE — 36415 COLL VENOUS BLD VENIPUNCTURE: CPT

## 2025-07-14 PROCEDURE — 84425 ASSAY OF VITAMIN B-1: CPT

## 2025-07-14 PROCEDURE — 83540 ASSAY OF IRON: CPT

## 2025-07-14 PROCEDURE — 82306 VITAMIN D 25 HYDROXY: CPT

## 2025-07-14 PROCEDURE — 84443 ASSAY THYROID STIM HORMONE: CPT

## 2025-07-14 PROCEDURE — 84439 ASSAY OF FREE THYROXINE: CPT

## 2025-07-14 PROCEDURE — 82607 VITAMIN B-12: CPT

## 2025-07-15 ENCOUNTER — TELEPHONE (OUTPATIENT)
Dept: HEALTH INFORMATION MANAGEMENT | Facility: OTHER | Age: 60
End: 2025-07-15
Payer: MEDICAID

## 2025-07-16 LAB — VIT B1 BLD-MCNC: 125 NMOL/L (ref 70–180)

## 2025-07-17 NOTE — Clinical Note
REFERRAL APPROVAL NOTICE         Sent on July 17, 2025                   Jumana Kahn  [address]  [place] [address]                   Dear Ms. Kahn,    After a careful review of the medical information and benefit coverage, [facility] has processed your referral. See below for additional details.    If applicable, you must be actively enrolled with your insurance for coverage of the authorized service. If you have any questions regarding your coverage, please contact your insurance directly.    REFERRAL INFORMATION   Referral #:  [id number]  Referred-To Provider    Referred-By Provider:  Physical Therapy    Cedric Grimm M.D.   [facility]      [address]  [place] [address]  [phone] [address] [facility] [address]  [facility] [address]  [phone]    Referral Start Date:  07/11/2025  Referral End Date:   07/11/2026             SCHEDULING  If you do not already have an appointment, please call [phone] to make an appointment.     MORE INFORMATION  If you do not already have a [employer] account, sign up at: [email or web address]  You can access your medical information, make appointments, see lab results, billing information, and more.  If you have questions regarding this referral, please contact  the [facility] Referrals department at:             [phone]. Monday - Friday 8:00AM - 5:00PM.     Sincerely,    [facility]

## 2025-07-24 NOTE — Clinical Note
REFERRAL APPROVAL NOTICE         Sent on July 24, 2025                   Jumana Kahn  [address]  [facility] [address]                   Dear Ms. Kahn,    After a careful review of the medical information and benefit coverage, [facility] has processed your referral. See below for additional details.    If applicable, you must be actively enrolled with your insurance for coverage of the authorized service. If you have any questions regarding your coverage, please contact your insurance directly.    REFERRAL INFORMATION   Referral #:  [id number]  Referred-To Provider    Referred-By Provider:  [facility]    Cedric Grimm M.D.   [facility]      [address]  [place] [address]  [phone] [address] [facility]  [address]  [place] [id number] [phone]    Referral Start Date:  07/23/2025  Referral End Date:   07/23/2026             SCHEDULING  If you do not already have an appointment, please call [phone] to make an appointment.     MORE INFORMATION  If you do not already have a [employer] account, sign up at: [email or web address]  You can access your medical information, make appointments, see lab results, billing information, and more.  If you have questions regarding this referral, please contact  the [facility] Referrals department at:             [phone]. Monday - Friday 8:00AM - 5:00PM.     Sincerely,    [facility]

## 2025-07-30 ENCOUNTER — NON-PROVIDER VISIT (OUTPATIENT)
Dept: CARDIOLOGY | Facility: MEDICAL CENTER | Age: 60
End: 2025-07-30
Attending: INTERNAL MEDICINE
Payer: MEDICAID

## 2025-07-30 DIAGNOSIS — R00.2 PALPITATIONS: ICD-10-CM

## 2025-07-30 PROCEDURE — 93246 EXT ECG>7D<15D RECORDING: CPT

## 2025-07-30 NOTE — PROGRESS NOTES
Patient enrolled in 14 day  [employer] Patch program per Cedric Grimm M.D.  In clinic [facility].  >Currently pending EOS.  Monitor serial number: [id number]   [facility] Tech: Emily Chang

## 2025-08-11 ENCOUNTER — HOSPITAL ENCOUNTER (OUTPATIENT)
Dept: LAB | Facility: MEDICAL CENTER | Age: 60
End: 2025-08-11
Attending: INTERNAL MEDICINE
Payer: MEDICAID

## 2025-08-11 DIAGNOSIS — E03.9 HYPOTHYROIDISM, UNSPECIFIED TYPE: ICD-10-CM

## 2025-08-11 DIAGNOSIS — E61.1 IRON DEFICIENCY: ICD-10-CM

## 2025-08-11 LAB
BASOPHILS # BLD AUTO: 0.9 % (ref 0–1.8)
BASOPHILS # BLD: 0.06 K/UL (ref 0–0.12)
EOSINOPHIL # BLD AUTO: 0.24 K/UL (ref 0–0.51)
EOSINOPHIL NFR BLD: 3.5 % (ref 0–6.9)
ERYTHROCYTE [DISTWIDTH] IN BLOOD BY AUTOMATED COUNT: 44.9 FL (ref 35.9–50)
HCT VFR BLD AUTO: 44.5 % (ref 37–47)
HGB BLD-MCNC: 15.2 G/DL (ref 12–16)
IMM GRANULOCYTES # BLD AUTO: 0.01 K/UL (ref 0–0.11)
IMM GRANULOCYTES NFR BLD AUTO: 0.1 % (ref 0–0.9)
LYMPHOCYTES # BLD AUTO: 3.01 K/UL (ref 1–4.8)
LYMPHOCYTES NFR BLD: 43.4 % (ref 22–41)
MCH RBC QN AUTO: 31.3 PG (ref 27–33)
MCHC RBC AUTO-ENTMCNC: 34.2 G/DL (ref 32.2–35.5)
MCV RBC AUTO: 91.6 FL (ref 81.4–97.8)
MONOCYTES # BLD AUTO: 0.5 K/UL (ref 0–0.85)
MONOCYTES NFR BLD AUTO: 7.2 % (ref 0–13.4)
NEUTROPHILS # BLD AUTO: 3.12 K/UL (ref 1.82–7.42)
NEUTROPHILS NFR BLD: 44.9 % (ref 44–72)
NRBC # BLD AUTO: 0 K/UL
NRBC BLD-RTO: 0 /100 WBC (ref 0–0.2)
PLATELET # BLD AUTO: 162 K/UL (ref 164–446)
PMV BLD AUTO: 11.1 FL (ref 9–12.9)
RBC # BLD AUTO: 4.86 M/UL (ref 4.2–5.4)
TSH SERPL DL<=0.005 MIU/L-ACNC: 2.49 UIU/ML (ref 0.38–5.33)
WBC # BLD AUTO: 6.9 K/UL (ref 4.8–10.8)

## 2025-08-11 PROCEDURE — 84443 ASSAY THYROID STIM HORMONE: CPT

## 2025-08-11 PROCEDURE — 36415 COLL VENOUS BLD VENIPUNCTURE: CPT

## 2025-08-11 PROCEDURE — 85025 COMPLETE CBC W/AUTO DIFF WBC: CPT

## 2025-08-13 ENCOUNTER — OFFICE VISIT (OUTPATIENT)
Dept: INTERNAL MEDICINE | Facility: OTHER | Age: 60
End: 2025-08-13
Payer: MEDICAID

## 2025-08-13 VITALS
HEIGHT: 64 IN | TEMPERATURE: 99.5 F | WEIGHT: 155 LBS | BODY MASS INDEX: 26.46 KG/M2 | OXYGEN SATURATION: 98 % | DIASTOLIC BLOOD PRESSURE: 77 MMHG | SYSTOLIC BLOOD PRESSURE: 114 MMHG | HEART RATE: 62 BPM

## 2025-08-13 DIAGNOSIS — S93.402A SPRAIN OF LEFT ANKLE, UNSPECIFIED LIGAMENT, INITIAL ENCOUNTER: ICD-10-CM

## 2025-08-13 DIAGNOSIS — M54.12 CERVICAL RADICULOPATHY: ICD-10-CM

## 2025-08-13 DIAGNOSIS — R52 PAIN: ICD-10-CM

## 2025-08-13 DIAGNOSIS — M25.512 CHRONIC PAIN OF BOTH SHOULDERS: ICD-10-CM

## 2025-08-13 DIAGNOSIS — M25.511 CHRONIC PAIN OF BOTH SHOULDERS: ICD-10-CM

## 2025-08-13 DIAGNOSIS — M54.50 ACUTE MIDLINE LOW BACK PAIN WITHOUT SCIATICA: ICD-10-CM

## 2025-08-13 DIAGNOSIS — M54.2 NECK PAIN, CHRONIC: ICD-10-CM

## 2025-08-13 DIAGNOSIS — S82.891D CLOSED FRACTURE OF RIGHT ANKLE WITH ROUTINE HEALING: ICD-10-CM

## 2025-08-13 DIAGNOSIS — M25.561 CHRONIC PAIN OF BOTH KNEES: Primary | ICD-10-CM

## 2025-08-13 DIAGNOSIS — G89.29 CHRONIC PAIN OF BOTH KNEES: Primary | ICD-10-CM

## 2025-08-13 DIAGNOSIS — R00.2 PALPITATIONS: ICD-10-CM

## 2025-08-13 DIAGNOSIS — R53.83 OTHER FATIGUE: ICD-10-CM

## 2025-08-13 DIAGNOSIS — M54.6 ACUTE LEFT-SIDED THORACIC BACK PAIN: ICD-10-CM

## 2025-08-13 DIAGNOSIS — G89.29 CHRONIC PAIN OF BOTH SHOULDERS: ICD-10-CM

## 2025-08-13 DIAGNOSIS — G89.29 NECK PAIN, CHRONIC: ICD-10-CM

## 2025-08-13 DIAGNOSIS — M25.562 CHRONIC PAIN OF BOTH KNEES: Primary | ICD-10-CM

## 2025-08-13 DIAGNOSIS — Z98.890 HISTORY OF REPAIR OF ROTATOR CUFF: ICD-10-CM

## 2025-08-13 PROCEDURE — 3074F SYST BP LT 130 MM HG: CPT | Performed by: INTERNAL MEDICINE

## 2025-08-13 PROCEDURE — 3078F DIAST BP <80 MM HG: CPT | Performed by: INTERNAL MEDICINE

## 2025-08-13 PROCEDURE — 99214 OFFICE O/P EST MOD 30 MIN: CPT | Performed by: INTERNAL MEDICINE

## 2025-08-13 RX ORDER — BACLOFEN 5 MG/1
5-10 TABLET ORAL 3 TIMES DAILY PRN
Qty: 30 TABLET | Refills: 1 | Status: SHIPPED | OUTPATIENT
Start: 2025-08-13

## 2025-08-13 RX ORDER — CELECOXIB 100 MG/1
100 CAPSULE ORAL 2 TIMES DAILY PRN
Qty: 60 CAPSULE | Refills: 1 | Status: SHIPPED | OUTPATIENT
Start: 2025-08-13

## 2025-08-13 ASSESSMENT — FIBROSIS 4 INDEX: FIB4 SCORE: 1.91

## 2025-08-14 PROBLEM — R52 PAIN: Status: ACTIVE | Noted: 2025-08-14

## 2025-08-21 ENCOUNTER — TELEPHONE (OUTPATIENT)
Dept: CARDIOLOGY | Facility: MEDICAL CENTER | Age: 60
End: 2025-08-21
Payer: MEDICAID

## 2025-08-22 ENCOUNTER — TELEPHONE (OUTPATIENT)
Dept: INTERNAL MEDICINE | Facility: OTHER | Age: 60
End: 2025-08-22
Payer: MEDICAID

## (undated) DEVICE — CANISTER SUCTION RIGID RED 1500CC (40EA/CA)

## (undated) DEVICE — TUBING O2 7FT TIP SMTH BORE - (50/CA)

## (undated) DEVICE — TUBING CLEARLINK DUO-VENT - C-FLO (48EA/CA)

## (undated) DEVICE — TUBE NG SALEM SUMP 14FR (50EA/BX)

## (undated) DEVICE — SET EXTENSION WITH 2 PORTS (48EA/CA) ***PART #2C8610 IS A SUBSTITUTE*****

## (undated) DEVICE — KIT CUSTOM PROCEDURE SINGLE FOR ENDO  (15/CA)

## (undated) DEVICE — CANISTER SUCTION 3000ML MECHANICAL FILTER AUTO SHUTOFF MEDI-VAC NONSTERILE LF DISP  (40EA/CA)

## (undated) DEVICE — TUBE CONNECTING SUCTION - CLEAR PLASTIC STERILE 72 IN (50EA/CA)

## (undated) DEVICE — SENSOR SPO2 NEO LNCS ADHESIVE (20/BX) SEE USER NOTES

## (undated) DEVICE — FILM CASSETTE ENDO

## (undated) DEVICE — CANNULA W/ SUPPLY TUBING O2 - (50/CA)

## (undated) DEVICE — MASK WITH FACE SHIELD (25/BX 4BX/CA)

## (undated) DEVICE — CATHETER IV 20 GA X 1-1/4 ---SURG.& SDS ONLY--- (50EA/BX)

## (undated) DEVICE — CONTAINER, SPECIMEN, STERILE

## (undated) DEVICE — DEVICE HEMOSTATIC CLIPPING RESOLUTION 360 DEGREES (20EA/BX)

## (undated) DEVICE — BITE BLOCK ADULT 60FR (100EA/CA)

## (undated) DEVICE — ELECTRODE 850 FOAM ADHESIVE - HYDROGEL RADIOTRNSPRNT (50/PK)